# Patient Record
Sex: MALE | Race: BLACK OR AFRICAN AMERICAN | NOT HISPANIC OR LATINO | Employment: OTHER | URBAN - METROPOLITAN AREA
[De-identification: names, ages, dates, MRNs, and addresses within clinical notes are randomized per-mention and may not be internally consistent; named-entity substitution may affect disease eponyms.]

---

## 2018-02-01 DIAGNOSIS — N18.31 CHRONIC KIDNEY DISEASE (CKD) STAGE G3A/A3, MODERATELY DECREASED GLOMERULAR FILTRATION RATE (GFR) BETWEEN 45-59 ML/MIN/1.73 SQUARE METER AND ALBUMINURIA CREATININE RATIO GREATER THAN 300 MG/G (HCC): Primary | ICD-10-CM

## 2018-02-01 DIAGNOSIS — R80.9 PROTEINURIA, UNSPECIFIED TYPE: ICD-10-CM

## 2018-02-01 DIAGNOSIS — E55.9 VITAMIN D DEFICIENCY: ICD-10-CM

## 2018-03-01 ENCOUNTER — OFFICE VISIT (OUTPATIENT)
Dept: NEPHROLOGY | Facility: CLINIC | Age: 68
End: 2018-03-01
Payer: COMMERCIAL

## 2018-03-01 VITALS — BODY MASS INDEX: 27.43 KG/M2 | HEIGHT: 68 IN | WEIGHT: 181 LBS

## 2018-03-01 DIAGNOSIS — R80.1 PERSISTENT PROTEINURIA: Primary | ICD-10-CM

## 2018-03-01 DIAGNOSIS — E11.21 DIABETIC NEPHROPATHY ASSOCIATED WITH TYPE 2 DIABETES MELLITUS (HCC): ICD-10-CM

## 2018-03-01 DIAGNOSIS — N25.81 SECONDARY HYPERPARATHYROIDISM (HCC): ICD-10-CM

## 2018-03-01 DIAGNOSIS — N18.30 CKD (CHRONIC KIDNEY DISEASE) STAGE 3, GFR 30-59 ML/MIN (HCC): ICD-10-CM

## 2018-03-01 PROBLEM — E55.9 VITAMIN D DEFICIENCY: Status: ACTIVE | Noted: 2018-03-01

## 2018-03-01 PROCEDURE — 99214 OFFICE O/P EST MOD 30 MIN: CPT | Performed by: INTERNAL MEDICINE

## 2018-03-01 RX ORDER — OLMESARTAN MEDOXOMIL 5 MG/1
5 TABLET ORAL DAILY
Qty: 90 TABLET | Refills: 3 | Status: SHIPPED | OUTPATIENT
Start: 2018-03-01 | End: 2018-04-06 | Stop reason: SDUPTHER

## 2018-03-01 RX ORDER — ASCORBIC ACID 500 MG
500 TABLET ORAL DAILY
COMMUNITY

## 2018-03-01 RX ORDER — TRAVOPROST 0.004 %
1 DROPS OPHTHALMIC (EYE)
Refills: 0 | COMMUNITY
Start: 2018-02-12

## 2018-03-01 RX ORDER — METOPROLOL SUCCINATE 50 MG/1
50 TABLET, EXTENDED RELEASE ORAL DAILY
COMMUNITY
End: 2019-03-15 | Stop reason: HOSPADM

## 2018-03-01 RX ORDER — OLMESARTAN MEDOXOMIL 5 MG/1
1 TABLET ORAL DAILY
COMMUNITY
Start: 2015-01-01 | End: 2018-03-01 | Stop reason: SDUPTHER

## 2018-03-01 RX ORDER — ATORVASTATIN CALCIUM 20 MG/1
1 TABLET, FILM COATED ORAL DAILY
Refills: 0 | COMMUNITY
Start: 2018-02-19 | End: 2018-04-17 | Stop reason: ALTCHOICE

## 2018-03-01 RX ORDER — CALCITRIOL 0.25 UG/1
1 CAPSULE, LIQUID FILLED ORAL 2 TIMES WEEKLY
COMMUNITY
Start: 2015-01-01 | End: 2019-04-19 | Stop reason: SDUPTHER

## 2018-03-01 RX ORDER — MULTIVITAMIN
1 TABLET ORAL DAILY
COMMUNITY
End: 2019-03-15 | Stop reason: HOSPADM

## 2018-03-01 NOTE — PATIENT INSTRUCTIONS
Please be sure that one meal a day is salad based    Restrict your protein intake to about 50-60 grams a day

## 2018-03-01 NOTE — PROGRESS NOTES
Assessment/Plan:    Diabetic nephropathy associated with type 2 diabetes mellitus ([facility] [address] )  He has diabetes for 5 years but his glucose seems to be well controlled  Not sure if his increased proteinuria is related to diabetes or not  Checking hgb a1c and minor serologic workup  Increasing benicar to 5 mg daily    Secondary hyperparathyroidism ([facility] [address] )  Check pth and phos    CKD (chronic kidney disease) stage 3, GFR 30-59 ml/min  His kidney function has remained preserved Cr is 1 6 his prior cr baseline was 1 3-1 5  Persistent proteinuria  Again as above, increasing proteinuria not sure if this is diabetes or not  Plan for increasing benicar and recheck labs  If no decrease may need to consider kidney biopsy           Subjective:      Patient ID: Saint Richmond is a 78 y o  male  HPI    He is feeling welll and denies any chest or sob  Review of Systems  no cp, sob or edema no urinary issues  Objective:      Ht 5' 8" (1 727 m)   Wt 82 1 kg (181 lb)   BMI 27 52 kg/m²          Physical Exam   Constitutional: He appears well-nourished  HENT:   Head: Atraumatic  Eyes: EOM are normal    Neck: Thyromegaly present  Cardiovascular: Regular rhythm  Pulmonary/Chest: Breath sounds normal    Abdominal: Soft  Bowel sounds are normal    Musculoskeletal: He exhibits no edema  Lymphadenopathy:     He has no cervical adenopathy  Neurological:   nonfocal   Skin: Skin is warm and dry

## 2018-03-01 NOTE — ASSESSMENT & PLAN NOTE
Again as above, increasing proteinuria not sure if this is diabetes or not  Plan for increasing benicar and recheck labs   If no decrease may need to consider kidney biopsy

## 2018-03-28 LAB
25(OH)D3 SERPL-MCNC: 18.3 NG/ML (ref 30–100)
EXT GLUCOSE BLD: 86
EXTERNAL BACTERIA (UA): ABNORMAL
EXTERNAL BUN: 22
EXTERNAL CALCIUM: 8.3
EXTERNAL CHLORIDE: 106
EXTERNAL CO2: 25
EXTERNAL CREATININE: 1.65
EXTERNAL EGFR: 49
EXTERNAL POTASSIUM: 4.6
EXTERNAL SODIUM: 138
GLUCOSE 24H UR-MRATE: ABNORMAL G/(24.H)
HGB UR QL STRIP.AUTO: ABNORMAL
LEUKOCYTE ESTERASE UR QL STRIP: NEGATIVE
MAGNESIUM SERPL-MCNC: 1.7 MG/DL (ref 1.6–2.6)
NITRITE UR QL STRIP: NEGATIVE
NON-SQ EPI CELLS URNS QL MICRO: ABNORMAL /HPF
PH SMN: 5 [PH]
PHOSPHATE SERPL-MCNC: 4 MG/DL (ref 2.3–4.1)
PROT UR STRIP-MCNC: ABNORMAL MG/DL
PROT/CREAT 24H UR: 4.2 MG/G{CREAT}
RBC #/AREA URNS AUTO: ABNORMAL /HPF
SP GR UR STRIP.AUTO: 1.01 (ref 1–1.03)
WBC #/AREA URNS AUTO: ABNORMAL /HPF

## 2018-04-06 ENCOUNTER — OFFICE VISIT (OUTPATIENT)
Dept: NEPHROLOGY | Facility: CLINIC | Age: 68
End: 2018-04-06
Payer: COMMERCIAL

## 2018-04-06 VITALS — HEIGHT: 68 IN | BODY MASS INDEX: 26.98 KG/M2 | WEIGHT: 178 LBS

## 2018-04-06 DIAGNOSIS — R80.1 PERSISTENT PROTEINURIA: Primary | ICD-10-CM

## 2018-04-06 DIAGNOSIS — N25.81 SECONDARY HYPERPARATHYROIDISM (HCC): ICD-10-CM

## 2018-04-06 DIAGNOSIS — E11.21 DIABETIC NEPHROPATHY ASSOCIATED WITH TYPE 2 DIABETES MELLITUS (HCC): Primary | ICD-10-CM

## 2018-04-06 DIAGNOSIS — R80.1 PERSISTENT PROTEINURIA: ICD-10-CM

## 2018-04-06 DIAGNOSIS — E55.9 VITAMIN D DEFICIENCY: ICD-10-CM

## 2018-04-06 DIAGNOSIS — N18.30 CKD (CHRONIC KIDNEY DISEASE) STAGE 3, GFR 30-59 ML/MIN (HCC): ICD-10-CM

## 2018-04-06 PROCEDURE — 99214 OFFICE O/P EST MOD 30 MIN: CPT | Performed by: INTERNAL MEDICINE

## 2018-04-06 RX ORDER — OLMESARTAN MEDOXOMIL 5 MG/1
TABLET ORAL
Qty: 180 TABLET | Refills: 3 | Status: SHIPPED | OUTPATIENT
Start: 2018-04-06 | End: 2018-05-05 | Stop reason: SDUPTHER

## 2018-04-06 NOTE — PROGRESS NOTES
Assessment/Plan:    CKD (chronic kidney disease) stage 3, GFR 30-59 ml/min  She has known stage 3 chronic kidney disease and his kidney function is at his baseline however he does have worsening proteinuria  His hemoglobin A1c is 5 6 and I am not sure at the worsening proteinuria is secondary to diabetic related renal disease or another process  I think we need to pursue a kidney biopsy at this point to further elaborate on the cause of his worsening proteinuria  Serologic workup has been essentially negative    Persistent proteinuria  As noted his protein creatinine ratio has increased to 4 4  Was 1 6 in 2016  While there has been no change in his kidney function the concern is whether not increased proteinuria is due to diabetic nephropathy versus some other process  Serologic workup has been negative  With increasing his Benicar either has not been any decrease in his proteinuria  Increase benicar to 10 mg and repeat labs also setting up for kidney biopsy to evaluate the cause of his proteinuria  Secondary hyperparathyroidism (HCC)  Intact PTH is 78  He is on twice weekly Rocaltrol and his PTH has been stable  Was 76 in 2016    Vitamin D deficiency  Recheck vitamin-D level with next visit; if it is low may be contributing as well secondary hyperparathyroidism    Diabetic nephropathy associated with type 2 diabetes mellitus ([facility] [address] )  Please see other notes  Again the question is is a worsening proteinuria due to diabetic nephropathy or not  Think we need to proceed with a kidney biopsy as discussed  The risks and benefits of a kidney biopsy were discussed with the patient  We talked about the risks of bleeding due to the kidney biopsy  We talked about how he would need to stay in the hospital overnight after the kidney biopsy as risk of bleeding is increased to a 2nd 12 hours    I stated there is a 100,000 chance that the bleeding would be severe now been active procedure such as embolization would need to be done and there was at 1 in a million chance that the bleeding would be significant enough that he could lose kidney  He is currently not on any anti-platelet medication  We will try and set up the biopsy at [facility] over the next 2 weeks; I will try for next week if possible          Subjective:      Patient ID: Osman Alfredo is a 78 y o  male  HPI    Patient has been feeling well  He tolerated the increased dose of Benicar  No dizziness no lightheadedness no chest pressure or shortness of Breath  He is here for follow-up on the blood urine studies after increasing the Benicar    Review of Systems  no chest pressure or shortness of Breath no nausea vomiting diarrhea no abdominal pain or back pain no urgency no frequency    Objective:      Ht 5' 8" (1 727 m)   Wt 80 7 kg (178 lb)   BMI 27 06 kg/m²          Physical Exam   Constitutional: He is oriented to person, place, and time  He appears well-nourished  HENT:   Head: Normocephalic  Eyes: Pupils are equal, round, and reactive to light  Neck: Neck supple  Cardiovascular: Normal rate and regular rhythm  Pulmonary/Chest: Breath sounds normal    Abdominal: Soft  Bowel sounds are normal    Musculoskeletal: He exhibits no edema  Lymphadenopathy:     He has no cervical adenopathy  Neurological: He is alert and oriented to person, place, and time  nonfocal   Skin: Skin is warm and dry

## 2018-04-06 NOTE — ASSESSMENT & PLAN NOTE
Recheck vitamin-D level with next visit; if it is low may be contributing as well secondary hyperparathyroidism

## 2018-04-06 NOTE — ASSESSMENT & PLAN NOTE
She has known stage 3 chronic kidney disease and his kidney function is at his baseline however he does have worsening proteinuria  His hemoglobin A1c is 5 6 and I am not sure at the worsening proteinuria is secondary to diabetic related renal disease or another process  I think we need to pursue a kidney biopsy at this point to further elaborate on the cause of his worsening proteinuria    Serologic workup has been essentially negative

## 2018-04-06 NOTE — ASSESSMENT & PLAN NOTE
Please see other notes  Again the question is is a worsening proteinuria due to diabetic nephropathy or not  Think we need to proceed with a kidney biopsy as discussed  The risks and benefits of a kidney biopsy were discussed with the patient  We talked about the risks of bleeding due to the kidney biopsy  We talked about how he would need to stay in the hospital overnight after the kidney biopsy as risk of bleeding is increased to a 2nd 12 hours  I stated there is a 100,000 chance that the bleeding would be severe now been active procedure such as embolization would need to be done and there was at 1 in a million chance that the bleeding would be significant enough that he could lose kidney  He is currently not on any anti-platelet medication    We will try and set up the biopsy at [facility] over the next 2 weeks; I will try for next week if possible

## 2018-04-06 NOTE — PATIENT INSTRUCTIONS
1  Please increase your benicar to 5 mg 2 tablets daily in the morning  Please call if you develop dizziness or lightheadedness and decrease back to 5 mg once a day    2  Please check your blood pressure at home daily; please call if you develop dizziness or lightheadedness    3   We will call you regarding the set up of the kidney biopsy

## 2018-04-06 NOTE — ASSESSMENT & PLAN NOTE
As noted his protein creatinine ratio has increased to 4 4  Was 1 6 in 2016  While there has been no change in his kidney function the concern is whether not increased proteinuria is due to diabetic nephropathy versus some other process  Serologic workup has been negative  With increasing his Benicar either has not been any decrease in his proteinuria  Increase benicar to 10 mg and repeat labs also setting up for kidney biopsy to evaluate the cause of his proteinuria

## 2018-04-13 ENCOUNTER — TELEPHONE (OUTPATIENT)
Dept: OTHER | Facility: HOSPITAL | Age: 68
End: 2018-04-13

## 2018-04-13 DIAGNOSIS — Z01.818 PRE-OPERATIVE CLEARANCE: ICD-10-CM

## 2018-04-13 DIAGNOSIS — R80.9 PROTEINURIA, UNSPECIFIED TYPE: ICD-10-CM

## 2018-04-13 DIAGNOSIS — N18.31 CHRONIC KIDNEY DISEASE (CKD) STAGE G3A/A2, MODERATELY DECREASED GLOMERULAR FILTRATION RATE (GFR) BETWEEN 45-59 ML/MIN/1.73 SQUARE METER AND ALBUMINURIA CREATININE RATIO BETWEEN 30-299 MG/G (HCC): Primary | ICD-10-CM

## 2018-04-13 NOTE — TELEPHONE ENCOUNTER
I spoke with patient on the phone today as well as IR to clarify the upcoming scheduled procedure  Mr Mouna Michel is scheduled to have IR kidney biopsy on 4/17  I confirmed this date with him  He will get PT and cbc on Saturday at [facility] as well as other lab work and undergo the procedure on Tuesday  Phone consult will be done via IR  Had communicated with IR and my colleagues and the patient will be monitored post procedure and likely discharged in the afternoon that day post-procedure  Appreciate IR input and help

## 2018-04-16 ENCOUNTER — TELEPHONE (OUTPATIENT)
Dept: NEPHROLOGY | Facility: CLINIC | Age: 68
End: 2018-04-16

## 2018-04-16 LAB
ALBUMIN SNV-MCNC: 3.2 G/DL
ALP SERPL-CCNC: 52 U/L (ref 46–116)
ALT SERPL W P-5'-P-CCNC: 25 U/L (ref 12–78)
AST SERPL W P-5'-P-CCNC: 20 U/L (ref 5–45)
BUN SERPL-MCNC: 25 MG/DL (ref 5–25)
CALCIUM SERPL-MCNC: 8.7 MG/DL
CHLORIDE SERPL-SCNC: 105 MMOL/L (ref 100–108)
CO2 SERPL-SCNC: 25 MMOL/L (ref 21–32)
CREAT SERPL-MCNC: 1.72 MG/DL (ref 0.6–1.3)
HCT VFR BLD AUTO: 44.3 % (ref 36.5–49.3)
HGB BLDA-MCNC: 15.4 G/DL (ref 12–17)
PLATELET # BLD AUTO: 198 THOUSANDS/UL (ref 149–390)
POTASSIUM SERPL-SCNC: 4.6 MMOL/L (ref 3.5–5.3)
PROT SERPL-MCNC: 6.4 G/DL (ref 6.4–8.2)
SODIUM SERPL-SCNC: 136 MMOL/L (ref 136–145)
WBC # BLD AUTO: 8 THOUSAND/UL

## 2018-04-16 NOTE — TELEPHONE ENCOUNTER
Per patient, he was told by [facility] IR nurse that they will draw PT/INR on his arrival for biopsy  Accordingly, pt did not go to [facility] 4/14/18 to have it done as scheduled

## 2018-04-17 ENCOUNTER — HOSPITAL ENCOUNTER (OUTPATIENT)
Dept: CT IMAGING | Facility: HOSPITAL | Age: 68
Discharge: HOME | End: 2018-04-17
Attending: INTERNAL MEDICINE
Payer: COMMERCIAL

## 2018-04-17 VITALS
TEMPERATURE: 97.6 F | WEIGHT: 178 LBS | BODY MASS INDEX: 26.98 KG/M2 | OXYGEN SATURATION: 99 % | SYSTOLIC BLOOD PRESSURE: 146 MMHG | DIASTOLIC BLOOD PRESSURE: 61 MMHG | HEIGHT: 68 IN | RESPIRATION RATE: 18 BRPM | HEART RATE: 71 BPM

## 2018-04-17 DIAGNOSIS — R80.1 PERSISTENT PROTEINURIA: ICD-10-CM

## 2018-04-17 LAB
GLUCOSE SERPL-MCNC: 91 MG/DL (ref 65–140)
INR PPP: 0.9 (ref 0.86–1.16)
PROTHROMBIN TIME: 12.4 SECONDS (ref 12.1–14.4)

## 2018-04-17 PROCEDURE — 50200 RENAL BIOPSY PERQ: CPT | Performed by: RADIOLOGY

## 2018-04-17 PROCEDURE — 99152 MOD SED SAME PHYS/QHP 5/>YRS: CPT

## 2018-04-17 PROCEDURE — 88300 SURGICAL PATH GROSS: CPT | Performed by: PATHOLOGY

## 2018-04-17 PROCEDURE — 77012 CT SCAN FOR NEEDLE BIOPSY: CPT

## 2018-04-17 PROCEDURE — 88348 ELECTRON MICROSCOPY DX: CPT

## 2018-04-17 PROCEDURE — 77012 CT SCAN FOR NEEDLE BIOPSY: CPT | Performed by: RADIOLOGY

## 2018-04-17 PROCEDURE — 88346 IMFLUOR 1ST 1ANTB STAIN PX: CPT

## 2018-04-17 PROCEDURE — 99152 MOD SED SAME PHYS/QHP 5/>YRS: CPT | Performed by: RADIOLOGY

## 2018-04-17 PROCEDURE — 88350 IMFLUOR EA ADDL 1ANTB STN PX: CPT

## 2018-04-17 PROCEDURE — 88329 PATH CONSLTJ DRG SURG: CPT | Performed by: PATHOLOGY

## 2018-04-17 PROCEDURE — 88313 SPECIAL STAINS GROUP 2: CPT

## 2018-04-17 PROCEDURE — 85610 PROTHROMBIN TIME: CPT | Performed by: RADIOLOGY

## 2018-04-17 PROCEDURE — 88305 TISSUE EXAM BY PATHOLOGIST: CPT | Performed by: INTERNAL MEDICINE

## 2018-04-17 PROCEDURE — 82948 REAGENT STRIP/BLOOD GLUCOSE: CPT

## 2018-04-17 PROCEDURE — 50200 RENAL BIOPSY PERQ: CPT

## 2018-04-17 RX ORDER — MIDAZOLAM HYDROCHLORIDE 1 MG/ML
INJECTION INTRAMUSCULAR; INTRAVENOUS CODE/TRAUMA/SEDATION MEDICATION
Status: COMPLETED | OUTPATIENT
Start: 2018-04-17 | End: 2018-04-17

## 2018-04-17 RX ORDER — SODIUM CHLORIDE 9 MG/ML
75 INJECTION, SOLUTION INTRAVENOUS CONTINUOUS
Status: DISCONTINUED | OUTPATIENT
Start: 2018-04-17 | End: 2018-04-18 | Stop reason: HOSPADM

## 2018-04-17 RX ORDER — HYDROCODONE BITARTRATE AND ACETAMINOPHEN 5; 325 MG/1; MG/1
1 TABLET ORAL EVERY 6 HOURS PRN
Status: DISCONTINUED | OUTPATIENT
Start: 2018-04-17 | End: 2018-04-18 | Stop reason: HOSPADM

## 2018-04-17 RX ORDER — FENTANYL CITRATE 50 UG/ML
INJECTION, SOLUTION INTRAMUSCULAR; INTRAVENOUS CODE/TRAUMA/SEDATION MEDICATION
Status: COMPLETED | OUTPATIENT
Start: 2018-04-17 | End: 2018-04-17

## 2018-04-17 RX ORDER — BIOTIN 1 MG
1 TABLET ORAL DAILY
COMMUNITY

## 2018-04-17 RX ADMIN — FENTANYL CITRATE 50 MCG: 50 INJECTION INTRAMUSCULAR; INTRAVENOUS at 09:32

## 2018-04-17 RX ADMIN — SODIUM CHLORIDE 75 ML/HR: 0.9 INJECTION, SOLUTION INTRAVENOUS at 08:30

## 2018-04-17 RX ADMIN — FENTANYL CITRATE 50 MCG: 50 INJECTION INTRAMUSCULAR; INTRAVENOUS at 09:36

## 2018-04-17 RX ADMIN — MIDAZOLAM HYDROCHLORIDE 1 MG: 1 INJECTION, SOLUTION INTRAMUSCULAR; INTRAVENOUS at 09:36

## 2018-04-17 RX ADMIN — MIDAZOLAM HYDROCHLORIDE 1 MG: 1 INJECTION, SOLUTION INTRAMUSCULAR; INTRAVENOUS at 09:32

## 2018-04-17 NOTE — DISCHARGE INSTRUCTIONS
Percutaneous Kidney Biopsy   WHAT YOU NEED TO KNOW:   A percutaneous kidney biopsy is a procedure to remove a small sample of kidney tissue  It may also be done to check for kidney disease or cancer  DISCHARGE INSTRUCTIONS:   Follow up with your healthcare provider as directed:  Write down your questions so you remember to ask them during your visits  Wound care: The Band-Aid may be removed in 24 hours  For more information:   · National Kid[employer] and Urologic [employer] Information Clearinghouse  [address], [place] [address]  Phone: 9- 583 - [phone]  Web Address: http://kidney  niddk nih gov/   Care after your procedure:    1  Limit your activities for 36 hours after your biopsy  2  No driving day of biopsy  3  Return to your normal diet  Flat sips of flat soda helps with mild nausea  4  Remove band-aid or dressing 24 hours after procedure  Contact Interventional Radiology at [phone]    Ben PATIENTS: Contact Interventional Radiology at [phone]) Dany Skiff PATIENTS: Contact Interventional Radiology at [phone]) if:    1  Difficulty breathing, nausea or vomiting  2  You feel weak or dizzy  3  Chills or fever above 101 degrees F  You have persistent nausea or vomiting    4  You have severe pain in your abdomen or where You feel weak or dizzy  your           procedure was done  5  You have blood in your urine  You urinate small amounts or not at all  4  Develop any redness, swelling, heat, unusual drainage, heavy bruising or bleeding     from biopsy site

## 2018-04-17 NOTE — BRIEF OP NOTE (RAD/CATH)
CT NEEDLE BIOPSY KIDNEY  Procedure Note    PATIENT NAME: Genie Heimlich  : 1950  MRN: [id number]     Pre-op Diagnosis:   1  Persistent proteinuria      Post-op Diagnosis:   1  Persistent proteinuria        Surgeon:   Obey Sanchez MD  Assistants:     No qualified resident was available, Resident is only observing    Estimated Blood Loss: minimal     Findings:     Lower pole right kidney biopsy performed  Glomeruli present, adequate for evaluation  D stat was used  No post biopsy hemorrhage  Specimens: two 18 gauge core biopsy samples       Complications:  none    Anesthesia: Conscious sedation and Amy Duvall MD     Date: 2018  Time: 9:54 AM

## 2018-04-27 LAB — MISCELLANEOUS LAB TEST RESULT: NORMAL

## 2018-05-05 DIAGNOSIS — R80.1 PERSISTENT PROTEINURIA: ICD-10-CM

## 2018-05-07 RX ORDER — OLMESARTAN MEDOXOMIL 5 MG/1
TABLET ORAL
Qty: 30 TABLET | Refills: 6 | Status: SHIPPED | OUTPATIENT
Start: 2018-05-07 | End: 2018-05-11

## 2018-05-10 PROBLEM — N05.1 FSGS (FOCAL SEGMENTAL GLOMERULOSCLEROSIS): Status: ACTIVE | Noted: 2018-05-10

## 2018-05-11 ENCOUNTER — OFFICE VISIT (OUTPATIENT)
Dept: NEPHROLOGY | Facility: CLINIC | Age: 68
End: 2018-05-11
Payer: COMMERCIAL

## 2018-05-11 VITALS — BODY MASS INDEX: 26.98 KG/M2 | WEIGHT: 178 LBS | HEIGHT: 68 IN

## 2018-05-11 DIAGNOSIS — R80.1 PERSISTENT PROTEINURIA: ICD-10-CM

## 2018-05-11 DIAGNOSIS — N18.30 CKD (CHRONIC KIDNEY DISEASE) STAGE 3, GFR 30-59 ML/MIN (HCC): ICD-10-CM

## 2018-05-11 DIAGNOSIS — N05.1 FSGS (FOCAL SEGMENTAL GLOMERULOSCLEROSIS): Primary | ICD-10-CM

## 2018-05-11 PROBLEM — I10 ESSENTIAL HYPERTENSION: Status: ACTIVE | Noted: 2018-05-11

## 2018-05-11 PROCEDURE — 99214 OFFICE O/P EST MOD 30 MIN: CPT | Performed by: INTERNAL MEDICINE

## 2018-05-11 RX ORDER — OLMESARTAN MEDOXOMIL 5 MG/1
12.5 TABLET ORAL DAILY
Qty: 90 TABLET | Refills: 4 | Status: SHIPPED | OUTPATIENT
Start: 2018-05-11 | End: 2018-12-31 | Stop reason: SDUPTHER

## 2018-05-11 RX ORDER — OLMESARTAN MEDOXOMIL 5 MG/1
12.5 TABLET ORAL DAILY
Qty: 90 TABLET | Refills: 3 | Status: SHIPPED | OUTPATIENT
Start: 2018-05-11 | End: 2018-05-11

## 2018-05-11 NOTE — PROGRESS NOTES
Assessment/Plan:    CKD (chronic kidney disease) stage 3, GFR 30-59 ml/min  The patient has a baseline Cr in the mid 1 0 range; the patient has biopsy proven  Secondary FSGS  I had called the patient  After his biopsy with the results  The goal of therapy is to increase ARB dosing, weight loss  FSGS (focal segmental glomerulosclerosis)  This is secondary FSGS most likely; the patient has glomerulomegaly on biopsy  As noted above, the plan would be increasing ARB dosage, further weight loss as well as screening for ELBERT  He has lost at least 30 lb of not more since I have seen him  His current weight is 170 lb would like to see him down to 170 lb and then we can reassess  2 weeks ago we had increased his almost started 10 mg daily now will increase to 12 5 mg daily alternating with 10 mg will repeat his potassium and if it is stable will increase to just  12 5 mg daily    Secondary hyperparathyroidism ([address] )  Last intact PTH was 78  Patient is on calcitriol; recheck PTH level    Persistent proteinuria   He has biopsy-proven secondary FSGS with glomerulomegaly  And there is only mild diabetic glomerulosclerosis  There was minimal fibrosis; This is more of a secondary form of FSGS encouraging weight loss and increasing ARB  As able  Essential hypertension   His blood pressure is currently 140/60  Increasing Benicar as noted above; also encouraging weight loss  Subjective:      Patient ID: Myrna Garcias is a 78 y o  male  HPI      We are seeing Mr Mustafa  In follow-up for chronic kidney disease and status post kidney biopsy  His biopsy demonstrated more of a secondary focal segmental glomerulosclerosis with only very mild diabetic changes  We had increased his Benicar 10 mg  Daily couple of weeks ago and he is tolerating it okay      Review of Systems    He denies any gross hematuria no swelling in the legs no nausea vomiting diarrhea, he denies any abdominal or back pain no dizziness no lightheadedness no cramping  His weight is decreased to 178 pounds  Objective:      Ht 5' 8" (1 727 m)   Wt 80 7 kg (178 lb)   BMI 27 06 kg/m²      blood pressure is 140/62 in the right arm the     Physical Exam   Constitutional: He appears well-nourished  HENT:   Head: Atraumatic  Eyes: No scleral icterus  Neck: Neck supple  Cardiovascular: Normal rate and regular rhythm  Pulmonary/Chest: Effort normal and breath sounds normal    Abdominal: Soft  Bowel sounds are normal    Musculoskeletal: He exhibits no edema  Lymphadenopathy:     He has no cervical adenopathy  Neurological:   Nonfocal patient answers questions appropriately   Skin: Skin is warm and dry

## 2018-05-11 NOTE — ASSESSMENT & PLAN NOTE
His blood pressure is currently 140/60  Increasing Benicar as noted above; also encouraging weight loss

## 2018-05-11 NOTE — ASSESSMENT & PLAN NOTE
He has biopsy-proven secondary FSGS with glomerulomegaly  And there is only mild diabetic glomerulosclerosis  There was minimal fibrosis; This is more of a secondary form of FSGS encouraging weight loss and increasing ARB  As able

## 2018-05-11 NOTE — PATIENT INSTRUCTIONS
1  You have lost a tremendous amount of weight  Let's shoot for 170 pounds and then reassess  2  Increase benicar to 10 mg alternating with 12 5 mg every other day  Will repeat blood work the first week of [place]      3  Thank you for coming in today; it was nice visiting with you

## 2018-05-11 NOTE — ASSESSMENT & PLAN NOTE
The patient has a baseline Cr in the mid 1 0 range; the patient has biopsy proven  Secondary FSGS  I had called the patient  After his biopsy with the results  The goal of therapy is to increase ARB dosing, weight loss

## 2018-05-11 NOTE — ASSESSMENT & PLAN NOTE
This is secondary FSGS most likely; the patient has glomerulomegaly on biopsy  As noted above, the plan would be increasing ARB dosage, further weight loss as well as screening for ELBERT  He has lost at least 30 lb of not more since I have seen him  His current weight is 170 lb would like to see him down to 170 lb and then we can reassess    2 weeks ago we had increased his almost started 10 mg daily now will increase to 12 5 mg daily alternating with 10 mg will repeat his potassium and if it is stable will increase to just  12 5 mg daily

## 2018-07-25 DIAGNOSIS — M10.9 GOUT, UNSPECIFIED CAUSE, UNSPECIFIED CHRONICITY, UNSPECIFIED SITE: Primary | ICD-10-CM

## 2018-07-30 LAB
25(OH)D3 SERPL-MCNC: 28.7 NG/ML (ref 30–100)
EXT GLUCOSE BLD: 91
EXTERNAL BUN: 20
EXTERNAL CALCIUM: 8.4
EXTERNAL CHLORIDE: 107
EXTERNAL CO2: 24
EXTERNAL CREATININE: 1.7
EXTERNAL EGFR: 47
EXTERNAL POTASSIUM: 4.4
EXTERNAL SODIUM: 137

## 2018-08-22 NOTE — ASSESSMENT & PLAN NOTE
He has stage 3 chronic kidney disease and biopsy-proven focal segmental glomerular sclerosis  His baseline creatinine has been in the mid 1 range about 1 6-1 8  Titrating Benicar as much as possible in terms of facilitating protein reduction  He has also lost weight which is also helping  Urinalysis shows 3+ protein and no blood  Need to recheck protein creatinine ratio    Recheck urine studies in October

## 2018-08-24 ENCOUNTER — OFFICE VISIT (OUTPATIENT)
Dept: NEPHROLOGY | Facility: CLINIC | Age: 68
End: 2018-08-24
Payer: COMMERCIAL

## 2018-08-24 VITALS — WEIGHT: 173 LBS | BODY MASS INDEX: 26.22 KG/M2 | HEIGHT: 68 IN

## 2018-08-24 DIAGNOSIS — N18.30 CKD (CHRONIC KIDNEY DISEASE) STAGE 3, GFR 30-59 ML/MIN (HCC): ICD-10-CM

## 2018-08-24 DIAGNOSIS — E55.9 VITAMIN D DEFICIENCY: ICD-10-CM

## 2018-08-24 DIAGNOSIS — R80.1 PERSISTENT PROTEINURIA: ICD-10-CM

## 2018-08-24 DIAGNOSIS — N05.1 FSGS (FOCAL SEGMENTAL GLOMERULOSCLEROSIS): Primary | ICD-10-CM

## 2018-08-24 PROCEDURE — 99214 OFFICE O/P EST MOD 30 MIN: CPT | Performed by: INTERNAL MEDICINE

## 2018-08-24 RX ORDER — ATORVASTATIN CALCIUM 20 MG/1
1 TABLET, FILM COATED ORAL DAILY
COMMUNITY
Start: 2018-07-27 | End: 2020-11-17 | Stop reason: SDUPTHER

## 2018-08-24 NOTE — PATIENT INSTRUCTIONS
1  Thank you for coming to see me today       2  Please check your blood pressure three times a week and let me know if your blood pressure is consistently over 140

## 2018-08-24 NOTE — ASSESSMENT & PLAN NOTE
Recheck PTH level before next blood work in October his phosphorus is stable at 3 7  From his prior blood work intact PTH was 78    Maintain twice weekly Rocaltrol

## 2018-08-24 NOTE — PROGRESS NOTES
Assessment/Plan:    FSGS (focal segmental glomerulosclerosis)   He has stage 3 chronic kidney disease and biopsy-proven focal segmental glomerular sclerosis  His baseline creatinine has been in the mid 1 range about 1 6-1 8  Titrating Benicar as much as possible in terms of facilitating protein reduction  He has also lost weight which is also helping  Urinalysis shows 3+ protein and no blood  Need to recheck protein creatinine ratio  Recheck urine studies in October    Vitamin D deficiency   The vitamin-D level is increased from 18-28  Continue with vitamin-D  Persistent proteinuria   Secondary to biopsy-proven focal segmental glomerulosclerosis  Increase ARB dosing as tolerated    CKD (chronic kidney disease) stage 3, GFR 30-59 ml/min  He has baseline ckd 3 with Cr 1 6-1 8 baseline  On recent blood work is creatinine is 1 7 continue with Benicar    Secondary hyperparathyroidism ([address] )  Recheck PTH level before next blood work in October his phosphorus is stable at 3 7  From his prior blood work intact PTH was 78  Maintain twice weekly Rocaltrol    He is undergoing evaluation with Cardiology with a stress test and echocardiogram   I askedWith regards to hypertension:   him to check his blood pressure at home at least 3 times a week sitting to let me know if he is consistently over were above 140  I am not sure if any medications will be adjusted by Cardiology until he undergoes further cardiac testing so I am holding off on adjusting any medications of his right now  If he has any further gout exacerbations with talked about switching his Benicar to losartan which is more uricosuric  Abdirahman Beckford He has no history of any kidney stones  We talked about maintaining fluid intake  With regards to FSGS, he has lost weight and is new set point seems to be in the 170s and long-term this will only help his kidney function  All questions answered  Subjective:      Patient ID: Eddie Sarmiento is a 80 y o  male     HPI    Patient is here with his wife in follow-up for chronic kidney disease  He has had trying couple of weeks  He talked about the recent passing of his father and dealing with family issues  He also talked about the long drive that he had to his father's    He had developed some transient leg edema that seemed to resolve with diuretics  He has seen Cardiology and will be undergoing an echocardiogram and stress test   He also had a recent gout exacerbation which was his 1st and approximately 3 years  He currently denies any acute complaints  He denies any further problems with any leg swelling no chest pressure or shortness of Breath no nausea vomiting diarrhea no dizziness no lightheadedness  He denies any further ankle swelling no further pain; he states he had taken tart cherry extract and helped him with his gout    Review of Systems  as above  Objective:  146/62 in the right arm    Ht 5' 8" (1 727 m)   Wt 78 5 kg (173 lb)   BMI 26 30 kg/m²          Physical Exam   Constitutional: He appears well-nourished  Eyes: No scleral icterus  Neck: Neck supple  Cardiovascular: Regular rhythm  Pulmonary/Chest: Breath sounds normal    Abdominal: Soft  Bowel sounds are normal    Musculoskeletal: He exhibits no edema  There is no ankle edema there is no tenderness   Lymphadenopathy:     He has no cervical adenopathy  Neurological:   Nonfocal   Skin: Skin is warm and dry  No erythema

## 2018-08-24 NOTE — ASSESSMENT & PLAN NOTE
He has baseline ckd 3 with Cr 1 6-1 8 baseline    On recent blood work is creatinine is 1 7 continue with Benicar

## 2018-10-01 LAB
BILIRUB UR QL STRIP: NEGATIVE
CREAT ?TM UR-SCNC: 161 UMOL/L
CREAT ?TM UR-SCNC: 161 UMOL/L
EXT GLUCOSE BLD: 96
EXT PROTEIN URINE: 640
EXTERNAL ALBUMIN: 3.2
EXTERNAL ALK PHOS: 53
EXTERNAL ALT: 21
EXTERNAL ANION GAP: 10
EXTERNAL AST: 16
EXTERNAL BACTERIA (UA): ABNORMAL
EXTERNAL BICARBONATE: 28
EXTERNAL BUN: 21
EXTERNAL CALCIUM: 8.8
EXTERNAL CHLORIDE: 106
EXTERNAL CREATININE: 1.71
EXTERNAL EGFR: 41
EXTERNAL PHOSPHORUS: 3.6
EXTERNAL POTASSIUM: 4.6
EXTERNAL PTH: 82
EXTERNAL SODIUM: 139
EXTERNAL T.BILIRUBIN: 0.5
EXTERNAL TOTAL PROTEIN: 6.4
GLUCOSE UR STRIP-MCNC: NEGATIVE MG/DL
HCT VFR BLD AUTO: 42.2 % (ref 36.5–49.3)
HGB BLD-MCNC: 14.6 G/DL (ref 12–17)
HGB UR QL STRIP.AUTO: ABNORMAL
KETONES UR STRIP-MCNC: NEGATIVE MG/DL
LEUKOCYTE ESTERASE UR QL STRIP: NEGATIVE
NITRITE UR QL STRIP: NEGATIVE
NON-SQ EPI CELLS URNS QL MICRO: ABNORMAL /HPF
PH UR STRIP.AUTO: 6 [PH] (ref 4.5–8)
PLATELET # BLD AUTO: 196 THOUSANDS/UL (ref 149–390)
PROT UR STRIP-MCNC: ABNORMAL MG/DL
PROT/CREAT UR: 4 MG/G{CREAT}
RBC #/AREA URNS AUTO: ABNORMAL /HPF
SP GR UR STRIP.AUTO: 1.03 (ref 1–1.03)
UROBILINOGEN UR QL STRIP.AUTO: 0.2 E.U./DL
WBC # BLD AUTO: 8.4 THOUSAND/UL
WBC #/AREA URNS AUTO: ABNORMAL /HPF

## 2018-12-28 ENCOUNTER — OFFICE VISIT (OUTPATIENT)
Dept: NEPHROLOGY | Facility: CLINIC | Age: 68
End: 2018-12-28
Payer: COMMERCIAL

## 2018-12-28 VITALS — WEIGHT: 165 LBS | BODY MASS INDEX: 25.01 KG/M2 | HEIGHT: 68 IN

## 2018-12-28 DIAGNOSIS — N05.1 FSGS (FOCAL SEGMENTAL GLOMERULOSCLEROSIS): ICD-10-CM

## 2018-12-28 DIAGNOSIS — N25.81 SECONDARY HYPERPARATHYROIDISM (HCC): ICD-10-CM

## 2018-12-28 DIAGNOSIS — N18.30 CKD (CHRONIC KIDNEY DISEASE) STAGE 3, GFR 30-59 ML/MIN (HCC): Primary | ICD-10-CM

## 2018-12-28 DIAGNOSIS — R80.1 PERSISTENT PROTEINURIA: ICD-10-CM

## 2018-12-28 PROCEDURE — 99214 OFFICE O/P EST MOD 30 MIN: CPT | Performed by: INTERNAL MEDICINE

## 2018-12-28 NOTE — ASSESSMENT & PLAN NOTE
He has baseline CKD 3 with a baseline cr between 1 6-1 8  His creatinine is stable 1 7  Given his recent influenza/viral URI he had lost about 5 lb he decreased his Benicar dose to 10 mg daily  I asked him especially given the focal segmental glomerulosclerosis and the degree of proteinuria that we would try and increase his Benicar dose as much as possible  He was agreeable to increasing his dose back to 12 5 mg every other day with the 10 mg dose and if he was feeling okay with this by mid January to increased to 12 5 mg daily  His potassium has been running at 4 6-5 0 range will check a BMP in February to recheck a repeat potassium and creatinine level

## 2018-12-28 NOTE — ASSESSMENT & PLAN NOTE
This is secondary FSGS as on biopsy there was glomerulomegaly and only 15% foot process effacement  The degree of fibrosis was mild on kidney biopsy  From a treatment standpoint, he has lost 35 lb and his weight seems to be stable at 170 lb  He also we are trying to max out the ARB  Please see above with regards to Benicar dosing  Will follow protein levels and kidney function closely

## 2018-12-28 NOTE — ASSESSMENT & PLAN NOTE
He has biopsy proven secondary FSGS  He is on ARB and he has lost a good amount of weight  We had increased his benicar dosing to 12 5 mg daily   His p/c ratio was 4 4 in April 2018 and on recent labs has been 4 0

## 2018-12-28 NOTE — ASSESSMENT & PLAN NOTE
His last PTH in October was 80 and his phos was okay   Maintain twice weekly calcitriol and recheck labs in [place]

## 2018-12-28 NOTE — PROGRESS NOTES
Assessment/Plan:    CKD (chronic kidney disease) stage 3, GFR 30-59 ml/min  He has baseline CKD 3 with a baseline cr between 1 6-1 8  His creatinine is stable 1 7  Given his recent influenza/viral URI he had lost about 5 lb he decreased his Benicar dose to 10 mg daily  I asked him especially given the focal segmental glomerulosclerosis and the degree of proteinuria that we would try and increase his Benicar dose as much as possible  He was agreeable to increasing his dose back to 12 5 mg every other day with the 10 mg dose and if he was feeling okay with this by mid January to increased to 12 5 mg daily  His potassium has been running at 4 6-5 0 range will check a BMP in February to recheck a repeat potassium and creatinine level  Persistent proteinuria  He has biopsy proven secondary FSGS  He is on ARB and he has lost a good amount of weight  We had increased his benicar dosing to 12 5 mg daily  His p/c ratio was 4 4 in April 2018 and on recent labs has been 4 0    FSGS (focal segmental glomerulosclerosis)  This is secondary FSGS as on biopsy there was glomerulomegaly and only 15% foot process effacement  The degree of fibrosis was mild on kidney biopsy  From a treatment standpoint, he has lost 35 lb and his weight seems to be stable at 170 lb  He also we are trying to max out the ARB  Please see above with regards to Benicar dosing  Will follow protein levels and kidney function closely  Secondary hyperparathyroidism ([address] )  His last PTH in October was 80 and his phos was okay  Maintain twice weekly calcitriol and recheck labs in jan          Subjective:      Patient ID: Silver Santillan is a 80 y o  male  HPI    Patient seen in follow-up for stage 3 chronic kidney disease  He has recently been dealing with a viral URI/influenza which seems to be resolving  He still has more of persistent cough for which he is following up with his primary care physician    He denies any chest pressure and shortness of breath  He denies any dizziness or lightheadedness  He has been taking Chlor-Trimeton for cold and flu symptoms  Review of Systems  currently no nausea vomiting diarrhea no shortness of breath positive nonproductive persistent cough no leg edema no dizziness or lightheadedness standing up  No abdominal or back pain no diarrhea    Objective:      Ht 5' 8" (1 727 m)   Wt 74 8 kg (165 lb)   BMI 25 09 kg/m²     Blood pressure is 146/70 in the right arm     Physical Exam   Constitutional: He appears well-nourished  Eyes: No scleral icterus  Neck: Neck supple  Cardiovascular: Normal rate and regular rhythm  Pulmonary/Chest: Breath sounds normal    Abdominal: Soft  Bowel sounds are normal    Musculoskeletal: He exhibits no edema  Lymphadenopathy:     He has no cervical adenopathy  Neurological:   Nonfocal   Skin: Skin is warm and dry

## 2018-12-28 NOTE — PATIENT INSTRUCTIONS
1  Please increase benicar to 12 5 mg every other day alternating with 10 mg dose  In mid January, ie around Jan 14 if you are feeling okay with this increase would increase benicar to 12 5 mg daily then  We will check bmp in febreary too followup on potassium levels and kidney function      2  Thank you for coming in to see me today and [address]

## 2018-12-31 DIAGNOSIS — N05.1 FSGS (FOCAL SEGMENTAL GLOMERULOSCLEROSIS): ICD-10-CM

## 2018-12-31 DIAGNOSIS — R80.1 PERSISTENT PROTEINURIA: ICD-10-CM

## 2019-01-01 RX ORDER — OLMESARTAN MEDOXOMIL 5 MG/1
TABLET ORAL
Qty: 90 TABLET | Refills: 4 | Status: SHIPPED | OUTPATIENT
Start: 2019-01-01 | End: 2019-02-06

## 2019-01-29 ENCOUNTER — HOSPITAL ENCOUNTER (INPATIENT)
Facility: HOSPITAL | Age: 69
LOS: 1 days | Discharge: HOME | DRG: 281 | End: 2019-01-30
Attending: EMERGENCY MEDICINE | Admitting: INTERNAL MEDICINE
Payer: COMMERCIAL

## 2019-01-29 ENCOUNTER — APPOINTMENT (EMERGENCY)
Dept: RADIOLOGY | Facility: HOSPITAL | Age: 69
DRG: 281 | End: 2019-01-29
Payer: COMMERCIAL

## 2019-01-29 ENCOUNTER — APPOINTMENT (INPATIENT)
Dept: NON INVASIVE DIAGNOSTICS | Facility: HOSPITAL | Age: 69
DRG: 281 | End: 2019-01-29
Payer: COMMERCIAL

## 2019-01-29 DIAGNOSIS — I50.9 ACUTE CHF (CONGESTIVE HEART FAILURE) (HCC): ICD-10-CM

## 2019-01-29 DIAGNOSIS — I10 ESSENTIAL HYPERTENSION: ICD-10-CM

## 2019-01-29 DIAGNOSIS — R77.8 ELEVATED TROPONIN: ICD-10-CM

## 2019-01-29 DIAGNOSIS — I50.9 CHF (CONGESTIVE HEART FAILURE) (HCC): Primary | ICD-10-CM

## 2019-01-29 PROBLEM — R06.02 SHORTNESS OF BREATH: Status: ACTIVE | Noted: 2019-01-29

## 2019-01-29 PROBLEM — R79.89 ELEVATED TROPONIN: Status: ACTIVE | Noted: 2019-01-29

## 2019-01-29 LAB
ALBUMIN SERPL BCP-MCNC: 2.4 G/DL (ref 3.5–5)
ALP SERPL-CCNC: 94 U/L (ref 46–116)
ALT SERPL W P-5'-P-CCNC: 75 U/L (ref 12–78)
ANION GAP SERPL CALCULATED.3IONS-SCNC: 9 MMOL/L (ref 4–13)
APTT PPP: 29 SECONDS (ref 26–38)
AST SERPL W P-5'-P-CCNC: 62 U/L (ref 5–45)
BASOPHILS # BLD AUTO: 0.05 THOUSANDS/ΜL (ref 0–0.1)
BASOPHILS NFR BLD AUTO: 1 % (ref 0–1)
BILIRUB SERPL-MCNC: 0.4 MG/DL (ref 0.2–1)
BUN SERPL-MCNC: 32 MG/DL (ref 5–25)
CALCIUM SERPL-MCNC: 8.1 MG/DL (ref 8.3–10.1)
CHLORIDE SERPL-SCNC: 105 MMOL/L (ref 100–108)
CO2 SERPL-SCNC: 24 MMOL/L (ref 21–32)
CREAT SERPL-MCNC: 1.95 MG/DL (ref 0.6–1.3)
EOSINOPHIL # BLD AUTO: 0.26 THOUSAND/ΜL (ref 0–0.61)
EOSINOPHIL NFR BLD AUTO: 3 % (ref 0–6)
ERYTHROCYTE [DISTWIDTH] IN BLOOD BY AUTOMATED COUNT: 14.3 % (ref 11.6–15.1)
GFR SERPL CREATININE-BSD FRML MDRD: 40 ML/MIN/1.73SQ M
GLUCOSE SERPL-MCNC: 107 MG/DL (ref 65–140)
GLUCOSE SERPL-MCNC: 111 MG/DL (ref 65–140)
GLUCOSE SERPL-MCNC: 114 MG/DL (ref 65–140)
GLUCOSE SERPL-MCNC: 129 MG/DL (ref 65–140)
GLUCOSE SERPL-MCNC: 133 MG/DL (ref 65–140)
HCT VFR BLD AUTO: 43.5 % (ref 36.5–49.3)
HGB BLD-MCNC: 14.6 G/DL (ref 12–17)
IMM GRANULOCYTES # BLD AUTO: 0.03 THOUSAND/UL (ref 0–0.2)
IMM GRANULOCYTES NFR BLD AUTO: 0 % (ref 0–2)
INR PPP: 1.08 (ref 0.86–1.17)
LYMPHOCYTES # BLD AUTO: 1.74 THOUSANDS/ΜL (ref 0.6–4.47)
LYMPHOCYTES NFR BLD AUTO: 20 % (ref 14–44)
MCH RBC QN AUTO: 30.7 PG (ref 26.8–34.3)
MCHC RBC AUTO-ENTMCNC: 33.6 G/DL (ref 31.4–37.4)
MCV RBC AUTO: 91 FL (ref 82–98)
MONOCYTES # BLD AUTO: 0.61 THOUSAND/ΜL (ref 0.17–1.22)
MONOCYTES NFR BLD AUTO: 7 % (ref 4–12)
NEUTROPHILS # BLD AUTO: 6.1 THOUSANDS/ΜL (ref 1.85–7.62)
NEUTS SEG NFR BLD AUTO: 69 % (ref 43–75)
NRBC BLD AUTO-RTO: 0 /100 WBCS
NT-PROBNP SERPL-MCNC: ABNORMAL PG/ML
PLATELET # BLD AUTO: 220 THOUSANDS/UL (ref 149–390)
PMV BLD AUTO: 9.4 FL (ref 8.9–12.7)
POTASSIUM SERPL-SCNC: 4.4 MMOL/L (ref 3.5–5.3)
PROT SERPL-MCNC: 6.3 G/DL (ref 6.4–8.2)
PROTHROMBIN TIME: 13.7 SECONDS (ref 11.8–14.2)
RBC # BLD AUTO: 4.76 MILLION/UL (ref 3.88–5.62)
SODIUM SERPL-SCNC: 138 MMOL/L (ref 136–145)
TROPONIN I SERPL-MCNC: 0.04 NG/ML
TROPONIN I SERPL-MCNC: 0.04 NG/ML
TROPONIN I SERPL-MCNC: 0.05 NG/ML
TSH SERPL DL<=0.05 MIU/L-ACNC: 3.35 UIU/ML (ref 0.36–3.74)
WBC # BLD AUTO: 8.79 THOUSAND/UL (ref 4.31–10.16)

## 2019-01-29 PROCEDURE — 84484 ASSAY OF TROPONIN QUANT: CPT | Performed by: PHYSICIAN ASSISTANT

## 2019-01-29 PROCEDURE — 93005 ELECTROCARDIOGRAM TRACING: CPT

## 2019-01-29 PROCEDURE — 83880 ASSAY OF NATRIURETIC PEPTIDE: CPT | Performed by: EMERGENCY MEDICINE

## 2019-01-29 PROCEDURE — 93306 TTE W/DOPPLER COMPLETE: CPT

## 2019-01-29 PROCEDURE — 71046 X-RAY EXAM CHEST 2 VIEWS: CPT

## 2019-01-29 PROCEDURE — 80053 COMPREHEN METABOLIC PANEL: CPT | Performed by: EMERGENCY MEDICINE

## 2019-01-29 PROCEDURE — 36415 COLL VENOUS BLD VENIPUNCTURE: CPT | Performed by: EMERGENCY MEDICINE

## 2019-01-29 PROCEDURE — 93306 TTE W/DOPPLER COMPLETE: CPT | Performed by: INTERNAL MEDICINE

## 2019-01-29 PROCEDURE — 85610 PROTHROMBIN TIME: CPT | Performed by: EMERGENCY MEDICINE

## 2019-01-29 PROCEDURE — 99222 1ST HOSP IP/OBS MODERATE 55: CPT | Performed by: INTERNAL MEDICINE

## 2019-01-29 PROCEDURE — 85025 COMPLETE CBC W/AUTO DIFF WBC: CPT | Performed by: EMERGENCY MEDICINE

## 2019-01-29 PROCEDURE — 84484 ASSAY OF TROPONIN QUANT: CPT | Performed by: EMERGENCY MEDICINE

## 2019-01-29 PROCEDURE — 82948 REAGENT STRIP/BLOOD GLUCOSE: CPT

## 2019-01-29 PROCEDURE — 99223 1ST HOSP IP/OBS HIGH 75: CPT | Performed by: PHYSICIAN ASSISTANT

## 2019-01-29 PROCEDURE — 85730 THROMBOPLASTIN TIME PARTIAL: CPT | Performed by: EMERGENCY MEDICINE

## 2019-01-29 PROCEDURE — 84443 ASSAY THYROID STIM HORMONE: CPT | Performed by: EMERGENCY MEDICINE

## 2019-01-29 PROCEDURE — 99285 EMERGENCY DEPT VISIT HI MDM: CPT

## 2019-01-29 RX ORDER — LOSARTAN POTASSIUM 25 MG/1
25 TABLET ORAL DAILY
Status: DISCONTINUED | OUTPATIENT
Start: 2019-01-29 | End: 2019-01-30

## 2019-01-29 RX ORDER — TRAVOPROST OPHTHALMIC SOLUTION 0.04 MG/ML
1 SOLUTION OPHTHALMIC
Status: DISCONTINUED | OUTPATIENT
Start: 2019-01-29 | End: 2019-01-30 | Stop reason: HOSPADM

## 2019-01-29 RX ORDER — ASCORBIC ACID 500 MG
500 TABLET ORAL DAILY
Status: DISCONTINUED | OUTPATIENT
Start: 2019-01-29 | End: 2019-01-30 | Stop reason: HOSPADM

## 2019-01-29 RX ORDER — MELATONIN
1000 DAILY
Status: DISCONTINUED | OUTPATIENT
Start: 2019-01-29 | End: 2019-01-30 | Stop reason: HOSPADM

## 2019-01-29 RX ORDER — ASPIRIN 81 MG/1
162 TABLET, CHEWABLE ORAL ONCE
Status: COMPLETED | OUTPATIENT
Start: 2019-01-29 | End: 2019-01-29

## 2019-01-29 RX ORDER — FUROSEMIDE 10 MG/ML
20 INJECTION INTRAMUSCULAR; INTRAVENOUS ONCE
Status: COMPLETED | OUTPATIENT
Start: 2019-01-29 | End: 2019-01-29

## 2019-01-29 RX ORDER — HEPARIN SODIUM 5000 [USP'U]/ML
5000 INJECTION, SOLUTION INTRAVENOUS; SUBCUTANEOUS EVERY 8 HOURS SCHEDULED
Status: DISCONTINUED | OUTPATIENT
Start: 2019-01-29 | End: 2019-01-30 | Stop reason: HOSPADM

## 2019-01-29 RX ORDER — METOPROLOL SUCCINATE 100 MG/1
100 TABLET, EXTENDED RELEASE ORAL DAILY
Status: DISCONTINUED | OUTPATIENT
Start: 2019-01-29 | End: 2019-01-30 | Stop reason: HOSPADM

## 2019-01-29 RX ORDER — AMLODIPINE BESYLATE 5 MG/1
5 TABLET ORAL DAILY
Status: DISCONTINUED | OUTPATIENT
Start: 2019-01-29 | End: 2019-01-30 | Stop reason: HOSPADM

## 2019-01-29 RX ORDER — FUROSEMIDE 10 MG/ML
20 INJECTION INTRAMUSCULAR; INTRAVENOUS
Status: DISCONTINUED | OUTPATIENT
Start: 2019-01-29 | End: 2019-01-30

## 2019-01-29 RX ORDER — CALCITRIOL 0.25 UG/1
0.25 CAPSULE, LIQUID FILLED ORAL 2 TIMES WEEKLY
Status: DISCONTINUED | OUTPATIENT
Start: 2019-01-31 | End: 2019-01-30 | Stop reason: HOSPADM

## 2019-01-29 RX ORDER — ATORVASTATIN CALCIUM 20 MG/1
20 TABLET, FILM COATED ORAL DAILY
Status: DISCONTINUED | OUTPATIENT
Start: 2019-01-29 | End: 2019-01-30 | Stop reason: HOSPADM

## 2019-01-29 RX ADMIN — NITROGLYCERIN 0.5 INCH: 20 OINTMENT TOPICAL at 02:38

## 2019-01-29 RX ADMIN — HEPARIN SODIUM 5000 UNITS: 5000 INJECTION, SOLUTION INTRAVENOUS; SUBCUTANEOUS at 22:35

## 2019-01-29 RX ADMIN — ASPIRIN 162 MG: 81 TABLET, CHEWABLE ORAL at 02:37

## 2019-01-29 RX ADMIN — ATORVASTATIN CALCIUM 20 MG: 20 TABLET, FILM COATED ORAL at 09:18

## 2019-01-29 RX ADMIN — METOPROLOL SUCCINATE 100 MG: 100 TABLET, FILM COATED, EXTENDED RELEASE ORAL at 09:17

## 2019-01-29 RX ADMIN — VITAMIN D, TAB 1000IU (100/BT) 1000 UNITS: 25 TAB at 09:18

## 2019-01-29 RX ADMIN — FUROSEMIDE 20 MG: 10 INJECTION, SOLUTION INTRAMUSCULAR; INTRAVENOUS at 16:38

## 2019-01-29 RX ADMIN — FUROSEMIDE 20 MG: 10 INJECTION, SOLUTION INTRAMUSCULAR; INTRAVENOUS at 02:40

## 2019-01-29 RX ADMIN — LOSARTAN POTASSIUM 25 MG: 25 TABLET, FILM COATED ORAL at 09:18

## 2019-01-29 RX ADMIN — HEPARIN SODIUM 5000 UNITS: 5000 INJECTION, SOLUTION INTRAVENOUS; SUBCUTANEOUS at 14:40

## 2019-01-29 RX ADMIN — OXYCODONE HYDROCHLORIDE AND ACETAMINOPHEN 500 MG: 500 TABLET ORAL at 09:19

## 2019-01-29 RX ADMIN — AMLODIPINE BESYLATE 5 MG: 5 TABLET ORAL at 12:28

## 2019-01-29 RX ADMIN — TRAVOPROST 1 DROP: 0.04 SOLUTION/ DROPS OPHTHALMIC at 22:33

## 2019-01-29 RX ADMIN — HEPARIN SODIUM 5000 UNITS: 5000 INJECTION, SOLUTION INTRAVENOUS; SUBCUTANEOUS at 05:50

## 2019-01-29 NOTE — CONSULTS
Cardiology   [facility] 80 y o  male MRN: [id number]  Unit/Bed#: -01 Encounter: [id number]      Reason for Consult / Principal Problem: CHF   Insomnia       Pt reports having trouble sleeping the last few days with SOB (SOB is worse when laying down and feels better while standing  "I didnt know if it was an anxiety attach") and a dry mouth  Pt reports getting dizzy and some sweats and thought he might be getting the "flu bug again " Pt reports he could not get comfortable so he decided to come to the ER   Shortness of Breath     Physician Requesting Consult:  Terrance Ac MD    Cardiologist: Dr Bryon Newell    PCP: Dr Maddy Olson    Assessment/plan  Acute HF, unclear etiology-ischemic vs non-ischemic (possible viral myocarditis)  -12 lead ECG 1/29:  NSR, RBBB, with LAFB bifascicular block, Q-waves V1 V2, LVH, ST T-wave abnormalities  -Troponin elevation:  0 05--0 04  -ProBNP 16,175  -Chest x-ray PA and lateral:  Small bilateral pleural effusions, left lower lung subsegmental atelectasis  -Ischemic workup:  Patient previously had a exercise nuclear stress and echocardiogram within the past 9 months--will obtain records from [facility]  -Can obtain a limited 2D echocardiogram this admission; tear with prior reading from [facility]  -Currently on IV furosemide 20 mg b i d--can continue, patient feels slightly better than yesterday in terms of his breathing  -Closely monitor volume status; strict I&O; daily standing weights; 2 g sodium diet, 1500 mL fluid restriction  -Continue closely monitor electrolytes; replete as needed    CKD stage III- baseline creatinine 1 6-1 8  -Follows with Dr Candido Robert as an outpatient   -Creatinine on admission 1 95  -Continue to closely monitor renal function; trend obtain a m   BMP  -Avoid nephrotoxin agents; maintain normotension    Hyperlipidemia  -Currently on atorvastatin 20 mg daily, continue  -Will check FLP in am    Hypertension  -BP elevated over the past 12 hrs; range 150's to 160's; last recorded 155/70  -Current BP regimen:  Toprol  mg daily, and Benicar 12 5 mg (MWF)  -Benicar--non-formulary-- changed to Losartan 25 mg daily    DM type 2  -currently on sliding scale insulin coverage this admission--on metformin at home  -check A1c in the a m  HPI: Myra Mustafa 80y o  year old male with a past medical history of a heart murmur, hypertension, hyperlipidemia, DM type 2, and CKD stage 3  He does not follow with a routine cardiologist   His current cardiac medications include atorvastatin 20 mg daily, Toprol  mg daily, and Benicar 12 5 mg (on MWF)  He presents to the [address] [place] [address] on 01/29/2019 for evaluation of worsening shortness of breath, orthopnea, chills and fatigue  The patient states that over the past month he has been dealing with a likely viral illness/URI which started in mid to late December  He reports a weight loss of approximately 10 lb during that period of time secondary to poor appetite/ oral intake  He states that he was evaluated by his PCP for this illness who recommended increased oral hydration  Approximately 9 months ago the patient was being seen by his PCP for routine follow-up and was diagnosed with a new murmur and was referred to Dr Marija Gee with [facility]  The patient underwent a nuclear exercise stress test and a 2D echocardiogram which appeared to be normal   He also had report of increased bilateral lower extremity swelling during this time period and was prescribed an oral diuretic for short period of time  The patient has still been dealing with a cold/illness over the past couple weeks with sinus congestion, sore throat, and cough  Over the past 3-4 days the patient has experience progressively worsening shortness of breath, GARCIA, orthopnea    He states that his symptoms were difficult to deal with yesterday and decided, to come to the ED for further evaluation  Hemodynamics on admission:  Temp 97 6° F, HR 88, RR 20, /70, sat 100% on RA  Laboratory data on admission:  NA +138, K +4 4 chloride 105, CO2 24, anion gap 9, BUN 32, creatinine 1 95, glucose 111, calcium 8 1, AST 62, ALT 75, alk-phos 94, albumin 2 4, WBC 8 79, hemoglobin 14 6 platelet count 218  Imaging:  Chest x-ray PA and lateral-bilateral pleural effusions left lower lung subsegmental atelectasis    Family History:   Family History   Problem Relation Age of Onset    Stroke Mother     Hypertension Mother     Blindness Father     Gout Brother      Historical Information   Past Medical History:   Diagnosis Date    Diabetes mellitus ([address] )     Hyperlipidemia     Hypertension     Proteinuria     Stage 3 chronic kidney disease (HCC)     Vitamin D deficiency      Past Surgical History:   Procedure Laterality Date    COLONOSCOPY      TOE SURGERY Left      Social History   History   Alcohol Use    Yes     Comment: occasionally     History   Drug Use No     History   Smoking Status    Former Smoker    Quit date: 1968   Smokeless Tobacco    Never Used     Family History:   Family History   Problem Relation Age of Onset    Stroke Mother     Hypertension Mother     Blindness Father     Gout Brother        Review of Systems:  Review of Systems   Constitutional: Positive for activity change, chills, fatigue and unexpected weight change  Negative for appetite change, diaphoresis and fever  HENT: Positive for congestion  Eyes: Negative for visual disturbance  Respiratory: Positive for cough and shortness of breath  Negative for chest tightness  Cardiovascular: Positive for leg swelling  Negative for chest pain and palpitations  +orthopnea, +GARCIA   Gastrointestinal: Negative for abdominal pain, constipation, diarrhea, nausea and vomiting  Genitourinary: Negative for difficulty urinating and dysuria  Musculoskeletal: Negative for arthralgias and myalgias     Neurological: Negative for dizziness, light-headedness and headaches  All other systems reviewed and are negative            Scheduled Meds:  Current Facility-Administered Medications:  ascorbic acid 500 mg Oral Daily Norma Ardon PA-C   atorvastatin 20 mg Oral Daily Norma Ardon PA-C   [START ON 1/31/2019] calcitriol 0 25 mcg Oral Once per day on Mon Thu Virgil Blankenship   cholecalciferol 1,000 Units Oral Daily Norma Ardon PA-C   furosemide 20 mg Intravenous BID (diuretic) Nroma Ardon PA-C   heparin (porcine) 5,000 Units Subcutaneous Q8H [id number]   insulin lispro 1-5 Units Subcutaneous HS Nikkie Balderas PA-C   insulin lispro 1-6 Units Subcutaneous TID AC Nikkie Balderas PA-C   losartan 25 mg Oral Daily Norma Ardon PA-C   metoprolol succinate 100 mg Oral Daily Nikkie Balderas PA-C   travoprost 1 drop Both Eyes HS Nikkie Balderas PA-C     Continuous Infusions:   PRN Meds:   all current active meds have been reviewed, current meds:   Current Facility-Administered Medications   Medication Dose Route Frequency    ascorbic acid (VITAMIN C) tablet 500 mg  500 mg Oral Daily    atorvastatin (LIPITOR) tablet 20 mg  20 mg Oral Daily    [START ON 1/31/2019] calcitriol (ROCALTROL) capsule 0 25 mcg  0 25 mcg Oral Once per day on Mon Thu    cholecalciferol (VITAMIN D3) tablet 1,000 Units  1,000 Units Oral Daily    furosemide (LASIX) injection 20 mg  20 mg Intravenous BID (diuretic)    heparin (porcine) subcutaneous injection 5,000 Units  5,000 Units Subcutaneous Q8H [address]    insulin lispro (HumaLOG) 100 units/mL subcutaneous injection 1-5 Units  1-5 Units Subcutaneous HS    insulin lispro (HumaLOG) 100 units/mL subcutaneous injection 1-6 Units  1-6 Units Subcutaneous TID AC    losartan (COZAAR) tablet 25 mg  25 mg Oral Daily    metoprolol succinate (TOPROL-XL) 24 hr tablet 100 mg  100 mg Oral Daily    travoprost (TRAVATAN-Z) 0 004 % ophthalmic solution 1 drop  1 drop Both Eyes HS    and PTA meds:   Prior to Admission Medications   Prescriptions Last Dose Informant Patient Reported? Taking? Cholecalciferol (VITAMIN D3) 1000 units CAPS 1/28/2019 at Unknown time Self Yes Yes   Sig: Take 1 capsule by mouth daily   Multiple Vitamin (MULTIVITAMIN) tablet 1/28/2019 at Unknown time Self Yes Yes   Sig: Take 1 tablet by mouth daily   TRAVATAN Z 0 004 % ophthalmic solution 1/28/2019 at Unknown time Self Yes Yes   ascorbic acid (VITAMIN C) 500 mg tablet 1/28/2019 at Unknown time Self Yes Yes   Sig: Take 500 mg by mouth daily   atorvastatin (LIPITOR) 20 mg tablet 1/28/2019 at Unknown time Self Yes Yes   Sig: Take 1 tablet by mouth daily   calcitriol (ROCALTROL) 0 25 mcg capsule Past Week at Unknown time Self Yes Yes   Sig: Take 1 capsule by mouth 2 (two) times a week   metFORMIN (GLUCOPHAGE) 500 mg tablet 1/28/2019 at Unknown time Self Yes Yes   Sig: Take 1 tablet by mouth daily   metoprolol succinate (TOPROL-XL) 50 mg 24 hr tablet 1/28/2019 at Unknown time Self Yes Yes   Sig: Take 100 mg by mouth daily     olmesartan (BENICAR) 5 mg tablet 1/28/2019 at Unknown time  No Yes   Sig: TAKE 2 AND 1/2 TABLETS (12 5MG TOTAL) BY MOUTH ON MONDAY WEDNESDAY AND FRIDAY  TAKE 2 TABLETS (10 MG) THE OTHER DAYS      Facility-Administered Medications: None       Allergies   Allergen Reactions    Ace Inhibitors Cough       Objective   Vitals: Blood pressure 155/70, pulse 87, temperature 98 °F (36 7 °C), temperature source Oral, resp  rate 18, height 5' 8" (1 727 m), weight 74 2 kg (163 lb 9 6 oz), SpO2 99 %  , Body mass index is 24 88 kg/m² , Orthostatic Blood Pressures      Most Recent Value   Blood Pressure  155/70 filed at 01/29/2019 0715   Patient Position - Orthostatic VS  Lying filed at 01/29/2019 0715          No intake or output data in the 24 hours ending 01/29/19 1047    Invasive Devices     Peripheral Intravenous Line            Peripheral IV 01/29/19 Left Forearm less than 1 day                Physical Exam:  Physical Exam Constitutional: He is oriented to person, place, and time  He appears well-developed and well-nourished  No distress  HENT:   Head: Normocephalic and atraumatic  Mouth/Throat: Oropharynx is clear and moist  No oropharyngeal exudate  Eyes: Pupils are equal, round, and reactive to light  Conjunctivae are normal  No scleral icterus  Neck: Normal range of motion  Neck supple  No JVD present  Cardiovascular: Normal rate, regular rhythm and intact distal pulses  Exam reveals no gallop and no friction rub  Murmur heard  Systolic murmur is present   Pulmonary/Chest: Effort normal and breath sounds normal  He has no wheezes  He has no rales  Abdominal: Soft  Bowel sounds are normal    Musculoskeletal: Normal range of motion  He exhibits no edema  Lymphadenopathy:     He has no cervical adenopathy  Neurological: He is alert and oriented to person, place, and time  Skin: Skin is warm and dry  He is not diaphoretic  Psychiatric: He has a normal mood and affect       Lab Results:   Recent Results (from the past 24 hour(s))   CBC and differential    Collection Time: 01/29/19  1:45 AM   Result Value Ref Range    WBC 8 79 4 31 - 10 16 Thousand/uL    RBC 4 76 3 88 - 5 62 Million/uL    Hemoglobin 14 6 12 0 - 17 0 g/dL    Hematocrit 43 5 36 5 - 49 3 %    MCV 91 82 - 98 fL    MCH 30 7 26 8 - 34 3 pg    MCHC 33 6 31 4 - 37 4 g/dL    RDW 14 3 11 6 - 15 1 %    MPV 9 4 8 9 - 12 7 fL    Platelets 770 806 - 755 Thousands/uL    nRBC 0 /100 WBCs    Neutrophils Relative 69 43 - 75 %    Immat GRANS % 0 0 - 2 %    Lymphocytes Relative 20 14 - 44 %    Monocytes Relative 7 4 - 12 %    Eosinophils Relative 3 0 - 6 %    Basophils Relative 1 0 - 1 %    Neutrophils Absolute 6 10 1 85 - 7 62 Thousands/µL    Immature Grans Absolute 0 03 0 00 - 0 20 Thousand/uL    Lymphocytes Absolute 1 74 0 60 - 4 47 Thousands/µL    Monocytes Absolute 0 61 0 17 - 1 22 Thousand/µL    Eosinophils Absolute 0 26 0 00 - 0 61 Thousand/µL    Basophils Absolute 0 05 0 00 - 0 10 Thousands/µL   Protime-INR    Collection Time: 01/29/19  1:45 AM   Result Value Ref Range    Protime 13 7 11 8 - 14 2 seconds    INR 1 08 0 86 - 1 17   APTT    Collection Time: 01/29/19  1:45 AM   Result Value Ref Range    PTT 29 26 - 38 seconds   Comprehensive metabolic panel    Collection Time: 01/29/19  1:45 AM   Result Value Ref Range    Sodium 138 136 - 145 mmol/L    Potassium 4 4 3 5 - 5 3 mmol/L    Chloride 105 100 - 108 mmol/L    CO2 24 21 - 32 mmol/L    ANION GAP 9 4 - 13 mmol/L    BUN 32 (H) 5 - 25 mg/dL    Creatinine 1 95 (H) 0 60 - 1 30 mg/dL    Glucose 111 65 - 140 mg/dL    Calcium 8 1 (L) 8 3 - 10 1 mg/dL    AST 62 (H) 5 - 45 U/L    ALT 75 12 - 78 U/L    Alkaline Phosphatase 94 46 - 116 U/L    Total Protein 6 3 (L) 6 4 - 8 2 g/dL    Albumin 2 4 (L) 3 5 - 5 0 g/dL    Total Bilirubin 0 40 0 20 - 1 00 mg/dL    eGFR 40 ml/min/1 73sq m   TSH    Collection Time: 01/29/19  1:45 AM   Result Value Ref Range    TSH 3RD GENERATON 3 347 0 358 - 3 740 uIU/mL   Troponin I    Collection Time: 01/29/19  1:45 AM   Result Value Ref Range    Troponin I 0 05 (H) <=0 04 ng/mL   B-type natriuretic peptide    Collection Time: 01/29/19  1:45 AM   Result Value Ref Range    NT-proBNP 16,175 (H) <125 pg/mL   Fingerstick Glucose (POCT)    Collection Time: 01/29/19  7:59 AM   Result Value Ref Range    POC Glucose 133 65 - 140 mg/dl   Troponin I    Collection Time: 01/29/19  8:02 AM   Result Value Ref Range    Troponin I 0 04 <=0 04 ng/mL     Imaging: I have personally reviewed pertinent reports     and I have personally reviewed pertinent films in PACS  Code Status: LVL 1 Full Code

## 2019-01-29 NOTE — ED PROVIDER NOTES
History  Chief Complaint   Patient presents with    Insomnia     Pt reports having trouble sleeping the last few days with SOB (SOB is worse when laying down and feels better while standing  "I didnt know if it was an anxiety attach") and a dry mouth  Pt reports getting dizzy and some sweats and thought he might be getting the "flu bug again " Pt reports he could not get comfortable so he decided to come to the ER   Shortness of Breath     Patient is a 80year old male with a few days of worsening shortness of breath and orthopnea and anxiety and difficulty sleeping  (+) dizziness and sweating tonight  No chest pain  No cough  No fever  No travel  Denies smoking  No N/V  No recent old records from this ED seen on computer system  [facility] website checked on this patient and last Rx filled was on 1/4/19 for tylenol #3 for two day supply  History provided by:  Patient and spouse   used: No        Prior to Admission Medications   Prescriptions Last Dose Informant Patient Reported? Taking?    Cholecalciferol (VITAMIN D3) 1000 units CAPS  Self Yes Yes   Sig: Take 1 capsule by mouth daily   Multiple Vitamin (MULTIVITAMIN) tablet  Self Yes Yes   Sig: Take 1 tablet by mouth daily   TRAVATAN Z 0 004 % ophthalmic solution  Self Yes Yes   ascorbic acid (VITAMIN C) 500 mg tablet  Self Yes Yes   Sig: Take 500 mg by mouth daily   atorvastatin (LIPITOR) 20 mg tablet  Self Yes Yes   Sig: Take 1 tablet by mouth daily   calcitriol (ROCALTROL) 0 25 mcg capsule  Self Yes Yes   Sig: Take 1 capsule by mouth 2 (two) times a week   metFORMIN (GLUCOPHAGE) 500 mg tablet  Self Yes Yes   Sig: Take 1 tablet by mouth daily   metoprolol succinate (TOPROL-XL) 50 mg 24 hr tablet  Self Yes Yes   Sig: Take 100 mg by mouth daily     olmesartan (BENICAR) 5 mg tablet   No Yes   Sig: TAKE 2 AND 1/2 TABLETS (12 5MG TOTAL) BY MOUTH ON MONDAY WEDNESDAY AND FRIDAY  TAKE 2 TABLETS (10 MG) THE OTHER DAYS Facility-Administered Medications: None       Past Medical History:   Diagnosis Date    Diabetes mellitus ([address] )     Hyperlipidemia     Hypertension     Proteinuria     Stage 3 chronic kidney disease (HCC)     Vitamin D deficiency        Past Surgical History:   Procedure Laterality Date    COLONOSCOPY      TOE SURGERY Left        Family History   Problem Relation Age of Onset    Stroke Mother     Hypertension Mother     Blindness Father     Gout Brother      I have reviewed and agree with the history as documented  Social History   Substance Use Topics    Smoking status: Former Smoker    Smokeless tobacco: Never Used    Alcohol use Yes      Comment: occasionally        Review of Systems   Constitutional: Positive for diaphoresis  Negative for fever  Respiratory: Positive for shortness of breath  Negative for cough  Cardiovascular: Negative for chest pain  Gastrointestinal: Negative for nausea and vomiting  Neurological: Positive for dizziness  Psychiatric/Behavioral: The patient is nervous/anxious  All other systems reviewed and are negative  Physical Exam  Physical Exam   Constitutional: He is oriented to person, place, and time  He appears well-developed and well-nourished  He appears distressed (mild)  HENT:   Head: Normocephalic and atraumatic  Mouth/Throat: Oropharynx is clear and moist    Eyes: No scleral icterus  Neck: Normal range of motion  Neck supple  No JVD present  No tracheal deviation present  Cardiovascular: Normal rate, regular rhythm and normal heart sounds  No murmur heard  Pulmonary/Chest: Effort normal  No stridor  No respiratory distress  He has no wheezes  He has rales (?bibasilar)  Abdominal: Soft  Bowel sounds are normal  There is no tenderness  Musculoskeletal: He exhibits no edema, tenderness (No calf tenderness) or deformity  Neurological: He is alert and oriented to person, place, and time  Skin: Skin is warm and dry   No rash noted    Psychiatric:   Somewhat anxious  Nursing note and vitals reviewed  Vital Signs  ED Triage Vitals [01/29/19 0105]   Temperature Pulse Respirations Blood Pressure SpO2   97 6 °F (36 4 °C) 88 20 169/70 100 %      Temp Source Heart Rate Source Patient Position - Orthostatic VS BP Location FiO2 (%)   Oral Monitor Sitting Right arm --      Pain Score       No Pain           Vitals:    01/29/19 0105 01/29/19 0145 01/29/19 0200   BP: 169/70     Pulse: 88 82 84   Patient Position - Orthostatic VS: Sitting         Visual Acuity      ED Medications  Medications   nitroglycerin (NITRO-BID) 2 % TD ointment 0 5 inch (not administered)   furosemide (LASIX) injection 20 mg (not administered)   aspirin chewable tablet 162 mg (not administered)       Diagnostic Studies  Results Reviewed     Procedure Component Value Units Date/Time    TSH [[id number]]  (Normal) Collected:  01/29/19 0145    Lab Status:  Final result Specimen:  Blood from Arm, Left Updated:  01/29/19 0218     TSH 3RD GENERATON 3 347 uIU/mL     Narrative:         Patients undergoing fluorescein dye angiography may retain small amounts of fluorescein in the body for 48-72 hours post procedure  Samples containing fluorescein can produce falsely depressed TSH values  If the patient had this procedure,a specimen should be resubmitted post fluorescein clearance      B-type natriuretic peptide [[id number]]  (Abnormal) Collected:  01/29/19 0145    Lab Status:  Final result Specimen:  Blood from Arm, Left Updated:  01/29/19 0218     NT-proBNP 16,175 (H) pg/mL     Troponin I [[id number]]  (Abnormal) Collected:  01/29/19 0145    Lab Status:  Final result Specimen:  Blood from Arm, Left Updated:  01/29/19 0211     Troponin I 0 05 (H) ng/mL     Comprehensive metabolic panel [[id number]]  (Abnormal) Collected:  01/29/19 0145    Lab Status:  Final result Specimen:  Blood from Arm, Left Updated:  01/29/19 0210     Sodium 138 mmol/L      Potassium 4 4 mmol/L      Chloride 105 mmol/L      CO2 24 mmol/L      ANION GAP 9 mmol/L      BUN 32 (H) mg/dL      Creatinine 1 95 (H) mg/dL      Glucose 111 mg/dL      Calcium 8 1 (L) mg/dL      AST 62 (H) U/L      ALT 75 U/L      Alkaline Phosphatase 94 U/L      Total Protein 6 3 (L) g/dL      Albumin 2 4 (L) g/dL      Total Bilirubin 0 40 mg/dL      eGFR 40 ml/min/1 73sq m     Narrative:         National Kidney Disease Education Program recommendations are as follows:  GFR calculation is accurate only with a steady state creatinine  Chronic Kidney disease less than 60 ml/min/1 73 sq  meters  Kidney failure less than 15 ml/min/1 73 sq  meters  Protime-INR [[id number]]  (Normal) Collected:  01/29/19 0145    Lab Status:  Final result Specimen:  Blood from Arm, Left Updated:  01/29/19 0203     Protime 13 7 seconds      INR 1 08    APTT [[id number]]  (Normal) Collected:  01/29/19 0145    Lab Status:  Final result Specimen:  Blood from Arm, Left Updated:  01/29/19 0203     PTT 29 seconds     CBC and differential [[id number]] Collected:  01/29/19 0145    Lab Status:  Final result Specimen:  Blood from Arm, Left Updated:  01/29/19 0153     WBC 8 79 Thousand/uL      RBC 4 76 Million/uL      Hemoglobin 14 6 g/dL      Hematocrit 43 5 %      MCV 91 fL      MCH 30 7 pg      MCHC 33 6 g/dL      RDW 14 3 %      MPV 9 4 fL      Platelets 379 Thousands/uL      nRBC 0 /100 WBCs      Neutrophils Relative 69 %      Immat GRANS % 0 %      Lymphocytes Relative 20 %      Monocytes Relative 7 %      Eosinophils Relative 3 %      Basophils Relative 1 %      Neutrophils Absolute 6 10 Thousands/µL      Immature Grans Absolute 0 03 Thousand/uL      Lymphocytes Absolute 1 74 Thousands/µL      Monocytes Absolute 0 61 Thousand/µL      Eosinophils Absolute 0 26 Thousand/µL      Basophils Absolute 0 05 Thousands/µL                  XR chest 2 views   ED Interpretation by Radha Funez MD (01/29 5211)   CHF, R effusion read by me                    Procedures  ECG 12 Lead Documentation  Date/Time: 1/29/2019 1:16 AM  Performed by: Mani Nevarez  Authorized by: Mani Nevarez     Indications / Diagnosis:  Sob  ECG reviewed by me, the ED Provider: yes    Patient location:  ED  Previous ECG:     Previous ECG:  Unavailable  Rate:     ECG rate:  88    ECG rate assessment: normal    Rhythm:     Rhythm: sinus rhythm    Ectopy:     Ectopy: none    QRS:     QRS axis:  Left  Conduction:     Conduction: abnormal      Abnormal conduction: complete RBBB, LAFB and bifascicular block    ST segments:     ST segments:  Normal  Q waves:     Q waves:  V1 and V2  Other findings:     Other findings: LAE and LVH with strain             Phone Contacts  ED Phone Contact    ED Course  ED Course as of Jan 29 0232   Tue Jan 29, 2019   0221 Labs and CXR d/w patient and wife with patient's permission  NTG paste and IV lasix ordered  MDM  Number of Diagnoses or Management Options  Diagnosis management comments: DDX including but not limited to: pneumonia, pleural effusion, CHF, doubt PE, PTX, ACS, MI, asthma exacerbation, COPD exacerbation, anemia, metabolic abnormality, renal failure, anxiety, insomnia         Amount and/or Complexity of Data Reviewed  Clinical lab tests: ordered and reviewed  Tests in the radiology section of CPT®: ordered and reviewed  Decide to obtain previous medical records or to obtain history from someone other than the patient: yes  Discuss the patient with other providers: yes  Independent visualization of images, tracings, or specimens: yes        Disposition  Final diagnoses:   CHF (congestive heart failure) ([facility] )   Elevated troponin     Time reflects when diagnosis was documented in both MDM as applicable and the Disposition within this note     Time User Action Codes Description Comment    1/29/2019  2:31 AM [place] Add [I50 9] CHF (congestive heart failure) ([facility] )     1/29/2019  2:31 AM [place] Add [R74 8] Elevated troponin       ED Disposition     ED Disposition Condition Comment    Admit  Case was discussed with ELANA Stearns and the patient's admission status was agreed to be Admission Status: inpatient status to the service of Dr Chad Urrutia   Follow-up Information    None         Patient's Medications   Discharge Prescriptions    No medications on file     No discharge procedures on file      ED Provider  Electronically Signed by           Rubens Wolf MD  01/29/19 5445

## 2019-01-29 NOTE — ASSESSMENT & PLAN NOTE
· Cr 1 95 on admission  · Baseline Cr 1 60-1  80  · Monitor intake/output  · Obtain repeat BMP in AM    · Outpatient follow-up with nephrology, Dr Kaleigh Mahoney

## 2019-01-29 NOTE — H&P
H&P- Kathy Prince 1950, 80 y o  male MRN: [id number]    Unit/Bed#: -01 Encounter: [id number]    Primary Care Provider: Laury Mcdonnell MD   Date and time admitted to hospital: 1/29/2019  1:00 AM        * Acute CHF (congestive heart failure) ([place] [address] )   Assessment & Plan    · Presented with SOB, orthopnea  · Suspect due to acute CHF  · BNP 16,175  · Follow-up on official results of CXR in AM    · Received 20 mg of IV Lasix in ED  · Continue IV Lasix 20 mg BID  · Cardiology consulted  · Obtain echocardiogram   · Monitor intake/output and daily weights  CKD (chronic kidney disease) stage 3, GFR 30-59 ml/min ([facility])   Assessment & Plan    · Cr 1 95 on admission  · Baseline Cr 1 60-1  80  · Monitor intake/output  · Obtain repeat BMP in AM    · Outpatient follow-up with nephrology, Dr Kaleigh Mahoney  Elevated troponin   Assessment & Plan    · Initial troponin 0 05    · Suspect NSTEMI type 2 in the setting of acute CHF  · Continue to trend troponin  · Monitor on telemetry  · Obtain echocardiogram       Essential hypertension   Assessment & Plan    · Continue to monitor BP with IV diuresis  · Continue metoprolol and ARB  Secondary hyperparathyroidism ([place] [address] )   Assessment & Plan    · Continue calcitriol twice weekly  Diabetic nephropathy associated with type 2 diabetes mellitus ([place] [address] )   Assessment & Plan    No results found for: HGBA1C    No results for input(s): POCGLU in the last 72 hours  Blood Sugar Average: Last 72 hrs:    · Hold metformin while inpatient  · Monitor accu-checks AC and HS  · SSI for coverage  VTE Prophylaxis: Heparin  / sequential compression device   Code Status: Level 1- Full Code  POLST: POLST form is not discussed and not completed at this time  Discussion with family: patient's wife at bedside    Anticipated Length of Stay:  Patient will be admitted on an Inpatient basis with an anticipated length of stay of  > 2 midnights     Justification for HEENA SNELL Stay: acute CHF, need for IV diuresis and cardiology evaluation  Total Time for Visit, including Counseling / Coordination of Care: 45 minutes  Greater than 50% of this total time spent on direct patient counseling and coordination of care  Chief Complaint:   SOB, orthopnea    History of Present Illness:    Cheli rEwin is a 80 y o  male with a history of HTN, CKD and type 2 diabetes who presents with SOB and orthopnea  Patient admits to increased SOB with exertion over the past few days  He reports that he had a cold a few weeks ago and attributed his increased SOB to his recent illness  He also reports difficulty sleeping over the past few days, noting that he becomes SOB and anxious when trying to lay flat  He states that last evening he was trying to sleep and was unable to do so despite trying to sleep in different positions on his couch and chair  He denies recent chest pain, cough, abdominal pain/ distention, weight gain or lower extremity edema  He notes that he has recently lost about 10 lbs due to his recent illness  He denies a history of CHF  He reports that he has been evaluated by a cardiologist and reports that his last echocardiogram this past year was unremarkable  Review of Systems:    Review of Systems   Respiratory: Positive for shortness of breath  Psychiatric/Behavioral: The patient is nervous/anxious  All other systems reviewed and are negative  Past Medical and Surgical History:     Past Medical History:   Diagnosis Date    Diabetes mellitus ([facility] )     Hyperlipidemia     Hypertension     Proteinuria     Stage 3 chronic kidney disease ([facility] )     Vitamin D deficiency        Past Surgical History:   Procedure Laterality Date    COLONOSCOPY      TOE SURGERY Left        Meds/Allergies:    Prior to Admission medications    Medication Sig Start Date End Date Taking?  Authorizing Provider   ascorbic acid (VITAMIN C) 500 mg tablet Take 500 mg by mouth daily   Yes Historical Provider, MD   atorvastatin (LIPITOR) 20 mg tablet Take 1 tablet by mouth daily 7/27/18  Yes Historical Provider, MD   calcitriol (ROCALTROL) 0 25 mcg capsule Take 1 capsule by mouth 2 (two) times a week 1/1/15  Yes Historical Provider, MD   Cholecalciferol (VITAMIN D3) 1000 units CAPS Take 1 capsule by mouth daily   Yes Historical Provider, MD   metFORMIN (GLUCOPHAGE) 500 mg tablet Take 1 tablet by mouth daily 2/19/18  Yes Historical Provider, MD   metoprolol succinate (TOPROL-XL) 50 mg 24 hr tablet Take 100 mg by mouth daily     Yes Historical Provider, MD   Multiple Vitamin (MULTIVITAMIN) tablet Take 1 tablet by mouth daily   Yes Historical Provider, MD   olmesartan (BENICAR) 5 mg tablet TAKE 2 AND 1/2 TABLETS (12 5MG TOTAL) BY MOUTH ON MONDAY WEDNESDAY AND FRIDAY  TAKE 2 TABLETS (10 MG) THE OTHER DAYS 1/1/19  Yes Perfecto Chowdary, DO   TRAVATAN Z 0 004 % ophthalmic solution  2/12/18  Yes Historical Provider, MD     I have reviewed home medications with patient personally  Allergies:    Allergies   Allergen Reactions    Ace Inhibitors Cough       Social History:     Marital Status: /Civil Union   Patient Pre-hospital Living Situation: resides with wife  Patient Pre-hospital Level of Mobility: ambulates without assistance  Patient Pre-hospital Diet Restrictions: none  Substance Use History:   History   Alcohol Use    Yes     Comment: occasionally     History   Smoking Status    Former Smoker    Quit date: 1968   Smokeless Tobacco    Never Used     History   Drug Use No       Family History:    Family History   Problem Relation Age of Onset    Stroke Mother     Hypertension Mother     Blindness Father     Gout Brother        Physical Exam:     Vitals:   Blood Pressure: 151/69 (01/29/19 0404)  Pulse: 76 (01/29/19 0404)  Temperature: 97 7 °F (36 5 °C) (01/29/19 0404)  Temp Source: Oral (01/29/19 0404)  Respirations: 18 (01/29/19 0404)  Height: 5' 8" (172 7 cm) (01/29/19 0404)  Weight - Scale: 74 2 kg (163 lb 9 6 oz) (01/29/19 0404)  SpO2: 99 % (01/29/19 0404)    Physical Exam   Constitutional: He is oriented to person, place, and time  He appears well-developed and well-nourished  No distress  HENT:   Head: Normocephalic and atraumatic  Eyes: Conjunctivae are normal    Cardiovascular: Normal rate and regular rhythm  Murmur heard  Pulmonary/Chest: Effort normal and breath sounds normal  No respiratory distress  Abdominal: Soft  Bowel sounds are normal  He exhibits no distension  There is no tenderness  Musculoskeletal: Normal range of motion  He exhibits edema (trace edema of bilateral lower extremities)  Neurological: He is alert and oriented to person, place, and time  Skin: Skin is warm and dry  He is not diaphoretic  No erythema  Psychiatric: He has a normal mood and affect  Nursing note and vitals reviewed  Additional Data:     Lab Results: I have personally reviewed pertinent reports  Results from last 7 days  Lab Units 01/29/19  0145   WBC Thousand/uL 8 79   HEMOGLOBIN g/dL 14 6   HEMATOCRIT % 43 5   PLATELETS Thousands/uL 220   NEUTROS PCT % 69   LYMPHS PCT % 20   MONOS PCT % 7   EOS PCT % 3       Results from last 7 days  Lab Units 01/29/19  0145   SODIUM mmol/L 138   POTASSIUM mmol/L 4 4   CHLORIDE mmol/L 105   CO2 mmol/L 24   BUN mg/dL 32*   CREATININE mg/dL 1 95*   ANION GAP mmol/L 9   CALCIUM mg/dL 8 1*   ALBUMIN g/dL 2 4*   TOTAL BILIRUBIN mg/dL 0 40   ALK PHOS U/L 94   ALT U/L 75   AST U/L 62*   GLUCOSE RANDOM mg/dL 111       Results from last 7 days  Lab Units 01/29/19  0145   INR  1 08                   Imaging: I have personally reviewed pertinent films in PACS    XR chest 2 views   ED Interpretation by rTipp Cuevas MD (01/29 8417)   CHF, R effusion read by me  AllscriNiko Niko / Epic Records Reviewed: Yes     ** Please Note: This note has been constructed using a voice recognition system   **

## 2019-01-29 NOTE — ASSESSMENT & PLAN NOTE
· Initial troponin 0 05    · Suspect NSTEMI type 2 in the setting of acute CHF  · Continue to trend troponin  · Monitor on telemetry     · Obtain echocardiogram

## 2019-01-29 NOTE — ASSESSMENT & PLAN NOTE
· Presented with SOB, orthopnea  · Suspect due to acute CHF  · BNP 16,175  · Follow-up on official results of CXR in AM    · Received 20 mg of IV Lasix in ED  · Continue IV Lasix 20 mg BID  · Cardiology consulted  · Obtain echocardiogram   · Monitor intake/output and daily weights

## 2019-01-29 NOTE — ASSESSMENT & PLAN NOTE
No results found for: HGBA1C    No results for input(s): POCGLU in the last 72 hours  Blood Sugar Average: Last 72 hrs:    · Hold metformin while inpatient  · Monitor accu-checks AC and HS  · SSI for coverage

## 2019-01-30 VITALS
HEIGHT: 68 IN | TEMPERATURE: 98.5 F | BODY MASS INDEX: 24.66 KG/M2 | DIASTOLIC BLOOD PRESSURE: 70 MMHG | RESPIRATION RATE: 18 BRPM | HEART RATE: 87 BPM | OXYGEN SATURATION: 97 % | WEIGHT: 162.7 LBS | SYSTOLIC BLOOD PRESSURE: 155 MMHG

## 2019-01-30 LAB
ANION GAP SERPL CALCULATED.3IONS-SCNC: 9 MMOL/L (ref 4–13)
BUN SERPL-MCNC: 32 MG/DL (ref 5–25)
CALCIUM SERPL-MCNC: 8.5 MG/DL (ref 8.3–10.1)
CHLORIDE SERPL-SCNC: 106 MMOL/L (ref 100–108)
CO2 SERPL-SCNC: 26 MMOL/L (ref 21–32)
CREAT SERPL-MCNC: 1.84 MG/DL (ref 0.6–1.3)
GFR SERPL CREATININE-BSD FRML MDRD: 43 ML/MIN/1.73SQ M
GLUCOSE SERPL-MCNC: 102 MG/DL (ref 65–140)
GLUCOSE SERPL-MCNC: 109 MG/DL (ref 65–140)
GLUCOSE SERPL-MCNC: 156 MG/DL (ref 65–140)
POTASSIUM SERPL-SCNC: 4.6 MMOL/L (ref 3.5–5.3)
SODIUM SERPL-SCNC: 141 MMOL/L (ref 136–145)

## 2019-01-30 PROCEDURE — 99239 HOSP IP/OBS DSCHRG MGMT >30: CPT | Performed by: HOSPITALIST

## 2019-01-30 PROCEDURE — 99232 SBSQ HOSP IP/OBS MODERATE 35: CPT | Performed by: INTERNAL MEDICINE

## 2019-01-30 PROCEDURE — 80048 BASIC METABOLIC PNL TOTAL CA: CPT | Performed by: PHYSICIAN ASSISTANT

## 2019-01-30 PROCEDURE — 82948 REAGENT STRIP/BLOOD GLUCOSE: CPT

## 2019-01-30 RX ORDER — FUROSEMIDE 40 MG/1
40 TABLET ORAL DAILY
Status: DISCONTINUED | OUTPATIENT
Start: 2019-01-31 | End: 2019-01-30 | Stop reason: HOSPADM

## 2019-01-30 RX ORDER — FUROSEMIDE 40 MG/1
40 TABLET ORAL DAILY
Qty: 30 TABLET | Refills: 0 | Status: SHIPPED | OUTPATIENT
Start: 2019-01-31 | End: 2019-03-04

## 2019-01-30 RX ORDER — AMLODIPINE BESYLATE 5 MG/1
5 TABLET ORAL DAILY
Qty: 30 TABLET | Refills: 0 | Status: SHIPPED | OUTPATIENT
Start: 2019-01-31 | End: 2019-02-06

## 2019-01-30 RX ORDER — LOSARTAN POTASSIUM 50 MG/1
50 TABLET ORAL DAILY
Status: DISCONTINUED | OUTPATIENT
Start: 2019-01-31 | End: 2019-01-30 | Stop reason: HOSPADM

## 2019-01-30 RX ADMIN — AMLODIPINE BESYLATE 5 MG: 5 TABLET ORAL at 08:35

## 2019-01-30 RX ADMIN — VITAMIN D, TAB 1000IU (100/BT) 1000 UNITS: 25 TAB at 08:33

## 2019-01-30 RX ADMIN — HEPARIN SODIUM 5000 UNITS: 5000 INJECTION, SOLUTION INTRAVENOUS; SUBCUTANEOUS at 05:56

## 2019-01-30 RX ADMIN — LOSARTAN POTASSIUM 25 MG: 25 TABLET, FILM COATED ORAL at 08:33

## 2019-01-30 RX ADMIN — FUROSEMIDE 20 MG: 10 INJECTION, SOLUTION INTRAMUSCULAR; INTRAVENOUS at 08:33

## 2019-01-30 RX ADMIN — OXYCODONE HYDROCHLORIDE AND ACETAMINOPHEN 500 MG: 500 TABLET ORAL at 08:35

## 2019-01-30 RX ADMIN — ATORVASTATIN CALCIUM 20 MG: 20 TABLET, FILM COATED ORAL at 08:36

## 2019-01-30 RX ADMIN — METOPROLOL SUCCINATE 100 MG: 100 TABLET, FILM COATED, EXTENDED RELEASE ORAL at 08:35

## 2019-01-30 NOTE — ASSESSMENT & PLAN NOTE
· Cr 1 95 on admission  · Baseline Cr 1 84 today (stable)   · Monitor intake/output  · BMP daily   · Outpatient follow-up with nephrology, Dr Mayra Calvert

## 2019-01-30 NOTE — ASSESSMENT & PLAN NOTE
· Initial troponin 0 05; remained 0 04   · Suspect NSTEMI type 2 in the setting of acute CHF  · Monitor on telemetry

## 2019-01-30 NOTE — PROGRESS NOTES
Cardiology Progress Note Xavier Vora 80 y o  male MRN: [id number]    Unit/Bed#: -01 Encounter: [id number]        Subjective:    No significant events overnight  Echo demonstrated severe MR/AI  Review of Systems   Cardiovascular: Negative for chest pain, leg swelling and palpitations  Respiratory: Negative for shortness of breath  Objective:   Vitals: Blood pressure 155/70, pulse 87, temperature 98 5 °F (36 9 °C), temperature source Oral, resp  rate 18, height 5' 8" (1 727 m), weight 73 8 kg (162 lb 11 2 oz), SpO2 97 %  , Body mass index is 24 74 kg/m² , Orthostatic Blood Pressures      Most Recent Value   Blood Pressure  155/70 filed at 2019 0737   Patient Position - Orthostatic VS  Sitting filed at 2019 2362         Systolic (76JZP), CARLOS:884 , Min:155 , ZW     Diastolic (50YSG), EXR:54, Min:70, Max:70      Intake/Output Summary (Last 24 hours) at 19 1052  Last data filed at 19 0800   Gross per 24 hour   Intake              240 ml   Output              100 ml   Net              140 ml     Weight (last 2 days)     Date/Time   Weight    19 0600  73 8 (162 7)    19 0404  74 2 (163 6)    19 0105  76 (167 55)                Telemetry Review: NSR    Physical Exam   Cardiovascular: Normal rate and regular rhythm  Exam reveals no gallop and no friction rub  Murmur heard  Systolic murmur is present with a grade of 2/6   Pulmonary/Chest: Breath sounds normal  He has no wheezes  He has no rales  Musculoskeletal: He exhibits no edema           Laboratory Results:    Results from last 7 days  Lab Units 19  1140 19  0802 19  0145   TROPONIN I ng/mL 0 04 0 04 0 05*       CBC with diff:   Results from last 7 days  Lab Units 19  0145   WBC Thousand/uL 8 79   HEMOGLOBIN g/dL 14 6   HEMATOCRIT % 43 5   MCV fL 91   PLATELETS Thousands/uL 220   MCH pg 30 7   MCHC g/dL 33 6   RDW % 14 3   MPV fL 9 4   NRBC AUTO /100 WBCs 0 CMP:  Results from last 7 days  Lab Units 19  0545 19  0145   POTASSIUM mmol/L 4 6 4 4   CHLORIDE mmol/L 106 105   CO2 mmol/L 26 24   BUN mg/dL 32* 32*   CREATININE mg/dL 1 84* 1 95*   CALCIUM mg/dL 8 5 8 1*   AST U/L  --  62*   ALT U/L  --  75   ALK PHOS U/L  --  94   EGFR ml/min/1 73sq m 43 40         BMP:  Results from last 7 days  Lab Units 19  0545 19  0145   POTASSIUM mmol/L 4 6 4 4   CHLORIDE mmol/L 106 105   CO2 mmol/L 26 24   BUN mg/dL 32* 32*   CREATININE mg/dL 1 84* 1 95*   CALCIUM mg/dL 8 5 8 1*       Magnesium:       Coags:   Results from last 7 days  Lab Units 19  0145   PTT seconds 29   INR  1 08         Cardiac testing:   Results for orders placed during the hospital encounter of 19   Echo complete with contrast if indicated    Narrative [place] [id number] [address] [place] [address]  [phone]    Transthoracic Echocardiogram  2D, M-mode, Doppler, and Color Doppler    Study date:  2019    Patient: Chung Juarez  MR number: [id number]  Account number: [de-identified]  : 1950  Age: 80 years  Gender: Male  Status: Inpatient  Location: Bedside  Height: 68 in  Weight: 163 lb  BP: 155/ 70 mmHg    Indications: Heart Failure    Diagnoses: I50 9 - Heart failure, unspecified    Sonographer:  TIMMY Ralph  Primary Physician:  Marc Boland  Referring Physician:  Zohra Braxton MD  Group:  [facility]  Interpreting Physician:  Zohra Braxton MD    SUMMARY    LEFT VENTRICLE:  The ventricle was mildly dilated  Systolic function was mildly to moderately reduced  Ejection fraction was estimated to be 40 %  There was mild diffuse hypokinesis  Wall thickness was mildly increased  RIGHT VENTRICLE:  The ventricle was mildly dilated  Systolic function was normal     LEFT ATRIUM:  The atrium was mildly dilated  MITRAL VALVE:  There was moderate to severe regurgitation      AORTIC VALVE:  The valve was possibly bicuspid  Leaflets exhibited normal thickness, normal cuspal separation, and significant diastolic prolapse  There was moderate to severe regurgitation  TRICUSPID VALVE:  There was mild regurgitation  Pulmonary artery systolic pressure was moderately to markedly increased  The findings suggest moderate to severe pulmonary hypertension  AORTA:  The root exhibited mild dilatation  IVC, HEPATIC VEINS:  The inferior vena cava was mildly dilated  Respirophasic changes were blunted (less than 50% variation)  PERICARDIUM:  There was a left pleural effusion  HISTORY: PRIOR HISTORY: Murmur, HTN, DM2, HLD, CKD3    PROCEDURE: The procedure was performed at the bedside  This was a routine study  The transthoracic approach was used  The study included complete 2D imaging, M-mode, complete spectral Doppler, and color Doppler  The heart rate was 95 bpm,  at the start of the study  Images were obtained from the parasternal, apical, subcostal, and suprasternal notch acoustic windows  Image quality was adequate  LEFT VENTRICLE: The ventricle was mildly dilated  Systolic function was mildly to moderately reduced  Ejection fraction was estimated to be 40 %  There was mild diffuse hypokinesis  Wall thickness was mildly increased  RIGHT VENTRICLE: The ventricle was mildly dilated  Systolic function was normal  Wall thickness was normal     LEFT ATRIUM: The atrium was mildly dilated  RIGHT ATRIUM: Size was normal     MITRAL VALVE: Valve structure was normal  There was normal leaflet separation  DOPPLER: The transmitral velocity was within the normal range  There was no evidence for stenosis  There was moderate to severe regurgitation  AORTIC VALVE: The valve was possibly bicuspid  Leaflets exhibited normal thickness, normal cuspal separation, and significant diastolic prolapse  DOPPLER: Transaortic velocity was within the normal range  There was no evidence for  stenosis   There was moderate to severe regurgitation  The regurgitant jet was eccentric  TRICUSPID VALVE: The valve structure was normal  There was normal leaflet separation  DOPPLER: The transtricuspid velocity was within the normal range  There was no evidence for stenosis  There was mild regurgitation  Pulmonary artery  systolic pressure was moderately to markedly increased  Estimated peak PA pressure was 60 mmHg  The findings suggest moderate to severe pulmonary hypertension  PULMONIC VALVE: Leaflets exhibited normal thickness, no calcification, and normal cuspal separation  DOPPLER: The transpulmonic velocity was within the normal range  There was no regurgitation  PERICARDIUM: There was no pericardial effusion  There was a left pleural effusion  The pericardium was normal in appearance  AORTA: The root exhibited mild dilatation  SYSTEMIC VEINS: IVC: The inferior vena cava was mildly dilated  Respirophasic changes were blunted (less than 50% variation)      MEASUREMENT TABLES    2D MEASUREMENTS  Aorta   (Reference normals)  Root diam   39 mm   (--)    SYSTEM MEASUREMENT TABLES    2D  %FS: 23 42 %  Ao Diam: 3 91 cm  EDV(Teich): 179 53 ml  EF Biplane: 40 37 %  EF(Teich): 46 07 %  ESV(Teich): 96 82 ml  IVSd: 1 02 cm  LA Area: 12 24 cm2  LA Diam: 4 3 cm  LVEDV MOD A2C: 129 37 ml  LVEDV MOD A4C: 151 37 ml  LVEDV MOD BP: 140 9 ml  LVEF MOD A2C: 42 79 %  LVEF MOD A4C: 40 91 %  LVESV MOD A2C: 74 02 ml  LVESV MOD A4C: 89 45 ml  LVESV MOD BP: 84 02 ml  LVIDd: 5 99 cm  LVIDs: 4 59 cm  LVLd A2C: 8 45 cm  LVLd A4C: 8 58 cm  LVLs A2C: 7 63 cm  LVLs A4C: 8 25 cm  LVPWd: 1 21 cm  RA Area: 15 27 cm2  RVIDd: 4 39 cm  SV MOD A2C: 55 35 ml  SV MOD A4C: 61 92 ml  SV(Teich): 82 71 ml    CW  TR MaxP 66 mmHg  TR Vmax: 3 52 m/s    MM  TAPSE: 1 8 cm    [employer] Accredited Echocardi[facility]    Prepared and electronically signed by    Greg Manley MD  Signed 2019 16:15:06           Meds/Allergies     Current Facility-Administered Medications:  amLODIPine 5 mg Oral Daily JHONATAN Gerard   ascorbic acid 500 mg Oral Daily Shae YINA Chapman   atorvastatin 20 mg Oral Daily [facility]   [START ON 1/31/2019] calcitriol 0 25 mcg Oral Once per day on Mon Thu [facility]   cholecalciferol 1,000 Units Oral Daily Shae Chapman PA-C   furosemide 20 mg Intravenous BID (diuretic) Shae Chapman PA-C   heparin (porcine) 5,000 Units Subcutaneous [facility] Nikkie Balderas PA-C   insulin lispro 1-5 Units Subcutaneous HS Nikkie Balderas PA-C   insulin lispro 1-6 Units Subcutaneous TID AC Nikkie Balderas PA-C   losartan 25 mg Oral Daily Shae Chapman PA-C   metoprolol succinate 100 mg Oral Daily Nikkie Balderas PA-C   travoprost 1 drop Both Eyes HS Shae Chapman PA-C        Prescriptions Prior to Admission   Medication    ascorbic acid (VITAMIN C) 500 mg tablet    atorvastatin (LIPITOR) 20 mg tablet    calcitriol (ROCALTROL) 0 25 mcg capsule    Cholecalciferol (VITAMIN D3) 1000 units CAPS    metFORMIN (GLUCOPHAGE) 500 mg tablet    metoprolol succinate (TOPROL-XL) 50 mg 24 hr tablet    Multiple Vitamin (MULTIVITAMIN) tablet    olmesartan (BENICAR) 5 mg tablet    TRAVATAN Z 0 004 % ophthalmic solution       Assessment:  Principal Problem:    Acute CHF (congestive heart failure) (HCC)  Active Problems:    CKD (chronic kidney disease) stage 3, GFR 30-59 ml/min (HCC)    Diabetic nephropathy associated with type 2 diabetes mellitus (HCC)    Secondary hyperparathyroidism (HCC)    Essential hypertension    Elevated troponin      Impression:  1  Cardiomyopathy - likely due to valvular heart disease  2  Bicuspid AoV with mod-sev AI/mod-sev MR - will need AVR/MVR likely  3  HTN - not adequate control  4  CKD    Plan:  1  Increase losartan  2  Change to oral diuretics  3  Patient stable for discharge from cardiac standpoint  Will need to f/u with CT surgery to help arrange for AVR

## 2019-01-30 NOTE — DISCHARGE SUMMARY
[facility]    Patient Information: Hannah Jordan 80 y o  male MRN: [id number]  Unit/Bed#: -01 Encounter: [id number]    Discharging Physician / Practitioner: Jennifer Mireles MD  PCP: Mohsen Stevens MD  Admission Date: 1/29/2019  Discharge Date: 01/30/19    Disposition:     Home    Reason for Admission: shortness of breath     Discharge Diagnoses:     Principal Problem:    Acute CHF (congestive heart failure) ([facility] )  Active Problems:    Essential hypertension    Elevated troponin    CKD (chronic kidney disease) stage 3, GFR 30-59 ml/min ([facility])    Diabetic nephropathy associated with type 2 diabetes mellitus ([facility] )    Secondary hyperparathyroidism ([place] [address] )  Resolved Problems:    * No resolved hospital problems  *      Consultations During Hospital Stay:  · Cardiology     Procedures Performed:     TTE: (1/29/19)  SUMMARY     LEFT VENTRICLE:  The ventricle was mildly dilated  Systolic function was mildly to moderately reduced  Ejection fraction was estimated to be 40 %  There was mild diffuse hypokinesis  Wall thickness was mildly increased      RIGHT VENTRICLE:  The ventricle was mildly dilated  Systolic function was normal      LEFT ATRIUM:  The atrium was mildly dilated      MITRAL VALVE:  There was moderate to severe regurgitation      AORTIC VALVE:  The valve was possibly bicuspid  Leaflets exhibited normal thickness, normal cuspal separation, and significant diastolic prolapse  There was moderate to severe regurgitation      TRICUSPID VALVE:  There was mild regurgitation  Pulmonary artery systolic pressure was moderately to markedly increased  The findings suggest moderate to severe pulmonary hypertension      AORTA:  The root exhibited mild dilatation      IVC, HEPATIC VEINS:  The inferior vena cava was mildly dilated  Respirophasic changes were blunted (less than 50% variation)      PERICARDIUM:  There was a left pleural effusion      Significant Findings / Test Results: · See above     Incidental Findings:   ·  none     Test Results Pending at Discharge (will require follow up): · None      Outpatient Tests Requested:  · None     Complications:      Hospital Course:     Gurdeep Vazquez is a 80 y o  male patient who originally presented to the hospital on 1/29/2019 due to worsening shortness of breath and orthopnea  There was concern for an acute on chronic CHF exacerbation  Patient was seen evaluated by Cardiology and he was started on IV diuretics  He had rapid improvement in his symptoms  He underwent echocardiogram which showed significant valvulopathy in both his aortic and mitral valve which would explain his new cardiomyopathy  He was started on oral diuretics his blood pressure meds were adjusted due to elevated pressures well observed in the hospital   He will need follow up with CT surgery to address his valve issues  Condition at Discharge: fair     Discharge Day Visit / Exam:     Subjective:  Pt is feeling pretty good  Was able to sleep without SOB  No SOB at rest   Vitals: Blood Pressure: 155/70 (01/30/19 0737)  Pulse: 87 (01/30/19 0737)  Temperature: 98 5 °F (36 9 °C) (01/30/19 0737)  Temp Source: Oral (01/30/19 0737)  Respirations: 18 (01/30/19 0737)  Height: 5' 8" (172 7 cm) (01/29/19 0404)  Weight - Scale: 73 8 kg (162 lb 11 2 oz) (01/30/19 0600)  SpO2: 97 % (01/30/19 0737)  Exam:   Physical Exam   Constitutional: He is oriented to person, place, and time  No distress  HENT:   Head: Normocephalic and atraumatic  Eyes: Conjunctivae are normal  No scleral icterus  Cardiovascular: Normal rate and regular rhythm  Pulmonary/Chest: Effort normal  No respiratory distress  He has no wheezes  He has no rales  Abdominal: Soft  Bowel sounds are normal  He exhibits no distension  There is no tenderness  There is no rebound  Musculoskeletal: He exhibits no edema  Neurological: He is alert and oriented to person, place, and time     Skin: Skin is warm and dry  No rash noted  He is not diaphoretic  No erythema  Nursing note and vitals reviewed  Discharge instructions/Information to patient and family:   See after visit summary for information provided to patient and family  Provisions for Follow-Up Care:  See after visit summary for information related to follow-up care and any pertinent home health orders  Planned Readmission: none      Discharge Statement:  I spent 40 minutes discharging the patient  This time was spent on the day of discharge  I had direct contact with the patient on the day of discharge  Greater than 50% of the total time was spent examining patient, answering all patient questions, arranging and discussing plan of care with patient as well as directly providing post-discharge instructions  Additional time then spent on discharge activities  Discharge Medications:  See after visit summary for reconciled discharge medications provided to patient and family        ** Please Note: This note has been constructed using a voice recognition system **

## 2019-01-30 NOTE — ASSESSMENT & PLAN NOTE
· Presented with SOB, orthopnea  · Suspect due to acute CHF  · BNP 16,175  · lassix   · Cardiology consulted  · TTE with diffuse hypokinesis (EF 40%), mod-severe AR, mod-severe pulm htn, dilated IVC  · Monitor intake/output and daily weights

## 2019-01-30 NOTE — DISCHARGE INSTRUCTIONS
Take your medications as directed, and keep your follow up appointments  Adhere to a heart healthy lifestyle, maintaining a low sodium diet  Daily weight and record  If your weight increases 2-3 lbs in one day, or 5 lbs in 2 days, you are short of breath or have lower extremity swelling, please call the heart failure team at  [facility] at [phone]  Heart Failure   WHAT YOU NEED TO KNOW:   Heart failure (HF) is a condition that does not allow your heart to fill or pump properly  Not enough oxygen in your blood gets to your organs and tissues  HF can occur in the right side, the left side, or both lower chambers of your heart  HF is often caused by damage or injury to your heart  The damage may be caused by heart attack, other heart conditions, or high blood pressure  HF is a long-term condition that tends to get worse over time  It is important to manage your health to improve your quality of life  HF can be worsened by heavy alcohol use, smoking, diabetes that is not controlled, or obesity  DISCHARGE INSTRUCTIONS:   Call 911 if:   · You have any of the following signs of a heart attack:      ¨ Squeezing, pressure, or pain in your chest that lasts longer than 5 minutes or returns    ¨ Discomfort or pain in your back, neck, jaw, stomach, or arm     ¨ Trouble breathing    ¨ Nausea or vomiting    ¨ Lightheadedness or a sudden cold sweat, especially with chest pain or trouble breathing    Seek care immediately if:   · You gain 3 or more pounds (1 4 kg) in a day, or more than your healthcare provider says you should  · Your heartbeat is fast, slow, or uneven all the time    Contact your healthcare provider if:   · You have symptoms of worsening HF:      ¨ Shortness of breath at rest, at night, or that is getting worse in any way     ¨ Weight gain of 5 or more pounds (2 2 kg) in a week     ¨ More swelling in your legs or ankles     ¨ Abdominal pain or swelling     ¨ More coughing ¨ Loss of appetite     ¨ Feeling tired all the time    · You feel hopeless or depressed, or you have lost interest in things you used to enjoy  · You often feel worried or afraid  · You have questions or concerns about your condition or care  Medicines: You may  need any of the following:  · Medicines  may be given to help regulate your heart rhythm  You may also need medicines to lower your blood pressure, and to get rid of extra fluids  · Take your medicine as directed  Contact your healthcare provider if you think your medicine is not helping or if you have side effects  Tell him or her if you are allergic to any medicine  Keep a list of the medicines, vitamins, and herbs you take  Include the amounts, and when and why you take them  Bring the list or the pill bottles to follow-up visits  Carry your medicine list with you in case of an emergency  Follow up with your healthcare provider or cardiologist as directed: You may need to return for other tests  You may need home health care  A healthcare provider will monitor your vital signs, weight, and make sure your medicines are working  Write down your questions so you remember to ask them during your visits  Go to cardiac rehab as directed:  Cardiac rehab is a program run by specialists who will help you safely strengthen your heart  The program includes exercise, relaxation, stress management, and heart-healthy nutrition  Healthcare providers will also make sure your medicines are helping to reduce your symptoms  Manage your HF:  · Do not smoke  Nicotine and other chemicals in cigarettes and cigars can cause lung damage and make HF difficult to manage  Ask your healthcare provider for information if you currently smoke and need help to quit  E-cigarettes or smokeless tobacco still contain nicotine  Talk to your healthcare provider before you use these products  · Do not drink alcohol or take illegal drugs    Alcohol and drugs can worsen your symptoms quickly  · Weigh yourself every morning  Use the same scale, in the same spot  Do this after you use the bathroom, but before you eat or drink anything  Wear the same type of clothing  Do not wear shoes  Record your weight each day so you will notice any sudden weight gain  Swelling and weight gain are signs of fluid retention  If you are overweight, ask how to lose weight safely  · Check your blood pressure and heart rate every day  Ask for more information about how to measure your blood pressure and heart rate correctly  Ask what these numbers should be for you  · Manage any chronic health conditions you have  These include high blood pressure, diabetes, obesity, high cholesterol, metabolic syndrome, and COPD  You will have fewer symptoms if you manage these health conditions  Follow your healthcare provider's recommendations and follow up with him or her regularly  · Eat heart-healthy foods and limit sodium (salt)  An easy way to do this is to eat more fresh fruits and vegetables and fewer canned and processed foods  Replace butter and margarine with heart-healthy oils such as olive oil and canola oil  Other heart-healthy foods include walnuts, whole-grain breads, low-fat dairy products, beans, and lean meats  Fatty fish such as salmon and tuna are also heart healthy  Ask how much salt you can eat each day  Do not use salt substitutes  · Drink liquids as directed  You may need to limit the amount of liquids you drink if you retain fluid  Ask how much liquid to drink each day and which liquids are best for you  · Stay active  If you are not active, your symptoms are likely to worsen quickly  Walking, bicycling, and other types of physical activity help maintain your strength and improve your mood  Physical activity also helps you manage your weight  Work with your healthcare provider to create an exercise plan that is right for you       · Get vaccines as directed  Get a flu shot every year  You may also need the pneumonia vaccine  The flu and pneumonia can be severe for a person who has HF  Vaccines protect you from these infections  Join a support group:  Living with HF can be difficult  It may be helpful to talk with others who have HF  You may learn how to better manage your condition or get emotional support  For more information:   · [address]  [place] , [place]   Phone: 6- [phone] - [phone]  Web Address: https://www strong com/  org   © 2017 [address] [employer] Information is for End User's use only and may not be sold, redistributed or otherwise used for commercial purposes  All illustrations and images included in CareNotes® are the copyrighted property of [employer]  or Julio César Wolff  The above information is an  only  It is not intended as medical advice for individual conditions or treatments  Talk to your doctor, nurse or pharmacist before following any medical regimen to see if it is safe and effective for you

## 2019-01-30 NOTE — UTILIZATION REVIEW
Network Utilization Review Department  Phone: [phone]; Fax [phone]  [email or web address]  ATTENTION: Please call with any questions or concerns to [phone]  and carefully listen to the prompts so that you are directed to the right person  Send all requests for admission clinical reviews, approved or denied determinations and any other requests to fax [phone]  All voicemails are confidential   Initial Clinical Review    Admission: Date/Time/Statement: inpatient 1/29/19 @ 0232     Orders Placed This Encounter   Procedures    Inpatient Admission (expected length of stay for this patient Order details is greater than two midnights)     Telemetry     Standing Status:   Standing     Number of Occurrences:   1     Order Specific Question:   Admitting Physician     Answer:   Bao Davis [[id number]]     Order Specific Question:   Level of Care     Answer:   Med Surg [[id number]]     Order Specific Question:   Bed request comments     Answer:   telemetry     Order Specific Question:   Estimated length of stay     Answer:   More than 2 Midnights     Order Specific Question:   Certification     Answer:   I certify that inpatient services are medically necessary for this patient for a duration of greater than two midnights  See H&P and MD Progress Notes for additional information about the patient's course of treatment  ED: Date/Time/Mode of Arrival:   ED Arrival Information     Expected Arrival Acuity Means of Arrival Escorted By Service Admission Type    - 1/29/2019 00:53 Urgent Walk-In Self Hospitalist Urgent    Arrival Complaint    Insomnia, anxiety, sob        Chief Complaint:   Chief Complaint   Patient presents with    Insomnia     Pt reports having trouble sleeping the last few days with SOB (SOB is worse when laying down and feels better while standing  "I didnt know if it was an anxiety attach") and a dry mouth   Pt reports getting dizzy and some sweats and thought he might be getting the "flu bug again " Pt reports he could not get comfortable so he decided to come to the ER   Shortness of Breath     History of Illness: 80 y o  Male PMhx of HTN, CKD, type 2 DM  presents with worsening shortness of breath over past few days with orthopnea, anxiety and difficulty sleeping  He attests he had a cold a few weeks ago and attributes the increased SOB to this illness  Noted for rales on admission  Exam      ED Vital Signs:   ED Triage Vitals [01/29/19 0105]   Temperature Pulse Respirations Blood Pressure SpO2   97 6 °F (36 4 °C) 88 20 169/70 100 %      Temp Source Heart Rate Source Patient Position - Orthostatic VS BP Location FiO2 (%)   Oral Monitor Sitting Right arm --      Pain Score       No Pain        Wt Readings from Last 1 Encounters:   01/30/19 73 8 kg (162 lb 11 2 oz)     Vital Signs (abnormal): none     Pertinent Labs/Diagnostic Test Results: 1/29/2019   BUN 32     Creatinine 1 95     Calcium 8 1     AST 62     Total Protein 6 3     Albumin 2 4     Trop 0 05, NT-proBNP 16,175,   CXR: Bibasilar pleural effusions or thickening  Left lower lobe subsegmental atelectasis  EKG: NSR, conduction abnormal, Abnormal conduction: complete RBBB, LAFB and bifascicular block ; ST segments: ST segments:  Normal Q waves:  Q waves:  V1 and V2  Other findings: Other findings: LAE and LVH with strain    1/30/2019 BUN=32, CREAT=1 84,     ED Treatment:   Medication Administration from 01/29/2019 0053 to 01/29/2019 0328       Date/Time Order Dose Route Action     01/29/2019 0238 nitroglycerin (NITRO-BID) 2 % TD ointment 0 5 inch 0 5 inch Topical Given     01/29/2019 0240 furosemide (LASIX) injection 20 mg 20 mg Intravenous Given     01/29/2019 0237 aspirin chewable tablet 162 mg 162 mg Oral Given        Past Medical/Surgical History:    Active Ambulatory Problems     Diagnosis Date Noted    CKD (chronic kidney disease) stage 3, GFR 30-59 ml/min ([facility]) 03/01/2018    Persistent proteinuria 03/01/2018    Diabetic nephropathy associated with type 2 diabetes mellitus ([place] [id number] ) 03/01/2018    Vitamin D deficiency 03/01/2018    Secondary hyperparathyroidism ([place] [address] ) 03/01/2018    FSGS (focal segmental glomerulosclerosis) 05/10/2018    Essential hypertension 05/11/2018       Past Medical History:   Diagnosis Date    Diabetes mellitus ([place] [address] )     Hyperlipidemia     Hypertension     Proteinuria     Stage 3 chronic kidney disease (HCC)     Vitamin D deficiency      Admitting Diagnosis: Shortness of breath [R06 02]  CHF (congestive heart failure) (HCC) [I50 9]  Insomnia [G47 00]  Elevated troponin [R74 8]     Age/Sex: 80 y o  male     Assessment/Plan: 80 y o  Male PMhx of HTN, CKD, type 2 DM  presents with worsening shortness of breath over past few days with orthopnea, anxiety and difficulty sleeping  He attests he had a cold a few weeks ago and attributes the increased SOB to this illness  Inpatient admission workup reveals CHF presenting with SOB, orthopnea; received IV Lasix in ED, cont BID; appreciate Cardiology input  Obtain ECHO, monitor I/O  CKD- baseline CREAT= 1 6-1 8, repeat BMP, outpatient follow up Dr Red Litten  Admission labs revealed NSTEMI type 2- monitor telemetry, trend trops, ECHO  Monitor HTN & treat with IV diuretics, metoprolol & ARB   Sliding scale insulin    Admission Orders:  48 hr telemetry  Peripheral IV  dialy wt  I/O  trop1  Cardiac diet  scd      Scheduled Meds:   Current Facility-Administered Medications:  amLODIPine 5 mg Oral Daily JHONATAN Shelton   ascorbic acid 500 mg Oral Daily Asher Vale PA-C   atorvastatin 20 mg Oral Daily Asher Vale PA-C   [START ON 1/31/2019] calcitriol 0 25 mcg Oral Once per day on Mon Thu Virgil Willoughby   cholecalciferol 1,000 Units Oral Daily Asher Vale PA-C   furosemide 20 mg Intravenous BID (diuretic) Asher Vale PA-C   heparin (porcine) 5,000 Units Subcutaneous Q8H [id number]   insulin lispro 1-5 Units Subcutaneous HS Nikkie Analia Bautista PA-C   insulin lispro 1-6 Units Subcutaneous TID AC Nikkie Balderas PA-C   losartan 25 mg Oral Daily Gayatri Grimaldo PA-C   metoprolol succinate 100 mg Oral Daily Nikkie Balderas PA-C   travoprost 1 drop Both Eyes HS Nikkie Balderas PA-C     Continuous Infusions:    PRN Meds:

## 2019-01-30 NOTE — ASSESSMENT & PLAN NOTE
No results found for: HGBA1C    Recent Labs      01/29/19   1138  01/29/19   1559  01/29/19   2112  01/30/19   0804   POCGLU  107  129  114  156*       Blood Sugar Average: Last 72 hrs:  (P) 127 8  · Hold metformin while inpatient  · Monitor accu-checks AC and HS  · SSI for coverage

## 2019-01-31 ENCOUNTER — TELEPHONE (OUTPATIENT)
Dept: CARDIOLOGY CLINIC | Facility: CLINIC | Age: 69
End: 2019-01-31

## 2019-01-31 NOTE — TELEPHONE ENCOUNTER
Good Morning Dr Dodie Johnson,    Dr Bryon Newell would like to speak with you in regards to pt  He said its in regards to pt being in the hospital  When you get a chance if you can call him back [phone]

## 2019-01-31 NOTE — TELEPHONE ENCOUNTER
I sent message to Chance Mcconnell she s/w pt he stop by to sign medical release  I gave Chance Mcconnell the medical release that pt signed  She asked if I can scan release in pts chart

## 2019-01-31 NOTE — TELEPHONE ENCOUNTER
I spoke to [facility] - he wants the echo from this hospitalization faxed to his office  Thanks

## 2019-02-06 ENCOUNTER — OFFICE VISIT (OUTPATIENT)
Dept: CARDIAC SURGERY | Facility: CLINIC | Age: 69
End: 2019-02-06
Payer: COMMERCIAL

## 2019-02-06 VITALS
BODY MASS INDEX: 24.25 KG/M2 | DIASTOLIC BLOOD PRESSURE: 52 MMHG | OXYGEN SATURATION: 97 % | SYSTOLIC BLOOD PRESSURE: 142 MMHG | HEART RATE: 78 BPM | TEMPERATURE: 97.8 F | RESPIRATION RATE: 14 BRPM | WEIGHT: 160 LBS | HEIGHT: 68 IN

## 2019-02-06 DIAGNOSIS — Q23.1 CONGENITAL BICUSPID AORTIC VALVE: Primary | ICD-10-CM

## 2019-02-06 DIAGNOSIS — I34.0 NON-RHEUMATIC MITRAL REGURGITATION: ICD-10-CM

## 2019-02-06 DIAGNOSIS — I35.1 NONRHEUMATIC AORTIC VALVE INSUFFICIENCY: ICD-10-CM

## 2019-02-06 PROBLEM — Q23.81 CONGENITAL BICUSPID AORTIC VALVE: Status: ACTIVE | Noted: 2019-02-06

## 2019-02-06 LAB
ATRIAL RATE: 88 BPM
P AXIS: 62 DEGREES
PR INTERVAL: 166 MS
QRS AXIS: -79 DEGREES
QRSD INTERVAL: 140 MS
QT INTERVAL: 422 MS
QTC INTERVAL: 510 MS
T WAVE AXIS: 80 DEGREES
VENTRICULAR RATE: 88 BPM

## 2019-02-06 PROCEDURE — 99204 OFFICE O/P NEW MOD 45 MIN: CPT | Performed by: THORACIC SURGERY (CARDIOTHORACIC VASCULAR SURGERY)

## 2019-02-06 PROCEDURE — 93010 ELECTROCARDIOGRAM REPORT: CPT | Performed by: INTERNAL MEDICINE

## 2019-02-06 RX ORDER — OLMESARTAN MEDOXOMIL 20 MG/1
1 TABLET ORAL 2 TIMES DAILY
Refills: 0 | COMMUNITY
Start: 2019-02-01 | End: 2019-02-26 | Stop reason: SDUPTHER

## 2019-02-06 NOTE — PROGRESS NOTES
Consultation - Cardiothoracic Surgery   KAYLEN HARRIS [facility] 80 y o  male MRN: [id number]    Physician Requesting Consult: Dr Adam Phillip    Reason for Consult / Principal Problem: Bicuspid aortic valve with insufficiency and  Mitral regurgitation    History of Present Illness: [place] [facility] J CARLOS is a 80y o  year old male who presents to our office today for initial out patient surgical consultation for Bicuspid aortic valve with insufficiency and mitral regurgitation  He states he was noted to have a heart murmur approximately 1 year ago  He has noticed over the past year a decrease in activity tolerance and lack of energy  While traveling in July 2018 he developed bilateral ankle edema which resolved with diuretic therapy  Since October he reports ongoing "cold type" symptoms and a weight loss of 10 lbs  In January he developed worsening SOB and orthopnea over several days prompting a visit to the ER  He was admitted and diagnosed with acute CHF  Echocardiogram demonstrated a cardiomyopathy with EF 40%, bicuspid aortic valve morphology with moderate to severe insufficiency, moderate to severe mitral regurgitation, aortic root dilatation and mild tricuspid regurgitation with moderate to severe pulmonary hypertension  In addition to his cardiac disease he has a h/o HTN, hyperlipidemia CKD III (renal bx proven DM nephropathy) and DM type 2 (Metformin)  He is a former smoker, quit in 1968  He does not drink alcohol much at all anymore  He obtains routine dental care, was last seen by the dentist 1 month ago and had a tooth extracted  He needs a permanent bridge  He states his brother underwent heart valve surgery at [facility] last year  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, 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  He also has a [address]   He lives with his wife who is recovering from a CVA and his brother in law who has Crohn's disease and schizophrenia  He states he has a lot a stress in his life  Past Medical History:  Past Medical History:   Diagnosis Date    Diabetes mellitus ([address] )     Hyperlipidemia     Hypertension     Proteinuria     Stage 3 chronic kidney disease (HCC)     Vitamin D deficiency          Past Surgical History:   Past Surgical History:   Procedure Laterality Date    COLONOSCOPY      TOE SURGERY Left          Family History:  Family History   Problem Relation Age of Onset    Stroke Mother     Hypertension Mother     Blindness Father     Gout Brother          Social History:    History   Alcohol Use    Yes     Comment: occasionally     History   Drug Use No     History   Smoking Status    Former Smoker    Quit date: 1968   Smokeless Tobacco    Never Used       Home Medications:   Prior to Admission medications    Medication Sig Start Date End Date Taking?  Authorizing Provider   ascorbic acid (VITAMIN C) 500 mg tablet Take 500 mg by mouth daily   Yes Historical Provider, MD   atorvastatin (LIPITOR) 20 mg tablet Take 1 tablet by mouth daily 7/27/18  Yes Historical Provider, MD   calcitriol (ROCALTROL) 0 25 mcg capsule Take 1 capsule by mouth 2 (two) times a week 1/1/15  Yes Historical Provider, MD   Cholecalciferol (VITAMIN D3) 1000 units CAPS Take 1 capsule by mouth daily   Yes Historical Provider, MD   furosemide (LASIX) 40 mg tablet Take 1 tablet (40 mg total) by mouth daily for 30 days 1/31/19 3/2/19 Yes Noa Antoine MD   metFORMIN (GLUCOPHAGE) 500 mg tablet Take 1 tablet by mouth daily 2/19/18  Yes Historical Provider, MD   metoprolol succinate (TOPROL-XL) 50 mg 24 hr tablet Take 100 mg by mouth daily     Yes Historical Provider, MD   Multiple Vitamin (MULTIVITAMIN) tablet Take 1 tablet by mouth daily   Yes Historical Provider, MD   olmesartan (BENICAR) 20 mg tablet Take 1 tablet by mouth daily 2/1/19  Yes Historical Provider, MD   TRAVATAN Z 0 004 % ophthalmic solution  2/12/18  Yes Historical Provider, MD amLODIPine (NORVASC) 5 mg tablet Take 1 tablet (5 mg total) by mouth daily for 30 days  Patient not taking: Reported on 2/6/2019 1/31/19 2/6/19  Sahra Dobbins MD   olmesartan (BENICAR) 5 mg tablet TAKE 2 AND 1/2 TABLETS (12 5MG TOTAL) BY MOUTH ON MONDAY [facility] AND FRIDAY  TAKE 2 TABLETS (10 MG) THE OTHER DAYS  Patient not taking: Reported on 2/6/2019 1/1/19 2/6/19  Samuel Lund DO       Allergies: Allergies   Allergen Reactions    Ace Inhibitors Cough       Review of Systems:  Review of Systems - History obtained from chart review and the patient  General ROS: positive for  - fatigue and weight loss  Psychological ROS: negative  Ophthalmic ROS: negative   ENT ROS: positive for - nasal congestion  Hematological and Lymphatic ROS: negative for - bleeding problems, blood clots, bruising or jaundice  Respiratory ROS: positive for - shortness of breath  negative for - cough or hemoptysis  Cardiovascular ROS: positive for - dyspnea on exertion, murmur and decreased activity tolerance   negative for - chest pain, irregular heartbeat, loss of consciousness or palpitations  Gastrointestinal ROS: no abdominal pain, change in bowel habits, or black or bloody stools  Genito-Urinary ROS: no dysuria, trouble voiding, or hematuria  Musculoskeletal ROS: negative  Neurological ROS: no TIA or stroke symptoms    Vital Signs:     Vitals:    02/06/19 1000 02/06/19 1001   BP: (!) 142/48 142/52   BP Location: Left arm Right arm   Cuff Size: Adult    Pulse: 78    Resp: 14    Temp: 97 8 °F (36 6 °C)    TempSrc: Oral    SpO2: 97%    Weight: 72 6 kg (160 lb)    Height: 5' 8" (1 727 m)        Physical Exam:    General: well developed, no acute distress  HEENT/NECK:  PERRLA  No jugular venous distention  Cardiac:Regular rate and rhythm, II/VI diastolic murmur LLSB, II/VI systolic murmur at apex  Carotid arteries: 2+ pulses, no bruit  Pulmonary:  Breath sounds clear bilaterally  Abdomen:  Non-tender, Non-distended    Positive bowel sounds  Upper extremities: 2+ radial pulses; brisk capillary refill; Lower extremities: Extremities warm/dry  2+ femoral pulses  PT/DP pulses 2+ bilaterally  No edema B/L  Neuro: Alert and oriented X 3  Sensation is grossly intact  No focal deficits  Musculoskeletal: MAEE, stable gait  Skin: Warm/Dry, without rashes or lesions  Lab Results:   Lab Results   Component Value Date    WBC 8 79 01/29/2019    HGB 14 6 01/29/2019    HCT 43 5 01/29/2019    MCV 91 01/29/2019     01/29/2019     Lab Results   Component Value Date    K 4 6 01/30/2019     01/30/2019    CO2 26 01/30/2019    BUN 32 (H) 01/30/2019    CREATININE 1 84 (H) 01/30/2019    CALCIUM 8 5 01/30/2019    AST 62 (H) 01/29/2019    ALT 75 01/29/2019    ALKPHOS 94 01/29/2019    EGFR 43 01/30/2019     Lab Results   Component Value Date    INR 1 08 01/29/2019    INR 0 90 04/17/2018    PROTIME 13 7 01/29/2019    PROTIME 12 4 04/17/2018     Lab Results   Component Value Date    TROPONINI 0 04 01/29/2019       Imaging Studies:     Echocardiogram:   LEFT VENTRICLE:  The ventricle was mildly dilated  Systolic function was mildly to moderately reduced  Ejection fraction was estimated to be 40 %  There was mild diffuse hypokinesis  Wall thickness was mildly increased      RIGHT VENTRICLE:  The ventricle was mildly dilated  Systolic function was normal      LEFT ATRIUM:  The atrium was mildly dilated      MITRAL VALVE:  There was moderate to severe regurgitation      AORTIC VALVE:  The valve was possibly bicuspid  Leaflets exhibited normal thickness, normal cuspal separation, and significant diastolic prolapse  There was moderate to severe regurgitation      TRICUSPID VALVE:  There was mild regurgitation  Pulmonary artery systolic pressure was moderately to markedly increased    The findings suggest moderate to severe pulmonary hypertension      AORTA:  The root exhibited mild dilatation        I have personally reviewed pertinent films in PACS    Assessment:  Patient Active Problem List    Diagnosis Date Noted    Congenital bicuspid aortic valve 02/06/2019    Nonrheumatic aortic valve insufficiency 02/06/2019    Non-rheumatic mitral regurgitation 02/06/2019    Acute CHF (congestive heart failure) ([place] [id number] ) 01/29/2019    Elevated troponin 01/29/2019    Essential hypertension 05/11/2018    FSGS (focal segmental glomerulosclerosis) 05/10/2018    CKD (chronic kidney disease) stage 3, GFR 30-59 ml/min ([place] [address] ) 03/01/2018    Persistent proteinuria 03/01/2018    Diabetic nephropathy associated with type 2 diabetes mellitus ([place] [address] ) 03/01/2018    Vitamin D deficiency 03/01/2018    Secondary hyperparathyroidism ([place] [address] ) 03/01/2018       Plan:  Risks and benefits of aortic valve replacement and mitral valve repair vs replacement were discussed in detail today with the patient  He understands and wishes to proceed with further workup and ultimately surgical intervention  We have ordered preoperative cardiac catheterization, non-contrast chest CT scan, carotid duplex, PFT's and dental clearance  Once competed, he will return to the office for follow up to review the results of his testing and to discuss surgical plan  Hannah Jordan was comfortable with our recommendations, and his questions were answered to his satisfaction  Thank you for allowing us to participate in the care of this patient       SIGNATURE: JHONATAN Augustin  DATE: February 6, 2019  TIME: 10:54 AM

## 2019-02-06 NOTE — LETTER
February 6, 2019     Liset Cavanaugh MD  [address]    Patient: Kev Paiz   YOB: 1950   Date of Visit: 2/6/2019       Dear Dr Garrett Ours: Thank you for referring Kev Paiz to me for evaluation  Below are my notes for this consultation  If you have questions, please do not hesitate to call me  I look forward to following your patient along with you  Sincerely,        Lb Le,         CC: MD Esvin Beach CRNP  2/6/2019 11:22 AM  Cosign Needed  Consultation - Cardiothoracic Surgery   Myra Mustafa 80 y o  male MRN: [id number]    Physician Requesting Consult: Dr Gerry Liz    Reason for Consult / Principal Problem: Bicuspid aortic valve with insufficiency and  Mitral regurgitation    History of Present Illness: Kev Paiz is a 80y o  year old male who presents to our office today for initial out patient surgical consultation for Bicuspid aortic valve with insufficiency and mitral regurgitation  He states he was noted to have a heart murmur approximately 1 year ago  He has noticed over the past year a decrease in activity tolerance and lack of energy  While traveling in July 2018 he developed bilateral ankle edema which resolved with diuretic therapy  Since October he reports ongoing "cold type" symptoms and a weight loss of 10 lbs  In January he developed worsening SOB and orthopnea over several days prompting a visit to the ER  He was admitted and diagnosed with acute CHF  Echocardiogram demonstrated a cardiomyopathy with EF 40%, bicuspid aortic valve morphology with moderate to severe insufficiency, moderate to severe mitral regurgitation, aortic root dilatation and mild tricuspid regurgitation with moderate to severe pulmonary hypertension  In addition to his cardiac disease he has a h/o HTN, hyperlipidemia CKD III (renal bx proven DM nephropathy) and DM type 2 (Metformin)  He is a former smoker, quit in 1968   He does not drink alcohol much at all anymore  He obtains routine dental care, was last seen by the dentist 1 month ago and had a tooth extracted  He needs a permanent bridge  He states his brother underwent heart valve surgery at [facility] clinic last year  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, 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  He also has a [address]  He lives with his wife who is recovering from a CVA and his brother in law who has Crohn's disease and schizophrenia  He states he has a lot a stress in his life  Past Medical History:  Past Medical History:   Diagnosis Date    Diabetes mellitus ([address] )     Hyperlipidemia     Hypertension     Proteinuria     Stage 3 chronic kidney disease (HCC)     Vitamin D deficiency          Past Surgical History:   Past Surgical History:   Procedure Laterality Date    COLONOSCOPY      TOE SURGERY Left          Family History:  Family History   Problem Relation Age of Onset    Stroke Mother     Hypertension Mother     Blindness Father     Gout Brother          Social History:    History   Alcohol Use    Yes     Comment: occasionally     History   Drug Use No     History   Smoking Status    Former Smoker    Quit date: 1968   Smokeless Tobacco    Never Used       Home Medications:   Prior to Admission medications    Medication Sig Start Date End Date Taking?  Authorizing Provider   ascorbic acid (VITAMIN C) 500 mg tablet Take 500 mg by mouth daily   Yes Historical Provider, MD   atorvastatin (LIPITOR) 20 mg tablet Take 1 tablet by mouth daily 7/27/18  Yes Historical Provider, MD   calcitriol (ROCALTROL) 0 25 mcg capsule Take 1 capsule by mouth 2 (two) times a week 1/1/15  Yes Historical Provider, MD   Cholecalciferol (VITAMIN D3) 1000 units CAPS Take 1 capsule by mouth daily   Yes Historical Provider, MD   furosemide (LASIX) 40 mg tablet Take 1 tablet (40 mg total) by mouth daily for 30 days 1/31/19 3/2/19 Yes Kasi De Leon MD   metFORMIN (GLUCOPHAGE) 500 mg tablet Take 1 tablet by mouth daily 2/19/18  Yes Historical Provider, MD   metoprolol succinate (TOPROL-XL) 50 mg 24 hr tablet Take 100 mg by mouth daily     Yes Historical Provider, MD   Multiple Vitamin (MULTIVITAMIN) tablet Take 1 tablet by mouth daily   Yes Historical Provider, MD   olmesartan (BENICAR) 20 mg tablet Take 1 tablet by mouth daily 2/1/19  Yes Historical Provider, MD   TRAVATAN Z 0 004 % ophthalmic solution  2/12/18  Yes Historical Provider, MD   amLODIPine (NORVASC) 5 mg tablet Take 1 tablet (5 mg total) by mouth daily for 30 days  Patient not taking: Reported on 2/6/2019 1/31/19 2/6/19  Kasi De Leon MD   olmesartan (BENICAR) 5 mg tablet TAKE 2 AND 1/2 TABLETS (12 5MG TOTAL) BY MOUTH ON MONDAY [facility] AND FRIDAY  TAKE 2 TABLETS (10 MG) THE OTHER DAYS  Patient not taking: Reported on 2/6/2019 1/1/19 2/6/19  Soraya Richardson DO       Allergies:   Allergies   Allergen Reactions    Ace Inhibitors Cough       Review of Systems:  Review of Systems - History obtained from chart review and the patient  General ROS: positive for  - fatigue and weight loss  Psychological ROS: negative  Ophthalmic ROS: negative   ENT ROS: positive for - nasal congestion  Hematological and Lymphatic ROS: negative for - bleeding problems, blood clots, bruising or jaundice  Respiratory ROS: positive for - shortness of breath  negative for - cough or hemoptysis  Cardiovascular ROS: positive for - dyspnea on exertion, murmur and decreased activity tolerance   negative for - chest pain, irregular heartbeat, loss of consciousness or palpitations  Gastrointestinal ROS: no abdominal pain, change in bowel habits, or black or bloody stools  Genito-Urinary ROS: no dysuria, trouble voiding, or hematuria  Musculoskeletal ROS: negative  Neurological ROS: no TIA or stroke symptoms    Vital Signs:     Vitals:    02/06/19 1000 02/06/19 1001   BP: (!) 142/48 142/52   BP Location: Left arm Right arm   Cuff Size: Adult    Pulse: 78    Resp: 14    Temp: 97 8 °F (36 6 °C)    TempSrc: Oral    SpO2: 97%    Weight: 72 6 kg (160 lb)    Height: 5' 8" (1 727 m)        Physical Exam:    General: well developed, no acute distress  HEENT/NECK:  PERRLA  No jugular venous distention  Cardiac:Regular rate and rhythm, II/VI diastolic murmur LLSB, II/VI systolic murmur at apex  Carotid arteries: 2+ pulses, no bruit  Pulmonary:  Breath sounds clear bilaterally  Abdomen:  Non-tender, Non-distended  Positive bowel sounds  Upper extremities: 2+ radial pulses; brisk capillary refill; Lower extremities: Extremities warm/dry  2+ femoral pulses  PT/DP pulses 2+ bilaterally  No edema B/L  Neuro: Alert and oriented X 3  Sensation is grossly intact  No focal deficits  Musculoskeletal: MAEE, stable gait  Skin: Warm/Dry, without rashes or lesions  Lab Results:   Lab Results   Component Value Date    WBC 8 79 01/29/2019    HGB 14 6 01/29/2019    HCT 43 5 01/29/2019    MCV 91 01/29/2019     01/29/2019     Lab Results   Component Value Date    K 4 6 01/30/2019     01/30/2019    CO2 26 01/30/2019    BUN 32 (H) 01/30/2019    CREATININE 1 84 (H) 01/30/2019    CALCIUM 8 5 01/30/2019    AST 62 (H) 01/29/2019    ALT 75 01/29/2019    ALKPHOS 94 01/29/2019    EGFR 43 01/30/2019     Lab Results   Component Value Date    INR 1 08 01/29/2019    INR 0 90 04/17/2018    PROTIME 13 7 01/29/2019    PROTIME 12 4 04/17/2018     Lab Results   Component Value Date    TROPONINI 0 04 01/29/2019       Imaging Studies:     Echocardiogram:   LEFT VENTRICLE:  The ventricle was mildly dilated  Systolic function was mildly to moderately reduced  Ejection fraction was estimated to be 40 %  There was mild diffuse hypokinesis  Wall thickness was mildly increased      RIGHT VENTRICLE:  The ventricle was mildly dilated    Systolic function was normal      LEFT ATRIUM:  The atrium was mildly dilated      MITRAL VALVE:  There was moderate to severe regurgitation      AORTIC VALVE:  The valve was possibly bicuspid  Leaflets exhibited normal thickness, normal cuspal separation, and significant diastolic prolapse  There was moderate to severe regurgitation      TRICUSPID VALVE:  There was mild regurgitation  Pulmonary artery systolic pressure was moderately to markedly increased  The findings suggest moderate to severe pulmonary hypertension      AORTA:  The root exhibited mild dilatation        I have personally reviewed pertinent films in PACS    Assessment:  Patient Active Problem List    Diagnosis Date Noted    Congenital bicuspid aortic valve 02/06/2019    Nonrheumatic aortic valve insufficiency 02/06/2019    Non-rheumatic mitral regurgitation 02/06/2019    Acute CHF (congestive heart failure) ([facility] ) 01/29/2019    Elevated troponin 01/29/2019    Essential hypertension 05/11/2018    FSGS (focal segmental glomerulosclerosis) 05/10/2018    CKD (chronic kidney disease) stage 3, GFR 30-59 ml/min ([facility] ) 03/01/2018    Persistent proteinuria 03/01/2018    Diabetic nephropathy associated with type 2 diabetes mellitus ([facility] ) 03/01/2018    Vitamin D deficiency 03/01/2018    Secondary hyperparathyroidism ([place] [address] ) 03/01/2018       Plan:  Risks and benefits of aortic valve replacement and mitral valve repair vs replacement were discussed in detail today with the patient  He understands and wishes to proceed with further workup and ultimately surgical intervention  We have ordered preoperative cardiac catheterization, non-contrast chest CT scan, carotid duplex, PFT's and dental clearance  Once competed, he will return to the office for follow up to review the results of his testing and to discuss surgical plan  Kathy Niki was comfortable with our recommendations, and his questions were answered to his satisfaction  Thank you for allowing us to participate in the care of this patient       SIGNATURE: Art Greene, JHONATAN  DATE: February 6, 2019  TIME: 10:54 AM

## 2019-02-07 ENCOUNTER — TELEPHONE (OUTPATIENT)
Dept: CARDIOLOGY CLINIC | Facility: CLINIC | Age: 69
End: 2019-02-07

## 2019-02-10 NOTE — H&P (VIEW-ONLY)
Heart Failure Follow Up Office Visit Note Alley Poster   80 y o    male   MRN: [id number]  [facility] [place]  [address]  [place] [address] [phone]    PCP: Eve Baldwin MD  Cardiologist Will be Dr Jose Yoo          Impression/plan  Chronic systolic heart failure  Recent hosp adm 1/29-1/30/19  --Discharge weight:73 8 kg (162 lb 11 2 oz) 01/30/19   Home weights are now 156 lb  --Discharge creatinine: 1 84  --Last labs: None since discharge  --Educated regarding adherence to a 1 5g-- 2g sodium diet/ 1500 ml fluid restriction, daily weights and to call us if he gains 2-3 lbs in 1 day ir 5 lbs in 1 wk  --Beta-Blocker: On Toprol- mg daily  --ACEi, ARB or ARNi: Benicar 20 mg b i d   --Aldosterone Receptor Blocker:None  --Diuretic:Lasix 40 mg po/d  NICM (likely), EF 40%- new dx  Normal Stress test 9 months ago  Cardiomyopathy likely due to valvular pathology  --A cardiac catheterization has been requested by CT surgery  Mitral regurgitation, moderate  --Under CTS evaluation for MV repair  Congential bicuspid aortic valve with severe aortic insufficiency  --Under CTS evaluation for Tissue AVR  Hypertension  /54 today,  Controlled  On BB and ARB  Hyperlipidemia- on atorvastatin 20 mg/d  Lipids not assessed  --Will check a lipid profile  CKD3  Baseline Cr 1 6-1 8  Followed by Dr Archie Vega as an OP  T2DM, on metformin  Unsure of last HgA1c    Summary recommendations  Hold Lasix and metformin day of cath  CBC, BMP,Fasting lipid profile, hemoglobin A1c tomorrow morning  He thinks he may go to [facility]  He has a CT of the chest and carotid dopler scheduled tomorrow  Educated regarding heart failure, as above  Follow up will be scheduled with Dr Jose Yoo 2 M  Cardiology preference discussed w pt              HPI:   Mr Charlotte Hurtado is a 79 yo gentleman with a bicuspid aortic valve, with severe AI, moderate MR and LV dysfunction ( EF 40%)  He also has CKD3, HTN, hyperlipidemia and DM2  He was recently hospitalized with SOB, found to have a biscupid aortic valve with severe AI/ MR and a new cardiomyopathy  He was diuresed and underwent med adjustments to optimize his BP control  He is currently under evaluation by CTS for a tissue aortic valve replacement and a mitral valve repair; his first office visit was last week with Dr Kuldip Soto  Preoperative testing has been scheduled  He reportedly had a stress test that was normal about 9 months ago, by Dr Betzy Guardado  He is here today for cardiology follow up    2/13/19  He is feeling well, no chest pain or shortness of breath  Weights at home are stable at 156 lb  He saw CT surgery/Dr Kuldip Soto last week  Cardiac catheterization is scheduled February 15th  he was previously known to Dr Betzy Guardado- who he also saw since [address] [place] [address] and changed some medications   He is now taking Benicar 20 mg b i d  and Lasix 20 mg on Monday, Wednesday, Friday, and 40 mg on all others  He has not had blood work since hospital discharge          Assessment  Diagnoses and all orders for this visit:    Chronic systolic (congestive) heart failure (HCC)    Cardiomyopathy, unspecified type ([facility] )    Congenital bicuspid aortic valve    Non-rheumatic mitral regurgitation    Essential hypertension    CKD (chronic kidney disease) stage 3, GFR 30-59 ml/min ([facility] )    Other specified diabetes mellitus with stage 3 chronic kidney disease, without long-term current use of insulin ([facility] )        Past Medical History:   Diagnosis Date    Diabetes mellitus ([facility] )     Hyperlipidemia     Hypertension     Proteinuria     Stage 3 chronic kidney disease ([facility] )     Vitamin D deficiency        Review of Systems   Constitution: Negative for chills     Cardiovascular: Negative for chest pain, claudication, cyanosis, dyspnea on exertion, irregular heartbeat, leg swelling, near-syncope, orthopnea, palpitations, paroxysmal nocturnal dyspnea and syncope  Respiratory: Negative for cough and shortness of breath  Gastrointestinal: Negative for heartburn and nausea  Neurological: Negative for dizziness, focal weakness, headaches, light-headedness and weakness  All other systems reviewed and are negative  Allergies   Allergen Reactions    Ace Inhibitors Cough           Current Outpatient Medications:     ascorbic acid (VITAMIN C) 500 mg tablet, Take 500 mg by mouth daily, Disp: , Rfl:     atorvastatin (LIPITOR) 20 mg tablet, Take 1 tablet by mouth daily, Disp: , Rfl:     calcitriol (ROCALTROL) 0 25 mcg capsule, Take 1 capsule by mouth 2 (two) times a week, Disp: , Rfl:     Cholecalciferol (VITAMIN D3) 1000 units CAPS, Take 1 capsule by mouth daily, Disp: , Rfl:     furosemide (LASIX) 40 mg tablet, Take 1 tablet (40 mg total) by mouth daily for 30 days, Disp: 30 tablet, Rfl: 0    metFORMIN (GLUCOPHAGE) 500 mg tablet, Take 1 tablet by mouth daily, Disp: , Rfl: 0    metoprolol succinate (TOPROL-XL) 50 mg 24 hr tablet, Take 100 mg by mouth daily  , Disp: , Rfl:     Multiple Vitamin (MULTIVITAMIN) tablet, Take 1 tablet by mouth daily, Disp: , Rfl:     olmesartan (BENICAR) 20 mg tablet, Take 1 tablet by mouth daily, Disp: , Rfl: 0    TRAVATAN Z 0 004 % ophthalmic solution, , Disp: , Rfl: 0    Social History     Socioeconomic History    Marital status: /Civil Union     Spouse name: Not on file    Number of children: Not on file    Years of education: Not on file    Highest education level: Not on file   Occupational History    Occupation: RETIRED   Social Needs    Financial resource strain: Not on file    Food insecurity:     Worry: Not on file     Inability: Not on file    Transportation needs:     Medical: Not on file     Non-medical: Not on file   Tobacco Use    Smoking status: Former Smoker     Last attempt to quit:      Years since quittin 1    Smokeless tobacco: Never Used   Substance and Sexual Activity    Alcohol use: Yes     Comment: occasionally    Drug use: No    Sexual activity: Not on file   Lifestyle    Physical activity:     Days per week: Not on file     Minutes per session: Not on file    Stress: Not on file   Relationships    Social connections:     Talks on phone: Not on file     Gets together: Not on file     Attends [employer] service: Not on file     Active member of club or organization: Not on file     Attends meetings of clubs or organizations: Not on file     Relationship status: Not on file    Intimate partner violence:     Fear of current or ex partner: Not on file     Emotionally abused: Not on file     Physically abused: Not on file     Forced sexual activity: Not on file   Other Topics Concern    Not on file   Social History Narrative    Not on file       Family History   Problem Relation Age of Onset    Stroke Mother     Hypertension Mother     Blindness Father     Gout Brother        Physical Exam   Constitutional: He is oriented to person, place, and time  No distress  HENT:   Head: Normocephalic and atraumatic  Eyes: Conjunctivae and EOM are normal    Neck: Normal range of motion  Neck supple  Cardiovascular: Normal rate, regular rhythm and intact distal pulses  Murmur heard  Systolic murmur is present  3/6 crescendo decrescendo murmur across the precordium radiating to the neck  also 2/6 blowing systolic murmur left lower sternal border  Pulmonary/Chest: Effort normal and breath sounds normal    Abdominal: Soft  Bowel sounds are normal    Musculoskeletal: Normal range of motion  Neurological: He is alert and oriented to person, place, and time  Skin: Skin is warm and dry  Psychiatric: He has a normal mood and affect  Nursing note and vitals reviewed  Vitals: There were no vitals taken for this visit     Wt Readings from Last 3 Encounters:   02/06/19 72 6 kg (160 lb)   01/30/19 73 8 kg (162 lb 11 2 oz)   12/28/18 74 8 kg (165 lb)         Labs & Results:  Lab Results   Component Value Date    WBC 8 79 2019    HGB 14 6 2019    HCT 43 5 2019    MCV 91 2019     2019     No results found for: BNP  No components found for: CHEM    Results for orders placed during the hospital encounter of 19   Echo complete with contrast if indicated    Narrative [facility], [facility]  [phone]    Transthoracic Echocardiogram  2D, M-mode, Doppler, and Color Doppler    Study date:  2019    Patient: Bennie Godinez  MR number: [id number]  Account number: [de-identified]  : 1950  Age: 80 years  Gender: Male  Status: Inpatient  Location: Bedside  Height: 68 in  Weight: 163 lb  BP: 155/ 70 mmHg    Indications: Heart Failure    Diagnoses: I50 9 - Heart failure, unspecified    Sonographer:  Zandra Cooks, RCS  Primary Physician:  Renato Bloom  Referring Physician:  Emmett Sanchez MD  Group[facility]  [facility]  Interpreting Physician:  Emmett Sanchez MD    SUMMARY    LEFT VENTRICLE:  The ventricle was mildly dilated  Systolic function was mildly to moderately reduced  Ejection fraction was estimated to be 40 %  There was mild diffuse hypokinesis  Wall thickness was mildly increased  RIGHT VENTRICLE:  The ventricle was mildly dilated  Systolic function was normal     LEFT ATRIUM:  The atrium was mildly dilated  MITRAL VALVE:  There was moderate to severe regurgitation  AORTIC VALVE:  The valve was possibly bicuspid  Leaflets exhibited normal thickness, normal cuspal separation, and significant diastolic prolapse  There was moderate to severe regurgitation  TRICUSPID VALVE:  There was mild regurgitation  Pulmonary artery systolic pressure was moderately to markedly increased  The findings suggest moderate to severe pulmonary hypertension  AORTA:  The root exhibited mild dilatation      IVC, HEPATIC VEINS:  The inferior vena cava was mildly dilated  Respirophasic changes were blunted (less than 50% variation)  PERICARDIUM:  There was a left pleural effusion  HISTORY: PRIOR HISTORY: Murmur, HTN, DM2, HLD, CKD3    PROCEDURE: The procedure was performed at the bedside  This was a routine study  The transthoracic approach was used  The study included complete 2D imaging, M-mode, complete spectral Doppler, and color Doppler  The heart rate was 95 bpm,  at the start of the study  Images were obtained from the parasternal, apical, subcostal, and suprasternal notch acoustic windows  Image quality was adequate  LEFT VENTRICLE: The ventricle was mildly dilated  Systolic function was mildly to moderately reduced  Ejection fraction was estimated to be 40 %  There was mild diffuse hypokinesis  Wall thickness was mildly increased  RIGHT VENTRICLE: The ventricle was mildly dilated  Systolic function was normal  Wall thickness was normal     LEFT ATRIUM: The atrium was mildly dilated  RIGHT ATRIUM: Size was normal     MITRAL VALVE: Valve structure was normal  There was normal leaflet separation  DOPPLER: The transmitral velocity was within the normal range  There was no evidence for stenosis  There was moderate to severe regurgitation  AORTIC VALVE: The valve was possibly bicuspid  Leaflets exhibited normal thickness, normal cuspal separation, and significant diastolic prolapse  DOPPLER: Transaortic velocity was within the normal range  There was no evidence for  stenosis  There was moderate to severe regurgitation  The regurgitant jet was eccentric  TRICUSPID VALVE: The valve structure was normal  There was normal leaflet separation  DOPPLER: The transtricuspid velocity was within the normal range  There was no evidence for stenosis  There was mild regurgitation  Pulmonary artery  systolic pressure was moderately to markedly increased  Estimated peak PA pressure was 60 mmHg   The findings suggest moderate to severe pulmonary hypertension  PULMONIC VALVE: Leaflets exhibited normal thickness, no calcification, and normal cuspal separation  DOPPLER: The transpulmonic velocity was within the normal range  There was no regurgitation  PERICARDIUM: There was no pericardial effusion  There was a left pleural effusion  The pericardium was normal in appearance  AORTA: The root exhibited mild dilatation  SYSTEMIC VEINS: IVC: The inferior vena cava was mildly dilated  Respirophasic changes were blunted (less than 50% variation)      MEASUREMENT TABLES    2D MEASUREMENTS  Aorta   (Reference normals)  Root diam   39 mm   (--)    SYSTEM MEASUREMENT TABLES    2D  %FS: 23 42 %  Ao Diam: 3 91 cm  EDV(Teich): 179 53 ml  EF Biplane: 40 37 %  EF(Teich): 46 07 %  ESV(Teich): 96 82 ml  IVSd: 1 02 cm  LA Area: 12 24 cm2  LA Diam: 4 3 cm  LVEDV MOD A2C: 129 37 ml  LVEDV MOD A4C: 151 37 ml  LVEDV MOD BP: 140 9 ml  LVEF MOD A2C: 42 79 %  LVEF MOD A4C: 40 91 %  LVESV MOD A2C: 74 02 ml  LVESV MOD A4C: 89 45 ml  LVESV MOD BP: 84 02 ml  LVIDd: 5 99 cm  LVIDs: 4 59 cm  LVLd A2C: 8 45 cm  LVLd A4C: 8 58 cm  LVLs A2C: 7 63 cm  LVLs A4C: 8 25 cm  LVPWd: 1 21 cm  RA Area: 15 27 cm2  RVIDd: 4 39 cm  SV MOD A2C: 55 35 ml  SV MOD A4C: 61 92 ml  SV(Teich): 82 71 ml    CW  TR MaxP 66 mmHg  TR Vmax: 3 52 m/s    MM  TAPSE: 1 8 cm    [employer] Accredited Echocardiography Laboratory    Prepared and electronically signed by    Yazan Bernabe MD  Signed 2019 16:15:06

## 2019-02-10 NOTE — PROGRESS NOTES
Heart Failure Follow Up Office Visit Note Nazanin Morgan   80 y o    male   MRN: [id number]  [address] [facility]  [address]  [facility] [address] [phone]    PCP: Marc Boland MD  Cardiologist Will be Dr Aiyana Fernando          Impression/plan  Chronic systolic heart failure  Recent hosp adm 1/29-1/30/19  --Discharge weight:73 8 kg (162 lb 11 2 oz) 01/30/19   Home weights are now 156 lb  --Discharge creatinine: 1 84  --Last labs: None since discharge  --Educated regarding adherence to a 1 5g-- 2g sodium diet/ 1500 ml fluid restriction, daily weights and to call us if he gains 2-3 lbs in 1 day ir 5 lbs in 1 wk  --Beta-Blocker: On Toprol- mg daily  --ACEi, ARB or ARNi: Benicar 20 mg b i d   --Aldosterone Receptor Blocker:None  --Diuretic:Lasix 40 mg po/d  NICM (likely), EF 40%- new dx  Normal Stress test 9 months ago  Cardiomyopathy likely due to valvular pathology  --A cardiac catheterization has been requested by CT surgery  Mitral regurgitation, moderate  --Under CTS evaluation for MV repair  Congential bicuspid aortic valve with severe aortic insufficiency  --Under CTS evaluation for Tissue AVR  Hypertension  /54 today,  Controlled  On BB and ARB  Hyperlipidemia- on atorvastatin 20 mg/d  Lipids not assessed  --Will check a lipid profile  CKD3  Baseline Cr 1 6-1 8  Followed by Dr Serge Clemens as an OP  T2DM, on metformin  Unsure of last HgA1c    Summary recommendations  Hold Lasix and metformin day of cath  CBC, BMP,Fasting lipid profile, hemoglobin A1c tomorrow morning  He thinks he may go to [facility]  He has a CT of the chest and carotid dopler scheduled tomorrow  Educated regarding heart failure, as above  Follow up will be scheduled with Dr Aiyana Fernando 2 M  Cardiology preference discussed w pt              HPI:   Mr Kailyn Eduardo is a 79 yo gentleman with a bicuspid aortic valve, with severe AI, moderate MR and LV dysfunction ( EF 40%)  He also has CKD3, HTN, hyperlipidemia and DM2  He was recently hospitalized with SOB, found to have a biscupid aortic valve with severe AI/ MR and a new cardiomyopathy  He was diuresed and underwent med adjustments to optimize his BP control  He is currently under evaluation by CTS for a tissue aortic valve replacement and a mitral valve repair; his first office visit was last week with Dr Jose Rocha  Preoperative testing has been scheduled  He reportedly had a stress test that was normal about 9 months ago, by Dr Bryon Newell  He is here today for cardiology follow up    2/13/19  He is feeling well, no chest pain or shortness of breath  Weights at home are stable at 156 lb  He saw CT surgery/Dr Jose Rocha last week  Cardiac catheterization is scheduled February 15th  he was previously known to Dr Bryon Newell- who he also saw since [address] [place] [address] and changed some medications   He is now taking Benicar 20 mg b i d  and Lasix 20 mg on Monday, Wednesday, Friday, and 40 mg on all others  He has not had blood work since hospital discharge          Assessment  Diagnoses and all orders for this visit:    Chronic systolic (congestive) heart failure (HCC)    Cardiomyopathy, unspecified type ([facility] )    Congenital bicuspid aortic valve    Non-rheumatic mitral regurgitation    Essential hypertension    CKD (chronic kidney disease) stage 3, GFR 30-59 ml/min ([facility] )    Other specified diabetes mellitus with stage 3 chronic kidney disease, without long-term current use of insulin ([facility] )        Past Medical History:   Diagnosis Date    Diabetes mellitus ([facility] )     Hyperlipidemia     Hypertension     Proteinuria     Stage 3 chronic kidney disease ([facility] )     Vitamin D deficiency        Review of Systems   Constitution: Negative for chills     Cardiovascular: Negative for chest pain, claudication, cyanosis, dyspnea on exertion, irregular heartbeat, leg swelling, near-syncope, orthopnea, palpitations, paroxysmal nocturnal dyspnea and syncope  Respiratory: Negative for cough and shortness of breath  Gastrointestinal: Negative for heartburn and nausea  Neurological: Negative for dizziness, focal weakness, headaches, light-headedness and weakness  All other systems reviewed and are negative  Allergies   Allergen Reactions    Ace Inhibitors Cough           Current Outpatient Medications:     ascorbic acid (VITAMIN C) 500 mg tablet, Take 500 mg by mouth daily, Disp: , Rfl:     atorvastatin (LIPITOR) 20 mg tablet, Take 1 tablet by mouth daily, Disp: , Rfl:     calcitriol (ROCALTROL) 0 25 mcg capsule, Take 1 capsule by mouth 2 (two) times a week, Disp: , Rfl:     Cholecalciferol (VITAMIN D3) 1000 units CAPS, Take 1 capsule by mouth daily, Disp: , Rfl:     furosemide (LASIX) 40 mg tablet, Take 1 tablet (40 mg total) by mouth daily for 30 days, Disp: 30 tablet, Rfl: 0    metFORMIN (GLUCOPHAGE) 500 mg tablet, Take 1 tablet by mouth daily, Disp: , Rfl: 0    metoprolol succinate (TOPROL-XL) 50 mg 24 hr tablet, Take 100 mg by mouth daily  , Disp: , Rfl:     Multiple Vitamin (MULTIVITAMIN) tablet, Take 1 tablet by mouth daily, Disp: , Rfl:     olmesartan (BENICAR) 20 mg tablet, Take 1 tablet by mouth daily, Disp: , Rfl: 0    TRAVATAN Z 0 004 % ophthalmic solution, , Disp: , Rfl: 0    Social History     Socioeconomic History    Marital status: /Civil Union     Spouse name: Not on file    Number of children: Not on file    Years of education: Not on file    Highest education level: Not on file   Occupational History    Occupation: RETIRED   Social Needs    Financial resource strain: Not on file    Food insecurity:     Worry: Not on file     Inability: Not on file    Transportation needs:     Medical: Not on file     Non-medical: Not on file   Tobacco Use    Smoking status: Former Smoker     Last attempt to quit:      Years since quittin 1    Smokeless tobacco: Never Used   Substance and Sexual Activity    Alcohol use: Yes     Comment: occasionally    Drug use: No    Sexual activity: Not on file   Lifestyle    Physical activity:     Days per week: Not on file     Minutes per session: Not on file    Stress: Not on file   Relationships    Social connections:     Talks on phone: Not on file     Gets together: Not on file     Attends Restorationism service: Not on file     Active member of club or organization: Not on file     Attends meetings of clubs or organizations: Not on file     Relationship status: Not on file    Intimate partner violence:     Fear of current or ex partner: Not on file     Emotionally abused: Not on file     Physically abused: Not on file     Forced sexual activity: Not on file   Other Topics Concern    Not on file   Social History Narrative    Not on file       Family History   Problem Relation Age of Onset    Stroke Mother     Hypertension Mother     Blindness Father     Gout Brother        Physical Exam   Constitutional: He is oriented to person, place, and time  No distress  HENT:   Head: Normocephalic and atraumatic  Eyes: Conjunctivae and EOM are normal    Neck: Normal range of motion  Neck supple  Cardiovascular: Normal rate, regular rhythm and intact distal pulses  Murmur heard  Systolic murmur is present  3/6 crescendo decrescendo murmur across the precordium radiating to the neck  also 2/6 blowing systolic murmur left lower sternal border  Pulmonary/Chest: Effort normal and breath sounds normal    Abdominal: Soft  Bowel sounds are normal    Musculoskeletal: Normal range of motion  Neurological: He is alert and oriented to person, place, and time  Skin: Skin is warm and dry  Psychiatric: He has a normal mood and affect  Nursing note and vitals reviewed  Vitals: There were no vitals taken for this visit     Wt Readings from Last 3 Encounters:   02/06/19 72 6 kg (160 lb)   01/30/19 73 8 kg (162 lb 11 2 oz)   12/28/18 74 8 kg (165 lb)         Labs & Results:  Lab Results   Component Value Date    WBC 8 79 2019    HGB 14 6 2019    HCT 43 5 2019    MCV 91 2019     2019     No results found for: BNP  No components found for: CHEM    Results for orders placed during the hospital encounter of 19   Echo complete with contrast if indicated    Narrative [facility], [address] [place] [address]  [phone]    Transthoracic Echocardiogram  2D, M-mode, Doppler, and Color Doppler    Study date:  2019    Patient: Bennie Godinez  MR number: [id number]  Account number: [de-identified]  : 1950  Age: 80 years  Gender: Male  Status: Inpatient  Location: Bedside  Height: 68 in  Weight: 163 lb  BP: 155/ 70 mmHg    Indications: Heart Failure    Diagnoses: I50 9 - Heart failure, unspecified    Sonographer:  Zandra Cooks, RCS  Primary Physician:  Renato Bloom  Referring Physician:  Emmett Sanchez MD  Group:  [id number]  Interpreting Physician:  Emmett Sanchez MD    SUMMARY    LEFT VENTRICLE:  The ventricle was mildly dilated  Systolic function was mildly to moderately reduced  Ejection fraction was estimated to be 40 %  There was mild diffuse hypokinesis  Wall thickness was mildly increased  RIGHT VENTRICLE:  The ventricle was mildly dilated  Systolic function was normal     LEFT ATRIUM:  The atrium was mildly dilated  MITRAL VALVE:  There was moderate to severe regurgitation  AORTIC VALVE:  The valve was possibly bicuspid  Leaflets exhibited normal thickness, normal cuspal separation, and significant diastolic prolapse  There was moderate to severe regurgitation  TRICUSPID VALVE:  There was mild regurgitation  Pulmonary artery systolic pressure was moderately to markedly increased  The findings suggest moderate to severe pulmonary hypertension  AORTA:  The root exhibited mild dilatation      IVC, HEPATIC VEINS:  The inferior vena cava was mildly dilated  Respirophasic changes were blunted (less than 50% variation)  PERICARDIUM:  There was a left pleural effusion  HISTORY: PRIOR HISTORY: Murmur, HTN, DM2, HLD, CKD3    PROCEDURE: The procedure was performed at the bedside  This was a routine study  The transthoracic approach was used  The study included complete 2D imaging, M-mode, complete spectral Doppler, and color Doppler  The heart rate was 95 bpm,  at the start of the study  Images were obtained from the parasternal, apical, subcostal, and suprasternal notch acoustic windows  Image quality was adequate  LEFT VENTRICLE: The ventricle was mildly dilated  Systolic function was mildly to moderately reduced  Ejection fraction was estimated to be 40 %  There was mild diffuse hypokinesis  Wall thickness was mildly increased  RIGHT VENTRICLE: The ventricle was mildly dilated  Systolic function was normal  Wall thickness was normal     LEFT ATRIUM: The atrium was mildly dilated  RIGHT ATRIUM: Size was normal     MITRAL VALVE: Valve structure was normal  There was normal leaflet separation  DOPPLER: The transmitral velocity was within the normal range  There was no evidence for stenosis  There was moderate to severe regurgitation  AORTIC VALVE: The valve was possibly bicuspid  Leaflets exhibited normal thickness, normal cuspal separation, and significant diastolic prolapse  DOPPLER: Transaortic velocity was within the normal range  There was no evidence for  stenosis  There was moderate to severe regurgitation  The regurgitant jet was eccentric  TRICUSPID VALVE: The valve structure was normal  There was normal leaflet separation  DOPPLER: The transtricuspid velocity was within the normal range  There was no evidence for stenosis  There was mild regurgitation  Pulmonary artery  systolic pressure was moderately to markedly increased  Estimated peak PA pressure was 60 mmHg   The findings suggest moderate to severe pulmonary hypertension  PULMONIC VALVE: Leaflets exhibited normal thickness, no calcification, and normal cuspal separation  DOPPLER: The transpulmonic velocity was within the normal range  There was no regurgitation  PERICARDIUM: There was no pericardial effusion  There was a left pleural effusion  The pericardium was normal in appearance  AORTA: The root exhibited mild dilatation  SYSTEMIC VEINS: IVC: The inferior vena cava was mildly dilated  Respirophasic changes were blunted (less than 50% variation)      MEASUREMENT TABLES    2D MEASUREMENTS  Aorta   (Reference normals)  Root diam   39 mm   (--)    SYSTEM MEASUREMENT TABLES    2D  %FS: 23 42 %  Ao Diam: 3 91 cm  EDV(Teich): 179 53 ml  EF Biplane: 40 37 %  EF(Teich): 46 07 %  ESV(Teich): 96 82 ml  IVSd: 1 02 cm  LA Area: 12 24 cm2  LA Diam: 4 3 cm  LVEDV MOD A2C: 129 37 ml  LVEDV MOD A4C: 151 37 ml  LVEDV MOD BP: 140 9 ml  LVEF MOD A2C: 42 79 %  LVEF MOD A4C: 40 91 %  LVESV MOD A2C: 74 02 ml  LVESV MOD A4C: 89 45 ml  LVESV MOD BP: 84 02 ml  LVIDd: 5 99 cm  LVIDs: 4 59 cm  LVLd A2C: 8 45 cm  LVLd A4C: 8 58 cm  LVLs A2C: 7 63 cm  LVLs A4C: 8 25 cm  LVPWd: 1 21 cm  RA Area: 15 27 cm2  RVIDd: 4 39 cm  SV MOD A2C: 55 35 ml  SV MOD A4C: 61 92 ml  SV(Teich): 82 71 ml    CW  TR MaxP 66 mmHg  TR Vmax: 3 52 m/s    MM  TAPSE: 1 8 cm    [employer] Accredited Echocardiography Laboratory    Prepared and electronically signed by    Jaz Peña MD  Signed 2019 16:15:06

## 2019-02-13 ENCOUNTER — OFFICE VISIT (OUTPATIENT)
Dept: CARDIOLOGY CLINIC | Facility: CLINIC | Age: 69
End: 2019-02-13
Payer: COMMERCIAL

## 2019-02-13 VITALS
WEIGHT: 159.4 LBS | SYSTOLIC BLOOD PRESSURE: 128 MMHG | HEIGHT: 68 IN | OXYGEN SATURATION: 98 % | DIASTOLIC BLOOD PRESSURE: 54 MMHG | BODY MASS INDEX: 24.16 KG/M2 | HEART RATE: 68 BPM

## 2019-02-13 DIAGNOSIS — Q23.1 CONGENITAL BICUSPID AORTIC VALVE: ICD-10-CM

## 2019-02-13 DIAGNOSIS — I10 ESSENTIAL HYPERTENSION: ICD-10-CM

## 2019-02-13 DIAGNOSIS — I42.9 CARDIOMYOPATHY, UNSPECIFIED TYPE (HCC): ICD-10-CM

## 2019-02-13 DIAGNOSIS — I50.22 CHRONIC SYSTOLIC (CONGESTIVE) HEART FAILURE (HCC): Primary | ICD-10-CM

## 2019-02-13 DIAGNOSIS — N18.30 OTHER SPECIFIED DIABETES MELLITUS WITH STAGE 3 CHRONIC KIDNEY DISEASE, WITHOUT LONG-TERM CURRENT USE OF INSULIN: ICD-10-CM

## 2019-02-13 DIAGNOSIS — I34.0 NON-RHEUMATIC MITRAL REGURGITATION: ICD-10-CM

## 2019-02-13 DIAGNOSIS — E13.22 OTHER SPECIFIED DIABETES MELLITUS WITH STAGE 3 CHRONIC KIDNEY DISEASE, WITHOUT LONG-TERM CURRENT USE OF INSULIN: ICD-10-CM

## 2019-02-13 DIAGNOSIS — N18.30 CKD (CHRONIC KIDNEY DISEASE) STAGE 3, GFR 30-59 ML/MIN (HCC): ICD-10-CM

## 2019-02-13 DIAGNOSIS — E78.5 HYPERLIPIDEMIA, UNSPECIFIED HYPERLIPIDEMIA TYPE: ICD-10-CM

## 2019-02-13 PROCEDURE — 3066F NEPHROPATHY DOC TX: CPT | Performed by: NURSE PRACTITIONER

## 2019-02-13 PROCEDURE — 99214 OFFICE O/P EST MOD 30 MIN: CPT | Performed by: NURSE PRACTITIONER

## 2019-02-13 NOTE — PATIENT INSTRUCTIONS
Take your medications as directed, and keep your follow up appointments  Adhere to a heart healthy lifestyle, maintaining a low sodium diet  Daily weight and record  If your weight increases 2-3 lbs in one day, or 5 lbs in 2 days, you are short of breath or have lower extremity swelling, please call the heart failure team at  [facility] at [phone]  Sodium: Look at the Label  Over 70% of dietary sodium comes from   eating packaged and prepared foods  Use the Nutrition Facts Label! Packaged and prepared foods can contain high levels of sodium, whether or not they taste salty  Even though sodium may already be in these foods, you can follow these steps to lower your daily sodium intake   Limit sodium to less than 2,300 milligrams (mg)* per day--thats equal to about 1 teaspoon of salt!  Use % Daily Value (%DV) to see if a serving of food is high or low in sodium and to compare food products  As a general guide: 5% DV or less per serving of sodium is considered low, and 20% DV or more per serving of sodium is considered high   Pay attention to the serving size and the number of servings you eat or drink to determine how much sodium you are consuming  Choose   Less   20% DVor more per serving is considered high! Sodium   and Health  According to the Dietary Guidelines for Americans, diets higher in sodium are associated with an increased risk of developing high blood pressure (also known as hypertension)  Uncontrolled high blood pressure can raise the risk of heart attacks, heart failure, stroke, kidney disease, and blindness  Consuming less sodium can help reduce your risk of developing these health conditions  * The Daily Value (100% DV) for sodium--reference amount not to exceed each day for adults and children 3years of age and older

## 2019-02-13 NOTE — LETTER
February 13, 2019     Yue Araujo MD  [address] [facility] 105    Patient: Gurdeep Vazquez   YOB: 1950   Date of Visit: 2/13/2019       Dear Dr Latesha Salas: Thank you for referring Gurdeep Vazquez to me for evaluation  Below are my notes for this consultation  If you have questions, please do not hesitate to call me  I look forward to following your patient along with you  Sincerely,        JHONATAN Jacobs        CC: MD Arleth Grayson, JHONATAN Díaz  2/13/2019  9:58 AM  Sign at close encounter  Heart Failure Follow Up Office Visit Note -     Gurdeep Vazquez   80 y o    male   MRN: [id number]  [address] [facility] [address] [place] [address] [phone]    PCP: Yue Araujo MD  Cardiologist Will be Dr Noreen Burris          Impression/plan  Chronic systolic heart failure  Recent hosp adm 1/29-1/30/19  --Discharge weight:73 8 kg (162 lb 11 2 oz) 01/30/19   Home weights are now 156 lb  --Discharge creatinine: 1 84  --Last labs: None since discharge  --Educated regarding adherence to a 1 5g-- 2g sodium diet/ 1500 ml fluid restriction, daily weights and to call us if he gains 2-3 lbs in 1 day ir 5 lbs in 1 wk  --Beta-Blocker: On Toprol- mg daily  --ACEi, ARB or ARNi: Benicar 20 mg b i d   --Aldosterone Receptor Blocker:None  --Diuretic:Lasix 40 mg po/d  NICM (likely), EF 40%- new dx  Normal Stress test 9 months ago  Cardiomyopathy likely due to valvular pathology  --A cardiac catheterization has been requested by CT surgery  Mitral regurgitation, moderate  --Under CTS evaluation for MV repair  Congential bicuspid aortic valve with severe aortic insufficiency  --Under CTS evaluation for Tissue AVR  Hypertension  /54 today,  Controlled  On BB and ARB  Hyperlipidemia- on atorvastatin 20 mg/d  Lipids not assessed  --Will check a lipid profile  CKD3  Baseline Cr 1 6-1 8  Followed by Dr Mayra Calvert as an OP  T2DM, on metformin  Unsure of last HgA1c    Summary recommendations  Hold Lasix and metformin day of cath  CBC, BMP,Fasting lipid profile, hemoglobin A1c tomorrow morning  He thinks he may go to [facility] am  He has a CT of the chest and carotid dopler scheduled tomorrow  Educated regarding heart failure, as above  Follow up will be scheduled with Dr Jasper Soriano 2 M  Cardiology preference discussed w pt              HPI:   Mr Nixon Benitez is a 79 yo gentleman with a bicuspid aortic valve, with severe AI, moderate MR and LV dysfunction ( EF 40%)  He also has CKD3, HTN, hyperlipidemia and DM2  He was recently hospitalized with SOB, found to have a biscupid aortic valve with severe AI/ MR and a new cardiomyopathy  He was diuresed and underwent med adjustments to optimize his BP control  He is currently under evaluation by CTS for a tissue aortic valve replacement and a mitral valve repair; his first office visit was last week with Dr Justyn Turcios  Preoperative testing has been scheduled  He reportedly had a stress test that was normal about 9 months ago, by Dr Marija Gee  He is here today for cardiology follow up    2/13/19  He is feeling well, no chest pain or shortness of breath    Weights at home are stable at 156 lb  He saw CT surgery/Dr Justyn Turcios last week  Cardiac catheterization is scheduled February 15th  he was previously known to Dr Marija Gee- who he also saw since [address] [place] [address] and changed some medications   He is now taking Benicar 20 mg b i d  and Lasix 20 mg on Monday, Wednesday, Friday, and 40 mg on all others  He has not had blood work since hospital discharge          Assessment  Diagnoses and all orders for this visit:    Chronic systolic (congestive) heart failure (HCC)    Cardiomyopathy, unspecified type ([address] )    Congenital bicuspid aortic valve    Non-rheumatic mitral regurgitation    Essential hypertension    CKD (chronic kidney disease) stage 3, GFR 30-59 ml/min ([facility] )    Other specified diabetes mellitus with stage 3 chronic kidney disease, without long-term current use of insulin ([place] [address] )        Past Medical History:   Diagnosis Date    Diabetes mellitus ([place] [address] )     Hyperlipidemia     Hypertension     Proteinuria     Stage 3 chronic kidney disease (HCC)     Vitamin D deficiency        Review of Systems   Constitution: Negative for chills  Cardiovascular: Negative for chest pain, claudication, cyanosis, dyspnea on exertion, irregular heartbeat, leg swelling, near-syncope, orthopnea, palpitations, paroxysmal nocturnal dyspnea and syncope  Respiratory: Negative for cough and shortness of breath  Gastrointestinal: Negative for heartburn and nausea  Neurological: Negative for dizziness, focal weakness, headaches, light-headedness and weakness  All other systems reviewed and are negative  Allergies   Allergen Reactions    Ace Inhibitors Cough           Current Outpatient Medications:     ascorbic acid (VITAMIN C) 500 mg tablet, Take 500 mg by mouth daily, Disp: , Rfl:     atorvastatin (LIPITOR) 20 mg tablet, Take 1 tablet by mouth daily, Disp: , Rfl:     calcitriol (ROCALTROL) 0 25 mcg capsule, Take 1 capsule by mouth 2 (two) times a week, Disp: , Rfl:     Cholecalciferol (VITAMIN D3) 1000 units CAPS, Take 1 capsule by mouth daily, Disp: , Rfl:     furosemide (LASIX) 40 mg tablet, Take 1 tablet (40 mg total) by mouth daily for 30 days, Disp: 30 tablet, Rfl: 0    metFORMIN (GLUCOPHAGE) 500 mg tablet, Take 1 tablet by mouth daily, Disp: , Rfl: 0    metoprolol succinate (TOPROL-XL) 50 mg 24 hr tablet, Take 100 mg by mouth daily  , Disp: , Rfl:     Multiple Vitamin (MULTIVITAMIN) tablet, Take 1 tablet by mouth daily, Disp: , Rfl:     olmesartan (BENICAR) 20 mg tablet, Take 1 tablet by mouth daily, Disp: , Rfl: 0    TRAVATAN Z 0 004 % ophthalmic solution, , Disp: , Rfl: 0    Social History     Socioeconomic History    Marital status: /Civil Union     Spouse name: Not on file    Number of children: Not on file    Years of education: Not on file    Highest education level: Not on file   Occupational History    Occupation: RETIRED   Social Needs    Financial resource strain: Not on file    Food insecurity:     Worry: Not on file     Inability: Not on file    Transportation needs:     Medical: Not on file     Non-medical: Not on file   Tobacco Use    Smoking status: Former Smoker     Last attempt to quit:      Years since quittin 1    Smokeless tobacco: Never Used   Substance and Sexual Activity    Alcohol use: Yes     Comment: occasionally    Drug use: No    Sexual activity: Not on file   Lifestyle    Physical activity:     Days per week: Not on file     Minutes per session: Not on file    Stress: Not on file   Relationships    Social connections:     Talks on phone: Not on file     Gets together: Not on file     Attends [employer] service: Not on file     Active member of club or organization: Not on file     Attends meetings of clubs or organizations: Not on file     Relationship status: Not on file    Intimate partner violence:     Fear of current or ex partner: Not on file     Emotionally abused: Not on file     Physically abused: Not on file     Forced sexual activity: Not on file   Other Topics Concern    Not on file   Social History Narrative    Not on file       Family History   Problem Relation Age of Onset    Stroke Mother     Hypertension Mother     Blindness Father     Gout Brother        Physical Exam   Constitutional: He is oriented to person, place, and time  No distress  HENT:   Head: Normocephalic and atraumatic  Eyes: Conjunctivae and EOM are normal    Neck: Normal range of motion  Neck supple  Cardiovascular: Normal rate, regular rhythm and intact distal pulses  Murmur heard  Systolic murmur is present  3/6 crescendo decrescendo murmur across the precordium radiating to the neck   also 2/6 blowing systolic murmur left lower sternal border  Pulmonary/Chest: Effort normal and breath sounds normal    Abdominal: Soft  Bowel sounds are normal    Musculoskeletal: Normal range of motion  Neurological: He is alert and oriented to person, place, and time  Skin: Skin is warm and dry  Psychiatric: He has a normal mood and affect  Nursing note and vitals reviewed  Vitals: There were no vitals taken for this visit  Wt Readings from Last 3 Encounters:   19 72 6 kg (160 lb)   19 73 8 kg (162 lb 11 2 oz)   18 74 8 kg (165 lb)         Labs & Results:  Lab Results   Component Value Date    WBC 8 79 2019    HGB 14 6 2019    HCT 43 5 2019    MCV 91 2019     2019     No results found for: BNP  No components found for: CHEM    Results for orders placed during the hospital encounter of 19   Echo complete with contrast if indicated    Narrative [address]  [phone]    Transthoracic Echocardiogram  2D, M-mode, Doppler, and Color Doppler    Study date:  2019    Patient: Cornelia Brown  MR number: [id number]  Account number: [de-identified]  : 1950  Age: 80 years  Gender: Male  Status: Inpatient  Location: Bedside  Height: 68 in  Weight: 163 lb  BP: 155/ 70 mmHg    Indications: Heart Failure    Diagnoses: I50 9 - Heart failure, unspecified    Sonographer:  TIMMY Trujillo  Primary Physician:  Hair Musa  Referring Physician:  Abdi Rutledge MD  Group:  [facility]  Interpreting Physician:  Abdi Rutledge MD    SUMMARY    LEFT VENTRICLE:  The ventricle was mildly dilated  Systolic function was mildly to moderately reduced  Ejection fraction was estimated to be 40 %  There was mild diffuse hypokinesis  Wall thickness was mildly increased  RIGHT VENTRICLE:  The ventricle was mildly dilated    Systolic function was normal     LEFT ATRIUM:  The atrium was mildly dilated  MITRAL VALVE:  There was moderate to severe regurgitation  AORTIC VALVE:  The valve was possibly bicuspid  Leaflets exhibited normal thickness, normal cuspal separation, and significant diastolic prolapse  There was moderate to severe regurgitation  TRICUSPID VALVE:  There was mild regurgitation  Pulmonary artery systolic pressure was moderately to markedly increased  The findings suggest moderate to severe pulmonary hypertension  AORTA:  The root exhibited mild dilatation  IVC, HEPATIC VEINS:  The inferior vena cava was mildly dilated  Respirophasic changes were blunted (less than 50% variation)  PERICARDIUM:  There was a left pleural effusion  HISTORY: PRIOR HISTORY: Murmur, HTN, DM2, HLD, CKD3    PROCEDURE: The procedure was performed at the bedside  This was a routine study  The transthoracic approach was used  The study included complete 2D imaging, M-mode, complete spectral Doppler, and color Doppler  The heart rate was 95 bpm,  at the start of the study  Images were obtained from the parasternal, apical, subcostal, and suprasternal notch acoustic windows  Image quality was adequate  LEFT VENTRICLE: The ventricle was mildly dilated  Systolic function was mildly to moderately reduced  Ejection fraction was estimated to be 40 %  There was mild diffuse hypokinesis  Wall thickness was mildly increased  RIGHT VENTRICLE: The ventricle was mildly dilated  Systolic function was normal  Wall thickness was normal     LEFT ATRIUM: The atrium was mildly dilated  RIGHT ATRIUM: Size was normal     MITRAL VALVE: Valve structure was normal  There was normal leaflet separation  DOPPLER: The transmitral velocity was within the normal range  There was no evidence for stenosis  There was moderate to severe regurgitation  AORTIC VALVE: The valve was possibly bicuspid  Leaflets exhibited normal thickness, normal cuspal separation, and significant diastolic prolapse   DOPPLER: Transaortic velocity was within the normal range  There was no evidence for  stenosis  There was moderate to severe regurgitation  The regurgitant jet was eccentric  TRICUSPID VALVE: The valve structure was normal  There was normal leaflet separation  DOPPLER: The transtricuspid velocity was within the normal range  There was no evidence for stenosis  There was mild regurgitation  Pulmonary artery  systolic pressure was moderately to markedly increased  Estimated peak PA pressure was 60 mmHg  The findings suggest moderate to severe pulmonary hypertension  PULMONIC VALVE: Leaflets exhibited normal thickness, no calcification, and normal cuspal separation  DOPPLER: The transpulmonic velocity was within the normal range  There was no regurgitation  PERICARDIUM: There was no pericardial effusion  There was a left pleural effusion  The pericardium was normal in appearance  AORTA: The root exhibited mild dilatation  SYSTEMIC VEINS: IVC: The inferior vena cava was mildly dilated  Respirophasic changes were blunted (less than 50% variation)      MEASUREMENT TABLES    2D MEASUREMENTS  Aorta   (Reference normals)  Root diam   39 mm   (--)    SYSTEM MEASUREMENT TABLES    2D  %FS: 23 42 %  Ao Diam: 3 91 cm  EDV(Teich): 179 53 ml  EF Biplane: 40 37 %  EF(Teich): 46 07 %  ESV(Teich): 96 82 ml  IVSd: 1 02 cm  LA Area: 12 24 cm2  LA Diam: 4 3 cm  LVEDV MOD A2C: 129 37 ml  LVEDV MOD A4C: 151 37 ml  LVEDV MOD BP: 140 9 ml  LVEF MOD A2C: 42 79 %  LVEF MOD A4C: 40 91 %  LVESV MOD A2C: 74 02 ml  LVESV MOD A4C: 89 45 ml  LVESV MOD BP: 84 02 ml  LVIDd: 5 99 cm  LVIDs: 4 59 cm  LVLd A2C: 8 45 cm  LVLd A4C: 8 58 cm  LVLs A2C: 7 63 cm  LVLs A4C: 8 25 cm  LVPWd: 1 21 cm  RA Area: 15 27 cm2  RVIDd: 4 39 cm  SV MOD A2C: 55 35 ml  SV MOD A4C: 61 92 ml  SV(Teich): 82 71 ml    CW  TR MaxP 66 mmHg  TR Vmax: 3 52 m/s    MM  TAPSE: 1 8 cm    [facility] Accredited Echocardiography Laboratory    Prepared and electronically signed by    Litzy Vo MD  Signed 29-Jan-2019 16:15:06

## 2019-02-14 ENCOUNTER — HOSPITAL ENCOUNTER (OUTPATIENT)
Dept: NON INVASIVE DIAGNOSTICS | Facility: CLINIC | Age: 69
Discharge: HOME | End: 2019-02-14
Payer: COMMERCIAL

## 2019-02-14 ENCOUNTER — HOSPITAL ENCOUNTER (OUTPATIENT)
Dept: PULMONOLOGY | Facility: HOSPITAL | Age: 69
Discharge: HOME | End: 2019-02-14
Payer: COMMERCIAL

## 2019-02-14 DIAGNOSIS — I34.0 NON-RHEUMATIC MITRAL REGURGITATION: ICD-10-CM

## 2019-02-14 DIAGNOSIS — Q23.1 CONGENITAL BICUSPID AORTIC VALVE: ICD-10-CM

## 2019-02-14 DIAGNOSIS — I35.1 NONRHEUMATIC AORTIC VALVE INSUFFICIENCY: ICD-10-CM

## 2019-02-14 PROCEDURE — 94726 PLETHYSMOGRAPHY LUNG VOLUMES: CPT

## 2019-02-14 PROCEDURE — 94726 PLETHYSMOGRAPHY LUNG VOLUMES: CPT | Performed by: INTERNAL MEDICINE

## 2019-02-14 PROCEDURE — 93880 EXTRACRANIAL BILAT STUDY: CPT | Performed by: SURGERY

## 2019-02-14 PROCEDURE — 93880 EXTRACRANIAL BILAT STUDY: CPT

## 2019-02-14 PROCEDURE — 94729 DIFFUSING CAPACITY: CPT

## 2019-02-14 PROCEDURE — 94010 BREATHING CAPACITY TEST: CPT

## 2019-02-14 PROCEDURE — 94729 DIFFUSING CAPACITY: CPT | Performed by: INTERNAL MEDICINE

## 2019-02-14 PROCEDURE — 94760 N-INVAS EAR/PLS OXIMETRY 1: CPT

## 2019-02-14 PROCEDURE — 94010 BREATHING CAPACITY TEST: CPT | Performed by: INTERNAL MEDICINE

## 2019-02-14 RX ORDER — ASPIRIN 81 MG/1
324 TABLET, CHEWABLE ORAL ONCE
Status: CANCELLED | OUTPATIENT
Start: 2019-02-14 | End: 2019-02-14

## 2019-02-14 RX ORDER — SODIUM CHLORIDE 9 MG/ML
125 INJECTION, SOLUTION INTRAVENOUS CONTINUOUS
Status: CANCELLED | OUTPATIENT
Start: 2019-02-14

## 2019-02-14 RX ORDER — CLOPIDOGREL BISULFATE 75 MG/1
600 TABLET ORAL ONCE
Status: CANCELLED | OUTPATIENT
Start: 2019-02-14 | End: 2019-02-14

## 2019-02-15 ENCOUNTER — HOSPITAL ENCOUNTER (OUTPATIENT)
Dept: NON INVASIVE DIAGNOSTICS | Facility: HOSPITAL | Age: 69
Discharge: HOME | End: 2019-02-15
Attending: INTERNAL MEDICINE | Admitting: INTERNAL MEDICINE
Payer: COMMERCIAL

## 2019-02-15 VITALS
SYSTOLIC BLOOD PRESSURE: 150 MMHG | DIASTOLIC BLOOD PRESSURE: 65 MMHG | RESPIRATION RATE: 17 BRPM | TEMPERATURE: 97.7 F | OXYGEN SATURATION: 97 % | HEART RATE: 79 BPM

## 2019-02-15 DIAGNOSIS — I35.1 NONRHEUMATIC AORTIC VALVE INSUFFICIENCY: ICD-10-CM

## 2019-02-15 DIAGNOSIS — I34.0 NON-RHEUMATIC MITRAL REGURGITATION: ICD-10-CM

## 2019-02-15 DIAGNOSIS — N18.30 CKD (CHRONIC KIDNEY DISEASE) STAGE 3, GFR 30-59 ML/MIN (HCC): Primary | ICD-10-CM

## 2019-02-15 DIAGNOSIS — Q23.1 CONGENITAL BICUSPID AORTIC VALVE: ICD-10-CM

## 2019-02-15 LAB
ANION GAP SERPL CALCULATED.3IONS-SCNC: 6 MMOL/L (ref 4–13)
BUN SERPL-MCNC: 32 MG/DL (ref 5–25)
CALCIUM SERPL-MCNC: 8.6 MG/DL (ref 8.3–10.1)
CHLORIDE SERPL-SCNC: 108 MMOL/L (ref 100–108)
CHOLEST SERPL-MCNC: 126 MG/DL (ref 50–200)
CO2 SERPL-SCNC: 25 MMOL/L (ref 21–32)
CREAT SERPL-MCNC: 1.71 MG/DL (ref 0.6–1.3)
GFR SERPL CREATININE-BSD FRML MDRD: 47 ML/MIN/1.73SQ M
GLUCOSE P FAST SERPL-MCNC: 95 MG/DL (ref 65–99)
GLUCOSE SERPL-MCNC: 95 MG/DL (ref 65–140)
HDLC SERPL-MCNC: 45 MG/DL (ref 40–60)
LDLC SERPL CALC-MCNC: 63 MG/DL (ref 0–100)
NONHDLC SERPL-MCNC: 81 MG/DL
POTASSIUM SERPL-SCNC: 4.4 MMOL/L (ref 3.5–5.3)
SODIUM SERPL-SCNC: 139 MMOL/L (ref 136–145)
TRIGL SERPL-MCNC: 88 MG/DL

## 2019-02-15 PROCEDURE — 93454 CORONARY ARTERY ANGIO S&I: CPT | Performed by: NURSE PRACTITIONER

## 2019-02-15 PROCEDURE — C1894 INTRO/SHEATH, NON-LASER: HCPCS | Performed by: NURSE PRACTITIONER

## 2019-02-15 PROCEDURE — 99152 MOD SED SAME PHYS/QHP 5/>YRS: CPT | Performed by: NURSE PRACTITIONER

## 2019-02-15 PROCEDURE — C1769 GUIDE WIRE: HCPCS | Performed by: NURSE PRACTITIONER

## 2019-02-15 PROCEDURE — C1887 CATHETER, GUIDING: HCPCS | Performed by: NURSE PRACTITIONER

## 2019-02-15 PROCEDURE — 99152 MOD SED SAME PHYS/QHP 5/>YRS: CPT | Performed by: INTERNAL MEDICINE

## 2019-02-15 PROCEDURE — 99153 MOD SED SAME PHYS/QHP EA: CPT | Performed by: NURSE PRACTITIONER

## 2019-02-15 PROCEDURE — 93454 CORONARY ARTERY ANGIO S&I: CPT | Performed by: INTERNAL MEDICINE

## 2019-02-15 PROCEDURE — 80061 LIPID PANEL: CPT | Performed by: INTERNAL MEDICINE

## 2019-02-15 PROCEDURE — 80048 BASIC METABOLIC PNL TOTAL CA: CPT | Performed by: INTERNAL MEDICINE

## 2019-02-15 PROCEDURE — 93005 ELECTROCARDIOGRAM TRACING: CPT

## 2019-02-15 RX ORDER — ACETAMINOPHEN 325 MG/1
650 TABLET ORAL EVERY 8 HOURS PRN
Status: DISCONTINUED | OUTPATIENT
Start: 2019-02-15 | End: 2019-02-15 | Stop reason: HOSPADM

## 2019-02-15 RX ORDER — NITROGLYCERIN 20 MG/100ML
INJECTION INTRAVENOUS CODE/TRAUMA/SEDATION MEDICATION
Status: COMPLETED | OUTPATIENT
Start: 2019-02-15 | End: 2019-02-15

## 2019-02-15 RX ORDER — CLOPIDOGREL BISULFATE 75 MG/1
600 TABLET ORAL ONCE
Status: COMPLETED | OUTPATIENT
Start: 2019-02-15 | End: 2019-02-15

## 2019-02-15 RX ORDER — VERAPAMIL HCL 2.5 MG/ML
AMPUL (ML) INTRAVENOUS CODE/TRAUMA/SEDATION MEDICATION
Status: COMPLETED | OUTPATIENT
Start: 2019-02-15 | End: 2019-02-15

## 2019-02-15 RX ORDER — SODIUM CHLORIDE 9 MG/ML
125 INJECTION, SOLUTION INTRAVENOUS CONTINUOUS
Status: DISCONTINUED | OUTPATIENT
Start: 2019-02-15 | End: 2019-02-15 | Stop reason: HOSPADM

## 2019-02-15 RX ORDER — LIDOCAINE HYDROCHLORIDE 10 MG/ML
INJECTION, SOLUTION INFILTRATION; PERINEURAL CODE/TRAUMA/SEDATION MEDICATION
Status: COMPLETED | OUTPATIENT
Start: 2019-02-15 | End: 2019-02-15

## 2019-02-15 RX ORDER — SODIUM CHLORIDE 9 MG/ML
75 INJECTION, SOLUTION INTRAVENOUS CONTINUOUS
Status: DISCONTINUED | OUTPATIENT
Start: 2019-02-15 | End: 2019-02-15 | Stop reason: HOSPADM

## 2019-02-15 RX ORDER — HEPARIN SODIUM 1000 [USP'U]/ML
INJECTION, SOLUTION INTRAVENOUS; SUBCUTANEOUS CODE/TRAUMA/SEDATION MEDICATION
Status: COMPLETED | OUTPATIENT
Start: 2019-02-15 | End: 2019-02-15

## 2019-02-15 RX ORDER — MIDAZOLAM HYDROCHLORIDE 1 MG/ML
INJECTION INTRAMUSCULAR; INTRAVENOUS CODE/TRAUMA/SEDATION MEDICATION
Status: COMPLETED | OUTPATIENT
Start: 2019-02-15 | End: 2019-02-15

## 2019-02-15 RX ORDER — FENTANYL CITRATE 50 UG/ML
INJECTION, SOLUTION INTRAMUSCULAR; INTRAVENOUS CODE/TRAUMA/SEDATION MEDICATION
Status: COMPLETED | OUTPATIENT
Start: 2019-02-15 | End: 2019-02-15

## 2019-02-15 RX ORDER — ASPIRIN 81 MG/1
324 TABLET, CHEWABLE ORAL ONCE
Status: COMPLETED | OUTPATIENT
Start: 2019-02-15 | End: 2019-02-15

## 2019-02-15 RX ADMIN — LIDOCAINE HYDROCHLORIDE 0.1 ML: 10 INJECTION, SOLUTION INFILTRATION; PERINEURAL at 11:50

## 2019-02-15 RX ADMIN — VERAPAMIL HYDROCHLORIDE 1.25 MG: 2.5 INJECTION, SOLUTION INTRAVENOUS at 11:52

## 2019-02-15 RX ADMIN — HEPARIN SODIUM 4000 UNITS: 1000 INJECTION INTRAVENOUS; SUBCUTANEOUS at 11:51

## 2019-02-15 RX ADMIN — NITROGLYCERIN 200 MCG: 20 INJECTION INTRAVENOUS at 11:51

## 2019-02-15 RX ADMIN — CLOPIDOGREL BISULFATE 600 MG: 75 TABLET ORAL at 07:10

## 2019-02-15 RX ADMIN — IOHEXOL 50 ML: 350 INJECTION, SOLUTION INTRAVENOUS at 12:01

## 2019-02-15 RX ADMIN — MIDAZOLAM 2 MG: 1 INJECTION INTRAMUSCULAR; INTRAVENOUS at 11:32

## 2019-02-15 RX ADMIN — SODIUM CHLORIDE 125 ML/HR: 0.9 INJECTION, SOLUTION INTRAVENOUS at 07:02

## 2019-02-15 RX ADMIN — FENTANYL CITRATE 50 MCG: 50 INJECTION, SOLUTION INTRAMUSCULAR; INTRAVENOUS at 11:32

## 2019-02-15 RX ADMIN — ASPIRIN 81 MG 324 MG: 81 TABLET ORAL at 07:10

## 2019-02-15 NOTE — INTERVAL H&P NOTE
Update:      H&P reviewed  After examining the patient, I find no changed to the H&P since it had been written  Patient re-evaluated   Accept as history and physical     Siddharth Soliz MD/February 15, 2019/11:34 AM

## 2019-02-15 NOTE — DISCHARGE INSTRUCTIONS
1  Please see the post cardiac catheterization dishcarge instructions  No heavy lifting, greater than 10 lbs  or strenuous  activity for 48 hrs  2 Remove band aid tomorrow  Shower and wash wrist area gently with soap and water- beginning tomorrow  Rinse and pat dry  Apply new water seal band aid  Repeat this process for 5 days  No powders, creams lotions or antibiotic ointments  for 5 days  No tub baths, hot tubs or swimming for 5 days  3  Please call our office ([phone]) if you have any fever, redness, swelling, discharge from your wrist access site    4 No driving for 1 day

## 2019-02-16 LAB
ATRIAL RATE: 75 BPM
P AXIS: 67 DEGREES
PR INTERVAL: 176 MS
QRS AXIS: -79 DEGREES
QRSD INTERVAL: 138 MS
QT INTERVAL: 426 MS
QTC INTERVAL: 475 MS
T WAVE AXIS: 30 DEGREES
VENTRICULAR RATE: 75 BPM

## 2019-02-16 PROCEDURE — 93010 ELECTROCARDIOGRAM REPORT: CPT | Performed by: INTERNAL MEDICINE

## 2019-02-21 ENCOUNTER — HOSPITAL ENCOUNTER (OUTPATIENT)
Dept: CT IMAGING | Facility: HOSPITAL | Age: 69
Discharge: HOME | End: 2019-02-21
Payer: COMMERCIAL

## 2019-02-21 DIAGNOSIS — I35.1 NONRHEUMATIC AORTIC VALVE INSUFFICIENCY: ICD-10-CM

## 2019-02-21 DIAGNOSIS — Q23.1 CONGENITAL BICUSPID AORTIC VALVE: ICD-10-CM

## 2019-02-21 DIAGNOSIS — I34.0 NON-RHEUMATIC MITRAL REGURGITATION: ICD-10-CM

## 2019-02-21 PROCEDURE — 71250 CT THORAX DX C-: CPT

## 2019-02-25 DIAGNOSIS — I10 ESSENTIAL HYPERTENSION: Primary | ICD-10-CM

## 2019-02-25 NOTE — TELEPHONE ENCOUNTER
Pt went to the pharmacy to  his Benicar and the medication is on back order  He's been out of the medication since yesterday  Pt is not having any symptoms  He is not sure what he should do  Pt normally uses [facility] they are on back order [facility] and they are also on back order

## 2019-02-25 NOTE — TELEPHONE ENCOUNTER
Based on his last BP numbers, he can go without it for a short period of time  If it is going to be prolonged ( a number of weeks), we can consider changing to something else

## 2019-02-26 RX ORDER — OLMESARTAN MEDOXOMIL 20 MG/1
20 TABLET ORAL 2 TIMES DAILY
Qty: 60 TABLET | Refills: 3 | Status: SHIPPED | OUTPATIENT
Start: 2019-02-26 | End: 2019-03-15 | Stop reason: HOSPADM

## 2019-02-26 NOTE — TELEPHONE ENCOUNTER
Called and spoke with lorna, He wanted to see if [facility] had the medication and they did patient will go pick it up from there       Thank you

## 2019-02-27 ENCOUNTER — OFFICE VISIT (OUTPATIENT)
Dept: CARDIAC SURGERY | Facility: CLINIC | Age: 69
End: 2019-02-27
Payer: COMMERCIAL

## 2019-02-27 VITALS
HEIGHT: 68 IN | DIASTOLIC BLOOD PRESSURE: 46 MMHG | SYSTOLIC BLOOD PRESSURE: 132 MMHG | TEMPERATURE: 97.5 F | HEART RATE: 68 BPM | OXYGEN SATURATION: 97 % | BODY MASS INDEX: 24.4 KG/M2 | WEIGHT: 161 LBS | RESPIRATION RATE: 12 BRPM

## 2019-02-27 DIAGNOSIS — I34.0 NON-RHEUMATIC MITRAL REGURGITATION: ICD-10-CM

## 2019-02-27 DIAGNOSIS — Q23.1 BICUSPID AORTIC VALVE: ICD-10-CM

## 2019-02-27 DIAGNOSIS — I35.1 NONRHEUMATIC AORTIC VALVE INSUFFICIENCY: Primary | ICD-10-CM

## 2019-02-27 DIAGNOSIS — I25.10 CORONARY ARTERY DISEASE INVOLVING NATIVE CORONARY ARTERY OF NATIVE HEART WITHOUT ANGINA PECTORIS: ICD-10-CM

## 2019-02-27 PROBLEM — Q23.81 BICUSPID AORTIC VALVE: Status: ACTIVE | Noted: 2019-02-27

## 2019-02-27 PROCEDURE — 99214 OFFICE O/P EST MOD 30 MIN: CPT | Performed by: THORACIC SURGERY (CARDIOTHORACIC VASCULAR SURGERY)

## 2019-02-27 RX ORDER — CHLORHEXIDINE GLUCONATE 0.12 MG/ML
15 RINSE ORAL ONCE
Status: CANCELLED | OUTPATIENT
Start: 2019-02-27 | End: 2019-02-27

## 2019-02-27 NOTE — LETTER
2019     Nayeli Valdez MD  [address]    Patient: David Bain   YOB: 1950   Date of Visit: 2019       Dear Dr Chandra Kaye: Thank you for referring David Bain to me for evaluation  Below are my notes for this consultation  If you have questions, please do not hesitate to call me  I look forward to following your patient along with you  Sincerely,        Carleen Gill, DO        CC: MD Sergio Aiken PA-C  2019  3:42 PM  Attested  Follow up preop visit - Cardiothoracic Surgery   David Bain 80 y o  male MRN: [id number]    Physician Requesting Consult: No att  providers found    Reason for Consult / Principal Problem: Coronary artery disease, Mitral regurgitation, BAV, Aortic Insufficiency    History of Present Illness: David Bain is a 80y o  year old male     Past Medical History:  Past Medical History:   Diagnosis Date    [facility]     Hyperlipidemia     Hypertension     Proteinuria     Stage 3 chronic [facility] disease [facility] )     Vitamin D deficiency          Past Surgical History:   Past Surgical History:   Procedure Laterality Date    COLONOSCOPY      TOE SURGERY Left          Family History:  Family History   Problem Relation Age of Onset    Stroke Mother     Hypertension Mother     Blindness Father     Gout Brother     Heart Valve Disease Brother     Anuerysm Neg Hx          Social History:    Social History     Substance and Sexual Activity   Alcohol Use Yes    Frequency: Monthly or less    Comment: occasionally     Social History     Substance and Sexual Activity   Drug Use No     Social History     Tobacco Use   Smoking Status Former Smoker    Last attempt to quit: Penelope Ross Years since quittin 1   Smokeless Tobacco Never Used       Home Medications:   Prior to Admission medications    Medication Sig Start Date End Date Taking?  Authorizing Provider   ascorbic acid (VITAMIN C) 500 mg tablet Take 500 mg by mouth daily   Yes Historical Provider, MD   atorvastatin (LIPITOR) 20 mg tablet Take 1 tablet by mouth daily 7/27/18  Yes Historical Provider, MD   calcitriol (ROCALTROL) 0 25 mcg capsule Take 1 capsule by mouth 2 (two) times a week 1/1/15  Yes Historical Provider, MD   Cholecalciferol (VITAMIN D3) 1000 units CAPS Take 1 capsule by mouth daily   Yes Historical Provider, MD   furosemide (LASIX) 40 mg tablet Take 1 tablet (40 mg total) by mouth daily for 30 days 1/31/19 3/2/19 Yes Daphne Montes MD   metFORMIN (GLUCOPHAGE) 500 mg tablet Take 1 tablet by mouth daily 2/19/18  Yes Historical Provider, MD   metoprolol succinate (TOPROL-XL) 50 mg 24 hr tablet Take 50 mg by mouth daily    Yes Historical Provider, MD   Multiple Vitamin (MULTIVITAMIN) tablet Take 1 tablet by mouth daily   Yes Historical Provider, MD   olmesartan (BENICAR) 20 mg tablet Take 1 tablet (20 mg total) by mouth 2 (two) times a day 2/26/19  Yes JHONATAN Chua   TRAVATAN Z 0 004 % ophthalmic solution  2/12/18  Yes Historical Provider, MD       Allergies:   Allergies   Allergen Reactions    Ace Inhibitors Cough       Review of Systems:  Review of Systems - All ROS negative except as described in HPI  History obtained from the patient  General ROS: positive for  - fatigue  negative for - chills, fever, hot flashes, night sweats, weight gain or weight loss  Hematological and Lymphatic ROS: negative for - bleeding problems, blood clots, bruising, jaundice, night sweats or swollen lymph nodes  Respiratory ROS: positive for - shortness of breath  negative for - cough, hemoptysis or wheezing  Cardiovascular ROS: positive for - dyspnea on exertion  negative for - chest pain, edema, irregular heartbeat, loss of consciousness, orthopnea or paroxysmal nocturnal dyspnea  Gastrointestinal ROS: negative for - abdominal pain, appetite loss, constipation, diarrhea, hematemesis, melena or nausea/vomiting  Neurological ROS: negative for - dizziness, headaches, numbness/tingling, seizures or weakness    Vital Signs:     Vitals:    02/27/19 1400 02/27/19 1424   BP: (!) 130/46 (!) 132/46   BP Location: Left arm Right arm   Cuff Size: Adult    Pulse: 68    Resp: 12    Temp: 97 5 °F (36 4 °C)    TempSrc: Oral    SpO2: 97%    Weight: 73 kg (161 lb)    Height: 5' 8" (1 727 m)        Physical Exam:    General:    HEENT/NECK:  PERRLA  No jugular venous distention  Cardiac:Regular rate and rhythm, III/VI diastic Murmur  Carotid arteries: no bruit  Pulmonary:  Breath sounds clear bilaterally  Abdomen:  Non-tender, Non-distended  Positive bowel sounds  Upper extremities: 2+ radial pulses; brisk capillary refill  Lower extremities: Extremities warm/dry  PT/DP pulses 2+ bilaterally  No edema B/L  Neuro: Alert and oriented X 3  Sensation is grossly intact  No focal deficits  Skin: Warm/Dry, without rashes or lesions  Lab Results:               Invalid input(s): LABGLOM      No results found for: HGBA1C  Lab Results   Component Value Date    TROPONINI 0 04 01/29/2019       Imaging Studies:     Cardiac Catheterization: Left main: Normal   LAD: The LAD was large and extended well beyond the apex  No atherosclerosis was seen  The diagonal branches were also normal   Circumflex: The vessel was normal sized There was a 70% stenosis in mid vessel  Ramus intermedius: There was a 70% stenosis in mid vessel  RCA: The vessel was large sized and dominant, giving rise to the PDA  No atherosclerosis was seen  Echocardiogram: LEFT VENTRICLE:  The ventricle was mildly dilated  Systolic function was mildly to moderately reduced  Ejection fraction was estimated to be 40 %  There was mild diffuse hypokinesis  Wall thickness was mildly increased      RIGHT VENTRICLE:  The ventricle was mildly dilated    Systolic function was normal      LEFT ATRIUM:  The atrium was mildly dilated      MITRAL VALVE:  There was moderate to severe regurgitation      AORTIC VALVE:  The valve was possibly bicuspid  Leaflets exhibited normal thickness, normal cuspal separation, and significant diastolic prolapse  There was moderate to severe regurgitation      TRICUSPID VALVE:  There was mild regurgitation  Pulmonary artery systolic pressure was moderately to markedly increased  The findings suggest moderate to severe pulmonary hypertension      AORTA:  The root exhibited mild dilatation      IVC, HEPATIC VEINS:  The inferior vena cava was mildly dilated  Respirophasic changes were blunted (less than 50% variation)      PERICARDIUM:  There was a left pleural effusion  CT Scan: HEART/GREAT VESSELS:  Atherosclerotic changes are noted in thoracic aorta and coronary arteries      There is no evidence of atherosclerotic disease involving the ascending aorta  There is minimal calcification of the aortic valve  Ascending aorta measures 3 2 cm in diameter  Distance from ascending aorta to the sternum measures 2 3 cm with a small   amount of intervening mediastinal fat  PFT's:    Spirometry:  FEV1/FVC Ratio is 57 %  FEV1 is 1 86 L/68 % of predicted  FVC is 3 26 L/91 % of predicted      Lung volumes:  RV 0 55 L/24% of predicted, TLC 3 69 L/55 % of predicted, RV/TLC ratio 15 %     Diffusing capacity:  DLCO is 41 % of predicted      IMPRESSION:  1  Spirometry shows moderate obstruction  2  Flow volume loop is very obstructive  3  Lung volumes shows severe restriction  Diffusion capacity is severely reduced  Carotid Duplex:  RIGHT:  There is <50% stenosis noted in the internal carotid artery  Plaque is  heterogenous and irregular  Vertebral artery flow is antegrade  There is no significant subclavian artery  disease  LEFT:  There is <50% stenosis noted in the internal carotid artery  Plaque is  heterogenous and smooth  Vertebral artery flow is antegrade   There is no significant subclavian artery  disease      I have personally reviewed pertinent reports  and I have personally reviewed pertinent films in PACS    Assessment:  Patient Active Problem List    Diagnosis Date Noted    Congenital bicuspid aortic valve 02/06/2019    Nonrheumatic aortic valve insufficiency 02/06/2019    Non-rheumatic mitral regurgitation 02/06/2019    Acute [facility]congestive [facility] failure) ([facility] ) 01/29/2019    Elevated troponin 01/29/2019    Essential hypertension 05/11/2018    FSGS (focal segmental glomerulosclerosis) 05/10/2018    CKD (chronic kidney disease) stage 3, GFR 30-59 ml/min ([facility] ) 03/01/2018    Persistent proteinuria 03/01/2018    Diabetic nephropathy associated with type 2 diabetes mellitus ([facility] ) 03/01/2018    Vitamin D deficiency 03/01/2018    Secondary hyperparathyroidism ([place] [id number] ) 03/01/2018     Severe coronary artery disease; Ongoing CABG workup  Severe mitral regurgitation; Ongoing MVR workup  Bicuspid Aortic Valve with Severe Aortic Insufficiency; Ongoing AVR workup    Plan:  Risks and benefits of aortic valve replacement, coronary artery bypass grafting and mitral valve repair vs replacement were discussed in detail today with the patient  We have reviewed results all preoperative radiographic,  laboratory and vascular studies  They understand and wish to proceed with surgical intervention  They will be scheduled for surgery with Dr Francies Homans, D O Zeb Spark was comfortable with our recommendations, and their questions were answered to their satisfaction  Thank you for allowing us to participate in the care of this patient  [facility] [place]  DATE: February 27, 2019  TIME: 2:52 PM  Attestation signed by Francies Homans, DO at 2/27/2019  4:24 PM:  The patient was seen and examined, and I agree with the midlevel's history, physical exam, assessment and plan with the following additions:    Mr Natacha Erickson returns to the office after completion of his preoperative testing    His cardiac catheterization was reviewed with him demonstrating moderate 2 vessel disease  These both appear to be stable lesions and can continue with medical therapy  He has severe aortic insufficiency mitral regurgitation  We discussed valve choices, and he would wish to proceed with a tissue aortic valve replacement, mitral valve repair and possible replacement tissue valve if necessary  He will remain on medical therapy for his moderate coronary disease  The risks, benefits, and alternatives to surgery been discussed in detail with the patient and he wishes to proceed

## 2019-02-27 NOTE — H&P (VIEW-ONLY)
Follow up preop visit - Cardiothoracic Surgery   KAYLEN HARRIS [facility] 80 y o  male MRN: [id number]    Physician Requesting Consult: No att  providers found    Reason for Consult / Principal Problem: Coronary artery disease, Mitral regurgitation, BAV, Aortic Insufficiency    History of Present Illness: KAYLEN HARRIS [facility] is a 80y o  year old male     Past Medical History:  Past Medical History:   Diagnosis Date    [facility]     Hyperlipidemia     Hypertension     Proteinuria     Stage 3 chronic kidney disease ([facility] )     Vitamin D deficiency          Past Surgical History:   Past Surgical History:   Procedure Laterality Date    COLONOSCOPY      TOE SURGERY Left          Family History:  Family History   Problem Relation Age of Onset    Stroke Mother     Hypertension Mother     Blindness Father     Gout Brother     Heart Valve Disease Brother     Anuerysm Neg Hx          Social History:    Social History     Substance and Sexual Activity   Alcohol Use Yes    Frequency: Monthly or less    Comment: occasionally     Social History     Substance and Sexual Activity   Drug Use No     Social History     Tobacco Use   Smoking Status Former Smoker    Last attempt to quit: [employer] Years since quittin 1   Smokeless Tobacco Never Used       Home Medications:   Prior to Admission medications    Medication Sig Start Date End Date Taking?  Authorizing Provider   ascorbic acid (VITAMIN C) 500 mg tablet Take 500 mg by mouth daily   Yes Historical Provider, MD   atorvastatin (LIPITOR) 20 mg tablet Take 1 tablet by mouth daily 18  Yes Historical Provider, MD   calcitriol (ROCALTROL) 0 25 mcg capsule Take 1 capsule by mouth 2 (two) times a week 1/1/15  Yes Historical Provider, MD   Cholecalciferol (VITAMIN D3) 1000 units CAPS Take 1 capsule by mouth daily   Yes Historical Provider, MD   furosemide (LASIX) 40 mg tablet Take 1 tablet (40 mg total) by mouth daily for 30 days 1/31/19 3/2/19 Yes Jackie Teague MD metFORMIN (GLUCOPHAGE) 500 mg tablet Take 1 tablet by mouth daily 2/19/18  Yes Historical Provider, MD   metoprolol succinate (TOPROL-XL) 50 mg 24 hr tablet Take 50 mg by mouth daily    Yes Historical Provider, MD   Multiple Vitamin (MULTIVITAMIN) tablet Take 1 tablet by mouth daily   Yes Historical Provider, MD   olmesartan (BENICAR) 20 mg tablet Take 1 tablet (20 mg total) by mouth 2 (two) times a day 2/26/19  Yes JHONATAN Chua   TRAVATAN Z 0 004 % ophthalmic solution  2/12/18  Yes Historical Provider, MD       Allergies: Allergies   Allergen Reactions    Ace Inhibitors Cough       Review of Systems:  Review of Systems - All ROS negative except as described in HPI  History obtained from the patient  General ROS: positive for  - fatigue  negative for - chills, fever, hot flashes, night sweats, weight gain or weight loss  Hematological and Lymphatic ROS: negative for - bleeding problems, blood clots, bruising, jaundice, night sweats or swollen lymph nodes  Respiratory ROS: positive for - shortness of breath  negative for - cough, hemoptysis or wheezing  Cardiovascular ROS: positive for - dyspnea on exertion  negative for - chest pain, edema, irregular heartbeat, loss of consciousness, orthopnea or paroxysmal nocturnal dyspnea  Gastrointestinal ROS: negative for - abdominal pain, appetite loss, constipation, diarrhea, hematemesis, melena or nausea/vomiting  Neurological ROS: negative for - dizziness, headaches, numbness/tingling, seizures or weakness    Vital Signs:     Vitals:    02/27/19 1400 02/27/19 1424   BP: (!) 130/46 (!) 132/46   BP Location: Left arm Right arm   Cuff Size: Adult    Pulse: 68    Resp: 12    Temp: 97 5 °F (36 4 °C)    TempSrc: Oral    SpO2: 97%    Weight: 73 kg (161 lb)    Height: 5' 8" (1 727 m)        Physical Exam:    General:    HEENT/NECK:  PERRLA  No jugular venous distention  Cardiac:Regular rate and rhythm, III/VI diastic Murmur    Carotid arteries: no bruit  Pulmonary: Breath sounds clear bilaterally  Abdomen:  Non-tender, Non-distended  Positive bowel sounds  Upper extremities: 2+ radial pulses; brisk capillary refill  Lower extremities: Extremities warm/dry  PT/DP pulses 2+ bilaterally  No edema B/L  Neuro: Alert and oriented X 3  Sensation is grossly intact  No focal deficits  Skin: Warm/Dry, without rashes or lesions  Lab Results:               Invalid input(s): LABGLOM      No results found for: HGBA1C  Lab Results   Component Value Date    TROPONINI 0 04 01/29/2019       Imaging Studies:     Cardiac Catheterization: Left main: Normal   LAD: The LAD was large and extended well beyond the apex  No atherosclerosis was seen  The diagonal branches were also normal   Circumflex: The vessel was normal sized There was a 70% stenosis in mid vessel  Ramus intermedius: There was a 70% stenosis in mid vessel  RCA: The vessel was large sized and dominant, giving rise to the PDA  No atherosclerosis was seen  Echocardiogram: LEFT VENTRICLE:  The ventricle was mildly dilated  Systolic function was mildly to moderately reduced  Ejection fraction was estimated to be 40 %  There was mild diffuse hypokinesis  Wall thickness was mildly increased      RIGHT VENTRICLE:  The ventricle was mildly dilated  Systolic function was normal      LEFT ATRIUM:  The atrium was mildly dilated      MITRAL VALVE:  There was moderate to severe regurgitation      AORTIC VALVE:  The valve was possibly bicuspid  Leaflets exhibited normal thickness, normal cuspal separation, and significant diastolic prolapse  There was moderate to severe regurgitation      TRICUSPID VALVE:  There was mild regurgitation  Pulmonary artery systolic pressure was moderately to markedly increased  The findings suggest moderate to severe pulmonary hypertension      AORTA:  The root exhibited mild dilatation      IVC, HEPATIC VEINS:  The inferior vena cava was mildly dilated    Respirophasic changes were blunted (less than 50% variation)      PERICARDIUM:  There was a left pleural effusion  CT Scan: HEART/GREAT VESSELS:  Atherosclerotic changes are noted in thoracic aorta and coronary arteries      There is no evidence of atherosclerotic disease involving the ascending aorta  There is minimal calcification of the aortic valve  Ascending aorta measures 3 2 cm in diameter  Distance from ascending aorta to the sternum measures 2 3 cm with a small   amount of intervening mediastinal fat  PFT's:    Spirometry:  FEV1/FVC Ratio is 57 %  FEV1 is 1 86 L/68 % of predicted  FVC is 3 26 L/91 % of predicted      Lung volumes:  RV 0 55 L/24% of predicted, TLC 3 69 L/55 % of predicted, RV/TLC ratio 15 %     Diffusing capacity:  DLCO is 41 % of predicted      IMPRESSION:  1  Spirometry shows moderate obstruction  2  Flow volume loop is very obstructive  3  Lung volumes shows severe restriction  Diffusion capacity is severely reduced  Carotid Duplex:  RIGHT:  There is <50% stenosis noted in the internal carotid artery  Plaque is  heterogenous and irregular  Vertebral artery flow is antegrade  There is no significant subclavian artery  disease  LEFT:  There is <50% stenosis noted in the internal carotid artery  Plaque is  heterogenous and smooth  Vertebral artery flow is antegrade  There is no significant subclavian artery  disease      I have personally reviewed pertinent reports     and I have personally reviewed pertinent films in PACS    Assessment:  Patient Active Problem List    Diagnosis Date Noted    Congenital bicuspid aortic valve 02/06/2019    Nonrheumatic aortic valve insufficiency 02/06/2019    Non-rheumatic mitral regurgitation 02/06/2019    [facility]    Elevated troponin 01/29/2019    Essential hypertension 05/11/2018    FSGS (focal segmental glomerulosclerosis) 05/10/2018    CKD (chronic kidney disease) stage 3, GFR 30-59 ml/min [facility] ) 03/01/2018    Persistent proteinuria 03/01/2018    Diabetic nephropathy associated with type 2 diabetes mellitus ([facility] ) 03/01/2018    Vitamin D deficiency 03/01/2018    Secondary hyperparathyroidism ([facility] ) 03/01/2018     Severe coronary artery disease; Ongoing CABG workup  Severe mitral regurgitation; Ongoing MVR workup  Bicuspid Aortic Valve with Severe Aortic Insufficiency; Ongoing AVR workup    Plan:  Risks and benefits of aortic valve replacement, coronary artery bypass grafting and mitral valve repair vs replacement were discussed in detail today with the patient  We have reviewed results all preoperative radiographic,  laboratory and vascular studies  They understand and wish to proceed with surgical intervention  They will be scheduled for surgery with DAMON Torre was comfortable with our recommendations, and their questions were answered to their satisfaction  Thank you for allowing us to participate in the care of this patient       SIGNATURE: Virgil Randle  DATE: February 27, 2019  TIME: 2:52 PM

## 2019-02-27 NOTE — PATIENT INSTRUCTIONS
1  You will receive a phone call from the hospital between 2:00 PM and 8:00 PM the day prior to surgery to confirm arrival time and location  For surgery on Mondays, you will receive a call on Friday  2  Do not drink or eat anything after midnight the night before surgery  That includes no water, candy, gum, lozenges, Lifesavers, etc  We recommend you not eat any salty or fatty snack foods, consume alcohol or smoke the night before surgery  3  Continue taking aspirin but only 81 mg daily  4  If you take Coumadin and/or Plavix, discontinue it 5 days before surgery  5  If you are diabetic, do not take any of your diabetic pills the morning of surgery  If you take Lantus insulin, you may take it at your regularly scheduled time the day before surgery  Do not take any other insulins the morning of surgery  6  The 2 nights before surgery, take a shower each night using the special antiseptic soap or soap sponges you received from the office or hospital  Elinor Wallss your hair with regular shampoo and rinse completely before using the antiseptic sponges  Use the sponge to wash from your neck down, with special attention to the armpits and groin area  Do not use any other soap or cleanser on your skin  Do not use lotions, powder, deodorant or perfume of any kind on your skin after you shower  Use clean bed linens and wear clean pajamas after your shower  7  You will be prescribed Mupirocin nasal ointment  Apply to both nostrils twice a day for 5 days prior to surgery  8  Do not take a shower the morning of surgery; you'll be given a special" bath" at the hospital   9  Notify the CT surgery office if you develop a cold, sore throat, cough, fever or other health issues before your surgery  10  Other medication changes included the following:Stop Multivitamin and all other vitamins now  Stop Metformin and Olmesartan 3 days prior to surgery

## 2019-02-27 NOTE — PROGRESS NOTES
Follow up preop visit - Cardiothoracic Surgery   KAYLEN HARRIS [facility] 80 y o  male MRN: [id number]    Physician Requesting Consult: No att  providers found    Reason for Consult / Principal Problem: Coronary artery disease, Mitral regurgitation, BAV, Aortic Insufficiency    History of Present Illness: KAYLEN HARRIS [facility] is a 80y o  year old male     Past Medical History:  Past Medical History:   Diagnosis Date    [facility]     Hyperlipidemia     Hypertension     Proteinuria     Stage 3 chronic kidney disease ([facility]     Vitamin D deficiency          Past Surgical History:   Past Surgical History:   Procedure Laterality Date    COLONOSCOPY      TOE SURGERY Left          Family History:  Family History   Problem Relation Age of Onset    Stroke Mother     Hypertension Mother     Blindness Father     Gout Brother     Heart Valve Disease Brother     Anuerysm Neg Hx          Social History:    Social History     Substance and Sexual Activity   Alcohol Use Yes    Frequency: Monthly or less    Comment: occasionally     Social History     Substance and Sexual Activity   Drug Use No     Social History     Tobacco Use   Smoking Status Former Smoker    Last attempt to quit: Mirian Terrell Years since quittin 1   Smokeless Tobacco Never Used       Home Medications:   Prior to Admission medications    Medication Sig Start Date End Date Taking?  Authorizing Provider   ascorbic acid (VITAMIN C) 500 mg tablet Take 500 mg by mouth daily   Yes Historical Provider, MD   atorvastatin (LIPITOR) 20 mg tablet Take 1 tablet by mouth daily 18  Yes Historical Provider, MD   calcitriol (ROCALTROL) 0 25 mcg capsule Take 1 capsule by mouth 2 (two) times a week 1/1/15  Yes Historical Provider, MD   Cholecalciferol (VITAMIN D3) 1000 units CAPS Take 1 capsule by mouth daily   Yes Historical Provider, MD   furosemide (LASIX) 40 mg tablet Take 1 tablet (40 mg total) by mouth daily for 30 days 1/31/19 3/2/19 Yes Kasi De Leon MD metFORMIN (GLUCOPHAGE) 500 mg tablet Take 1 tablet by mouth daily 2/19/18  Yes Historical Provider, MD   metoprolol succinate (TOPROL-XL) 50 mg 24 hr tablet Take 50 mg by mouth daily    Yes Historical Provider, MD   Multiple Vitamin (MULTIVITAMIN) tablet Take 1 tablet by mouth daily   Yes Historical Provider, MD   olmesartan (BENICAR) 20 mg tablet Take 1 tablet (20 mg total) by mouth 2 (two) times a day 2/26/19  Yes JHONATAN Romero   TRAVATAN Z 0 004 % ophthalmic solution  2/12/18  Yes Historical Provider, MD       Allergies: Allergies   Allergen Reactions    Ace Inhibitors Cough       Review of Systems:  Review of Systems - All ROS negative except as described in HPI  History obtained from the patient  General ROS: positive for  - fatigue  negative for - chills, fever, hot flashes, night sweats, weight gain or weight loss  Hematological and Lymphatic ROS: negative for - bleeding problems, blood clots, bruising, jaundice, night sweats or swollen lymph nodes  Respiratory ROS: positive for - shortness of breath  negative for - cough, hemoptysis or wheezing  Cardiovascular ROS: positive for - dyspnea on exertion  negative for - chest pain, edema, irregular heartbeat, loss of consciousness, orthopnea or paroxysmal nocturnal dyspnea  Gastrointestinal ROS: negative for - abdominal pain, appetite loss, constipation, diarrhea, hematemesis, melena or nausea/vomiting  Neurological ROS: negative for - dizziness, headaches, numbness/tingling, seizures or weakness    Vital Signs:     Vitals:    02/27/19 1400 02/27/19 1424   BP: (!) 130/46 (!) 132/46   BP Location: Left arm Right arm   Cuff Size: Adult    Pulse: 68    Resp: 12    Temp: 97 5 °F (36 4 °C)    TempSrc: Oral    SpO2: 97%    Weight: 73 kg (161 lb)    Height: 5' 8" (1 727 m)        Physical Exam:    General:    HEENT/NECK:  PERRLA  No jugular venous distention  Cardiac:Regular rate and rhythm, III/VI diastic Murmur    Carotid arteries: no bruit  Pulmonary: Breath sounds clear bilaterally  Abdomen:  Non-tender, Non-distended  Positive bowel sounds  Upper extremities: 2+ radial pulses; brisk capillary refill  Lower extremities: Extremities warm/dry  PT/DP pulses 2+ bilaterally  No edema B/L  Neuro: Alert and oriented X 3  Sensation is grossly intact  No focal deficits  Skin: Warm/Dry, without rashes or lesions  Lab Results:               Invalid input(s): LABGLOM      No results found for: HGBA1C  Lab Results   Component Value Date    TROPONINI 0 04 01/29/2019       Imaging Studies:     Cardiac Catheterization: Left main: Normal   LAD: The LAD was large and extended well beyond the apex  No atherosclerosis was seen  The diagonal branches were also normal   Circumflex: The vessel was normal sized There was a 70% stenosis in mid vessel  Ramus intermedius: There was a 70% stenosis in mid vessel  RCA: The vessel was large sized and dominant, giving rise to the PDA  No atherosclerosis was seen  Echocardiogram: LEFT VENTRICLE:  The ventricle was mildly dilated  Systolic function was mildly to moderately reduced  Ejection fraction was estimated to be 40 %  There was mild diffuse hypokinesis  Wall thickness was mildly increased      RIGHT VENTRICLE:  The ventricle was mildly dilated  Systolic function was normal      LEFT ATRIUM:  The atrium was mildly dilated      MITRAL VALVE:  There was moderate to severe regurgitation      AORTIC VALVE:  The valve was possibly bicuspid  Leaflets exhibited normal thickness, normal cuspal separation, and significant diastolic prolapse  There was moderate to severe regurgitation      TRICUSPID VALVE:  There was mild regurgitation  Pulmonary artery systolic pressure was moderately to markedly increased  The findings suggest moderate to severe pulmonary hypertension      AORTA:  The root exhibited mild dilatation      IVC, HEPATIC VEINS:  The inferior vena cava was mildly dilated    Respirophasic changes were blunted (less than 50% variation)      PERICARDIUM:  There was a left pleural effusion  CT Scan: HEART/GREAT VESSELS:  Atherosclerotic changes are noted in thoracic aorta and coronary arteries      There is no evidence of atherosclerotic disease involving the ascending aorta  There is minimal calcification of the aortic valve  Ascending aorta measures 3 2 cm in diameter  Distance from ascending aorta to the sternum measures 2 3 cm with a small   amount of intervening mediastinal fat  PFT's:    Spirometry:  FEV1/FVC Ratio is 57 %  FEV1 is 1 86 L/68 % of predicted  FVC is 3 26 L/91 % of predicted      Lung volumes:  RV 0 55 L/24% of predicted, TLC 3 69 L/55 % of predicted, RV/TLC ratio 15 %     Diffusing capacity:  DLCO is 41 % of predicted      IMPRESSION:  1  Spirometry shows moderate obstruction  2  Flow volume loop is very obstructive  3  Lung volumes shows severe restriction  Diffusion capacity is severely reduced  Carotid Duplex:  RIGHT:  There is <50% stenosis noted in the internal carotid artery  Plaque is  heterogenous and irregular  Vertebral artery flow is antegrade  There is no significant subclavian artery  disease  LEFT:  There is <50% stenosis noted in the internal carotid artery  Plaque is  heterogenous and smooth  Vertebral artery flow is antegrade  There is no significant subclavian artery  disease      I have personally reviewed pertinent reports     and I have personally reviewed pertinent films in PACS    Assessment:  Patient Active Problem List    Diagnosis Date Noted    Congenital bicuspid aortic valve 02/06/2019    Nonrheumatic aortic valve insufficiency 02/06/2019    Non-rheumatic mitral regurgitation 02/06/2019    [facility] 01/29/2019    Elevated troponin 01/29/2019    Essential hypertension 05/11/2018    FSGS (focal segmental glomerulosclerosis) 05/10/2018    CKD (chronic kidney disease) stage 3, GFR 30-59 ml/min ([facility] ) 03/01/2018    Persistent proteinuria 03/01/2018    Diabetic nephropathy associated with type 2 diabetes mellitus ([facility] ) 03/01/2018    Vitamin D deficiency 03/01/2018    Secondary hyperparathyroidism ([facility] ) 03/01/2018     Severe coronary artery disease; Ongoing CABG workup  Severe mitral regurgitation; Ongoing MVR workup  Bicuspid Aortic Valve with Severe Aortic Insufficiency; Ongoing AVR workup    Plan:  Risks and benefits of aortic valve replacement, coronary artery bypass grafting and mitral valve repair vs replacement were discussed in detail today with the patient  We have reviewed results all preoperative radiographic,  laboratory and vascular studies  They understand and wish to proceed with surgical intervention  They will be scheduled for surgery with DAMON Fisher was comfortable with our recommendations, and their questions were answered to their satisfaction  Thank you for allowing us to participate in the care of this patient       SIGNATURE: Virgil Barfield  DATE: February 27, 2019  TIME: 2:52 PM

## 2019-02-27 NOTE — H&P
Follow up preop visit - Cardiothoracic Surgery   KAYLEN HARRIS [facility] 80 y o  male MRN: [id number]    Physician Requesting Consult: No att  providers found    Reason for Consult / Principal Problem: Coronary artery disease, Mitral regurgitation, BAV, Aortic Insufficiency    History of Present Illness: KAYLEN HARRIS [facility] is a 80y o  year old male     Past Medical History:  Past Medical History:   Diagnosis Date    [facility] mellitus [facility]     Hyperlipidemia     Hypertension     Proteinuria     Stage 3 chronic kidney disease ([facility] )     Vitamin D deficiency          Past Surgical History:   Past Surgical History:   Procedure Laterality Date    COLONOSCOPY      TOE SURGERY Left          Family History:  Family History   Problem Relation Age of Onset    Stroke Mother     Hypertension Mother     Blindness Father     Gout Brother     Heart Valve Disease Brother     Anuerysm Neg Hx          Social History:    Social History     Substance and Sexual Activity   Alcohol Use Yes    Frequency: Monthly or less    Comment: occasionally     Social History     Substance and Sexual Activity   Drug Use No     Social History     Tobacco Use   Smoking Status Former Smoker    Last attempt to quit: Mara Portillo Years since quittin 1   Smokeless Tobacco Never Used       Home Medications:   Prior to Admission medications    Medication Sig Start Date End Date Taking?  Authorizing Provider   ascorbic acid (VITAMIN C) 500 mg tablet Take 500 mg by mouth daily   Yes Historical Provider, MD   atorvastatin (LIPITOR) 20 mg tablet Take 1 tablet by mouth daily 18  Yes Historical Provider, MD   calcitriol (ROCALTROL) 0 25 mcg capsule Take 1 capsule by mouth 2 (two) times a week 1/1/15  Yes Historical Provider, MD   Cholecalciferol (VITAMIN D3) 1000 units CAPS Take 1 capsule by mouth daily   Yes Historical Provider, MD   furosemide (LASIX) 40 mg tablet Take 1 tablet (40 mg total) by mouth daily for 30 days 1/31/19 3/2/19 Yes Gene Serrano MD metFORMIN (GLUCOPHAGE) 500 mg tablet Take 1 tablet by mouth daily 2/19/18  Yes Historical Provider, MD   metoprolol succinate (TOPROL-XL) 50 mg 24 hr tablet Take 50 mg by mouth daily    Yes Historical Provider, MD   Multiple Vitamin (MULTIVITAMIN) tablet Take 1 tablet by mouth daily   Yes Historical Provider, MD   olmesartan (BENICAR) 20 mg tablet Take 1 tablet (20 mg total) by mouth 2 (two) times a day 2/26/19  Yes JHONATAN Chua   TRAVATAN Z 0 004 % ophthalmic solution  2/12/18  Yes Historical Provider, MD       Allergies: Allergies   Allergen Reactions    Ace Inhibitors Cough       Review of Systems:  Review of Systems - All ROS negative except as described in HPI  History obtained from the patient  General ROS: positive for  - fatigue  negative for - chills, fever, hot flashes, night sweats, weight gain or weight loss  Hematological and Lymphatic ROS: negative for - bleeding problems, blood clots, bruising, jaundice, night sweats or swollen lymph nodes  Respiratory ROS: positive for - shortness of breath  negative for - cough, hemoptysis or wheezing  Cardiovascular ROS: positive for - dyspnea on exertion  negative for - chest pain, edema, irregular heartbeat, loss of consciousness, orthopnea or paroxysmal nocturnal dyspnea  Gastrointestinal ROS: negative for - abdominal pain, appetite loss, constipation, diarrhea, hematemesis, melena or nausea/vomiting  Neurological ROS: negative for - dizziness, headaches, numbness/tingling, seizures or weakness    Vital Signs:     Vitals:    02/27/19 1400 02/27/19 1424   BP: (!) 130/46 (!) 132/46   BP Location: Left arm Right arm   Cuff Size: Adult    Pulse: 68    Resp: 12    Temp: 97 5 °F (36 4 °C)    TempSrc: Oral    SpO2: 97%    Weight: 73 kg (161 lb)    Height: 5' 8" (1 727 m)        Physical Exam:    General:    HEENT/NECK:  PERRLA  No jugular venous distention  Cardiac:Regular rate and rhythm, III/VI diastic Murmur    Carotid arteries: no bruit  Pulmonary: Breath sounds clear bilaterally  Abdomen:  Non-tender, Non-distended  Positive bowel sounds  Upper extremities: 2+ radial pulses; brisk capillary refill  Lower extremities: Extremities warm/dry  PT/DP pulses 2+ bilaterally  No edema B/L  Neuro: Alert and oriented X 3  Sensation is grossly intact  No focal deficits  Skin: Warm/Dry, without rashes or lesions  Lab Results:               Invalid input(s): LABGLOM      No results found for: HGBA1C  Lab Results   Component Value Date    TROPONINI 0 04 01/29/2019       Imaging Studies:     Cardiac Catheterization: Left main: Normal   LAD: The LAD was large and extended well beyond the apex  No atherosclerosis was seen  The diagonal branches were also normal   Circumflex: The vessel was normal sized There was a 70% stenosis in mid vessel  Ramus intermedius: There was a 70% stenosis in mid vessel  RCA: The vessel was large sized and dominant, giving rise to the PDA  No atherosclerosis was seen  Echocardiogram: LEFT VENTRICLE:  The ventricle was mildly dilated  Systolic function was mildly to moderately reduced  Ejection fraction was estimated to be 40 %  There was mild diffuse hypokinesis  Wall thickness was mildly increased      RIGHT VENTRICLE:  The ventricle was mildly dilated  Systolic function was normal      LEFT ATRIUM:  The atrium was mildly dilated      MITRAL VALVE:  There was moderate to severe regurgitation      AORTIC VALVE:  The valve was possibly bicuspid  Leaflets exhibited normal thickness, normal cuspal separation, and significant diastolic prolapse  There was moderate to severe regurgitation      TRICUSPID VALVE:  There was mild regurgitation  Pulmonary artery systolic pressure was moderately to markedly increased  The findings suggest moderate to severe pulmonary hypertension      AORTA:  The root exhibited mild dilatation      IVC, HEPATIC VEINS:  The inferior vena cava was mildly dilated    Respirophasic changes were blunted (less than 50% variation)      PERICARDIUM:  There was a left pleural effusion  CT Scan: HEART/GREAT VESSELS:  Atherosclerotic changes are noted in thoracic aorta and coronary arteries      There is no evidence of atherosclerotic disease involving the ascending aorta  There is minimal calcification of the aortic valve  Ascending aorta measures 3 2 cm in diameter  Distance from ascending aorta to the sternum measures 2 3 cm with a small   amount of intervening mediastinal fat  PFT's:    Spirometry:  FEV1/FVC Ratio is 57 %  FEV1 is 1 86 L/68 % of predicted  FVC is 3 26 L/91 % of predicted      Lung volumes:  RV 0 55 L/24% of predicted, TLC 3 69 L/55 % of predicted, RV/TLC ratio 15 %     Diffusing capacity:  DLCO is 41 % of predicted      IMPRESSION:  1  Spirometry shows moderate obstruction  2  Flow volume loop is very obstructive  3  Lung volumes shows severe restriction  Diffusion capacity is severely reduced  Carotid Duplex:  RIGHT:  There is <50% stenosis noted in the internal carotid artery  Plaque is  heterogenous and irregular  Vertebral artery flow is antegrade  There is no significant subclavian artery  disease  LEFT:  There is <50% stenosis noted in the internal carotid artery  Plaque is  heterogenous and smooth  Vertebral artery flow is antegrade  There is no significant subclavian artery  disease      I have personally reviewed pertinent reports     and I have personally reviewed pertinent films in PACS    Assessment:  Patient Active Problem List    Diagnosis Date Noted    Congenital bicuspid aortic valve 02/06/2019    Nonrheumatic aortic valve insufficiency 02/06/2019    Non-rheumatic mitral regurgitation 02/06/2019    [facility]    Elevated troponin 01/29/2019    Essential hypertension 05/11/2018    FSGS (focal segmental glomerulosclerosis) 05/10/2018    CKD (chronic kidney disease) stage 3, GFR 30-59 ml/min ([facility] ) 03/01/2018    Persistent proteinuria 03/01/2018    Diabetic nephropathy associated with type 2 diabetes mellitus ([facility] ) 03/01/2018    Vitamin D deficiency 03/01/2018    Secondary hyperparathyroidism ([facility] ) 03/01/2018     Severe coronary artery disease; Ongoing CABG workup  Severe mitral regurgitation; Ongoing MVR workup  Bicuspid Aortic Valve with Severe Aortic Insufficiency; Ongoing AVR workup    Plan:  Risks and benefits of aortic valve replacement, coronary artery bypass grafting and mitral valve repair vs replacement were discussed in detail today with the patient  We have reviewed results all preoperative radiographic,  laboratory and vascular studies  They understand and wish to proceed with surgical intervention  They will be scheduled for surgery with DAMON Blanco  [facility] was comfortable with our recommendations, and their questions were answered to their satisfaction  Thank you for allowing us to participate in the care of this patient       SIGNATURE: Virgil Rush  DATE: February 27, 2019  TIME: 2:52 PM

## 2019-03-02 ENCOUNTER — HOSPITAL ENCOUNTER (EMERGENCY)
Facility: HOSPITAL | Age: 69
Discharge: HOME | End: 2019-03-02
Attending: EMERGENCY MEDICINE | Admitting: EMERGENCY MEDICINE
Payer: COMMERCIAL

## 2019-03-02 ENCOUNTER — APPOINTMENT (OUTPATIENT)
Dept: LAB | Facility: CLINIC | Age: 69
End: 2019-03-02
Payer: COMMERCIAL

## 2019-03-02 ENCOUNTER — TRANSCRIBE ORDERS (OUTPATIENT)
Dept: LAB | Facility: CLINIC | Age: 69
End: 2019-03-02

## 2019-03-02 VITALS
DIASTOLIC BLOOD PRESSURE: 52 MMHG | OXYGEN SATURATION: 98 % | RESPIRATION RATE: 18 BRPM | TEMPERATURE: 98.3 F | SYSTOLIC BLOOD PRESSURE: 148 MMHG | WEIGHT: 160.72 LBS | HEART RATE: 79 BPM | BODY MASS INDEX: 24.44 KG/M2

## 2019-03-02 DIAGNOSIS — Q23.1 BICUSPID AORTIC VALVE: ICD-10-CM

## 2019-03-02 DIAGNOSIS — I34.0 NON-RHEUMATIC MITRAL REGURGITATION: ICD-10-CM

## 2019-03-02 DIAGNOSIS — Z01.30 BLOOD PRESSURE CHECK: Primary | ICD-10-CM

## 2019-03-02 DIAGNOSIS — Q23.1 CONGENITAL INSUFFICIENCY OF AORTIC VALVE: Primary | ICD-10-CM

## 2019-03-02 DIAGNOSIS — I34.8 MYXOID TRANSFORMATION OF MITRAL VALVE: ICD-10-CM

## 2019-03-02 DIAGNOSIS — Q23.1 CONGENITAL INSUFFICIENCY OF AORTIC VALVE: ICD-10-CM

## 2019-03-02 DIAGNOSIS — I35.1 NONRHEUMATIC AORTIC VALVE INSUFFICIENCY: ICD-10-CM

## 2019-03-02 LAB
ABO GROUP BLD: NORMAL
ANION GAP SERPL CALCULATED.3IONS-SCNC: 9 MMOL/L (ref 4–13)
BACTERIA UR QL AUTO: ABNORMAL /HPF
BILIRUB UR QL STRIP: NEGATIVE
BLD GP AB SCN SERPL QL: NEGATIVE
BUN SERPL-MCNC: 31 MG/DL (ref 5–25)
CALCIUM SERPL-MCNC: 8.4 MG/DL (ref 8.3–10.1)
CHLORIDE SERPL-SCNC: 107 MMOL/L (ref 100–108)
CLARITY UR: CLEAR
CO2 SERPL-SCNC: 24 MMOL/L (ref 21–32)
COLOR UR: YELLOW
CREAT SERPL-MCNC: 1.84 MG/DL (ref 0.6–1.3)
ERYTHROCYTE [DISTWIDTH] IN BLOOD BY AUTOMATED COUNT: 13.4 % (ref 11.6–15.1)
EST. AVERAGE GLUCOSE BLD GHB EST-MCNC: 123 MG/DL
GFR SERPL CREATININE-BSD FRML MDRD: 43 ML/MIN/1.73SQ M
GLUCOSE P FAST SERPL-MCNC: 91 MG/DL (ref 65–99)
GLUCOSE UR STRIP-MCNC: NEGATIVE MG/DL
HBA1C MFR BLD: 5.9 % (ref 4.2–6.3)
HCT VFR BLD AUTO: 40.5 % (ref 36.5–49.3)
HGB BLD-MCNC: 14.1 G/DL (ref 12–17)
HGB UR QL STRIP.AUTO: ABNORMAL
HYALINE CASTS #/AREA URNS LPF: ABNORMAL /LPF
KETONES UR STRIP-MCNC: NEGATIVE MG/DL
LEUKOCYTE ESTERASE UR QL STRIP: NEGATIVE
MCH RBC QN AUTO: 31.3 PG (ref 26.8–34.3)
MCHC RBC AUTO-ENTMCNC: 34.8 G/DL (ref 31.4–37.4)
MCV RBC AUTO: 90 FL (ref 82–98)
NITRITE UR QL STRIP: NEGATIVE
NON-SQ EPI CELLS URNS QL MICRO: ABNORMAL /HPF
PH UR STRIP.AUTO: 5.5 [PH] (ref 4.5–8)
PLATELET # BLD AUTO: 199 THOUSANDS/UL (ref 149–390)
PMV BLD AUTO: 10 FL (ref 8.9–12.7)
POTASSIUM SERPL-SCNC: 4.5 MMOL/L (ref 3.5–5.3)
PROT UR STRIP-MCNC: >=300 MG/DL
RBC # BLD AUTO: 4.5 MILLION/UL (ref 3.88–5.62)
RBC #/AREA URNS AUTO: ABNORMAL /HPF
RH BLD: POSITIVE
SODIUM SERPL-SCNC: 140 MMOL/L (ref 136–145)
SP GR UR STRIP.AUTO: >=1.03 (ref 1–1.03)
SPECIMEN EXPIRATION DATE: NORMAL
UROBILINOGEN UR QL STRIP.AUTO: 0.2 E.U./DL
WBC # BLD AUTO: 6.29 THOUSAND/UL (ref 4.31–10.16)
WBC #/AREA URNS AUTO: ABNORMAL /HPF

## 2019-03-02 PROCEDURE — 93005 ELECTROCARDIOGRAM TRACING: CPT

## 2019-03-02 PROCEDURE — 86900 BLOOD TYPING SEROLOGIC ABO: CPT

## 2019-03-02 PROCEDURE — 36415 COLL VENOUS BLD VENIPUNCTURE: CPT

## 2019-03-02 PROCEDURE — 99282 EMERGENCY DEPT VISIT SF MDM: CPT

## 2019-03-02 PROCEDURE — 86850 RBC ANTIBODY SCREEN: CPT

## 2019-03-02 PROCEDURE — 85027 COMPLETE CBC AUTOMATED: CPT

## 2019-03-02 PROCEDURE — 86901 BLOOD TYPING SEROLOGIC RH(D): CPT

## 2019-03-02 PROCEDURE — 83036 HEMOGLOBIN GLYCOSYLATED A1C: CPT

## 2019-03-02 PROCEDURE — 81001 URINALYSIS AUTO W/SCOPE: CPT | Performed by: THORACIC SURGERY (CARDIOTHORACIC VASCULAR SURGERY)

## 2019-03-02 PROCEDURE — 87081 CULTURE SCREEN ONLY: CPT

## 2019-03-02 PROCEDURE — 80048 BASIC METABOLIC PNL TOTAL CA: CPT

## 2019-03-02 NOTE — ED PROVIDER NOTES
History  Chief Complaint   Patient presents with    Blood Pressure Check     Patient presents to ED for eval of possible low hr and blood pressure  Takes benicar 20mg bid  Scheduled for open heart surgery this Thursday  Denies chest pain, sob, dizziness, nausea, or vomiting  States, 'I just am aware that my heart rate feels low "      Patient is a 60-year-old male with a past medical history of CHF, diabetes, CKD, hypertension, hyperlipidemia presenting to the emergency department for a blood pressure check  Patient has been feeling as if my heart is beating slow and wanted to come to the ER to make sure his blood pressure wasn't too low  Patient is scheduled for a mitral valve and aortic valve repair this week  Patient was at the lab this morning getting preoperative blood work done  Patient is asymptomatic without any complaints  Patient denies chest pain, palpitations, shortness of breath, dizziness, diaphoresis, nausea, vomiting, fever, abdominal pain  Pt's blood pressure on arrival is 167/67 which is where his blood pressure normally runs at  Antihypertensives include Benicar, metoprolol, and Lasix  Pt has been compliant with his medications but did not take his medications yet this morning  History provided by:  Patient   used: No    Hypertension   Severity:  Mild  Onset quality:  Gradual  Progression:  Improving  Chronicity:  Chronic  Time since last dose of antihypertensive: Yesterday  Context: stress    Relieved by:  Beta blockers, angiotensin blockers and diuretics  Associated symptoms: palpitations    Associated symptoms: no abdominal pain, no blurred vision, no chest pain, no dizziness, no fever, no headaches, no nausea, no peripheral edema, no shortness of breath, no syncope and not vomiting    Risk factors: cardiac disease and diabetes        Prior to Admission Medications   Prescriptions Last Dose Informant Patient Reported? Taking?    Cholecalciferol (VITAMIN D3) 1000 units CAPS  Self Yes No   Sig: Take 1 capsule by mouth daily   Multiple Vitamin (MULTIVITAMIN) tablet  Self Yes No   Sig: Take 1 tablet by mouth daily   TRAVATAN Z 0 004 % ophthalmic solution  Self Yes No   ascorbic acid (VITAMIN C) 500 mg tablet  Self Yes No   Sig: Take 500 mg by mouth daily   atorvastatin (LIPITOR) 20 mg tablet  Self Yes No   Sig: Take 1 tablet by mouth daily   calcitriol (ROCALTROL) 0 25 mcg capsule  Self Yes No   Sig: Take 1 capsule by mouth 2 (two) times a week   furosemide (LASIX) 40 mg tablet  Self No No   Sig: Take 1 tablet (40 mg total) by mouth daily for 30 days   metFORMIN (GLUCOPHAGE) 500 mg tablet  Self Yes No   Sig: Take 1 tablet by mouth daily   metoprolol succinate (TOPROL-XL) 50 mg 24 hr tablet  Self Yes No   Sig: Take 50 mg by mouth daily    mupirocin (BACTROBAN) 2 % ointment   No No   Sig: Apply inside both nostrils two times per day  Start 5 days prior to surgery  olmesartan (BENICAR) 20 mg tablet  Self No No   Sig: Take 1 tablet (20 mg total) by mouth 2 (two) times a day      Facility-Administered Medications: None       Past Medical History:   Diagnosis Date    Diabetes mellitus ([address] )     Hyperlipidemia     Hypertension     Proteinuria     Stage 3 chronic kidney disease (HCC)     Vitamin D deficiency        Past Surgical History:   Procedure Laterality Date    COLONOSCOPY      TOE SURGERY Left        Family History   Problem Relation Age of Onset    Stroke Mother     Hypertension Mother     Blindness Father     Gout Brother     Heart Valve Disease Brother     Anuerysm Neg Hx      I have reviewed and agree with the history as documented      Social History     Tobacco Use    Smoking status: Former Smoker     Last attempt to quit: 1968     Years since quittin 2    Smokeless tobacco: Never Used   Substance Use Topics    Alcohol use: Yes     Frequency: Monthly or less     Comment: occasionally    Drug use: No        Review of Systems   Constitutional: Negative for chills and fever  HENT: Negative for sore throat  Eyes: Negative for blurred vision and visual disturbance  Respiratory: Negative for shortness of breath  Cardiovascular: Positive for palpitations  Negative for chest pain, leg swelling and syncope  Gastrointestinal: Negative for abdominal pain, nausea and vomiting  Genitourinary: Negative for dysuria  Musculoskeletal: Negative for back pain  Skin: Negative for color change  Neurological: Negative for dizziness, syncope, light-headedness and headaches  All other systems reviewed and are negative  Physical Exam  Physical Exam   Constitutional: He is oriented to person, place, and time  He appears well-developed and well-nourished  No distress  Pt resting comfortably in bed  HENT:   Head: Normocephalic and atraumatic  Right Ear: External ear normal    Left Ear: External ear normal    Eyes: Right eye exhibits no discharge  Left eye exhibits no discharge  No scleral icterus  Cardiovascular: Normal rate and regular rhythm  Murmur heard  Diastolic murmur is present  Pulmonary/Chest: Effort normal and breath sounds normal  No stridor  No respiratory distress  He has no wheezes  He has no rales  Abdominal: Soft  Bowel sounds are normal  He exhibits no distension  There is no tenderness  There is no guarding  Musculoskeletal:   No peripheral edema  Neurological: He is alert and oriented to person, place, and time  Skin: Skin is warm and dry  He is not diaphoretic  Psychiatric: He has a normal mood and affect   His behavior is normal        Vital Signs  ED Triage Vitals   Temperature Pulse Respirations Blood Pressure SpO2   03/02/19 0808 03/02/19 0805 03/02/19 0805 03/02/19 0805 03/02/19 0805   98 3 °F (36 8 °C) 79 18 167/67 98 %      Temp Source Heart Rate Source Patient Position - Orthostatic VS BP Location FiO2 (%)   03/02/19 0808 03/02/19 0805 03/02/19 0805 03/02/19 0805 --   Oral Monitor Sitting Right arm Pain Score       03/02/19 0805       No Pain           Vitals:    03/02/19 0805 03/02/19 0849   BP: 167/67 148/52   Pulse: 79    Patient Position - Orthostatic VS: Sitting Sitting       Visual Acuity      ED Medications  Medications - No data to display    Diagnostic Studies  Results Reviewed     None                 No orders to display              Procedures  ECG 12 Lead Documentation  Date/Time: 3/2/2019 8:39 AM  Performed by: Brian Flood PA-C  Authorized by: Brian Flood PA-C     Indications / Diagnosis:  Palpitations  ECG reviewed by me, the ED Provider: yes    Patient location:  ED  Previous ECG:     Previous ECG:  Compared to current    Comparison ECG info:  2/15/19    Similarity:  No change    Comparison to cardiac monitor: Yes    Interpretation:     Interpretation: abnormal    Rate:     ECG rate:  76    ECG rate assessment: normal    Rhythm:     Rhythm: sinus rhythm    Ectopy:     Ectopy: none    QRS:     QRS axis:  Left    QRS intervals: Wide  Conduction:     Conduction: abnormal      Abnormal conduction: incomplete RBBB and LAFB    ST segments:     ST segments:  Normal  T waves:     T waves: non-specific and inverted      Inverted:  V4 and V5  Comments:      T wave inversions are consistent with previous EKGs  Phone Contacts  ED Phone Contact    ED Course           MDM  Number of Diagnoses or Management Options  Blood pressure check: new and requires workup  Diagnosis management comments: 69yo male with a PMH of CHF, CKD, DM, HTN, and HLD who is scheduled for an upcoming mitral and aortic valve repair presenting to the emergency department for a blood pressure check  Pt is well-appearing  Pt had preoperative blood work done this morning  No need for additional blood work at this time as pt is asymptomatic  Will do EKG and likely discharge pt to home  EKG compared to previous EKG last month  RBBB, LAFB, and T wave inversions in V4/V5 present in prior EKG   Pt safe for discharge with PCP follow-up  Red flags and when to return to ED discussed with pt  Pt expressed understanding and is agreeable to plan  Amount and/or Complexity of Data Reviewed  Review and summarize past medical records: yes  Independent visualization of images, tracings, or specimens: yes    Risk of Complications, Morbidity, and/or Mortality  Presenting problems: moderate  Diagnostic procedures: moderate  Management options: moderate    Patient Progress  Patient progress: stable      Disposition  Final diagnoses:   Blood pressure check     Time reflects when diagnosis was documented in both MDM as applicable and the Disposition within this note     Time User Action Codes Description Comment    3/2/2019  8:41 AM Silvio Worrell Add [Z01 30] Blood pressure check       ED Disposition     ED Disposition Condition Date/Time Comment    Discharge Stable Sat Mar 2, 2019  8:41 AM Myra Mustafa discharge to home/self care              Follow-up Information     Follow up With Specialties Details Why Contact Info Additional [address], MD Family Medicine   [address]  [address] Emergency Department Emergency Medicine  If symptoms worsen [address] [facility]  [phone] [facility] ED, [place], [place], [address]          Discharge Medication List as of 3/2/2019  8:42 AM      CONTINUE these medications which have NOT CHANGED    Details   ascorbic acid (VITAMIN C) 500 mg tablet Take 500 mg by mouth daily, Historical Med      atorvastatin (LIPITOR) 20 mg tablet Take 1 tablet by mouth daily, Starting Fri 7/27/2018, Historical Med      calcitriol (ROCALTROL) 0 25 mcg capsule Take 1 capsule by mouth 2 (two) times a week, Starting Thu 1/1/2015, Historical Med      Cholecalciferol (VITAMIN D3) 1000 units CAPS Take 1 capsule by mouth daily, Historical Med      furosemide (LASIX) 40 mg tablet Take 1 tablet (40 mg total) by mouth daily for 30 days, Starting Thu 1/31/2019, Until Sat 3/2/2019, Print      metFORMIN (GLUCOPHAGE) 500 mg tablet Take 1 tablet by mouth daily, Starting Mon 2/19/2018, Historical Med      metoprolol succinate (TOPROL-XL) 50 mg 24 hr tablet Take 50 mg by mouth daily , Historical Med      Multiple Vitamin (MULTIVITAMIN) tablet Take 1 tablet by mouth daily, Historical Med      mupirocin (BACTROBAN) 2 % ointment Apply inside both nostrils two times per day  Start 5 days prior to surgery  , Normal      olmesartan (BENICAR) 20 mg tablet Take 1 tablet (20 mg total) by mouth 2 (two) times a day, Starting Tue 2/26/2019, Normal      TRAVATAN Z 0 004 % ophthalmic solution Starting Mon 2/12/2018, Historical Med           No discharge procedures on file      ED Provider  Electronically Signed by           Teo Holder PA-C  03/02/19 6370

## 2019-03-02 NOTE — ED NOTES
Provider at bedside     Geraldo Trinh, [facility]  03/02/19 3010

## 2019-03-03 LAB — MRSA NOSE QL CULT: NORMAL

## 2019-03-04 LAB
ATRIAL RATE: 76 BPM
P AXIS: 70 DEGREES
PR INTERVAL: 170 MS
QRS AXIS: -77 DEGREES
QRSD INTERVAL: 144 MS
QT INTERVAL: 420 MS
QTC INTERVAL: 472 MS
T WAVE AXIS: 60 DEGREES
VENTRICULAR RATE: 76 BPM

## 2019-03-04 PROCEDURE — 93010 ELECTROCARDIOGRAM REPORT: CPT | Performed by: INTERNAL MEDICINE

## 2019-03-04 RX ORDER — FUROSEMIDE 20 MG/1
20 TABLET ORAL 3 TIMES WEEKLY
COMMUNITY
End: 2019-03-15 | Stop reason: HOSPADM

## 2019-03-04 RX ORDER — FUROSEMIDE 40 MG/1
40 TABLET ORAL
COMMUNITY
End: 2019-03-15 | Stop reason: HOSPADM

## 2019-03-04 NOTE — PRE-PROCEDURE INSTRUCTIONS
Pre-Surgery Instructions:   Medication Instructions    ascorbic acid (VITAMIN C) 500 mg tablet Patient was instructed by Physician and understands   atorvastatin (LIPITOR) 20 mg tablet Patient was instructed by Physician and understands   calcitriol (ROCALTROL) 0 25 mcg capsule Patient was instructed by Physician and understands   Cholecalciferol (VITAMIN D3) 1000 units CAPS Patient was instructed by Physician and understands   furosemide (LASIX) 20 mg tablet Patient was instructed by Physician and understands   furosemide (LASIX) 40 mg tablet Patient was instructed by Physician and understands   metFORMIN (GLUCOPHAGE) 500 mg tablet Patient was instructed by Physician and understands   metoprolol succinate (TOPROL-XL) 50 mg 24 hr tablet Patient was instructed by Physician and understands   Multiple Vitamin (MULTIVITAMIN) tablet Patient was instructed by Physician and understands   mupirocin (BACTROBAN) 2 % ointment Patient was instructed by Physician and understands   olmesartan (BENICAR) 20 mg tablet Patient was instructed by Physician and understands   TRAVATAN Z 0 004 % ophthalmic solution Patient was instructed by Physician and understands

## 2019-03-05 ENCOUNTER — TELEPHONE (OUTPATIENT)
Dept: NEPHROLOGY | Facility: CLINIC | Age: 69
End: 2019-03-05

## 2019-03-05 NOTE — TELEPHONE ENCOUNTER
I called to schedule April follow up appt with Dr Sohan Mckeon  Patient said he is going for open heart surgery on Thursday and he will call us on a later date to schedule appt once he is fully recovered

## 2019-03-06 ENCOUNTER — ANESTHESIA EVENT (INPATIENT)
Dept: PERIOP | Facility: HOSPITAL | Age: 69
DRG: 219 | End: 2019-03-06
Payer: COMMERCIAL

## 2019-03-06 RX ORDER — HEPARIN SODIUM 1000 [USP'U]/ML
400 INJECTION, SOLUTION INTRAVENOUS; SUBCUTANEOUS ONCE
Status: CANCELLED | OUTPATIENT
Start: 2019-03-07

## 2019-03-06 RX ORDER — HEPARIN SODIUM 1000 [USP'U]/ML
10000 INJECTION, SOLUTION INTRAVENOUS; SUBCUTANEOUS ONCE
Status: CANCELLED | OUTPATIENT
Start: 2019-03-07

## 2019-03-06 NOTE — ANESTHESIA PREPROCEDURE EVALUATION
Review of Systems/Medical History  Patient summary reviewed  Chart reviewed      Cardiovascular  Hyperlipidemia, Hypertension controlled, CAD , CAD status: 2VD, CHF ,   Comment: Echo:  EF = 40%  Severe AI  Severe MR,  Pulmonary       GI/Hepatic         Comment: Cr = 1 84     Endo/Other  Diabetes well controlled type 2 Oral agent,      GYN       Hematology   Musculoskeletal       Neurology   Psychology           Physical Exam    Airway    Mallampati score: I  TM Distance: >3 FB  Neck ROM: full     Dental       Cardiovascular      Pulmonary      Other Findings        Anesthesia Plan  ASA Score- 4     Anesthesia Type- general with ASA Monitors  Additional Monitors: pulmonary artery catheter, central venous line and arterial line  Airway Plan: ETT  Plan Factors-    Induction- intravenous  Postoperative Plan-     Informed Consent- Anesthetic plan and risks discussed with patient

## 2019-03-07 ENCOUNTER — APPOINTMENT (INPATIENT)
Dept: RADIOLOGY | Facility: HOSPITAL | Age: 69
DRG: 219 | End: 2019-03-07
Payer: COMMERCIAL

## 2019-03-07 ENCOUNTER — HOSPITAL ENCOUNTER (INPATIENT)
Facility: HOSPITAL | Age: 69
LOS: 8 days | Discharge: HOME HEALTH | DRG: 219 | End: 2019-03-15
Attending: THORACIC SURGERY (CARDIOTHORACIC VASCULAR SURGERY) | Admitting: THORACIC SURGERY (CARDIOTHORACIC VASCULAR SURGERY)
Payer: COMMERCIAL

## 2019-03-07 ENCOUNTER — ANESTHESIA (INPATIENT)
Dept: PERIOP | Facility: HOSPITAL | Age: 69
DRG: 219 | End: 2019-03-07
Payer: COMMERCIAL

## 2019-03-07 ENCOUNTER — APPOINTMENT (INPATIENT)
Dept: NON INVASIVE DIAGNOSTICS | Facility: HOSPITAL | Age: 69
DRG: 219 | End: 2019-03-07
Payer: COMMERCIAL

## 2019-03-07 DIAGNOSIS — Z98.890 S/P MVR (MITRAL VALVE REPAIR): ICD-10-CM

## 2019-03-07 DIAGNOSIS — I35.1 NONRHEUMATIC AORTIC VALVE INSUFFICIENCY: ICD-10-CM

## 2019-03-07 DIAGNOSIS — N17.9 AKI (ACUTE KIDNEY INJURY) (HCC): ICD-10-CM

## 2019-03-07 DIAGNOSIS — E11.9 DIABETES MELLITUS TYPE 2 IN NONOBESE (HCC): Chronic | ICD-10-CM

## 2019-03-07 DIAGNOSIS — I25.10 CORONARY ARTERY DISEASE INVOLVING NATIVE CORONARY ARTERY OF NATIVE HEART WITHOUT ANGINA PECTORIS: ICD-10-CM

## 2019-03-07 DIAGNOSIS — I34.0 NON-RHEUMATIC MITRAL REGURGITATION: ICD-10-CM

## 2019-03-07 DIAGNOSIS — I48.0 PAROXYSMAL ATRIAL FIBRILLATION (HCC): ICD-10-CM

## 2019-03-07 DIAGNOSIS — N18.30 CKD (CHRONIC KIDNEY DISEASE) STAGE 3, GFR 30-59 ML/MIN (HCC): Chronic | ICD-10-CM

## 2019-03-07 DIAGNOSIS — Z95.2 S/P AVR: Primary | ICD-10-CM

## 2019-03-07 DIAGNOSIS — I35.9 NONRHEUMATIC AORTIC VALVE DISORDER: ICD-10-CM

## 2019-03-07 DIAGNOSIS — Q23.1 BICUSPID AORTIC VALVE: ICD-10-CM

## 2019-03-07 PROBLEM — I42.0 DILATED CARDIOMYOPATHY (HCC): Chronic | Status: ACTIVE | Noted: 2019-03-07

## 2019-03-07 PROBLEM — I50.22 CHRONIC SYSTOLIC CONGESTIVE HEART FAILURE (HCC): Chronic | Status: ACTIVE | Noted: 2019-01-29

## 2019-03-07 PROBLEM — R79.89 ELEVATED TROPONIN: Status: RESOLVED | Noted: 2019-01-29 | Resolved: 2019-03-07

## 2019-03-07 PROBLEM — Q23.81 BICUSPID AORTIC VALVE: Chronic | Status: ACTIVE | Noted: 2019-02-27

## 2019-03-07 PROBLEM — R77.8 ELEVATED TROPONIN: Status: RESOLVED | Noted: 2019-01-29 | Resolved: 2019-03-07

## 2019-03-07 PROBLEM — I42.0 DILATED CARDIOMYOPATHY (HCC): Status: ACTIVE | Noted: 2019-03-07

## 2019-03-07 PROBLEM — I50.22 CHRONIC SYSTOLIC CONGESTIVE HEART FAILURE (HCC): Status: ACTIVE | Noted: 2019-01-29

## 2019-03-07 PROBLEM — I10 ESSENTIAL HYPERTENSION: Chronic | Status: ACTIVE | Noted: 2018-05-11

## 2019-03-07 PROBLEM — N25.81 SECONDARY HYPERPARATHYROIDISM (HCC): Chronic | Status: ACTIVE | Noted: 2018-03-01

## 2019-03-07 PROBLEM — E11.21 DIABETIC NEPHROPATHY ASSOCIATED WITH TYPE 2 DIABETES MELLITUS (HCC): Chronic | Status: ACTIVE | Noted: 2018-03-01

## 2019-03-07 LAB
ANION GAP SERPL CALCULATED.3IONS-SCNC: 7 MMOL/L (ref 4–13)
ATRIAL RATE: 90 BPM
BASE EXCESS BLDA CALC-SCNC: -1 MMOL/L (ref -2–3)
BASE EXCESS BLDA CALC-SCNC: -2 MMOL/L (ref -2–3)
BASE EXCESS BLDA CALC-SCNC: 0 MMOL/L (ref -2–3)
BASE EXCESS BLDA CALC-SCNC: 1 MMOL/L (ref -2–3)
BASE EXCESS BLDA CALC-SCNC: 4 MMOL/L (ref -2–3)
BUN SERPL-MCNC: 25 MG/DL (ref 5–25)
CA-I BLD-SCNC: 0.97 MMOL/L (ref 1.12–1.32)
CA-I BLD-SCNC: 1 MMOL/L (ref 1.12–1.32)
CA-I BLD-SCNC: 1.01 MMOL/L (ref 1.12–1.32)
CA-I BLD-SCNC: 1.02 MMOL/L (ref 1.12–1.32)
CA-I BLD-SCNC: 1.06 MMOL/L (ref 1.12–1.32)
CA-I BLD-SCNC: 1.17 MMOL/L (ref 1.12–1.32)
CA-I BLD-SCNC: 1.17 MMOL/L (ref 1.12–1.32)
CA-I BLD-SCNC: 1.19 MMOL/L (ref 1.12–1.32)
CA-I BLD-SCNC: 1.31 MMOL/L (ref 1.12–1.32)
CALCIUM SERPL-MCNC: 8 MG/DL (ref 8.3–10.1)
CHLORIDE SERPL-SCNC: 112 MMOL/L (ref 100–108)
CO2 SERPL-SCNC: 23 MMOL/L (ref 21–32)
CREAT SERPL-MCNC: 1.94 MG/DL (ref 0.6–1.3)
FIO2 GAS DIL.REBREATH: 50 L
GFR SERPL CREATININE-BSD FRML MDRD: 40 ML/MIN/1.73SQ M
GLUCOSE SERPL-MCNC: 101 MG/DL (ref 65–140)
GLUCOSE SERPL-MCNC: 104 MG/DL (ref 65–140)
GLUCOSE SERPL-MCNC: 110 MG/DL (ref 65–140)
GLUCOSE SERPL-MCNC: 111 MG/DL (ref 65–140)
GLUCOSE SERPL-MCNC: 134 MG/DL (ref 65–140)
GLUCOSE SERPL-MCNC: 139 MG/DL (ref 65–140)
GLUCOSE SERPL-MCNC: 140 MG/DL (ref 65–140)
GLUCOSE SERPL-MCNC: 142 MG/DL (ref 65–140)
GLUCOSE SERPL-MCNC: 154 MG/DL (ref 65–140)
GLUCOSE SERPL-MCNC: 155 MG/DL (ref 65–140)
GLUCOSE SERPL-MCNC: 166 MG/DL (ref 65–140)
GLUCOSE SERPL-MCNC: 167 MG/DL (ref 65–140)
GLUCOSE SERPL-MCNC: 171 MG/DL (ref 65–140)
GLUCOSE SERPL-MCNC: 185 MG/DL (ref 65–140)
GLUCOSE SERPL-MCNC: 89 MG/DL (ref 65–140)
GLUCOSE SERPL-MCNC: 95 MG/DL (ref 65–140)
GLUCOSE SERPL-MCNC: 96 MG/DL (ref 65–140)
GLUCOSE SERPL-MCNC: 98 MG/DL (ref 65–140)
HCO3 BLDA-SCNC: 23.2 MMOL/L (ref 22–28)
HCO3 BLDA-SCNC: 23.3 MMOL/L (ref 22–28)
HCO3 BLDA-SCNC: 24.3 MMOL/L (ref 22–28)
HCO3 BLDA-SCNC: 25.6 MMOL/L (ref 22–28)
HCO3 BLDA-SCNC: 25.7 MMOL/L (ref 22–28)
HCO3 BLDA-SCNC: 25.9 MMOL/L (ref 22–28)
HCO3 BLDA-SCNC: 26.8 MMOL/L (ref 22–28)
HCO3 BLDA-SCNC: 29.6 MMOL/L (ref 24–30)
HCO3 BLDA-SCNC: 29.8 MMOL/L (ref 22–28)
HCT VFR BLD AUTO: 34.6 % (ref 36.5–49.3)
HCT VFR BLD AUTO: 35 % (ref 36.5–49.3)
HCT VFR BLD CALC: 24 % (ref 36.5–49.3)
HCT VFR BLD CALC: 27 % (ref 36.5–49.3)
HCT VFR BLD CALC: 29 % (ref 36.5–49.3)
HCT VFR BLD CALC: 29 % (ref 36.5–49.3)
HCT VFR BLD CALC: 30 % (ref 36.5–49.3)
HCT VFR BLD CALC: 30 % (ref 36.5–49.3)
HCT VFR BLD CALC: 31 % (ref 36.5–49.3)
HCT VFR BLD CALC: 32 % (ref 36.5–49.3)
HCT VFR BLD CALC: 32 % (ref 36.5–49.3)
HGB BLD-MCNC: 11.7 G/DL (ref 12–17)
HGB BLD-MCNC: 11.9 G/DL (ref 12–17)
HGB BLDA-MCNC: 10.2 G/DL (ref 12–17)
HGB BLDA-MCNC: 10.2 G/DL (ref 12–17)
HGB BLDA-MCNC: 10.5 G/DL (ref 12–17)
HGB BLDA-MCNC: 10.9 G/DL (ref 12–17)
HGB BLDA-MCNC: 10.9 G/DL (ref 12–17)
HGB BLDA-MCNC: 8.2 G/DL (ref 12–17)
HGB BLDA-MCNC: 9.2 G/DL (ref 12–17)
HGB BLDA-MCNC: 9.9 G/DL (ref 12–17)
HGB BLDA-MCNC: 9.9 G/DL (ref 12–17)
KCT BLD-ACNC: 115 SEC (ref 89–137)
KCT BLD-ACNC: 138 SEC (ref 89–137)
KCT BLD-ACNC: 543 SEC (ref 89–137)
KCT BLD-ACNC: 543 SEC (ref 89–137)
KCT BLD-ACNC: 548 SEC (ref 89–137)
KCT BLD-ACNC: 554 SEC (ref 89–137)
KCT BLD-ACNC: 682 SEC (ref 89–137)
KCT BLD-ACNC: 831 SEC (ref 89–137)
P AXIS: 76 DEGREES
PCO2 BLD: 100.5 MM HG (ref 42–50)
PCO2 BLD: 178 MM HG
PCO2 BLD: 24 MMOL/L (ref 21–32)
PCO2 BLD: 24 MMOL/L (ref 21–32)
PCO2 BLD: 26 MMOL/L (ref 21–32)
PCO2 BLD: 27 MMOL/L (ref 21–32)
PCO2 BLD: 28 MMOL/L (ref 21–32)
PCO2 BLD: 31 MMOL/L (ref 21–32)
PCO2 BLD: 33 MMOL/L (ref 21–32)
PCO2 BLD: 36.5 MM HG (ref 36–44)
PCO2 BLD: 42.2 MM HG (ref 36–44)
PCO2 BLD: 46.8 MM HG (ref 36–44)
PCO2 BLD: 47.4 MM HG (ref 36–44)
PCO2 BLD: 48.6 MM HG (ref 36–44)
PCO2 BLD: 49.8 MM HG (ref 36–44)
PCO2 BLD: 49.9 MM HG (ref 36–44)
PCO2 BLD: 53.5 MM HG (ref 36–44)
PCO2 BLDA: 41.7 MM HG
PH BLD: 7.08 [PH] (ref 7.3–7.4)
PH BLD: 7.29 [PH] (ref 7.35–7.45)
PH BLD: 7.31 [PH] (ref 7.35–7.45)
PH BLD: 7.32 [PH] (ref 7.35–7.45)
PH BLD: 7.35 [PH]
PH BLD: 7.35 [PH] (ref 7.35–7.45)
PH BLD: 7.35 [PH] (ref 7.35–7.45)
PH BLD: 7.36 [PH] (ref 7.35–7.45)
PH BLD: 7.38 [PH] (ref 7.35–7.45)
PH BLD: 7.41 [PH] (ref 7.35–7.45)
PLATELET # BLD AUTO: 144 THOUSANDS/UL (ref 149–390)
PLATELET # BLD AUTO: 158 THOUSANDS/UL (ref 149–390)
PMV BLD AUTO: 10 FL (ref 8.9–12.7)
PMV BLD AUTO: 10.6 FL (ref 8.9–12.7)
PO2 BLD: 180 MM HG (ref 75–129)
PO2 BLD: 281 MM HG (ref 75–129)
PO2 BLD: 291 MM HG (ref 75–129)
PO2 BLD: 336 MM HG (ref 75–129)
PO2 BLD: 345 MM HG (ref 75–129)
PO2 BLD: 55 MM HG (ref 35–45)
PO2 BLD: >400 MM HG (ref 75–129)
POTASSIUM BLD-SCNC: 4.3 MMOL/L (ref 3.5–5.3)
POTASSIUM BLD-SCNC: 4.6 MMOL/L (ref 3.5–5.3)
POTASSIUM BLD-SCNC: 5 MMOL/L (ref 3.5–5.3)
POTASSIUM BLD-SCNC: 5 MMOL/L (ref 3.5–5.3)
POTASSIUM BLD-SCNC: 5.2 MMOL/L (ref 3.5–5.3)
POTASSIUM BLD-SCNC: 5.4 MMOL/L (ref 3.5–5.3)
POTASSIUM BLD-SCNC: 6.4 MMOL/L (ref 3.5–5.3)
POTASSIUM BLD-SCNC: 6.6 MMOL/L (ref 3.5–5.3)
POTASSIUM BLD-SCNC: 6.7 MMOL/L (ref 3.5–5.3)
POTASSIUM SERPL-SCNC: 4.3 MMOL/L (ref 3.5–5.3)
POTASSIUM SERPL-SCNC: 4.4 MMOL/L (ref 3.5–5.3)
POTASSIUM SERPL-SCNC: 5.2 MMOL/L (ref 3.5–5.3)
QRS AXIS: -65 DEGREES
QRSD INTERVAL: 188 MS
QT INTERVAL: 475 MS
QTC INTERVAL: 582 MS
SAO2 % BLD FROM PO2: 100 % (ref 95–98)
SAO2 % BLD FROM PO2: 72 % (ref 95–98)
SODIUM BLD-SCNC: 136 MMOL/L (ref 136–145)
SODIUM BLD-SCNC: 139 MMOL/L (ref 136–145)
SODIUM BLD-SCNC: 140 MMOL/L (ref 136–145)
SODIUM BLD-SCNC: 141 MMOL/L (ref 136–145)
SODIUM SERPL-SCNC: 142 MMOL/L (ref 136–145)
SPECIMEN SOURCE: ABNORMAL
SPECIMEN SOURCE: NORMAL
T WAVE AXIS: 190 DEGREES
VENTRICULAR RATE: 90 BPM

## 2019-03-07 PROCEDURE — 84295 ASSAY OF SERUM SODIUM: CPT

## 2019-03-07 PROCEDURE — 88311 DECALCIFY TISSUE: CPT | Performed by: PATHOLOGY

## 2019-03-07 PROCEDURE — 02RF08Z REPLACEMENT OF AORTIC VALVE WITH ZOOPLASTIC TISSUE, OPEN APPROACH: ICD-10-PCS | Performed by: THORACIC SURGERY (CARDIOTHORACIC VASCULAR SURGERY)

## 2019-03-07 PROCEDURE — 82330 ASSAY OF CALCIUM: CPT

## 2019-03-07 PROCEDURE — 02HV33Z INSERTION OF INFUSION DEVICE INTO SUPERIOR VENA CAVA, PERCUTANEOUS APPROACH: ICD-10-PCS | Performed by: ANESTHESIOLOGY

## 2019-03-07 PROCEDURE — 94002 VENT MGMT INPAT INIT DAY: CPT

## 2019-03-07 PROCEDURE — 33405 REPLACEMENT AORTIC VALVE OPN: CPT | Performed by: PHYSICIAN ASSISTANT

## 2019-03-07 PROCEDURE — 86920 COMPATIBILITY TEST SPIN: CPT

## 2019-03-07 PROCEDURE — 88313 SPECIAL STAINS GROUP 2: CPT | Performed by: PATHOLOGY

## 2019-03-07 PROCEDURE — 99232 SBSQ HOSP IP/OBS MODERATE 35: CPT | Performed by: EMERGENCY MEDICINE

## 2019-03-07 PROCEDURE — 84132 ASSAY OF SERUM POTASSIUM: CPT | Performed by: PHYSICIAN ASSISTANT

## 2019-03-07 PROCEDURE — 85014 HEMATOCRIT: CPT

## 2019-03-07 PROCEDURE — 30233N0 TRANSFUSION OF AUTOLOGOUS RED BLOOD CELLS INTO PERIPHERAL VEIN, PERCUTANEOUS APPROACH: ICD-10-PCS | Performed by: ANESTHESIOLOGY

## 2019-03-07 PROCEDURE — 88305 TISSUE EXAM BY PATHOLOGIST: CPT | Performed by: PATHOLOGY

## 2019-03-07 PROCEDURE — 93312 ECHO TRANSESOPHAGEAL: CPT

## 2019-03-07 PROCEDURE — 85049 AUTOMATED PLATELET COUNT: CPT | Performed by: THORACIC SURGERY (CARDIOTHORACIC VASCULAR SURGERY)

## 2019-03-07 PROCEDURE — 71045 X-RAY EXAM CHEST 1 VIEW: CPT

## 2019-03-07 PROCEDURE — 5A1221Z PERFORMANCE OF CARDIAC OUTPUT, CONTINUOUS: ICD-10-PCS | Performed by: THORACIC SURGERY (CARDIOTHORACIC VASCULAR SURGERY)

## 2019-03-07 PROCEDURE — 76376 3D RENDER W/INTRP POSTPROCES: CPT

## 2019-03-07 PROCEDURE — 93005 ELECTROCARDIOGRAM TRACING: CPT

## 2019-03-07 PROCEDURE — 85347 COAGULATION TIME ACTIVATED: CPT

## 2019-03-07 PROCEDURE — 84132 ASSAY OF SERUM POTASSIUM: CPT

## 2019-03-07 PROCEDURE — 5A1223Z PERFORMANCE OF CARDIAC PACING, CONTINUOUS: ICD-10-PCS | Performed by: THORACIC SURGERY (CARDIOTHORACIC VASCULAR SURGERY)

## 2019-03-07 PROCEDURE — 33405 REPLACEMENT AORTIC VALVE OPN: CPT | Performed by: THORACIC SURGERY (CARDIOTHORACIC VASCULAR SURGERY)

## 2019-03-07 PROCEDURE — 02UG0JZ SUPPLEMENT MITRAL VALVE WITH SYNTHETIC SUBSTITUTE, OPEN APPROACH: ICD-10-PCS | Performed by: THORACIC SURGERY (CARDIOTHORACIC VASCULAR SURGERY)

## 2019-03-07 PROCEDURE — 33426 REPAIR OF MITRAL VALVE: CPT | Performed by: PHYSICIAN ASSISTANT

## 2019-03-07 PROCEDURE — 93010 ELECTROCARDIOGRAM REPORT: CPT | Performed by: INTERNAL MEDICINE

## 2019-03-07 PROCEDURE — 82803 BLOOD GASES ANY COMBINATION: CPT

## 2019-03-07 PROCEDURE — 85014 HEMATOCRIT: CPT | Performed by: PHYSICIAN ASSISTANT

## 2019-03-07 PROCEDURE — 85018 HEMOGLOBIN: CPT | Performed by: PHYSICIAN ASSISTANT

## 2019-03-07 PROCEDURE — 82948 REAGENT STRIP/BLOOD GLUCOSE: CPT

## 2019-03-07 PROCEDURE — 80048 BASIC METABOLIC PNL TOTAL CA: CPT | Performed by: PHYSICIAN ASSISTANT

## 2019-03-07 PROCEDURE — 82947 ASSAY GLUCOSE BLOOD QUANT: CPT

## 2019-03-07 PROCEDURE — 33426 REPAIR OF MITRAL VALVE: CPT | Performed by: THORACIC SURGERY (CARDIOTHORACIC VASCULAR SURGERY)

## 2019-03-07 PROCEDURE — 94760 N-INVAS EAR/PLS OXIMETRY 1: CPT

## 2019-03-07 PROCEDURE — 02L70CK OCCLUSION OF LEFT ATRIAL APPENDAGE WITH EXTRALUMINAL DEVICE, OPEN APPROACH: ICD-10-PCS | Performed by: THORACIC SURGERY (CARDIOTHORACIC VASCULAR SURGERY)

## 2019-03-07 PROCEDURE — 85049 AUTOMATED PLATELET COUNT: CPT | Performed by: PHYSICIAN ASSISTANT

## 2019-03-07 DEVICE — ARTICLIP 35MM: Type: IMPLANTABLE DEVICE | Site: HEART | Status: FUNCTIONAL

## 2019-03-07 DEVICE — VALVE AORTIC INSPIRIS RESILIA 23MM: Type: IMPLANTABLE DEVICE | Site: HEART | Status: FUNCTIONAL

## 2019-03-07 DEVICE — RING ANNULOPLASTY CG FUTURE 30MM: Type: IMPLANTABLE DEVICE | Site: HEART | Status: FUNCTIONAL

## 2019-03-07 RX ORDER — ATORVASTATIN CALCIUM 20 MG/1
20 TABLET, FILM COATED ORAL
Status: DISCONTINUED | OUTPATIENT
Start: 2019-03-07 | End: 2019-03-15 | Stop reason: HOSPADM

## 2019-03-07 RX ORDER — CHLORHEXIDINE GLUCONATE 0.12 MG/ML
15 RINSE ORAL ONCE
Status: COMPLETED | OUTPATIENT
Start: 2019-03-07 | End: 2019-03-07

## 2019-03-07 RX ORDER — HEPARIN SODIUM 1000 [USP'U]/ML
INJECTION, SOLUTION INTRAVENOUS; SUBCUTANEOUS AS NEEDED
Status: DISCONTINUED | OUTPATIENT
Start: 2019-03-07 | End: 2019-03-07 | Stop reason: SURG

## 2019-03-07 RX ORDER — MAGNESIUM SULFATE HEPTAHYDRATE 40 MG/ML
2 INJECTION, SOLUTION INTRAVENOUS ONCE
Status: COMPLETED | OUTPATIENT
Start: 2019-03-07 | End: 2019-03-07

## 2019-03-07 RX ORDER — SODIUM CHLORIDE 450 MG/100ML
20 INJECTION, SOLUTION INTRAVENOUS CONTINUOUS
Status: DISCONTINUED | OUTPATIENT
Start: 2019-03-07 | End: 2019-03-11

## 2019-03-07 RX ORDER — GLYCOPYRROLATE 0.2 MG/ML
INJECTION INTRAMUSCULAR; INTRAVENOUS AS NEEDED
Status: DISCONTINUED | OUTPATIENT
Start: 2019-03-07 | End: 2019-03-07 | Stop reason: SURG

## 2019-03-07 RX ORDER — PANTOPRAZOLE SODIUM 40 MG/1
40 TABLET, DELAYED RELEASE ORAL
Status: DISCONTINUED | OUTPATIENT
Start: 2019-03-08 | End: 2019-03-15 | Stop reason: HOSPADM

## 2019-03-07 RX ORDER — SODIUM CHLORIDE 9 MG/ML
INJECTION, SOLUTION INTRAVENOUS CONTINUOUS PRN
Status: DISCONTINUED | OUTPATIENT
Start: 2019-03-07 | End: 2019-03-07 | Stop reason: SURG

## 2019-03-07 RX ORDER — MILRINONE LACTATE 0.2 MG/ML
0.13 INJECTION, SOLUTION INTRAVENOUS CONTINUOUS
Status: DISCONTINUED | OUTPATIENT
Start: 2019-03-07 | End: 2019-03-12

## 2019-03-07 RX ORDER — BISACODYL 10 MG
10 SUPPOSITORY, RECTAL RECTAL DAILY PRN
Status: DISCONTINUED | OUTPATIENT
Start: 2019-03-07 | End: 2019-03-15 | Stop reason: HOSPADM

## 2019-03-07 RX ORDER — OXYCODONE HYDROCHLORIDE AND ACETAMINOPHEN 5; 325 MG/1; MG/1
1 TABLET ORAL EVERY 4 HOURS PRN
Status: DISCONTINUED | OUTPATIENT
Start: 2019-03-07 | End: 2019-03-15 | Stop reason: HOSPADM

## 2019-03-07 RX ORDER — VANCOMYCIN HYDROCHLORIDE 1 G/20ML
INJECTION, POWDER, LYOPHILIZED, FOR SOLUTION INTRAVENOUS AS NEEDED
Status: DISCONTINUED | OUTPATIENT
Start: 2019-03-07 | End: 2019-03-07 | Stop reason: HOSPADM

## 2019-03-07 RX ORDER — OXYCODONE HYDROCHLORIDE AND ACETAMINOPHEN 5; 325 MG/1; MG/1
1 TABLET ORAL EVERY 4 HOURS PRN
Status: DISCONTINUED | OUTPATIENT
Start: 2019-03-07 | End: 2019-03-07

## 2019-03-07 RX ORDER — ONDANSETRON 2 MG/ML
4 INJECTION INTRAMUSCULAR; INTRAVENOUS EVERY 6 HOURS PRN
Status: DISCONTINUED | OUTPATIENT
Start: 2019-03-07 | End: 2019-03-15 | Stop reason: HOSPADM

## 2019-03-07 RX ORDER — ASPIRIN 325 MG
325 TABLET ORAL DAILY
Status: DISCONTINUED | OUTPATIENT
Start: 2019-03-07 | End: 2019-03-11

## 2019-03-07 RX ORDER — MAGNESIUM HYDROXIDE 1200 MG/15ML
LIQUID ORAL AS NEEDED
Status: DISCONTINUED | OUTPATIENT
Start: 2019-03-07 | End: 2019-03-07 | Stop reason: HOSPADM

## 2019-03-07 RX ORDER — OXYCODONE HYDROCHLORIDE AND ACETAMINOPHEN 5; 325 MG/1; MG/1
2 TABLET ORAL EVERY 6 HOURS PRN
Status: DISCONTINUED | OUTPATIENT
Start: 2019-03-07 | End: 2019-03-15 | Stop reason: HOSPADM

## 2019-03-07 RX ORDER — ALBUMIN, HUMAN INJ 5% 5 %
SOLUTION INTRAVENOUS
Status: COMPLETED
Start: 2019-03-07 | End: 2019-03-07

## 2019-03-07 RX ORDER — FENTANYL CITRATE 50 UG/ML
50 INJECTION, SOLUTION INTRAMUSCULAR; INTRAVENOUS ONCE
Status: DISCONTINUED | OUTPATIENT
Start: 2019-03-07 | End: 2019-03-08

## 2019-03-07 RX ORDER — HYDROMORPHONE HCL/PF 1 MG/ML
1 SYRINGE (ML) INJECTION
Status: DISCONTINUED | OUTPATIENT
Start: 2019-03-07 | End: 2019-03-08

## 2019-03-07 RX ORDER — FENTANYL CITRATE 50 UG/ML
INJECTION, SOLUTION INTRAMUSCULAR; INTRAVENOUS AS NEEDED
Status: DISCONTINUED | OUTPATIENT
Start: 2019-03-07 | End: 2019-03-07 | Stop reason: SURG

## 2019-03-07 RX ORDER — SODIUM CHLORIDE, SODIUM LACTATE, POTASSIUM CHLORIDE, CALCIUM CHLORIDE 600; 310; 30; 20 MG/100ML; MG/100ML; MG/100ML; MG/100ML
INJECTION, SOLUTION INTRAVENOUS CONTINUOUS PRN
Status: DISCONTINUED | OUTPATIENT
Start: 2019-03-07 | End: 2019-03-07 | Stop reason: SURG

## 2019-03-07 RX ORDER — FUROSEMIDE 10 MG/ML
40 INJECTION INTRAMUSCULAR; INTRAVENOUS EVERY 6 HOURS PRN
Status: DISCONTINUED | OUTPATIENT
Start: 2019-03-07 | End: 2019-03-08

## 2019-03-07 RX ORDER — TRAVOPROST OPHTHALMIC SOLUTION 0.04 MG/ML
1 SOLUTION OPHTHALMIC
Status: DISCONTINUED | OUTPATIENT
Start: 2019-03-07 | End: 2019-03-15 | Stop reason: HOSPADM

## 2019-03-07 RX ORDER — MILRINONE LACTATE 0.2 MG/ML
INJECTION, SOLUTION INTRAVENOUS CONTINUOUS PRN
Status: DISCONTINUED | OUTPATIENT
Start: 2019-03-07 | End: 2019-03-07 | Stop reason: SURG

## 2019-03-07 RX ORDER — HYDROMORPHONE HCL/PF 1 MG/ML
0.5 SYRINGE (ML) INJECTION
Status: DISCONTINUED | OUTPATIENT
Start: 2019-03-07 | End: 2019-03-08

## 2019-03-07 RX ORDER — AMIODARONE HYDROCHLORIDE 200 MG/1
200 TABLET ORAL EVERY 8 HOURS SCHEDULED
Status: DISCONTINUED | OUTPATIENT
Start: 2019-03-07 | End: 2019-03-15 | Stop reason: HOSPADM

## 2019-03-07 RX ORDER — HEPARIN SODIUM 5000 [USP'U]/ML
5000 INJECTION, SOLUTION INTRAVENOUS; SUBCUTANEOUS EVERY 8 HOURS SCHEDULED
Status: DISCONTINUED | OUTPATIENT
Start: 2019-03-07 | End: 2019-03-10

## 2019-03-07 RX ORDER — CHLORHEXIDINE GLUCONATE 0.12 MG/ML
15 RINSE ORAL 2 TIMES DAILY
Status: DISCONTINUED | OUTPATIENT
Start: 2019-03-07 | End: 2019-03-09

## 2019-03-07 RX ORDER — LIDOCAINE HYDROCHLORIDE 10 MG/ML
INJECTION, SOLUTION INFILTRATION; PERINEURAL
Status: COMPLETED | OUTPATIENT
Start: 2019-03-07 | End: 2019-03-07

## 2019-03-07 RX ORDER — FONDAPARINUX SODIUM 2.5 MG/.5ML
2.5 INJECTION SUBCUTANEOUS DAILY
Status: DISCONTINUED | OUTPATIENT
Start: 2019-03-08 | End: 2019-03-07

## 2019-03-07 RX ORDER — POTASSIUM CHLORIDE 14.9 MG/ML
20 INJECTION INTRAVENOUS ONCE AS NEEDED
Status: DISCONTINUED | OUTPATIENT
Start: 2019-03-07 | End: 2019-03-09

## 2019-03-07 RX ORDER — ROCURONIUM BROMIDE 10 MG/ML
INJECTION, SOLUTION INTRAVENOUS AS NEEDED
Status: DISCONTINUED | OUTPATIENT
Start: 2019-03-07 | End: 2019-03-07 | Stop reason: SURG

## 2019-03-07 RX ORDER — CEFAZOLIN SODIUM 2 G/50ML
SOLUTION INTRAVENOUS AS NEEDED
Status: DISCONTINUED | OUTPATIENT
Start: 2019-03-07 | End: 2019-03-07 | Stop reason: SURG

## 2019-03-07 RX ORDER — ALBUMIN, HUMAN INJ 5% 5 %
12.5 SOLUTION INTRAVENOUS ONCE
Status: COMPLETED | OUTPATIENT
Start: 2019-03-07 | End: 2019-03-07

## 2019-03-07 RX ORDER — ACETAMINOPHEN 325 MG/1
650 TABLET ORAL EVERY 4 HOURS PRN
Status: DISCONTINUED | OUTPATIENT
Start: 2019-03-07 | End: 2019-03-15 | Stop reason: HOSPADM

## 2019-03-07 RX ORDER — FENTANYL CITRATE 50 UG/ML
50 INJECTION, SOLUTION INTRAMUSCULAR; INTRAVENOUS
Status: DISCONTINUED | OUTPATIENT
Start: 2019-03-07 | End: 2019-03-08

## 2019-03-07 RX ORDER — PROTAMINE SULFATE 10 MG/ML
INJECTION, SOLUTION INTRAVENOUS AS NEEDED
Status: DISCONTINUED | OUTPATIENT
Start: 2019-03-07 | End: 2019-03-07 | Stop reason: SURG

## 2019-03-07 RX ORDER — POLYETHYLENE GLYCOL 3350 17 G/17G
17 POWDER, FOR SOLUTION ORAL DAILY
Status: DISCONTINUED | OUTPATIENT
Start: 2019-03-07 | End: 2019-03-15 | Stop reason: HOSPADM

## 2019-03-07 RX ORDER — POTASSIUM CHLORIDE 14.9 MG/ML
20 INJECTION INTRAVENOUS
Status: DISCONTINUED | OUTPATIENT
Start: 2019-03-07 | End: 2019-03-09

## 2019-03-07 RX ORDER — CALCIUM CHLORIDE 100 MG/ML
1 INJECTION INTRAVENOUS; INTRAVENTRICULAR ONCE
Status: DISCONTINUED | OUTPATIENT
Start: 2019-03-07 | End: 2019-03-08

## 2019-03-07 RX ORDER — NEOSTIGMINE METHYLSULFATE 1 MG/ML
INJECTION INTRAVENOUS AS NEEDED
Status: DISCONTINUED | OUTPATIENT
Start: 2019-03-07 | End: 2019-03-07 | Stop reason: SURG

## 2019-03-07 RX ORDER — MIDAZOLAM HYDROCHLORIDE 1 MG/ML
INJECTION INTRAMUSCULAR; INTRAVENOUS AS NEEDED
Status: DISCONTINUED | OUTPATIENT
Start: 2019-03-07 | End: 2019-03-07 | Stop reason: SURG

## 2019-03-07 RX ADMIN — CEFAZOLIN SODIUM 2000 MG: 2 SOLUTION INTRAVENOUS at 08:24

## 2019-03-07 RX ADMIN — EPINEPHRINE 4 MCG/MIN: 1 INJECTION, SOLUTION, CONCENTRATE INTRAVENOUS at 12:00

## 2019-03-07 RX ADMIN — VASOPRESSIN 0.04 UNITS/MIN: 20 INJECTION INTRAVENOUS at 12:00

## 2019-03-07 RX ADMIN — FENTANYL CITRATE 750 MCG: 50 INJECTION, SOLUTION INTRAMUSCULAR; INTRAVENOUS at 07:43

## 2019-03-07 RX ADMIN — CEFAZOLIN SODIUM 2000 MG: 10 INJECTION, POWDER, FOR SOLUTION INTRAVENOUS at 20:34

## 2019-03-07 RX ADMIN — LIDOCAINE HYDROCHLORIDE 0.5 ML: 10 INJECTION, SOLUTION INFILTRATION; PERINEURAL at 09:25

## 2019-03-07 RX ADMIN — CHLORHEXIDINE GLUCONATE 0.12% ORAL RINSE 15 ML: 1.2 LIQUID ORAL at 17:45

## 2019-03-07 RX ADMIN — VASOPRESSIN 0.04 UNITS/MIN: 20 INJECTION INTRAVENOUS at 16:37

## 2019-03-07 RX ADMIN — OXYCODONE HYDROCHLORIDE AND ACETAMINOPHEN 1 TABLET: 5; 325 TABLET ORAL at 20:33

## 2019-03-07 RX ADMIN — MUPIROCIN 1 APPLICATION: 20 OINTMENT TOPICAL at 22:17

## 2019-03-07 RX ADMIN — ONDANSETRON 4 MG: 2 INJECTION INTRAMUSCULAR; INTRAVENOUS at 20:44

## 2019-03-07 RX ADMIN — ASPIRIN 325 MG ORAL TABLET 325 MG: 325 PILL ORAL at 17:45

## 2019-03-07 RX ADMIN — ROCURONIUM BROMIDE 100 MG: 10 INJECTION INTRAVENOUS at 07:43

## 2019-03-07 RX ADMIN — MUPIROCIN 1 APPLICATION: 20 OINTMENT TOPICAL at 06:33

## 2019-03-07 RX ADMIN — GLYCOPYRROLATE 0.8 MG: 0.2 INJECTION, SOLUTION INTRAMUSCULAR; INTRAVENOUS at 11:26

## 2019-03-07 RX ADMIN — MILRINONE LACTATE IN DEXTROSE 0.5 MCG/KG/MIN: 200 INJECTION, SOLUTION INTRAVENOUS at 10:33

## 2019-03-07 RX ADMIN — NEOSTIGMINE METHYLSULFATE 4 MG: 1 INJECTION INTRAVENOUS at 11:26

## 2019-03-07 RX ADMIN — EPINEPHRINE 4 MCG/MIN: 1 INJECTION, SOLUTION, CONCENTRATE INTRAVENOUS at 21:56

## 2019-03-07 RX ADMIN — MIDAZOLAM 5 MG: 1 INJECTION INTRAMUSCULAR; INTRAVENOUS at 07:43

## 2019-03-07 RX ADMIN — SODIUM CHLORIDE, SODIUM LACTATE, POTASSIUM CHLORIDE, AND CALCIUM CHLORIDE: .6; .31; .03; .02 INJECTION, SOLUTION INTRAVENOUS at 08:11

## 2019-03-07 RX ADMIN — CEFAZOLIN SODIUM 2000 MG: 10 INJECTION, POWDER, FOR SOLUTION INTRAVENOUS at 16:35

## 2019-03-07 RX ADMIN — CEFAZOLIN SODIUM 2000 MG: 2 SOLUTION INTRAVENOUS at 11:35

## 2019-03-07 RX ADMIN — VASOPRESSIN 0.04 UNITS/MIN: 20 INJECTION INTRAVENOUS at 10:33

## 2019-03-07 RX ADMIN — MILRINONE LACTATE IN DEXTROSE 0.5 MCG/KG/MIN: 200 INJECTION, SOLUTION INTRAVENOUS at 12:00

## 2019-03-07 RX ADMIN — CHLORHEXIDINE GLUCONATE 15 ML: 1.2 RINSE ORAL at 06:16

## 2019-03-07 RX ADMIN — SODIUM CHLORIDE 3 UNITS/HR: 9 INJECTION, SOLUTION INTRAVENOUS at 16:35

## 2019-03-07 RX ADMIN — FENTANYL CITRATE 250 MCG: 50 INJECTION, SOLUTION INTRAMUSCULAR; INTRAVENOUS at 11:30

## 2019-03-07 RX ADMIN — MIDAZOLAM 5 MG: 1 INJECTION INTRAMUSCULAR; INTRAVENOUS at 07:29

## 2019-03-07 RX ADMIN — ALBUMIN, HUMAN INJ 5% 12.5 G: 5 SOLUTION at 16:32

## 2019-03-07 RX ADMIN — EPINEPHRINE 2 MCG/MIN: 1 INJECTION, SOLUTION, CONCENTRATE INTRAVENOUS at 10:33

## 2019-03-07 RX ADMIN — ALBUMIN (HUMAN) 12.5 G: 12.5 SOLUTION INTRAVENOUS at 16:32

## 2019-03-07 RX ADMIN — SODIUM CHLORIDE 20 ML/HR: 0.45 INJECTION, SOLUTION INTRAVENOUS at 12:22

## 2019-03-07 RX ADMIN — HEPARIN SODIUM 5000 UNITS: 5000 INJECTION INTRAVENOUS; SUBCUTANEOUS at 22:17

## 2019-03-07 RX ADMIN — ROCURONIUM BROMIDE 50 MG: 10 INJECTION INTRAVENOUS at 10:06

## 2019-03-07 RX ADMIN — SODIUM CHLORIDE 5 MG/HR: 0.9 INJECTION, SOLUTION INTRAVENOUS at 08:30

## 2019-03-07 RX ADMIN — PHENYLEPHRINE HYDROCHLORIDE 50 MCG/MIN: 10 INJECTION INTRAVENOUS at 10:52

## 2019-03-07 RX ADMIN — PROTAMINE SULFATE 300 MG: 10 INJECTION, SOLUTION INTRAVENOUS at 10:45

## 2019-03-07 RX ADMIN — AMINOCAPROIC ACID 1000 MG/HR: 250 INJECTION, SOLUTION INTRAVENOUS at 08:12

## 2019-03-07 RX ADMIN — PHENYLEPHRINE HYDROCHLORIDE 100 MCG/MIN: 10 INJECTION INTRAVENOUS at 12:00

## 2019-03-07 RX ADMIN — SODIUM CHLORIDE: 0.9 INJECTION, SOLUTION INTRAVENOUS at 07:29

## 2019-03-07 RX ADMIN — ALBUMIN (HUMAN) 12.5 G: 12.5 SOLUTION INTRAVENOUS at 17:44

## 2019-03-07 RX ADMIN — AMIODARONE HYDROCHLORIDE 200 MG: 200 TABLET ORAL at 22:17

## 2019-03-07 RX ADMIN — MILRINONE LACTATE IN DEXTROSE 0.5 MCG/KG/MIN: 200 INJECTION, SOLUTION INTRAVENOUS at 18:01

## 2019-03-07 RX ADMIN — MAGNESIUM SULFATE IN WATER 2 G: 40 INJECTION, SOLUTION INTRAVENOUS at 12:22

## 2019-03-07 RX ADMIN — TRAVOPROST 1 DROP: 0.04 SOLUTION/ DROPS OPHTHALMIC at 22:17

## 2019-03-07 RX ADMIN — HEPARIN SODIUM 30000 UNITS: 1000 INJECTION INTRAVENOUS; SUBCUTANEOUS at 08:34

## 2019-03-07 RX ADMIN — AMINOCAPROIC ACID 5 G: 250 INJECTION, SOLUTION INTRAVENOUS at 08:12

## 2019-03-07 RX ADMIN — PHENYLEPHRINE HYDROCHLORIDE 100 MCG/MIN: 10 INJECTION INTRAVENOUS at 17:48

## 2019-03-07 RX ADMIN — ALBUMIN, HUMAN INJ 5% 12.5 G: 5 SOLUTION at 17:44

## 2019-03-07 NOTE — ANESTHESIA PROCEDURE NOTES
Procedure Performed: PATIENCE Anesthesia  Start Time:  3/7/2019 8:22 AM        Preanesthesia Checklist    Patient identified, IV assessed, risks and benefits discussed, monitors and equipment assessed, procedure being performed at surgeon's request and anesthesia consent obtained  Procedure    Diagnostic Indications for PATIENCE:  assessment of surgical repair and defect repair evaluation  Type of PATIENCE: complete PATIENCE with interpretation  Images Saved: ultrasound permanent image saved  Physician Requesting Echo: Vipin Chi DO  Location performed: OR  Intubated  Bite block not placed  Heart visualized  Insertion of PATIENCE Probe:  Atraumatic  Probe Type:  Multiplane  Modalities:  3D and color flow mapping  Echocardiographic and Doppler Measurements    PREPROCEDURE    LEFT VENTRICLE:  Systolic Function: moderately impaired  Ejection Fraction: 40%  Cavity size: dilated  RIGHT VENTRICLE:  Systolic Function: moderately impaired  Cavity size mildly dilated  No hypertrophy              AORTIC VALVE:  Leaflets: bileaflet  Leaflet motions prolapse  Stenosis: none  Regurgitation: severe  MITRAL VALVE:  Leaflets: dilate annulus  Regurgitation: moderate  Stenosis: none  TRICUSPID VALVE:  Leaflets: normal  Leaflet Motions: normal  Stenosis: none  Regurgitation: trace  ASCENDING AORTA:  Size:  normal  Dissection not present  AORTIC ARCH:  Size:  normal  dissection not present  Grade 1: normal to mild intimal thickening  DESCENDING AORTA:  Size: normal  Dissection not present  Grade 1: normal to mild intimal thickening  RIGHT ATRIUM:  Size:  dilated  Spontaneous echo contrast     LEFT ATRIUM:  Size: dilated   Spontaneous echo contrast     LEFT ATRIAL APPENDAGE:  Size: normal  No spontaneous echo contrast         ATRIAL SEPTUM:  Intra-atrial septal morphology: normal          VENTRICULAR SEPTUM:  Intra-ventricular septum morphology: normal          EPIAORTIC:  Plaque Thickness: 0-5 mm     OTHER FINDINGS:  Pericardium:  normal  Pleural Effusion:  none  POSTPROCEDURE    LEFT VENTRICLE: Unchanged   Systolic Function: moderately depressed  Cavity Size: dilated  RIGHT VENTRICLE: Unchanged   AORTIC VALVE:   Leaflets: bioprosthetic  Stenosis: none  Regurgitation: none  MITRAL VALVE:   Leaflets: ring  Regurgitation: trace  Stenosis: none  TRICUSPID VALVE: Unchanged   ATRIA: Unchanged   Left Atrial Appendage [place]: Yes  AORTA: Unchanged   REMOVAL:  Probe Removal: atraumatic

## 2019-03-07 NOTE — CONSULTS
Consult - Critical Care    Erika Jacobs 80 y o  male MRN: [id number]  Unit/Bed#: [facility] 416-01 Encounter: [id number]      Physician Requesting Consult: Elliot Downs DO    Reason for Consult / Principal Problem: Bicuspid aortic valve    HPI: Erika Jacobs is a 81 yo M with known bicuspid aortic valve and severe aortic regurgitation who was referred to Dr Vanessa Baires for evaluation  Dr Vanessa Baires met with the patient in consultation and recommended proceeding with surgical repair of his aortic and mitral valve  The patient presents to ICU postoperatively today  PMH:  CKD 3, hypertension, hyperlipidemia, diabetes type 2, bicuspid aortic valve, severe aortic regurgitation, moderate mitral regurgitation, coronary artery disease stable with medical therapy, cardiomyopathy with an EF of 40%  History obtained from chart review due to patient being intubated and sedated  PMH:   Past Medical History:   Diagnosis Date    Diabetes mellitus ([address] )     Hyperlipidemia     Hypertension     Proteinuria     Stage 3 chronic kidney disease (HCC)     Vitamin D deficiency        PSH:   Past Surgical History:   Procedure Laterality Date    COLONOSCOPY      TOE SURGERY Left        Family History:   Family History   Problem Relation Age of Onset    Stroke Mother     Hypertension Mother     Blindness Father     Gout Brother     Heart Valve Disease Brother     Anuerysm Neg Hx        Social History:   Social History     Tobacco Use   Smoking Status Former Smoker    Last attempt to quit: 1968    Years since quittin 2   Smokeless Tobacco Never Used      Social History     Substance and Sexual Activity   Alcohol Use Yes    Frequency: Monthly or less    Drinks per session: 1 or 2    Binge frequency: Never    Comment: occasionally      Marital Status: /Civil Union    ROS: ROS unable to be obtained secondary to intubation and sedation  Allergies:    Allergies   Allergen Reactions    Ace Inhibitors Cough       Home Medications:   Prior to Admission medications    Medication Sig Start Date End Date Taking? Authorizing Provider   ascorbic acid (VITAMIN C) 500 mg tablet Take 500 mg by mouth daily   Yes Historical Provider, MD   atorvastatin (LIPITOR) 20 mg tablet Take 1 tablet by mouth daily 7/27/18  Yes Historical Provider, MD   calcitriol (ROCALTROL) 0 25 mcg capsule Take 1 capsule by mouth 2 (two) times a week 1/1/15  Yes Historical Provider, MD   Cholecalciferol (VITAMIN D3) 1000 units CAPS Take 1 capsule by mouth daily   Yes Historical Provider, MD   furosemide (LASIX) 20 mg tablet Take 20 mg by mouth 3 (three) times a week   Yes Historical Provider, MD   furosemide (LASIX) 40 mg tablet Take 40 mg by mouth 4 (four) times a week   Yes Historical Provider, MD   metFORMIN (GLUCOPHAGE) 500 mg tablet Take 1 tablet by mouth daily 2/19/18  Yes Historical Provider, MD   metoprolol succinate (TOPROL-XL) 50 mg 24 hr tablet Take 50 mg by mouth daily    Yes Historical Provider, MD   Multiple Vitamin (MULTIVITAMIN) tablet Take 1 tablet by mouth daily   Yes Historical Provider, MD   mupirocin (BACTROBAN) 2 % ointment Apply inside both nostrils two times per day  Start 5 days prior to surgery   2/27/19  Yes [facility], YINA   olmesartan (BENICAR) 20 mg tablet Take 1 tablet (20 mg total) by mouth 2 (two) times a day 2/26/19  Yes JHONATAN Romero   TRAVATAN Z 0 004 % ophthalmic solution  2/12/18  Yes Historical Provider, MD       Inpatient Medications:  Scheduled Meds:    Current Facility-Administered Medications:  acetaminophen 650 mg Rectal Q4H PRN Kitty Form, PADeanC    acetaminophen 650 mg Oral Q4H PRN Kitty Form, PAAMOR    amiodarone 200 mg Oral [facility] Kitty Form, PAAMOR    aspirin 325 mg Oral Daily Kitty Form, PAAMOR    atorvastatin 20 mg Oral Daily [employer] Shila, YINA    bisacodyl 10 mg Rectal Daily PRN Kitty Form, PA-NIC    calcium chloride 1 g Intravenous Once Kitty Form, PADeanC cefazolin 2,000 mg Intravenous Q8H Tawanda Almaguer PA-C    chlorhexidine 15 mL Mouth/Throat BID Tawanda Almaguer PA-C    epinephrine 1-20 mcg/min Intravenous Continuous Tawanda Almaguer PA-C Last Rate: 4 mcg/min (03/07/19 1200)   fentanyl citrate (PF) 50 mcg Intravenous Once Tawanda Almaguer PA-C    fentanyl citrate (PF) 50 mcg Intravenous Q1H PRN Tawanda Almaguer PA-C    furosemide 40 mg Intravenous Q6H PRN Tawanda Almaguer PA-C    heparin (porcine) 5,000 Units Subcutaneous [facility] Manuela Almendarez PA-C    HYDROmorphone 0 5 mg Intravenous Q1H PRN Tawanda Almaguer PA-C    HYDROmorphone 1 mg Intravenous Q1H PRN Tawanda Almaguer PA-C    insulin regular (HumuLIN R,NovoLIN R) infusion 0 3-21 Units/hr Intravenous Titrated Tawanda Almaguer PA-C    lactated ringers 500 mL Intravenous Q30 Min PRN Tawanda Almaguer PA-C    lidocaine (cardiac) 100 mg Intravenous Q30 Min PRN Tawanda Almaguer PA-C    magnesium sulfate 2 g Intravenous Once Tawanda Almaguer PA-C    milrinone [facility]) infusion 0 5 mcg/kg/min Intravenous Continuous Manuela Almendarez PA-C Last Rate: 0 5 mcg/kg/min (03/07/19 1200)   mupirocin 1 application Nasal [address] Tawanda Almaguer PA-C    niCARdipine 2 5-15 mg/hr Intravenous Titrated Tawanda Almaguer PA-C    ondansetron 4 mg Intravenous Q6H PRN Tawanda Almaguer PA-C    oxyCODONE-acetaminophen 1 tablet Oral Q4H PRN Elma Coburn DO    oxyCODONE-acetaminophen 2 tablet Oral Q6H PRN Tawanda Almaguer PA-C    [START ON 3/8/2019] pantoprazole 40 mg Oral Daily Before Breakfast Tawanda Almaguer PA-C    phenylephine  mcg/min Intravenous Titrated Tawanda Almaguer PA-C Last Rate: 70 mcg/min (03/07/19 1247)   polyethylene glycol 17 g Oral Daily Tawanda Almaguer PA-C    potassium chloride 20 mEq Intravenous Once PRN Tawanda Almaguer PA-C    potassium chloride 20 mEq Intravenous Q1H PRN Tawanda Almaguer PA-C    potassium chloride 20 mEq Intravenous Q30 Min PRN Tawanda Almaguer PA-C    sodium chloride 20 mL/hr Intravenous Continuous Breana Lucas YINA Fuchs Last Rate: 20 mL/hr (03/07/19 1222)   travoprost 1 drop Both Eyes HS Elijah Sanchez PA-C    vasopressin (PITRESSIN) in 0 9 % sodium chloride 100 mL 0 04 Units/min Intravenous Continuous Elijah Sanchez PA-C Last Rate: 0 04 Units/min (03/07/19 1200)     Continuous Infusions:    epinephrine 1-20 mcg/min Last Rate: 4 mcg/min (03/07/19 1200)   insulin regular (HumuLIN R,NovoLIN R) infusion 0 3-21 Units/hr    milrinone (PRIMACOR) infusion 0 5 mcg/kg/min Last Rate: 0 5 mcg/kg/min (03/07/19 1200)   niCARdipine 2 5-15 mg/hr    phenylephine  mcg/min Last Rate: 70 mcg/min (03/07/19 1247)   sodium chloride 20 mL/hr Last Rate: 20 mL/hr (03/07/19 1222)   vasopressin (PITRESSIN) in 0 9 % sodium chloride 100 mL 0 04 Units/min Last Rate: 0 04 Units/min (03/07/19 1200)     PRN Meds:    acetaminophen 650 mg Q4H PRN   acetaminophen 650 mg Q4H PRN   bisacodyl 10 mg Daily PRN   fentanyl citrate (PF) 50 mcg Q1H PRN   furosemide 40 mg Q6H PRN   HYDROmorphone 0 5 mg Q1H PRN   HYDROmorphone 1 mg Q1H PRN   lactated ringers 500 mL Q30 Min PRN   lidocaine (cardiac) 100 mg Q30 Min PRN   ondansetron 4 mg Q6H PRN   oxyCODONE-acetaminophen 1 tablet Q4H PRN   oxyCODONE-acetaminophen 2 tablet Q6H PRN   potassium chloride 20 mEq Once PRN   potassium chloride 20 mEq Q1H PRN   potassium chloride 20 mEq Q30 Min PRN       VTE Pharmacologic Prophylaxis: Heparin SQ  VTE Mechanical Prophylaxis: sequential compression device    Invasive lines and devices:   Invasive Devices     Central Venous Catheter Line            CVC Central Lines 03/07/19 Triple less than 1 day    Introducer 03/07/19 less than 1 day          Peripheral Intravenous Line            Peripheral IV 02/15/19 Left Wrist 20 days    Peripheral IV 03/07/19 Left Wrist less than 1 day    Peripheral IV 03/07/19 Right Wrist less than 1 day          Arterial Line            Arterial Line 03/07/19 Right Radial less than 1 day          Line            Pacer Wires less than 1 day Pacer Wires less than 1 day          Drain            Chest Tube 1 Posterior Mediastinal 32 Fr  less than 1 day    Chest Tube 2 Anterior Mediastinal 32 Fr  less than 1 day    Urethral Catheter Non-latex; Temperature probe 16 Fr  less than 1 day          Airway            ETT  Hi-Lo; Cuffed;Oral 8 mm less than 1 day                Vitals:   Vitals:    19 0613 19 1150   BP: 134/75    Pulse: 75 88   Resp: 18 12   Temp: 99 2 °F (37 3 °C)    TempSrc: Tympanic Core    SpO2: 99% 100%   Weight: 70 8 kg (156 lb)    Height: 5' 6" (1 676 m)              Temperature: Temp (24hrs), Av 2 °F (37 3 °C), Min:99 2 °F (37 3 °C), Max:99 2 °F (37 3 °C)  Current: Temperature: 99 2 °F (37 3 °C)    Weights: IBW: 63 8 kg  Body mass index is 25 18 kg/m²  Hemodynamic Monitoring:  PAP:  , CVP:  , SvO2:   , ScvO2:  , CO:  , CI:  , SVR:      Ventilator Settings:   Vent Mode: AC/VC  Resp Rate (BPM): 12 BPM  Vt (mL): 500 mL  FIO2 (%): 40 %  PEEP (cmH2O): 5 cmH2O  SpO2: 100 %    Physical Exam:    Constitutional: Adult male in NAD in bed  HENT: Atraumtic  Intubated  Neck: Supple  +CVC  Cardiovascular: Regular rate and rhythm  No murmur heard  Exam reveals no rub  Pulmonary/Chest: Breath sounds clear bilaterally  Abdominal: Soft  No distension  Musculoskeletal: No edema  Neurological: Unable to assess due to sedation  Skin: Skin is warm  Psychiatric: Unable to assess due to sedation  Labs/Imaging:   Results from last 7 days   Lab Units 19  1213 19  1204 19  1050  19  0946  19  0743   WBC Thousand/uL  --   --   --   --   --   --  6 29   HEMOGLOBIN g/dL  --  11 9*  --   --   --   --  14 1   I STAT HEMOGLOBIN g/dl 10 9*  --  9 2*   < >  --    < >  --    HEMATOCRIT %  --  35 0*  --   --   --   --  40 5   HEMATOCRIT, ISTAT % 32*  --  27*   < >  --    < >  --    PLATELETS Thousands/uL  --  144*  --   --  158  --  199    < > = values in this interval not displayed       Results from last 7 days   Lab Units 03/07/19  1213 03/07/19  1204 03/07/19  1050 03/07/19  1023  03/02/19  0743   SODIUM mmol/L  --  142  --   --   --  140   POTASSIUM mmol/L  --  4 3  --   --   --  4 5   CHLORIDE mmol/L  --  112*  --   --   --  107   CO2 mmol/L  --  23  --   --   --  24   CO2, I-STAT mmol/L 24  --  28 27   < >  --    BUN mg/dL  --  25  --   --   --  31*   CREATININE mg/dL  --  1 94*  --   --   --  1 84*   CALCIUM mg/dL  --  8 0*  --   --   --  8 4   GLUCOSE, ISTAT mg/dl 110  --  98 111   < >  --     < > = values in this interval not displayed  Results from last 7 days   Lab Units 03/02/19  0743   HEMOGLOBIN A1C % 5 9       Post-op ABG: 3 34/42/180/23/-2/100%  Post-op CXR: Lines and tubes in good position  I have personally reviewed pertinent films in PACS  Post-op EKG: A sensed V paced 90 bpm  This was personally reviewed by myself  Impression:  Principal Problem:    Bicuspid aortic valve  Active Problems:    S/P AVR    S/P MVR (mitral valve repair)    Nonrheumatic aortic valve insufficiency    Non-rheumatic mitral regurgitation    Dilated cardiomyopathy (HCC)    Chronic systolic congestive heart failure (HCC)    Coronary artery disease involving native coronary artery of native heart without angina pectoris    CKD (chronic kidney disease) stage 3, GFR 30-59 ml/min ([facility])    Diabetes mellitus type 2 in nonobese [facility])    Essential hypertension    Diabetic nephropathy associated with type 2 diabetes mellitus ([facility] [address] )    Secondary hyperparathyroidism ([facility] [address] )      Plan:    Neuro: D/C continuous sedation  PRN dilaudid and percocet for pain  Trend neuro exam   Delirium precautions  CV: MAP goal >65  SBP goal <130  CI>2 2  Post-op medications: Epinephrine, 4 mcg/min, Primacor, 0 5 mcg/kg/min, [address] 80, Vasopressin, 0 04 Units/min  Wean pressors and epi as able  Volume resuscitation as needed  Monitor rhythm on telemetry  Epicardial pacing wires in place  Will pace as needed for bradycardia or heart block   Intra-op PATIENCE LVEF 40%  Moderate RV dysfunction  Lung: Check STAT post-op ABG and CXR  Wean vent with spontaneous breathing trial with goal to extubate today  GI: GI prophylaxis with PPI  Bowel regimen  Zofran PRN for nausea  FEN: NPO  Replete K >4 0, Mag >2 0 and calcium >7 0  : Check STAT post-op BMP  Ovalles in place  Monitor UOP with goal >0 5cc/kg/hour  Lasix prn  Volume resuscitate as needed  ID: Prophylactic post-op abx  Maintain normothermia  Tylenol PRN for fevers  Heme: Check STAT post-op H/H and platelets  Monitor incision site, invasive lines, and chest tube outputs for bleeding  Send coag panel if needed  Endo: Insulin gtt for blood sugar <180  Disposition: ICU Care    Counseling / Coordination of Care  Total Critical Care time spent 0 minutes excluding procedures, teaching and family updates        SIGNATURE: Danielle Montes PA-C  DATE: March 7, 2019  TIME: 12:47 PM

## 2019-03-07 NOTE — ANESTHESIA PROCEDURE NOTES
Arterial Line Insertion  Performed by: Wendy Keller MD  Authorized by: Wendy Keller MD   Consent: Written consent obtained  Risks and benefits: risks, benefits and alternatives were discussed  Consent given by: patient  Patient identity confirmed: arm band  Preparation: Patient was prepped and draped in the usual sterile fashion  Indications: multiple ABGs and hemodynamic monitoring  Orientation:  Right  Location: radial arterylidocaine (XYLOCAINE) 1% local infiltration, 0 5 mL  Sedation:  Patient sedated: yes  Sedatives: midazolam  Vitals: Vital signs were monitored during sedation      Procedure Details:  Needle gauge: 20  Seldinger technique: Seldinger technique used  Number of attempts: 1    Post-procedure:  Post-procedure: chlorhexidine patch applied  Waveform: good waveform  Post-procedure CNS: normal  Patient tolerance: Patient tolerated the procedure well with no immediate complications

## 2019-03-07 NOTE — ANESTHESIA PROCEDURE NOTES
Central Line Insertion  Performed by: Brittany Orona MD  Authorized by: Brittany Orona MD   Date/Time: 3/7/2019 7:44 AM  Catheter Type:  triple lumen  Consent: Written consent obtained    Risks and benefits: risks, benefits and alternatives were discussed  Consent given by: patient  Patient identity confirmed: arm band  Indications: vascular access  Location details: left internal jugular  Catheter size: 7 Fr  Patient position: Trendelenburg    Sedation:  Patient sedated: no    Assessment: blood return through all ports,  no pneumothorax on x-ray,  free fluid flow and placement verified by x-ray  Preparation: skin prepped with 2% chlorhexidine  Skin prep agent dried: skin prep agent completely dried prior to procedure  Sterile barriers: all five maximum sterile barriers used - cap, mask, sterile gown, sterile gloves, and large sterile sheet  Hand hygiene: hand hygiene performed prior to central venous catheter insertion  Ultrasound guidance: yes  sterile gel and probe cover used in ultrasound-guided central venous catheter insertionVessel of Catheter Tip End: SVC  Number of attempts: 1  Successful placement: yes  Post-procedure: chlorhexidine patch applied,  dressing applied and line sutured  Patient tolerance: Patient tolerated the procedure well with no immediate complications

## 2019-03-07 NOTE — OP NOTE
OPERATIVE REPORT  PATIENT NAME: [facility]    :  1950  MRN: [id number]      SURGERY DATE: 3/7/2019    SURGEON: Stephanie Murphy DO    ASSISTANT: Clint Lopez PA-C    ADDITIONAL ASSISTANT: n/a    PREOPERATIVE DIAGNOSIS:   1  Severe Aortic stenosis  2  Bicuspid aortic valve  3 Moderate to Severe mitral regurgitation    POSTOPERATIVE DIAGNOSES:   1  Severe Aortic stenosis  2  Bicuspid aortic valve  3 Moderate to Severe mitral regurgitation    NYHA CLASS: 3    CCS CLASS: 3    PROCEDURE:  1  Aortic valve repair with 23mm Nettles Inspiris Resilia bovine tissue valve  2  Mitral valve repair with 30 mm Medtronic CG Future mitral annuloplasty ring   3  Ligation of left atrial appendage with 35mm AtriClip device    CARDIOPULMONARY BYPASS TIME: 96 minutes  CROSSCLAMP TIME: 87 minutes  ANESTHESIA: General endotracheal anesthesia with transesophageal echocardiogram guidance, Dr Chandler Brooks:  The patient is a 80y o  year-old male with clinical and echocardiographic findings consistent with severe mitral regurgitation  FINDINGS:  · Intraoperative transesophageal echocardiogram revealed still bicuspid aortic valve with severe aortic insufficiency  There is moderate to severe mitral regurgitation with annular dilatation  The LVEF was approximately 40%     · Epiaortic ultrasound showed less than 5 mm non-mobile plaque  · Inspection of the mitral valve reveals  functional mitral valve disease with annular dilatation central mitral regurgitation  · Final transesophageal echocardiogram demonstrated succesful replacement of the aortic valve without evidence of perivalvular leak, and repair of the mitral valve without evidence of regurgitation  LVEF was approximately 35-40%  OPERATIVE TECHNIQUE:   The patient was taken to the operating room and placed supine on the operating table   Following the satisfactory induction of general anesthesia and the placement of monitoring lines the patient was prepped and draped in the usual sterile fashion  A time-out procedure was performed  The patient underwent median sternotomy, pericardiotomy, and systemic heparinization  Epiaortic ultrasound showed less than 5 mm non-mobile plaque  Cannulation for cardiopulmonary bypass was performed with an arterial dispersion cannula, bicaval single stage venous cannulas and a vented antegrade cardioplegia cannula  A retrograde cannula was also placed  Once the ACT was greater than 480 seconds we placed the patient on cardiopulmonary bypass, the ascending aorta was crossclamped and cardiac arrest was obtained without complications with [place] cardioplegia  A CO2 flooded field was used during the entire case  The left atrial appendage was identified  This was sized and a 35 mm AtriClip ligation device was choosen  Waterson's groove was dissected, left atriotomy was performed and the mitral valve was exposed with a self-retaining retractor  Thorough mitral valve analysis was performed  There was functionalmitral valve disease with dilated mitral annulus  The anterior posterior leaflet were structurally normal with intact cords  I felt that the valve was therefore repairable  Annuloplasty sutures were placed with 2-0 Ethibond  The following techniques were used for valve repair: placement of a true-size 30 mm Medtronic CG Future mitral annuloplasty ring   The 2-0 Ethibond sutures were passed through the ring, the ring was seated and the sutures were tied down  The valve was tested and found to be competent  A left ventricular vent was placed through right superior pulmonary vein directed to the left ventricle  This was directed to the left ventricle  The left atriotomy was closed with 2 layers running Prolene  An aortotomy was performed  The valve was inspected  His bicuspid aortic valve with prolapsing non coronary cusp leaflet  The valve was excised    The annulus was sized and a 23 mm Nettles Inspiris Resilia bovine pericardial tissue valve was selected  Interrupted Ethibond pledget sutures were placed around the annulus and passed through the sewing ring  The valve was then lowered and secured  Valve was tested and appeared competent  The right and left coronary ostia were identified  The aortotomy was closed with 2 layers running Prolene  The heart was fully de-aired  A dose of warm blood was given in a retrograde fashion  This was followed by antegrade warm dose of blood  Temporary ventricular and atrial pacing wires were placed and brought through separate stab incisions  The cross-clamp was removed  The heart was AV paced  Patient was ventilated and the heart fully de-aired  Patient was then weaned from cardiopulmonary bypass with inotropics support  Heparinization was reversed with protamine  Thoracostomy tubes were placed  The pericardium was loosely reapproximated  The sternum was reapproximated with 7 interrupted simple sternal wires  Fascia was closed with 2 layers of absorbable suture skin closed subcuticular stitch  Patient is taken to the ICU in stable condition  COMPLICATIONS: None    PACKS/TUBES/DRAINS: Chest tubes x 2  EBL: 250mL  TRANSFUSION: None  SPECIMENS:  Bicuspid aortic valve    As the attending surgeon, I was present and scrubbed for all critical portions of this procedure  There was no qualified surgical resident available  Sponge, needle and instrument counts were reported as correct by the nursing staff  The assistance of a PA was required to complete this case, specifically for assistance with cannulation, decannulation, and exposure during aortic valve replacement and mitral valve repair           SIGNATURE: Elma Coburn DO  DATE: March 7, 2019  TIME: 11:39 AM

## 2019-03-07 NOTE — RESPIRATORY THERAPY NOTE
RT Ventilator Management Note  Isaias Tam 80 y o  male MRN: [id number]  Unit/Bed#: [facility] 416-01 Encounter: [id number]      Daily Screen       3/7/2019 1150             Patient safety screen outcome[de-identified]  Passed            Physical Exam:   Assessment Type: Assess only  General Appearance: Sedated  Respiratory Pattern: Assisted  Chest Assessment: Chest expansion symmetrical  Bilateral Breath Sounds: Clear  O2 Device: vent  Subjective Data: intubated/sedated      Resp Comments: (P) Pt from OR intubated and sedated placed on AC settings  Never a smoker, no pulmonary hx, no home respiratory meds  Plan to wean when more alert/awake

## 2019-03-07 NOTE — ANESTHESIA PROCEDURE NOTES
Introducer/Snyder-Anibal  Performed by: Beronica Chamorro MD  Authorized by: Beronica Chamorro MD   Date/Time: 3/7/2019 7:44 AM  Consent: Written consent obtained  Risks and benefits: risks, benefits and alternatives were discussed  Consent given by: patient  Patient identity confirmed: arm band  Indications: vascular access and central pressure monitoring  Location details: left internal jugular  Catheter size: 9 Fr  Patient position: Trendelenburg    Sedation:  Patient sedated: no    Assessment: blood return through all ports,  no pneumothorax on x-ray,  free fluid flow and placement verified by x-ray  Preparation: skin prepped with 2% chlorhexidine  Skin prep agent dried: skin prep agent completely dried prior to procedure  Sterile barriers: all five maximum sterile barriers used - cap, mask, sterile gown, sterile gloves, and large sterile sheet  Hand hygiene: hand hygiene performed prior to central venous catheter insertion  Ultrasound guidance: yes  ultrasound permanent image saved  Site selection rationale: Right Internal juglar diameter appeared too small, I unable to canulate right IJ   Left IJ was of much greater diameter than  the right, so the left was cannulated  Pre-procedure: landmarks identified  Number of attempts: 2  Post-procedure: line sutured and dressing applied  Patient tolerance: Patient tolerated the procedure well with no immediate complications

## 2019-03-07 NOTE — ANESTHESIA POSTPROCEDURE EVALUATION
Post-Op Assessment Note    CV Status:  Stable  Pain Score: 0    Pain management: adequate     Mental Status:  Unresponsive   Hydration Status:  Euvolemic   PONV Controlled:  Controlled   Airway Patency:  Patent  Airway: intubated   Post Op Vitals Reviewed: Yes      Staff: Anesthesiologist           BP   132/76   Temp      Pulse  80   Resp   vented   SpO2   99%

## 2019-03-08 ENCOUNTER — APPOINTMENT (INPATIENT)
Dept: RADIOLOGY | Facility: HOSPITAL | Age: 69
DRG: 219 | End: 2019-03-08
Payer: COMMERCIAL

## 2019-03-08 PROBLEM — N17.9 AKI (ACUTE KIDNEY INJURY) (HCC): Status: ACTIVE | Noted: 2019-03-08

## 2019-03-08 LAB
ANION GAP SERPL CALCULATED.3IONS-SCNC: 10 MMOL/L (ref 4–13)
ANION GAP SERPL CALCULATED.3IONS-SCNC: 8 MMOL/L (ref 4–13)
ATRIAL RATE: 88 BPM
BASE EX.OXY STD BLDV CALC-SCNC: 68.2 % (ref 60–80)
BASE EXCESS BLDV CALC-SCNC: -6.2 MMOL/L
BUN SERPL-MCNC: 34 MG/DL (ref 5–25)
BUN SERPL-MCNC: 39 MG/DL (ref 5–25)
CALCIUM SERPL-MCNC: 8.1 MG/DL (ref 8.3–10.1)
CALCIUM SERPL-MCNC: 8.2 MG/DL (ref 8.3–10.1)
CHLORIDE SERPL-SCNC: 109 MMOL/L (ref 100–108)
CHLORIDE SERPL-SCNC: 111 MMOL/L (ref 100–108)
CO2 SERPL-SCNC: 20 MMOL/L (ref 21–32)
CO2 SERPL-SCNC: 22 MMOL/L (ref 21–32)
CREAT SERPL-MCNC: 2.54 MG/DL (ref 0.6–1.3)
CREAT SERPL-MCNC: 3.02 MG/DL (ref 0.6–1.3)
ERYTHROCYTE [DISTWIDTH] IN BLOOD BY AUTOMATED COUNT: 13.9 % (ref 11.6–15.1)
GFR SERPL CREATININE-BSD FRML MDRD: 24 ML/MIN/1.73SQ M
GFR SERPL CREATININE-BSD FRML MDRD: 29 ML/MIN/1.73SQ M
GLUCOSE SERPL-MCNC: 109 MG/DL (ref 65–140)
GLUCOSE SERPL-MCNC: 125 MG/DL (ref 65–140)
GLUCOSE SERPL-MCNC: 129 MG/DL (ref 65–140)
GLUCOSE SERPL-MCNC: 142 MG/DL (ref 65–140)
GLUCOSE SERPL-MCNC: 152 MG/DL (ref 65–140)
GLUCOSE SERPL-MCNC: 154 MG/DL (ref 65–140)
GLUCOSE SERPL-MCNC: 157 MG/DL (ref 65–140)
GLUCOSE SERPL-MCNC: 170 MG/DL (ref 65–140)
GLUCOSE SERPL-MCNC: 175 MG/DL (ref 65–140)
GLUCOSE SERPL-MCNC: 178 MG/DL (ref 65–140)
GLUCOSE SERPL-MCNC: 192 MG/DL (ref 65–140)
GLUCOSE SERPL-MCNC: 210 MG/DL (ref 65–140)
HCO3 BLDV-SCNC: 20.3 MMOL/L (ref 24–30)
HCT VFR BLD AUTO: 33.2 % (ref 36.5–49.3)
HGB BLD-MCNC: 11.1 G/DL (ref 12–17)
INR PPP: 1.29 (ref 0.86–1.17)
MAGNESIUM SERPL-MCNC: 3.1 MG/DL (ref 1.6–2.6)
MCH RBC QN AUTO: 30.7 PG (ref 26.8–34.3)
MCHC RBC AUTO-ENTMCNC: 33.4 G/DL (ref 31.4–37.4)
MCV RBC AUTO: 92 FL (ref 82–98)
NASAL CANNULA: 3
O2 CT BLDV-SCNC: 11.5 ML/DL
P AXIS: 54 DEGREES
PCO2 BLDV: 43.8 MM HG (ref 42–50)
PH BLDV: 7.28 [PH] (ref 7.3–7.4)
PLATELET # BLD AUTO: 131 THOUSANDS/UL (ref 149–390)
PMV BLD AUTO: 10.3 FL (ref 8.9–12.7)
PO2 BLDV: 37.8 MM HG (ref 35–45)
POTASSIUM SERPL-SCNC: 5 MMOL/L (ref 3.5–5.3)
POTASSIUM SERPL-SCNC: 5.1 MMOL/L (ref 3.5–5.3)
PROTHROMBIN TIME: 16.2 SECONDS (ref 11.8–14.2)
QRS AXIS: -59 DEGREES
QRSD INTERVAL: 113 MS
QT INTERVAL: 450 MS
QTC INTERVAL: 545 MS
RBC # BLD AUTO: 3.61 MILLION/UL (ref 3.88–5.62)
SODIUM SERPL-SCNC: 139 MMOL/L (ref 136–145)
SODIUM SERPL-SCNC: 141 MMOL/L (ref 136–145)
T WAVE AXIS: 109 DEGREES
VENTRICULAR RATE: 88 BPM
WBC # BLD AUTO: 18.5 THOUSAND/UL (ref 4.31–10.16)

## 2019-03-08 PROCEDURE — 71045 X-RAY EXAM CHEST 1 VIEW: CPT

## 2019-03-08 PROCEDURE — 80048 BASIC METABOLIC PNL TOTAL CA: CPT | Performed by: PHYSICIAN ASSISTANT

## 2019-03-08 PROCEDURE — 82805 BLOOD GASES W/O2 SATURATION: CPT | Performed by: PHYSICIAN ASSISTANT

## 2019-03-08 PROCEDURE — 99024 POSTOP FOLLOW-UP VISIT: CPT | Performed by: THORACIC SURGERY (CARDIOTHORACIC VASCULAR SURGERY)

## 2019-03-08 PROCEDURE — 82948 REAGENT STRIP/BLOOD GLUCOSE: CPT

## 2019-03-08 PROCEDURE — 99255 IP/OBS CONSLTJ NEW/EST HI 80: CPT | Performed by: INTERNAL MEDICINE

## 2019-03-08 PROCEDURE — 83735 ASSAY OF MAGNESIUM: CPT | Performed by: PHYSICIAN ASSISTANT

## 2019-03-08 PROCEDURE — 85610 PROTHROMBIN TIME: CPT | Performed by: PHYSICIAN ASSISTANT

## 2019-03-08 PROCEDURE — 93010 ELECTROCARDIOGRAM REPORT: CPT | Performed by: INTERNAL MEDICINE

## 2019-03-08 PROCEDURE — 85027 COMPLETE CBC AUTOMATED: CPT | Performed by: PHYSICIAN ASSISTANT

## 2019-03-08 PROCEDURE — 93005 ELECTROCARDIOGRAM TRACING: CPT

## 2019-03-08 RX ORDER — FUROSEMIDE 10 MG/ML
80 INJECTION INTRAMUSCULAR; INTRAVENOUS ONCE
Status: COMPLETED | OUTPATIENT
Start: 2019-03-08 | End: 2019-03-08

## 2019-03-08 RX ORDER — ALBUMIN, HUMAN INJ 5% 5 %
25 SOLUTION INTRAVENOUS ONCE
Status: COMPLETED | OUTPATIENT
Start: 2019-03-08 | End: 2019-03-08

## 2019-03-08 RX ORDER — METOLAZONE 10 MG/1
10 TABLET ORAL ONCE
Status: COMPLETED | OUTPATIENT
Start: 2019-03-08 | End: 2019-03-08

## 2019-03-08 RX ORDER — FUROSEMIDE 10 MG/ML
5 SYRINGE (ML) INJECTION CONTINUOUS
Status: DISCONTINUED | OUTPATIENT
Start: 2019-03-08 | End: 2019-03-12

## 2019-03-08 RX ORDER — WARFARIN SODIUM 2.5 MG/1
2.5 TABLET ORAL
Status: COMPLETED | OUTPATIENT
Start: 2019-03-08 | End: 2019-03-08

## 2019-03-08 RX ORDER — ALBUMIN, HUMAN INJ 5% 5 %
12.5 SOLUTION INTRAVENOUS ONCE
Status: COMPLETED | OUTPATIENT
Start: 2019-03-08 | End: 2019-03-08

## 2019-03-08 RX ORDER — ALBUMIN, HUMAN INJ 5% 5 %
SOLUTION INTRAVENOUS
Status: COMPLETED
Start: 2019-03-08 | End: 2019-03-08

## 2019-03-08 RX ADMIN — HEPARIN SODIUM 5000 UNITS: 5000 INJECTION INTRAVENOUS; SUBCUTANEOUS at 21:00

## 2019-03-08 RX ADMIN — AMIODARONE HYDROCHLORIDE 200 MG: 200 TABLET ORAL at 06:22

## 2019-03-08 RX ADMIN — OXYCODONE HYDROCHLORIDE AND ACETAMINOPHEN 1 TABLET: 5; 325 TABLET ORAL at 05:29

## 2019-03-08 RX ADMIN — FUROSEMIDE 40 MG: 10 INJECTION, SOLUTION INTRAMUSCULAR; INTRAVENOUS at 12:45

## 2019-03-08 RX ADMIN — AMIODARONE HYDROCHLORIDE 200 MG: 200 TABLET ORAL at 21:00

## 2019-03-08 RX ADMIN — VASOPRESSIN 0.04 UNITS/MIN: 20 INJECTION INTRAVENOUS at 02:21

## 2019-03-08 RX ADMIN — TRAVOPROST 1 DROP: 0.04 SOLUTION/ DROPS OPHTHALMIC at 21:00

## 2019-03-08 RX ADMIN — CHLORHEXIDINE GLUCONATE 0.12% ORAL RINSE 15 ML: 1.2 LIQUID ORAL at 08:00

## 2019-03-08 RX ADMIN — MILRINONE LACTATE IN DEXTROSE 0.38 MCG/KG/MIN: 200 INJECTION, SOLUTION INTRAVENOUS at 21:42

## 2019-03-08 RX ADMIN — ALBUMIN, HUMAN INJ 5% 25 G: 5 SOLUTION at 11:17

## 2019-03-08 RX ADMIN — Medication 40 MG/HR: at 18:26

## 2019-03-08 RX ADMIN — SODIUM CHLORIDE 7 MG/HR: 0.9 INJECTION, SOLUTION INTRAVENOUS at 11:43

## 2019-03-08 RX ADMIN — ALBUMIN (HUMAN) 25 G: 12.5 SOLUTION INTRAVENOUS at 10:14

## 2019-03-08 RX ADMIN — WARFARIN SODIUM 2.5 MG: 2.5 TABLET ORAL at 17:08

## 2019-03-08 RX ADMIN — ATORVASTATIN CALCIUM 20 MG: 20 TABLET, FILM COATED ORAL at 17:08

## 2019-03-08 RX ADMIN — ALBUMIN, HUMAN INJ 5% 25 G: 5 SOLUTION at 10:14

## 2019-03-08 RX ADMIN — ASPIRIN 325 MG ORAL TABLET 325 MG: 325 PILL ORAL at 08:00

## 2019-03-08 RX ADMIN — PANTOPRAZOLE SODIUM 40 MG: 40 TABLET, DELAYED RELEASE ORAL at 06:22

## 2019-03-08 RX ADMIN — SODIUM CHLORIDE 3 UNITS/HR: 9 INJECTION, SOLUTION INTRAVENOUS at 21:42

## 2019-03-08 RX ADMIN — FUROSEMIDE 80 MG: 10 INJECTION, SOLUTION INTRAMUSCULAR; INTRAVENOUS at 16:10

## 2019-03-08 RX ADMIN — MUPIROCIN 1 APPLICATION: 20 OINTMENT TOPICAL at 08:00

## 2019-03-08 RX ADMIN — MILRINONE LACTATE IN DEXTROSE 0.5 MCG/KG/MIN: 200 INJECTION, SOLUTION INTRAVENOUS at 02:26

## 2019-03-08 RX ADMIN — CEFAZOLIN SODIUM 2000 MG: 10 INJECTION, POWDER, FOR SOLUTION INTRAVENOUS at 04:57

## 2019-03-08 RX ADMIN — ALBUMIN (HUMAN) 12.5 G: 12.5 SOLUTION INTRAVENOUS at 02:42

## 2019-03-08 RX ADMIN — SODIUM CHLORIDE 5 MG/HR: 0.9 INJECTION, SOLUTION INTRAVENOUS at 18:53

## 2019-03-08 RX ADMIN — EPINEPHRINE 2 MCG/MIN: 1 INJECTION, SOLUTION, CONCENTRATE INTRAVENOUS at 10:47

## 2019-03-08 RX ADMIN — CHLORHEXIDINE GLUCONATE 0.12% ORAL RINSE 15 ML: 1.2 LIQUID ORAL at 17:34

## 2019-03-08 RX ADMIN — AMIODARONE HYDROCHLORIDE 200 MG: 200 TABLET ORAL at 14:44

## 2019-03-08 RX ADMIN — MUPIROCIN 1 APPLICATION: 20 OINTMENT TOPICAL at 21:00

## 2019-03-08 RX ADMIN — HEPARIN SODIUM 5000 UNITS: 5000 INJECTION INTRAVENOUS; SUBCUTANEOUS at 14:44

## 2019-03-08 RX ADMIN — OXYCODONE HYDROCHLORIDE AND ACETAMINOPHEN 1 TABLET: 5; 325 TABLET ORAL at 21:07

## 2019-03-08 RX ADMIN — MILRINONE LACTATE IN DEXTROSE 0.5 MCG/KG/MIN: 200 INJECTION, SOLUTION INTRAVENOUS at 12:06

## 2019-03-08 RX ADMIN — POLYETHYLENE GLYCOL 3350 17 G: 17 POWDER, FOR SOLUTION ORAL at 08:00

## 2019-03-08 RX ADMIN — HEPARIN SODIUM 5000 UNITS: 5000 INJECTION INTRAVENOUS; SUBCUTANEOUS at 06:22

## 2019-03-08 RX ADMIN — METOLAZONE 10 MG: 10 TABLET ORAL at 21:00

## 2019-03-08 RX ADMIN — METOPROLOL TARTRATE 25 MG: 25 TABLET, FILM COATED ORAL at 17:34

## 2019-03-08 RX ADMIN — ALBUMIN (HUMAN) 25 G: 12.5 SOLUTION INTRAVENOUS at 11:17

## 2019-03-08 NOTE — PROGRESS NOTES
Progress Note - Cardiothoracic Surgery   [facility] 80 y o  male MRN: [id number]  Unit/Bed#: [facility] 416-01 Encounter: [id number]    Aortic regurgitation, Mitral regurgitation  S/P AVR (23mm Inspirus)/MV repairwith 30 mm Medtronic CG Future mitral annuloplasty ring /VIRI ligation; POD # 1    24 Hour Events: Extubated without incident  Gerardo and vasopressin weaned off  Remains supported with Epi 4, and primacor 0 5  Was initially AV paced without underlying rhythm; Now in sinus rhythm        Medications:   Scheduled Meds:  Current Facility-Administered Medications:  acetaminophen 650 mg Rectal Q4H PRN Helen ELANA Worrell-NIC    acetaminophen 650 mg Oral Q4H PRN Helen Worrell PA-C    amiodarone 200 mg Oral [facility] Helen Worrell PA-C    aspirin 325 mg Oral Daily Helen Worrell PA-C    atorvastatin 20 mg Oral Daily With Chad Fuchs PA-C    bisacodyl 10 mg Rectal Daily PRN Helen Worrell PA-C    calcium chloride 1 g Intravenous Once Helen Worrell PA-C    chlorhexidine 15 mL Mouth/Throat BID Helen Worrell PA-C    epinephrine 1-20 mcg/min Intravenous Continuous Helen Worrell PA-C Last Rate: 4 mcg/min (03/07/19 2156)   fentanyl citrate (PF) 50 mcg Intravenous Once Helen Worrell PA-C    fentanyl citrate (PF) 50 mcg Intravenous Q1H PRN Helen Worrell PA-C    furosemide 40 mg Intravenous Q6H PRN Helen Worrell PA-C    heparin (porcine) 5,000 Units Subcutaneous Q8H [facility] Ashanti Arrieta PA-C    HYDROmorphone 0 5 mg Intravenous Q1H PRN Helen Worrell PA-C    HYDROmorphone 1 mg Intravenous Q1H PRN Helen Worrell PA-C    insulin regular (HumuLIN R,NovoLIN R) infusion 0 3-21 Units/hr Intravenous Titrated Helen Worrell PA-C Last Rate: 1 Units/hr (03/08/19 0636)   lactated ringers 500 mL Intravenous Q30 Min PRN Helen Worrell PA-C    lidocaine (cardiac) 100 mg Intravenous Q30 Min PRN Helen Worrell PA-C    milrinone [facility]) infusion 0 5 mcg/kg/min Intravenous Continuous Ashanti Arrieta PA-C Last Rate: 0 5 mcg/kg/min (03/08/19 0226)   mupirocin 1 application Nasal [address] Matthew Beatriz, PA-C    niCARdipine 2 5-15 mg/hr Intravenous Titrated Matthew Beatriz, PA-C    ondansetron 4 mg Intravenous Q6H PRN Matthew Beatriz, PA-C    oxyCODONE-acetaminophen 1 tablet Oral Q4H PRN Arleth Nogueira, DO    oxyCODONE-acetaminophen 2 tablet Oral Q6H PRN Matthew Beatriz, PA-C    pantoprazole 40 mg Oral Daily Before Breakfast Matthew Beatriz, PA-C    phenylephine  mcg/min Intravenous Titrated Matthew Beatriz, PA-C Last Rate: 30 mcg/min (03/07/19 2250)   polyethylene glycol 17 g Oral Daily Matthew Beatriz, PA-C    potassium chloride 20 mEq Intravenous Once PRN Matthew Beatriz, PA-C    potassium chloride 20 mEq Intravenous Q1H PRN Matthew Beatriz, PA-C    potassium chloride 20 mEq Intravenous Q30 Min PRN Matthew Beatriz, PA-C    sodium chloride 20 mL/hr Intravenous Continuous Matthew Beatriz, PA-C Last Rate: 20 mL/hr (03/07/19 1222)   travoprost 1 drop Both Eyes HS Matthew Beatriz, PA-C    vasopressin (PITRESSIN) in 0 9 % sodium chloride 100 mL 0 04 Units/min Intravenous Continuous Matthew Beatriz, PA-C Last Rate: Stopped (03/08/19 6416)     Continuous Infusions:  epinephrine 1-20 mcg/min Last Rate: 4 mcg/min (03/07/19 2156)   insulin regular (HumuLIN R,NovoLIN R) infusion 0 3-21 Units/hr Last Rate: 1 Units/hr (03/08/19 0636)   milrinone (PRIMACOR) infusion 0 5 mcg/kg/min Last Rate: 0 5 mcg/kg/min (03/08/19 0226)   niCARdipine 2 5-15 mg/hr    phenylephine  mcg/min Last Rate: 30 mcg/min (03/07/19 2250)   sodium chloride 20 mL/hr Last Rate: 20 mL/hr (03/07/19 1222)   vasopressin (PITRESSIN) in 0 9 % sodium chloride 100 mL 0 04 Units/min Last Rate: Stopped (03/08/19 0605)     PRN Meds:   acetaminophen    acetaminophen    bisacodyl    fentanyl citrate (PF)    furosemide    HYDROmorphone    HYDROmorphone    lactated ringers    lidocaine (cardiac)    ondansetron    oxyCODONE-acetaminophen    oxyCODONE-acetaminophen   potassium chloride    potassium chloride    potassium chloride    Vitals:   Vitals:    03/08/19 0300 03/08/19 0400 03/08/19 0500 03/08/19 0600   BP:       Pulse: 84 84 86 90   Resp: 18 17 20 (!) 27   Temp: 98 8 °F (37 1 °C) 98 8 °F (37 1 °C)  98 2 °F (36 8 °C)   TempSrc:       SpO2: 100% 99% 99% 99%   Weight:    71 8 kg (158 lb 4 6 oz)   Height:           Hemodynamics:  PAP: (21-39)/(8-23) 21/8    Respiratory:   SpO2: SpO2: 99 %, SpO2 Activity: SpO2 Activity: At Rest; 2 LPM    Intake/Output:   I/O       03/06 0701 - 03/07 0700 03/07 0701 - 03/08 0700    I V  (mL/kg)  3580 5 (49 9)    IV Piggyback  950    Cell Saver  500    Total Intake(mL/kg)  5030 5 (70 1)    Urine (mL/kg/hr)  1645 (1)    Blood  500    Chest Tube  340    Total Output  2485    Net  +2545 5                Chest tube Output:    Mediastinal tubes: 110 mL/8 hours  340 mL/24 hours          Weights:   Weight (last 2 days)     Date/Time   Weight    03/08/19 0600   71 8 (158 29)    03/07/19 0613   70 8 (156)            Admit Weight: 70 8    Results:   Results from last 7 days   Lab Units 03/08/19 0346 03/07/19  2037 03/07/19  1213 03/07/19  1204  03/07/19  0946  03/02/19  0743   WBC Thousand/uL 18 50*  --   --   --   --   --   --  6 29   HEMOGLOBIN g/dL 11 1* 11 7*  --  11 9*  --   --   --  14 1   I STAT HEMOGLOBIN g/dl  --   --  10 9*  --    < >  --    < >  --    HEMATOCRIT % 33 2* 34 6*  --  35 0*  --   --   --  40 5   HEMATOCRIT, ISTAT %  --   --  32*  --    < >  --    < >  --    PLATELETS Thousands/uL 131*  --   --  144*  --  158  --  199    < > = values in this interval not displayed       Results from last 7 days   Lab Units 03/08/19  0346 03/07/19  2037 03/07/19  1641 03/07/19  1213 03/07/19  1204  03/02/19  0743   SODIUM mmol/L 141  --   --   --  142  --  140   POTASSIUM mmol/L 5 1 4 4 5 2  --  4 3  --  4 5   CHLORIDE mmol/L 111*  --   --   --  112*  --  107   CO2 mmol/L 22  --   --   --  23  --  24   CO2, I-STAT mmol/L  --   --   --  24  --    < >  --    BUN mg/dL 34*  --   --   --  25  --  31*   CREATININE mg/dL 2 54*  --   --   --  1 94*  --  1 84*   GLUCOSE, ISTAT mg/dl  --   --   --  110  --    < >  --    CALCIUM mg/dL 8 2*  --   --   --  8 0*  --  8 4    < > = values in this interval not displayed  Studies:  CXR: No effusion/pneumothorax  Large gastric bubble  EKG: NSR, without ischemic changes  Invasive Lines/Tubes:  Invasive Devices     Central Venous Catheter Line            CVC Central Lines 03/07/19 Triple less than 1 day    Introducer 03/07/19 less than 1 day          Peripheral Intravenous Line            Peripheral IV 02/15/19 Left Wrist 20 days    Peripheral IV 03/07/19 Left Wrist less than 1 day    Peripheral IV 03/07/19 Right Wrist less than 1 day          Arterial Line            Arterial Line 03/07/19 Right Radial less than 1 day          Line            Pacer Wires less than 1 day    Pacer Wires less than 1 day          Drain            Chest Tube 1 Posterior Mediastinal 32 Fr  less than 1 day    Chest Tube 2 Anterior Mediastinal 32 Fr  less than 1 day    Urethral Catheter Non-latex; Temperature probe 16 Fr  less than 1 day                Physical Exam:    HEENT/NECK:  PERRLA  No jugular venous distention  Cardiac: Regular rate and rhythm  No rubs/murmurs/gallops  Pulmonary:  Breath sounds slightly diminished at the bases bilaterally  Abdomen:  Non-tender, Non-distended  Positive bowel sounds  Incisions: Sternum is stable  Incision dressed with Acticoat  No erythema or drainage    Lower extremities: Extremities warm/dry  Radial/PT/DP pulses 2+ bilaterally  Trace edema B/L  Neuro: Alert and oriented X 3  Sensation is grossly intact  No focal deficits  Skin: Warm/Dry, without rashes or lesions      Assessment:  Patient Active Problem List   Diagnosis    CKD (chronic kidney disease) stage 3, GFR 30-59 ml/min ([facility])    Persistent proteinuria    Diabetic nephropathy associated with type 2 diabetes mellitus ([facility] [address] )  Vitamin D deficiency    Secondary hyperparathyroidism ([facility] [address] )    FSGS (focal segmental glomerulosclerosis)    Essential hypertension    Chronic systolic congestive heart failure (HCC)    Nonrheumatic aortic valve insufficiency    Non-rheumatic mitral regurgitation    Bicuspid aortic valve    Coronary artery disease involving native coronary artery of native heart without angina pectoris    S/P AVR    S/P MVR (mitral valve repair)    Dilated cardiomyopathy (HCC)    Diabetes mellitus type 2 in nonobese [facility])       Aortic regurgitation, Mitral regurgitation  S/P AVR (23mm Inspirus)/MV repairwith 30 mm Medtronic CG Future mitral annuloplasty ring /VIRI ligation; POD # 1      Plan:    1  Cardiac:   Cardiac infusions: Epinephrine, 4 mcg/min, Primacor, 0 5 mcg/kg/min    Slow epi wean as tolerated    T/C beta blocker inititation, after epi has been weaned down  Cardiac index > 2 2   Maintain [place] Anibal catheter   D/C Arterial line   Check INR and dose coumadin (2 5 mg)  Continue ASA and Statin therapy  Maintain epicardial pacing wires for one more day  Maintain central IV access today for inotropic support  Continue DVT prophylaxis  Consult cardiology for postoperative medical management    2  Pulmonary:   Extubated  CXR findings: Clear, with large gastric bubble  Acute post-op pulmonary insufficiency; Requiring 2 Liters via nasal cannula, secondary to pulmonary vascular congestion  Continue incentive spirometry/coughing/deep breathing exercises  Wean supplemental oxygen as tolerated for saturation > 90%  Chest tube output remains persistently high; Continue chest tubes to suction today    3  Renal:   Intake/Output net: +2500 mL/24 hours   H/O CKD III  Post op volume overload:    Hold Lasix this morning;  T/C later today, trending filling pressures  Post op CARY; Creatinine 2 54  Baseline 1 84; Follow up labs prn  Continue potassium replacement scales  Maintain maya catheter    4   Neuro:  Neurologically intact; No active issues  Incisional pain well-controlled; Continue prn Percocet    5  GI:  Tolerating clear liquid diet, without evidence of dysphagia; Initiate TLC 2 3 gm sodium diet with consistent carbohydrate modifier  Maintain 1800 mL daily fluid restriction   Continue stool softeners and prn suppository  Continue GI prophylaxis    6  Endo:    History of diabetes: Continue insulin infusion today  Consult Endocrinology for management    7  Hematology:   Post-operative acute blood loss anemia; Hemoglobin 11 1, from 14; trend prn    8   Disposition:  Continue ICU level of care for ongoing inotropic support    VTE Pharmacologic Prophylaxis: Sequential compression device (Venodyne)  and Heparin  VTE Mechanical Prophylaxis: sequential compression device    Collaborative rounds completed with DAMON Markham , and Maico Shaikh RN    SIGNATURE: Karel Nissen, PA-C  DATE: March 8, 2019  TIME: 6:47 AM

## 2019-03-08 NOTE — CONSULTS
Consultation - Sam Marte 80 y o  male MRN: [id number]    Unit/Bed#: [facility] 416-01 Encounter: [id number]      Assessment/Plan     Assessment: This is a 80y o -year-old male with type 2 diabetes with hyperglycemia, status post aortic valve repair, mitral valve repair-who has been on IV insulin infusion since the surgery    Plan:  Given poor oral intake will continue IV insulin infusion for another 24 hours-fingersticks to be monitored q 2 hours on IV insulin infusion-as oral intake improves will switch to subcu insulin therapy tomorrow  He probably will not need to be discharged home on insulin- however given chronic kidney disease he should not be restarted on metformin  Aortic regurgitation, mitral regurgitation-status post aortic and mitral valve repair-management as per primary team    CC: Diabetes Consult    History of Present Illness     HPI: Sam Marte is a 80y o  year old male with type 2 diabetes, aortic regurgitation, mitral regurgitation who underwent aortic valve replacement and mitral valve repair yesterday-since surgery he has been on IV insulin infusion and endocrine consult is requested for management of diabetes  Patient states that he has had type 2 diabetes for the past 3-4 years treated with oral hypoglycemics  He takes metformin at home  He checks his blood sugars once a day at different times and usually sugars are between 120 to 130s  He denies any polyuria, polydipsia, blurry vision  Denies any numbness or tingling in his feet  Denies any microvascular complications of diabetes  Currently on IV insulin infusion his fingersticks since morning have ranged between 109 to 210-requiring anywhere between 1-3 units of insulin an hour    He does not have much of an appetite-denies any nausea or vomiting    Inpatient consult to Endocrinology  Consult performed by: Tyson Winslow MD  Consult ordered by: Karel Nissen, PA-C          Review of Systems   Constitutional: Positive for fatigue  Negative for unexpected weight change  Eyes: Negative for visual disturbance  Respiratory: Negative for cough and shortness of breath  Cardiovascular: Negative for palpitations and leg swelling  Gastrointestinal: Negative for constipation, diarrhea, nausea and vomiting  Endocrine: Negative for polydipsia and polyuria  Musculoskeletal: Negative for gait problem  Skin: Negative for rash  Psychiatric/Behavioral: Negative for sleep disturbance  All other systems reviewed and are negative  Historical Information   Past Medical History:   Diagnosis Date    Diabetes mellitus ([facility] )     Hyperlipidemia     Hypertension     Proteinuria     Stage 3 chronic kidney disease ([facility] )     Vitamin D deficiency      Past Surgical History:   Procedure Laterality Date    COLONOSCOPY      KS ECHO TRANSESOPHAG R-T 2D W/PRB IMG ACQUISJ I&R N/A 3/7/2019    Procedure: INTRA-OP TRANSESOPHAGEAL ECHOCARDIOGRAM (PATIENCE);   Surgeon: Maryan Johnson DO;  [facility];  Service: Cardiac Surgery    KS PERQ CLSR TCAT L ATR APNDGE W/ENDOCARDIAL IMPLNT  3/7/2019    Procedure: LEFT ATRIAL APPENDAGE OCCLUSION CLIPPED with 35mm Dorothey Lo;  Surgeon: Maryan Johnson DO;  [facility];  Service: Cardiac Surgery    KS REPLACEMENT OF MITRAL VALVE N/A 3/7/2019    Procedure: REPLACEMENT VALVE MITRAL (MVR) REPAIR with a 30mm MEDTRONIC CG FUTURE RING;  Surgeon: Maryan Johnson DO;  [facility]: [facility];  Service: Cardiac Surgery    KS RPLCMT AORTIC VALVE OPN W/STENTLESS TISSUE VALVE N/A 3/7/2019    Procedure: REPLACEMENT VALVE AORTIC (AVR) with 23mm TAVERA INSPIRIS RESILIA  AORTIC VALVE;  Surgeon: Maryan Johnson DO;  [facility];  Service: Cardiac Surgery    TOE SURGERY Left      Social History   Social History     Substance and Sexual Activity   Alcohol Use Yes    Frequency: Monthly or less    Drinks per session: 1 or 2    Binge frequency: Never    Comment: occasionally Social History     Substance and Sexual Activity   Drug Use No     Social History     Tobacco Use   Smoking Status Former Smoker    Last attempt to quit: Pop Marvin Years since quittin 2   Smokeless Tobacco Never Used     Family History:   Family History   Problem Relation Age of Onset    Stroke Mother     Hypertension Mother     Blindness Father     Gout Brother     Heart Valve Disease Brother     Anuerysm Neg Hx     Mother and sister with type 2 diabetes    Meds/Allergies   Current Facility-Administered Medications   Medication Dose Route Frequency Provider Last Rate Last Dose    acetaminophen (TYLENOL) tablet 650 mg  650 mg Oral Q4H PRN DkolELANA Abdi-NIC        amiodarone tablet 200 mg  200 mg Oral [facility] Dkolm ELANA Delvalle-C   200 mg at 19 8962    aspirin tablet 325 mg  325 mg Oral Daily DkolELANA Abdi-C   325 mg at 19 0800    atorvastatin (LIPITOR) tablet 20 mg  20 mg Oral Daily With Group 1 Claribel Fuchs PA-C        bisacodyl (DULCOLAX) rectal suppository 10 mg  10 mg Rectal Daily PRN Kristian Delvalle PA-C        chlorhexidine (PERIDEX) 0 12 % oral rinse 15 mL  15 mL Mouth/Throat BID ELANA Machado-C   15 mL at 19 0800    EPINEPHrine 3000 mcg (STANDARD CONCENTRATION) IV in sodium chloride 0 9% 250 mL  1-20 mcg/min Intravenous Continuous Kristian Delvalle PA-C   Stopped at 19 1235    heparin (porcine) subcutaneous injection 5,000 Units  5,000 Units Subcutaneous Q8H [address] Ashanti Arrieta PA-C   5,000 Units at 19 0622    insulin regular (HumuLIN R,NovoLIN R) 1 Units/mL in sodium chloride 0 9 % 100 mL infusion  0 3-21 Units/hr Intravenous Titrated AdolSANDRA AbdiC 3 mL/hr at 19 1000 3 Units/hr at 19 1000    lidocaine (cardiac) injection 100 mg  100 mg Intravenous Q30 Min PRN Kristian Delvalle PA-C        Milrinone Lactate in Dextrose (PRIMACOR) 20 MG/100 ML infusion (premix)  0 5 mcg/kg/min Intravenous Continuous Ashanti Arrieta PA-C 10 6 mL/hr at 03/08/19 1206 0 5 mcg/kg/min at 03/08/19 1206    mupirocin (BACTROBAN) 2 % nasal ointment 1 application  1 application Nasal G18R [facility] ELANA Cardona-C   1 application at 35/79/82 0800    niCARdipine (CARDENE) 25 mg (STANDARD CONCENTRATION) in sodium chloride 0 9% 250 mL  1-15 mg/hr Intravenous Titrated ELANA Nielsen-C   Stopped at 03/08/19 1318    ondansetron (ZOFRAN) injection 4 mg  4 mg Intravenous Q6H PRN Matthew Henderson PA-C   4 mg at 03/07/19 2044    oxyCODONE-acetaminophen (PERCOCET) 5-325 mg per tablet 1 tablet  1 tablet Oral Q4H PRN Marquez Nogueira, DO   1 tablet at 03/08/19 0529    oxyCODONE-acetaminophen (PERCOCET) 5-325 mg per tablet 2 tablet  2 tablet Oral Q6H PRN Matthew Henderson PA-C        pantoprazole (PROTONIX) EC tablet 40 mg  40 mg Oral Daily Before Breakfast ELANA Cardona-C   40 mg at 03/08/19 0622    polyethylene glycol (MIRALAX) packet 17 g  17 g Oral Daily ELANA Cardona-C   17 g at 03/08/19 0800    potassium chloride 20 mEq IVPB (premix)  20 mEq Intravenous Once PRN Matthew Henderson, PA-C        potassium chloride 20 mEq IVPB (premix)  20 mEq Intravenous Q1H PRN Matthew Henderson, PA-C        potassium chloride 20 mEq IVPB (premix)  20 mEq Intravenous Q30 Min PRN Matthew Henderson, PA-C        sodium chloride infusion 0 45 %  20 mL/hr Intravenous Continuous Matthew Henderson PA-C 20 mL/hr at 03/07/19 1222 20 mL/hr at 03/07/19 1222    travoprost (TRAVATAN-Z) 0 004 % ophthalmic solution 1 drop  1 drop Both Eyes HS Matthew Henderson PA-C   1 drop at 03/07/19 2217    warfarin (COUMADIN) tablet 2 5 mg  2 5 mg Oral Once (warfarin) Matthew Henderson PA-C         Allergies   Allergen Reactions    Ace Inhibitors Cough       Objective   Vitals: Blood pressure 134/75, pulse 90, temperature 97 5 °F (36 4 °C), temperature source Tympanic, resp  rate 17, height 5' 6" (1 676 m), weight 71 8 kg (158 lb 4 6 oz), SpO2 99 %      Intake/Output Summary (Last 24 hours) at 3/8/2019 1436  Last data filed at 3/8/2019 1300  Gross per 24 hour   Intake 3833 98 ml   Output 1190 ml   Net 2643 98 ml     Invasive Devices     Central Venous Catheter Line            CVC Central Lines 03/07/19 Triple 1 day    Introducer 03/07/19 1 day          Peripheral Intravenous Line            Peripheral IV 02/15/19 Left Wrist 21 days    Peripheral IV 03/07/19 Left Wrist 1 day    Peripheral IV 03/07/19 Right Wrist 1 day          Arterial Line            Arterial Line 03/07/19 Right Radial 1 day          Line            Pacer Wires 1 day    Pacer Wires 1 day          Drain            Chest Tube 1 Posterior Mediastinal 32 Fr  1 day    Chest Tube 2 Anterior Mediastinal 32 Fr  1 day    Urethral Catheter Non-latex; Temperature probe 16 Fr  1 day                Physical Exam   Constitutional: He is oriented to person, place, and time  He appears well-developed and well-nourished  No distress  HENT:   Head: Normocephalic and atraumatic  Mouth/Throat: No oropharyngeal exudate  Eyes: EOM are normal  No scleral icterus  Neck: Normal range of motion  Neck supple  Cardiovascular: Normal rate, regular rhythm and normal heart sounds  No murmur heard  Pulmonary/Chest: Effort normal and breath sounds normal  No respiratory distress  He has no wheezes  He has no rales  Abdominal: Soft  Bowel sounds are normal  He exhibits no distension  There is no tenderness  Musculoskeletal: Normal range of motion  He exhibits no edema or deformity  Lymphadenopathy:     He has no cervical adenopathy  Neurological: He is alert and oriented to person, place, and time  Skin: Skin is warm and dry  Psychiatric: He has a normal mood and affect  His behavior is normal  Judgment and thought content normal        The history was obtained from the review of the chart, patient and family      Lab Results:   Results from last 7 days   Lab Units 03/02/19  0743   HEMOGLOBIN A1C % 5 9     Lab Results   Component Value Date    WBC 18 50 (H) 03/08/2019    HGB 11 1 (L) 03/08/2019    HCT 33 2 (L) 03/08/2019    MCV 92 03/08/2019     (L) 03/08/2019     Lab Results   Component Value Date/Time    BUN 34 (H) 03/08/2019 03:46 AM    K 5 1 03/08/2019 03:46 AM     (H) 03/08/2019 03:46 AM    CO2 22 03/08/2019 03:46 AM    CO2 24 03/07/2019 12:13 PM    CREATININE 2 54 (H) 03/08/2019 03:46 AM    GFRAA 47 04/13/2018    AST 62 (H) 01/29/2019 01:45 AM    ALT 75 01/29/2019 01:45 AM    ALB 2 4 (L) 01/29/2019 01:45 AM    ALB 3 2 04/13/2018     No results for input(s): CHOL, HDL, LDL, TRIG, VLDL in the last 72 hours  No results found for: Gerson Mullins  POC Glucose (mg/dl)   Date Value   03/08/2019 175 (H)   03/08/2019 210 (H)   03/08/2019 170 (H)   03/08/2019 142 (H)   03/08/2019 109   03/08/2019 125   03/07/2019 139   03/07/2019 142 (H)   03/07/2019 134   03/07/2019 166 (H)       Imaging Studies: I have personally reviewed pertinent reports  CHEST      INDICATION:   Cardiac surgery      COMPARISON:  March 7, 2019     EXAM PERFORMED/VIEWS:  XR CHEST PORTABLE        FINDINGS:  Endotracheal and enteric tubes been removed  Again noted are sternotomy wires, left atrial occlusion devices, and prosthetic cardiac valves  Also again noted is a left subclavian central venous catheter and a [place]-Anibal catheter with   unchanged position from prior examination  Mediastinal drain is reidentified      Cardiomediastinal silhouette appears unremarkable      The lungs are clear  No pneumothorax or pleural effusion      Osseous structures appear within normal limits for patient age      IMPRESSION:     Expected radiographic appearance after cardiac surgery      Portions of the record may have been created with voice recognition software

## 2019-03-08 NOTE — UTILIZATION REVIEW
Initial Clinical Review  Age/Sex: 80 y o  male  Surgery Date: 03/07/2019  Procedure: PROCEDURE:  1  Aortic valve repair with 23mm Nettles Inspiris Resilia bovine tissue valve  2  Mitral valve repair with 30 mm Medtronic CG Future mitral annuloplasty ring   3  Ligation of left atrial appendage with 35mm AtriClip device  Anesthesia: General endotracheal anesthesia with transesophageal echocardiogram guidance, Dr Pauly Nelson   Admission Orders: Date/Time/Statement: 3/7/19 @ 0660 [id number]   Orders Placed This Encounter   Procedures    Inpatient Admission     Standing Status:   Standing     Number of Occurrences:   1     Order Specific Question:   Admitting Physician     Answer:   Indra Luna [[id number]]     Order Specific Question:   Level of Care     Answer:   Critical Care [15]     Order Specific Question:   Estimated length of stay     Answer:   More than 2 Midnights     Order Specific Question:   Certification     Answer:   I certify that inpatient services are medically necessary for this patient for a duration of greater than two midnights  See H&P and MD Progress Notes for additional information about the patient's course of treatment  Vital Signs: /75   Pulse 88   Temp 97 5 °F (36 4 °C) (Oral)   Resp 22   Ht 5' 6" (1 676 m)   Wt 71 8 kg (158 lb 4 6 oz)   SpO2 99%   BMI 25 55 kg/m²   Diet:        Diet Orders   (From admission, onward)            Start     Ordered    03/08/19 0000  Diet Cardiovascular; Cardiac; Fluid Restriction 1800 ML, Consistent Carbohydrate Diet Level 2 (5 carb servings/75 grams CHO/meal)  Diet effective 0500     Question Answer Comment   Diet Type Cardiovascular    Cardiac Cardiac    Other Restriction(s): Fluid Restriction 1800 ML    Other Restriction(s): Consistent Carbohydrate Diet Level 2 (5 carb servings/75 grams CHO/meal)    RD to adjust diet per protocol? No        03/07/19 1150      Introducer / Trip;e CVC Line / A  Line right radial  Chest Tubes #1,2 (-20cm)  Epicardial pacing wires A/V  Mobility: Up with assistance / Up as tolerated / POD#1 OOB to chair and for all meals  DVT Prophylaxis: Dre SCD  Medication:   Scheduled Meds:  Current Facility-Administered Medications:  acetaminophen 650 mg Oral Q4H PRN Chrissy Pack, PA-C    amiodarone 200 mg Oral [facility] Chrissy Pack, PA-C    aspirin 325 mg Oral Daily Chrissy Pack, PA-C    atorvastatin 20 mg Oral Daily With [employer] Shila, PA-C    bisacodyl 10 mg Rectal Daily PRN Chrissy Pack, PA-C    chlorhexidine 15 mL Mouth/Throat BID Chrissy Pack, PA-C    epinephrine 1-20 mcg/min Intravenous Continuous Chrissy Pack, PA-C Last Rate: 1 mcg/min (03/08/19 1115)   heparin (porcine) 5,000 Units Subcutaneous [facility] Ashantijakob Arrieta, PA-C    insulin regular (HumuLIN R,NovoLIN R) infusion 0 3-21 Units/hr Intravenous Titrated Chrissy Pack, PA-C Last Rate: 3 Units/hr (03/08/19 1000)   lidocaine (cardiac) 100 mg Intravenous Q30 Min PRN Chrissy Pack, PA-C    milrinone [facility]) infusion 0 5 mcg/kg/min Intravenous Continuous [facility], PA-C Last Rate: 0 5 mcg/kg/min (03/08/19 1206)   mupirocin 1 application Nasal L69H [facility] Chrissy Pack, PA-C    niCARdipine 1-15 mg/hr Intravenous Titrated [facility], PA-C Last Rate: 4 mg/hr (03/08/19 1206)   ondansetron 4 mg Intravenous Q6H PRN Chrissy Pack, PA-C    oxyCODONE-acetaminophen 1 tablet Oral Q4H PRN Freddy Hernandez DO    oxyCODONE-acetaminophen 2 tablet Oral Q6H PRN Chrissy Pack, PA-C    pantoprazole 40 mg Oral Daily Before Breakfast Chrissy Pack, PA-C    polyethylene glycol 17 g Oral Daily Chrissy Pack, PA-C    potassium chloride 20 mEq Intravenous Once PRN Chrissy Pack, PA-C    potassium chloride 20 mEq Intravenous Q1H PRN Chrissy Pack, PA-C    potassium chloride 20 mEq Intravenous Q30 Min PRN Chrissy Pack, PA-C    sodium chloride 20 mL/hr Intravenous Continuous Chrissy Pack, PA-C Last Rate: 20 mL/hr (03/07/19 1222)   travoprost 1 drop Both Eyes HS Chrissy Pack, YINA    warfarin 2 5 mg Oral Once (warfarin) Rebecca Hawkins PA-C      Continuous Infusions:  epinephrine 1-20 mcg/min Last Rate: 1 mcg/min (03/08/19 1115)   insulin regular (HumuLIN R,NovoLIN R) infusion 0 3-21 Units/hr Last Rate: 3 Units/hr (03/08/19 1000)   milrinone (PRIMACOR) infusion 0 5 mcg/kg/min Last Rate: 0 5 mcg/kg/min (03/08/19 1206)   niCARdipine 1-15 mg/hr Last Rate: 4 mg/hr (03/08/19 1206)   sodium chloride 20 mL/hr Last Rate: 20 mL/hr (03/07/19 1222)     Network Utilization Review Department  Phone: [phone]; Fax [phone]  [email or web address]  ATTENTION: Please call with any questions or concerns to [phone]  and carefully listen to the prompts so that you are directed to the right person  Send all requests for admission clinical reviews, approved or denied determinations and any other requests to fax [phone]  All voicemails are confidential

## 2019-03-08 NOTE — PLAN OF CARE
Problem: Prexisting or High Potential for Compromised Skin Integrity  Goal: Skin integrity is maintained or improved  Description  INTERVENTIONS:  - Identify patients at risk for skin breakdown  - Assess and monitor skin integrity  - Assess and monitor nutrition and hydration status  - Monitor labs (i e  albumin)  - Assess for incontinence   - Turn and reposition patient  - Assist with mobility/ambulation  - Relieve pressure over bony prominences  - Avoid friction and shearing  - Provide appropriate hygiene as needed including keeping skin clean and dry  - Evaluate need for skin moisturizer/barrier cream  - Collaborate with interdisciplinary team (i e  Nutrition, Rehabilitation, etc )   - Patient/family teaching  Outcome: Progressing     Problem: Potential for Falls  Goal: Patient will remain free of falls  Description  INTERVENTIONS:  - Assess patient frequently for physical needs  -  Identify cognitive and physical deficits and behaviors that affect risk of falls    -  [place] fall precautions as indicated by assessment   - Educate patient/family on patient safety including physical limitations  - Instruct patient to call for assistance with activity based on assessment  - Modify environment to reduce risk of injury  - Consider OT/PT consult to assist with strengthening/mobility  Outcome: Progressing     Problem: DISCHARGE PLANNING - CARE MANAGEMENT  Goal: Discharge to post-acute care or home with appropriate resources  Description  INTERVENTIONS:  - Conduct assessment to determine patient/family and health care team treatment goals, and need for post-acute services based on payer coverage, community resources, and patient preferences, and barriers to discharge  - Address psychosocial, clinical, and financial barriers to discharge as identified in assessment in conjunction with the patient/family and health care team  - Arrange appropriate level of post-acute services according to patient?s   needs and preference and payer coverage in collaboration with the physician and health care team  - Communicate with and update the patient/family, physician, and health care team regarding progress on the discharge plan  - Arrange appropriate transportation to post-acute venues  Outcome: Progressing     Problem: PAIN - ADULT  Goal: Verbalizes/displays adequate comfort level or baseline comfort level  Description  Interventions:  - Encourage patient to monitor pain and request assistance  - Assess pain using appropriate pain scale  - Administer analgesics based on type and severity of pain and evaluate response  - Implement non-pharmacological measures as appropriate and evaluate response  - Consider cultural and social influences on pain and pain management  - Notify physician/advanced practitioner if interventions unsuccessful or patient reports new pain  Outcome: Progressing     Problem: INFECTION - ADULT  Goal: Absence or prevention of progression during hospitalization  Description  INTERVENTIONS:  - Assess and monitor for signs and symptoms of infection  - Monitor lab/diagnostic results  - Monitor all insertion sites, i e  indwelling lines, tubes, and drains  - Monitor endotracheal (as able) and nasal secretions for changes in amount and color  - [place] appropriate cooling/warming therapies per order  - Administer medications as ordered  - Instruct and encourage patient and family to use good hand hygiene technique  - Identify and instruct in appropriate isolation precautions for identified infection/condition  Outcome: Progressing  Goal: Absence of fever/infection during neutropenic period  Description  INTERVENTIONS:  - Monitor WBC  - Implement neutropenic guidelines  Outcome: Progressing     Problem: SAFETY ADULT  Goal: Maintain or return to baseline ADL function  Description  INTERVENTIONS:  -  Assess patient's ability to carry out ADLs; assess patient's baseline for ADL function and identify physical deficits which impact ability to perform ADLs (bathing, care of mouth/teeth, toileting, grooming, dressing, etc )  - Assess/evaluate cause of self-care deficits   - Assess range of motion  - Assess patient's mobility; develop plan if impaired  - Assess patient's need for assistive devices and provide as appropriate  - Encourage maximum independence but intervene and supervise when necessary  ¯ Involve family in performance of ADLs  ¯ Assess for home care needs following discharge   ¯ Request OT consult to assist with ADL evaluation and planning for discharge  ¯ Provide patient education as appropriate  Outcome: Progressing  Goal: Maintain or return mobility status to optimal level  Description  INTERVENTIONS:  - Assess patient's baseline mobility status (ambulation, transfers, stairs, etc )    - Identify cognitive and physical deficits and behaviors that affect mobility  - Identify mobility aids required to assist with transfers and/or ambulation (gait belt, sit-to-stand, lift, walker, cane, etc )  - [place] fall precautions as indicated by assessment  - Record patient progress and toleration of activity level on Mobility SBAR; progress patient to next Phase/Stage  - Instruct patient to call for assistance with activity based on assessment  - Request Rehabilitation consult to assist with strengthening/weightbearing, etc   Outcome: Progressing     Problem: DISCHARGE PLANNING  Goal: Discharge to home or other facility with appropriate resources  Description  INTERVENTIONS:  - Identify barriers to discharge w/patient and caregiver  - Arrange for needed discharge resources and transportation as appropriate  - Identify discharge learning needs (meds, wound care, etc )  - Arrange for interpretive services to assist at discharge as needed  - Refer to Case Management Department for coordinating discharge planning if the patient needs post-hospital services based on physician/advanced practitioner order or complex needs related to functional status, cognitive ability, or social support system  Outcome: Progressing     Problem: Knowledge Deficit  Goal: Patient/family/caregiver demonstrates understanding of disease process, treatment plan, medications, and discharge instructions  Description  Complete learning assessment and assess knowledge base  Interventions:  - Provide teaching at level of understanding  - Provide teaching via preferred learning methods  Outcome: Progressing     Problem: Nutrition/Hydration-ADULT  Goal: Nutrient/Hydration intake appropriate for improving, restoring or maintaining nutritional needs  Description  Monitor and assess patient's nutrition/hydration status for malnutrition (ex- brittle hair, bruises, dry skin, pale skin and conjunctiva, muscle wasting, smooth red tongue, and disorientation)  Collaborate with interdisciplinary team and initiate plan and interventions as ordered  Monitor patient's weight and dietary intake as ordered or per policy  Utilize nutrition screening tool and intervene per policy  Determine patient's food preferences and provide high-protein, high-caloric foods as appropriate       INTERVENTIONS:  - Monitor oral intake, urinary output, labs, and treatment plans  - Assess nutrition and hydration status and recommend course of action  - Evaluate amount of meals eaten  - Assist patient with eating if necessary   - Allow adequate time for meals  - Recommend/ encourage appropriate diets, oral nutritional supplements, and vitamin/mineral supplements  - Order, calculate, and assess calorie counts as needed  - Recommend, monitor, and adjust tube feedings and TPN/PPN based on assessed needs  - Assess need for intravenous fluids  - Provide specific nutrition/hydration education as appropriate  - Include patient/family/caregiver in decisions related to nutrition  Outcome: Progressing

## 2019-03-09 LAB
ABO GROUP BLD BPU: NORMAL
ANION GAP SERPL CALCULATED.3IONS-SCNC: 11 MMOL/L (ref 4–13)
ANION GAP SERPL CALCULATED.3IONS-SCNC: 9 MMOL/L (ref 4–13)
BASE EX.OXY STD BLDV CALC-SCNC: 47.9 % (ref 60–80)
BASE EX.OXY STD BLDV CALC-SCNC: 61.4 % (ref 60–80)
BASE EXCESS BLDV CALC-SCNC: -6 MMOL/L
BASE EXCESS BLDV CALC-SCNC: -6.7 MMOL/L
BODY TEMPERATURE: 99.3 DEGREES FEHRENHEIT
BPU ID: NORMAL
BUN SERPL-MCNC: 45 MG/DL (ref 5–25)
BUN SERPL-MCNC: 49 MG/DL (ref 5–25)
CALCIUM SERPL-MCNC: 8.2 MG/DL (ref 8.3–10.1)
CALCIUM SERPL-MCNC: 8.3 MG/DL (ref 8.3–10.1)
CHLORIDE SERPL-SCNC: 105 MMOL/L (ref 100–108)
CHLORIDE SERPL-SCNC: 107 MMOL/L (ref 100–108)
CO2 SERPL-SCNC: 18 MMOL/L (ref 21–32)
CO2 SERPL-SCNC: 21 MMOL/L (ref 21–32)
CREAT SERPL-MCNC: 3.48 MG/DL (ref 0.6–1.3)
CREAT SERPL-MCNC: 3.89 MG/DL (ref 0.6–1.3)
CROSSMATCH: NORMAL
ERYTHROCYTE [DISTWIDTH] IN BLOOD BY AUTOMATED COUNT: 14.2 % (ref 11.6–15.1)
GFR SERPL CREATININE-BSD FRML MDRD: 17 ML/MIN/1.73SQ M
GFR SERPL CREATININE-BSD FRML MDRD: 20 ML/MIN/1.73SQ M
GLUCOSE SERPL-MCNC: 106 MG/DL (ref 65–140)
GLUCOSE SERPL-MCNC: 107 MG/DL (ref 65–140)
GLUCOSE SERPL-MCNC: 109 MG/DL (ref 65–140)
GLUCOSE SERPL-MCNC: 114 MG/DL (ref 65–140)
GLUCOSE SERPL-MCNC: 121 MG/DL (ref 65–140)
GLUCOSE SERPL-MCNC: 125 MG/DL (ref 65–140)
GLUCOSE SERPL-MCNC: 127 MG/DL (ref 65–140)
GLUCOSE SERPL-MCNC: 128 MG/DL (ref 65–140)
GLUCOSE SERPL-MCNC: 129 MG/DL (ref 65–140)
GLUCOSE SERPL-MCNC: 142 MG/DL (ref 65–140)
GLUCOSE SERPL-MCNC: 144 MG/DL (ref 65–140)
GLUCOSE SERPL-MCNC: 159 MG/DL (ref 65–140)
GLUCOSE SERPL-MCNC: 163 MG/DL (ref 65–140)
GLUCOSE SERPL-MCNC: 168 MG/DL (ref 65–140)
GLUCOSE SERPL-MCNC: 180 MG/DL (ref 65–140)
HCO3 BLDV-SCNC: 19.1 MMOL/L (ref 24–30)
HCO3 BLDV-SCNC: 19.6 MMOL/L (ref 24–30)
HCT VFR BLD AUTO: 29.4 % (ref 36.5–49.3)
HGB BLD-MCNC: 9.5 G/DL (ref 12–17)
HGB BLD-MCNC: 9.9 G/DL (ref 12–17)
INR PPP: 1.47 (ref 0.86–1.17)
MAGNESIUM SERPL-MCNC: 2.8 MG/DL (ref 1.6–2.6)
MCH RBC QN AUTO: 31 PG (ref 26.8–34.3)
MCHC RBC AUTO-ENTMCNC: 33.7 G/DL (ref 31.4–37.4)
MCV RBC AUTO: 92 FL (ref 82–98)
NASAL CANNULA: 2
NON VENT ROOM AIR: ABNORMAL %
O2 CT BLDV-SCNC: 7.3 ML/DL
O2 CT BLDV-SCNC: 9.6 ML/DL
PCO2 BLDV: 39.1 MM HG (ref 42–50)
PCO2 BLDV: 39.1 MM HG (ref 42–50)
PH BLDV: 7.31 [PH] (ref 7.3–7.4)
PH BLDV: 7.32 [PH] (ref 7.3–7.4)
PHOSPHATE SERPL-MCNC: 5.4 MG/DL (ref 2.3–4.1)
PLATELET # BLD AUTO: 73 THOUSANDS/UL (ref 149–390)
PMV BLD AUTO: 10.9 FL (ref 8.9–12.7)
PO2 BLDV: 28.5 MM HG (ref 35–45)
PO2 BLDV: 34.3 MM HG (ref 35–45)
POTASSIUM SERPL-SCNC: 4.5 MMOL/L (ref 3.5–5.3)
POTASSIUM SERPL-SCNC: 4.9 MMOL/L (ref 3.5–5.3)
POTASSIUM SERPL-SCNC: 5 MMOL/L (ref 3.5–5.3)
POTASSIUM SERPL-SCNC: 5.1 MMOL/L (ref 3.5–5.3)
PROTHROMBIN TIME: 17.9 SECONDS (ref 11.8–14.2)
RBC # BLD AUTO: 3.19 MILLION/UL (ref 3.88–5.62)
SODIUM SERPL-SCNC: 134 MMOL/L (ref 136–145)
SODIUM SERPL-SCNC: 137 MMOL/L (ref 136–145)
UNIT DISPENSE STATUS: NORMAL
UNIT PRODUCT CODE: NORMAL
UNIT RH: NORMAL
WBC # BLD AUTO: 16.89 THOUSAND/UL (ref 4.31–10.16)

## 2019-03-09 PROCEDURE — G8979 MOBILITY GOAL STATUS: HCPCS

## 2019-03-09 PROCEDURE — 99232 SBSQ HOSP IP/OBS MODERATE 35: CPT | Performed by: EMERGENCY MEDICINE

## 2019-03-09 PROCEDURE — 82805 BLOOD GASES W/O2 SATURATION: CPT | Performed by: NURSE PRACTITIONER

## 2019-03-09 PROCEDURE — G8978 MOBILITY CURRENT STATUS: HCPCS

## 2019-03-09 PROCEDURE — 99232 SBSQ HOSP IP/OBS MODERATE 35: CPT | Performed by: INTERNAL MEDICINE

## 2019-03-09 PROCEDURE — 84132 ASSAY OF SERUM POTASSIUM: CPT | Performed by: PHYSICIAN ASSISTANT

## 2019-03-09 PROCEDURE — 82948 REAGENT STRIP/BLOOD GLUCOSE: CPT

## 2019-03-09 PROCEDURE — 80048 BASIC METABOLIC PNL TOTAL CA: CPT | Performed by: THORACIC SURGERY (CARDIOTHORACIC VASCULAR SURGERY)

## 2019-03-09 PROCEDURE — 82805 BLOOD GASES W/O2 SATURATION: CPT | Performed by: THORACIC SURGERY (CARDIOTHORACIC VASCULAR SURGERY)

## 2019-03-09 PROCEDURE — 85610 PROTHROMBIN TIME: CPT | Performed by: THORACIC SURGERY (CARDIOTHORACIC VASCULAR SURGERY)

## 2019-03-09 PROCEDURE — 85027 COMPLETE CBC AUTOMATED: CPT | Performed by: THORACIC SURGERY (CARDIOTHORACIC VASCULAR SURGERY)

## 2019-03-09 PROCEDURE — 80048 BASIC METABOLIC PNL TOTAL CA: CPT | Performed by: NURSE PRACTITIONER

## 2019-03-09 PROCEDURE — 85018 HEMOGLOBIN: CPT | Performed by: NURSE PRACTITIONER

## 2019-03-09 PROCEDURE — 99024 POSTOP FOLLOW-UP VISIT: CPT | Performed by: THORACIC SURGERY (CARDIOTHORACIC VASCULAR SURGERY)

## 2019-03-09 PROCEDURE — 84100 ASSAY OF PHOSPHORUS: CPT | Performed by: THORACIC SURGERY (CARDIOTHORACIC VASCULAR SURGERY)

## 2019-03-09 PROCEDURE — 97163 PT EVAL HIGH COMPLEX 45 MIN: CPT

## 2019-03-09 PROCEDURE — 83735 ASSAY OF MAGNESIUM: CPT | Performed by: THORACIC SURGERY (CARDIOTHORACIC VASCULAR SURGERY)

## 2019-03-09 PROCEDURE — 99223 1ST HOSP IP/OBS HIGH 75: CPT | Performed by: INTERNAL MEDICINE

## 2019-03-09 RX ORDER — WARFARIN SODIUM 2.5 MG/1
2.5 TABLET ORAL
Status: COMPLETED | OUTPATIENT
Start: 2019-03-09 | End: 2019-03-09

## 2019-03-09 RX ORDER — INSULIN GLARGINE 100 [IU]/ML
20 INJECTION, SOLUTION SUBCUTANEOUS
Status: DISCONTINUED | OUTPATIENT
Start: 2019-03-09 | End: 2019-03-13

## 2019-03-09 RX ADMIN — MUPIROCIN 1 APPLICATION: 20 OINTMENT TOPICAL at 08:24

## 2019-03-09 RX ADMIN — HEPARIN SODIUM 5000 UNITS: 5000 INJECTION INTRAVENOUS; SUBCUTANEOUS at 14:45

## 2019-03-09 RX ADMIN — HEPARIN SODIUM 5000 UNITS: 5000 INJECTION INTRAVENOUS; SUBCUTANEOUS at 06:05

## 2019-03-09 RX ADMIN — MILRINONE LACTATE IN DEXTROSE 0.25 MCG/KG/MIN: 200 INJECTION, SOLUTION INTRAVENOUS at 12:23

## 2019-03-09 RX ADMIN — METOPROLOL TARTRATE 25 MG: 25 TABLET, FILM COATED ORAL at 22:05

## 2019-03-09 RX ADMIN — ASPIRIN 325 MG ORAL TABLET 325 MG: 325 PILL ORAL at 08:24

## 2019-03-09 RX ADMIN — Medication 40 MG/HR: at 12:23

## 2019-03-09 RX ADMIN — AMIODARONE HYDROCHLORIDE 200 MG: 200 TABLET ORAL at 22:05

## 2019-03-09 RX ADMIN — TRAVOPROST 1 DROP: 0.04 SOLUTION/ DROPS OPHTHALMIC at 22:07

## 2019-03-09 RX ADMIN — HEPARIN SODIUM 5000 UNITS: 5000 INJECTION INTRAVENOUS; SUBCUTANEOUS at 22:05

## 2019-03-09 RX ADMIN — POLYETHYLENE GLYCOL 3350 17 G: 17 POWDER, FOR SOLUTION ORAL at 08:24

## 2019-03-09 RX ADMIN — AMIODARONE HYDROCHLORIDE 200 MG: 200 TABLET ORAL at 06:05

## 2019-03-09 RX ADMIN — METOPROLOL TARTRATE 25 MG: 25 TABLET, FILM COATED ORAL at 08:24

## 2019-03-09 RX ADMIN — PANTOPRAZOLE SODIUM 40 MG: 40 TABLET, DELAYED RELEASE ORAL at 06:05

## 2019-03-09 RX ADMIN — Medication 40 MG/HR: at 00:18

## 2019-03-09 RX ADMIN — MUPIROCIN 1 APPLICATION: 20 OINTMENT TOPICAL at 22:05

## 2019-03-09 RX ADMIN — AMIODARONE HYDROCHLORIDE 200 MG: 200 TABLET ORAL at 14:44

## 2019-03-09 RX ADMIN — WARFARIN SODIUM 2.5 MG: 2.5 TABLET ORAL at 17:18

## 2019-03-09 RX ADMIN — CHLORHEXIDINE GLUCONATE 0.12% ORAL RINSE 15 ML: 1.2 LIQUID ORAL at 08:24

## 2019-03-09 RX ADMIN — INSULIN GLARGINE 20 UNITS: 100 INJECTION, SOLUTION SUBCUTANEOUS at 22:05

## 2019-03-09 RX ADMIN — SODIUM CHLORIDE 5 MG/HR: 0.9 INJECTION, SOLUTION INTRAVENOUS at 20:00

## 2019-03-09 RX ADMIN — ATORVASTATIN CALCIUM 20 MG: 20 TABLET, FILM COATED ORAL at 17:18

## 2019-03-09 NOTE — PROGRESS NOTES
Progress Note - Hannah Jordan 80 y o  male MRN: [id number]    Unit/Bed#: [facility] 416-01 Encounter: [id number]      CC: diabetes f/u    Subjective:   Hannah Jordan is a 80y o  year old male with type 2 diabetes  Feels well  No complaints  No hypoglycemia  Tolerating p o  Well at % of meals  Still on IV insulin and IV milrinone along with IV Lasix  Objective:     Vitals: Blood pressure (!) 82/58, pulse 88, temperature 99 3 °F (37 4 °C), temperature source Probe, resp  rate 19, height 5' 6" (1 676 m), weight 73 8 kg (162 lb 11 2 oz), SpO2 97 %  ,Body mass index is 26 26 kg/m²  Intake/Output Summary (Last 24 hours) at 3/9/2019 1440  Last data filed at 3/9/2019 1300  Gross per 24 hour   Intake 1319 94 ml   Output 1555 ml   Net -235 06 ml       Physical Exam:  General Appearance: awake, appears stated age and cooperative  Head: Normocephalic, without obvious abnormality, atraumatic  Extremities: moves all extremities  Skin: Skin color and temperature normal    Pulm: no labored breathing    Lab:    POC Glucose (mg/dl)   Date Value   03/09/2019 144 (H)   03/09/2019 159 (H)   03/09/2019 163 (H)   03/09/2019 180 (H)   03/09/2019 121   03/09/2019 128   03/09/2019 127   03/09/2019 107   03/08/2019 109   03/08/2019 152 (H)       Assessment:  diabetes with hyperglycemia  Type 2 diabetes  Currently on an insulin drip post AVR  Tolerating p o  Well at 75 to 100% of meals  Plan:  1  Will switch to basal/bolus insulin regimen today  We will start Lantus insulin 20 units at bedtime and discontinue IV insulin drip 2 hours later  2  Will start Humalog insulin subcu 5 units with each meal   3  Tomorrow, will start Humalog insulin sliding scale with q i d  Blood sugar testing  Portions of the record may have been created with voice recognition software

## 2019-03-09 NOTE — CONSULTS
Consultation - Nephrology   [facility] CTR 80 y o  male MRN: [id number]  Unit/Bed#: [facility] 416-01 Encounter: [id number]    ASSESSMENT AND PLAN:  Patient is 52-year-old male with diabetes, CKD stage 3 with baseline creatinine 1 6 to 1 8, biopsy-proven secondary FSGS, history of severe aortic insufficiency, MR, presented for AVR/mitral valve repair  We are consulted for CARY/CKD management  Nonoliguric CARY on CKD stage 3 with baseline creatinine 1 6 to 1 8, follows with Dr Zachariah Headley  -creatinine 1 9 on admission slightly elevated from his baseline now significantly worsening at 3 8 today  -patient was started on Lasix drip at 40 mg/hour with decent urine output currently  His urine output is overall fluctuating anywhere from 50 mL to 150 mL/hour  -CARY likely secondary to ischemic ATN with intraoperative hypotension episodes, cardiopulmonary bypass time 96 minutes, cross-clamp time 87 minutes  -urinalysis earlier this month showed very concentrated sample, greater than 3+ proteinuria, no hematuria  -previous UPC ratio 4 g in October 2018   -continue to closely monitor  No emergent indication for dialysis yet  If creatinine continued to worsen, he may need dialysis in the near future  This was discussed with him and all risks versus benefits were explained  Patient is agreeable for dialysis if needed and provided consent which is in the chart  -BMP in a m  Volume overload  -PAD 21 to 23, currently remains on Milrinone along with Lasix drip  Status post metolazone one dose yesterday  Urine output overall decent and continue to closely monitor   -goal to keep negative balance in 24 hours  Can give p r n  metolazone with current Lasix 40 mg per hour IV drip   -clinically patient does not seem to be in respiratory distress    Remains on room air   -currently remains on Milrinone IV drip    Biopsy-proven FSGS  -this was thought to be secondary to secondary FSGS with glomerular megaly and only 15% foot process effacement  Since then patient has been trying to lose weight   -he was on ARB due to significant proteinuria  Currently holding ARB due to CARY  Proteinuria  -last UPC ratio 4 g secondary to FSGS  -will need eventual ARB which can be restarted as outpatient  Mild metabolic acidosis with worsening renal failure  -if this is worsening, consider checking lactic acid  -venous pH 7 31, continue to monitor blood gas  Severe aortic insufficiency/MR  -status post AVR, mitral valve repair, VIRI ligation  -close ICU follow-up  Discussed with ICU team     HISTORY OF PRESENT ILLNESS:  Requesting Physician: Bj Jean-Baptiste DO  Reason for Consult:  Hallie Bowers is a 80y o  year old male who was admitted to [facility] after presenting with for elective AVR/mitral valve repair  A renal consultation is requested today for assistance in the management of CARY/CKD  Old medical records were reviewed  Patient's baseline serum creatinine seems to be 1 6 to 1 8  On admission patient's creatinine was 1 9 slightly elevated from his baseline  Patient does have biopsy-proven secondary FSGS as per my review of outpatient records  Patient was on ARB due to ongoing proteinuria and patient was working on weight loss  Patient presented for elective AVR/mitral valve repair  Postoperatively overall has been doing well  His urine output was lower with worsening CARY and patient was started on Lasix drip with overall improving urine output  Patient was given one dose of metolazone with overall decent urine output currently at the time of my encounter  Currently has [place] catheter  Denies any chest pain or shortness of Breath at the time of my encounter  Denies any nausea or vomiting  Has fair appetite      PAST MEDICAL HISTORY:  Past Medical History:   Diagnosis Date    Diabetes mellitus ([facility] )     Hyperlipidemia     Hypertension     Proteinuria     Stage 3 chronic kidney disease ([facility] )     Vitamin D deficiency        PAST SURGICAL HISTORY:  Past Surgical History:   Procedure Laterality Date    COLONOSCOPY      [place] ECHO TRANSESOPHAG R-T 2D W/PRB IMG ACQUISJ I&R N/A 3/7/2019    Procedure: INTRA-OP TRANSESOPHAGEAL ECHOCARDIOGRAM (PATIENCE);   Surgeon: Sujey Senior DO;  [facility];  Service: Cardiac Surgery    NY PERQ CLSR TCAT L ATR APNDGE W/ENDOCARDIAL IMPLNT  3/7/2019    Procedure: LEFT ATRIAL APPENDAGE OCCLUSION CLIPPED with 35mm Bennetta Cruel;  Surgeon: Sujey Senior DO;  [facility];  Service: Cardiac Surgery    [place] REPLACEMENT OF MITRAL VALVE N/A 3/7/2019    Procedure: REPLACEMENT VALVE MITRAL (MVR) REPAIR with a 30mm MEDTRONIC CG FUTURE RING;  Surgeon: Sujey Senior DO;  [facility];  Service: Cardiac Surgery    [place] RPLCMT AORTIC VALVE OPN W/STENTLESS TISSUE VALVE N/A 3/7/2019    Procedure: REPLACEMENT VALVE AORTIC (AVR) with 23mm TAVERA INSPIRIS RESILIA  AORTIC VALVE;  Surgeon: Sujey Senior DO;  [facility];  Service: Cardiac Surgery    TOE SURGERY Left        ALLERGIES:  Allergies   Allergen Reactions    Ace Inhibitors Cough       SOCIAL HISTORY:  Social History     Substance and Sexual Activity   Alcohol Use Yes    Frequency: Monthly or less    Drinks per session: 1 or 2    Binge frequency: Never    Comment: occasionally     Social History     Substance and Sexual Activity   Drug Use No     Social History     Tobacco Use   Smoking Status Former Smoker    Last attempt to quit: Moises Born Years since quittin 2   Smokeless Tobacco Never Used       FAMILY HISTORY:  Family History   Problem Relation Age of Onset    Stroke Mother     Hypertension Mother     Blindness Father     Gout Brother     Heart Valve Disease Brother     Anuerysm Neg Hx        MEDICATIONS:    Current Facility-Administered Medications:     acetaminophen (TYLENOL) tablet 650 mg, 650 mg, Oral, Q4H PRN, Campbell Fam PA-C    amiodarone tablet 200 mg, 200 mg, Oral, Q8H [address], Cheryl Currie PA-C, 200 mg at 03/09/19 1444    aspirin tablet 325 mg, 325 mg, Oral, Daily, Cheryl Currie PA-C, 325 mg at 03/09/19 5721    atorvastatin (LIPITOR) tablet 20 mg, 20 mg, Oral, Daily With Radha Lang PA-C, 20 mg at 03/09/19 1718    bisacodyl (DULCOLAX) rectal suppository 10 mg, 10 mg, Rectal, Daily PRN, Cheryl Currie PA-C    furosemide (LASIX) 500 mg infusion 50 mL, 40 mg/hr, Intravenous, Continuous, Ashanti Arrieta PA-C, Last Rate: 4 mL/hr at 03/09/19 1223, 40 mg/hr at 03/09/19 1223    heparin (porcine) subcutaneous injection 5,000 Units, 5,000 Units, Subcutaneous, Q8H [address], Ashanti Arrieta PA-C, 5,000 Units at 03/09/19 1445    insulin glargine (LANTUS) subcutaneous injection 20 Units 0 2 mL, 20 Units, Subcutaneous, HS, MD Stephanie Rosales  [START ON 3/10/2019] insulin lispro (HumaLOG) 100 units/mL subcutaneous injection 1-5 Units, 1-5 Units, Subcutaneous, TID AC **AND** [START ON 3/10/2019] Fingerstick Glucose (POCT), , , TID AC, Sapna Tao MD    [START ON 3/10/2019] insulin lispro (HumaLOG) 100 units/mL subcutaneous injection 1-5 Units, 1-5 Units, Subcutaneous, HS, Sapna Tao MD    insulin lispro (HumaLOG) 100 units/mL subcutaneous injection 5 Units, 5 Units, Subcutaneous, TID With Meals, Sapna Tao MD    insulin regular (HumuLIN R,NovoLIN R) 1 Units/mL in sodium chloride 0 9 % 100 mL infusion, 0 3-21 Units/hr, Intravenous, Titrated, Sapna Tao MD, Last Rate: 3 mL/hr at 03/09/19 1059, 3 Units/hr at 03/09/19 1059    lidocaine (cardiac) injection 100 mg, 100 mg, Intravenous, Q30 Min PRN, Cheryl Currie PA-C    metoprolol tartrate (LOPRESSOR) tablet 25 mg, 25 mg, Oral, Q12H [address], Ashanti Arrieta PA-C, 25 mg at 03/09/19 0824    Milrinone Lactate in Dextrose (PRIMACOR) 20 MG/100 ML infusion (premix), 0 38 mcg/kg/min, Intravenous, Continuous, Everlena Gilford, CRNP, Last Rate: 8 1 mL/hr at 03/09/19 1432, 0 38 mcg/kg/min at 03/09/19 1432    mupirocin (BACTROBAN) 2 % nasal ointment 1 application, 1 application, Nasal, Z17N [facility], Lauryn López PA-C, 1 application at 02/63/76 0824    ondansetron [facility]) injection 4 mg, 4 mg, Intravenous, Q6H PRN, Lauryn López PA-C, 4 mg at 03/07/19 2044    oxyCODONE-acetaminophen (PERCOCET) 5-325 mg per tablet 1 tablet, 1 tablet, Oral, Q4H PRN, Joss Zaragoza, DO, 1 tablet at 03/08/19 2107    oxyCODONE-acetaminophen (PERCOCET) 5-325 mg per tablet 2 tablet, 2 tablet, Oral, Q6H PRN, Lauryn López PA-C    pantoprazole (PROTONIX) EC tablet 40 mg, 40 mg, Oral, Daily Before Breakfast, Lauryn López PA-C, 40 mg at 03/09/19 0605    polyethylene glycol (MIRALAX) packet 17 g, 17 g, Oral, Daily, Lauryn López PA-C, 17 g at 03/09/19 2133    sodium chloride infusion 0 45 %, 20 mL/hr, Intravenous, Continuous, Lauryn López PA-C, Last Rate: 20 mL/hr at 03/07/19 1222, 20 mL/hr at 03/07/19 1222    travoprost (TRAVATAN-Z) 0 004 % ophthalmic solution 1 drop, 1 drop, Both Eyes, HS, Lauryn López PA-C, 1 drop at 03/08/19 2100    REVIEW OF SYSTEMS:  Complete 10 point review of systems were obtained and discussed in length with the patient  Complete review of systems were negative / unremarkable except mentioned in the HPI section       PHYSICAL EXAM:  Current Weight: Weight - Scale: 73 8 kg (162 lb 11 2 oz)  First Weight: Weight - Scale: 70 8 kg (156 lb)  Vitals:    03/09/19 1700   BP: 114/65   Pulse: 88   Resp: 22   Temp: 99 7 °F (37 6 °C)   SpO2: 99%       Intake/Output Summary (Last 24 hours) at 3/9/2019 1732  Last data filed at 3/9/2019 1701  Gross per 24 hour   Intake 2080 77 ml   Output 1765 ml   Net 315 77 ml       Physical Examination:  General:  Sitting in chair, no acute distress  Eyes:  Mild conjunctival pallor present  ENT:  External examination of ears and nose unremarkable  Neck:  Supple  Respiratory:  Bilateral air entry present no significant crackles appreciated  CVS:  S1, S2 present  GI:  Soft, nontender, nondistended  CNS:  Active alert oriented x3  Extremities:  No significant edema  Psych:  Conscious, coherent, oriented  Skin:  No new rash in legs    Invasive Devices:   Urethral Catheter Non-latex; Temperature probe 16 Fr   (Active)   Reasons to continue Urinary Catheter  Accurate I&O assessment in critically ill patients (48 hr  max) 3/9/2019 10:09 AM   Goal for Removal Remove after 48 hrs of I/O monitoring 3/9/2019 10:09 AM   Site Assessment Clean;Skin intact 3/9/2019  4:00 PM   Collection Container Standard drainage bag 3/9/2019  4:00 PM   Securement Method Securing device (Describe) 3/9/2019  4:00 PM   Output (mL) 175 mL 3/9/2019  5:00 PM     Lab Results:   Results from last 7 days   Lab Units 03/09/19  1556 03/09/19  1354 03/09/19  0945 03/09/19  0458 03/09/19  0021 03/09/19 03/08/19  1616 03/08/19  0346 03/07/19  2037  03/07/19  1213 03/07/19  1204 03/07/19  1050 03/07/19  1023 03/07/19  1004 03/07/19  0946 03/07/19  0945 03/07/19  0937 03/07/19  0923   WBC Thousand/uL  --   --   --   --   --  16 89*  --  18 50*  --   --   --   --   --   --   --   --   --   --   --    HEMOGLOBIN g/dL 9 5*  --   --   --   --  9 9*  --  11 1* 11 7*  --   --  11 9*  --   --   --   --   --   --   --    I STAT HEMOGLOBIN g/dl  --   --   --   --   --   --   --   --   --   --  10 9*  --  9 2* 9 9* 9 9*  --  10 2* 10 2* 8 2*   HEMATOCRIT %  --   --   --   --   --  29 4*  --  33 2* 34 6*  --   --  35 0*  --   --   --   --   --   --   --    HEMATOCRIT, ISTAT %  --   --   --   --   --   --   --   --   --   --  32*  --  27* 29* 29*  --  30* 30* 24*   PLATELETS Thousands/uL  --   --   --   --   --  73*  --  131*  --   --   --  144*  --   --   --  158  --   --   --    POTASSIUM mmol/L  --  4 5 4 9 5 1 5 0  --  5 0 5 1 4 4   < >  --  4 3  --   --   --   --   --   --   --    CHLORIDE mmol/L  --  105  --  107  --   --  109* 111*  --   --   --  112*  --   --   --   --   --   --   --    CO2 mmol/L  --  18*  --  21  --   --  20* 22  --   --   -- 23  --   --   --   --   --   --   --    CO2, I-STAT mmol/L  --   --   --   --   --   --   --   --   --   --  24  --  28 27 31  --  24 33* 27   BUN mg/dL  --  49*  --  45*  --   --  39* 34*  --   --   --  25  --   --   --   --   --   --   --    CREATININE mg/dL  --  3 89*  --  3 48*  --   --  3 02* 2 54*  --   --   --  1 94*  --   --   --   --   --   --   --    CALCIUM mg/dL  --  8 2*  --  8 3  --   --  8 1* 8 2*  --   --   --  8 0*  --   --   --   --   --   --   --    MAGNESIUM mg/dL  --   --   --  2 8*  --   --   --  3 1*  --   --   --   --   --   --   --   --   --   --   --    PHOSPHORUS mg/dL  --   --   --  5 4*  --   --   --   --   --   --   --   --   --   --   --   --   --   --   --    GLUCOSE, ISTAT mg/dl  --   --   --   --   --   --   --   --   --   --  110  --  98 111 154*  --  185* 171* 140    < > = values in this interval not displayed  Other Studies:   Results for orders placed during the hospital encounter of 03/07/19   XR chest portable    Narrative CHEST     INDICATION:   Cardiac surgery  COMPARISON:  March 7, 2019    EXAM PERFORMED/VIEWS:  XR CHEST PORTABLE      FINDINGS:  Endotracheal and enteric tubes been removed  Again noted are sternotomy wires, left atrial occlusion devices, and prosthetic cardiac valves  Also again noted is a left subclavian central venous catheter and a [place] catheter with   unchanged position from prior examination  Mediastinal drain is reidentified  Cardiomediastinal silhouette appears unremarkable  The lungs are clear  No pneumothorax or pleural effusion  Osseous structures appear within normal limits for patient age  Impression Expected radiographic appearance after cardiac surgery  Workstation performed: [id number]       Results for orders placed during the hospital encounter of 01/29/19   XR chest 2 views    Narrative CHEST     INDICATION:   sob      COMPARISON:  None    EXAM PERFORMED/VIEWS:  XR CHEST PA & LATERAL      FINDINGS:    Heart enlarged  Pulmonary vessels unremarkable  Subsegmental atelectasis in the left lower lobe  Lungs otherwise clear  Small amount of bibasilar pleural fluid or thickening  Osseous structures appear within normal limits for patient age  Impression Bibasilar pleural effusions or thickening  Left lower lobe subsegmental atelectasis  Workstation performed: [id number]       Results for orders placed during the hospital encounter of 02/21/19   CT chest wo contrast    Narrative CT CHEST WITHOUT IV CONTRAST    INDICATION:   Q23 1: Congenital insufficiency of aortic valve  I35 1: Nonrheumatic aortic (valve) insufficiency  I34 0: Nonrheumatic mitral (valve) insufficiency  COMPARISON:  CT chest 8/11/2016    TECHNIQUE: CT examination of the chest was performed without intravenous contrast   Axial, sagittal, and coronal 2D reformatted images were created from the source data and submitted for interpretation  Radiation dose length product (DLP) for this visit:  264 mGy-cm   This examination, like all CT scans performed in the [facility], was performed utilizing techniques to minimize radiation dose exposure, including the use of iterative   reconstruction and automated exposure control  FINDINGS:    LUNGS:  Lungs are clear  There is no tracheal or endobronchial lesion  PLEURA:  Unremarkable  HEART/GREAT VESSELS:  Atherosclerotic changes are noted in thoracic aorta and coronary arteries  There is no evidence of atherosclerotic disease involving the ascending aorta  There is minimal calcification of the aortic valve  Ascending aorta measures 3 2 cm in diameter  Distance from ascending aorta to the sternum measures 2 3 cm with a small   amount of intervening mediastinal fat  MEDIASTINUM AND DIMITRIOS:  Unremarkable  CHEST WALL AND LOWER NECK:   Unremarkable  VISUALIZED STRUCTURES IN THE UPPER ABDOMEN:  Unremarkable      OSSEOUS STRUCTURES:  No acute fracture or destructive osseous lesion  Impression No evidence of acute intrathoracic pathology  Workstation performed: [id number]     Chest x-ray personally reviewed which shows lungs clear  No results found for this or any previous visit  Portions of the record may have been created with voice recognition software  Occasional wrong word or "sound a like" substitutions may have occurred due to the inherent limitations of voice recognition software  Read the chart carefully and recognize, using context, where substitutions have occurred

## 2019-03-09 NOTE — PHYSICAL THERAPY NOTE
PHYSICAL THERAPY EVALUATION          Patient Name: Cheli Erwin  GGMCN'F Date: 3/9/2019     Past Medical History:   Diagnosis Date    Diabetes mellitus ([address] )      Hyperlipidemia      Hypertension      Proteinuria      Stage 3 chronic kidney disease (HCC)      Vitamin D deficiency              Past Surgical History:   Past Surgical History:   Procedure Laterality Date    COLONOSCOPY        TOE SURGERY Left              Family History:        Family History   Problem Relation Age of Onset    Stroke Mother      Hypertension Mother      Blindness Father      Gout Brother      Heart Valve Disease Brother      Anuerysm Neg Hx             03/09/19 1340   Note Type   Note type Eval only   Pain Assessment   Pain Assessment No/denies pain   Pain Score No Pain   Home Living   Type of [address] One level;Stairs to enter with rails  (1 MICHELLE)   Home Equipment Cane;Walker  (belongs to his wife)   Prior Function   Level of [place] Independent with ADLs and functional mobility   Lives With Spouse   Receives Help From Family   ADL Assistance Independent   IADLs Independent   Falls in the last 6 months 0   Vocational Part time employment   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   Restrictions/Precautions   Weight Bearing Precautions Per Order No   Other Precautions Multiple lines;Telemetry; Fall Risk  (Sternal precautions, chest tube, swanz alban)   General   Family/Caregiver Present Yes  (pt wife and daughter)   Cognition   Overall Cognitive Status WFL   Arousal/Participation Alert   Orientation Level Oriented X4   Following Commands Follows multistep commands with increased time or repetition   RUE Assessment   RUE Assessment WFL   LUE Assessment   LUE Assessment WFL   RLE Assessment   RLE Assessment X   Strength RLE   RLE Overall Strength 4/5   LLE Assessment   LLE Assessment X   Strength LLE   LLE Overall Strength 4/5 Coordination   Movements are Fluid and Coordinated 0   Coordination and Movement Description slow cautious movements secondary to multiple lines   Bed Mobility   Additional Comments Not assessed, pt receive sitting in bedside chair, agreeable to therapy session, pt left sitting in bedside chair at termination of session, all needs within reach   Transfers   Sit to Stand 4  Minimal assistance   Additional items Assist x 1; Increased time required;Verbal cues  (additional person for line management)   Stand to Sit 4  Minimal assistance   Additional items Assist x 1; Increased time required;Verbal cues  (additional person for line management)   Toilet transfer 4  Minimal assistance   Additional items Assist x 1; Increased time required;Commode  (additional person for line management)   Ambulation/Elevation   Gait pattern Improper Weight shift;Decreased foot clearance;Shuffling; Short stride; Step to;Excessively slow   Gait Assistance 3  Moderate assist   Additional items Assist x 1;Verbal cues; Tactile cues  (additional person for line management)   Assistive Device Rolling walker   Distance 2' from bedside chair to commode  (distance limited by hiogiVaughn cath)   Stair Management Assistance Not tested   Balance   Static Sitting Good   Dynamic Sitting Fair +   Static Standing Fair  (RW)   Dynamic Standing Fair -  (RW)   Ambulatory Fair -  (RW)   Endurance Deficit   Endurance Deficit No   Activity Tolerance   Activity Tolerance Patient tolerated treatment well   Medical Staff Made Aware Restorative, Annie   Nurse Made Aware yes, RN cleared pt for PT evaluation   Assessment   Prognosis Good   Problem List Decreased strength;Decreased range of motion;Decreased endurance; Impaired balance;Decreased mobility; Decreased coordination;Decreased skin integrity;Orthopedic restrictions   Assessment Pt is 80 y o  male seen for PT evaluation s/p [facility]  Two pt identifiers were used to confirm   Pt presented s/p aortic valve repair which was performed on 3/7/19  Pt was admitted with a primary dx of: biscuspid aortic valve  PT now consulted for assessment of mobility and d/c needs  Pts current co morbidities effecting treatment include: DM,CKD, diabetic neuropathy, HTN, CHF, coronary artery disease and personal factors including 1 MICHELLE home environment and being the primary caregiver for his wife  Pt currently lives at home with his wife  Pts current clinical presentation is Unstable/ Unpredictable (high complexity) due to Ongoing medical management for primary dx, Increased reliance on more restrictive AD compared to baseline, decreased activity tolerance compared to baseline, fall risk, increased assistance needed from caregiver at current time, continuous pulse oximetry monitoring, multiple lines, decline in overall functional mobility status  Prior to admission, pt was I with ambulation without the use of an AD as per pt  Upon evaluation, pt currently is requiring min A x 1 plus additional person for line management for transfers and min A x 1 plus additional person for line management for ambulation w/ RW  Pt displays decreased step and stride length, decreased gait speed, shuffling gait pattern, improper weight shift  Pt presents at PT eval functioning below baseline and currently w/ overall mobility deficits secondary to: decreased strength, decreased endurance, impaired balance, decreased coordination  Pt currently at a fall risk secondary to impairments listed above  Based on PT evaluation, pt will continue to benefit from skilled acute PT interventions to address stated impairments; to maximize functional mobility; for ongoing pt/ family training; and DME needs  At conclusion of PT session pt was left seated in bedside chair, all needs within reach  Provided pt education regarding PT plan to improve functional mobility status  PT is currently recommending home with family support and outpatient PT    Pt agreeable to plan and goals as stated on evaluation  PT will continue to follow during hospital stay  Goals   Patient Goals get back to normal   STG Expiration Date 03/19/19   Short Term Goal #1 1  Pt will increased strength by 1 grade in order to increase functional independence with transfers  2  Pt will increase balance grade by 1 in order to improve safety  3  Pt will improve transfer ability to mod I to increase functional independence and decrease caregiver burden  4  Pt will perform bed mobility independently to decrease caregiver burden  5  Pt will be able to amb 150 ft with RW and mod I to increase functional independence in home and community environments  6  Pt will be able to ascend and descend 1 step mod I to facilitate pt ability to enter home environment  Treatment Day 1   Plan   Treatment/Interventions Functional transfer training;LE strengthening/ROM; Elevations; Therapeutic exercise; Endurance training;Patient/family training;Gait training;Spoke to nursing;Family   PT Frequency Other (Comment)  (4-6x/wk)   Recommendation   Recommendation Home with family support; Outpatient PT   Equipment Recommended Walker  (RW)   PT - OK to Discharge No  (increase ambulation distance and trial stairs)   Modified [place] Scale   Modified Yen Scale 4   Barthel Index   Feeding 10   Bathing 0   Grooming Score 0   Dressing Score 0   Bladder Score 0   Bowels Score 10   Toilet Use Score 5   Transfers (Bed/Chair) Score 5   Mobility (Level Surface) Score 0   Stairs Score 0   Barthel Index Score 30     Pasha Ayala PT, DPT

## 2019-03-09 NOTE — PLAN OF CARE
Problem: PHYSICAL THERAPY ADULT  Goal: Performs mobility at highest level of function for planned discharge setting  See evaluation for individualized goals  Description  Treatment/Interventions: Functional transfer training, LE strengthening/ROM, Elevations, Therapeutic exercise, Endurance training, Patient/family training, Gait training, Spoke to nursing, Family  Equipment Recommended: Walker(KYLAH)       See flowsheet documentation for full assessment, interventions and recommendations  Note:   Prognosis: Good  Problem List: Decreased strength, Decreased range of motion, Decreased endurance, Impaired balance, Decreased mobility, Decreased coordination, Decreased skin integrity, Orthopedic restrictions  Assessment: Pt is 80 y o  male seen for PT evaluation s/p admit to [facility] on 3/7/19  Two pt identifiers were used to confirm  Pt presented s/p aortic valve repair which was performed on 3/7/19  Pt was admitted with a primary dx of: biscuspid aortic valve  PT now consulted for assessment of mobility and d/c needs  Pts current co morbidities effecting treatment include: DM,CKD, diabetic neuropathy, HTN, CHF, coronary artery disease and personal factors including 1 MICHELLE home environment and being the primary caregiver for his wife  Pt currently lives at home with his wife  Pts current clinical presentation is Unstable/ Unpredictable (high complexity) due to Ongoing medical management for primary dx, Increased reliance on more restrictive AD compared to baseline, decreased activity tolerance compared to baseline, fall risk, increased assistance needed from caregiver at current time, continuous pulse oximetry monitoring, multiple lines, decline in overall functional mobility status  Prior to admission, pt was I with ambulation without the use of an AD as per pt   Upon evaluation, pt currently is requiring min A x 1 plus additional person for line management for transfers and min A x 1 plus additional person for line management for ambulation w/ RW  Pt displays decreased step and stride length, decreased gait speed, shuffling gait pattern, improper weight shift  Pt presents at PT eval functioning below baseline and currently w/ overall mobility deficits secondary to: decreased strength, decreased endurance, impaired balance, decreased coordination  Pt currently at a fall risk secondary to impairments listed above  Based on PT evaluation, pt will continue to benefit from skilled acute PT interventions to address stated impairments; to maximize functional mobility; for ongoing pt/ family training; and DME needs  At conclusion of PT session pt was left seated in bedside chair, all needs within reach  Provided pt education regarding PT plan to improve functional mobility status  PT is currently recommending home with family support and outpatient PT  Pt agreeable to plan and goals as stated on evaluation  PT will continue to follow during hospital stay  Recommendation: Home with family support, Outpatient PT     PT - OK to Discharge: No(increase ambulation distance and trial stairs)    See flowsheet documentation for full assessment

## 2019-03-09 NOTE — PROGRESS NOTES
Progress Note - Critical Care   Myra Mustafa 80 y o  male MRN: [id number]  Unit/Bed#: [facility] 416-01 Encounter: [id number]      Attending Physician: Mary Ontiveros DO    24 Hour Events:   POD # 2 s/p AVR (23mm Inspirus), mitral valve repair (30 mm [employer] cG futures mitral) annuloplasty ring)/VIRI ligation  UO marginal->Lasix gtt at 40mg/hr started yesterday evening; Zaroxolyn 10 mg x 1 dose overnight up /hr, net positive 950ml/24 hours    Milrinone 0 38    Allergies:    Allergies   Allergen Reactions    Ace Inhibitors Cough       Medications:   Scheduled Meds:  Current Facility-Administered Medications:  acetaminophen 650 mg Oral Q4H PRN Kristian Delvalle PA-C    amiodarone 200 mg Oral [facility] Dkolmarquis Delvalle PA-C    aspirin 325 mg Oral Daily Adolmarquis Delvalle PA-C    atorvastatin 20 mg Oral Daily With [employer] ShilaYINA    bisacodyl 10 mg Rectal Daily PRN Dkolmarquis Delvalle PA-C    chlorhexidine 15 mL Mouth/Throat BID Dkolmarquis Delvalle PA-C    epinephrine 1-20 mcg/min Intravenous Continuous Kristian Delvalle PA-C Last Rate: Stopped (03/08/19 1235)   furosemide 40 mg/hr Intravenous Continuous Ashanti Arrieta PA-C Last Rate: 40 mg/hr (03/09/19 0018)   heparin (porcine) 5,000 Units Subcutaneous [facility] Ashanti Arrieta PA-C    insulin regular (HumuLIN R,NovoLIN R) infusion 0 3-21 Units/hr Intravenous Titrated Kristian Delvalle PA-C Last Rate: 1 5 Units/hr (03/09/19 0604)   lidocaine (cardiac) 100 mg Intravenous Q30 Min PRN Kristian Delvalle PA-C    metoprolol tartrate 25 mg Oral Q12H [facility] Ashanti Arrieta PA-C    milrinone [facility]) infusion 0 38 mcg/kg/min Intravenous Continuous Parvez Cazares PA-C Last Rate: 0 38 mcg/kg/min (03/08/19 7660)   mupirocin 1 application Nasal S17P [facility] Kristian Delvalle PA-C    niCARdipine 1-15 mg/hr Intravenous Titrated Ida Young PA-C Last Rate: Stopped (03/08/19 1936)   ondansetron 4 mg Intravenous Q6H PRN Kristian Delvalle PA-C    oxyCODONE-acetaminophen 1 tablet Oral Q4H PRN Elizabeth Zavala DO Suly    oxyCODONE-acetaminophen 2 tablet Oral Q6H PRN ELANA Alonzo-C    pantoprazole 40 mg Oral Daily Before Breakfast SANDRA AlonzoC    phenylephine  mcg/min Intravenous Titrated ELANA Webster-C Last Rate: Stopped (03/08/19 2143)   polyethylene glycol 17 g Oral Daily Clint Lopez, PA-C    potassium chloride 20 mEq Intravenous Once PRN Clint Lopez, PA-C    potassium chloride 20 mEq Intravenous Q1H PRN Clint Lopez, PA-C    potassium chloride 20 mEq Intravenous Q30 Min PRN Clint Lopez, PA-C    sodium chloride 20 mL/hr Intravenous Continuous Clint Lopez, PA-C Last Rate: 20 mL/hr (03/07/19 1222)   travoprost 1 drop Both Eyes HS ELANA Alonzo-C        VTE Pharmacologic Prophylaxis: Heparin  VTE Mechanical Prophylaxis: sequential compression device    Continuous Infusions:  epinephrine 1-20 mcg/min Last Rate: Stopped (03/08/19 1235)   furosemide 40 mg/hr Last Rate: 40 mg/hr (03/09/19 0018)   insulin regular (HumuLIN R,NovoLIN R) infusion 0 3-21 Units/hr Last Rate: 1 5 Units/hr (03/09/19 0604)   milrinone (PRIMACOR) infusion 0 38 mcg/kg/min Last Rate: 0 38 mcg/kg/min (03/08/19 2147)   niCARdipine 1-15 mg/hr Last Rate: Stopped (03/08/19 1936)   phenylephine  mcg/min Last Rate: Stopped (03/08/19 2143)   sodium chloride 20 mL/hr Last Rate: 20 mL/hr (03/07/19 1222)     PRN Meds:    acetaminophen 650 mg Q4H PRN   bisacodyl 10 mg Daily PRN   lidocaine (cardiac) 100 mg Q30 Min PRN   ondansetron 4 mg Q6H PRN   oxyCODONE-acetaminophen 1 tablet Q4H PRN   oxyCODONE-acetaminophen 2 tablet Q6H PRN   potassium chloride 20 mEq Once PRN   potassium chloride 20 mEq Q1H PRN   potassium chloride 20 mEq Q30 Min PRN       Home Medications:   Prior to Admission medications    Medication Sig Start Date End Date Taking?  Authorizing Provider   ascorbic acid (VITAMIN C) 500 mg tablet Take 500 mg by mouth daily   Yes Historical Provider, MD   atorvastatin (LIPITOR) 20 mg tablet Take 1 tablet by mouth daily 18  Yes Historical Provider, MD   calcitriol (ROCALTROL) 0 25 mcg capsule Take 1 capsule by mouth 2 (two) times a week 1/1/15  Yes Historical Provider, MD   Cholecalciferol (VITAMIN D3) 1000 units CAPS Take 1 capsule by mouth daily   Yes Historical Provider, MD   furosemide (LASIX) 20 mg tablet Take 20 mg by mouth 3 (three) times a week   Yes Historical Provider, MD   furosemide (LASIX) 40 mg tablet Take 40 mg by mouth 4 (four) times a week   Yes Historical Provider, MD   metFORMIN (GLUCOPHAGE) 500 mg tablet Take 1 tablet by mouth daily 18  Yes Historical Provider, MD   metoprolol succinate (TOPROL-XL) 50 mg 24 hr tablet Take 50 mg by mouth daily    Yes Historical Provider, MD   Multiple Vitamin (MULTIVITAMIN) tablet Take 1 tablet by mouth daily   Yes Historical Provider, MD   mupirocin (BACTROBAN) 2 % ointment Apply inside both nostrils two times per day  Start 5 days prior to surgery  19  Yes Demetri Dailey PA-C   olmesartan (BENICAR) 20 mg tablet Take 1 tablet (20 mg total) by mouth 2 (two) times a day 19  Yes JHONATAN Stewart   TRAVATAN Z 0 004 % ophthalmic solution  18  Yes Historical Provider, MD       Vitals:   Esabilly Skeens:    19 0100 19 0200 19 0300 19 0400   BP: 113/68 114/70 116/72 119/73   Pulse: 94 94 98 100   Resp:    Temp: 98 °F (36 7 °C) 98 2 °F (36 8 °C) 98 2 °F (36 8 °C) 98 1 °F (36 7 °C)   TempSrc: Probe Probe Probe Probe   SpO2: 98% 98% 98% 98%   Weight:       Height:         Arterial Line BP: 144/62  Arterial Line MAP (mmHg): 82 mmHg    Tele Rhythm: Sinus Tachycardia This was personally reviewed by myself      Respiratory:  SpO2: SpO2: 98 %  O2 Flow Rate (L/min): 2 L/min    Temperature: Temp (24hrs), Av 9 °F (36 6 °C), Min:97 1 °F (36 2 °C), Max:98 4 °F (36 9 °C)  Current: Temperature: 98 1 °F (36 7 °C)    Weights:   Weight (last 2 days)     Date/Time   Weight    19 06   71 8 (158 29)    19 0613   70 8 (156)            IBW: 63 8 kg  Body mass index is 25 55 kg/m²  Hemodynamic Monitoring:  PAP: PAP: 37/17, PAP mean:   , CVP: CVP (mean): 13 mmHg, PCWP:   , CO:  , CI:2 3-3 1  , SVR: SVR (dyne*sec)/cm5: 1206 (dyne*sec)/cm5  PAP: (21-39)/(7-18) 37/17    Intake and Outputs:    Intake/Output Summary (Last 24 hours) at 3/9/2019 0639  Last data filed at 3/9/2019 0400  Gross per 24 hour   Intake 1866 87 ml   Output 915 ml   Net 951 87 ml     I/O last 24 hours: In: 3163 7 [P O :300; I V :2013 7; IV Piggyback:850]  Out: 7129 [Urine:1240; Chest Tube:290]    UOP: /hour     Chest tube Output:  Mediastinal tubes: 20 mL/8 hours  180 mL/24 hours   Pleural tubes:        Labs:  Results from last 7 days   Lab Units 03/09/19 03/08/19  0346 03/07/19  2037  03/07/19  1204  03/07/19  0946  03/02/19  0743   WBC Thousand/uL 16 89* 18 50*  --   --   --   --   --   --  6 29   HEMOGLOBIN g/dL 9 9* 11 1* 11 7*  --  11 9*  --   --   --  14 1   I STAT HEMOGLOBIN   --   --   --    < >  --    < >  --    < >  --    HEMATOCRIT % 29 4* 33 2* 34 6*  --  35 0*  --   --   --  40 5   HEMATOCRIT, ISTAT   --   --   --    < >  --    < >  --    < >  --    PLATELETS Thousands/uL  --  131*  --   --  144*  --  158  --  199    < > = values in this interval not displayed       Results from last 7 days   Lab Units 03/09/19  0458 03/09/19  0021 03/08/19  1616 03/08/19  0346  03/07/19  1213  03/07/19  1050 03/07/19  1023   SODIUM mmol/L 137  --  139 141  --   --    < >  --   --    POTASSIUM mmol/L 5 1 5 0 5 0 5 1   < >  --    < >  --   --    CHLORIDE mmol/L 107  --  109* 111*  --   --    < >  --   --    CO2 mmol/L 21  --  20* 22  --   --    < >  --   --    CO2, I-STAT mmol/L  --   --   --   --   --  24  --  28 27   BUN mg/dL 45*  --  39* 34*  --   --    < >  --   --    CREATININE mg/dL 3 48*  --  3 02* 2 54*  --   --    < >  --   --    CALCIUM mg/dL 8 3  --  8 1* 8 2*  --   --    < >  --   --    GLUCOSE, ISTAT mg/dl  --   --   --   --   --  110  --  98 111 < > = values in this interval not displayed  Baseline creat 1 7    Results from last 7 days   Lab Units 03/09/19  0458 03/08/19  0346   MAGNESIUM mg/dL 2 8* 3 1*     Results from last 7 days   Lab Units 03/09/19  0604 03/08/19  1050   INR  1 47* 1 29*     Results from last 7 days   Lab Units 03/02/19  0743   HEMOGLOBIN A1C % 5 9     SVO2 61  Imaging:  AM CXR: none this AM  AM EKG: ST on tele    Physical Exam:    General Appearance:  OOB to chair, NAD  HENT: Atraumatic without obvious abnormality  Neck: No JVDCVC in place  Eyes: no icterus  Cardiac: ST RRR , DP pulses 1+  Pulmonary: CTA to auscultation bilaterally  decreased bases, -750  Gastrointestinal: soft NDNT +BS  : Ovalles present: yes   Musculoskeletal: 1+ edema bilaterally  Neuro:  Screening neurologic exam reveals no deficits  follows commands  Psych: Mood and affect appropriate to clinical situation  Skin: warm dry and intact  Incisions: Sternum is stable  Incision dressed with Acticoat  No erythema or drainage     Invasive lines and devices: Invasive Devices     Central Venous Catheter Line            CVC Central Lines 03/07/19 Triple 1 day    Introducer 03/07/19 1 day          Peripheral Intravenous Line            Peripheral IV 02/15/19 Left Wrist 21 days    Peripheral IV 03/07/19 Left Wrist 1 day    Peripheral IV 03/07/19 Right Wrist 1 day          Arterial Line            Arterial Line 03/07/19 Right Radial 1 day          Line            Pacer Wires 1 day    Pacer Wires 1 day          Drain            Chest Tube 1 Posterior Mediastinal 32 Fr  1 day    Chest Tube 2 Anterior Mediastinal 32 Fr  1 day    Urethral Catheter Non-latex; Temperature probe 16 Fr  1 day                Assessment:  Principal Problem:    Bicuspid aortic valve  Active Problems:    S/P AVR    S/P MVR (mitral valve repair)    Nonrheumatic aortic valve insufficiency    CARY (acute kidney injury) (HCC)    Non-rheumatic mitral regurgitation    Dilated cardiomyopathy ([place] [id number] )    Chronic systolic congestive heart failure (HCC)    Coronary artery disease involving native coronary artery of native heart without angina pectoris    CKD (chronic kidney disease) stage 3, GFR 30-59 ml/min (HCC)    Diabetes mellitus type 2 in nonobese [facility])    Essential hypertension    Diabetic nephropathy associated with type 2 diabetes mellitus ([place] [address] )    Secondary hyperparathyroidism ([place] [address] )      Plan:    Neuro:   · Pain controlled with: percocet and tylenol PRN  · Regulate sleep/wake cycle  · Restoril QHS PRN  · Trend neuro exam  CV:   · Cardiac infusions: Primacor, 0 38 mcg/kg/min, decrease to 0 25 this AM  · Wean Primacore to off as able  · MAP goal > 65 and CI >2 2  · Continue [place] catheter and cordis  · Continue Arterial line  · Rhythm: Sinus Tachycardia  · Follow rhythm on telemetry  · Start lopressor 25 mg PO BID  · Maintain epicardial pacing wires for PRN pacing  · Continue PO amio, statin, and ASA therapy  · Consult cardiology for post-op management  · Continue DVT prophylaxis    Lung:   · Acute post-op pulmonary insufficiency; Requiring 2 Liters via nasal cannula, secondary to poor inspiratory effort secondary to pain  Continue incentive spirometry/coughing/deep breathing exercises  Wean supplemental oxygen as tolerated for saturation > 90%  · Wean FiO as tolerated  · SpO2 goal >92%  · Continue IS, deep breathing and coughing exercises    · Chest tube output remains persistently high; Continue chest tubes to suction today  GI:   · Continue PPI for stress ulcer prophylaxis  · Continue bowel regimen  FEN:   · Diuretic plan: Lasix gtt at 40 mg/hr, if UO decrease redose Zaroxolyn  · K-dur PRN for goal K >4  · Goal 24 hour fluid balance: net negative 500-1000  · Nutrition/diet plan: Initiate TLC 2 3 gm sodium diet with 1800 mL fluid restriction   · Replete electrolytes with goals: K >4 0, Mag >2 0, and Phos >3 0  :   · Indwelling Ovalles present: yes   · Continue Ovalles  · Trend UOP and BUN/creat  · Strict I and O  ID:   · Trend temps and WBC count  · Maintain normothermia  · Tylenol PRN for fevers  Heme:   · Trend hgb and plts  · Transfuse as needed for goal hgb >7  · Redose coumadin 2 5 mg HS, INR in theAM  Endo:   · Glycemic control plan: Endocrine following, continue insulin gtt  MSK/Skin:  · Mobility goal: OOB and ambulating as able  · PT consult: yes  · OT consult: yes  · Frequent turning and pressure off-loading  · Local wound care as needed  Disposition:  Continue in critical care setting    Collaborative bedside rounds performed with cardiac surgery attending and bedside RN      SIGNATURE: JHONATAN Ramirez  DATE: March 9, 2019  TIME: 6:39 AM

## 2019-03-09 NOTE — PROCEDURES
03/09/19    Procedure: Epicardial Pacing Wire removal    Myra Mustafa was returned to bed and informed of mandatory one hour post-procedure bed rest   The assigned nurse was notified  Epicardial pacing wires removed in routine fashion, without incident  The patient tolerated the procedure well  Vital signs ordered  q 15 minutes for one hour, as per protocol      SIGNATURE: Kristin Kenyon PA-C  DATE: March 9, 2019  TIME: 6:47 PM

## 2019-03-09 NOTE — PROGRESS NOTES
Progress Note - Cardiothoracic Surgery   [facility] 80 y o  male MRN: [id number]  Unit/Bed#: [facility] 416-01 Encounter: [id number]      POD # 2 s/p AVR, MV repair, VIRI ligation    Pt seen/examined  Interval history and data reviewed with critical care team   Pt doing well  No specific complaints      Milrinone 0 5  Lasix gtt 40      Medications:   Scheduled Meds:  Current Facility-Administered Medications:  acetaminophen 650 mg Oral Q4H PRN Isaac Host, PA-C    amiodarone 200 mg Oral [facility] Isaac Host, PA-C    aspirin 325 mg Oral Daily Isaac Host, PA-C    atorvastatin 20 mg Oral Daily With [employer] ShilaYINA    bisacodyl 10 mg Rectal Daily PRN Isaac Host, PA-NIC    chlorhexidine 15 mL Mouth/Throat BID Isaac Sullivan, PA-NIC    furosemide 40 mg/hr Intravenous Continuous Bentley Hartmann PA-C Last Rate: 40 mg/hr (03/09/19 0018)   heparin (porcine) 5,000 Units Subcutaneous [facility] Ashanti Arrieta PA-C    insulin regular (HumuLIN R,NovoLIN R) infusion 0 3-21 Units/hr Intravenous Titrated ELANA Byers-NIC Last Rate: 1 5 Units/hr (03/09/19 0604)   lidocaine (cardiac) 100 mg Intravenous Q30 Min PRN Isaac Sullivan PA-C    metoprolol tartrate 25 mg Oral Q12H [address] Ashanti Arrieta PA-C    milrinone [facility]) infusion 0 38 mcg/kg/min Intravenous Continuous Staci ChanelYINA li Last Rate: 0 25 mcg/kg/min (03/09/19 0800)   mupirocin 1 application Nasal X94F [address] IsaacELANA Robb-NIC    ondansetron 4 mg Intravenous Q6H PRN Isaac , PA-C    oxyCODONE-acetaminophen 1 tablet Oral Q4H PRN Sen SensorDO    oxyCODONE-acetaminophen 2 tablet Oral Q6H PRN Isaac Host, PA-NIC    pantoprazole 40 mg Oral Daily Before Breakfast Isaac Sullivan, PA-C    polyethylene glycol 17 g Oral Daily Isaac Host, PA-C    potassium chloride 20 mEq Intravenous Once PRN Isaac Host, PA-C    potassium chloride 20 mEq Intravenous Q1H PRN Isaac Host, PA-C    potassium chloride 20 mEq Intravenous Q30 Min PRN Manuel De Leon YINA Fuchs    sodium chloride 20 mL/hr Intravenous Continuous Love Do, PA-C Last Rate: 20 mL/hr (03/07/19 1222)   travoprost 1 drop Both Eyes HS Love Do, PA-C      Continuous Infusions:  furosemide 40 mg/hr Last Rate: 40 mg/hr (03/09/19 0018)   insulin regular (HumuLIN R,NovoLIN R) infusion 0 3-21 Units/hr Last Rate: 1 5 Units/hr (03/09/19 0604)   milrinone (PRIMACOR) infusion 0 38 mcg/kg/min Last Rate: 0 25 mcg/kg/min (03/09/19 0800)   sodium chloride 20 mL/hr Last Rate: 20 mL/hr (03/07/19 1222)     PRN Meds:   acetaminophen    bisacodyl    lidocaine (cardiac)    ondansetron    oxyCODONE-acetaminophen    oxyCODONE-acetaminophen    potassium chloride    potassium chloride    potassium chloride    Vitals: Blood pressure 121/73, pulse (!) 108, temperature 99 7 °F (37 6 °C), temperature source Probe, resp  rate (!) 34, height 5' 6" (1 676 m), weight 73 8 kg (162 lb 11 2 oz), SpO2 97 %  ,Body mass index is 26 26 kg/m²  I/O last 24 hours: In: 2152 1 [P O :420; I V :1232 1; IV Piggyback:500]  Out: 7882 [Urine:1255; Chest Tube:200]  Invasive Devices     Central Venous Catheter Line            CVC Central Lines 03/07/19 Triple 2 days    Introducer 03/07/19 2 days          Peripheral Intravenous Line            Peripheral IV 02/15/19 Left Wrist 22 days    Peripheral IV 03/07/19 Left Wrist 2 days    Peripheral IV 03/07/19 Right Wrist 2 days          Arterial Line            Arterial Line 03/07/19 Right Radial 2 days          Line            Pacer Wires 1 day    Pacer Wires 1 day          Drain            Urethral Catheter Non-latex; Temperature probe 16 Fr  2 days    Chest Tube 1 Posterior Mediastinal 32 Fr  1 day    Chest Tube 2 Anterior Mediastinal 32 Fr  1 day                  Lab, Imaging and other studies:   Results from last 7 days   Lab Units 03/09/19 03/08/19  0346 03/07/19  2037  03/07/19  1204   WBC Thousand/uL 16 89* 18 50*  --   --   --    HEMOGLOBIN g/dL 9 9* 11 1* 11 7*  --  11 9*   I STAT HEMOGLOBIN   --   --   --    < >  --    HEMATOCRIT % 29 4* 33 2* 34 6*  --  35 0*   HEMATOCRIT, ISTAT   --   --   --    < >  --    PLATELETS Thousands/uL 73* 131*  --   --  144*    < > = values in this interval not displayed  Results from last 7 days   Lab Units 03/09/19  0458 03/09/19  0021 03/08/19  1616 03/08/19  0346  03/07/19  1213   POTASSIUM mmol/L 5 1 5 0 5 0 5 1   < >  --    CHLORIDE mmol/L 107  --  109* 111*  --   --    CO2 mmol/L 21  --  20* 22  --   --    CO2, I-STAT mmol/L  --   --   --   --   --  24   BUN mg/dL 45*  --  39* 34*  --   --    CREATININE mg/dL 3 48*  --  3 02* 2 54*  --   --    GLUCOSE, ISTAT mg/dl  --   --   --   --   --  110   CALCIUM mg/dL 8 3  --  8 1* 8 2*  --   --     < > = values in this interval not displayed  Results from last 7 days   Lab Units 03/09/19  0604 03/08/19  1050   INR  1 47* 1 29*     No results for input(s): PHART, HIU7VLC, PO2ART, CDG9QTW, D9BVCCYC, BEART in the last 72 hours  Plan:    Wean inotropic support as able  DC TPW's  Cont CT's  Follow UOP and recheck creatinine in PM   Dose Coumadin for AFib/MAZE  Continue invasive monitoring        Ancelmo Shi MD  DATE: March 9, 2019  TIME: 9:32 AM

## 2019-03-10 LAB
ALBUMIN SERPL BCP-MCNC: 2.7 G/DL (ref 3.5–5)
ALP SERPL-CCNC: 53 U/L (ref 46–116)
ALT SERPL W P-5'-P-CCNC: <6 U/L (ref 12–78)
ANION GAP SERPL CALCULATED.3IONS-SCNC: 11 MMOL/L (ref 4–13)
ANION GAP SERPL CALCULATED.3IONS-SCNC: 9 MMOL/L (ref 4–13)
AST SERPL W P-5'-P-CCNC: 47 U/L (ref 5–45)
BASE EX.OXY STD BLDV CALC-SCNC: 51.8 % (ref 60–80)
BASE EXCESS BLDV CALC-SCNC: -2.3 MMOL/L
BILIRUB SERPL-MCNC: 0.38 MG/DL (ref 0.2–1)
BUN SERPL-MCNC: 57 MG/DL (ref 5–25)
BUN SERPL-MCNC: 62 MG/DL (ref 5–25)
CA-I BLD-SCNC: 1.06 MMOL/L (ref 1.12–1.32)
CALCIUM SERPL-MCNC: 7.9 MG/DL (ref 8.3–10.1)
CALCIUM SERPL-MCNC: 8.2 MG/DL (ref 8.3–10.1)
CHLORIDE SERPL-SCNC: 101 MMOL/L (ref 100–108)
CHLORIDE SERPL-SCNC: 103 MMOL/L (ref 100–108)
CO2 SERPL-SCNC: 22 MMOL/L (ref 21–32)
CO2 SERPL-SCNC: 23 MMOL/L (ref 21–32)
CREAT SERPL-MCNC: 4.25 MG/DL (ref 0.6–1.3)
CREAT SERPL-MCNC: 4.49 MG/DL (ref 0.6–1.3)
ERYTHROCYTE [DISTWIDTH] IN BLOOD BY AUTOMATED COUNT: 13.7 % (ref 11.6–15.1)
GFR SERPL CREATININE-BSD FRML MDRD: 15 ML/MIN/1.73SQ M
GFR SERPL CREATININE-BSD FRML MDRD: 16 ML/MIN/1.73SQ M
GLUCOSE SERPL-MCNC: 105 MG/DL (ref 65–140)
GLUCOSE SERPL-MCNC: 112 MG/DL (ref 65–140)
GLUCOSE SERPL-MCNC: 118 MG/DL (ref 65–140)
GLUCOSE SERPL-MCNC: 129 MG/DL (ref 65–140)
GLUCOSE SERPL-MCNC: 131 MG/DL (ref 65–140)
GLUCOSE SERPL-MCNC: 188 MG/DL (ref 65–140)
HCO3 BLDV-SCNC: 22.6 MMOL/L (ref 24–30)
HCT VFR BLD AUTO: 27.4 % (ref 36.5–49.3)
HGB BLD-MCNC: 9.5 G/DL (ref 12–17)
INR PPP: 1.88 (ref 0.86–1.17)
MAGNESIUM SERPL-MCNC: 2.4 MG/DL (ref 1.6–2.6)
MCH RBC QN AUTO: 31.4 PG (ref 26.8–34.3)
MCHC RBC AUTO-ENTMCNC: 34.7 G/DL (ref 31.4–37.4)
MCV RBC AUTO: 90 FL (ref 82–98)
NASAL CANNULA: 2
O2 CT BLDV-SCNC: 7.6 ML/DL
PCO2 BLDV: 39.1 MM HG (ref 42–50)
PH BLDV: 7.38 [PH] (ref 7.3–7.4)
PHOSPHATE SERPL-MCNC: 5 MG/DL (ref 2.3–4.1)
PLATELET # BLD AUTO: 85 THOUSANDS/UL (ref 149–390)
PMV BLD AUTO: 10.8 FL (ref 8.9–12.7)
PO2 BLDV: 28.8 MM HG (ref 35–45)
POTASSIUM SERPL-SCNC: 3.9 MMOL/L (ref 3.5–5.3)
POTASSIUM SERPL-SCNC: 3.9 MMOL/L (ref 3.5–5.3)
POTASSIUM SERPL-SCNC: 4.4 MMOL/L (ref 3.5–5.3)
POTASSIUM SERPL-SCNC: 4.5 MMOL/L (ref 3.5–5.3)
POTASSIUM SERPL-SCNC: 4.5 MMOL/L (ref 3.5–5.3)
PROT SERPL-MCNC: 6.2 G/DL (ref 6.4–8.2)
PROTHROMBIN TIME: 21.7 SECONDS (ref 11.8–14.2)
RBC # BLD AUTO: 3.03 MILLION/UL (ref 3.88–5.62)
SODIUM SERPL-SCNC: 134 MMOL/L (ref 136–145)
SODIUM SERPL-SCNC: 135 MMOL/L (ref 136–145)
WBC # BLD AUTO: 12.74 THOUSAND/UL (ref 4.31–10.16)

## 2019-03-10 PROCEDURE — 82948 REAGENT STRIP/BLOOD GLUCOSE: CPT

## 2019-03-10 PROCEDURE — 83735 ASSAY OF MAGNESIUM: CPT | Performed by: NURSE PRACTITIONER

## 2019-03-10 PROCEDURE — 99221 1ST HOSP IP/OBS SF/LOW 40: CPT | Performed by: INTERNAL MEDICINE

## 2019-03-10 PROCEDURE — 85610 PROTHROMBIN TIME: CPT | Performed by: NURSE PRACTITIONER

## 2019-03-10 PROCEDURE — 84132 ASSAY OF SERUM POTASSIUM: CPT | Performed by: PHYSICIAN ASSISTANT

## 2019-03-10 PROCEDURE — 99233 SBSQ HOSP IP/OBS HIGH 50: CPT | Performed by: NURSE PRACTITIONER

## 2019-03-10 PROCEDURE — 93005 ELECTROCARDIOGRAM TRACING: CPT

## 2019-03-10 PROCEDURE — 82805 BLOOD GASES W/O2 SATURATION: CPT | Performed by: PHYSICIAN ASSISTANT

## 2019-03-10 PROCEDURE — 84100 ASSAY OF PHOSPHORUS: CPT | Performed by: NURSE PRACTITIONER

## 2019-03-10 PROCEDURE — 80053 COMPREHEN METABOLIC PANEL: CPT | Performed by: NURSE PRACTITIONER

## 2019-03-10 PROCEDURE — 82330 ASSAY OF CALCIUM: CPT | Performed by: NURSE PRACTITIONER

## 2019-03-10 PROCEDURE — 85027 COMPLETE CBC AUTOMATED: CPT | Performed by: NURSE PRACTITIONER

## 2019-03-10 PROCEDURE — 80048 BASIC METABOLIC PNL TOTAL CA: CPT | Performed by: NURSE PRACTITIONER

## 2019-03-10 PROCEDURE — 99024 POSTOP FOLLOW-UP VISIT: CPT | Performed by: THORACIC SURGERY (CARDIOTHORACIC VASCULAR SURGERY)

## 2019-03-10 PROCEDURE — 99233 SBSQ HOSP IP/OBS HIGH 50: CPT | Performed by: INTERNAL MEDICINE

## 2019-03-10 RX ORDER — AMIODARONE HYDROCHLORIDE 50 MG/ML
INJECTION, SOLUTION INTRAVENOUS
Status: DISCONTINUED
Start: 2019-03-10 | End: 2019-03-10 | Stop reason: WASHOUT

## 2019-03-10 RX ORDER — WARFARIN SODIUM 2.5 MG/1
2.5 TABLET ORAL
Status: COMPLETED | OUTPATIENT
Start: 2019-03-10 | End: 2019-03-10

## 2019-03-10 RX ORDER — METOPROLOL TARTRATE 5 MG/5ML
2.5 INJECTION INTRAVENOUS ONCE
Status: COMPLETED | OUTPATIENT
Start: 2019-03-10 | End: 2019-03-10

## 2019-03-10 RX ORDER — POTASSIUM CHLORIDE 20 MEQ/1
20 TABLET, EXTENDED RELEASE ORAL ONCE
Status: COMPLETED | OUTPATIENT
Start: 2019-03-10 | End: 2019-03-10

## 2019-03-10 RX ORDER — AMIODARONE HYDROCHLORIDE 50 MG/ML
INJECTION, SOLUTION INTRAVENOUS
Status: DISPENSED
Start: 2019-03-10 | End: 2019-03-11

## 2019-03-10 RX ADMIN — MILRINONE LACTATE IN DEXTROSE 0.25 MCG/KG/MIN: 200 INJECTION, SOLUTION INTRAVENOUS at 17:08

## 2019-03-10 RX ADMIN — MUPIROCIN 1 APPLICATION: 20 OINTMENT TOPICAL at 21:13

## 2019-03-10 RX ADMIN — INSULIN GLARGINE 20 UNITS: 100 INJECTION, SOLUTION SUBCUTANEOUS at 21:12

## 2019-03-10 RX ADMIN — AMIODARONE HYDROCHLORIDE 200 MG: 200 TABLET ORAL at 15:00

## 2019-03-10 RX ADMIN — AMIODARONE HYDROCHLORIDE 200 MG: 200 TABLET ORAL at 21:12

## 2019-03-10 RX ADMIN — WARFARIN SODIUM 2.5 MG: 2.5 TABLET ORAL at 17:08

## 2019-03-10 RX ADMIN — AMIODARONE HYDROCHLORIDE 1 MG/MIN: 50 INJECTION, SOLUTION INTRAVENOUS at 19:21

## 2019-03-10 RX ADMIN — METOPROLOL TARTRATE 25 MG: 25 TABLET, FILM COATED ORAL at 08:20

## 2019-03-10 RX ADMIN — ASPIRIN 325 MG ORAL TABLET 325 MG: 325 PILL ORAL at 08:20

## 2019-03-10 RX ADMIN — DEXTROSE MONOHYDRATE 150 MG: 5 INJECTION INTRAVENOUS at 19:10

## 2019-03-10 RX ADMIN — METOPROLOL TARTRATE 2.5 MG: 5 INJECTION INTRAVENOUS at 07:14

## 2019-03-10 RX ADMIN — MUPIROCIN 1 APPLICATION: 20 OINTMENT TOPICAL at 08:20

## 2019-03-10 RX ADMIN — ACETAMINOPHEN 650 MG: 325 TABLET ORAL at 21:12

## 2019-03-10 RX ADMIN — AMIODARONE HYDROCHLORIDE 150 MG: 50 INJECTION, SOLUTION INTRAVENOUS at 09:25

## 2019-03-10 RX ADMIN — INSULIN LISPRO 5 UNITS: 100 INJECTION, SOLUTION INTRAVENOUS; SUBCUTANEOUS at 11:36

## 2019-03-10 RX ADMIN — AMIODARONE HYDROCHLORIDE 200 MG: 200 TABLET ORAL at 06:05

## 2019-03-10 RX ADMIN — ATORVASTATIN CALCIUM 20 MG: 20 TABLET, FILM COATED ORAL at 17:08

## 2019-03-10 RX ADMIN — Medication 40 MG/HR: at 17:08

## 2019-03-10 RX ADMIN — MILRINONE LACTATE IN DEXTROSE 0.38 MCG/KG/MIN: 200 INJECTION, SOLUTION INTRAVENOUS at 00:04

## 2019-03-10 RX ADMIN — POTASSIUM CHLORIDE 20 MEQ: 1500 TABLET, EXTENDED RELEASE ORAL at 21:16

## 2019-03-10 RX ADMIN — POLYETHYLENE GLYCOL 3350 17 G: 17 POWDER, FOR SOLUTION ORAL at 08:20

## 2019-03-10 RX ADMIN — INSULIN LISPRO 5 UNITS: 100 INJECTION, SOLUTION INTRAVENOUS; SUBCUTANEOUS at 17:10

## 2019-03-10 RX ADMIN — METOPROLOL TARTRATE 25 MG: 25 TABLET, FILM COATED ORAL at 21:12

## 2019-03-10 RX ADMIN — Medication 40 MG/HR: at 00:04

## 2019-03-10 RX ADMIN — PANTOPRAZOLE SODIUM 40 MG: 40 TABLET, DELAYED RELEASE ORAL at 06:05

## 2019-03-10 RX ADMIN — INSULIN LISPRO 1 UNITS: 100 INJECTION, SOLUTION INTRAVENOUS; SUBCUTANEOUS at 11:37

## 2019-03-10 RX ADMIN — HEPARIN SODIUM 5000 UNITS: 5000 INJECTION INTRAVENOUS; SUBCUTANEOUS at 06:05

## 2019-03-10 RX ADMIN — TRAVOPROST 1 DROP: 0.04 SOLUTION/ DROPS OPHTHALMIC at 21:15

## 2019-03-10 NOTE — PROGRESS NOTES
Progress Note - Cardiothoracic Surgery   [facility] 80 y o  male MRN: [id number]  Unit/Bed#: [facility] 416-01 Encounter: [id number]      POD # 3 s/p AVR, MV repair, VIRI ligation    Pt seen/examined  Interval history and data reviewed with critical care team   Pt doing well  No specific complaints      Milrinone 0 5  Lasix gtt 40      Medications:   Scheduled Meds:    Current Facility-Administered Medications:  acetaminophen 650 mg Oral Q4H PRN Clint Lopez PA-C    amiodarone 1 mg/min Intravenous Continuous Oral Due, CRNP    Followed by        amiodarone 0 5 mg/min Intravenous Continuous Oral Due, CRNP    amiodarone 200 mg Oral [facility] Clint Lopez PA-C    aspirin 325 mg Oral Daily Clint Lopez PA-C    atorvastatin 20 mg Oral Daily With [employer] Fuchs PA-C    bisacodyl 10 mg Rectal Daily PRN Clint Lopez PA-C    furosemide 40 mg/hr Intravenous Continuous Adlambrocio Shelton PA-C Last Rate: 40 mg/hr (03/10/19 0004)   insulin glargine 20 Units Subcutaneous HS Sendy Toledo MD    insulin lispro 1-5 Units Subcutaneous TID AC Sendy Toledo MD    insulin lispro 1-5 Units Subcutaneous HS Sendy Toledo MD    insulin lispro 5 Units Subcutaneous TID With Meals Sendy Toledo MD    lidocaine (cardiac) 100 mg Intravenous Q30 Min PRN Clint Lopez PA-C    metoprolol tartrate 25 mg Oral Q12H [address] Ashanti Arrieta PA-C    milrinone [facility]) infusion 0 38 mcg/kg/min Intravenous Continuous Oral Due, CRNP Last Rate: 0 25 mcg/kg/min (03/10/19 0900)   mupirocin 1 application Nasal L03T [address] Clint Lopez PA-C    niCARdipine 1-15 mg/hr Intravenous Titrated Magdiel Nichole PA-C Last Rate: Stopped (03/10/19 0700)   ondansetron 4 mg Intravenous Q6H PRN Clint Lopez PA-C    oxyCODONE-acetaminophen 1 tablet Oral Q4H PRN Elliot Downs DO    oxyCODONE-acetaminophen 2 tablet Oral Q6H PRN Clint Lopez PA-C    pantoprazole 40 mg Oral Daily Before Breakfast Clint Lopez PA-C    polyethylene glycol 17 g Oral Daily Cindia Plunk, PA-C    sodium chloride 20 mL/hr Intravenous Continuous Cindia Plunk, PA-C Last Rate: Stopped (03/09/19 2200)   travoprost 1 drop Both Eyes HS Cindia Plunk, PA-C    warfarin 2 5 mg Oral Once (warfarin) JHONATAN Grover      Continuous Infusions:    amiodarone 1 mg/min    Followed by     amiodarone 0 5 mg/min    furosemide 40 mg/hr Last Rate: 40 mg/hr (03/10/19 0004)   milrinone (PRIMACOR) infusion 0 38 mcg/kg/min Last Rate: 0 25 mcg/kg/min (03/10/19 0900)   niCARdipine 1-15 mg/hr Last Rate: Stopped (03/10/19 0700)   sodium chloride 20 mL/hr Last Rate: Stopped (03/09/19 2200)     PRN Meds:   acetaminophen    bisacodyl    lidocaine (cardiac)    ondansetron    oxyCODONE-acetaminophen    oxyCODONE-acetaminophen    Vitals: Blood pressure 114/65, pulse 90, temperature 100 2 °F (37 9 °C), temperature source Probe, resp  rate (!) 34, height 5' 6" (1 676 m), weight 87 3 kg (192 lb 7 4 oz), SpO2 95 %  ,Body mass index is 31 06 kg/m²  I/O last 24 hours: In: Kalinatristan Zavalaon [P O :1060; I V :812]  Out: 9771 [Urine:3095; Chest Tube:120]  Invasive Devices     Central Venous Catheter Line            CVC Central Lines 03/07/19 Triple 3 days    Introducer 03/07/19 3 days          Peripheral Intravenous Line            Peripheral IV 02/15/19 Left Wrist 23 days    Peripheral IV 03/07/19 Left Wrist 3 days    Peripheral IV 03/07/19 Right Wrist 3 days          Arterial Line            Arterial Line 03/07/19 Right Radial 2 days          Drain            Urethral Catheter Non-latex; Temperature probe 16 Fr  3 days    Chest Tube 1 Posterior Mediastinal 32 Fr  2 days    Chest Tube 2 Anterior Mediastinal 32 Fr  2 days                  Lab, Imaging and other studies:   Results from last 7 days   Lab Units 03/10/19  0451 03/09/19  1556 03/09/19 03/08/19  0346   WBC Thousand/uL 12 74*  --  16 89* 18 50*   HEMOGLOBIN g/dL 9 5* 9 5* 9 9* 11 1*   HEMATOCRIT % 27 4*  --  29 4* 33 2*   PLATELETS Thousands/uL 85*  --  73* 131*     Results from last 7 days   Lab Units 03/10/19  0928 03/10/19  0512 03/10/19  0012 03/09/19  1354  03/09/19  0458  03/07/19  1213   POTASSIUM mmol/L 4 4 4 5 4 5 4 5   < > 5 1   < >  --    CHLORIDE mmol/L  --  103  --  105  --  107   < >  --    CO2 mmol/L  --  23  --  18*  --  21   < >  --    CO2, I-STAT mmol/L  --   --   --   --   --   --   --  24   BUN mg/dL  --  57*  --  49*  --  45*   < >  --    CREATININE mg/dL  --  4 25*  --  3 89*  --  3 48*   < >  --    GLUCOSE, ISTAT mg/dl  --   --   --   --   --   --   --  110   CALCIUM mg/dL  --  8 2*  --  8 2*  --  8 3   < >  --     < > = values in this interval not displayed  Results from last 7 days   Lab Units 03/10/19  0452 03/09/19  0604 03/08/19  1050   INR  1 88* 1 47* 1 29*     No results for input(s): PHART, JWR1IUA, PO2ART, PNL1DAC, T9VKYSAB, BEART in the last 72 hours  Plan:    Wean inotropic support as able  DC CT's  Nephrology managing CARY on CKD/diuretics  Dose Coumadin for AFib/MAZE  Possible removal of [place]/Cordis if hemodynamics stable with decreased inotropic support        SIGNATUREAbjusto Tillman MD  DATE: March 10, 2019  TIME: 10:07 AM

## 2019-03-10 NOTE — QUICK NOTE
Procedure: Chest Tube Removal    03/10/19    Mediastinal CT x 2  d/c'd in typical fashion  Patient tolerated procedure well  No immediate complications  Acticote Dressing applied  Patient's nurse was made aware         JHONATAN Ramirez

## 2019-03-10 NOTE — PROGRESS NOTES
Progress Note - Critical Care   Myra Mustafa 80 y o  male MRN: [id number]  Unit/Bed#: [facility] 416-01 Encounter: [id number]      Attending Physician: Bj Jean-Baptiste DO    24 Hour Events:   · POD # 3 AVR with tissue valve, MV repair with 30mm [employer] CG Future mitral annuloplasty ring and VIRI Ligation  · Milrinone weaned yesterday to 0 25 with decrease in CI and UO improved with increase in milrinone and addition of Cardene  · Renal Consult: continues on lasix gtt at 40mg/hr; -150 uo/hr; net negative 750 ml/24 hours; Cr increased to 4 25  · EPW d/c'd  · Coumadin 2 5 dosed    Allergies:    Allergies   Allergen Reactions    Ace Inhibitors Cough       Medications:   Scheduled Meds:  Current Facility-Administered Medications:  acetaminophen 650 mg Oral Q4H PRN Mary Fillers, PA-NIC    amiodarone 200 mg Oral [facility] Mary FillersYINA    aspirin 325 mg Oral Daily Mary FillersYINA    atorvastatin 20 mg Oral Daily With [employer] Shila, YINA    bisacodyl 10 mg Rectal Daily PRN Mary FillersYINA    furosemide 40 mg/hr Intravenous Continuous Vianca Flood PA-C Last Rate: 40 mg/hr (03/10/19 0004)   heparin (porcine) 5,000 Units Subcutaneous Q8H [address] Ashanti Arrieta PA-C    insulin glargine 20 Units Subcutaneous HS Earl Burrell MD    insulin lispro 1-5 Units Subcutaneous TID AC Earl Burrell MD    insulin lispro 1-5 Units Subcutaneous HS Earl Burrell MD    insulin lispro 5 Units Subcutaneous TID With Meals Earl Burrell MD    lidocaine (cardiac) 100 mg Intravenous Q30 Min PRN Mary YINA Nunn    metoprolol tartrate 25 mg Oral Q12H [address] Ashanti Arrieta PA-C    milrinone [facility]) infusion 0 38 mcg/kg/min Intravenous Continuous [place]hes Peaks, CRNP Last Rate: 0 38 mcg/kg/min (03/10/19 0004)   mupirocin 1 application Nasal O45K [address] Mary Nunn PA-C    niCARdipine 1-15 mg/hr Intravenous Titrated French Mckenzie PA-C Last Rate: Stopped (03/10/19 0400)   ondansetron 4 mg Intravenous Q6H PRN Jaswant Gomez YINA Fuchs    oxyCODONE-acetaminophen 1 tablet Oral Q4H PRN Leveda Budds, DO    oxyCODONE-acetaminophen 2 tablet Oral Q6H PRN Aditi Wasserman PA-C    pantoprazole 40 mg Oral Daily Before Breakfast Aditi Wasserman PA-C    polyethylene glycol 17 g Oral Daily Aditi aWsserman PA-C    sodium chloride 20 mL/hr Intravenous Continuous Aditi Wasserman PA-C Last Rate: Stopped (03/09/19 2200)   travoprost 1 drop Both Eyes HS Aditi Wasserman PA-C        VTE Pharmacologic Prophylaxis: Heparin, Coumadin 2 5  VTE Mechanical Prophylaxis: sequential compression device    Continuous Infusions:  furosemide 40 mg/hr Last Rate: 40 mg/hr (03/10/19 0004)   milrinone (PRIMACOR) infusion 0 38 mcg/kg/min Last Rate: 0 38 mcg/kg/min (03/10/19 0004)   niCARdipine 1-15 mg/hr Last Rate: Stopped (03/10/19 0400)   sodium chloride 20 mL/hr Last Rate: Stopped (03/09/19 2200)     PRN Meds:    acetaminophen 650 mg Q4H PRN   bisacodyl 10 mg Daily PRN   lidocaine (cardiac) 100 mg Q30 Min PRN   ondansetron 4 mg Q6H PRN   oxyCODONE-acetaminophen 1 tablet Q4H PRN   oxyCODONE-acetaminophen 2 tablet Q6H PRN       Home Medications:   Prior to Admission medications    Medication Sig Start Date End Date Taking?  Authorizing Provider   ascorbic acid (VITAMIN C) 500 mg tablet Take 500 mg by mouth daily   Yes Historical Provider, MD   atorvastatin (LIPITOR) 20 mg tablet Take 1 tablet by mouth daily 7/27/18  Yes Historical Provider, MD   calcitriol (ROCALTROL) 0 25 mcg capsule Take 1 capsule by mouth 2 (two) times a week 1/1/15  Yes Historical Provider, MD   Cholecalciferol (VITAMIN D3) 1000 units CAPS Take 1 capsule by mouth daily   Yes Historical Provider, MD   furosemide (LASIX) 20 mg tablet Take 20 mg by mouth 3 (three) times a week   Yes Historical Provider, MD   furosemide (LASIX) 40 mg tablet Take 40 mg by mouth 4 (four) times a week   Yes Historical Provider, MD   metFORMIN (GLUCOPHAGE) 500 mg tablet Take 1 tablet by mouth daily 2/19/18  Yes Historical Provider, MD   metoprolol succinate (TOPROL-XL) 50 mg 24 hr tablet Take 50 mg by mouth daily    Yes Historical Provider, MD   Multiple Vitamin (MULTIVITAMIN) tablet Take 1 tablet by mouth daily   Yes Historical Provider, MD   mupirocin (BACTROBAN) 2 % ointment Apply inside both nostrils two times per day  Start 5 days prior to surgery  19  Yes YINA Martínez   olmesartan (BENICAR) 20 mg tablet Take 1 tablet (20 mg total) by mouth 2 (two) times a day 19  Yes JHONATAN Vega   TRAVATADIPESH Z 0 004 % ophthalmic solution  18  Yes Historical Provider, MD       Vitals:   Mayra Fly:    03/10/19 0000 03/10/19 0100 03/10/19 0300 03/10/19 0400   BP:       Pulse: 94 94 96 96   Resp: (!) 27 20 (!) 32 21   Temp: 100 2 °F (37 9 °C)   100 °F (37 8 °C)   TempSrc: Probe   Probe   SpO2: 95% 94% 93% 94%   Weight:       Height:         Arterial Line BP: 120/58  Arterial Line MAP (mmHg): 74 mmHg    Tele Rhythm: Sinus Tachycardia, first degree AVB This was personally reviewed by myself  Respiratory:  SpO2: SpO2: 94 %  O2 Flow Rate (L/min): 2 L/min weaned to RA this AM    Temperature: Temp (24hrs), Av 8 °F (37 7 °C), Min:99 3 °F (37 4 °C), Max:100 2 °F (37 9 °C)  Current: Temperature: 100 °F (37 8 °C)    Weights:   Weight (last 2 days)     Date/Time   Weight    19 0600   73 8 (162 7)    19 0600   71 8 (158 29)            IBW: 63 8 kg  Body mass index is 26 26 kg/m²  Hemodynamic Monitoring:  PAP: PAP: 35/18, PAP mean:   , CVP: CVP (mean): 13 mmHg, SvO2: SVO2 (%): 61 %(SQI 1) , ScvO2:  , CO:  , CI: 2 1-3 1  , SVR: SVR (dyne*sec)/cm5: 1026 (dyne*sec)/cm5  PAP: (30-54)/(12-) 35/18    Intake and Outputs:    Intake/Output Summary (Last 24 hours) at 3/10/2019 0620  Last data filed at 3/10/2019 0400  Gross per 24 hour   Intake 1791 54 ml   Output 2545 ml   Net -753 46 ml     I/O last 24 hours: In: 2277 3 [P O :1060; I V :1217 3]  Out: 3250 [Urine:3120;  Chest Tube:130]    UOP: 100/hour Chest tube Output:  Mediastinal tubes: 0 mL/8 hours  100 mL/24 hours   Pleural tubes:        Labs:  Results from last 7 days   Lab Units 03/10/19  0451 03/09/19  1556 03/09/19 03/08/19  0346   WBC Thousand/uL 12 74*  --  16 89* 18 50*   HEMOGLOBIN g/dL 9 5* 9 5* 9 9* 11 1*   HEMATOCRIT % 27 4*  --  29 4* 33 2*   PLATELETS Thousands/uL 85*  --  73* 131*     Results from last 7 days   Lab Units 03/10/19  0512 03/10/19  0012 03/09/19  1354  03/09/19  0458  03/07/19  1213  03/07/19  1050 03/07/19  1023   SODIUM mmol/L 135*  --  134*  --  137   < >  --    < >  --   --    POTASSIUM mmol/L 4 5 4 5 4 5   < > 5 1   < >  --    < >  --   --    CHLORIDE mmol/L 103  --  105  --  107   < >  --    < >  --   --    CO2 mmol/L 23  --  18*  --  21   < >  --    < >  --   --    CO2, I-STAT mmol/L  --   --   --   --   --   --  24  --  28 27   BUN mg/dL 57*  --  49*  --  45*   < >  --    < >  --   --    CREATININE mg/dL 4 25*  --  3 89*  --  3 48*   < >  --    < >  --   --    CALCIUM mg/dL 8 2*  --  8 2*  --  8 3   < >  --    < >  --   --    ALK PHOS U/L 53  --   --   --   --   --   --   --   --   --    ALT U/L <6*  --   --   --   --   --   --   --   --   --    AST U/L 47*  --   --   --   --   --   --   --   --   --    GLUCOSE, ISTAT mg/dl  --   --   --   --   --   --  110  --  98 111    < > = values in this interval not displayed  Baseline creat 1 7    Results from last 7 days   Lab Units 03/10/19  0504 03/09/19  0458 03/08/19  0346   MAGNESIUM mg/dL 2 4 2 8* 3 1*     Results from last 7 days   Lab Units 03/10/19  0452 03/09/19  0604 03/08/19  1050   INR  1 88* 1 47* 1 29*     VBG SVO2 51 this AM        Imaging:  AM CXR: none this AM  AM EKG: SR/ST on tele, occasional bigemeny    Physical Exam:    General Appearance:  OOB to chair, no acute distress  HENT: Atraumatic without obvious abnormality  Neck: No JVD  CVC in place     Eyes: EMY, No icterus  Cardiac: RRR no MRG, 104 bpm  Pulmonary: Regular unlabored, CTA no cracles  Gastrointestinal: abdomen soft, ND, NT +ve BS; +ve flatus  : Ovalles present: yes   Musculoskeletal: No edema bilaterally  Neuro:  Screening neurologic exam reveals no deficits  follows commands  Psych: Mood and affect appropriate for clinical situation  Skin: warm well perfused, DP pulses 1+  Incisions: Sternum is stable  Incision dressed with Acticoat  No erythema or drainage      Invasive lines and devices: Invasive Devices     Central Venous Catheter Line            CVC Central Lines 03/07/19 Triple 2 days    Introducer 03/07/19 2 days          Peripheral Intravenous Line            Peripheral IV 02/15/19 Left Wrist 22 days    Peripheral IV 03/07/19 Left Wrist 2 days    Peripheral IV 03/07/19 Right Wrist 2 days          Arterial Line            Arterial Line 03/07/19 Right Radial 2 days          Drain            Chest Tube 1 Posterior Mediastinal 32 Fr  2 days    Chest Tube 2 Anterior Mediastinal 32 Fr  2 days    Urethral Catheter Non-latex; Temperature probe 16 Fr  2 days                Assessment:  Principal Problem:    Bicuspid aortic valve  Active Problems:    S/P AVR    S/P MVR (mitral valve repair)    Nonrheumatic aortic valve insufficiency    CARY (acute kidney injury) ([facility] [address] )    Non-rheumatic mitral regurgitation    Dilated cardiomyopathy (HCC)    Chronic systolic congestive heart failure (HCC)    Coronary artery disease involving native coronary artery of native heart without angina pectoris    CKD (chronic kidney disease) stage 3, GFR 30-59 ml/min ([facility])    Diabetes mellitus type 2 in nonobese [facility])    Essential hypertension    Diabetic nephropathy associated with type 2 diabetes mellitus ([facility] [address] )    Secondary hyperparathyroidism ([facility] )      Plan:    Neuro:   · Pain controlled with: percocet and tylenol PRN  · Regulate sleep/wake cycle  · Restoril QHS PRN  · Trend neuro exam  CV:   · Cardiac infusions: Primacor, 0 38 mcg/kg/min, Cardene, 2 mg/hour  · Wean Primacor 0 25 mcg/kg/min  · MAP goal > 65 and CI >2 2  · Continue [place] Anibal catheter and cordis  · Continue Arterial line  · Rhythm: Sinus Tachycardia  · Follow rhythm on telemetry  · Increase Lopressor 25mg TID  · Consider small dose of Norvasc for afterload reduction  · Epicardial pacing wires out  · Continue PO amio, statin, and ASA therapy  · Consult cardiology for post-op management  · Continue DVT prophylaxis  Lung:   · Good Room air oxygen saturation; Continue incentive spirometry/Coughing/Deep breathing exercises  · SpO2 goal >92%  · Continue IS, deep breathing and coughing exercises  · Chest tube drainage diminished; D/C today  GI:   · Continue PPI for stress ulcer prophylaxis  · Continue bowel regimen  FEN:   · Diuretic plan: Lasix gtt 40mg/hr  · Goal 24 hour fluid balance: -500 to -1000  · Nutrition/diet plan: Initiate TLC 2 3 gm sodium diet with 1800 mL fluid restriction   · Replete electrolytes with goals: K >4 0, Mag >2 0, and Phos >3 0  :   · Appreciate Nephro input  · Indwelling Ovalles present: yes   · Continue Ovalles  · Trend UOP and BUN/creat  · Strict I and O  ID:   · Trend temps and WBC count  · Maintain normothermia  · Tylenol PRN for fevers  Heme:   · Trend hgb and plts  · Transfuse as needed for goal hgb >7  · Coumadin 2 5 mg and monitor INR in the AM  Endo:   · Glycemic control plan: History of DM, Endocrine following; Lantus and SSI  MSK/Skin:  · Mobility goal: OOB and ambulating as able  · PT consult: yes  · OT consult: yes  · Frequent turning and pressure off-loading  · Local wound care as needed    Disposition:  Continue in ICU today    Collaborative bedside rounds performed with cardiac surgery attending and bedside RN      SIGNATURE: JHONATAN Hernández  DATE: March 10, 2019  TIME: 6:20 AM

## 2019-03-10 NOTE — PROGRESS NOTES
NEPHROLOGY PROGRESS NOTE   Cash Trinidad 80 y o  male MRN: [id number]  Unit/Bed#: [facility] 416-01 Encounter: [id number]  Reason for Consult: CARY    ASSESSMENT AND PLAN:  Patient is 31-year-old male with diabetes, CKD stage 3 with baseline creatinine 1 6 to 1 8, biopsy-proven secondary FSGS, history of severe aortic insufficiency, MR, presented for AVR/mitral valve repair  We are consulted for CARY/CKD management      Nonoliguric CARY on CKD stage 3 with baseline creatinine 1 6 to 1 8, follows with Dr Kelvin Daniel  -creatinine 1 9 on admission slightly elevated from his baseline now significantly worsening at 4 2 today  -continue Lasix drip at 40 mg/hour with overall good urine output in last 24 hours  -CARY likely secondary to ischemic ATN with intraoperative hypotension episodes, cardiopulmonary bypass time 96 minutes, cross-clamp time 87 minutes  -urinalysis earlier this month showed very concentrated sample, greater than 3+ proteinuria, no hematuria  -previous UPC ratio 4 g in October 2018   -continue to closely monitor  No emergent indication for dialysis yet  If creatinine continued to worsen, he may need dialysis in the near future  Dialysis consent is obtained from the patient and in the chart  -BMP in a m   -no obvious uremic symptoms      Volume overload  -PAD has improved at 14 to 15 from yesterday 21 to 23, currently remains on Milrinone along with Lasix drip     -goal to keep negative balance in 24 hours  Can give p r n  metolazone with current Lasix 40 mg per hour IV drip   -clinically patient does not seem to be in respiratory distress   -currently remains on Milrinone IV drip     Biopsy-proven FSGS  -this was thought to be secondary to secondary FSGS with glomerular megaly and only 15% foot process effacement  Since then patient has been trying to lose weight   -he was on ARB due to significant proteinuria    Currently holding ARB due to CARY      Proteinuria  -last UPC ratio 4 g secondary to FSGS  -will need eventual ARB which can be restarted as outpatient      Mild metabolic acidosis with worsening renal failure  -bicarb level has improved at 23 today  Venous pH 7 37 today      Severe aortic insufficiency/MR  -status post AVR, mitral valve repair, VIRI ligation  -close ICU follow-up      Discussed with ICU team     SUBJECTIVE:  Patient seen and examined at bedside  No chest pain, shortness of breath, nausea, vomiting, abdominal pain  Has good urine output with Lasix drip      OBJECTIVE:  Current Weight: Weight - Scale: 87 3 kg (192 lb 7 4 oz)  Vitals:    03/10/19 0600   BP:    Pulse: 102   Resp: (!) 28   Temp: 100 2 °F (37 9 °C)   SpO2: 94%       Intake/Output Summary (Last 24 hours) at 3/10/2019 0827  Last data filed at 3/10/2019 0600  Gross per 24 hour   Intake 1628 54 ml   Output 2525 ml   Net -896 46 ml       Physical Examination:  General:  Sitting in chair, no acute distress   Eyes:  Mild conjunctival pallor present  ENT:  External examination of ears and nose unremarkable  Neck:  Supple  Respiratory:  Bilateral air entry present  CVS:  S1, S2 present  GI:  Soft, nontender, nondistended  CNS:  Active alert oriented x3  Extremities:  No significant edema in legs  Skin:  No new rash in legs    Medications:    Current Facility-Administered Medications:     acetaminophen (TYLENOL) tablet 650 mg, 650 mg, Oral, Q4H PRN, Claude Apley, PA-C    amiodarone 150 mg in dextrose 5 % 100 mL IV bolus, 150 mg, Intravenous, Once **FOLLOWED BY** amiodarone (CORDARONE) 900 mg in dextrose 5 % 500 mL infusion, 1 mg/min, Intravenous, Continuous **FOLLOWED BY** amiodarone (CORDARONE) 900 mg in dextrose 5 % 500 mL infusion, 0 5 mg/min, Intravenous, Continuous, Abbey Prudent, CRNP    amiodarone tablet 200 mg, 200 mg, Oral, Q8H SHOSHANA, Claude Apley, PA-C, 200 mg at 03/10/19 0605    aspirin tablet 325 mg, 325 mg, Oral, Daily, Claude Apley, PA-C, 325 mg at 03/10/19 0820    atorvastatin (LIPITOR) tablet 20 mg, 20 mg, Oral, Daily With Enoch Nix PA-C, 20 mg at 03/09/19 1718    bisacodyl (DULCOLAX) rectal suppository 10 mg, 10 mg, Rectal, Daily PRN, Isaac Sullivan PA-C    furosemide (LASIX) 500 mg infusion 50 mL, 40 mg/hr, Intravenous, Continuous, Ashanti Arrieta PA-C, Last Rate: 4 mL/hr at 03/10/19 0004, 40 mg/hr at 03/10/19 0004    heparin (porcine) subcutaneous injection 5,000 Units, 5,000 Units, Subcutaneous, Q8H [address], Ashanti Arrieta PA-C, 5,000 Units at 03/10/19 0605    insulin glargine (LANTUS) subcutaneous injection 20 Units 0 2 mL, 20 Units, Subcutaneous, HS, Raymond Corral MD, 20 Units at 03/09/19 2205    insulin lispro (HumaLOG) 100 units/mL subcutaneous injection 1-5 Units, 1-5 Units, Subcutaneous, TID AC **AND** Fingerstick Glucose (POCT), , , TID AC, Raymond Corral MD    insulin lispro (HumaLOG) 100 units/mL subcutaneous injection 1-5 Units, 1-5 Units, Subcutaneous, HS, Raymond Corral MD    insulin lispro (HumaLOG) 100 units/mL subcutaneous injection 5 Units, 5 Units, Subcutaneous, TID With Meals, Raymond Corral MD    lidocaine (cardiac) injection 100 mg, 100 mg, Intravenous, Q30 Min PRN, Isaac Sullivan PA-C    metoprolol tartrate (LOPRESSOR) tablet 25 mg, 25 mg, Oral, Q12H SHOSHANA, Ashanti Arrieta PA-C, 25 mg at 03/10/19 0820    Milrinone Lactate in Dextrose (PRIMACOR) 20 MG/100 ML infusion (premix), 0 38 mcg/kg/min, Intravenous, Continuous, JHONATAN Ann, Last Rate: 8 1 mL/hr at 03/10/19 0004, 0 38 mcg/kg/min at 03/10/19 0004    mupirocin (BACTROBAN) 2 % nasal ointment 1 application, 1 application, Nasal, C79R [address], Isaac Sullivan PA-C, 1 application at 62/92/67 0820    niCARdipine (CARDENE) 25 mg (STANDARD CONCENTRATION) in sodium chloride 0 9% 250 mL, 1-15 mg/hr, Intravenous, Titrated, Staci Buchanan PA-C, Last Rate: 20 mL/hr at 03/10/19 0600, 2 mg/hr at 03/10/19 0600    ondansetron (ZOFRAN) injection 4 mg, 4 mg, Intravenous, Q6H PRN, Isaac Sullivan PA-C, 4 mg at 03/07/19 2043   oxyCODONE-acetaminophen (PERCOCET) 5-325 mg per tablet 1 tablet, 1 tablet, Oral, Q4H PRN, Carleen Shows, DO, 1 tablet at 03/08/19 2107    oxyCODONE-acetaminophen (PERCOCET) 5-325 mg per tablet 2 tablet, 2 tablet, Oral, Q6H PRN, [place] Ashing, PA-C    pantoprazole (PROTONIX) EC tablet 40 mg, 40 mg, Oral, Daily Before Breakfast, Jose Ashing, PA-C, 40 mg at 03/10/19 0605    polyethylene glycol (MIRALAX) packet 17 g, 17 g, Oral, Daily, Jose Ashing, PA-C, 17 g at 03/10/19 0820    sodium chloride infusion 0 45 %, 20 mL/hr, Intravenous, Continuous, [place] Ashdottie PA-C, Stopped at 03/09/19 2200    travoprost (TRAVATAN-Z) 0 004 % ophthalmic solution 1 drop, 1 drop, Both Eyes, HS, Jose Yakov PA-C, 1 drop at 03/09/19 2207    Laboratory Results:  Results from last 7 days   Lab Units 03/10/19  0512 03/10/19  0504 03/10/19  0451 03/10/19  0012 03/09/19  1556 03/09/19  1354 03/09/19  0945 03/09/19  0458 03/09/19  0021 03/09/19 03/08/19  1616 03/08/19  0346 03/07/19  2037  03/07/19  1213 03/07/19  1204 03/07/19  1050 03/07/19  1023 03/07/19  1004 03/07/19  0946 03/07/19  0945 03/07/19  0937 03/07/19  0923   WBC Thousand/uL  --   --  12 74*  --   --   --   --   --   --  16 89*  --  18 50*  --   --   --   --   --   --   --   --   --   --   --    HEMOGLOBIN g/dL  --   --  9 5*  --  9 5*  --   --   --   --  9 9*  --  11 1* 11 7*  --   --  11 9*  --   --   --   --   --   --   --    I STAT HEMOGLOBIN g/dl  --   --   --   --   --   --   --   --   --   --   --   --   --   --  10 9*  --  9 2* 9 9* 9 9*  --  10 2* 10 2* 8 2*   HEMATOCRIT %  --   --  27 4*  --   --   --   --   --   --  29 4*  --  33 2* 34 6*  --   --  35 0*  --   --   --   --   --   --   --    HEMATOCRIT, ISTAT %  --   --   --   --   --   --   --   --   --   --   --   --   --   --  32*  --  27* 29* 29*  --  30* 30* 24*   PLATELETS Thousands/uL  --   --  85*  --   --   --   --   --   --  73*  --  131*  --   --   --  144*  --   --   --  158  --   --   -- POTASSIUM mmol/L 4 5  --   --  4 5  --  4 5 4 9 5 1 5 0  --  5 0 5 1 4 4   < >  --  4 3  --   --   --   --   --   --   --    CHLORIDE mmol/L 103  --   --   --   --  105  --  107  --   --  109* 111*  --   --   --  112*  --   --   --   --   --   --   --    CO2 mmol/L 23  --   --   --   --  18*  --  21  --   --  20* 22  --   --   --  23  --   --   --   --   --   --   --    CO2, I-STAT mmol/L  --   --   --   --   --   --   --   --   --   --   --   --   --   --  24  --  28 27 31  --  24 33* 27   BUN mg/dL 57*  --   --   --   --  49*  --  45*  --   --  39* 34*  --   --   --  25  --   --   --   --   --   --   --    CREATININE mg/dL 4 25*  --   --   --   --  3 89*  --  3 48*  --   --  3 02* 2 54*  --   --   --  1 94*  --   --   --   --   --   --   --    CALCIUM mg/dL 8 2*  --   --   --   --  8 2*  --  8 3  --   --  8 1* 8 2*  --   --   --  8 0*  --   --   --   --   --   --   --    MAGNESIUM mg/dL  --  2 4  --   --   --   --   --  2 8*  --   --   --  3 1*  --   --   --   --   --   --   --   --   --   --   --    PHOSPHORUS mg/dL  --  5 0*  --   --   --   --   --  5 4*  --   --   --   --   --   --   --   --   --   --   --   --   --   --   --    GLUCOSE, ISTAT mg/dl  --   --   --   --   --   --   --   --   --   --   --   --   --   --  110  --  98 111 154*  --  185* 171* 140    < > = values in this interval not displayed  Results for orders placed during the hospital encounter of 03/07/19   XR chest portable    Narrative CHEST     INDICATION:   Cardiac surgery  COMPARISON:  March 7, 2019    EXAM PERFORMED/VIEWS:  XR CHEST PORTABLE      FINDINGS:  Endotracheal and enteric tubes been removed  Again noted are sternotomy wires, left atrial occlusion devices, and prosthetic cardiac valves  Also again noted is a left subclavian central venous catheter and a [place] catheter with   unchanged position from prior examination  Mediastinal drain is reidentified  Cardiomediastinal silhouette appears unremarkable      The lungs are clear  No pneumothorax or pleural effusion  Osseous structures appear within normal limits for patient age  Impression Expected radiographic appearance after cardiac surgery  Workstation performed: [id number]       Results for orders placed during the hospital encounter of 01/29/19   XR chest 2 views    Narrative CHEST     INDICATION:   sob  COMPARISON:  None    EXAM PERFORMED/VIEWS:  XR CHEST PA & LATERAL      FINDINGS:    Heart enlarged  Pulmonary vessels unremarkable  Subsegmental atelectasis in the left lower lobe  Lungs otherwise clear  Small amount of bibasilar pleural fluid or thickening  Osseous structures appear within normal limits for patient age  Impression Bibasilar pleural effusions or thickening  Left lower lobe subsegmental atelectasis  Workstation performed: [id number]       Results for orders placed during the hospital encounter of 02/21/19   CT chest wo contrast    Narrative CT CHEST WITHOUT IV CONTRAST    INDICATION:   Q23 1: Congenital insufficiency of aortic valve  I35 1: Nonrheumatic aortic (valve) insufficiency  I34 0: Nonrheumatic mitral (valve) insufficiency  COMPARISON:  CT chest 8/11/2016    TECHNIQUE: CT examination of the chest was performed without intravenous contrast   Axial, sagittal, and coronal 2D reformatted images were created from the source data and submitted for interpretation  Radiation dose length product (DLP) for this visit:  264 mGy-cm   This examination, like all CT scans performed in the [facility], was performed utilizing techniques to minimize radiation dose exposure, including the use of iterative   reconstruction and automated exposure control  FINDINGS:    LUNGS:  Lungs are clear  There is no tracheal or endobronchial lesion  PLEURA:  Unremarkable  HEART/GREAT VESSELS:  Atherosclerotic changes are noted in thoracic aorta and coronary arteries      There is no evidence of atherosclerotic disease involving the ascending aorta  There is minimal calcification of the aortic valve  Ascending aorta measures 3 2 cm in diameter  Distance from ascending aorta to the sternum measures 2 3 cm with a small   amount of intervening mediastinal fat  MEDIASTINUM AND DIMITRIOS:  Unremarkable  CHEST WALL AND LOWER NECK:   Unremarkable  VISUALIZED STRUCTURES IN THE UPPER ABDOMEN:  Unremarkable  OSSEOUS STRUCTURES:  No acute fracture or destructive osseous lesion  Impression No evidence of acute intrathoracic pathology  Workstation performed: [id number]       No results found for this or any previous visit  No results found for this or any previous visit  No results found for this or any previous visit  Portions of the record may have been created with voice recognition software  Occasional wrong word or "sound a like" substitutions may have occurred due to the inherent limitations of voice recognition software  Read the chart carefully and recognize, using context, where substitutions have occurred

## 2019-03-10 NOTE — CONSULTS
Cardiology   [facility] 80 y o  male MRN: [id number]  Unit/Bed#: [facility] 416-01 Encounter: [id number]      Reason for Consult / Principal Problem: Post op mitral and aortic valve replacement    Physician Requesting Consult:  Huber Mckeon DO    Cardiologist: Dr Michoacano Weaver      Assessment/Plan:    >> POD # 4 s/p  1  Aortic valve repair with 23mm Nettles Inspiris Resilia bovine tissue valve   2  Mitral valve repair with 30 mm Medtronic CG Future mitral annuloplasty ring   3  Ligation of left atrial appendage with 35mm AtriClip device    >> Atrial tachyarrhythmia: Appears to be 2:1 atrial flutter and short paroxsyms of atrial fibrillation  >> NICM EF 40%:  Secondary to valvular disease  >> HTN  >> HLD  >> DM  >> CKD 3 with acute renal failure:  Likely secondary to ischemic ATN after cardiopulmonary bypass  >> CAD    Plan:    Continue beta-blocker, amiodarone per CT surgery  Continue Coumadin per CT surgery  Wean off Milrinone as tolerated  Strict monitoring of intake, output and daily weights  Nephrology on board for management of acute kidney injury, no indications for dialysis at this time  CC:  Admitted for elective replacement/repair of aortic and mitral valve    HPI: Myra Mustafa 80y o  year old male who presents for elective replacement of aortic valve and mitral valve repair  He is postop day 4 and has no complaints  He was low output post cardiac surgery and was on a Milrinone drip and Lasix infusion, he developed acute kidney injury in the immediate postoperative stage which is thought to be due to ischemic ATN from cardiopulmonary bypass  The patient had some runs of atrial tachyarrhythmia overnight, received 2 5 mg of IV Lopressor, was loaded with IV amiodarone as well and subsequently these arrhythmias resolved      Denies chest pain, shortness of breath, palpitations, orthopnea, PND,  syncope, presyncope, diaphoresis, nausea/vomiting     Remainder of ROS done and negative    Telemetry:  Tachyarrhythmia 144, regular, NOT noted as an event on telemetry, also had small bursts of irregular NCT    Net fluid balance:     Weight:  87 3 kg    ECHO: Jan 2019: LEFT VENTRICLE:  The ventricle was mildly dilated  Systolic function was mildly to moderately reduced  Ejection fraction was estimated to be 40 %  There was mild diffuse hypokinesis  Wall thickness was mildly increased      RIGHT VENTRICLE:  The ventricle was mildly dilated  Systolic function was normal      LEFT ATRIUM:  The atrium was mildly dilated      MITRAL VALVE:  There was moderate to severe regurgitation      AORTIC VALVE:  The valve was possibly bicuspid  Leaflets exhibited normal thickness, normal cuspal separation, and significant diastolic prolapse  There was moderate to severe regurgitation      TRICUSPID VALVE:  There was mild regurgitation  Pulmonary artery systolic pressure was moderately to markedly increased  The findings suggest moderate to severe pulmonary hypertension      AORTA:  The root exhibited mild dilatation      IVC, HEPATIC VEINS:  The inferior vena cava was mildly dilated  Respirophasic changes were blunted (less than 50% variation)      PERICARDIUM:  There was a left pleural effusion  Cardiac Catheterization: 2/2019: CORONARY CIRCULATION:  Left main: Normal   LAD: The LAD was large and extended well beyond the apex  No atherosclerosis was seen  The diagonal branches were also normal   Circumflex: The vessel was normal sized There was a 70% stenosis in mid vessel  Ramus intermedius: There was a 70% stenosis in mid vessel  RCA: The vessel was large sized and dominant, giving rise to the PDA  No atherosclerosis was seen                Family History:   Family History   Problem Relation Age of Onset    Stroke Mother     Hypertension Mother     Blindness Father     Gout Brother     Heart Valve Disease Brother     Anuerysm Neg Hx      Historical Information   Past Medical History: Diagnosis Date    Diabetes mellitus ([facility] )     Hyperlipidemia     Hypertension     Proteinuria     Stage 3 chronic kidney disease ([facility] )     Vitamin D deficiency      Past Surgical History:   Procedure Laterality Date    COLONOSCOPY      AZ ECHO TRANSESOPHAG R-T 2D W/PRB IMG ACQUISJ I&R N/A 3/7/2019    Procedure: INTRA-OP TRANSESOPHAGEAL ECHOCARDIOGRAM (PATIENCE);   Surgeon: Francies Homans, DO;  [facility];  Service: Cardiac Surgery    AZ PERQ CLSR TCAT L ATR APNDGE W/ENDOCARDIAL IMPLNT  3/7/2019    Procedure: LEFT ATRIAL APPENDAGE OCCLUSION CLIPPED with 35mm Maurice Pottier;  Surgeon: Francies Homans, DO;  [facility];  Service: Cardiac Surgery    AZ REPLACEMENT OF MITRAL VALVE N/A 3/7/2019    Procedure: REPLACEMENT VALVE MITRAL (MVR) REPAIR with a 30mm MEDTRONIC CG FUTURE RING;  Surgeon: Francies Homans, DO;  [facility];  Service: Cardiac Surgery    AZ RPLCMT AORTIC VALVE OPN W/STENTLESS TISSUE VALVE N/A 3/7/2019    Procedure: REPLACEMENT VALVE AORTIC (AVR) with 23mm TAVERA INSPIRIS RESILIA  AORTIC VALVE;  Surgeon: Francies Homans, DO;  [facility];  Service: Cardiac Surgery    TOE SURGERY Left      Social History   Social History     Substance and Sexual Activity   Alcohol Use Yes    Frequency: Monthly or less    Drinks per session: 1 or 2    Binge frequency: Never    Comment: occasionally     Social History     Substance and Sexual Activity   Drug Use No     Social History     Tobacco Use   Smoking Status Former Smoker    Last attempt to quit: Carlene Mckeon Years since quittin 2   Smokeless Tobacco Never Used     Family History:   Family History   Problem Relation Age of Onset    Stroke Mother     Hypertension Mother     Blindness Father     Gout Brother     Heart Valve Disease Brother     Anuerysm Neg Hx        Review of Systems:  Review of Systems        Scheduled Meds:  Current Facility-Administered Medications:  acetaminophen 650 mg Oral Q4H PRN Aditi Wasserman PA-C    amiodarone 1 mg/min Intravenous Continuous JHONATAN Austin    Followed by        amiodarone 0 5 mg/min Intravenous Continuous JHONATAN Austin    amiodarone 200 mg Oral [facility] Aditi Wasserman PA-C    aspirin 325 mg Oral Daily Aditi Wasserman PA-C    atorvastatin 20 mg Oral Daily With [employer] ShilaYINA sherman    bisacodyl 10 mg Rectal Daily PRN Aditi Wasserman PA-C    furosemide 40 mg/hr Intravenous Continuous Sebastianquinn Avila PA-C Last Rate: 40 mg/hr (03/10/19 0004)   insulin glargine 20 Units Subcutaneous HS Stacey Weathers MD    insulin lispro 1-5 Units Subcutaneous TID AC Stacey Weathers MD    insulin lispro 1-5 Units Subcutaneous HS Stacey Weathers MD    insulin lispro 5 Units Subcutaneous TID With Meals Stacey Weathers MD    lidocaine (cardiac) 100 mg Intravenous Q30 Min PRN Aditi Wasserman PA-C    metoprolol tartrate 25 mg Oral Q12H [facility] Ashanti Arrieta PA-C    milrinone [facility]) infusion 0 38 mcg/kg/min Intravenous Continuous JHONATAN Austin Last Rate: 0 25 mcg/kg/min (03/10/19 0900)   mupirocin 1 application Nasal G00J [facility] Aditi Wasserman PA-C    niCARdipine 1-15 mg/hr Intravenous Titrated Quinn Canales PA-C Last Rate: Stopped (03/10/19 0700)   ondansetron 4 mg Intravenous Q6H PRN Aditi Wasserman PA-C    oxyCODONE-acetaminophen 1 tablet Oral Q4H PRN Leveda Budds, DO    oxyCODONE-acetaminophen 2 tablet Oral Q6H PRN Aditi Wasserman PA-C    pantoprazole 40 mg Oral Daily Before Breakfast Aditi Wasserman PA-C    polyethylene glycol 17 g Oral Daily Aditi Wasserman PA-C    sodium chloride 20 mL/hr Intravenous Continuous Aditi Wasserman PA-C Last Rate: Stopped (03/09/19 2200)   travoprost 1 drop Both Eyes HS Aditi Wasserman PA-C    warfarin 2 5 mg Oral Once (warfarin) Sree Stanville, CRNP      Continuous Infusions:  amiodarone 1 mg/min    Followed by     amiodarone 0 5 mg/min    furosemide 40 mg/hr Last Rate: 40 mg/hr (03/10/19 0004)   milrinone (PRIMACOR) infusion 0 38 mcg/kg/min Last Rate: 0 25 mcg/kg/min (03/10/19 0900)   niCARdipine 1-15 mg/hr Last Rate: Stopped (03/10/19 0700)   sodium chloride 20 mL/hr Last Rate: Stopped (03/09/19 2200)     PRN Meds:   acetaminophen    bisacodyl    lidocaine (cardiac)    ondansetron    oxyCODONE-acetaminophen    oxyCODONE-acetaminophen  current meds:   Current Facility-Administered Medications   Medication Dose Route Frequency    acetaminophen (TYLENOL) tablet 650 mg  650 mg Oral Q4H PRN    amiodarone (CORDARONE) 900 mg in dextrose 5 % 500 mL infusion  1 mg/min Intravenous Continuous    Followed by   [facility] amiodarone (CORDARONE) 900 mg in dextrose 5 % 500 mL infusion  0 5 mg/min Intravenous Continuous    amiodarone tablet 200 mg  200 mg Oral Q8H [address]    aspirin tablet 325 mg  325 mg Oral Daily    atorvastatin (LIPITOR) tablet 20 mg  20 mg Oral Daily With Dinner    bisacodyl (DULCOLAX) rectal suppository 10 mg  10 mg Rectal Daily PRN    furosemide (LASIX) 500 mg infusion 50 mL  40 mg/hr Intravenous Continuous    insulin glargine (LANTUS) subcutaneous injection 20 Units 0 2 mL  20 Units Subcutaneous HS    insulin lispro (HumaLOG) 100 units/mL subcutaneous injection 1-5 Units  1-5 Units Subcutaneous TID AC    insulin lispro (HumaLOG) 100 units/mL subcutaneous injection 1-5 Units  1-5 Units Subcutaneous HS    insulin lispro (HumaLOG) 100 units/mL subcutaneous injection 5 Units  5 Units Subcutaneous TID With Meals    lidocaine (cardiac) injection 100 mg  100 mg Intravenous Q30 Min PRN    metoprolol tartrate (LOPRESSOR) tablet 25 mg  25 mg Oral Q12H SHOSHANA    Milrinone Lactate in Dextrose (PRIMACOR) 20 MG/100 ML infusion (premix)  0 38 mcg/kg/min Intravenous Continuous    mupirocin (BACTROBAN) 2 % nasal ointment 1 application  1 application Nasal T67G [address]    niCARdipine (CARDENE) 25 mg (STANDARD CONCENTRATION) in sodium chloride 0 9% 250 mL  1-15 mg/hr Intravenous Titrated    ondansetron (ZOFRAN) injection 4 mg  4 mg Intravenous Q6H PRN    oxyCODONE-acetaminophen (PERCOCET) 5-325 mg per tablet 1 tablet  1 tablet Oral Q4H PRN    oxyCODONE-acetaminophen (PERCOCET) 5-325 mg per tablet 2 tablet  2 tablet Oral Q6H PRN    pantoprazole (PROTONIX) EC tablet 40 mg  40 mg Oral Daily Before Breakfast    polyethylene glycol (MIRALAX) packet 17 g  17 g Oral Daily    sodium chloride infusion 0 45 %  20 mL/hr Intravenous Continuous    travoprost (TRAVATAN-Z) 0 004 % ophthalmic solution 1 drop  1 drop Both Eyes HS    warfarin (COUMADIN) tablet 2 5 mg  2 5 mg Oral Once (warfarin)    and PTA meds:   Prior to Admission Medications   Prescriptions Last Dose Informant Patient Reported? Taking? Cholecalciferol (VITAMIN D3) 1000 units CAPS 2/27/2019 Self Yes Yes   Sig: Take 1 capsule by mouth daily   Multiple Vitamin (MULTIVITAMIN) tablet 2/20/2019 Self Yes Yes   Sig: Take 1 tablet by mouth daily   TRAVATAN Z 0 004 % ophthalmic solution 3/6/2019 at Unknown time Self Yes Yes   ascorbic acid (VITAMIN C) 500 mg tablet 2/27/2019 Self Yes Yes   Sig: Take 500 mg by mouth daily   atorvastatin (LIPITOR) 20 mg tablet 3/4/2019 Self Yes Yes   Sig: Take 1 tablet by mouth daily   calcitriol (ROCALTROL) 0 25 mcg capsule 3/4/2019 Self Yes Yes   Sig: Take 1 capsule by mouth 2 (two) times a week   furosemide (LASIX) 20 mg tablet 3/4/2019  Yes Yes   Sig: Take 20 mg by mouth 3 (three) times a week   furosemide (LASIX) 40 mg tablet 3/4/2019  Yes Yes   Sig: Take 40 mg by mouth 4 (four) times a week   metFORMIN (GLUCOPHAGE) 500 mg tablet 3/4/2019 Self Yes Yes   Sig: Take 1 tablet by mouth daily   metoprolol succinate (TOPROL-XL) 50 mg 24 hr tablet 3/7/2019 at 0430 Self Yes Yes   Sig: Take 50 mg by mouth daily    mupirocin (BACTROBAN) 2 % ointment 3/6/2019 at Unknown time  No Yes   Sig: Apply inside both nostrils two times per day  Start 5 days prior to surgery     olmesartan (BENICAR) 20 mg tablet 3/4/2019 Self No Yes   Sig: Take 1 tablet (20 mg total) by mouth 2 (two) times a day Facility-Administered Medications: None       Allergies   Allergen Reactions    Ace Inhibitors Cough       Objective   Vitals: Blood pressure 114/65, pulse 90, temperature 100 2 °F (37 9 °C), temperature source Probe, resp  rate (!) 34, height 5' 6" (1 676 m), weight 87 3 kg (192 lb 7 4 oz), SpO2 95 %  , Body mass index is 31 06 kg/m² , Orthostatic Blood Pressures      Most Recent Value   Blood Pressure  114/65 filed at 03/09/2019 1700            Intake/Output Summary (Last 24 hours) at 3/10/2019 1108  Last data filed at 3/10/2019 1000  Gross per 24 hour   Intake 1701 71 ml   Output 2800 ml   Net -1098 29 ml       Invasive Devices     Central Venous Catheter Line            CVC Central Lines 03/07/19 Triple 3 days    Introducer 03/07/19 3 days          Peripheral Intravenous Line            Peripheral IV 02/15/19 Left Wrist 23 days    Peripheral IV 03/07/19 Left Wrist 3 days    Peripheral IV 03/07/19 Right Wrist 3 days          Arterial Line            Arterial Line 03/07/19 Right Radial 3 days          Drain            Urethral Catheter Non-latex; Temperature probe 16 Fr  3 days    Chest Tube 1 Posterior Mediastinal 32 Fr  2 days    Chest Tube 2 Anterior Mediastinal 32 Fr  2 days                Physical Exam:    General:  AO x3, no acute distress  Cardiac:  S1-S2 normal  central line in-situ, sternal incision appears to be clean and nontender  Lungs:  Reduced air entry bilaterally    Abdomen:  Soft nontender nondistended, positive bowel sounds  Extremities:  Warm, well perfused, pulses palpable, mild bilateral pitting edema  Neuro: Grossly nonfocal  Psych:  Normal affect      Lab Results:   Recent Results (from the past 24 hour(s))   Fingerstick Glucose (POCT)    Collection Time: 03/09/19 11:52 AM   Result Value Ref Range    POC Glucose 159 (H) 65 - 140 mg/dl   Basic metabolic panel    Collection Time: 03/09/19  1:54 PM   Result Value Ref Range    Sodium 134 (L) 136 - 145 mmol/L    Potassium 4 5 3 5 - 5 3 mmol/L Chloride 105 100 - 108 mmol/L    CO2 18 (L) 21 - 32 mmol/L    ANION GAP 11 4 - 13 mmol/L    BUN 49 (H) 5 - 25 mg/dL    Creatinine 3 89 (H) 0 60 - 1 30 mg/dL    Glucose 142 (H) 65 - 140 mg/dL    Calcium 8 2 (L) 8 3 - 10 1 mg/dL    eGFR 17 ml/min/1 73sq m   Fingerstick Glucose (POCT)    Collection Time: 03/09/19  1:54 PM   Result Value Ref Range    POC Glucose 144 (H) 65 - 140 mg/dl   Blood gas, venous    Collection Time: 03/09/19  3:56 PM   Result Value Ref Range    pH, Chris 7 318 7 300 - 7 400    pCO2, Chris 39 1 (L) 42 0 - 50 0 mm Hg    pO2, Chris 28 5 (L) 35 0 - 45 0 mm Hg    HCO3, Chris 19 6 (L) 24 - 30 mmol/L    Base Excess, Chris -6 0 mmol/L    O2 Content, Chris 7 3 ml/dL    O2 HGB, VENOUS 47 9 (L) 60 0 - 80 0 %    Temperature 99 3 Degrees Fehrenheit    ROOM AIR FIO2 RA %   Hemoglobin    Collection Time: 03/09/19  3:56 PM   Result Value Ref Range    Hemoglobin 9 5 (L) 12 0 - 17 0 g/dL   Fingerstick Glucose (POCT)    Collection Time: 03/09/19  4:12 PM   Result Value Ref Range    POC Glucose 114 65 - 140 mg/dl   Fingerstick Glucose (POCT)    Collection Time: 03/09/19  6:10 PM   Result Value Ref Range    POC Glucose 168 (H) 65 - 140 mg/dl   Fingerstick Glucose (POCT)    Collection Time: 03/09/19  8:02 PM   Result Value Ref Range    POC Glucose 106 65 - 140 mg/dl   Fingerstick Glucose (POCT)    Collection Time: 03/09/19  9:58 PM   Result Value Ref Range    POC Glucose 125 65 - 140 mg/dl   Potassium    Collection Time: 03/10/19 12:12 AM   Result Value Ref Range    Potassium 4 5 3 5 - 5 3 mmol/L   CBC    Collection Time: 03/10/19  4:51 AM   Result Value Ref Range    WBC 12 74 (H) 4 31 - 10 16 Thousand/uL    RBC 3 03 (L) 3 88 - 5 62 Million/uL    Hemoglobin 9 5 (L) 12 0 - 17 0 g/dL    Hematocrit 27 4 (L) 36 5 - 49 3 %    MCV 90 82 - 98 fL    MCH 31 4 26 8 - 34 3 pg    MCHC 34 7 31 4 - 37 4 g/dL    RDW 13 7 11 6 - 15 1 %    Platelets 85 (L) 910 - 390 Thousands/uL    MPV 10 8 8 9 - 12 7 fL   Protime-INR    Collection Time: 03/10/19  4:52 AM   Result Value Ref Range    Protime 21 7 (H) 11 8 - 14 2 seconds    INR 1 88 (H) 0 86 - 1 17   Calcium, ionized    Collection Time: 03/10/19  4:55 AM   Result Value Ref Range    Calcium, Ionized 1 06 (L) 1 12 - 1 32 mmol/L   Blood gas, venous    Collection Time: 03/10/19  4:55 AM   Result Value Ref Range    pH, Chris 7 379 7 300 - 7 400    pCO2, Chris 39 1 (L) 42 0 - 50 0 mm Hg    pO2, Chris 28 8 (L) 35 0 - 45 0 mm Hg    HCO3, Chris 22 6 (L) 24 - 30 mmol/L    Base Excess, Chris -2 3 mmol/L    O2 Content, Chris 7 6 ml/dL    O2 HGB, VENOUS 51 8 (L) 60 0 - 80 0 %    Nasal Cannula 2    Phosphorus    Collection Time: 03/10/19  5:04 AM   Result Value Ref Range    Phosphorus 5 0 (H) 2 3 - 4 1 mg/dL   Magnesium    Collection Time: 03/10/19  5:04 AM   Result Value Ref Range    Magnesium 2 4 1 6 - 2 6 mg/dL   Comprehensive metabolic panel    Collection Time: 03/10/19  5:12 AM   Result Value Ref Range    Sodium 135 (L) 136 - 145 mmol/L    Potassium 4 5 3 5 - 5 3 mmol/L    Chloride 103 100 - 108 mmol/L    CO2 23 21 - 32 mmol/L    ANION GAP 9 4 - 13 mmol/L    BUN 57 (H) 5 - 25 mg/dL    Creatinine 4 25 (H) 0 60 - 1 30 mg/dL    Glucose 131 65 - 140 mg/dL    Calcium 8 2 (L) 8 3 - 10 1 mg/dL    AST 47 (H) 5 - 45 U/L    ALT <6 (L) 12 - 78 U/L    Alkaline Phosphatase 53 46 - 116 U/L    Total Protein 6 2 (L) 6 4 - 8 2 g/dL    Albumin 2 7 (L) 3 5 - 5 0 g/dL    Total Bilirubin 0 38 0 20 - 1 00 mg/dL    eGFR 16 ml/min/1 73sq m   Fingerstick Glucose (POCT)    Collection Time: 03/10/19  5:58 AM   Result Value Ref Range    POC Glucose 129 65 - 140 mg/dl   Potassium    Collection Time: 03/10/19  9:28 AM   Result Value Ref Range    Potassium 4 4 3 5 - 5 3 mmol/L     Imaging: I have personally reviewed pertinent reports

## 2019-03-10 NOTE — SOCIAL WORK
CM met with patient at bedside to explain CM role and discuss d/c plan  Pt resides with his wife Steve Corral [phone] in a 1-story home with no MICHELLE  Pt reports that brother also resides in the home  Pt was independent with ADLs and ambulation PTA  No DME  No history of HHC or STR  Pt drives and works from home  Preferred Rx:  [facility] on [id number] in [place]  POA is patient's wife  CM requested copy  No history of MH or D/A treatment reported  Pt will need RN for follow up care  Pt provided freedom of choice  Pt agreeable to referral to [facility]  Referral placed in [facility]  CM reviewed d/c planning process including the following: identifying help at home, patient preference for d/c planning needs, Discharge Lounge, [facility] Meds to Bed program, availability of treatment team to discuss questions or concerns patient and/or family may have regarding understanding medications and recognizing signs and symptoms once discharged  CM also encouraged patient to follow up with all recommended appointments after discharge  Patient advised of importance for patient and family to participate in managing patients medical well being

## 2019-03-11 LAB
ANION GAP SERPL CALCULATED.3IONS-SCNC: 12 MMOL/L (ref 4–13)
ATRIAL RATE: 101 BPM
ATRIAL RATE: 143 BPM
BASE EX.OXY STD BLDV CALC-SCNC: 51 % (ref 60–80)
BASE EXCESS BLDV CALC-SCNC: -0.2 MMOL/L
BUN SERPL-MCNC: 68 MG/DL (ref 5–25)
CA-I BLD-SCNC: 1.1 MMOL/L (ref 1.12–1.32)
CALCIUM SERPL-MCNC: 8.1 MG/DL (ref 8.3–10.1)
CHLORIDE SERPL-SCNC: 100 MMOL/L (ref 100–108)
CHOLEST SERPL-MCNC: 81 MG/DL (ref 50–200)
CO2 SERPL-SCNC: 23 MMOL/L (ref 21–32)
CREAT SERPL-MCNC: 4.63 MG/DL (ref 0.6–1.3)
ERYTHROCYTE [DISTWIDTH] IN BLOOD BY AUTOMATED COUNT: 13.3 % (ref 11.6–15.1)
GFR SERPL CREATININE-BSD FRML MDRD: 14 ML/MIN/1.73SQ M
GLUCOSE SERPL-MCNC: 101 MG/DL (ref 65–140)
GLUCOSE SERPL-MCNC: 123 MG/DL (ref 65–140)
GLUCOSE SERPL-MCNC: 163 MG/DL (ref 65–140)
GLUCOSE SERPL-MCNC: 170 MG/DL (ref 65–140)
GLUCOSE SERPL-MCNC: 70 MG/DL (ref 65–140)
GLUCOSE SERPL-MCNC: 80 MG/DL (ref 65–140)
GLUCOSE SERPL-MCNC: 84 MG/DL (ref 65–140)
HCO3 BLDV-SCNC: 24.7 MMOL/L (ref 24–30)
HCT VFR BLD AUTO: 27.2 % (ref 36.5–49.3)
HDLC SERPL-MCNC: 40 MG/DL (ref 40–60)
HGB BLD-MCNC: 9.3 G/DL (ref 12–17)
INR PPP: 2.3 (ref 0.86–1.17)
LDLC SERPL CALC-MCNC: 28 MG/DL (ref 0–100)
MAGNESIUM SERPL-MCNC: 2.5 MG/DL (ref 1.6–2.6)
MCH RBC QN AUTO: 30.7 PG (ref 26.8–34.3)
MCHC RBC AUTO-ENTMCNC: 34.2 G/DL (ref 31.4–37.4)
MCV RBC AUTO: 90 FL (ref 82–98)
NASAL CANNULA: 4
O2 CT BLDV-SCNC: 7.3 ML/DL
PCO2 BLDV: 41.1 MM HG (ref 42–50)
PH BLDV: 7.4 [PH] (ref 7.3–7.4)
PHOSPHATE SERPL-MCNC: 6.3 MG/DL (ref 2.3–4.1)
PLATELET # BLD AUTO: 101 THOUSANDS/UL (ref 149–390)
PMV BLD AUTO: 10.8 FL (ref 8.9–12.7)
PO2 BLDV: 30.2 MM HG (ref 35–45)
POTASSIUM SERPL-SCNC: 3.7 MMOL/L (ref 3.5–5.3)
POTASSIUM SERPL-SCNC: 3.9 MMOL/L (ref 3.5–5.3)
POTASSIUM SERPL-SCNC: 3.9 MMOL/L (ref 3.5–5.3)
POTASSIUM SERPL-SCNC: 4.2 MMOL/L (ref 3.5–5.3)
PROTHROMBIN TIME: 25.4 SECONDS (ref 11.8–14.2)
QRS AXIS: -71 DEGREES
QRS AXIS: -71 DEGREES
QRSD INTERVAL: 138 MS
QRSD INTERVAL: 150 MS
QT INTERVAL: 367 MS
QT INTERVAL: 421 MS
QTC INTERVAL: 546 MS
QTC INTERVAL: 567 MS
RBC # BLD AUTO: 3.03 MILLION/UL (ref 3.88–5.62)
SODIUM SERPL-SCNC: 135 MMOL/L (ref 136–145)
T WAVE AXIS: 76 DEGREES
T WAVE AXIS: 81 DEGREES
TRIGL SERPL-MCNC: 63 MG/DL
VENTRICULAR RATE: 101 BPM
VENTRICULAR RATE: 143 BPM
WBC # BLD AUTO: 11.15 THOUSAND/UL (ref 4.31–10.16)

## 2019-03-11 PROCEDURE — 83735 ASSAY OF MAGNESIUM: CPT | Performed by: NURSE PRACTITIONER

## 2019-03-11 PROCEDURE — 99232 SBSQ HOSP IP/OBS MODERATE 35: CPT | Performed by: INTERNAL MEDICINE

## 2019-03-11 PROCEDURE — 93005 ELECTROCARDIOGRAM TRACING: CPT

## 2019-03-11 PROCEDURE — 99233 SBSQ HOSP IP/OBS HIGH 50: CPT | Performed by: INTERNAL MEDICINE

## 2019-03-11 PROCEDURE — 84132 ASSAY OF SERUM POTASSIUM: CPT | Performed by: PHYSICIAN ASSISTANT

## 2019-03-11 PROCEDURE — 99024 POSTOP FOLLOW-UP VISIT: CPT | Performed by: THORACIC SURGERY (CARDIOTHORACIC VASCULAR SURGERY)

## 2019-03-11 PROCEDURE — 85027 COMPLETE CBC AUTOMATED: CPT | Performed by: NURSE PRACTITIONER

## 2019-03-11 PROCEDURE — 80048 BASIC METABOLIC PNL TOTAL CA: CPT | Performed by: NURSE PRACTITIONER

## 2019-03-11 PROCEDURE — 99233 SBSQ HOSP IP/OBS HIGH 50: CPT | Performed by: PHYSICIAN ASSISTANT

## 2019-03-11 PROCEDURE — 85610 PROTHROMBIN TIME: CPT | Performed by: NURSE PRACTITIONER

## 2019-03-11 PROCEDURE — 82948 REAGENT STRIP/BLOOD GLUCOSE: CPT

## 2019-03-11 PROCEDURE — 82330 ASSAY OF CALCIUM: CPT | Performed by: NURSE PRACTITIONER

## 2019-03-11 PROCEDURE — 84100 ASSAY OF PHOSPHORUS: CPT | Performed by: NURSE PRACTITIONER

## 2019-03-11 PROCEDURE — 82805 BLOOD GASES W/O2 SATURATION: CPT | Performed by: NURSE PRACTITIONER

## 2019-03-11 PROCEDURE — 93010 ELECTROCARDIOGRAM REPORT: CPT | Performed by: INTERNAL MEDICINE

## 2019-03-11 PROCEDURE — 80061 LIPID PANEL: CPT | Performed by: INTERNAL MEDICINE

## 2019-03-11 PROCEDURE — 97110 THERAPEUTIC EXERCISES: CPT

## 2019-03-11 RX ORDER — ASPIRIN 81 MG/1
81 TABLET ORAL DAILY
Status: DISCONTINUED | OUTPATIENT
Start: 2019-03-11 | End: 2019-03-15 | Stop reason: HOSPADM

## 2019-03-11 RX ORDER — POTASSIUM CHLORIDE 29.8 MG/ML
40 INJECTION INTRAVENOUS ONCE
Status: COMPLETED | OUTPATIENT
Start: 2019-03-11 | End: 2019-03-11

## 2019-03-11 RX ORDER — METOPROLOL TARTRATE 5 MG/5ML
5 INJECTION INTRAVENOUS ONCE
Status: COMPLETED | OUTPATIENT
Start: 2019-03-11 | End: 2019-03-11

## 2019-03-11 RX ORDER — DOCUSATE SODIUM 100 MG/1
100 CAPSULE, LIQUID FILLED ORAL 2 TIMES DAILY
Status: DISCONTINUED | OUTPATIENT
Start: 2019-03-11 | End: 2019-03-15 | Stop reason: HOSPADM

## 2019-03-11 RX ORDER — ALBUMIN, HUMAN INJ 5% 5 %
12.5 SOLUTION INTRAVENOUS ONCE
Status: COMPLETED | OUTPATIENT
Start: 2019-03-11 | End: 2019-03-11

## 2019-03-11 RX ORDER — TEMAZEPAM 15 MG/1
15 CAPSULE ORAL
Status: DISCONTINUED | OUTPATIENT
Start: 2019-03-11 | End: 2019-03-15 | Stop reason: HOSPADM

## 2019-03-11 RX ORDER — WARFARIN SODIUM 2.5 MG/1
2.5 TABLET ORAL
Status: COMPLETED | OUTPATIENT
Start: 2019-03-11 | End: 2019-03-11

## 2019-03-11 RX ORDER — AMLODIPINE BESYLATE 5 MG/1
5 TABLET ORAL DAILY
Status: DISCONTINUED | OUTPATIENT
Start: 2019-03-11 | End: 2019-03-11

## 2019-03-11 RX ORDER — METOPROLOL TARTRATE 50 MG/1
50 TABLET, FILM COATED ORAL EVERY 12 HOURS SCHEDULED
Status: DISCONTINUED | OUTPATIENT
Start: 2019-03-11 | End: 2019-03-11

## 2019-03-11 RX ORDER — ALBUMIN, HUMAN INJ 5% 5 %
SOLUTION INTRAVENOUS
Status: COMPLETED
Start: 2019-03-11 | End: 2019-03-11

## 2019-03-11 RX ADMIN — DEXTROSE 150 MG: 50 INJECTION, SOLUTION INTRAVENOUS at 10:56

## 2019-03-11 RX ADMIN — WARFARIN SODIUM 2.5 MG: 2.5 TABLET ORAL at 17:57

## 2019-03-11 RX ADMIN — METOPROLOL TARTRATE 25 MG: 25 TABLET, FILM COATED ORAL at 17:57

## 2019-03-11 RX ADMIN — METOPROLOL TARTRATE 50 MG: 50 TABLET, FILM COATED ORAL at 08:08

## 2019-03-11 RX ADMIN — ASPIRIN 325 MG ORAL TABLET 325 MG: 325 PILL ORAL at 08:09

## 2019-03-11 RX ADMIN — Medication 40 MG/HR: at 02:12

## 2019-03-11 RX ADMIN — SODIUM CHLORIDE 3 MG/HR: 0.9 INJECTION, SOLUTION INTRAVENOUS at 06:18

## 2019-03-11 RX ADMIN — AMIODARONE HYDROCHLORIDE 200 MG: 200 TABLET ORAL at 21:15

## 2019-03-11 RX ADMIN — DOCUSATE SODIUM 100 MG: 100 CAPSULE, LIQUID FILLED ORAL at 17:57

## 2019-03-11 RX ADMIN — INSULIN LISPRO 5 UNITS: 100 INJECTION, SOLUTION INTRAVENOUS; SUBCUTANEOUS at 09:00

## 2019-03-11 RX ADMIN — INSULIN LISPRO 1 UNITS: 100 INJECTION, SOLUTION INTRAVENOUS; SUBCUTANEOUS at 21:14

## 2019-03-11 RX ADMIN — PHENYLEPHRINE HYDROCHLORIDE 50 MCG/MIN: 10 INJECTION INTRAVENOUS at 10:15

## 2019-03-11 RX ADMIN — INSULIN LISPRO 5 UNITS: 100 INJECTION, SOLUTION INTRAVENOUS; SUBCUTANEOUS at 12:32

## 2019-03-11 RX ADMIN — ALTEPLASE 2 MG: 2.2 INJECTION, POWDER, LYOPHILIZED, FOR SOLUTION INTRAVENOUS at 14:50

## 2019-03-11 RX ADMIN — ATORVASTATIN CALCIUM 20 MG: 20 TABLET, FILM COATED ORAL at 16:52

## 2019-03-11 RX ADMIN — AMLODIPINE BESYLATE 5 MG: 5 TABLET ORAL at 08:09

## 2019-03-11 RX ADMIN — ALBUMIN (HUMAN) 12.5 G: 12.5 SOLUTION INTRAVENOUS at 10:18

## 2019-03-11 RX ADMIN — MUPIROCIN 1 APPLICATION: 20 OINTMENT TOPICAL at 21:15

## 2019-03-11 RX ADMIN — POLYETHYLENE GLYCOL 3350 17 G: 17 POWDER, FOR SOLUTION ORAL at 08:09

## 2019-03-11 RX ADMIN — INSULIN LISPRO 5 UNITS: 100 INJECTION, SOLUTION INTRAVENOUS; SUBCUTANEOUS at 17:55

## 2019-03-11 RX ADMIN — AMIODARONE HYDROCHLORIDE 200 MG: 200 TABLET ORAL at 06:08

## 2019-03-11 RX ADMIN — PANTOPRAZOLE SODIUM 40 MG: 40 TABLET, DELAYED RELEASE ORAL at 06:08

## 2019-03-11 RX ADMIN — POTASSIUM CHLORIDE 40 MEQ: 400 INJECTION, SOLUTION INTRAVENOUS at 18:32

## 2019-03-11 RX ADMIN — AMIODARONE HYDROCHLORIDE 200 MG: 200 TABLET ORAL at 13:45

## 2019-03-11 RX ADMIN — DOCUSATE SODIUM 100 MG: 100 CAPSULE, LIQUID FILLED ORAL at 08:09

## 2019-03-11 RX ADMIN — AMIODARONE HYDROCHLORIDE 1 MG/MIN: 50 INJECTION, SOLUTION INTRAVENOUS at 15:52

## 2019-03-11 RX ADMIN — INSULIN GLARGINE 20 UNITS: 100 INJECTION, SOLUTION SUBCUTANEOUS at 21:15

## 2019-03-11 RX ADMIN — ALBUMIN, HUMAN INJ 5% 12.5 G: 5 SOLUTION at 10:18

## 2019-03-11 RX ADMIN — TRAVOPROST 1 DROP: 0.04 SOLUTION/ DROPS OPHTHALMIC at 21:15

## 2019-03-11 RX ADMIN — METOPROLOL TARTRATE 5 MG: 5 INJECTION INTRAVENOUS at 07:21

## 2019-03-11 RX ADMIN — MILRINONE LACTATE IN DEXTROSE 0.13 MCG/KG/MIN: 200 INJECTION, SOLUTION INTRAVENOUS at 20:26

## 2019-03-11 RX ADMIN — PHENYLEPHRINE HYDROCHLORIDE 10000 MCG: 10 INJECTION INTRAVENOUS at 10:00

## 2019-03-11 RX ADMIN — MUPIROCIN 1 APPLICATION: 20 OINTMENT TOPICAL at 08:08

## 2019-03-11 NOTE — OCCUPATIONAL THERAPY NOTE
Occupational Therapy Cancellation Note        Patient Name: Isela Solis  [id number] Date: 3/11/2019    Consult received, chart reviewed  Per PT, pt with high HR and low Blood pressure with sitting B LE exercises, not appropriate for transfers during PT evaluation- RN was notified will hold OT session this am and re-attempt initial evaluation as medically appropriate    Lupe Cruz OT

## 2019-03-11 NOTE — PROGRESS NOTES
Progress Note - Nephrology   [facility] CTR 80 y o  male MRN: [id number]  Unit/Bed#: [facility] 416-01 Encounter: [id number]  LATE NOTE, patient seen earlier this am    Assessment / Plan:  1  Nonoliguric CARY on CKD stage 3 with baseline creatinine 1 6 to 1 8, follows with Dr Thien Patten  -creatinine 1 9 on admission, has risen up to 4 5-4 6, stable and hopefully plateaued now    -continue Lasix drip at 10 mg/hour with overall excellent urine output in last 24 hours  -CARY likely secondary to ischemic ATN with intraoperative hypotension episodes, cardiopulmonary bypass time 96 minutes, cross-clamp time 87 minutes  -HD consent on file if needed  -f/u BMP in a m   -no obvious uremic symptoms      2  Volume overload  -currently remains on Milrinone along with Lasix drip   -goal to keep negative balance in 24 hours  -clinically patient does not seem to be in respiratory distress   -currently remains on Milrinone IV drip     3  Biopsy-proven FSGS  -this was thought to be secondary to secondary FSGS and only 15% foot process effacement   Since then patient has been trying to lose weight   -he was on ARB due to significant proteinuria   Currently holding ARB due to CARY      4  Proteinuria d/t FSGS  -last UPC ratio 4 g  -will need eventual ARB which can be restarted as outpatient      5  Mild metabolic acidosis with worsening renal failure  -bicarb level has improved at 23 today  Venous pH 7 37 today      6  Severe aortic insufficiency/MR  -status post AVR, mitral valve repair, VIRI ligation - per ICU team    7  Mild hyponatremia likely dilutional, expect improvement with diuresis    8  Anemia - Hgb stable in 9s, I do note low platelets, monitor this       Discussed with ICU team             Subjective:   He is feeling better  He denies any chest pain or shortness of Breath  He is urinating quite a bit on Lasix drip  No complaints        Objective:     Vitals: Blood pressure 120/58, pulse (!) 124, temperature 99 3 °F (37 4 °C), temperature source Probe, resp  rate (!) 38, height 5' 6" (1 676 m), weight 71 2 kg (156 lb 15 5 oz), SpO2 95 %  ,Body mass index is 25 34 kg/m²  Temp (24hrs), Av 6 °F (37 6 °C), Min:98 3 °F (36 8 °C), Max:101 3 °F (38 5 °C)      Weight (last 2 days)     Date/Time   Weight    19 06   71 2 (156 97)    03/10/19 0600   87 3 (192 46)    19 0600   73 8 (162 7)                Intake/Output Summary (Last 24 hours) at 3/11/2019 1455  Last data filed at 3/11/2019 1251  Gross per 24 hour   Intake 2139 99 ml   Output 3920 ml   Net -1780 01 ml     I/O last 24 hours: In: 2582 8 [P O :1380; I V :652 8; IV Piggyback:550]  Out: 2698 [Urine:4665]        Physical Exam:   Physical Exam   Constitutional: He appears well-developed and well-nourished  No distress  HENT:   Head: Normocephalic and atraumatic  Mouth/Throat: No oropharyngeal exudate  Eyes: Right eye exhibits no discharge  Left eye exhibits no discharge  No scleral icterus  Neck: Neck supple  Cardiovascular: Normal rate, regular rhythm and normal heart sounds  Pulmonary/Chest: Effort normal and breath sounds normal  He has no wheezes  He has no rales  Abdominal: Soft  Bowel sounds are normal  He exhibits no distension  There is no tenderness  Genitourinary:   Genitourinary Comments: +maya   Musculoskeletal: Normal range of motion  He exhibits no edema  Neurological: He is alert  awake   Skin: Skin is warm and dry  No rash noted  He is not diaphoretic  Vertical incision over chest well healed    Psychiatric: He has a normal mood and affect  His behavior is normal    Vitals reviewed        Invasive Devices     Central Venous Catheter Line            CVC Central Lines 19 Triple 4 days    Introducer 19 4 days          Peripheral Intravenous Line            Peripheral IV 02/15/19 Left Wrist 24 days    Peripheral IV 19 Left Wrist 4 days    Peripheral IV 19 Right Wrist 4 days          Arterial Line Arterial Line 03/07/19 Right Radial 4 days          Drain            Urethral Catheter Non-latex; Temperature probe 16 Fr  4 days                Medications:    Scheduled Meds:  Current Facility-Administered Medications:  acetaminophen 650 mg Oral Q4H PRN Campbell Fam PA-C    amiodarone 0 5 mg/min Intravenous Continuous JHONATAN Funes    amiodarone 200 mg Oral [facility] Campbell Fam PA-C    aspirin 81 mg Oral Daily Ashanti Arrieta PA-C    atorvastatin 20 mg Oral Daily With [employer] Shila, PA-C    bisacodyl 10 mg Rectal Daily PRN Campbell Fam PA-C    docusate sodium 100 mg Oral BID Monique Edmondson PA-C    furosemide 10 mg/hr Intravenous Continuous Monique Edmondson PA-C Last Rate: 10 mg/hr (03/11/19 1050)   insulin glargine 20 Units Subcutaneous HS Jordyn Yee MD    insulin lispro 1-5 Units Subcutaneous TID AC Jordyn Yee MD    insulin lispro 1-5 Units Subcutaneous HS Jordyn Yee MD    insulin lispro 5 Units Subcutaneous TID With Meals Jordyn Yee MD    lidocaine (cardiac) 100 mg Intravenous Q30 Min PRN Campbell Fam PA-C    metoprolol tartrate 50 mg Oral Q12H [address] Ashanti Arrieta PA-C    milrinone [facility]) infusion 0 13 mcg/kg/min Intravenous Continuous Monique Edmondson PA-C Last Rate: 0 13 mcg/kg/min (03/11/19 1245)   mupirocin 1 application Nasal E57R [address] Ashanti Arrieta PA-C    niCARdipine 1-15 mg/hr Intravenous Titrated Minna Cody PA-C Last Rate: Stopped (03/11/19 0919)   ondansetron 4 mg Intravenous Q6H PRN Campbell Fam PA-C    oxyCODONE-acetaminophen 1 tablet Oral Q4H PRN Sujey Senior DO    oxyCODONE-acetaminophen 2 tablet Oral Q6H PRN Campbell Fam PA-C    pantoprazole 40 mg Oral Daily Before Breakfast Campbell Fam PA-C    phenylephine  mcg/min Intravenous Titrated Monique Edmondson PA-C Last Rate: Stopped (03/11/19 1427)   polyethylene glycol 17 g Oral Daily Pablo Fuchs PA-C    temazepam 15 mg Oral HS PRN Ashanti Arrieta PA-C    travoprost 1 drop [facility] YINA Fuchs    warfarin 2 5 mg Oral Once (warfarin) Elijah Pryor PA-C        PRN Meds:   acetaminophen    bisacodyl    lidocaine (cardiac)    ondansetron    oxyCODONE-acetaminophen    oxyCODONE-acetaminophen    temazepam    Continuous Infusions:  amiodarone 0 5 mg/min    furosemide 10 mg/hr Last Rate: 10 mg/hr (03/11/19 1050)   milrinone (PRIMACOR) infusion 0 13 mcg/kg/min Last Rate: 0 13 mcg/kg/min (03/11/19 2175)   niCARdipine 1-15 mg/hr Last Rate: Stopped (03/11/19 0919)   phenylephine  mcg/min Last Rate: Stopped (03/11/19 1427)           LAB RESULTS:      Results from last 7 days   Lab Units 03/11/19  1301 03/11/19  0530 03/11/19  0453 03/11/19  0012 03/10/19  1836 03/10/19  1633 03/10/19  0928 03/10/19  0512 03/10/19  0504 03/10/19  0451  03/09/19  1556 03/09/19  1354  03/09/19  0458  03/09/19 03/08/19  1616 03/08/19  0346 03/07/19  2037  03/07/19  1213 03/07/19  1204  03/07/19  1050 03/07/19  1023 03/07/19  1004 03/07/19  0946 03/07/19  0945 03/07/19  0937 03/07/19  0923   WBC Thousand/uL  --  11 15*  --   --   --   --   --   --   --  12 74*  --   --   --   --   --   --  16 89*  --  18 50*  --   --   --   --   --   --   --   --   --   --   --   --    HEMOGLOBIN g/dL  --  9 3*  --   --   --   --   --   --   --  9 5*  --  9 5*  --   --   --   --  9 9*  --  11 1* 11 7*  --   --  11 9*  --   --   --   --   --   --   --   --    I STAT HEMOGLOBIN g/dl  --   --   --   --   --   --   --   --   --   --   --   --   --   --   --   --   --   --   --   --   --  10 9*  --   --  9 2* 9 9* 9 9*  --  10 2* 10 2* 8 2*   HEMATOCRIT %  --  27 2*  --   --   --   --   --   --   --  27 4*  --   --   --   --   --   --  29 4*  --  33 2* 34 6*  --   --  35 0*  --   --   --   --   --   --   --   --    HEMATOCRIT, ISTAT %  --   --   --   --   --   --   --   --   --   --   --   --   --   --   --   --   --   --   --   --   --  32*  --   --  27* 29* 29*  --  30* 30* 24*   PLATELETS Thousands/uL  --  101*  --   --   --   -- --   --   --  80*  --   --   --   --   --   --  73*  --  131*  --   --   --  144*  --   --   --   --  158  --   --   --    POTASSIUM mmol/L 3 9  --  3 9 4 2 3 9 3 9 4 4 4 5  --   --    < >  --  4 5   < > 5 1   < >  --  5 0 5 1 4 4   < >  --  4 3  --   --   --   --   --   --   --   --    CHLORIDE mmol/L  --   --  100  --  101  --   --  103  --   --   --   --  105  --  107  --   --  109* 111*  --   --   --  112*   < >  --   --   --   --   --   --   --    CO2 mmol/L  --   --  23  --  22  --   --  23  --   --   --   --  18*  --  21  --   --  20* 22  --   --   --  23   < >  --   --   --   --   --   --   --    CO2, I-STAT mmol/L  --   --   --   --   --   --   --   --   --   --   --   --   --   --   --   --   --   --   --   --   --  24  --   --  28 27 31  --  24 33* 27   BUN mg/dL  --   --  68*  --  62*  --   --  57*  --   --   --   --  49*  --  45*  --   --  39* 34*  --   --   --  25   < >  --   --   --   --   --   --   --    CREATININE mg/dL  --   --  4 63*  --  4 49*  --   --  4 25*  --   --   --   --  3 89*  --  3 48*  --   --  3 02* 2 54*  --   --   --  1 94*   < >  --   --   --   --   --   --   --    CALCIUM mg/dL  --   --  8 1*  --  7 9*  --   --  8 2*  --   --   --   --  8 2*  --  8 3  --   --  8 1* 8 2*  --   --   --  8 0*   < >  --   --   --   --   --   --   --    ALK PHOS U/L  --   --   --   --   --   --   --  53  --   --   --   --   --   --   --   --   --   --   --   --   --   --   --   --   --   --   --   --   --   --   --    ALT U/L  --   --   --   --   --   --   --  <6*  --   --   --   --   --   --   --   --   --   --   --   --   --   --   --   --   --   --   --   --   --   --   --    AST U/L  --   --   --   --   --   --   --  52*  --   --   --   --   --   --   --   --   --   --   --   --   --   --   --   --   --   --   --   --   --   --   --    GLUCOSE, ISTAT mg/dl  --   --   --   --   --   --   --   --   --   --   --   --   --   --   --   --   --   --   --   --   --  110  --   --  98 111 154*  --  185* 171* 140   MAGNESIUM mg/dL  --   --  2 5  --   --   --   --   --  2 4  --   --   --   --   --  2 8*  --   --   --  3 1*  --   --   --   --   --   --   --   --   --   --   --   --    PHOSPHORUS mg/dL  --   --  6 3*  --   --   --   --   --  5 0*  --   --   --   --   --  5 4*  --   --   --   --   --   --   --   --   --   --   --   --   --   --   --   --     < > = values in this interval not displayed  CUTURES:  No results found for: Adelene Pucker              Portions of the record may have been created with voice recognition software  Occasional wrong word or "sound a like" substitutions may have occurred due to the inherent limitations of voice recognition software  Read the chart carefully and recognize, using context, where substitutions have occurred  If you have any questions, please contact the dictating provider

## 2019-03-11 NOTE — PROGRESS NOTES
Progress Note - Cardiothoracic Surgery   [facility] CTR 80 y o  male MRN: [id number]  Unit/Bed#: [facility] 416-01 Encounter: [id number]      POD # 4 s/p AVR, MV repair, VIRI ligation    Pt seen/examined    Interval history and data reviewed with critical care team   Sinus tachycardia      Medications:   Scheduled Meds:    Current Facility-Administered Medications:  acetaminophen 650 mg Oral Q4H PRN Kristen Joyner PA-C    amiodarone 0 5 mg/min Intravenous Continuous Eulis Aschoff, CRNP    amiodarone 200 mg Oral [facility] Kristen Joyner PA-C    amLODIPine 5 mg Oral Daily Ashanti Arrieta PA-C    aspirin 325 mg Oral Daily Kristen Joyner PA-C    atorvastatin 20 mg Oral Daily With [employer] Shila, PA-C    bisacodyl 10 mg Rectal Daily PRN Kristen Joyner PA-C    docusate sodium 100 mg Oral BID Jeanine Barbour PA-C    furosemide 40 mg/hr Intravenous Continuous Jeanine Barbour PA-C Last Rate: 40 mg/hr (03/11/19 0212)   insulin glargine 20 Units Subcutaneous HS Pranav Cuello MD    insulin lispro 1-5 Units Subcutaneous TID AC Pranav Cuello MD    insulin lispro 1-5 Units Subcutaneous HS Pranav Cuello MD    insulin lispro 5 Units Subcutaneous TID With Meals Pranav Cuello MD    lidocaine (cardiac) 100 mg Intravenous Q30 Min PRN Kristen Joyner PA-C    metoprolol tartrate 50 mg Oral Q12H [facility] Ashanti Arrieta PA-C    milrinone [facility]) infusion 0 13 mcg/kg/min Intravenous Continuous Jeanine Barbour PA-C Last Rate: 0 13 mcg/kg/min (03/11/19 0605)   mupirocin 1 application Nasal Q33B [facility] Ashanti Arrieta PA-C    niCARdipine 1-15 mg/hr Intravenous Titrated Benjamin Vega PA-C Last Rate: 3 mg/hr (03/11/19 0618)   ondansetron 4 mg Intravenous Q6H PRN Kristen Joyner PA-C    oxyCODONE-acetaminophen 1 tablet Oral Q4H PRN Rebekah Fenton DO    oxyCODONE-acetaminophen 2 tablet Oral Q6H PRN Kristen Joyner PA-C    pantoprazole 40 mg Oral Daily Before Breakfast Kristen Joyner PA-C    polyethylene glycol 17 g Oral Daily Kristen Joyner PA-C temazepam 15 mg Oral HS PRN Herrera Weller PA-C    travoprost 1 drop Both Eyes [facility] Claude Apley, PA-C    warfarin 2 5 mg Oral Once (warfarin) Herrera Weller PA-C      Continuous Infusions:    amiodarone 0 5 mg/min    furosemide 40 mg/hr Last Rate: 40 mg/hr (03/11/19 0212)   milrinone (PRIMACOR) infusion 0 13 mcg/kg/min Last Rate: 0 13 mcg/kg/min (03/11/19 0605)   niCARdipine 1-15 mg/hr Last Rate: 3 mg/hr (03/11/19 0618)     PRN Meds:   acetaminophen    bisacodyl    lidocaine (cardiac)    ondansetron    oxyCODONE-acetaminophen    oxyCODONE-acetaminophen    temazepam    Vitals: Blood pressure 114/65, pulse (!) 134, temperature 99 2 °F (37 3 °C), temperature source Probe, resp  rate 18, height 5' 6" (1 676 m), weight 71 2 kg (156 lb 15 5 oz), SpO2 96 %  ,Body mass index is 25 34 kg/m²  I/O last 24 hours: In: 1954 9 [P O :1260; I V :494 9; IV Piggyback:200]  Out: 3605 [Urine:3605]  Invasive Devices     Central Venous Catheter Line            CVC Central Lines 03/07/19 Triple 3 days    Introducer 03/07/19 3 days          Peripheral Intravenous Line            Peripheral IV 02/15/19 Left Wrist 23 days    Peripheral IV 03/07/19 Left Wrist 3 days    Peripheral IV 03/07/19 Right Wrist 3 days          Arterial Line            Arterial Line 03/07/19 Right Radial 3 days          Drain            Urethral Catheter Non-latex; Temperature probe 16 Fr  3 days                  Lab, Imaging and other studies:   Results from last 7 days   Lab Units 03/11/19  0530 03/10/19  0451 03/09/19  1556 03/09/19   WBC Thousand/uL 11 15* 12 74*  --  16 89*   HEMOGLOBIN g/dL 9 3* 9 5* 9 5* 9 9*   HEMATOCRIT % 27 2* 27 4*  --  29 4*   PLATELETS Thousands/uL 101* 85*  --  73*     Results from last 7 days   Lab Units 03/11/19  0453 03/11/19  0012 03/10/19  1836  03/10/19  0512  03/07/19  1213   POTASSIUM mmol/L 3 9 4 2 3 9   < > 4 5   < >  --    CHLORIDE mmol/L 100  --  101  --  103   < >  --    CO2 mmol/L 23  --  22  --  23   < >  --    CO2, I-STAT mmol/L  --   --   --   --   --   --  24   BUN mg/dL 68*  --  62*  --  57*   < >  --    CREATININE mg/dL 4 63*  --  4 49*  --  4 25*   < >  --    GLUCOSE, ISTAT mg/dl  --   --   --   --   --   --  110   CALCIUM mg/dL 8 1*  --  7 9*  --  8 2*   < >  --     < > = values in this interval not displayed  Results from last 7 days   Lab Units 03/11/19  0525 03/10/19  0452 03/09/19  0604   INR  2 30* 1 88* 1 47*     No results for input(s): PHART, SXY0FQH, PO2ART, KQO5ZLG, K7XKRRKN, BEART in the last 72 hours          Plan:    Wean milrinone as tolerated  D/c beverly     SIGNATURE: Huber Mckeon DO  DATE: March 11, 2019  TIME: 8:02 AM

## 2019-03-11 NOTE — PROGRESS NOTES
Progress Note - Critical Care   Lidia Goodwin 80 y o  male MRN: [id number]  Unit/Bed#: [facility] 416-01 Encounter: [id number]    Attending Physician: Sujey Senior DO    24 Hour Events: POD # 4 s/p tissue AVR, MV ring repair, and VIRI ligation  Converted to AF  Amio bolus and drip but started with frequent pauses  Amio drip stopped  Continues on lasix infusion  Milrinone weaned to 0 125  Cardene started  Allergies:    Allergies   Allergen Reactions    Ace Inhibitors Cough       Medications:   Scheduled Meds:  Current Facility-Administered Medications:  acetaminophen 650 mg Oral Q4H PRN Campbell Fam PA-C    amiodarone 0 5 mg/min Intravenous Continuous Albertina Yulye, CRNP    amiodarone        amiodarone 200 mg Oral [facility] Campbell Fam PA-C    aspirin 325 mg Oral Daily Campbell Fam PA-C    atorvastatin 20 mg Oral Daily With [employer] Shila, PA-C    bisacodyl 10 mg Rectal Daily PRN Campbell Fam PA-C    furosemide 40 mg/hr Intravenous Continuous Monique Edmondson PA-C Last Rate: 40 mg/hr (03/11/19 0212)   insulin glargine 20 Units Subcutaneous HS Jordyn Yee MD    insulin lispro 1-5 Units Subcutaneous TID AC Jordyn Yee MD    insulin lispro 1-5 Units Subcutaneous HS Jordyn Yee MD    insulin lispro 5 Units Subcutaneous TID With Meals Jordyn Yee MD    lidocaine (cardiac) 100 mg Intravenous Q30 Min PRN Campbell Fam PA-C    metoprolol tartrate 25 mg Oral Q12H [address] Ashanti Arrieta PA-C    milrinone [facility]) infusion 0 38 mcg/kg/min Intravenous Continuous KYLE FunesNP Last Rate: 0 13 mcg/kg/min (03/11/19 0605)   mupirocin 1 application Nasal W30B [address] Campbell Fam PA-C    niCARdipine 1-15 mg/hr Intravenous Titrated Minna Cody PA-C Last Rate: 3 mg/hr (03/11/19 0618)   ondansetron 4 mg Intravenous Q6H PRN Campbell Fam PA-C    oxyCODONE-acetaminophen 1 tablet Oral Q4H PRN Sujey Senior DO    oxyCODONE-acetaminophen 2 tablet Oral Q6H PRN Campbell Fam PA-C    pantoprazole 40 mg Oral Daily Before Breakfast Montano Dues, PA-C    polyethylene glycol 17 g Oral Daily Montano Dues, PA-C    sodium chloride 20 mL/hr Intravenous Continuous Montano Dues, PA-C Last Rate: Stopped (03/09/19 2200)   travoprost 1 drop Both Eyes HS Montano Dues, PA-C        VTE Pharmacologic Prophylaxis: Warfarin (Coumadin)  VTE Mechanical Prophylaxis: sequential compression device    Continuous Infusions:  amiodarone 0 5 mg/min    furosemide 40 mg/hr Last Rate: 40 mg/hr (03/11/19 0212)   milrinone (PRIMACOR) infusion 0 38 mcg/kg/min Last Rate: 0 13 mcg/kg/min (03/11/19 0605)   niCARdipine 1-15 mg/hr Last Rate: 3 mg/hr (03/11/19 0618)   sodium chloride 20 mL/hr Last Rate: Stopped (03/09/19 2200)     PRN Meds:    acetaminophen 650 mg Q4H PRN   bisacodyl 10 mg Daily PRN   lidocaine (cardiac) 100 mg Q30 Min PRN   ondansetron 4 mg Q6H PRN   oxyCODONE-acetaminophen 1 tablet Q4H PRN   oxyCODONE-acetaminophen 2 tablet Q6H PRN       Home Medications:   Prior to Admission medications    Medication Sig Start Date End Date Taking?  Authorizing Provider   ascorbic acid (VITAMIN C) 500 mg tablet Take 500 mg by mouth daily   Yes Historical Provider, MD   atorvastatin (LIPITOR) 20 mg tablet Take 1 tablet by mouth daily 7/27/18  Yes Historical Provider, MD   calcitriol (ROCALTROL) 0 25 mcg capsule Take 1 capsule by mouth 2 (two) times a week 1/1/15  Yes Historical Provider, MD   Cholecalciferol (VITAMIN D3) 1000 units CAPS Take 1 capsule by mouth daily   Yes Historical Provider, MD   furosemide (LASIX) 20 mg tablet Take 20 mg by mouth 3 (three) times a week   Yes Historical Provider, MD   furosemide (LASIX) 40 mg tablet Take 40 mg by mouth 4 (four) times a week   Yes Historical Provider, MD   metFORMIN (GLUCOPHAGE) 500 mg tablet Take 1 tablet by mouth daily 2/19/18  Yes Historical Provider, MD   metoprolol succinate (TOPROL-XL) 50 mg 24 hr tablet Take 50 mg by mouth daily    Yes Historical Provider, MD   Multiple Vitamin (MULTIVITAMIN) tablet Take 1 tablet by mouth daily   Yes Historical Provider, MD   mupirocin (BACTROBAN) 2 % ointment Apply inside both nostrils two times per day  Start 5 days prior to surgery  19  Yes Hilario Cotton PA-C   olmesartan (BENICAR) 20 mg tablet Take 1 tablet (20 mg total) by mouth 2 (two) times a day 19  Yes JHONATAN Jacobs   TRAVATAN Z 0 004 % ophthalmic solution  18  Yes Historical Provider, MD       Vitals:   Maria Kawasaki:    19 0200 19 0300 19 0400 19 0600   BP:       Pulse: (!) 126 (!) 128 (!) 132    Resp: (!) 27 14 15    Temp: 98 3 °F (36 8 °C)  98 6 °F (37 °C)    TempSrc: Probe  Probe    SpO2: 99% 97% 98%    Weight:    71 2 kg (156 lb 15 5 oz)   Height:         Arterial Line BP: 114/60  Arterial Line MAP (mmHg): 76 mmHg    Tele Rhythm: Atrial Fibrillation This was personally reviewed by myself  Respiratory:  SpO2: SpO2: 98 %, SpO2 Activity: SpO2 Activity: At Rest, SpO2 Device: O2 Device: Nasal cannula  O2 Flow Rate (L/min): 4 L/min    Temperature: Temp (24hrs), Av 7 °F (37 6 °C), Min:98 3 °F (36 8 °C), Max:101 3 °F (38 5 °C)  Current: Temperature: 98 6 °F (37 °C)    Weights:   Weight (last 2 days)     Date/Time   Weight    19 06   71 2 (156 97)    03/10/19 0600   87 3 (192 46)    19 0600   73 8 (162 7)            IBW: 63 8 kg  Body mass index is 25 34 kg/m²  Hemodynamic Monitoring:  PAP: PAP: , CVP: CVP (mean): 8 mmHg, SvO2: SVO2 (%): 72 %(SQI 1) , ScvO2:  , CO:  , CI: 2 1, SVR: SVR (dyne*sec)/cm5: 1146 (dyne*sec)/cm5  PAP: (28-45)/(10-23)     Intake and Outputs:    Intake/Output Summary (Last 24 hours) at 3/11/2019 0622  Last data filed at 3/11/2019 0400  Gross per 24 hour   Intake 1636 25 ml   Output 3170 ml   Net -1533 75 ml     I/O last 24 hours:   In:  [P O :960; I V :837; IV Piggyback:200]  Out: 8214 [OMPMC:5052; Chest Tube:30]    UOP: 150-325/hour     Labs:  Results from last 7 days   Lab Units 19  9444 03/10/19  0451 03/09/19  1556 03/09/19   WBC Thousand/uL 11 15* 12 74*  --  16 89*   HEMOGLOBIN g/dL 9 3* 9 5* 9 5* 9 9*   HEMATOCRIT % 27 2* 27 4*  --  29 4*   PLATELETS Thousands/uL 101* 85*  --  73*     Results from last 7 days   Lab Units 03/11/19  0453 03/11/19  0012 03/10/19  1836  03/10/19  0512  03/07/19  1213  03/07/19  1050 03/07/19  1023   SODIUM mmol/L 135*  --  134*  --  135*   < >  --    < >  --   --    POTASSIUM mmol/L 3 9 4 2 3 9   < > 4 5   < >  --    < >  --   --    CHLORIDE mmol/L 100  --  101  --  103   < >  --    < >  --   --    CO2 mmol/L 23  --  22  --  23   < >  --    < >  --   --    CO2, I-STAT mmol/L  --   --   --   --   --   --  24  --  28 27   BUN mg/dL 68*  --  62*  --  57*   < >  --    < >  --   --    CREATININE mg/dL 4 63*  --  4 49*  --  4 25*   < >  --    < >  --   --    CALCIUM mg/dL 8 1*  --  7 9*  --  8 2*   < >  --    < >  --   --    ALK PHOS U/L  --   --   --   --  53  --   --   --   --   --    ALT U/L  --   --   --   --  <6*  --   --   --   --   --    AST U/L  --   --   --   --  47*  --   --   --   --   --    GLUCOSE, ISTAT mg/dl  --   --   --   --   --   --  110  --  98 111    < > = values in this interval not displayed       Baseline creat 1 7    Results from last 7 days   Lab Units 03/11/19  0453 03/10/19  0504 03/09/19  0458   MAGNESIUM mg/dL 2 5 2 4 2 8*     Results from last 7 days   Lab Units 03/11/19  0453 03/10/19  0504 03/09/19  0458   PHOSPHORUS mg/dL 6 3* 5 0* 5 4*      Results from last 7 days   Lab Units 03/11/19  0525 03/10/19  0452 03/09/19  0604   INR  2 30* 1 88* 1 47*     Coumadin mg 2 5 2 5 2 5 -- --     SVO2: 51%    Micro:   Blood Culture: No results found for: BLOODCX  Urine Culture: No results found for: URINECX  Sputum Culture: No components found for: SPUTUMCX  Wound Culure: No results found for: WOUNDCULT        Imaging:  No new imaging    Nutrition:        Diet Orders   (From admission, onward)            Start     Ordered    03/08/19 0000  Diet Cardiovascular; Cardiac; Fluid Restriction 1800 ML, Consistent Carbohydrate Diet Level 2 (5 carb servings/75 grams CHO/meal)  Diet effective 0500     Question Answer Comment   Diet Type Cardiovascular    Cardiac Cardiac    Other Restriction(s): Fluid Restriction 1800 ML    Other Restriction(s): Consistent Carbohydrate Diet Level 2 (5 carb servings/75 grams CHO/meal)    RD to adjust diet per protocol? No        03/07/19 1150        Physical Exam:    General Appearance:  Adult male in bedside chair in NAD  HENT: Atraumatic without obvious abnormality  Neck: Supple  +CVC in place  Eyes: No icterus  Cardiac: Rapid rate and irregular rhythm, no murmur, no rub  Pulmonary: Clear to auscultation bilaterally  Gastrointestinal: Soft, no distention  : Ovalles present: yes   Musculoskeletal: 1+ edema bilaterally  Neuro:  Alert and oriented  Psych: Mood and affect seems appropriate  Skin: Warm  Incisions: Sternum is stable  Incision is clean, dry, and intact  Invasive lines and devices: Invasive Devices     Central Venous Catheter Line            CVC Central Lines 03/07/19 Triple 3 days    Introducer 03/07/19 3 days          Peripheral Intravenous Line            Peripheral IV 02/15/19 Left Wrist 23 days    Peripheral IV 03/07/19 Left Wrist 3 days    Peripheral IV 03/07/19 Right Wrist 3 days          Arterial Line            Arterial Line 03/07/19 Right Radial 3 days          Drain            Urethral Catheter Non-latex; Temperature probe 16 Fr  3 days                Assessment:  Principal Problem:    Bicuspid aortic valve  Active Problems:    S/P AVR    S/P MVR (mitral valve repair)    Nonrheumatic aortic valve insufficiency    CARY (acute kidney injury) ([address] )    Non-rheumatic mitral regurgitation    Dilated cardiomyopathy (HCC)    Chronic systolic congestive heart failure (HCC)    Coronary artery disease involving native coronary artery of native heart without angina pectoris    CKD (chronic kidney disease) stage 3, GFR 30-59 ml/min ([facility])    Diabetes mellitus type 2 in nonobese [facility])    Essential hypertension    Diabetic nephropathy associated with type 2 diabetes mellitus ([facility] [address] )    Secondary hyperparathyroidism ([facility] )      Plan:    Neuro:   · Pain controlled with: percocet PRn  · Regulate sleep/wake cycle  · Restoril QHS  · Delirium precautions  · CAM-ICU daily  · Trend neuro exam  CV:   · Cardiac infusions: Primacor, 0 125 mcg/kg/min, Cardene, 3 mg/hour  · Wean cardene as able  · MAP goal > 65 and CI >2 2  · D/C [place] catheter  · D/C Arterial line  · Rhythm: Atrial Fibrillation  · Follow rhythm on telemetry  · Lopressor 25 mg PO BID  · Add Norvasc 5 mg QD  · Epicardial pacing wires out  · Continue PO amio, statin, and ASA therapy  Lung:   · Acute post-op pulmonary insufficiency; Requiring 4 liters via nasal cannla, secondary to pulmonary vascular congestion  Continue incentive spirometry/coughing/deep breathing exercises    Wean supplemental oxygen as tolerated for saturation > 90%  · Wean NC as tolerated  · SpO2 goal >90%  · Pulmonary toileting with IS and deep breathing  · Chest tubes have been discontinued  GI:   · Continue PPI for stress ulcer prophylaxis  · Continue bowel regimen  · Zofran PRN for nausea  FEN:   · Diuretic plan: Lasix infusion 40mg/hour  · Goal 24 hour fluid balance: -1 to 1 5 L  · Nutrition/diet plan: Oral diet  · Replete electrolytes with goals: K >4 0, Mag >2 0, and Phos >3 0  :   · Indwelling Ovalles present: yes   · Keep Ovalles  · Trend UOP and BUN/creat  · Strict I and O  ID:   · Trend temps and WBC count  · Maintain normothermia  · Tylenol PRN for fevers  Heme:   · Trend hgb and plts  · Transfuse as needed for goal hgb >7 0  Endo:   · Glycemic control plan: SQ insulin  MSK/Skin:  · Mobility goal: OOB and ambulate as able  · PT consult: yes  · OT consult: yes  · Frequent turning and pressure off-loading  · Local wound care as needed    Disposition: Transfer to SD level 1    Code Status: Level 1 - Full Code    Counseling / Coordination of Care  Total Critical Care time spent 0 minutes excluding procedures, teaching and family updates  Collaborative bedside rounds performed with cardiac surgery attending and bedside RN      SIGNATURE: Jeanine Barbuor PA-C  DATE: March 11, 2019  TIME: 6:22 AM

## 2019-03-11 NOTE — PROGRESS NOTES
Cardiology Progress Note - Cheli Situ 80 y o  male MRN: [id number]    Unit/Bed#: [facility] 416-01 Encounter: [id number]        Subjective:   Patient with intermittent atrial flutter  Weaning off milrinone  Review of Systems   Cardiovascular: Negative for chest pain, leg swelling and palpitations  Respiratory: Negative for shortness of breath  Objective:   Vitals: Blood pressure 120/58, pulse (!) 136, temperature 99 2 °F (37 3 °C), temperature source Probe, resp  rate 18, height 5' 6" (1 676 m), weight 71 2 kg (156 lb 15 5 oz), SpO2 96 %  , Body mass index is 25 34 kg/m² , Orthostatic Blood Pressures      Most Recent Value   Blood Pressure  120/58 [MAP 54] filed at 03/11/2019 4039         Systolic (96YYD), GJU:095 , Min:120 , AON:028     Diastolic (40QBS), WDR:58, Min:58, Max:58      Intake/Output Summary (Last 24 hours) at 3/11/2019 0826  Last data filed at 3/11/2019 0747  Gross per 24 hour   Intake 1910 65 ml   Output 3280 ml   Net -1369 35 ml     Weight (last 2 days)     Date/Time   Weight    03/11/19 0600   71 2 (156 97)    03/10/19 0600   87 3 (192 46)    03/09/19 0600   73 8 (162 7)                  Telemetry Review: Aflutter    Physical Exam   Cardiovascular: Regular rhythm and normal heart sounds  Tachycardia present  Exam reveals no gallop and no friction rub  No murmur heard  Pulmonary/Chest: Breath sounds normal  He has no wheezes  He has no rales  Musculoskeletal: He exhibits no edema           Laboratory Results:        CBC with diff: Results from last 7 days   Lab Units 03/11/19  0530 03/10/19  0451 03/09/19  1556 03/09/19 03/08/19  0346 03/07/19  2037 03/07/19  1213 03/07/19  1204  03/07/19  0946   WBC Thousand/uL 11 15* 12 74*  --  16 89* 18 50*  --   --   --   --   --    HEMOGLOBIN g/dL 9 3* 9 5* 9 5* 9 9* 11 1* 11 7*  --  11 9*  --   --    I STAT HEMOGLOBIN g/dl  --   --   --   --   --   --  10 9*  --    < >  --    HEMATOCRIT % 27 2* 27 4*  --  29 4* 33 2* 34 6*  --  35 0*  --   -- HEMATOCRIT, ISTAT %  --   --   --   --   --   --  32*  --    < >  --    MCV fL 90 90  --  92 92  --   --   --   --   --    PLATELETS Thousands/uL 101* 85*  --  73* 131*  --   --  144*  --  158   MCH pg 30 7 31 4  --  31 0 30 7  --   --   --   --   --    MCHC g/dL 34 2 34 7  --  33 7 33 4  --   --   --   --   --    RDW % 13 3 13 7  --  14 2 13 9  --   --   --   --   --    MPV fL 10 8 10 8  --  10 9 10 3  --   --  10 0  --  10 6    < > = values in this interval not displayed           CMP:  Results from last 7 days   Lab Units 03/11/19  0453 03/11/19  0012 03/10/19  1836 03/10/19  1633 03/10/19  0928 03/10/19  0512 03/10/19  0012 03/09/19  1354  03/09/19  0458  03/08/19  1616 03/08/19  0346  03/07/19  1213  03/07/19  1050 03/07/19  1023 03/07/19  1004 03/07/19  0945 03/07/19  0937 03/07/19  0923   POTASSIUM mmol/L 3 9 4 2 3 9 3 9 4 4 4 5 4 5 4 5   < > 5 1   < > 5 0 5 1   < >  --    < >  --   --   --   --   --   --    CHLORIDE mmol/L 100  --  101  --   --  103  --  105  --  107  --  109* 111*  --   --    < >  --   --   --   --   --   --    CO2 mmol/L 23  --  22  --   --  23  --  18*  --  21  --  20* 22  --   --    < >  --   --   --   --   --   --    CO2, I-STAT mmol/L  --   --   --   --   --   --   --   --   --   --   --   --   --   --  24  --  28 27 31 24 33* 27   BUN mg/dL 68*  --  62*  --   --  57*  --  49*  --  45*  --  39* 34*  --   --    < >  --   --   --   --   --   --    CREATININE mg/dL 4 63*  --  4 49*  --   --  4 25*  --  3 89*  --  3 48*  --  3 02* 2 54*  --   --    < >  --   --   --   --   --   --    GLUCOSE, ISTAT mg/dl  --   --   --   --   --   --   --   --   --   --   --   --   --   --  110  --  98 111 154* 185* 171* 140   CALCIUM mg/dL 8 1*  --  7 9*  --   --  8 2*  --  8 2*  --  8 3  --  8 1* 8 2*  --   --    < >  --   --   --   --   --   --    AST U/L  --   --   --   --   --  52*  --   --   --   --   --   --   --   --   --   --   --   --   --   --   --   --    ALT U/L  --   --   --   --   --  <6* --   --   --   --   --   --   --   --   --   --   --   --   --   --   --   --    ALK PHOS U/L  --   --   --   --   --  53  --   --   --   --   --   --   --   --   --   --   --   --   --   --   --   --    EGFR ml/min/1 73sq m 14  --  15  --   --  16  --  17  --  20  --  24 29  --   --    < >  --   --   --   --   --   --     < > = values in this interval not displayed  BMP:  Results from last 7 days   Lab Units 03/11/19  0453 03/11/19  0012 03/10/19  1836 03/10/19  1633 03/10/19  8937 03/10/19  0512 03/10/19  0012 03/09/19  1354  03/09/19  0458  03/08/19  1616 03/08/19  0346  03/07/19  1213   POTASSIUM mmol/L 3 9 4 2 3 9 3 9 4 4 4 5 4 5 4 5   < > 5 1   < > 5 0 5 1   < >  --    CHLORIDE mmol/L 100  --  101  --   --  103  --  105  --  107  --  109* 111*  --   --    CO2 mmol/L 23  --  22  --   --  23  --  18*  --  21  --  20* 22  --   --    CO2, I-STAT mmol/L  --   --   --   --   --   --   --   --   --   --   --   --   --   --  24   BUN mg/dL 68*  --  62*  --   --  57*  --  49*  --  45*  --  39* 34*  --   --    CREATININE mg/dL 4 63*  --  4 49*  --   --  4 25*  --  3 89*  --  3 48*  --  3 02* 2 54*  --   --    GLUCOSE, ISTAT mg/dl  --   --   --   --   --   --   --   --   --   --   --   --   --   --  110   CALCIUM mg/dL 8 1*  --  7 9*  --   --  8 2*  --  8 2*  --  8 3  --  8 1* 8 2*  --   --     < > = values in this interval not displayed  BNP:   Results Reviewed     None        No results for input(s): BNP in the last 72 hours      Magnesium:   Results from last 7 days   Lab Units 03/11/19  0453 03/10/19  0504 03/09/19  0458 03/08/19  0346   MAGNESIUM mg/dL 2 5 2 4 2 8* 3 1*       Coags:   Results from last 7 days   Lab Units 03/11/19  0525 03/10/19  0452 03/09/19  0604 03/08/19  1050   INR  2 30* 1 88* 1 47* 1 29*       TSH:       Lipid Profile:   Results from last 7 days   Lab Units 03/11/19  0453   TRIGLYCERIDES mg/dL 63   HDL mg/dL 40           Cardiac testing:   Results for orders placed during the hospital encounter of 19   Echo complete with contrast if indicated    Narrative [address], [address] [facility]  [phone]    Transthoracic Echocardiogram  2D, M-mode, Doppler, and Color Doppler    Study date:  2019    Patient: Reji Almendarez  MR number: [id number]  Account number: [de-identified]  : 1950  Age: 80 years  Gender: Male  Status: Inpatient  Location: Bedside  Height: 68 in  Weight: 163 lb  BP: 155/ 70 mmHg    Indications: Heart Failure    Diagnoses: I50 9 - Heart failure, unspecified    Sonographer:  TIMMY Donis  Primary Physician:  Keisha Mejia  Referring Physician:  Litzy Vo MD  Group:  [id number]  Interpreting Physician:  Litzy Vo MD    SUMMARY    LEFT VENTRICLE:  The ventricle was mildly dilated  Systolic function was mildly to moderately reduced  Ejection fraction was estimated to be 40 %  There was mild diffuse hypokinesis  Wall thickness was mildly increased  RIGHT VENTRICLE:  The ventricle was mildly dilated  Systolic function was normal     LEFT ATRIUM:  The atrium was mildly dilated  MITRAL VALVE:  There was moderate to severe regurgitation  AORTIC VALVE:  The valve was possibly bicuspid  Leaflets exhibited normal thickness, normal cuspal separation, and significant diastolic prolapse  There was moderate to severe regurgitation  TRICUSPID VALVE:  There was mild regurgitation  Pulmonary artery systolic pressure was moderately to markedly increased  The findings suggest moderate to severe pulmonary hypertension  AORTA:  The root exhibited mild dilatation  IVC, HEPATIC VEINS:  The inferior vena cava was mildly dilated  Respirophasic changes were blunted (less than 50% variation)  PERICARDIUM:  There was a left pleural effusion  HISTORY: PRIOR HISTORY: Murmur, HTN, DM2, HLD, CKD3    PROCEDURE: The procedure was performed at the bedside  This was a routine study   The transthoracic approach was used  The study included complete 2D imaging, M-mode, complete spectral Doppler, and color Doppler  The heart rate was 95 bpm,  at the start of the study  Images were obtained from the parasternal, apical, subcostal, and suprasternal notch acoustic windows  Image quality was adequate  LEFT VENTRICLE: The ventricle was mildly dilated  Systolic function was mildly to moderately reduced  Ejection fraction was estimated to be 40 %  There was mild diffuse hypokinesis  Wall thickness was mildly increased  RIGHT VENTRICLE: The ventricle was mildly dilated  Systolic function was normal  Wall thickness was normal     LEFT ATRIUM: The atrium was mildly dilated  RIGHT ATRIUM: Size was normal     MITRAL VALVE: Valve structure was normal  There was normal leaflet separation  DOPPLER: The transmitral velocity was within the normal range  There was no evidence for stenosis  There was moderate to severe regurgitation  AORTIC VALVE: The valve was possibly bicuspid  Leaflets exhibited normal thickness, normal cuspal separation, and significant diastolic prolapse  DOPPLER: Transaortic velocity was within the normal range  There was no evidence for  stenosis  There was moderate to severe regurgitation  The regurgitant jet was eccentric  TRICUSPID VALVE: The valve structure was normal  There was normal leaflet separation  DOPPLER: The transtricuspid velocity was within the normal range  There was no evidence for stenosis  There was mild regurgitation  Pulmonary artery  systolic pressure was moderately to markedly increased  Estimated peak PA pressure was 60 mmHg  The findings suggest moderate to severe pulmonary hypertension  PULMONIC VALVE: Leaflets exhibited normal thickness, no calcification, and normal cuspal separation  DOPPLER: The transpulmonic velocity was within the normal range  There was no regurgitation  PERICARDIUM: There was no pericardial effusion   There was a left pleural effusion  The pericardium was normal in appearance  AORTA: The root exhibited mild dilatation  SYSTEMIC VEINS: IVC: The inferior vena cava was mildly dilated  Respirophasic changes were blunted (less than 50% variation)      MEASUREMENT TABLES    2D MEASUREMENTS  Aorta   (Reference normals)  Root diam   39 mm   (--)    SYSTEM MEASUREMENT TABLES    2D  %FS: 23 42 %  Ao Diam: 3 91 cm  EDV(Teich): 179 53 ml  EF Biplane: 40 37 %  EF(Teich): 46 07 %  ESV(Teich): 96 82 ml  IVSd: 1 02 cm  LA Area: 12 24 cm2  LA Diam: 4 3 cm  LVEDV MOD A2C: 129 37 ml  LVEDV MOD A4C: 151 37 ml  LVEDV MOD BP: 140 9 ml  LVEF MOD A2C: 42 79 %  LVEF MOD A4C: 40 91 %  LVESV MOD A2C: 74 02 ml  LVESV MOD A4C: 89 45 ml  LVESV MOD BP: 84 02 ml  LVIDd: 5 99 cm  LVIDs: 4 59 cm  LVLd A2C: 8 45 cm  LVLd A4C: 8 58 cm  LVLs A2C: 7 63 cm  LVLs A4C: 8 25 cm  LVPWd: 1 21 cm  RA Area: 15 27 cm2  RVIDd: 4 39 cm  SV MOD A2C: 55 35 ml  SV MOD A4C: 61 92 ml  SV(Teich): 82 71 ml    CW  TR MaxP 66 mmHg  TR Vmax: 3 52 m/s    MM  TAPSE: 1 8 cm    [facility]    Prepared and electronically signed by    Allegra Chang MD  Signed 2019 16:15:06           Meds/Allergies     Current Facility-Administered Medications:  acetaminophen 650 mg Oral Q4H PRN Perlita Sensing, YINA    amiodarone 0 5 mg/min Intravenous Continuous JHONATAN Roche    amiodarone 200 mg Oral Q8H [address] Perlita Sensing, YINA    amLODIPine 5 mg Oral Daily Ashanti Arrieta PA-C    aspirin 81 mg Oral Daily Ashanti Arrieta PA-C    atorvastatin 20 mg Oral Daily With [employer] ShilaYINA    bisacodyl 10 mg Rectal Daily PRN Perlita Sensing, YINA    docusate sodium 100 mg Oral BID Wilber León PA-C    furosemide 40 mg/hr Intravenous Continuous Ashanti Arrieta PA-C Last Rate: 40 mg/hr (19)   insulin glargine 20 Units Subcutaneous HS Shyla Pastrana MD    insulin lispro 1-5 Units Subcutaneous TID AC Shyla Pastrana MD    insulin lispro 1-5 Units Subcutaneous HS Katiuska Sheikh MD    insulin lispro 5 Units Subcutaneous TID With Meals Katiuska Sheikh MD    lidocaine (cardiac) 100 mg Intravenous Q30 Min PRN Cohen YINA Pierre    metoprolol tartrate 50 mg Oral Q12H [address] Ashanti Arrieta PA-C    milrinone [facility]) infusion 0 13 mcg/kg/min Intravenous Continuous Tanisha ChoYINA Last Rate: 0 13 mcg/kg/min (03/11/19 3652)   mupirocin 1 application Nasal S66R [address] Ashanti Arrieta PA-C    niCARdipine 1-15 mg/hr Intravenous Titrated Watson YINA Mosquera Last Rate: 3 mg/hr (03/11/19 0618)   ondansetron 4 mg Intravenous Q6H PRN Cohen IgnacioYINA lora    oxyCODONE-acetaminophen 1 tablet Oral Q4H PRN Sanjeev Bateman DO    oxyCODONE-acetaminophen 2 tablet Oral Q6H PRN Cohen IgnacioYINA lora    pantoprazole 40 mg Oral Daily Before Breakfast Cohen IgnacioYINA lora    polyethylene glycol 17 g Oral Daily Cohen IgnacioYINA lora    temazepam 15 mg Oral HS PRN Tanisha ChoYINA    travoprost 1 drop Both Eyes HS Cohen IgnacioYINA lora    warfarin 2 5 mg Oral Once (warfarin) Tanisha ChoYINA        amiodarone 0 5 mg/min    furosemide 40 mg/hr Last Rate: 40 mg/hr (03/11/19 0212)   milrinone (PRIMACOR) infusion 0 13 mcg/kg/min Last Rate: 0 13 mcg/kg/min (03/11/19 0720)   niCARdipine 1-15 mg/hr Last Rate: 3 mg/hr (03/11/19 0618)     Medications Prior to Admission   Medication    ascorbic acid (VITAMIN C) 500 mg tablet    atorvastatin (LIPITOR) 20 mg tablet    calcitriol (ROCALTROL) 0 25 mcg capsule    Cholecalciferol (VITAMIN D3) 1000 units CAPS    furosemide (LASIX) 20 mg tablet    furosemide (LASIX) 40 mg tablet    metFORMIN (GLUCOPHAGE) 500 mg tablet    metoprolol succinate (TOPROL-XL) 50 mg 24 hr tablet    Multiple Vitamin (MULTIVITAMIN) tablet    mupirocin (BACTROBAN) 2 % ointment    olmesartan (BENICAR) 20 mg tablet    TRAVATAN Z 0 004 % ophthalmic solution       Assessment:  Principal Problem:    Bicuspid aortic valve  Active Problems:    CKD (chronic kidney disease) stage 3, GFR 30-59 ml/min ([facility])    Diabetic nephropathy associated with type 2 diabetes mellitus ([facility] )    Secondary hyperparathyroidism ([facility] )    Essential hypertension    Chronic systolic congestive heart failure ([facility])    Nonrheumatic aortic valve insufficiency    Non-rheumatic mitral regurgitation    Coronary artery disease involving native coronary artery of native heart without angina pectoris    S/P AVR    S/P MVR (mitral valve repair)    Dilated cardiomyopathy ([facility])    Diabetes mellitus type 2 in nonobese ([facility])    CARY (acute kidney injury) ([facility] )      Impression:  1  S/P AVR/MV repair - hemodynamically stable  Weaning off milrinone  2  CARY - on furosemide gtt  3  Paroxysmal atrial flutter - on amio gtt  Would bolus with IV amio  Plan:  1  Bolus IV amiodarone  2  Wean milrinone as tolerated  3  Continue lasix gtt

## 2019-03-11 NOTE — PHYSICAL THERAPY NOTE
PHYSICAL THERAPY NOTE          Patient Name: Josiah Bamberger  MOCLS'V Date: 3/11/2019     03/11/19 0930   Restrictions/Precautions   Other Precautions Cardiac/sternal;Multiple lines;Telemetry;O2;Fall Risk  ([place] catheter; (R) UE a-line)   General   Chart Reviewed Yes   Additional Pertinent History cleared for Tx session (spoke to nsg); Response to Previous Treatment Patient with no complaints from previous session  Cognition   Overall Cognitive Status WFL   Arousal/Participation Alert; Cooperative   Attention Attends with cues to redirect   Orientation Level Oriented to person;Oriented to place;Oriented to situation   Memory Decreased recall of precautions   Following Commands Follows one step commands without difficulty   Subjective   Subjective Pt was observed reclined in the chair; agreeable to LE therex; also agreeable to reposition in the chair later during the session as he appeared to be low in the chair;   Transfers   Sit to Stand Unable to assess  (noted decreased BP and elevated HR post repositioning)   Balance   Static Sitting Fair -   Dynamic Sitting Poor +   Activity Tolerance   Activity Tolerance Patient limited by fatigue;Treatment limited secondary to medical complications (Comment)  (HR to 130s bpm post repositioning; decreased BP)   Nurse Made Aware spoke to Trevor Kelly   Hip Abduction Sitting;10 reps;AAROM; Bilateral  (2 sets; reclined in the chair)   Knee AROM Short Arc Quad Sitting;10 reps;AAROM;AROM; Bilateral  (2 sets; reclined in the chair)   Ankle Pumps Sitting;AAROM;AROM; Bilateral  (2 sets; reclined in the chair)   Equipment Use   Comments During LE therex, HR in 100s-110s bpm; after sitting up and repositioning further back in the chair, noted HR to 130s bpm; further mobilization was not attempted at that point; nsg aware; pt was reclined w/ LE elevated at the end of session; call bell placed w/in reach   Assessment   Prognosis Guarded   Problem List Decreased strength;Decreased endurance;Decreased mobility   Assessment Limited LE therex performed in the chair as outlined above; pt cont to exhibit general weakness and decreased tolerance to positional changes w/ elevated HR noted upon sitting up in the chair w/ LE down to floor; also generally decreased BP (per a-line) was noted t/o the session and nsg was working on (R) UE and a-line positioning intermittently during the session; functional mobility was not performed due to above and pt was reclined back in the chair w/ LE elevated at the end of session; remained in NAD w/ vague c/o transient weakness and dizziness reported; will cont to follow pt in PT for graded mobilization as clinical course allows; if pt returns home w/ family support upon D/C, home PT follow up is recommended; will follow;   Barriers to Discharge Other (Comment)  (1 MICHELLE; med status)   Goals   Patient Goals to go home   STG Expiration Date 03/19/19   Treatment Day 1   Plan   Treatment/Interventions Functional transfer training;LE strengthening/ROM; Elevations; Therapeutic exercise; Endurance training;Bed mobility;Gait training;Spoke to nursing;OT;Spoke to case management   Progress Slow progress, medical status limitations   PT Frequency Other (Comment)  (4-6x/wk)   Recommendation   Recommendation Home PT; Home with family support  (pending progress)       Clementmahin Hopes, PT

## 2019-03-11 NOTE — PLAN OF CARE
Problem: PHYSICAL THERAPY ADULT  Goal: Performs mobility at highest level of function for planned discharge setting  See evaluation for individualized goals  Description  Treatment/Interventions: Functional transfer training, LE strengthening/ROM, Elevations, Therapeutic exercise, Endurance training, Patient/family training, Gait training, Spoke to nursing, Family  Equipment Recommended: Walker(KYLAH)       See flowsheet documentation for full assessment, interventions and recommendations  Outcome: Progressing  Note:   Prognosis: Guarded  Problem List: Decreased strength, Decreased endurance, Decreased mobility  Assessment: Limited LE therex performed in the chair as outlined above; pt cont to exhibit general weakness and decreased tolerance to positional changes w/ elevated HR noted upon sitting up in the chair w/ LE down to floor; also generally decreased BP (per a-line) was noted t/o the session and nsg was working on (R) UE and a-line positioning intermittently during the session; functional mobility was not performed due to above and pt was reclined back in the chair w/ LE elevated at the end of session; remained in NAD w/ vague c/o transient weakness and dizziness reported; will cont to follow pt in PT for graded mobilization as clinical course allows; if pt returns home w/ family support upon D/C, home PT follow up is recommended; will follow;  Barriers to Discharge: Other (Comment)(1 MICHELLE; med status)     Recommendation: Home PT, Home with family support(pending progress)     PT - OK to Discharge: No(increase ambulation distance and trial stairs)    See flowsheet documentation for full assessment

## 2019-03-11 NOTE — NURSING NOTE
Patient working with PT in chair became hypotensive and stated he felt sweaty and "weird" with MAP in low 50's, cardene was put on hold and [employer] YINA notified  Was told to put lasix infusion to 10mg/hr  Patient BP stabililzing with MAP's in 62s  Told to hold Amiodarone bolus for now  Will continue to monitor

## 2019-03-12 LAB
ANION GAP SERPL CALCULATED.3IONS-SCNC: 11 MMOL/L (ref 4–13)
ARTERIAL PATENCY WRIST A: NO
ATRIAL RATE: 258 BPM
BASE EX.OXY STD BLDV CALC-SCNC: 67.8 % (ref 60–80)
BASE EXCESS BLDV CALC-SCNC: -2.3 MMOL/L
BUN SERPL-MCNC: 76 MG/DL (ref 5–25)
CALCIUM SERPL-MCNC: 8 MG/DL (ref 8.3–10.1)
CHLORIDE SERPL-SCNC: 98 MMOL/L (ref 100–108)
CO2 SERPL-SCNC: 22 MMOL/L (ref 21–32)
CREAT SERPL-MCNC: 4.61 MG/DL (ref 0.6–1.3)
ERYTHROCYTE [DISTWIDTH] IN BLOOD BY AUTOMATED COUNT: 13.5 % (ref 11.6–15.1)
GFR SERPL CREATININE-BSD FRML MDRD: 14 ML/MIN/1.73SQ M
GLUCOSE SERPL-MCNC: 107 MG/DL (ref 65–140)
GLUCOSE SERPL-MCNC: 108 MG/DL (ref 65–140)
GLUCOSE SERPL-MCNC: 110 MG/DL (ref 65–140)
GLUCOSE SERPL-MCNC: 241 MG/DL (ref 65–140)
GLUCOSE SERPL-MCNC: 84 MG/DL (ref 65–140)
HCO3 BLDV-SCNC: 22.1 MMOL/L (ref 24–30)
HCT VFR BLD AUTO: 27.3 % (ref 36.5–49.3)
HGB BLD-MCNC: 9.7 G/DL (ref 12–17)
INR PPP: 2.84 (ref 0.86–1.17)
MAGNESIUM SERPL-MCNC: 2.3 MG/DL (ref 1.6–2.6)
MCH RBC QN AUTO: 31.2 PG (ref 26.8–34.3)
MCHC RBC AUTO-ENTMCNC: 35.5 G/DL (ref 31.4–37.4)
MCV RBC AUTO: 88 FL (ref 82–98)
NASAL CANNULA: 3
O2 CT BLDV-SCNC: 10.2 ML/DL
P AXIS: 81 DEGREES
PCO2 BLDV: 36.9 MM HG (ref 42–50)
PH BLDV: 7.4 [PH] (ref 7.3–7.4)
PLATELET # BLD AUTO: 128 THOUSANDS/UL (ref 149–390)
PMV BLD AUTO: 10.3 FL (ref 8.9–12.7)
PO2 BLDV: 41.3 MM HG (ref 35–45)
POTASSIUM SERPL-SCNC: 3.7 MMOL/L (ref 3.5–5.3)
POTASSIUM SERPL-SCNC: 3.8 MMOL/L (ref 3.5–5.3)
POTASSIUM SERPL-SCNC: 4.2 MMOL/L (ref 3.5–5.3)
PROTHROMBIN TIME: 29.9 SECONDS (ref 11.8–14.2)
QRS AXIS: -62 DEGREES
QRSD INTERVAL: 154 MS
QT INTERVAL: 458 MS
QTC INTERVAL: 561 MS
RBC # BLD AUTO: 3.11 MILLION/UL (ref 3.88–5.62)
SODIUM SERPL-SCNC: 131 MMOL/L (ref 136–145)
T WAVE AXIS: 87 DEGREES
VENTRICULAR RATE: 90 BPM
WBC # BLD AUTO: 12.02 THOUSAND/UL (ref 4.31–10.16)

## 2019-03-12 PROCEDURE — 99024 POSTOP FOLLOW-UP VISIT: CPT | Performed by: THORACIC SURGERY (CARDIOTHORACIC VASCULAR SURGERY)

## 2019-03-12 PROCEDURE — 84132 ASSAY OF SERUM POTASSIUM: CPT | Performed by: PHYSICIAN ASSISTANT

## 2019-03-12 PROCEDURE — 97166 OT EVAL MOD COMPLEX 45 MIN: CPT

## 2019-03-12 PROCEDURE — 85027 COMPLETE CBC AUTOMATED: CPT | Performed by: PHYSICIAN ASSISTANT

## 2019-03-12 PROCEDURE — 99232 SBSQ HOSP IP/OBS MODERATE 35: CPT | Performed by: INTERNAL MEDICINE

## 2019-03-12 PROCEDURE — 93010 ELECTROCARDIOGRAM REPORT: CPT | Performed by: INTERNAL MEDICINE

## 2019-03-12 PROCEDURE — 83735 ASSAY OF MAGNESIUM: CPT | Performed by: PHYSICIAN ASSISTANT

## 2019-03-12 PROCEDURE — G8988 SELF CARE GOAL STATUS: HCPCS

## 2019-03-12 PROCEDURE — G8987 SELF CARE CURRENT STATUS: HCPCS

## 2019-03-12 PROCEDURE — 80048 BASIC METABOLIC PNL TOTAL CA: CPT | Performed by: PHYSICIAN ASSISTANT

## 2019-03-12 PROCEDURE — 97110 THERAPEUTIC EXERCISES: CPT

## 2019-03-12 PROCEDURE — 85610 PROTHROMBIN TIME: CPT | Performed by: PHYSICIAN ASSISTANT

## 2019-03-12 PROCEDURE — 82948 REAGENT STRIP/BLOOD GLUCOSE: CPT

## 2019-03-12 PROCEDURE — 82805 BLOOD GASES W/O2 SATURATION: CPT | Performed by: PHYSICIAN ASSISTANT

## 2019-03-12 PROCEDURE — 97530 THERAPEUTIC ACTIVITIES: CPT

## 2019-03-12 PROCEDURE — 99233 SBSQ HOSP IP/OBS HIGH 50: CPT | Performed by: INTERNAL MEDICINE

## 2019-03-12 RX ORDER — METOPROLOL TARTRATE 50 MG/1
50 TABLET, FILM COATED ORAL EVERY 12 HOURS SCHEDULED
Status: DISCONTINUED | OUTPATIENT
Start: 2019-03-12 | End: 2019-03-13

## 2019-03-12 RX ORDER — POTASSIUM CHLORIDE 29.8 MG/ML
40 INJECTION INTRAVENOUS ONCE
Status: COMPLETED | OUTPATIENT
Start: 2019-03-12 | End: 2019-03-12

## 2019-03-12 RX ORDER — WARFARIN SODIUM 2.5 MG/1
2.5 TABLET ORAL
Status: COMPLETED | OUTPATIENT
Start: 2019-03-12 | End: 2019-03-12

## 2019-03-12 RX ORDER — FUROSEMIDE 10 MG/ML
40 INJECTION INTRAMUSCULAR; INTRAVENOUS
Status: DISCONTINUED | OUTPATIENT
Start: 2019-03-12 | End: 2019-03-14

## 2019-03-12 RX ADMIN — INSULIN LISPRO 5 UNITS: 100 INJECTION, SOLUTION INTRAVENOUS; SUBCUTANEOUS at 09:48

## 2019-03-12 RX ADMIN — INSULIN LISPRO 5 UNITS: 100 INJECTION, SOLUTION INTRAVENOUS; SUBCUTANEOUS at 11:55

## 2019-03-12 RX ADMIN — WARFARIN SODIUM 2.5 MG: 2.5 TABLET ORAL at 17:26

## 2019-03-12 RX ADMIN — METOPROLOL TARTRATE 50 MG: 50 TABLET, FILM COATED ORAL at 22:39

## 2019-03-12 RX ADMIN — PANTOPRAZOLE SODIUM 40 MG: 40 TABLET, DELAYED RELEASE ORAL at 06:10

## 2019-03-12 RX ADMIN — INSULIN LISPRO 2 UNITS: 100 INJECTION, SOLUTION INTRAVENOUS; SUBCUTANEOUS at 11:56

## 2019-03-12 RX ADMIN — FUROSEMIDE 40 MG: 10 INJECTION, SOLUTION INTRAMUSCULAR; INTRAVENOUS at 11:51

## 2019-03-12 RX ADMIN — INSULIN GLARGINE 20 UNITS: 100 INJECTION, SOLUTION SUBCUTANEOUS at 22:40

## 2019-03-12 RX ADMIN — AMIODARONE HYDROCHLORIDE 0.5 MG/MIN: 50 INJECTION, SOLUTION INTRAVENOUS at 07:57

## 2019-03-12 RX ADMIN — AMIODARONE HYDROCHLORIDE 200 MG: 200 TABLET ORAL at 14:48

## 2019-03-12 RX ADMIN — INSULIN LISPRO 5 UNITS: 100 INJECTION, SOLUTION INTRAVENOUS; SUBCUTANEOUS at 17:31

## 2019-03-12 RX ADMIN — MUPIROCIN 1 APPLICATION: 20 OINTMENT TOPICAL at 08:39

## 2019-03-12 RX ADMIN — ASPIRIN 81 MG: 81 TABLET, COATED ORAL at 08:39

## 2019-03-12 RX ADMIN — ATORVASTATIN CALCIUM 20 MG: 20 TABLET, FILM COATED ORAL at 17:26

## 2019-03-12 RX ADMIN — DOCUSATE SODIUM 100 MG: 100 CAPSULE, LIQUID FILLED ORAL at 17:26

## 2019-03-12 RX ADMIN — POTASSIUM CHLORIDE 40 MEQ: 400 INJECTION, SOLUTION INTRAVENOUS at 01:53

## 2019-03-12 RX ADMIN — AMIODARONE HYDROCHLORIDE 200 MG: 200 TABLET ORAL at 06:10

## 2019-03-12 RX ADMIN — AMIODARONE HYDROCHLORIDE 200 MG: 200 TABLET ORAL at 22:39

## 2019-03-12 RX ADMIN — METOPROLOL TARTRATE 50 MG: 50 TABLET, FILM COATED ORAL at 08:39

## 2019-03-12 RX ADMIN — FUROSEMIDE 40 MG: 10 INJECTION, SOLUTION INTRAMUSCULAR; INTRAVENOUS at 08:39

## 2019-03-12 RX ADMIN — FUROSEMIDE 40 MG: 10 INJECTION, SOLUTION INTRAMUSCULAR; INTRAVENOUS at 17:30

## 2019-03-12 RX ADMIN — METOPROLOL TARTRATE 25 MG: 25 TABLET, FILM COATED ORAL at 01:05

## 2019-03-12 RX ADMIN — MUPIROCIN 1 APPLICATION: 20 OINTMENT TOPICAL at 22:40

## 2019-03-12 NOTE — PHYSICAL THERAPY NOTE
PHYSICAL THERAPY NOTE          Patient Name: Anthony Paige  TKVDT'S Date: 3/12/2019     03/12/19 1013   Pain Assessment   Pain Assessment No/denies pain   Restrictions/Precautions   Other Precautions Cardiac/sternal;Telemetry; Fall Risk   General   Chart Reviewed Yes   Additional Pertinent History cleared for Tx session (spoke to nsg)   Response to Previous Treatment Patient with no complaints from previous session  Cognition   Overall Cognitive Status WFL   Arousal/Participation Alert; Cooperative   Attention Attends with cues to redirect   Orientation Level Oriented to person;Oriented to place;Oriented to situation   Memory Decreased recall of precautions   Following Commands Follows one step commands without difficulty   Subjective   Subjective Pt is in the chair; agreeable to mobilize; denies dizziness; overall feeling better   Transfers   Sit to Stand 4  Minimal assistance   Additional items Assist x 1;Verbal cues  (2 trials;)   Stand to Sit 4  Minimal assistance   Additional items Assist x 1;Verbal cues  (2 trials;)   Ambulation/Elevation   Gait pattern Excessively slow; Short stride; Inconsistent consuelo   Gait Assistance 4  Minimal assist   Additional items Assist x 1;Verbal cues; Tactile cues  (stand by (A) of 2nd for chair follow)   Assistive Device Rolling walker   Distance 80 ft and 70 ft w/ seated rest period in between   Balance   Static Sitting Fair +   Static Standing Fair -   Ambulatory Poor +   Activity Tolerance   Activity Tolerance Patient tolerated treatment well   Nurse Made Aware spoke to [facility]   Exercises   Hip Abduction Sitting;15 reps;AAROM; Bilateral  (2 sets)   Knee AROM Long Arc Quad Sitting;15 reps;AROM; Bilateral  (2 sets)   Ankle Pumps Sitting;15 reps;AROM; Bilateral  (2 sets)   Marching Sitting;5 reps;AROM; Bilateral  (2 sets)   Equipment Use   Comments Pt was reclined in the chair w/ LE elevated at the end of session; pillows placed for back and UE support; SCD re-activated; call bell placed w/in reach; Assessment   Prognosis Good   Problem List Decreased strength;Decreased mobility; Decreased endurance; Impaired balance   Assessment Pt demonstrated overall improvement in functional mobility skills progressing w/ amb distances w/ use of rw and chair follow; min (A)x1 was required w/ all aspects of observed mobility to assure safety; pt remained in NAD w/ rest periods provided t/o the session; currently, cont to anticipate pt will return home w/ family support upon D/C pending progress; home PT follow up is recommended; rw for amb at this time; will follow  Barriers to Discharge Other (Comment)  (1 MICHELLE)   Goals   Patient Goals to go home   STG Expiration Date 03/19/19   Treatment Day 2   Plan   Treatment/Interventions Functional transfer training;LE strengthening/ROM; Elevations; Therapeutic exercise; Endurance training;Bed mobility;Gait training;Spoke to nursing;OT   Progress Progressing toward goals   PT Frequency Other (Comment)  (4-6x/wk)   Recommendation   Recommendation Home PT; Home with family support   Equipment Recommended Walker  (at this time)       Vladimir Rosas, PT

## 2019-03-12 NOTE — UTILIZATION REVIEW
Continued Stay Review - INPATIENT  Date: 03/12/2019  POD # 5 s/p AVR, MV repair, VIRI ligation  Vital Signs: /58   Pulse 99   Temp 98 4 °F (36 9 °C) (Oral)   Resp 17   Ht 5' 6" (1 676 m)   Wt 71 7 kg (158 lb 1 1 oz)   SpO2 97%   BMI 25 51 kg/m²    A  Line right radial / Triple CVC Line  Assessment/Plan:   1  Cardiac:   Cardiac infusions: Primacor, 0 13 mcg/kg/min - stop today              Complete IV amiodarone and then when bag complete maintain po dosing   Sidney previously removed,  D/C bambi  Atrial Fibrillation; HR/BP well-controlled  Lopressor, 50 mg PO BID              Coumadin 2 5 mg tonight, INR therapeutic   Continue ASA and Statin therapy  Epicardial pacing wires out  Maintain central IV access today for today  Continue DVT prophylaxis  Consult cardiology for postoperative medical management     2  Pulmonary: Wean oxygen as tolerated  Acute post-op pulmonary insufficiency; Requiring 2 Liters via nasal cannula, secondary to pulmonary vascular congestion  Continue incentive spirometry/coughing/deep breathing exercises  Wean supplemental oxygen as tolerated for saturation > 90%  Chest tubes have been discontinued     3  Renal:   Intake/Output net: -2417 mL/24 hours  Post op volume overload:  Transition to bolus dosing lasix  Add Lasix, 40 mg IV BID  Add Potassium supplementation, 20 mEq QD  Nephrology following, Cr stable from yesterday   Discontinue maya catheter at [facility]     4  Neuro:  Neurologically intact; No active issues  Incisional pain well-controlled; Continue prn Percocet     5  GI:  Tolerating TLC 2 3 gm sodium diet  Maintain 1800 mL daily fluid restriction   Continue stool softeners and prn suppository  Continue GI prophylaxis     6  Endo: SQ insulin as directed by endocrinology      7  Hematology:   Post-operative acute blood loss anemia; Hemoglobin 9 7; trend prn     8   Disposition:  Transfer from ICU to telemetry today     VTE Pharmacologic Prophylaxis: INR therapeutic on Coumadin VTE Mechanical Prophylaxis: sequential compression device    Medications:   Scheduled Meds:   Current Facility-Administered Medications:  acetaminophen 650 mg Oral Q4H PRN Jeffry Thompson PA-C    amiodarone 0 5 mg/min Intravenous Continuous Kali Sierra PA-C Last Rate: 0 5 mg/min (03/12/19 0757)   amiodarone 200 mg Oral Q8H [address] Jeffry Thompson PA-C    aspirin 81 mg Oral Daily Ashanti Arrieta PA-C    atorvastatin 20 mg Oral Daily With [employer] ShilaYINA    bisacodyl 10 mg Rectal Daily PRN Jeffry Thompson PA-C    docusate sodium 100 mg Oral BID Gwendolyn Amezcua PA-C    furosemide 40 mg Intravenous TID (diuretic) Singh Ly PA-C    insulin glargine 20 Units Subcutaneous HS Em Camera, MD    insulin lispro 1-5 Units Subcutaneous TID AC Em Camera, MD    insulin lispro 1-5 Units Subcutaneous HS Em Camera, MD    insulin lispro 5 Units Subcutaneous TID With Meals Em Zendejas MD    metoprolol tartrate 50 mg Oral Q12H [address] Singh Ly PA-C    mupirocin 1 application Nasal O14H [address] Ashanti Arrieta PA-C    ondansetron 4 mg Intravenous Q6H PRN Jeffry Thompson PA-C    oxyCODONE-acetaminophen 1 tablet Oral Q4H PRN Maryan Johnson DO    oxyCODONE-acetaminophen 2 tablet Oral Q6H PRN Jeffry Thompson PA-C    pantoprazole 40 mg Oral Daily Before Breakfast Jeffry Thompson PA-C    polyethylene glycol 17 g Oral Daily Jeffry Thompson PA-C    temazepam 15 mg Oral HS PRN Gwendolyn Amezcua PA-C    travoprost 1 drop Both Eyes HS Jeffry Thompson PA-C    warfarin 2 5 mg Oral Once (warfarin) Kali Sierra PA-C      Continuous Infusions:   amiodarone 0 5 mg/min Last Rate: 0 5 mg/min (03/12/19 0757)   Pertinent Labs/Diagnostic Results:   WBC 12 02  Hgl 9 7 / Hct 27 3  Platelets 475  BUN 76 / Cr 4 61  INR 2 84  Age/Sex: 80 y o  male   Discharge Plan: TBD    Network Utilization Review Department  Phone: [phone]; Fax [phone]  [email or web address]  ATTENTION: Please call with any questions or concerns to [phone]  and carefully listen to the prompts so that you are directed to the right person  Send all requests for admission clinical reviews, approved or denied determinations and any other requests to fax [phone]   All voicemails are confidential

## 2019-03-12 NOTE — PLAN OF CARE
Problem: OCCUPATIONAL THERAPY ADULT  Goal: Performs self-care activities at highest level of function for planned discharge setting  See evaluation for individualized goals  Description  Treatment Interventions: ADL retraining, Functional transfer training, Compensatory technique education, Cardiac education, Energy conservation, Activityengagement, Equipment evaluation/education, Patient/family training, Endurance training  Equipment Recommended: Bedside commode       See flowsheet documentation for full assessment, interventions and recommendations  Note:   Limitation: Decreased ADL status, Decreased endurance, Decreased self-care trans, Decreased high-level ADLs  Prognosis: Good  Assessment: Pt is a 80 y o  male who was admitted to [facility] on 3/7/2019 with Bicuspid aortic valve s/p aortic valve repair performed 3/7/19  Pt's problem list also includes PMH of diabetes, hyperlipidemia, HTN, proteinuria, CKD stage 3, vitamin D deficiency, toe surgery  At baseline pt was completing ADL's/IADL's with I, no AD with functional ambulation  Pt lives with spouse in a 1 SH with 1STE, +cane, RW   Currently pt requires max a  for overall ADLS and min a with RW and SBA of another (s) with line managment for functional mobility/transfers  Pt currently presents with impairments in the following categories -steps to enter environment, difficulty performing ADLS and difficulty performing IADLS  activity tolerance, endurance and standing balance/tolerance  These impairments, as well as pt's fatigue, pain and cardiac/sternal precautions  limit pt's ability to safely engage in all baseline areas of occupation, includingbathing, dressing, toileting, functional mobility/transfers, community mobility, laundry , driving, house maintenance, meal prep and cleaning From OT standpoint, recommend home OT and family support upon D/C  OT will continue to follow to address the below stated goals        OT Discharge Recommendation: Home OT  OT - OK to Discharge: No

## 2019-03-12 NOTE — PROGRESS NOTES
03/12/19 1000   Spiritual Beliefs/Perceptions   Support Systems Spouse/significant other;Children   Stress Factors   Patient Stress Factors None identified   Coping Responses   Patient Coping Accepting;Open/discussion   Plan of Care   Comments Explored pt's emotional needs,  needs, pt doing well, provided a caring, supportive presence     Assessment Completed by: Unit visit

## 2019-03-12 NOTE — PROGRESS NOTES
03/12/19 1000   Clinical Encounter Type   Visited With Patient   Routine Visit Introduction   Continue Visiting Yes

## 2019-03-12 NOTE — PROGRESS NOTES
Progress Note - Cardiothoracic Surgery   [facility] 80 y o  male MRN: [id number]  Unit/Bed#: [facility] 416-01 Encounter: [id number]  POD # 5 s/p AVR, MV repair, VIRI ligation    24 Hour Events: Remains on IV Milrinone 0 13, Lasix drip at 5 mg/hr and Amiodarone 1 mg/hr  Making 100 ml to 200 ml/hr of urine  Remains in Afib       Medications:   Scheduled Meds:  Current Facility-Administered Medications:  acetaminophen 650 mg Oral Q4H PRN Zohaib Diana PA-C    amiodarone 1 mg/min Intravenous Continuous Ashanti Arrieta PA-C Last Rate: 1 mg/min (03/11/19 1552)   amiodarone 200 mg Oral Q8H [address] Zohaib Diana PA-C    aspirin 81 mg Oral Daily Ashanti Arrieta PA-C    atorvastatin 20 mg Oral Daily With [employer] ShilaYINA    bisacodyl 10 mg Rectal Daily PRN Zohaib Diana PA-C    docusate sodium 100 mg Oral BID Nicci Bush PA-C    furosemide 5 mg/hr Intravenous Continuous Jannie Loyola PA-C Last Rate: 5 mg/hr (03/11/19 1841)   insulin glargine 20 Units Subcutaneous HS Fátima Paredes MD    insulin lispro 1-5 Units Subcutaneous TID AC Fátima Paredes MD    insulin lispro 1-5 Units Subcutaneous HS Fátima Paredes MD    insulin lispro 5 Units Subcutaneous TID With Meals Fátima Paredes MD    lidocaine (cardiac) 100 mg Intravenous Q30 Min PRN Zohaib Diana PA-C    metoprolol tartrate 25 mg Oral Q8H Ashanti Arrieta PA-C    milrinone [facility]) infusion 0 13 mcg/kg/min Intravenous Continuous Ashanti rArieta PA-C Last Rate: 0 13 mcg/kg/min (03/11/19 2026)   mupirocin 1 application Nasal M56K [address] Ashanti Arrieta PA-C    niCARdipine 1-15 mg/hr Intravenous Titrated Indira Oquendo PA-C Last Rate: Stopped (03/11/19 1803)   ondansetron 4 mg Intravenous Q6H PRN Zohaib Diana PA-C    oxyCODONE-acetaminophen 1 tablet Oral Q4H PRN Radha Gr DO    oxyCODONE-acetaminophen 2 tablet Oral Q6H PRN Zohaib Diana PA-C    pantoprazole 40 mg Oral Daily Before Breakfast Zohaib Diana PA-C    phenylephine  mcg/min Intravenous Titrated Sebastian Avila PA-C Last Rate: Stopped (03/12/19 0400)   polyethylene glycol 17 g Oral Daily Aditi Wasserman PA-C    temazepam 15 mg Oral HS PRN Sebastian Avila PA-C    travoprost 1 drop Both Eyes HS Aditi Wasserman PA-C      Continuous Infusions:  amiodarone 1 mg/min Last Rate: 1 mg/min (03/11/19 1552)   furosemide 5 mg/hr Last Rate: 5 mg/hr (03/11/19 1841)   milrinone (PRIMACOR) infusion 0 13 mcg/kg/min Last Rate: 0 13 mcg/kg/min (03/11/19 2026)   niCARdipine 1-15 mg/hr Last Rate: Stopped (03/11/19 1803)   phenylephine  mcg/min Last Rate: Stopped (03/12/19 0400)     PRN Meds:   acetaminophen    bisacodyl    lidocaine (cardiac)    ondansetron    oxyCODONE-acetaminophen    oxyCODONE-acetaminophen    temazepam    Vitals: Tm 100 8  Vitals:    03/12/19 0400 03/12/19 0500 03/12/19 0550 03/12/19 0600   BP: 121/62 120/58  129/59   Pulse: 80 88  88   Resp: (!) 26 18  22   Temp:       TempSrc:       SpO2: 96% 96%  97%   Weight:   71 7 kg (158 lb 1 1 oz)    Height:           Hemodynamics:  PAP: (22-39)/(11-22) 39/22   VBG: SVO2 67 8    Respiratory:   SpO2: SpO2: 97 %; 2 LPM    Intake/Output:   I/O       03/10 0701 - 03/11 0700 03/11 0701 - 03/12 0700 03/12 0701 - 03/13 0700    P  O  960 420     I V  (mL/kg) 494 9 (7) 777 7 (10 8)     IV Piggyback 200 580     Total Intake(mL/kg) 1654 9 (23 2) 1777 7 (24 8)     Urine (mL/kg/hr) 3605 (2 1) 4195 (2 4)     Stool  0     Chest Tube 0      Total Output 3605 4195     Net -1950 2 -2417 3            Unmeasured Stool Occurrence  3 x         Weights:   Weight (last 2 days)     Date/Time   Weight    03/12/19 0550   71 7 (158 07)    03/11/19 0600   71 2 (156 97)    03/10/19 0600   87 3 (192 46)            Admit Weight: 70 8    Results:   Results from last 7 days   Lab Units 03/12/19  0448 03/11/19  0530 03/10/19  0451   WBC Thousand/uL 12 02* 11 15* 12 74*   HEMOGLOBIN g/dL 9 7* 9 3* 9 5*   HEMATOCRIT % 27 3* 27 2* 27 4*   PLATELETS Thousands/uL 128* 101* 85*     Results from last 7 days   Lab Units 03/12/19  0448 03/12/19  0000 03/11/19  1650  03/11/19  0453  03/10/19  1836  03/07/19  1213   SODIUM mmol/L 131*  --   --   --  135*  --  134*   < >  --    POTASSIUM mmol/L 4 2 3 7 3 7   < > 3 9   < > 3 9   < >  --    CHLORIDE mmol/L 98*  --   --   --  100  --  101   < >  --    CO2 mmol/L 22  --   --   --  23  --  22   < >  --    CO2, I-STAT mmol/L  --   --   --   --   --   --   --   --  24   BUN mg/dL 76*  --   --   --  68*  --  62*   < >  --    CREATININE mg/dL 4 61*  --   --   --  4 63*  --  4 49*   < >  --    GLUCOSE, ISTAT mg/dl  --   --   --   --   --   --   --   --  110   CALCIUM mg/dL 8 0*  --   --   --  8 1*  --  7 9*   < >  --     < > = values in this interval not displayed  Results from last 7 days   Lab Units 03/12/19  0448 03/11/19  0525 03/10/19  0452   INR  2 84* 2 30* 1 88*   2 5 mg x prior 4 days     Blood glucose: 70 to 170     Invasive Lines/Tubes:  Invasive Devices     Central Venous Catheter Line            CVC Central Lines 03/07/19 Triple 4 days          Peripheral Intravenous Line            Peripheral IV 02/15/19 Left Wrist 24 days    Peripheral IV 03/11/19 Left Forearm less than 1 day          Arterial Line            Arterial Line 03/07/19 Right Radial 4 days          Drain            Urethral Catheter Non-latex; Temperature probe 16 Fr  4 days                Physical Exam:    HEENT/NECK:  PERRLA  No jugular venous distention  Cardiac: irregularly irregular rate of 90s   Pulmonary:  Breath sounds slightly diminished at the bases bilaterally  Abdomen:  Non-tender, Non-distended  Positive bowel sounds  Incisions: Sternum is stable  Incision is clean, dry, and intact  Lower extremities: Extremities warm/dry  Radial/PT/DP pulses 2+ bilaterally  Trace edema B/L  Neuro: Alert and oriented X 3  Sensation is grossly intact  No focal deficits  Skin: Warm/Dry, without rashes or lesions      Assessment:  Patient Active Problem List   Diagnosis    CKD (chronic kidney disease) stage 3, GFR 30-59 ml/min ([facility])    Persistent proteinuria    Diabetic nephropathy associated with type 2 diabetes mellitus ([facility])    Vitamin D deficiency    [facility] hyperparathyroidism ([facility] )    FSGS (focal segmental glomerulosclerosis)    Essential hypertension    Chronic systolic congestive heart failure ([facility])    Nonrheumatic aortic valve insufficiency    Non-rheumatic mitral regurgitation    Bicuspid aortic valve    Coronary artery disease involving native coronary artery of native heart without angina pectoris    S/P AVR    S/P MVR (mitral valve repair)    Dilated cardiomyopathy ([place] [address] )    Diabetes mellitus type 2 in nonobese ([place] [address] )    CARY (acute kidney injury) ([place] [address] )       POD # 5 s/p AVR, MV repair, VIRI ligation    Plan:    1  Cardiac:   Cardiac infusions: Primacor, 0 13 mcg/kg/min - stop today   Complete IV amiodarone and then when bag complete maintain po dosing   Mchenry previously removed,  D/C bambi  Atrial Fibrillation; HR/BP well-controlled  Lopressor, 50 mg PO BID   Coumadin 2 5 mg tonight, INR therapeutic   Continue ASA and Statin therapy  Epicardial pacing wires out  Maintain central IV access today for today  Continue DVT prophylaxis  Consult cardiology for postoperative medical management    2  Pulmonary: Wean oxygen as tolerated  Acute post-op pulmonary insufficiency; Requiring 2 Liters via nasal cannula, secondary to pulmonary vascular congestion  Continue incentive spirometry/coughing/deep breathing exercises  Wean supplemental oxygen as tolerated for saturation > 90%  Chest tubes have been discontinued    3  Renal:   Intake/Output net: -2417 mL/24 hours  Post op volume overload:  Transition to bolus dosing lasix  Add Lasix, 40 mg IV BID  Add Potassium supplementation, 20 mEq QD  Nephrology following, Cr stable from yesterday   Discontinue maya catheter at MN    4  Neuro:  Neurologically intact;  No active issues  Incisional pain well-controlled; Continue prn Percocet    5  GI:  Tolerating TLC 2 3 gm sodium diet  Maintain 1800 mL daily fluid restriction   Continue stool softeners and prn suppository  Continue GI prophylaxis    6  Endo: SQ insulin as directed by endocrinology     7  Hematology:   Post-operative acute blood loss anemia; Hemoglobin 9 7; trend prn    8   Disposition:  Transfer from ICU to telemetry today    VTE Pharmacologic Prophylaxis: INR therapeutic on Coumadin   VTE Mechanical Prophylaxis: sequential compression device    Collaborative rounds completed with DAMON Argueta , and Piedad More RN    SIGNATURE: Virgil Braun  DATE: March 12, 2019  TIME: 7:10 AM

## 2019-03-12 NOTE — PROGRESS NOTES
Progress Note - Nephrology   [facility] 80 y o  male MRN: [id number]  Unit/Bed#: [facility] 424-01 Encounter: [id number]      Assessment / Plan:  1  Nonoliguric CARY on CKD stage 3 with baseline creatinine 1 6 to 1 8, follows with Dr Daxa Turcios  -creatinine 1 9 on admission, has risen up to 4 5-4 6, stable and hopefully plateaued now    -transitioned from lasix gtt to lasix 40mg IV TID  -appears to be polyuric with 4 1L urine output yesterday ? D/t diuresis vs post ATN recovery  -CARY likely secondary to ischemic ATN with intraoperative hypotension episodes, cardiopulmonary bypass time 96 minutes, cross-clamp time 87 minutes  -HD consent on file if needed  -f/u BMP in a m      2  Volume overload  -improving on IV diuresis as above  -clinically patient does not seem to be in respiratory distress   -currently remains on amio IV drip     3  Biopsy-proven FSGS  -this was thought to be secondary to secondary FSGS and only 15% foot process effacement   Since then patient has been trying to lose weight   -he was on ARB due to significant proteinuria   Currently holding ARB due to CARY      4  Proteinuria d/t FSGS  -last UPC ratio 4 g  -will need eventual ARB which can be restarted as outpatient      5  Mild metabolic acidosis with worsening renal failure  -bicarb level stable at 22 today        6  Severe aortic insufficiency/MR  -status post AVR, mitral valve repair, VIRI ligation - per ICU team     7  Hyponatremia - sNa has dropped from 135 to 131 and I note hypotensive episode yesterday  ? D/t volume depletion  F/u am BMP  May need to hold diuretics if further episode of hypotension and/or sNa lower      8  Anemia - Hgb stable in 9s, I do note low platelets which are rising, monitor this  Subjective:   Denies chest pain or shortness of breath  No complaints  Urinating well  Objective:     Vitals: Blood pressure 109/58, pulse 78, temperature 97 5 °F (36 4 °C), temperature source Oral, resp   rate 18, height 5' 6" (1 676 m), weight 71 7 kg (158 lb 1 1 oz), SpO2 98 %  ,Body mass index is 25 51 kg/m²  Temp (24hrs), Av 2 °F (37 3 °C), Min:97 5 °F (36 4 °C), Max:100 8 °F (38 2 °C)      Weight (last 2 days)     Date/Time   Weight    19 0550   71 7 (158 07)    19 0600   71 2 (156 97)    03/10/19 0600   87 3 (192 46)                Intake/Output Summary (Last 24 hours) at 3/12/2019 1153  Last data filed at 3/12/2019 0900  Gross per 24 hour   Intake 1317 94 ml   Output 3695 ml   Net -2377 06 ml     I/O last 24 hours: In: 2106 8 [P O :660; I V :866 8; IV Piggyback:580]  Out: 2937 [Urine:4470]        Physical Exam:   Physical Exam   Constitutional: He appears well-developed and well-nourished  No distress  HENT:   Head: Normocephalic and atraumatic  Mouth/Throat: No oropharyngeal exudate  Eyes: Right eye exhibits no discharge  Left eye exhibits no discharge  No scleral icterus  Neck: Neck supple  Cardiovascular: Normal rate, regular rhythm and normal heart sounds  Pulmonary/Chest: Effort normal and breath sounds normal  He has no wheezes  He has no rales  Abdominal: Soft  Bowel sounds are normal  He exhibits no distension  There is no tenderness  Musculoskeletal: Normal range of motion  He exhibits no edema  Neurological: He is alert  awake   Skin: Skin is warm and dry  No rash noted  He is not diaphoretic  Vertical chest incision well healed   Psychiatric: He has a normal mood and affect  His behavior is normal    Vitals reviewed        Invasive Devices     Central Venous Catheter Line            CVC Central Lines 19 Triple 5 days          Peripheral Intravenous Line            Peripheral IV 02/15/19 Left Wrist 25 days    Peripheral IV 19 Left Forearm less than 1 day                Medications:    Scheduled Meds:  Current Facility-Administered Medications:  acetaminophen 650 mg Oral Q4H PRN Helen Worrell PA-C    amiodarone 0 5 mg/min Intravenous Continuous Helen Worrell PA-C Last Rate: 0 5 mg/min (03/12/19 0757)   amiodarone 200 mg Oral Q8H [address] Claude Apley, PA-C    aspirin 81 mg Oral Daily Claude Apley, PA-C    atorvastatin 20 mg Oral Daily With Justine Fuchs PA-C    bisacodyl 10 mg Rectal Daily PRN Claude Apley, PA-C    docusate sodium 100 mg Oral BID Claude Apley, PA-C    furosemide 40 mg Intravenous TID (diuretic) Claude Apley, PA-C    insulin glargine 20 Units Subcutaneous HS Claude Apley, PA-C    insulin lispro 1-5 Units Subcutaneous TID AC Claude Apley, PA-C    insulin lispro 1-5 Units Subcutaneous HS Claude Apley, PA-C    insulin lispro 5 Units Subcutaneous TID With Meals Claude Apley, PA-C    metoprolol tartrate 50 mg Oral Q12H [address] Claude Apley, PA-C    mupirocin 1 application Nasal E65Q [address] Claude Apley, PA-C    ondansetron 4 mg Intravenous Q6H PRN Claude Apley, PA-C    oxyCODONE-acetaminophen 1 tablet Oral Q4H PRN Claude Apley, PA-C    oxyCODONE-acetaminophen 2 tablet Oral Q6H PRN Claude Apley, PA-C    pantoprazole 40 mg Oral Daily Before Breakfast Claude Apley, PA-C    polyethylene glycol 17 g Oral Daily Claude Apley, PA-C    temazepam 15 mg Oral HS PRN Claude Apley, PA-C    travoprost 1 drop Both Eyes HS Claude Apley, PA-C    warfarin 2 5 mg Oral Once (warfarin) Claude Apley, PA-C        PRN Meds:   acetaminophen    bisacodyl    ondansetron    oxyCODONE-acetaminophen    oxyCODONE-acetaminophen    temazepam    Continuous Infusions:  amiodarone 0 5 mg/min Last Rate: 0 5 mg/min (03/12/19 0757)           LAB RESULTS:      Results from last 7 days   Lab Units 03/12/19  0448 03/12/19  0000 03/11/19  1650 03/11/19  1301 03/11/19  0530 03/11/19  0453 03/11/19  0012 03/10/19  1836  03/10/19  0512 03/10/19  0504 03/10/19  0451  03/09/19  1556 03/09/19  1354  03/09/19  0458  03/09/19 03/08/19  1616 03/08/19  0346 03/07/19  2037  03/07/19  1213 03/07/19  1204  03/07/19  1050 03/07/19  1023 03/07/19  1004 03/07/19  0946 03/07/19  0945 03/07/19 0937 03/07/19 0923   WBC Thousand/uL 12 02*  --   --   --  11 15*  --   --   --   --   --   --  12 74*  --   --   --   --   --   --  16 89*  --  18 50*  --   --   --   --   --   --   --   --   --   --   --   --    HEMOGLOBIN g/dL 9 7*  --   --   --  9 3*  --   --   --   --   --   --  9 5*  --  9 5*  --   --   --   --  9 9*  --  11 1* 11 7*  --   --  11 9*   < >  --   --   --   --   --   --   --    I STAT HEMOGLOBIN g/dl  --   --   --   --   --   --   --   --   --   --   --   --   --   --   --   --   --   --   --   --   --   --   --  10 9*  --   --  9 2* 9 9* 9 9*  --  10 2* 10 2* 8 2*   HEMATOCRIT % 27 3*  --   --   --  27 2*  --   --   --   --   --   --  27 4*  --   --   --   --   --   --  29 4*  --  33 2* 34 6*  --   --  35 0*  --   --   --   --   --   --   --   --    HEMATOCRIT, ISTAT %  --   --   --   --   --   --   --   --   --   --   --   --   --   --   --   --   --   --   --   --   --   --   --  32*  --   --  27* 29* 29*  --  30* 30* 24*   PLATELETS Thousands/uL 128*  --   --   --  101*  --   --   --   --   --   --  85*  --   --   --   --   --   --  73*  --  131*  --   --   --  144*  --   --   --   --  158  --   --   --    POTASSIUM mmol/L 4 2 3 7 3 7 3 9  --  3 9 4 2 3 9   < > 4 5  --   --    < >  --  4 5   < > 5 1   < >  --  5 0 5 1 4 4   < >  --  4 3  --   --   --   --   --   --   --   --    CHLORIDE mmol/L 98*  --   --   --   --  100  --  101  --  103  --   --   --   --  105  --  107  --   --  109* 111*  --   --   --  112*   < >  --   --   --   --   --   --   --    CO2 mmol/L 22  --   --   --   --  23  --  22  --  23  --   --   --   --  18*  --  21  --   --  20* 22  --   --   --  23   < >  --   --   --   --   --   --   --    CO2, I-STAT mmol/L  --   --   --   --   --   --   --   --   --   --   --   --   --   --   --   --   --   --   --   --   --   --   --  24  --   --  28 27 31  --  24 33* 27   BUN mg/dL 76*  --   --   --   --  68*  --  62*  --  57*  --   --   --   --  49*  --  45*  --   -- 39* 34*  --   --   --  25   < >  --   --   --   --   --   --   --    CREATININE mg/dL 4 61*  --   --   --   --  4 63*  --  4 49*  --  4 25*  --   --   --   --  3 89*  --  3 48*  --   --  3 02* 2 54*  --   --   --  1 94*   < >  --   --   --   --   --   --   --    CALCIUM mg/dL 8 0*  --   --   --   --  8 1*  --  7 9*  --  8 2*  --   --   --   --  8 2*  --  8 3  --   --  8 1* 8 2*  --   --   --  8 0*   < >  --   --   --   --   --   --   --    ALK PHOS U/L  --   --   --   --   --   --   --   --   --  53  --   --   --   --   --   --   --   --   --   --   --   --   --   --   --   --   --   --   --   --   --   --   --    ALT U/L  --   --   --   --   --   --   --   --   --  <6*  --   --   --   --   --   --   --   --   --   --   --   --   --   --   --   --   --   --   --   --   --   --   --    AST U/L  --   --   --   --   --   --   --   --   --  52*  --   --   --   --   --   --   --   --   --   --   --   --   --   --   --   --   --   --   --   --   --   --   --    GLUCOSE, ISTAT mg/dl  --   --   --   --   --   --   --   --   --   --   --   --   --   --   --   --   --   --   --   --   --   --   --  110  --   --  98 111 154*  --  185* 171* 140   MAGNESIUM mg/dL 2 3  --   --   --   --  2 5  --   --   --   --  2 4  --   --   --   --   --  2 8*  --   --   --  3 1*  --   --   --   --   --   --   --   --   --   --   --   --    PHOSPHORUS mg/dL  --   --   --   --   --  6 3*  --   --   --   --  5 0*  --   --   --   --   --  5 4*  --   --   --   --   --   --   --   --   --   --   --   --   --   --   --   --     < > = values in this interval not displayed  CUTURES:  No results found for: Rush Better              Portions of the record may have been created with voice recognition software  Occasional wrong word or "sound a like" substitutions may have occurred due to the inherent limitations of voice recognition software  Read the chart carefully and recognize, using context, where substitutions have occurred  If you have any questions, please contact the dictating provider

## 2019-03-12 NOTE — PLAN OF CARE
Problem: PHYSICAL THERAPY ADULT  Goal: Performs mobility at highest level of function for planned discharge setting  See evaluation for individualized goals  Description  Treatment/Interventions: Functional transfer training, LE strengthening/ROM, Elevations, Therapeutic exercise, Endurance training, Patient/family training, Gait training, Spoke to nursing, Family  Equipment Recommended: Walker(RW)       See flowsheet documentation for full assessment, interventions and recommendations  Outcome: Progressing  Note:   Prognosis: Good  Problem List: Decreased strength, Decreased mobility, Decreased endurance, Impaired balance  Assessment: Pt demonstrated overall improvement in functional mobility skills progressing w/ amb distances w/ use of rw and chair follow; min (A)x1 was required w/ all aspects of observed mobility to assure safety; pt remained in NAD w/ rest periods provided t/o the session; currently, cont to anticipate pt will return home w/ family support upon D/C pending progress; home PT follow up is recommended; rw for amb at this time; will follow  Barriers to Discharge: Other (Comment)(1 MICHELLE)     Recommendation: Home PT, Home with family support     PT - OK to Discharge: No(increase ambulation distance and trial stairs)    See flowsheet documentation for full assessment

## 2019-03-12 NOTE — OCCUPATIONAL THERAPY NOTE
OccupationalTherapy Evaluation     Patient Name: Cheli Erwni  KIBPLMISAEL Date: 3/12/2019  Problem List  Patient Active Problem List   Diagnosis    CKD (chronic kidney disease) stage 3, GFR 30-59 ml/min ([facility])    Persistent proteinuria    Diabetic nephropathy associated with type 2 diabetes mellitus [facility] )    Vitamin D deficiency    [facility]    FSGS (focal segmental glomerulosclerosis)    Essential hypertension    Chronic systolic congestive heart failure ([facility]    Nonrheumatic aortic valve insufficiency    Non-rheumatic mitral regurgitation    Bicuspid aortic valve    Coronary artery disease involving native coronary artery of native heart without angina pectoris    S/P AVR    S/P MVR (mitral valve repair)    Dilated cardiomyopathy ([place] [address] )    Diabetes mellitus type 2 in nonobese ([place] [address] )    CARY (acute kidney injury) ([place] [address] )     Past Medical History  Past Medical History:   Diagnosis Date    Diabetes mellitus ([place] [address] )     Hyperlipidemia     Hypertension     Proteinuria     Stage 3 chronic kidney disease ([place] [address] )     Vitamin D deficiency      Past Surgical History  Past Surgical History:   Procedure Laterality Date    COLONOSCOPY      MS ECHO TRANSESOPHAG R-T 2D W/PRB IMG ACQUISJ I&R N/A 3/7/2019    Procedure: INTRA-OP TRANSESOPHAGEAL ECHOCARDIOGRAM (PATIENCE);   Surgeon: Deepika Gutierrez DO;  [facility];  Service: Cardiac Surgery    MS PERQ CLSR TCAT L ATR APNDGE W/ENDOCARDIAL IMPLNT  3/7/2019    Procedure: LEFT ATRIAL APPENDAGE OCCLUSION CLIPPED with 35mm Bob Pollen;  Surgeon: Deepika Gutierrez DO;  [facility];  Service: Cardiac Surgery    MS REPLACEMENT OF MITRAL VALVE N/A 3/7/2019    Procedure: REPLACEMENT VALVE MITRAL (MVR) REPAIR with a 30mm MEDTRONIC CG FUTURE RING;  Surgeon: Deepika Gutierrez DO;  [facility];  Service: Cardiac Surgery    MS RPLCMT AORTIC VALVE OPN W/STENTLESS TISSUE VALVE N/A 3/7/2019    Procedure: REPLACEMENT VALVE AORTIC (AVR) with 23mm TAVERA INSPIRIS RESILIA  AORTIC VALVE;  Surgeon: Vipin Chi DO;  [facility];  Service: Cardiac Surgery    TOE SURGERY Left          03/12/19 0958   Note Type   Note type Eval only   Restrictions/Precautions   Weight Bearing Precautions Per Order No   Other Precautions Cardiac/sternal;Multiple lines;Telemetry;Pain   Pain Assessment   Pain Assessment No/denies pain   Pain Score No Pain   Home Living   Type of [address] Two level;Stairs to enter with rails  (MICHELLE)   Bathroom Shower/Tub Tub/shower unit   Bathroom Toilet Standard   Bathroom Equipment Shower chair   Bathroom Accessibility Accessible via walker   Home Equipment Walker;Cane   Prior Function   Level of [place] Independent with ADLs and functional mobility   Lives With Andre-Verma Help From Family   ADL Assistance Independent   IADLs Independent   Falls in the last 6 months 0   Vocational Part time employment   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   Lifestyle   Autonomy I ADL's/IADL's, no AD with functional ambulation, +part time employement   Reciprocal Relationships family   Service to Others retired   Intrinsic Gratification spend time with 3year old granddaughter   Psychosocial   Psychosocial (WDL) WDL   Subjective   Subjective pleasant and cooperative   ADL   Where Assessed Chair   Eating Assistance 7  Independent   Grooming Assistance 4  Minimal Assistance   [address] 3  Moderate Assistance   LB [address] 2  Maximal [address] 3  Moderate Assistance    [address] 2  Maximal Assistance   LB Dressing Deficit Don/doff R sock; Don/doff L sock   Toileting Assistance  1  Total Assistance   Bed Mobility   Supine to Sit Unable to assess   Sit to Supine Unable to assess   Additional Comments pt in chair when received   Transfers   Sit to Stand 4  Minimal assistance   Additional items Assist x 1;Verbal cues   Stand to Sit 4  Minimal assistance   Additional items Assist x 1;Verbal cues   Toilet transfer 4  Minimal assistance   Additional items Commode;Assist x 1; Increased time required  (simulated commode )   Functional Mobility   Functional Mobility 4  Minimal assistance   Additional Comments min a with RW and SBA of another for line management   Additional items Rolling walker   Balance   Static Sitting Fair +   Dynamic Sitting Fair   Static Standing Fair -   Dynamic Standing Poor +   Activity Tolerance   Activity Tolerance Patient tolerated treatment well;Patient limited by fatigue   Medical Staff Made Aware RN present during evaluation   Nurse Made Aware RN cleared for therapy   RUE Assessment   RUE Assessment WFL  (within cardiac precautions)   LUE Assessment   LUE Assessment WFL  (with in cardiac precautions)   Hand Function   Gross Motor Coordination Functional   Fine Motor Coordination Functional   Cognition   Arousal/Participation Alert; Cooperative   Attention Attends with cues to redirect   Orientation Level Oriented X4   Memory Decreased recall of precautions   Following Commands Follows multistep commands with increased time or repetition   Comments verbal cues for new learning with cardiac precautions due to fatigue limiting attention/carryover  Assessment   Limitation Decreased ADL status; Decreased endurance;Decreased self-care trans;Decreased high-level ADLs   Prognosis Good   Assessment Pt is a 80 y o  male who was admitted to [facility] on 3/7/2019 with Bicuspid aortic valve s/p aortic valve repair performed 3/7/19  Pt's problem list also includes PMH of diabetes, hyperlipidemia, HTN, proteinuria, CKD stage 3, vitamin D deficiency, toe surgery  At baseline pt was completing ADL's/IADL's with I, no AD with functional ambulation   Pt lives with spouse in a 1 SH with 1STE, +cane, RW   Currently pt requires max a  for overall ADLS and min a with RW and SBA of another (s) with line managment for functional mobility/transfers  Pt currently presents with impairments in the following categories -steps to enter environment, difficulty performing ADLS and difficulty performing IADLS  activity tolerance, endurance and standing balance/tolerance  These impairments, as well as pt's fatigue, pain and cardiac/sternal precautions  limit pt's ability to safely engage in all baseline areas of occupation, includingbathing, dressing, toileting, functional mobility/transfers, community mobility, laundry , driving, house maintenance, meal prep and cleaning From OT standpoint, recommend home OT and family support upon D/C  OT will continue to follow to address the below stated goals  Goals   Patient Goals none stated   LTG Time Frame 10-14   Long Term Goal #1 see goals below   Plan   Treatment Interventions ADL retraining;Functional transfer training; Compensatory technique education;Cardiac education; Energy conservation; Activityengagement;Equipment evaluation/education;Patient/family training; Endurance training   Goal Expiration Date 03/26/19   Recommendation   OT Discharge Recommendation Home OT   Equipment Recommended Bedside commode   OT - OK to Discharge No   Barthel Index   Feeding 10   Bathing 0   Grooming Score 0   Dressing Score 0   Bladder Score 10   Bowels Score 10   Toilet Use Score 5   Transfers (Bed/Chair) Score 10   Mobility (Level Surface) Score 10   Stairs Score 0   Barthel Index Score 55   Modified Yen Scale   Modified Yen Scale 4     1) Pt will improve activity tolerance to G for min 30 min txment sessions to increase participation in ADLs  2) Pt will complete ADLs/self care w/ mod I using adaptive device and DME as needed  3) Pt will complete toileting w/ mod I w/ G hygiene/thoroughness using DME as needed  4) Pt will improve functional transfers on/off all surfaces using DME as needed w/ G balance/safety including w/ mod I  5) Pt will improve functional mobility during ADL/IADL/leisure tasks using DME as needed w/ G balance/safety w/ mod I  6) Pt will engage in ongoing cognitive assessment w/ G participation w/ mod I to assist w/ safe d/c planning/recommendations  7) Pt will demonstrate G carryover of pt/caregiver education and training as appropriate w/ mod I w/o cues w/ good tolerance  8) Pt will demonstrate 100% carryover of energy conservation techniques w/ mod I t/o functional I/ADL/leisure tasks w/o cues s/p skilled education  9) Pt will engage in cardiac education using the Recovering After Cardiac Rehabilitation packet w/ G participation and G carryover    10) Pt will demonstrate 100% carryover of precautions s/p review w/o cues w/ mod I w/ G tolerance/participation t/o functional ADL/IADL/leisure tasks      Tony Gleason, OT

## 2019-03-12 NOTE — PROGRESS NOTES
Progress Note - Rahel Rowley 80 y o  male MRN: [id number]    Unit/Bed#: [facility] 424-01 Encounter: [id number]      CC: diabetes f/u    Subjective:   Rahel Rowley is a 80y o  year old male with type 2  diabetes  Feels well  No complaints  No hypoglycemia  Appetite improving    Objective:     Vitals: Blood pressure 109/58, pulse 78, temperature 97 5 °F (36 4 °C), temperature source Oral, resp  rate 18, height 5' 6" (1 676 m), weight 71 7 kg (158 lb 1 1 oz), SpO2 98 %  ,Body mass index is 25 51 kg/m²  Intake/Output Summary (Last 24 hours) at 3/12/2019 1402  Last data filed at 3/12/2019 1301  Gross per 24 hour   Intake 1250 66 ml   Output 3535 ml   Net -2284 34 ml       Physical Exam:  General Appearance: awake, appears stated age and cooperative  Head: Normocephalic, without obvious abnormality, atraumatic  Extremities: moves all extremities  Skin: Skin color and temperature normal    Pulm: no labored breathing    Lab, Imaging and other studies: I have personally reviewed pertinent reports  POC Glucose (mg/dl)   Date Value   03/12/2019 241 (H)   03/12/2019 110   03/11/2019 170 (H)   03/11/2019 123   03/11/2019 70   03/11/2019 101   03/11/2019 163 (H)   03/11/2019 80   03/10/2019 112   03/10/2019 118       Assessment:  Type 2 diabetes with hyperglycemia  Acute kidney injury/chronic kidney disease  Bicuspid aortic valve-status post AVR  Status post MVR      Plan:  Sugars have been fairly stable with occasional excursions-he will need adjustment in his insulin regimen as his appetite improves  Discussed with the patient her that given acute kidney injury/chronic kidney disease insulin therapy is the safest choice     Continue  Insulin at current dose for now      Acute kidney injury/chronic kidney disease-nephrology on board        Portions of the record may have been created with voice recognition software

## 2019-03-12 NOTE — PROGRESS NOTES
Cardiology Progress Note - Lidia Goodwin 80 y o  male MRN: [id number]    Unit/Bed#: [facility] 416-01 Encounter: [id number]        Subjective:   Patient with intermittent atrial flutter  Review of Systems   Cardiovascular: Negative for chest pain, leg swelling and palpitations  Respiratory: Negative for shortness of breath  Objective:   Vitals: Blood pressure 116/58, pulse 99, temperature 98 4 °F (36 9 °C), temperature source Oral, resp  rate 17, height 5' 6" (1 676 m), weight 71 7 kg (158 lb 1 1 oz), SpO2 97 %  , Body mass index is 25 51 kg/m² ,   Orthostatic Blood Pressures      Most Recent Value   Blood Pressure  116/58 filed at 03/12/2019 0755         Systolic (19UUS), NPV:357 , Min:73 , LOQ:704     Diastolic (23QGY), WAK:68, Min:51, Max:65      Intake/Output Summary (Last 24 hours) at 3/12/2019 1001  Last data filed at 3/12/2019 0900  Gross per 24 hour   Intake 1361 01 ml   Output 3695 ml   Net -2333 99 ml     Weight (last 2 days)     Date/Time   Weight    03/12/19 0550   71 7 (158 07)    03/11/19 0600   71 2 (156 97)    03/10/19 0600   87 3 (192 46)                  Telemetry Review: Aflutter    Physical Exam   Cardiovascular: Normal rate and normal heart sounds  An irregularly irregular rhythm present  Exam reveals no gallop and no friction rub  No murmur heard  Pulmonary/Chest: Breath sounds normal  He has no wheezes  He has no rales  Musculoskeletal: He exhibits no edema           Laboratory Results:        CBC with diff:   Results from last 7 days   Lab Units 03/12/19  0448 03/11/19  0530 03/10/19  0451 03/09/19  1556 03/09/19 03/08/19  0346 03/07/19  2037 03/07/19  1213 03/07/19  1204  03/07/19  0946   WBC Thousand/uL 12 02* 11 15* 12 74*  --  16 89* 18 50*  --   --   --   --   --    HEMOGLOBIN g/dL 9 7* 9 3* 9 5* 9 5* 9 9* 11 1* 11 7*  --  11 9*   < >  --    I STAT HEMOGLOBIN g/dl  --   --   --   --   --   --   --  10 9*  --    < >  --    HEMATOCRIT % 27 3* 27 2* 27 4*  --  29 4* 33 2* 34 6* --  35 0*  --   --    HEMATOCRIT, ISTAT %  --   --   --   --   --   --   --  32*  --    < >  --    MCV fL 88 90 90  --  92 92  --   --   --   --   --    PLATELETS Thousands/uL 128* 101* 85*  --  73* 131*  --   --  144*  --  158   MCH pg 31 2 30 7 31 4  --  31 0 30 7  --   --   --   --   --    MCHC g/dL 35 5 34 2 34 7  --  33 7 33 4  --   --   --   --   --    RDW % 13 5 13 3 13 7  --  14 2 13 9  --   --   --   --   --    MPV fL 10 3 10 8 10 8  --  10 9 10 3  --   --  10 0  --  10 6    < > = values in this interval not displayed           CMP:  Results from last 7 days   Lab Units 03/12/19  0448 03/12/19  0000 03/11/19  1650 03/11/19  1301 03/11/19  0453 03/11/19  0012 03/10/19  1836  03/10/19  0512  03/09/19  1354  03/09/19  0458  03/08/19  1616  03/07/19  1213  03/07/19  1050 03/07/19  1023 03/07/19  1004 03/07/19  0945 03/07/19  0937 03/07/19  0923   POTASSIUM mmol/L 4 2 3 7 3 7 3 9 3 9 4 2 3 9   < > 4 5   < > 4 5   < > 5 1   < > 5 0   < >  --    < >  --   --   --   --   --   --    CHLORIDE mmol/L 98*  --   --   --  100  --  101  --  103  --  105  --  107  --  109*   < >  --    < >  --   --   --   --   --   --    CO2 mmol/L 22  --   --   --  23  --  22  --  23  --  18*  --  21  --  20*   < >  --    < >  --   --   --   --   --   --    CO2, I-STAT mmol/L  --   --   --   --   --   --   --   --   --   --   --   --   --   --   --   --  24  --  28 27 31 24 33* 27   BUN mg/dL 76*  --   --   --  68*  --  62*  --  57*  --  49*  --  45*  --  39*   < >  --    < >  --   --   --   --   --   --    CREATININE mg/dL 4 61*  --   --   --  4 63*  --  4 49*  --  4 25*  --  3 89*  --  3 48*  --  3 02*   < >  --    < >  --   --   --   --   --   --    GLUCOSE, ISTAT mg/dl  --   --   --   --   --   --   --   --   --   --   --   --   --   --   --   --  110  --  98 111 154* 185* 171* 140   CALCIUM mg/dL 8 0*  --   --   --  8 1*  --  7 9*  --  8 2*  --  8 2*  --  8 3  --  8 1*   < >  --    < >  --   --   --   --   --   --    AST U/L  --   -- --   --   --   --   --   --  52*  --   --   --   --   --   --   --   --   --   --   --   --   --   --   --    ALT U/L  --   --   --   --   --   --   --   --  <6*  --   --   --   --   --   --   --   --   --   --   --   --   --   --   --    ALK PHOS U/L  --   --   --   --   --   --   --   --  53  --   --   --   --   --   --   --   --   --   --   --   --   --   --   --    EGFR ml/min/1 73sq m 14  --   --   --  14  --  15  --  16  --  17  --  20  --  24   < >  --    < >  --   --   --   --   --   --     < > = values in this interval not displayed  BMP:  Results from last 7 days   Lab Units 03/12/19  0448 03/12/19  0000 03/11/19  1650 03/11/19  1301 03/11/19  0453 03/11/19  0012 03/10/19  1836  03/10/19  0512  03/09/19  1354  03/09/19  0458  03/08/19  1616  03/07/19  1213   POTASSIUM mmol/L 4 2 3 7 3 7 3 9 3 9 4 2 3 9   < > 4 5   < > 4 5   < > 5 1   < > 5 0   < >  --    CHLORIDE mmol/L 98*  --   --   --  100  --  101  --  103  --  105  --  107  --  109*   < >  --    CO2 mmol/L 22  --   --   --  23  --  22  --  23  --  18*  --  21  --  20*   < >  --    CO2, I-STAT mmol/L  --   --   --   --   --   --   --   --   --   --   --   --   --   --   --   --  24   BUN mg/dL 76*  --   --   --  68*  --  62*  --  57*  --  49*  --  45*  --  39*   < >  --    CREATININE mg/dL 4 61*  --   --   --  4 63*  --  4 49*  --  4 25*  --  3 89*  --  3 48*  --  3 02*   < >  --    GLUCOSE, ISTAT mg/dl  --   --   --   --   --   --   --   --   --   --   --   --   --   --   --   --  110   CALCIUM mg/dL 8 0*  --   --   --  8 1*  --  7 9*  --  8 2*  --  8 2*  --  8 3  --  8 1*   < >  --     < > = values in this interval not displayed  BNP:   Results Reviewed     None        No results for input(s): BNP in the last 72 hours      Magnesium:   Results from last 7 days   Lab Units 03/12/19  0448 03/11/19  0453 03/10/19  0504 03/09/19  0458 03/08/19  0346   MAGNESIUM mg/dL 2 3 2 5 2 4 2 8* 3 1*       Coags:   Results from last 7 days   Lab Units 19  0448 19  0525 03/10/19  0452 19  0604 19  1050   INR  2 84* 2 30* 1 88* 1 47* 1 29*       TSH:       Lipid Profile:   Results from last 7 days   Lab Units 19  0453   TRIGLYCERIDES mg/dL 63   HDL mg/dL 40           Cardiac testing:   Results for orders placed during the hospital encounter of 19   Echo complete with contrast if indicated    Narrative [facility], [address] [facility]  [phone]    Transthoracic Echocardiogram  2D, M-mode, Doppler, and Color Doppler    Study date:  2019    Patient: Nu Bartholomew  MR number: [id number]  Account number: [de-identified]  : 1950  Age: 80 years  Gender: Male  Status: Inpatient  Location: Bedside  Height: 68 in  Weight: 163 lb  BP: 155/ 70 mmHg    Indications: Heart Failure    Diagnoses: I50 9 - Heart failure, unspecified    Sonographer:  TIMMY Bonilla  Primary Physician:  Saied Persaud  Referring Physician:  Angélica Mckeon MD  Group:  [facility]  Interpreting Physician:  Angélica Mckeon MD    SUMMARY    LEFT VENTRICLE:  The ventricle was mildly dilated  Systolic function was mildly to moderately reduced  Ejection fraction was estimated to be 40 %  There was mild diffuse hypokinesis  Wall thickness was mildly increased  RIGHT VENTRICLE:  The ventricle was mildly dilated  Systolic function was normal     LEFT ATRIUM:  The atrium was mildly dilated  MITRAL VALVE:  There was moderate to severe regurgitation  AORTIC VALVE:  The valve was possibly bicuspid  Leaflets exhibited normal thickness, normal cuspal separation, and significant diastolic prolapse  There was moderate to severe regurgitation  TRICUSPID VALVE:  There was mild regurgitation  Pulmonary artery systolic pressure was moderately to markedly increased  The findings suggest moderate to severe pulmonary hypertension  AORTA:  The root exhibited mild dilatation      IVC, HEPATIC VEINS:  The inferior vena cava was mildly dilated  Respirophasic changes were blunted (less than 50% variation)  PERICARDIUM:  There was a left pleural effusion  HISTORY: PRIOR HISTORY: Murmur, HTN, DM2, HLD, CKD3    PROCEDURE: The procedure was performed at the bedside  This was a routine study  The transthoracic approach was used  The study included complete 2D imaging, M-mode, complete spectral Doppler, and color Doppler  The heart rate was 95 bpm,  at the start of the study  Images were obtained from the parasternal, apical, subcostal, and suprasternal notch acoustic windows  Image quality was adequate  LEFT VENTRICLE: The ventricle was mildly dilated  Systolic function was mildly to moderately reduced  Ejection fraction was estimated to be 40 %  There was mild diffuse hypokinesis  Wall thickness was mildly increased  RIGHT VENTRICLE: The ventricle was mildly dilated  Systolic function was normal  Wall thickness was normal     LEFT ATRIUM: The atrium was mildly dilated  RIGHT ATRIUM: Size was normal     MITRAL VALVE: Valve structure was normal  There was normal leaflet separation  DOPPLER: The transmitral velocity was within the normal range  There was no evidence for stenosis  There was moderate to severe regurgitation  AORTIC VALVE: The valve was possibly bicuspid  Leaflets exhibited normal thickness, normal cuspal separation, and significant diastolic prolapse  DOPPLER: Transaortic velocity was within the normal range  There was no evidence for  stenosis  There was moderate to severe regurgitation  The regurgitant jet was eccentric  TRICUSPID VALVE: The valve structure was normal  There was normal leaflet separation  DOPPLER: The transtricuspid velocity was within the normal range  There was no evidence for stenosis  There was mild regurgitation  Pulmonary artery  systolic pressure was moderately to markedly increased  Estimated peak PA pressure was 60 mmHg   The findings suggest moderate to severe pulmonary hypertension  PULMONIC VALVE: Leaflets exhibited normal thickness, no calcification, and normal cuspal separation  DOPPLER: The transpulmonic velocity was within the normal range  There was no regurgitation  PERICARDIUM: There was no pericardial effusion  There was a left pleural effusion  The pericardium was normal in appearance  AORTA: The root exhibited mild dilatation  SYSTEMIC VEINS: IVC: The inferior vena cava was mildly dilated  Respirophasic changes were blunted (less than 50% variation)      MEASUREMENT TABLES    2D MEASUREMENTS  Aorta   (Reference normals)  Root diam   39 mm   (--)    SYSTEM MEASUREMENT TABLES    2D  %FS: 23 42 %  Ao Diam: 3 91 cm  EDV(Teich): 179 53 ml  EF Biplane: 40 37 %  EF(Teich): 46 07 %  ESV(Teich): 96 82 ml  IVSd: 1 02 cm  LA Area: 12 24 cm2  LA Diam: 4 3 cm  LVEDV MOD A2C: 129 37 ml  LVEDV MOD A4C: 151 37 ml  LVEDV MOD BP: 140 9 ml  LVEF MOD A2C: 42 79 %  LVEF MOD A4C: 40 91 %  LVESV MOD A2C: 74 02 ml  LVESV MOD A4C: 89 45 ml  LVESV MOD BP: 84 02 ml  LVIDd: 5 99 cm  LVIDs: 4 59 cm  LVLd A2C: 8 45 cm  LVLd A4C: 8 58 cm  LVLs A2C: 7 63 cm  LVLs A4C: 8 25 cm  LVPWd: 1 21 cm  RA Area: 15 27 cm2  RVIDd: 4 39 cm  SV MOD A2C: 55 35 ml  SV MOD A4C: 61 92 ml  SV(Teich): 82 71 ml    CW  TR MaxP 66 mmHg  TR Vmax: 3 52 m/s    MM  TAPSE: 1 8 cm    [facility]    Prepared and electronically signed by    Nayeli Valdez MD  Signed 2019 16:15:06           Meds/Allergies     Current Facility-Administered Medications:  acetaminophen 650 mg Oral Q4H PRN Jose Nagy PA-C    amiodarone 0 5 mg/min Intravenous Continuous Jp Martínez PA-C Last Rate: 0 5 mg/min (19 0757)   amiodarone 200 mg Oral Q8H [address] Jose Nagy PA-C    aspirin 81 mg Oral Daily Ashanti Noraas, PA-C    atorvastatin 20 mg Oral Daily With Group 1 Claribel Fuchs PA-C    bisacodyl 10 mg Rectal Daily PRN Jose Nagy PA-C docusate sodium 100 mg Oral BID Gustavo Rivera PA-C    furosemide 40 mg Intravenous TID (diuretic) Lena Castellanos PA-C    insulin glargine 20 Units Subcutaneous HS Arya Camacho MD    insulin lispro 1-5 Units Subcutaneous TID AC Arya Camacho MD    insulin lispro 1-5 Units Subcutaneous HS Arya Camacho MD    insulin lispro 5 Units Subcutaneous TID With Meals Arya Camacho MD    metoprolol tartrate 50 mg Oral Q12H [facility] Lena Castellanos PA-C    mupirocin 1 application Nasal T05Q [facility] Ashanti Arrieta PA-C    ondansetron 4 mg Intravenous Q6H PRN Humberto Taylor PA-C    oxyCODONE-acetaminophen 1 tablet Oral Q4H PRN Marco Sharma DO    oxyCODONE-acetaminophen 2 tablet Oral Q6H PRN Humberto Taylor PA-C    pantoprazole 40 mg Oral Daily Before Breakfast Humberto Taylor PA-C    polyethylene glycol 17 g Oral Daily Humberto Taylor PA-C    temazepam 15 mg Oral HS PRN Gustavo Rivera PA-C    travoprost 1 drop Both Eyes HS Humberto Taylor PA-C    warfarin 2 5 mg Oral Once (warfarin) Vielka Alexander PA-C        amiodarone 0 5 mg/min Last Rate: 0 5 mg/min (19 0757)     Medications Prior to Admission   Medication    ascorbic acid (VITAMIN C) 500 mg tablet    atorvastatin (LIPITOR) 20 mg tablet    calcitriol (ROCALTROL) 0 25 mcg capsule    Cholecalciferol (VITAMIN D3) 1000 units CAPS    furosemide (LASIX) 20 mg tablet    furosemide (LASIX) 40 mg tablet    metFORMIN (GLUCOPHAGE) 500 mg tablet    metoprolol succinate (TOPROL-XL) 50 mg 24 hr tablet    Multiple Vitamin (MULTIVITAMIN) tablet    mupirocin (BACTROBAN) 2 % ointment    olmesartan (BENICAR) 20 mg tablet    TRAVATAN Z 0 004 % ophthalmic solution       Assessment:  Principal Problem:    Bicuspid aortic valve  Active Problems:    CKD (chronic kidney disease) stage 3, GFR 30-59 ml/min (HCC)    Diabetic nephropathy associated with type 2 diabetes mellitus (HCC)    Secondary hyperparathyroidism (HCC)    Essential hypertension    Chronic systolic congestive heart failure (HCC)    Nonrheumatic aortic valve insufficiency    Non-rheumatic mitral regurgitation    Coronary artery disease involving native coronary artery of native heart without angina pectoris    S/P AVR    S/P MVR (mitral valve repair)    Dilated cardiomyopathy ([facility])    Diabetes mellitus type 2 in nonobese ([facility])    CARY (acute kidney injury) ([facility] [address] )      Impression:  1  S/P AVR/MV repair - hemodynamically stable  Off milrinone  2  CARY - on IV furosemide  Renal function stable  3  Paroxysmal atrial flutter - on amio gtt  Rate controlled  Plan:  1  Continue IV amiodarone for now  2  Continue remainder of medications  3  Follow renal function

## 2019-03-13 LAB
ANION GAP SERPL CALCULATED.3IONS-SCNC: 11 MMOL/L (ref 4–13)
BUN SERPL-MCNC: 93 MG/DL (ref 5–25)
CALCIUM SERPL-MCNC: 8.5 MG/DL (ref 8.3–10.1)
CHLORIDE SERPL-SCNC: 94 MMOL/L (ref 100–108)
CO2 SERPL-SCNC: 27 MMOL/L (ref 21–32)
CREAT SERPL-MCNC: 4.63 MG/DL (ref 0.6–1.3)
ERYTHROCYTE [DISTWIDTH] IN BLOOD BY AUTOMATED COUNT: 13.3 % (ref 11.6–15.1)
GFR SERPL CREATININE-BSD FRML MDRD: 14 ML/MIN/1.73SQ M
GLUCOSE SERPL-MCNC: 118 MG/DL (ref 65–140)
GLUCOSE SERPL-MCNC: 123 MG/DL (ref 65–140)
GLUCOSE SERPL-MCNC: 222 MG/DL (ref 65–140)
GLUCOSE SERPL-MCNC: 81 MG/DL (ref 65–140)
GLUCOSE SERPL-MCNC: 88 MG/DL (ref 65–140)
HCT VFR BLD AUTO: 30 % (ref 36.5–49.3)
HGB BLD-MCNC: 10.4 G/DL (ref 12–17)
INR PPP: 2.99 (ref 0.86–1.17)
MCH RBC QN AUTO: 30.6 PG (ref 26.8–34.3)
MCHC RBC AUTO-ENTMCNC: 34.7 G/DL (ref 31.4–37.4)
MCV RBC AUTO: 88 FL (ref 82–98)
PLATELET # BLD AUTO: 165 THOUSANDS/UL (ref 149–390)
PMV BLD AUTO: 9.8 FL (ref 8.9–12.7)
POTASSIUM SERPL-SCNC: 3.4 MMOL/L (ref 3.5–5.3)
PROTHROMBIN TIME: 31.1 SECONDS (ref 11.8–14.2)
RBC # BLD AUTO: 3.4 MILLION/UL (ref 3.88–5.62)
SODIUM SERPL-SCNC: 132 MMOL/L (ref 136–145)
WBC # BLD AUTO: 12.6 THOUSAND/UL (ref 4.31–10.16)

## 2019-03-13 PROCEDURE — 80048 BASIC METABOLIC PNL TOTAL CA: CPT | Performed by: PHYSICIAN ASSISTANT

## 2019-03-13 PROCEDURE — 85610 PROTHROMBIN TIME: CPT | Performed by: PHYSICIAN ASSISTANT

## 2019-03-13 PROCEDURE — 85027 COMPLETE CBC AUTOMATED: CPT | Performed by: PHYSICIAN ASSISTANT

## 2019-03-13 PROCEDURE — 99024 POSTOP FOLLOW-UP VISIT: CPT | Performed by: PHYSICIAN ASSISTANT

## 2019-03-13 PROCEDURE — 82948 REAGENT STRIP/BLOOD GLUCOSE: CPT

## 2019-03-13 PROCEDURE — 99232 SBSQ HOSP IP/OBS MODERATE 35: CPT | Performed by: INTERNAL MEDICINE

## 2019-03-13 PROCEDURE — 97116 GAIT TRAINING THERAPY: CPT

## 2019-03-13 PROCEDURE — 99233 SBSQ HOSP IP/OBS HIGH 50: CPT | Performed by: INTERNAL MEDICINE

## 2019-03-13 PROCEDURE — 97530 THERAPEUTIC ACTIVITIES: CPT

## 2019-03-13 RX ORDER — WARFARIN SODIUM 1 MG/1
2 TABLET ORAL
Status: COMPLETED | OUTPATIENT
Start: 2019-03-13 | End: 2019-03-13

## 2019-03-13 RX ORDER — POTASSIUM CHLORIDE 20 MEQ/1
20 TABLET, EXTENDED RELEASE ORAL 2 TIMES DAILY
Status: DISCONTINUED | OUTPATIENT
Start: 2019-03-13 | End: 2019-03-13

## 2019-03-13 RX ORDER — POTASSIUM CHLORIDE 20 MEQ/1
20 TABLET, EXTENDED RELEASE ORAL DAILY
Status: DISCONTINUED | OUTPATIENT
Start: 2019-03-13 | End: 2019-03-14

## 2019-03-13 RX ORDER — INSULIN GLARGINE 100 [IU]/ML
16 INJECTION, SOLUTION SUBCUTANEOUS
Status: DISCONTINUED | OUTPATIENT
Start: 2019-03-13 | End: 2019-03-15 | Stop reason: HOSPADM

## 2019-03-13 RX ADMIN — INSULIN LISPRO 4 UNITS: 100 INJECTION, SOLUTION INTRAVENOUS; SUBCUTANEOUS at 17:32

## 2019-03-13 RX ADMIN — TRAVOPROST 1 DROP: 0.04 SOLUTION/ DROPS OPHTHALMIC at 21:33

## 2019-03-13 RX ADMIN — POTASSIUM CHLORIDE 20 MEQ: 1500 TABLET, EXTENDED RELEASE ORAL at 09:57

## 2019-03-13 RX ADMIN — ATORVASTATIN CALCIUM 20 MG: 20 TABLET, FILM COATED ORAL at 15:48

## 2019-03-13 RX ADMIN — INSULIN GLARGINE 16 UNITS: 100 INJECTION, SOLUTION SUBCUTANEOUS at 21:32

## 2019-03-13 RX ADMIN — INSULIN LISPRO 2 UNITS: 100 INJECTION, SOLUTION INTRAVENOUS; SUBCUTANEOUS at 12:28

## 2019-03-13 RX ADMIN — MUPIROCIN 1 APPLICATION: 20 OINTMENT TOPICAL at 21:32

## 2019-03-13 RX ADMIN — WARFARIN SODIUM 2 MG: 1 TABLET ORAL at 17:25

## 2019-03-13 RX ADMIN — FUROSEMIDE 40 MG: 10 INJECTION, SOLUTION INTRAMUSCULAR; INTRAVENOUS at 06:17

## 2019-03-13 RX ADMIN — AMIODARONE HYDROCHLORIDE 200 MG: 200 TABLET ORAL at 21:32

## 2019-03-13 RX ADMIN — INSULIN LISPRO 4 UNITS: 100 INJECTION, SOLUTION INTRAVENOUS; SUBCUTANEOUS at 09:57

## 2019-03-13 RX ADMIN — METOPROLOL TARTRATE 75 MG: 50 TABLET, FILM COATED ORAL at 21:32

## 2019-03-13 RX ADMIN — ASPIRIN 81 MG: 81 TABLET, COATED ORAL at 08:45

## 2019-03-13 RX ADMIN — METOPROLOL TARTRATE 75 MG: 50 TABLET, FILM COATED ORAL at 08:45

## 2019-03-13 RX ADMIN — DOCUSATE SODIUM 100 MG: 100 CAPSULE, LIQUID FILLED ORAL at 17:25

## 2019-03-13 RX ADMIN — MUPIROCIN 1 APPLICATION: 20 OINTMENT TOPICAL at 08:48

## 2019-03-13 RX ADMIN — PANTOPRAZOLE SODIUM 40 MG: 40 TABLET, DELAYED RELEASE ORAL at 06:17

## 2019-03-13 RX ADMIN — FUROSEMIDE 40 MG: 10 INJECTION, SOLUTION INTRAMUSCULAR; INTRAVENOUS at 17:26

## 2019-03-13 RX ADMIN — INSULIN LISPRO 4 UNITS: 100 INJECTION, SOLUTION INTRAVENOUS; SUBCUTANEOUS at 12:29

## 2019-03-13 RX ADMIN — AMIODARONE HYDROCHLORIDE 200 MG: 200 TABLET ORAL at 15:48

## 2019-03-13 RX ADMIN — FUROSEMIDE 40 MG: 10 INJECTION, SOLUTION INTRAMUSCULAR; INTRAVENOUS at 12:30

## 2019-03-13 RX ADMIN — AMIODARONE HYDROCHLORIDE 200 MG: 200 TABLET ORAL at 06:17

## 2019-03-13 NOTE — PROGRESS NOTES
Progress Note - Nephrology   Myra Mustafa 80 y o  male MRN: [id number]  Unit/Bed#: [facility] 424-01 Encounter: [id number]      Assessment / Plan:  1  Nonoliguric CARY on CKD stage 3 with baseline creatinine 1 6 to 1 8, follows with Dr Sohan Mckeon  -creatinine 1 9 on admission, has risen up to 4 5-4 6, stable and hopefully plateaued now    -on lasix 40mg IV TID  -appears to be polyuric with 3 1L urine output yesterday and already 1L today ? D/t diuresis vs post ATN recovery  -CARY likely secondary to ischemic ATN with intraoperative hypotension episodes, cardiopulmonary bypass time 96 minutes, cross-clamp time 87 minutes  -HD consent on file if needed  -f/u BMP in a m      2  Volume overload  -improving on IV diuresis as above  -clinically patient does not seem to be in respiratory distress      3  Biopsy-proven FSGS  -this was thought to be secondary to secondary FSGS and only 15% foot process effacement   Since renal biopsy results, patient has been trying to lose weight   -he was on ARB due to significant proteinuria   Currently holding ARB due to CARY      4  Proteinuria d/t FSGS  -last UPC ratio 4 g  -will need eventual ARB which can be restarted as outpatient      5  Mild metabolic acidosis with worsening renal failure  -bicarb level stable at 22 today        6  Severe aortic insufficiency/MR  -status post AVR, mitral valve repair, VIRI ligation - per ICU team     7  Hyponatremia - sNa has dropped from 135 to 132  Continue to monitor on IV diuresis  Would hold diuretics if low BP noted      8  Anemia - Hgb stable in 10s  Thrombocytopenia has resolved  9  Hypokalemia - likely diuretic induced, monitor mag/K, on daily K repletion  Subjective:   Denies any chest pain, shortness of Breath or difficulty with urination  He does complain of back pain  Objective:     Vitals: Blood pressure 126/66, pulse 80, temperature 98 3 °F (36 8 °C), temperature source Oral, resp   rate 18, height 5' 6" (1 676 m), weight 70 6 kg (155 lb 10 3 oz), SpO2 97 %  ,Body mass index is 25 12 kg/m²  Temp (24hrs), Av 1 °F (36 7 °C), Min:97 8 °F (36 6 °C), Max:98 4 °F (36 9 °C)      Weight (last 2 days)     Date/Time   Weight    19 0644   70 6 (155 65)    19 0550   71 7 (158 07)    19 0600   71 2 (156 97)                Intake/Output Summary (Last 24 hours) at 3/13/2019 1248  Last data filed at 3/13/2019 1227  Gross per 24 hour   Intake 1190 7 ml   Output 3850 ml   Net -2659 3 ml     I/O last 24 hours: In: 1619 8 [P O :1180; I V :439 8]  Out: 4125 [Urine:4125]        Physical Exam:   Physical Exam   Constitutional: He appears well-developed and well-nourished  No distress  HENT:   Head: Normocephalic and atraumatic  Mouth/Throat: No oropharyngeal exudate  Eyes: Right eye exhibits no discharge  Left eye exhibits no discharge  No scleral icterus  Neck: Neck supple  Cardiovascular: Normal rate, regular rhythm and normal heart sounds  Pulmonary/Chest: Effort normal and breath sounds normal  He has no wheezes  He has no rales  Abdominal: Soft  Bowel sounds are normal  He exhibits no distension  There is no tenderness  Musculoskeletal: Normal range of motion  He exhibits no edema  Neurological: He is alert  awake   Skin: Skin is warm and dry  No rash noted  He is not diaphoretic  Vertical incision over sternum c/d/i   Psychiatric: He has a normal mood and affect  His behavior is normal    Vitals reviewed        Invasive Devices     Central Venous Catheter Line            CVC Central Lines 19 Triple 6 days          Peripheral Intravenous Line            Peripheral IV 19 Left Forearm 1 day                Medications:    Scheduled Meds:  Current Facility-Administered Medications:  acetaminophen 650 mg Oral Q4H PRN Matthew Henderson PA-C   amiodarone 200 mg Oral [facility] Matthew Henderson PA-C   aspirin 81 mg Oral Daily Matthew Henderson PA-C   atorvastatin 20 mg Oral Daily With Laura Aparicio PA-C bisacodyl 10 mg Rectal Daily PRN Edison Nissen, PA-C   docusate sodium 100 mg Oral BID Edison Nissen, PA-C   furosemide 40 mg Intravenous TID (diuretic) Edison Nissen, PA-C   insulin glargine 20 Units Subcutaneous HS Edison Nissen, PA-C   insulin lispro 1-5 Units Subcutaneous TID AC Edison Nissen, PA-C   insulin lispro 1-5 Units Subcutaneous HS Edison Nissen, PA-C   insulin lispro 4 Units Subcutaneous TID With Meals Tyson Winslow MD   metoprolol tartrate 75 mg Oral Q12H [facility] Edison Nissen, PADeanC   mupirocin 1 application Nasal V10N [facility] Edison Nissen, PA-C   ondansetron 4 mg Intravenous Q6H PRN Edison Nissen, PA-C   oxyCODONE-acetaminophen 1 tablet Oral Q4H PRN Edison Nissen, PA-C   oxyCODONE-acetaminophen 2 tablet Oral Q6H PRN Edison Nissen, PA-C   pantoprazole 40 mg Oral Daily Before Breakfast Edison Nissen, PA-C   polyethylene glycol 17 g Oral Daily Edison Nissen, PA-C   potassium chloride 20 mEq Oral Daily Edison Nissen, PA-C   temazepam 15 mg Oral HS PRN Edison Nissen, PA-C   travoprost 1 drop Both Eyes HS Edison Nissen, PA-C   warfarin 2 mg Oral Once (warfarin) Edison Nissen, PA-C       PRN Meds:   acetaminophen    bisacodyl    ondansetron    oxyCODONE-acetaminophen    oxyCODONE-acetaminophen    temazepam    Continuous Infusions:         LAB RESULTS:      Results from last 7 days   Lab Units 03/13/19  0547 03/12/19  1451 03/12/19  0448 03/12/19  0000 03/11/19  1650 03/11/19  1301 03/11/19  0530 03/11/19  0453  03/10/19  1836  03/10/19  0512 03/10/19  0504 03/10/19  0451  03/09/19  1556 03/09/19  1354  03/09/19  0458  03/09/19 03/08/19  0346 03/07/19  2037  03/07/19  1213 03/07/19  1204  03/07/19  1050 03/07/19  1023 03/07/19  1004  03/07/19  0945 03/07/19  0937 03/07/19  0923   WBC [place]ousand/uL 12 60*  --  12 02*  --   --   --  11 15*  --   --   --   --   --   --  12 74*  --   --   --   --   --   --  16 89*  --  18 50*  --   --   --   --   --   --   --   --   --   --   --   -- HEMOGLOBIN g/dL 10 4*  --  9 7*  --   --   --  9 3*  --   --   --   --   --   --  9 5*  --  9 5*  --   --   --   --  9 9*  --  11 1* 11 7*  --   --  11 9*   < >  --   --   --   --   --   --   --    I STAT HEMOGLOBIN g/dl  --   --   --   --   --   --   --   --   --   --   --   --   --   --   --   --   --   --   --   --   --   --   --   --   --  10 9*  --   --  9 2* 9 9* 9 9*  --  10 2* 10 2* 8 2*   HEMATOCRIT % 30 0*  --  27 3*  --   --   --  27 2*  --   --   --   --   --   --  27 4*  --   --   --   --   --   --  29 4*  --  33 2* 34 6*  --   --  35 0*   < >  --   --   --   --   --   --   --    HEMATOCRIT, ISTAT %  --   --   --   --   --   --   --   --   --   --   --   --   --   --   --   --   --   --   --   --   --   --   --   --   --  32*  --   --  27* 29* 29*  --  30* 30* 24*   PLATELETS Thousands/uL 165  --  128*  --   --   --  101*  --   --   --   --   --   --  85*  --   --   --   --   --   --  73*  --  131*  --   --   --  144*  --   --   --   --    < >  --   --   --    POTASSIUM mmol/L 3 4* 3 8 4 2 3 7 3 7 3 9  --  3 9   < > 3 9   < > 4 5  --   --    < >  --  4 5   < > 5 1   < >  --    < > 5 1 4 4   < >  --  4 3  --   --   --   --   --   --   --   --    CHLORIDE mmol/L 94*  --  98*  --   --   --   --  100  --  101  --  103  --   --   --   --  105  --  107  --   --    < > 111*  --   --   --  112*   < >  --   --   --   --   --   --   --    CO2 mmol/L 27  --  22  --   --   --   --  23  --  22  --  23  --   --   --   --  18*  --  21  --   --    < > 22  --   --   --  23   < >  --   --   --   --   --   --   --    CO2, I-STAT mmol/L  --   --   --   --   --   --   --   --   --   --   --   --   --   --   --   --   --   --   --   --   --   --   --   --   --  24  --   --  28 27 31  --  24 33* 27   BUN mg/dL 93*  --  76*  --   --   --   --  68*  --  62*  --  57*  --   --   --   --  49*  --  45*  --   --    < > 34*  --   --   --  25   < >  --   --   --   --   --   --   --    CREATININE mg/dL 4 63*  --  4 61*  --   --   -- --  4 63*  --  4 49*  --  4 25*  --   --   --   --  3 89*  --  3 48*  --   --    < > 2 54*  --   --   --  1 94*   < >  --   --   --   --   --   --   --    CALCIUM mg/dL 8 5  --  8 0*  --   --   --   --  8 1*  --  7 9*  --  8 2*  --   --   --   --  8 2*  --  8 3  --   --    < > 8 2*  --   --   --  8 0*   < >  --   --   --   --   --   --   --    ALK PHOS U/L  --   --   --   --   --   --   --   --   --   --   --  53  --   --   --   --   --   --   --   --   --   --   --   --   --   --   --   --   --   --   --   --   --   --   --    ALT U/L  --   --   --   --   --   --   --   --   --   --   --  <6*  --   --   --   --   --   --   --   --   --   --   --   --   --   --   --   --   --   --   --   --   --   --   --    AST U/L  --   --   --   --   --   --   --   --   --   --   --  52*  --   --   --   --   --   --   --   --   --   --   --   --   --   --   --   --   --   --   --   --   --   --   --    GLUCOSE, ISTAT mg/dl  --   --   --   --   --   --   --   --   --   --   --   --   --   --   --   --   --   --   --   --   --   --   --   --   --  110  --   --  98 111 154*  --  185* 171* 140   MAGNESIUM mg/dL  --   --  2 3  --   --   --   --  2 5  --   --   --   --  2 4  --   --   --   --   --  2 8*  --   --   --  3 1*  --   --   --   --   --   --   --   --   --   --   --   --    PHOSPHORUS mg/dL  --   --   --   --   --   --   --  6 3*  --   --   --   --  5 0*  --   --   --   --   --  5 4*  --   --   --   --   --   --   --   --   --   --   --   --   --   --   --   --     < > = values in this interval not displayed  CUTURES:  No results found for: Hetal Monahan              Portions of the record may have been created with voice recognition software  Occasional wrong word or "sound a like" substitutions may have occurred due to the inherent limitations of voice recognition software  Read the chart carefully and recognize, using context, where substitutions have occurred  If you have any questions, please contact the dictating provider

## 2019-03-13 NOTE — PROGRESS NOTES
Cardiology Progress Note - Percy Miller 80 y o  male MRN: [id number]    Unit/Bed#: [facility] 424-01 Encounter: [id number]        Subjective:   Patient with intermittent atrial flutter  Review of Systems   Cardiovascular: Negative for chest pain, leg swelling and palpitations  Respiratory: Negative for shortness of breath  Objective:   Vitals: Blood pressure 145/86, pulse 90, temperature 97 8 °F (36 6 °C), temperature source Oral, resp  rate 18, height 5' 6" (1 676 m), weight 70 6 kg (155 lb 10 3 oz), SpO2 95 %  , Body mass index is 25 12 kg/m² ,   Orthostatic Blood Pressures      Most Recent Value   Blood Pressure  145/86 filed at 03/13/2019 0730   Patient Position - Orthostatic VS  Sitting filed at 03/13/2019 5848         Systolic (13XRA), NJU:407 , Min:109 , TEL:813     Diastolic (55MYS), CQK:57, Min:56, Max:86      Intake/Output Summary (Last 24 hours) at 3/13/2019 0920  Last data filed at 3/13/2019 0644  Gross per 24 hour   Intake 650 7 ml   Output 2850 ml   Net -2199 3 ml     Weight (last 2 days)     Date/Time   Weight    03/13/19 0644   70 6 (155 65)    03/12/19 0550   71 7 (158 07)    03/11/19 0600   71 2 (156 97)                  Telemetry Review: Aflutter    Physical Exam   Cardiovascular: Normal rate and normal heart sounds  An irregularly irregular rhythm present  Exam reveals no gallop and no friction rub  No murmur heard  Pulmonary/Chest: Breath sounds normal  He has no wheezes  He has no rales  Musculoskeletal: He exhibits no edema           Laboratory Results:        CBC with diff:   Results from last 7 days   Lab Units 03/13/19  0547 03/12/19  0448 03/11/19  0530 03/10/19  0451 03/09/19  1556 03/09/19 03/08/19  0346 03/07/19  2037  03/07/19  1204   WBC Thousand/uL 12 60* 12 02* 11 15* 12 74*  --  16 89* 18 50*  --   --   --    HEMOGLOBIN g/dL 10 4* 9 7* 9 3* 9 5* 9 5* 9 9* 11 1* 11 7*  --  11 9*   I STAT HEMOGLOBIN   --   --   --   --   --   --   --   --    < >  --    HEMATOCRIT % 30 0* 27 3* 27 2* 27 4*  --  29 4* 33 2* 34 6*  --  35 0*   HEMATOCRIT, ISTAT   --   --   --   --   --   --   --   --    < >  --    MCV fL 88 88 90 90  --  92 92  --   --   --    PLATELETS Thousands/uL 165 128* 101* 85*  --  73* 131*  --   --  144*   MCH pg 30 6 31 2 30 7 31 4  --  31 0 30 7  --   --   --    MCHC g/dL 34 7 35 5 34 2 34 7  --  33 7 33 4  --   --   --    RDW % 13 3 13 5 13 3 13 7  --  14 2 13 9  --   --   --    MPV fL 9 8 10 3 10 8 10 8  --  10 9 10 3  --   --  10 0    < > = values in this interval not displayed           CMP:  Results from last 7 days   Lab Units 03/13/19  0547 03/12/19  1451 03/12/19  0448 03/12/19  0000 03/11/19  1650 03/11/19  1301 03/11/19  0453  03/10/19  1836  03/10/19  0512  03/09/19  1354  03/09/19  0458  03/07/19  1213  03/07/19  1050 03/07/19  1023 03/07/19  1004 03/07/19  0945 03/07/19  0937 03/07/19  0923   POTASSIUM mmol/L 3 4* 3 8 4 2 3 7 3 7 3 9 3 9   < > 3 9   < > 4 5   < > 4 5   < > 5 1   < >  --    < >  --   --   --   --   --   --    CHLORIDE mmol/L 94*  --  98*  --   --   --  100  --  101  --  103  --  105  --  107   < >  --    < >  --   --   --   --   --   --    CO2 mmol/L 27  --  22  --   --   --  23  --  22  --  23  --  18*  --  21   < >  --    < >  --   --   --   --   --   --    CO2, I-STAT mmol/L  --   --   --   --   --   --   --   --   --   --   --   --   --   --   --   --  24  --  28 27 31 24 33* 27   BUN mg/dL 93*  --  76*  --   --   --  68*  --  62*  --  57*  --  49*  --  45*   < >  --    < >  --   --   --   --   --   --    CREATININE mg/dL 4 63*  --  4 61*  --   --   --  4 63*  --  4 49*  --  4 25*  --  3 89*  --  3 48*   < >  --    < >  --   --   --   --   --   --    GLUCOSE, ISTAT mg/dl  --   --   --   --   --   --   --   --   --   --   --   --   --   --   --   --  110  --  98 111 154* 185* 171* 140   CALCIUM mg/dL 8 5  --  8 0*  --   --   --  8 1*  --  7 9*  --  8 2*  --  8 2*  --  8 3   < >  --    < >  --   --   --   --   --   --    AST U/L  --   --   -- --   --   --   --   --   --   --  52*  --   --   --   --   --   --   --   --   --   --   --   --   --    ALT U/L  --   --   --   --   --   --   --   --   --   --  <6*  --   --   --   --   --   --   --   --   --   --   --   --   --    ALK PHOS U/L  --   --   --   --   --   --   --   --   --   --  53  --   --   --   --   --   --   --   --   --   --   --   --   --    EGFR ml/min/1 73sq m 14  --  14  --   --   --  14  --  15  --  16  --  17  --  20   < >  --    < >  --   --   --   --   --   --     < > = values in this interval not displayed  BMP:  Results from last 7 days   Lab Units 03/13/19  0547 03/12/19  1451 03/12/19  0448 03/12/19  0000 03/11/19  1650 03/11/19  1301 03/11/19  0453  03/10/19  1836  03/10/19  0512  03/09/19  1354  03/09/19  0458  03/07/19  1213   POTASSIUM mmol/L 3 4* 3 8 4 2 3 7 3 7 3 9 3 9   < > 3 9   < > 4 5   < > 4 5   < > 5 1   < >  --    CHLORIDE mmol/L 94*  --  98*  --   --   --  100  --  101  --  103  --  105  --  107   < >  --    CO2 mmol/L 27  --  22  --   --   --  23  --  22  --  23  --  18*  --  21   < >  --    CO2, I-STAT mmol/L  --   --   --   --   --   --   --   --   --   --   --   --   --   --   --   --  24   BUN mg/dL 93*  --  76*  --   --   --  68*  --  62*  --  57*  --  49*  --  45*   < >  --    CREATININE mg/dL 4 63*  --  4 61*  --   --   --  4 63*  --  4 49*  --  4 25*  --  3 89*  --  3 48*   < >  --    GLUCOSE, ISTAT mg/dl  --   --   --   --   --   --   --   --   --   --   --   --   --   --   --   --  110   CALCIUM mg/dL 8 5  --  8 0*  --   --   --  8 1*  --  7 9*  --  8 2*  --  8 2*  --  8 3   < >  --     < > = values in this interval not displayed  BNP:   Results Reviewed     None        No results for input(s): BNP in the last 72 hours      Magnesium:   Results from last 7 days   Lab Units 03/12/19  0448 03/11/19  0453 03/10/19  0504 03/09/19  0458 03/08/19  0346   MAGNESIUM mg/dL 2 3 2 5 2 4 2 8* 3 1*       Coags:   Results from last 7 days   Lab Units 19  0547 19  0448 19  0525 03/10/19  0452 19  0604 19  1050   INR  2 99* 2 84* 2 30* 1 88* 1 47* 1 29*       TSH:       Lipid Profile:   Results from last 7 days   Lab Units 19  0453   TRIGLYCERIDES mg/dL 63   HDL mg/dL 40           Cardiac testing:   Results for orders placed during the hospital encounter of 19   Echo complete with contrast if indicated    Narrative [place] [id number], [facility]  [phone]    Transthoracic Echocardiogram  2D, M-mode, Doppler, and Color Doppler    Study date:  2019    Patient: Army Martinez  MR number: [id number]  Account number: [de-identified]  : 1950  Age: 80 years  Gender: Male  Status: Inpatient  Location: Bedside  Height: 68 in  Weight: 163 lb  BP: 155/ 70 mmHg    Indications: Heart Failure    Diagnoses: I50 9 - Heart failure, unspecified    Sonographer:  TIMMY Griffin  Primary Physician:  Damien Vieira  Referring Physician:  Kelsea Lemus MD  Group:  [facility]  Interpreting Physician:  Kelsea Lemus MD    SUMMARY    LEFT VENTRICLE:  The ventricle was mildly dilated  Systolic function was mildly to moderately reduced  Ejection fraction was estimated to be 40 %  There was mild diffuse hypokinesis  Wall thickness was mildly increased  RIGHT VENTRICLE:  The ventricle was mildly dilated  Systolic function was normal     LEFT ATRIUM:  The atrium was mildly dilated  MITRAL VALVE:  There was moderate to severe regurgitation  AORTIC VALVE:  The valve was possibly bicuspid  Leaflets exhibited normal thickness, normal cuspal separation, and significant diastolic prolapse  There was moderate to severe regurgitation  TRICUSPID VALVE:  There was mild regurgitation  Pulmonary artery systolic pressure was moderately to markedly increased  The findings suggest moderate to severe pulmonary hypertension      AORTA:  The root exhibited mild dilatation  IVC, HEPATIC VEINS:  The inferior vena cava was mildly dilated  Respirophasic changes were blunted (less than 50% variation)  PERICARDIUM:  There was a left pleural effusion  HISTORY: PRIOR HISTORY: Murmur, HTN, DM2, HLD, CKD3    PROCEDURE: The procedure was performed at the bedside  This was a routine study  The transthoracic approach was used  The study included complete 2D imaging, M-mode, complete spectral Doppler, and color Doppler  The heart rate was 95 bpm,  at the start of the study  Images were obtained from the parasternal, apical, subcostal, and suprasternal notch acoustic windows  Image quality was adequate  LEFT VENTRICLE: The ventricle was mildly dilated  Systolic function was mildly to moderately reduced  Ejection fraction was estimated to be 40 %  There was mild diffuse hypokinesis  Wall thickness was mildly increased  RIGHT VENTRICLE: The ventricle was mildly dilated  Systolic function was normal  Wall thickness was normal     LEFT ATRIUM: The atrium was mildly dilated  RIGHT ATRIUM: Size was normal     MITRAL VALVE: Valve structure was normal  There was normal leaflet separation  DOPPLER: The transmitral velocity was within the normal range  There was no evidence for stenosis  There was moderate to severe regurgitation  AORTIC VALVE: The valve was possibly bicuspid  Leaflets exhibited normal thickness, normal cuspal separation, and significant diastolic prolapse  DOPPLER: Transaortic velocity was within the normal range  There was no evidence for  stenosis  There was moderate to severe regurgitation  The regurgitant jet was eccentric  TRICUSPID VALVE: The valve structure was normal  There was normal leaflet separation  DOPPLER: The transtricuspid velocity was within the normal range  There was no evidence for stenosis  There was mild regurgitation  Pulmonary artery  systolic pressure was moderately to markedly increased  Estimated peak PA pressure was 60 mmHg  The findings suggest moderate to severe pulmonary hypertension  PULMONIC VALVE: Leaflets exhibited normal thickness, no calcification, and normal cuspal separation  DOPPLER: The transpulmonic velocity was within the normal range  There was no regurgitation  PERICARDIUM: There was no pericardial effusion  There was a left pleural effusion  The pericardium was normal in appearance  AORTA: The root exhibited mild dilatation  SYSTEMIC VEINS: IVC: The inferior vena cava was mildly dilated  Respirophasic changes were blunted (less than 50% variation)      MEASUREMENT TABLES    2D MEASUREMENTS  Aorta   (Reference normals)  Root diam   39 mm   (--)    SYSTEM MEASUREMENT TABLES    2D  %FS: 23 42 %  Ao Diam: 3 91 cm  EDV(Teich): 179 53 ml  EF Biplane: 40 37 %  EF(Teich): 46 07 %  ESV(Teich): 96 82 ml  IVSd: 1 02 cm  LA Area: 12 24 cm2  LA Diam: 4 3 cm  LVEDV MOD A2C: 129 37 ml  LVEDV MOD A4C: 151 37 ml  LVEDV MOD BP: 140 9 ml  LVEF MOD A2C: 42 79 %  LVEF MOD A4C: 40 91 %  LVESV MOD A2C: 74 02 ml  LVESV MOD A4C: 89 45 ml  LVESV MOD BP: 84 02 ml  LVIDd: 5 99 cm  LVIDs: 4 59 cm  LVLd A2C: 8 45 cm  LVLd A4C: 8 58 cm  LVLs A2C: 7 63 cm  LVLs A4C: 8 25 cm  LVPWd: 1 21 cm  RA Area: 15 27 cm2  RVIDd: 4 39 cm  SV MOD A2C: 55 35 ml  SV MOD A4C: 61 92 ml  SV(Teich): 82 71 ml    CW  TR MaxP 66 mmHg  TR Vmax: 3 52 m/s    MM  TAPSE: 1 8 cm    [facility]    Prepared and electronically signed by    Ana Masters MD  Signed 2019 16:15:06           Meds/Allergies     Current Facility-Administered Medications:  acetaminophen 650 mg Oral Q4H PRN Ty Sparrow, PA-C   amiodarone 200 mg Oral Q8H [address] Ty Sparrow, PA-C   aspirin 81 mg Oral Daily Ty Sparrow, PA-C   atorvastatin 20 mg Oral Daily With [employer] Shila, PA-C   bisacodyl 10 mg Rectal Daily PRN Ty Sparrow, PA-C   docusate sodium 100 mg Oral BID Ty Shields PA-C   furosemide 40 mg Intravenous TID (diuretic) Love Do, PA-NIC   insulin glargine 20 Units Subcutaneous HS Lvoe Do, PA-C   insulin lispro 1-5 Units Subcutaneous TID AC Love Do, PA-NIC   insulin lispro 1-5 Units Subcutaneous HS Love Do, PA-NIC   insulin lispro 4 Units Subcutaneous TID With Meals Shell Montalvo MD   metoprolol tartrate 75 mg Oral Q12H [facility], YINA   mupirocin 1 application Nasal C18J [facility], YINA   ondansetron 4 mg Intravenous Q6H PRN Love Do, YINA   oxyCODONE-acetaminophen 1 tablet Oral Q4H PRN Love Do, YINA   oxyCODONE-acetaminophen 2 tablet Oral Q6H PRN Love Do, PA-NIC   pantoprazole 40 mg Oral Daily Before Breakfast Love Do, YINA   polyethylene glycol 17 g Oral Daily Love Do, PA-NIC   potassium chloride 20 mEq Oral Daily Love Do, YINA   temazepam 15 mg Oral HS PRN Love Do, YINA   travoprost 1 drop Both Eyes HS Love Do, YINA   warfarin 2 mg Oral Once (warfarin) Love Do, YINA        Medications Prior to Admission   Medication    ascorbic acid (VITAMIN C) 500 mg tablet    atorvastatin (LIPITOR) 20 mg tablet    calcitriol (ROCALTROL) 0 25 mcg capsule    Cholecalciferol (VITAMIN D3) 1000 units CAPS    furosemide (LASIX) 20 mg tablet    furosemide (LASIX) 40 mg tablet    metFORMIN (GLUCOPHAGE) 500 mg tablet    metoprolol succinate (TOPROL-XL) 50 mg 24 hr tablet    Multiple Vitamin (MULTIVITAMIN) tablet    mupirocin (BACTROBAN) 2 % ointment    olmesartan (BENICAR) 20 mg tablet    TRAVATAN Z 0 004 % ophthalmic solution       Assessment:  Principal Problem:    Bicuspid aortic valve  Active Problems:    CKD (chronic kidney disease) stage 3, GFR 30-59 ml/min (HCC)    Diabetic nephropathy associated with type 2 diabetes mellitus (HCC)    Secondary hyperparathyroidism (HCC)    Essential hypertension    Chronic systolic congestive heart failure (HCC)    Nonrheumatic aortic valve insufficiency    Non-rheumatic mitral regurgitation    Coronary artery disease involving native coronary artery of native heart without angina pectoris    S/P AVR    S/P MVR (mitral valve repair)    Dilated cardiomyopathy ([facility])    Diabetes mellitus type 2 in nonobese ([facility])    CARY (acute kidney injury) ([facility] [address] )      Impression:  1  S/P AVR/MV repair - hemodynamically stable  Off milrinone  2  CARY - on IV furosemide  Renal function plateaued at Cr 4 6  Non-oliguric  3  Paroxysmal atrial flutter - on PO amiodarone  Rate controlled  Plan:  1  Continue PO amiodarone  If remains in atrial flutter, would consider cardioversion prior to discharge vs as an outpatient  2  Continue remainder of medications  3  Follow renal function

## 2019-03-13 NOTE — SOCIAL WORK
CM was informed that pt required a RW and BSC  Script was signed and pt had no DME preference  Referral sent to [facility] DME

## 2019-03-13 NOTE — PLAN OF CARE
Problem: PHYSICAL THERAPY ADULT  Goal: Performs mobility at highest level of function for planned discharge setting  See evaluation for individualized goals  Description  Treatment/Interventions: Functional transfer training, LE strengthening/ROM, Elevations, Therapeutic exercise, Endurance training, Patient/family training, Gait training, Spoke to nursing, Family  Equipment Recommended: Walker(RW)       See flowsheet documentation for full assessment, interventions and recommendations     Outcome: Progressing

## 2019-03-13 NOTE — PHYSICAL THERAPY NOTE
Physical Therapy Progress Note     03/13/19 1510   Pain Assessment   Pain Assessment No/denies pain   Pain Score No Pain   Restrictions/Precautions   Other Precautions Cardiac/sternal;Telemetry; Fall Risk   Subjective   Subjective The pt  states that he is feeling better, and that he is ready to go home soon  Transfers   Sit to Stand 6  Modified independent   Stand to Sit 6  Modified independent   Ambulation/Elevation   Gait pattern Excessively slow   Gait Assistance 6  Modified independent   Assistive Device Rolling walker   Distance 380 feet  Stair Management Assistance Not tested  (Pt  declined need to trial step )   Balance   Static Sitting Normal   Dynamic Sitting Good   Static Standing Good   Ambulatory Fair +   Activity Tolerance   Activity Tolerance Patient tolerated treatment well   Nurse Chelsea Orr RN  Assessment   Prognosis Excellent   Problem List Decreased mobility; Impaired balance   Assessment The pt  is ambulating far beyond community distances without any loss of balance  He readily manuevered around obstacles without difficulty as well as performed dynamic head and trunk movements  He declined any need to trial a curb step prior to returning home  He has demonstrated the necessary mobility in order to safely return home  Goals   Patient Goals To go home  STG Expiration Date 03/19/19   Treatment Day 3   Plan   Treatment/Interventions Functional transfer training;LE strengthening/ROM; Therapeutic exercise; Endurance training;Patient/family training;Bed mobility;Gait training;Elevations   Progress Progressing toward goals   PT Frequency   (4-6x a week )   Recommendation   Recommendation Home PT; Home with family support   Equipment Recommended Rachelle Lopez   PT - OK to Discharge Yes     Luis F Bronson, PTA

## 2019-03-13 NOTE — PROGRESS NOTES
Progress Note - Cardiothoracic Surgery   Counts include 234 beds at the [facility] 80 y o  male MRN: [id number]  Unit/Bed#: [facility] 424-01 Encounter: [id number]    Aortic regurgitation, Mitral regurgitation  S/P AVR (23mm Inspiris)/MV repairwith 30 mm Medtronic CG Future mitral annuloplasty ring /VIRI ligation; POD # 6      24 Hour Events: Transferred from ICU to stepdown  Voiding well with maya removed        Medications:   Scheduled Meds:  Current Facility-Administered Medications:  acetaminophen 650 mg Oral Q4H PRN Nissa Gimenez PA-C    amiodarone 0 5 mg/min Intravenous Continuous Nissa Gimenez PA-C Last Rate: 0 5 mg/min (03/12/19 0757)   amiodarone 200 mg Oral Q8H [facility] Nissa Gimenez PA-C    aspirin 81 mg Oral Daily Nissa Gimenez PA-C    atorvastatin 20 mg Oral Daily With [employer] ShilaYINA sherman    bisacodyl 10 mg Rectal Daily PRN Nissa Gimenez PA-C    docusate sodium 100 mg Oral BID Nissa Gimenez PA-C    furosemide 40 mg Intravenous TID (diuretic) Nissa Gimenez PA-C    insulin glargine 20 Units Subcutaneous HS Nissa Gimenez PA-C    insulin lispro 1-5 Units Subcutaneous TID AC Nissa Gimenez PA-C    insulin lispro 1-5 Units Subcutaneous HS Nissa Gimenez PA-C    insulin lispro 4 Units Subcutaneous TID With Meals Mary Anne Guerra MD    metoprolol tartrate 50 mg Oral Q12H [facility] Nissa Gimenez PA-C    mupirocin 1 application Nasal K03S [facility] Nissa Gimenez PA-C    ondansetron 4 mg Intravenous Q6H PRN Nissa Gimenez PA-C    oxyCODONE-acetaminophen 1 tablet Oral Q4H PRN Nissa Gimenez PA-C    oxyCODONE-acetaminophen 2 tablet Oral Q6H PRN Nissa Gimenez PA-C    pantoprazole 40 mg Oral Daily Before Breakfast Nissa Gimenez PA-C    polyethylene glycol 17 g Oral Daily Nissa Gimenez PA-C    temazepam 15 mg Oral HS PRN Nissa Gimenez PA-C    travoprost 1 drop Both Eyes HS Nissa Gimenez PA-C      Continuous Infusions:  amiodarone 0 5 mg/min Last Rate: 0 5 mg/min (03/12/19 0757)     PRN Meds:   acetaminophen    bisacodyl   ondansetron    oxyCODONE-acetaminophen    oxyCODONE-acetaminophen    temazepam    Vitals:   Vitals:    03/12/19 2300 03/13/19 0300 03/13/19 0644 03/13/19 0730   BP:  137/62  145/86   BP Location:  Right arm  Right arm   Pulse: 88 77  90   Resp: 20 20 18   Temp:  98 °F (36 7 °C)  97 8 °F (36 6 °C)   TempSrc:  Oral  Oral   SpO2:  97%  95%   Weight:   70 6 kg (155 lb 10 3 oz)    Height:           Telemetry: Atrial Fibrillation; Heart Rate: 82    Respiratory:   SpO2: SpO2: 95 %, SpO2 Activity: SpO2 Activity: At Rest; Room Air    Intake/Output:   I/O       03/11 0701 - 03/12 0700 03/12 0701 - 03/13 0700 03/13 0701 - 03/14 0700    P  O  420 540     I V  (mL/kg) 777 7 (10 8) 439 8 (6 2)     IV Piggyback 580      Total Intake(mL/kg) 1777 7 (24 8) 979 8 (13 9)     Urine (mL/kg/hr) 4195 (2 4) 3125 (1 8)     Stool 0 0     Chest Tube       Total Output 4195 3125     Net -2417 3 -2145  2            Unmeasured Stool Occurrence 3 x 2 x             Weights:   Weight (last 2 days)     Date/Time   Weight    03/13/19 0644   70 6 (155 65)    03/12/19 0550   71 7 (158 07)    03/11/19 0600   71 2 (156 97)            Admit weight: 70 8    Results:   Results from last 7 days   Lab Units 03/13/19  0547 03/12/19  0448 03/11/19  0530   WBC Thousand/uL 12 60* 12 02* 11 15*   HEMOGLOBIN g/dL 10 4* 9 7* 9 3*   HEMATOCRIT % 30 0* 27 3* 27 2*   PLATELETS Thousands/uL 165 128* 101*     Results from last 7 days   Lab Units 03/13/19  0547 03/12/19  1451 03/12/19  0448  03/11/19  0453  03/07/19  1213   SODIUM mmol/L 132*  --  131*  --  135*   < >  --    POTASSIUM mmol/L 3 4* 3 8 4 2   < > 3 9   < >  --    CHLORIDE mmol/L 94*  --  98*  --  100   < >  --    CO2 mmol/L 27  --  22  --  23   < >  --    CO2, I-STAT mmol/L  --   --   --   --   --   --  24   BUN mg/dL 93*  --  76*  --  68*   < >  --    CREATININE mg/dL 4 63*  --  4 61*  --  4 63*   < >  --    GLUCOSE, ISTAT mg/dl  --   --   --   --   --   --  110   CALCIUM mg/dL 8 5  --  8 0*  --  8 1*   < >  --     < > = values in this interval not displayed  Results from last 7 days   Lab Units 03/13/19  0547 03/12/19  0448 03/11/19  0525   INR  2 99* 2 84* 2 30*     Coumadin mg 2 2 5 2 5  2 5 2 5       Point of care glucose: 81-88      Invasive Lines/Tubes:  Invasive Devices     Central Venous Catheter Line            CVC Central Lines 03/07/19 Triple 5 days          Peripheral Intravenous Line            Peripheral IV 03/11/19 Left Forearm 1 day                Physical Exam:    HEENT/NECK:  PERRLA  No jugular venous distention  Cardiac: Regular rate and rhythm  No rubs/murmurs/gallops  Pulmonary:  Breath sounds slightly diminished at the bases bilaterally  Abdomen:  Non-tender, Non-distended  Positive bowel sounds  Incisions: Sternum is stable  Incision is clean, dry, and intact  Lower extremities: Extremities warm/dry  Radial/PT/DP pulses 2+ bilaterally  Trace edema B/L  Neuro: Alert and oriented X 3  Sensation is grossly intact  No focal deficits  Skin: Warm/Dry, without rashes or lesions  Assessment:  Patient Active Problem List   Diagnosis    CKD (chronic kidney disease) stage 3, GFR 30-59 ml/min ([facility]    Persistent proteinuria    Diabetic nephropathy associated with type 2 diabetes mellitus ([address] )    Vitamin D deficiency    Secondary hyperpar[facility]roidism [facility]    FSGS (focal segmental glomerulosclerosis)    Essential hypertension    Chronic systolic congestive heart failure ([facility]    Nonrheumatic aortic valve insufficiency    Non-rheumatic mitral regurgitation    Bicuspid aortic valve    Coronary artery disease involving native coronary artery of native heart without angina pectoris    S/P AVR    S/P MVR (mitral valve repair)    Dilated cardiomyopathy ([facility]    Diabetes mellitus type 2 in nonobese ([address] )    CARY (acute kidney injury) ([address] )         Aortic regurgitation, Mitral regurgitation   S/P AVR (23mm Inspiris)/MV repairwith 30 mm Medtronic CG Future mitral annuloplasty ring /VIRI ligation; POD # 6    Plan:    1  Cardiac:   Atrial Fibrillation; Hypertensive  Increase Lopressor 75 mg PO BID   INR 3; Coumadin 2 mg  Continue ASA and Statin therapy  Epicardial pacing wires out  Central IV access no longer required; Remove central venous catheter today    2  Pulmonary:   Good Room air oxygen saturation; Continue incentive spirometry/Coughing/Deep breathing exercises  Chest tubes have been discontinued    3  Renal:   Intake/Output net: -2100 mL/24 hours  Continue diuresis   Lasix 40  mg IV TID  Potassium Chloride 20 mEq PO TID  CKD III with baseline creatinine 1 7;   Post op CARY; Creatinine stable at 4 63    Check daily BMP    Follow urine output    4  Neuro:  Neurologically intact; No active issues  Incisional pain well-controlled; Continue prn Percocet    5  GI:  Tolerating TLC 2 3 gm sodium diet  Maintain 1800 mL daily fluid restriction   Continue stool softeners and prn suppository  Continue GI prophylaxis    6  Endo:    History of diabetes; Continue SQ insulin therapy as directed by endocrinology physician  Insulin administration teaching for home therapy ordered    7  Hematology:   Post-operative blood count acceptable; Trend prn    8   Disposition:  Ambulating independently, Anticipate discharge to home 24-48 hours     VTE Pharmacologic Prophylaxis: Sequential compression device (Venodyne)  and Warfarin (Coumadin)  VTE Mechanical Prophylaxis: sequential compression device    Collaborative rounds completed with DAMON James , and Caridad Hwang RN    SIGNATURE: Ty Shields PA-C  DATE: March 13, 2019  TIME: 8:04 AM

## 2019-03-13 NOTE — PROGRESS NOTES
Sugars reviewed-fasting sugars have been 80s in the past couple of days-decrease Lantus to 16 units at bedtime

## 2019-03-13 NOTE — PLAN OF CARE
Problem: Prexisting or High Potential for Compromised Skin Integrity  Goal: Skin integrity is maintained or improved  Description  INTERVENTIONS:  - Identify patients at risk for skin breakdown  - Assess and monitor skin integrity  - Assess and monitor nutrition and hydration status  - Monitor labs (i e  albumin)  - Assess for incontinence   - Turn and reposition patient  - Assist with mobility/ambulation  - Relieve pressure over bony prominences  - Avoid friction and shearing  - Provide appropriate hygiene as needed including keeping skin clean and dry  - Evaluate need for skin moisturizer/barrier cream  - Collaborate with interdisciplinary team (i e  Nutrition, Rehabilitation, etc )   - Patient/family teaching  Outcome: Progressing     Problem: Potential for Falls  Goal: Patient will remain free of falls  Description  INTERVENTIONS:  - Assess patient frequently for physical needs  -  Identify cognitive and physical deficits and behaviors that affect risk of falls    -  [place] fall precautions as indicated by assessment   - Educate patient/family on patient safety including physical limitations  - Instruct patient to call for assistance with activity based on assessment  - Modify environment to reduce risk of injury  - Consider OT/PT consult to assist with strengthening/mobility  Outcome: Progressing     Problem: DISCHARGE PLANNING - CARE MANAGEMENT  Goal: Discharge to post-acute care or home with appropriate resources  Description  INTERVENTIONS:  - Conduct assessment to determine patient/family and health care team treatment goals, and need for post-acute services based on payer coverage, community resources, and patient preferences, and barriers to discharge  - Address psychosocial, clinical, and financial barriers to discharge as identified in assessment in conjunction with the patient/family and health care team  - Arrange appropriate level of post-acute services according to patient?s   needs and preference and payer coverage in collaboration with the physician and health care team  - Communicate with and update the patient/family, physician, and health care team regarding progress on the discharge plan  - Arrange appropriate transportation to post-acute venues  Outcome: Progressing     Problem: PAIN - ADULT  Goal: Verbalizes/displays adequate comfort level or baseline comfort level  Description  Interventions:  - Encourage patient to monitor pain and request assistance  - Assess pain using appropriate pain scale  - Administer analgesics based on type and severity of pain and evaluate response  - Implement non-pharmacological measures as appropriate and evaluate response  - Consider cultural and social influences on pain and pain management  - Notify physician/advanced practitioner if interventions unsuccessful or patient reports new pain  Outcome: Progressing     Problem: INFECTION - ADULT  Goal: Absence or prevention of progression during hospitalization  Description  INTERVENTIONS:  - Assess and monitor for signs and symptoms of infection  - Monitor lab/diagnostic results  - Monitor all insertion sites, i e  indwelling lines, tubes, and drains  - Monitor endotracheal (as able) and nasal secretions for changes in amount and color  - [place] appropriate cooling/warming therapies per order  - Administer medications as ordered  - Instruct and encourage patient and family to use good hand hygiene technique  - Identify and instruct in appropriate isolation precautions for identified infection/condition  Outcome: Progressing  Goal: Absence of fever/infection during neutropenic period  Description  INTERVENTIONS:  - Monitor WBC  - Implement neutropenic guidelines  Outcome: Progressing     Problem: SAFETY ADULT  Goal: Maintain or return to baseline ADL function  Description  INTERVENTIONS:  -  Assess patient's ability to carry out ADLs; assess patient's baseline for ADL function and identify physical deficits which impact ability to perform ADLs (bathing, care of mouth/teeth, toileting, grooming, dressing, etc )  - Assess/evaluate cause of self-care deficits   - Assess range of motion  - Assess patient's mobility; develop plan if impaired  - Assess patient's need for assistive devices and provide as appropriate  - Encourage maximum independence but intervene and supervise when necessary  ¯ Involve family in performance of ADLs  ¯ Assess for home care needs following discharge   ¯ Request OT consult to assist with ADL evaluation and planning for discharge  ¯ Provide patient education as appropriate  Outcome: Progressing  Goal: Maintain or return mobility status to optimal level  Description  INTERVENTIONS:  - Assess patient's baseline mobility status (ambulation, transfers, stairs, etc )    - Identify cognitive and physical deficits and behaviors that affect mobility  - Identify mobility aids required to assist with transfers and/or ambulation (gait belt, sit-to-stand, lift, walker, cane, etc )  - [place] fall precautions as indicated by assessment  - Record patient progress and toleration of activity level on Mobility SBAR; progress patient to next Phase/Stage  - Instruct patient to call for assistance with activity based on assessment  - Request Rehabilitation consult to assist with strengthening/weightbearing, etc   Outcome: Progressing     Problem: DISCHARGE PLANNING  Goal: Discharge to home or other facility with appropriate resources  Description  INTERVENTIONS:  - Identify barriers to discharge w/patient and caregiver  - Arrange for needed discharge resources and transportation as appropriate  - Identify discharge learning needs (meds, wound care, etc )  - Arrange for interpretive services to assist at discharge as needed  - Refer to Case Management Department for coordinating discharge planning if the patient needs post-hospital services based on physician/advanced practitioner order or complex needs related to functional status, cognitive ability, or social support system  Outcome: Progressing     Problem: Knowledge Deficit  Goal: Patient/family/caregiver demonstrates understanding of disease process, treatment plan, medications, and discharge instructions  Description  Complete learning assessment and assess knowledge base  Interventions:  - Provide teaching at level of understanding  - Provide teaching via preferred learning methods  Outcome: Progressing     Problem: Nutrition/Hydration-ADULT  Goal: Nutrient/Hydration intake appropriate for improving, restoring or maintaining nutritional needs  Description  Monitor and assess patient's nutrition/hydration status for malnutrition (ex- brittle hair, bruises, dry skin, pale skin and conjunctiva, muscle wasting, smooth red tongue, and disorientation)  Collaborate with interdisciplinary team and initiate plan and interventions as ordered  Monitor patient's weight and dietary intake as ordered or per policy  Utilize nutrition screening tool and intervene per policy  Determine patient's food preferences and provide high-protein, high-caloric foods as appropriate       INTERVENTIONS:  - Monitor oral intake, urinary output, labs, and treatment plans  - Assess nutrition and hydration status and recommend course of action  - Evaluate amount of meals eaten  - Assist patient with eating if necessary   - Allow adequate time for meals  - Recommend/ encourage appropriate diets, oral nutritional supplements, and vitamin/mineral supplements  - Order, calculate, and assess calorie counts as needed  - Recommend, monitor, and adjust tube feedings and TPN/PPN based on assessed needs  - Assess need for intravenous fluids  - Provide specific nutrition/hydration education as appropriate  - Include patient/family/caregiver in decisions related to nutrition  Outcome: Progressing

## 2019-03-13 NOTE — RESTORATIVE TECHNICIAN NOTE
Restorative Specialist Mobility Note       Activity: Ambulate in [place], Chair     Assistive Device: Front wheel walker

## 2019-03-14 LAB
ANION GAP SERPL CALCULATED.3IONS-SCNC: 11 MMOL/L (ref 4–13)
BUN SERPL-MCNC: 94 MG/DL (ref 5–25)
CALCIUM SERPL-MCNC: 8.5 MG/DL (ref 8.3–10.1)
CHLORIDE SERPL-SCNC: 94 MMOL/L (ref 100–108)
CO2 SERPL-SCNC: 27 MMOL/L (ref 21–32)
CREAT SERPL-MCNC: 4.35 MG/DL (ref 0.6–1.3)
GFR SERPL CREATININE-BSD FRML MDRD: 15 ML/MIN/1.73SQ M
GLUCOSE SERPL-MCNC: 182 MG/DL (ref 65–140)
GLUCOSE SERPL-MCNC: 198 MG/DL (ref 65–140)
GLUCOSE SERPL-MCNC: 77 MG/DL (ref 65–140)
GLUCOSE SERPL-MCNC: 81 MG/DL (ref 65–140)
GLUCOSE SERPL-MCNC: 90 MG/DL (ref 65–140)
INR PPP: 3.15 (ref 0.86–1.17)
POTASSIUM SERPL-SCNC: 3.1 MMOL/L (ref 3.5–5.3)
PROTHROMBIN TIME: 32.4 SECONDS (ref 11.8–14.2)
SODIUM SERPL-SCNC: 132 MMOL/L (ref 136–145)

## 2019-03-14 PROCEDURE — 97530 THERAPEUTIC ACTIVITIES: CPT

## 2019-03-14 PROCEDURE — 99024 POSTOP FOLLOW-UP VISIT: CPT | Performed by: THORACIC SURGERY (CARDIOTHORACIC VASCULAR SURGERY)

## 2019-03-14 PROCEDURE — 99232 SBSQ HOSP IP/OBS MODERATE 35: CPT | Performed by: INTERNAL MEDICINE

## 2019-03-14 PROCEDURE — 85610 PROTHROMBIN TIME: CPT | Performed by: PHYSICIAN ASSISTANT

## 2019-03-14 PROCEDURE — 82948 REAGENT STRIP/BLOOD GLUCOSE: CPT

## 2019-03-14 PROCEDURE — 97535 SELF CARE MNGMENT TRAINING: CPT

## 2019-03-14 PROCEDURE — 80048 BASIC METABOLIC PNL TOTAL CA: CPT | Performed by: PHYSICIAN ASSISTANT

## 2019-03-14 RX ORDER — POTASSIUM CHLORIDE 20 MEQ/1
40 TABLET, EXTENDED RELEASE ORAL ONCE
Status: COMPLETED | OUTPATIENT
Start: 2019-03-14 | End: 2019-03-14

## 2019-03-14 RX ORDER — WARFARIN SODIUM 1 MG/1
1 TABLET ORAL
Status: DISCONTINUED | OUTPATIENT
Start: 2019-03-14 | End: 2019-03-14

## 2019-03-14 RX ORDER — POTASSIUM CHLORIDE 20 MEQ/1
20 TABLET, EXTENDED RELEASE ORAL 2 TIMES DAILY
Status: DISCONTINUED | OUTPATIENT
Start: 2019-03-14 | End: 2019-03-15 | Stop reason: HOSPADM

## 2019-03-14 RX ORDER — FUROSEMIDE 40 MG/1
40 TABLET ORAL
Status: DISCONTINUED | OUTPATIENT
Start: 2019-03-14 | End: 2019-03-15

## 2019-03-14 RX ORDER — FUROSEMIDE 10 MG/ML
40 INJECTION INTRAMUSCULAR; INTRAVENOUS
Status: DISCONTINUED | OUTPATIENT
Start: 2019-03-14 | End: 2019-03-14

## 2019-03-14 RX ADMIN — TRAVOPROST 1 DROP: 0.04 SOLUTION/ DROPS OPHTHALMIC at 21:47

## 2019-03-14 RX ADMIN — AMIODARONE HYDROCHLORIDE 200 MG: 200 TABLET ORAL at 13:12

## 2019-03-14 RX ADMIN — INSULIN LISPRO 4 UNITS: 100 INJECTION, SOLUTION INTRAVENOUS; SUBCUTANEOUS at 17:58

## 2019-03-14 RX ADMIN — POTASSIUM CHLORIDE 40 MEQ: 1500 TABLET, EXTENDED RELEASE ORAL at 13:11

## 2019-03-14 RX ADMIN — METOPROLOL TARTRATE 75 MG: 50 TABLET, FILM COATED ORAL at 21:50

## 2019-03-14 RX ADMIN — INSULIN GLARGINE 16 UNITS: 100 INJECTION, SOLUTION SUBCUTANEOUS at 21:45

## 2019-03-14 RX ADMIN — OXYCODONE HYDROCHLORIDE AND ACETAMINOPHEN 1 TABLET: 5; 325 TABLET ORAL at 13:17

## 2019-03-14 RX ADMIN — METOPROLOL TARTRATE 75 MG: 50 TABLET, FILM COATED ORAL at 08:58

## 2019-03-14 RX ADMIN — INSULIN LISPRO 4 UNITS: 100 INJECTION, SOLUTION INTRAVENOUS; SUBCUTANEOUS at 08:55

## 2019-03-14 RX ADMIN — INSULIN LISPRO 1 UNITS: 100 INJECTION, SOLUTION INTRAVENOUS; SUBCUTANEOUS at 21:44

## 2019-03-14 RX ADMIN — AMIODARONE HYDROCHLORIDE 200 MG: 200 TABLET ORAL at 21:43

## 2019-03-14 RX ADMIN — ATORVASTATIN CALCIUM 20 MG: 20 TABLET, FILM COATED ORAL at 17:37

## 2019-03-14 RX ADMIN — MUPIROCIN 1 APPLICATION: 20 OINTMENT TOPICAL at 09:02

## 2019-03-14 RX ADMIN — INSULIN LISPRO 4 UNITS: 100 INJECTION, SOLUTION INTRAVENOUS; SUBCUTANEOUS at 13:10

## 2019-03-14 RX ADMIN — PANTOPRAZOLE SODIUM 40 MG: 40 TABLET, DELAYED RELEASE ORAL at 06:13

## 2019-03-14 RX ADMIN — MUPIROCIN 1 APPLICATION: 20 OINTMENT TOPICAL at 21:46

## 2019-03-14 RX ADMIN — POTASSIUM CHLORIDE 20 MEQ: 1500 TABLET, EXTENDED RELEASE ORAL at 17:36

## 2019-03-14 RX ADMIN — FUROSEMIDE 40 MG: 40 TABLET ORAL at 17:36

## 2019-03-14 RX ADMIN — ASPIRIN 81 MG: 81 TABLET, COATED ORAL at 08:59

## 2019-03-14 RX ADMIN — AMIODARONE HYDROCHLORIDE 200 MG: 200 TABLET ORAL at 06:13

## 2019-03-14 RX ADMIN — POTASSIUM CHLORIDE 20 MEQ: 1500 TABLET, EXTENDED RELEASE ORAL at 09:03

## 2019-03-14 RX ADMIN — FUROSEMIDE 40 MG: 10 INJECTION, SOLUTION INTRAMUSCULAR; INTRAVENOUS at 06:13

## 2019-03-14 RX ADMIN — INSULIN LISPRO 1 UNITS: 100 INJECTION, SOLUTION INTRAVENOUS; SUBCUTANEOUS at 13:09

## 2019-03-14 NOTE — PROGRESS NOTES
Progress Note - Nephrology   Myra Mustafa 80 y o  male MRN: [id number]  Unit/Bed#: [facility] 424-01 Encounter: [id number]      Assessment / Plan:  1  Nonoliguric CARY on CKD stage 3 with baseline creatinine 1 6 to 1 8, follows with Dr Cassi Nichols  -creatinine 1 9 on admission, has risen up to 4 5-4 6 but is starting to downtrend  -monitor renal indices on lasix 40mg po BID  -CARY likely secondary to ischemic ATN with intraoperative hypotension episodes, cardiopulmonary bypass time 96 minutes, cross-clamp time 87 minutes  -HD consent on file if needed  -f/u BMP in a m , UOP improving  Expect ongoing recovery      2  Volume overload  -improving on oral diuresis as above  -clinically patient does not seem to be in respiratory distress      3  Biopsy-proven FSGS  -this was thought to be secondary to secondary FSGS and only 15% foot process effacement   Since renal biopsy results, patient has been trying to lose weight   -he was on ARB due to significant proteinuria   Currently holding ARB due to CARY      4  Proteinuria d/t FSGS  -last UPC ratio 4 g  -will need eventual ARB which can be restarted as outpatient      5  Mild metabolic acidosis with worsening renal failure  -resolved     6  Severe aortic insufficiency/MR  -status post AVR, mitral valve repair, VIRI ligation - per ICU team     7  Hyponatremia - sNa has dropped from 135 to 132 and stable  Continue to monitor on oral diuresis  Would hold diuretics if low BP noted      8  Anemia - Hgb stable in 10s  Thrombocytopenia has resolved      9  Hypokalemia - likely diuretic induced, monitor mag/K, on daily K repletion  Subjective:   He feels well  No complaints  Denies chest pain or shortness of Breath  Objective:     Vitals: Blood pressure 107/73, pulse 62, temperature 97 9 °F (36 6 °C), temperature source Oral, resp  rate 18, height 5' 6" (1 676 m), weight 68 5 kg (151 lb 0 2 oz), SpO2 99 %  ,Body mass index is 24 37 kg/m²  Temp (24hrs), Av 7 °F (36 5 °C), Min:97 4 °F (36 3 °C), Max:98 °F (36 7 °C)      Weight (last 2 days)     Date/Time   Weight    03/14/19 0600   68 5 (151 02)    03/13/19 0644   70 6 (155 65)    03/12/19 0550   71 7 (158 07)                Intake/Output Summary (Last 24 hours) at 3/14/2019 1215  Last data filed at 3/14/2019 0920  Gross per 24 hour   Intake 910 ml   Output 2775 ml   Net -1865 ml     I/O last 24 hours: In: 1030 [P O :1030]  Out: 3475 [Urine:3475]        Physical Exam:   Physical Exam   Constitutional: He appears well-developed and well-nourished  No distress  HENT:   Head: Normocephalic and atraumatic  Mouth/Throat: No oropharyngeal exudate  Eyes: Right eye exhibits no discharge  Left eye exhibits no discharge  No scleral icterus  Neck: Neck supple  Cardiovascular: Normal rate, regular rhythm and normal heart sounds  Pulmonary/Chest: Effort normal and breath sounds normal  He has no wheezes  He has no rales  Abdominal: Soft  Bowel sounds are normal  He exhibits no distension  There is no tenderness  Musculoskeletal: Normal range of motion  He exhibits no edema  Neurological: He is alert  awake   Skin: Skin is warm and dry  No rash noted  He is not diaphoretic  Vertical chest incision c/d/i   Psychiatric: He has a normal mood and affect  His behavior is normal    Vitals reviewed        Invasive Devices     Peripheral Intravenous Line            Peripheral IV 03/11/19 Left Forearm 2 days                Medications:    Scheduled Meds:  Current Facility-Administered Medications:  acetaminophen 650 mg Oral Q4H PRN Tawanda Almaguer PA-C   amiodarone 200 mg Oral [facility] Tawanda Almaguer PA-C   aspirin 81 mg Oral Daily Tawanda Almaguer PA-C   atorvastatin 20 mg Oral Daily [employer] YINA Fuchs   bisacodyl 10 mg Rectal Daily PRN Tawanda Almaguer PA-C   docusate sodium 100 mg Oral BID Tawanda Almaguer PA-C   furosemide 40 mg Oral BID (diuretic) Hudson Roger MD   insulin glargine 16 Units Subcutaneous [facility] MD Jose Eduardo   insulin lispro 1-5 Units Subcutaneous TID AC Norwood Ashing, PA-C   insulin lispro 1-5 Units Subcutaneous HS Jose Ashing, PA-C   insulin lispro 4 Units Subcutaneous TID With Meals Paco Zendejas MD   metoprolol tartrate 75 mg Oral Q12H [address] Jose Ashing, PA-NIC   mupirocin 1 application Nasal I31H [address] Norwood Ashing, PA-C   ondansetron 4 mg Intravenous Q6H PRN Jose Ashing, PA-C   oxyCODONE-acetaminophen 1 tablet Oral Q4H PRN Norwood Ashing, PA-C   oxyCODONE-acetaminophen 2 tablet Oral Q6H PRN Jose Ashing, PA-C   pantoprazole 40 mg Oral Daily Before Breakfast Jose Ashing, PA-C   polyethylene glycol 17 g Oral Daily Jose Ashing, PA-C   potassium chloride 20 mEq Oral BID Jose Ashing, PA-C   potassium chloride 40 mEq Oral Once Nayeli Valdez MD   temazepam 15 mg Oral HS PRN Jose Ashing, YINA   travoprost 1 drop Both Eyes HS Norwood Ashing, PA-C       PRN Meds:   acetaminophen    bisacodyl    ondansetron    oxyCODONE-acetaminophen    oxyCODONE-acetaminophen    temazepam    Continuous Infusions:         LAB RESULTS:      Results from last 7 days   Lab Units 03/14/19  0518 03/13/19  0547 03/12/19  1451 03/12/19  0448 03/12/19  0000 03/11/19  1650 03/11/19  1301 03/11/19  0530 03/11/19  0453  03/10/19  1836  03/10/19  0512 03/10/19  0504 03/10/19  0451  03/09/19  1556 03/09/19  1354  03/09/19  0458  03/09/19 03/08/19  0346 03/07/19  2037  03/07/19  1213 03/07/19  1204   WBC [place]/uL  --  12 60*  --  12 02*  --   --   --  11 15*  --   --   --   --   --   --  12 74*  --   --   --   --   --   --  16 89*  --  18 50*  --   --   --   --    HEMOGLOBIN g/dL  --  10 4*  --  9 7*  --   --   --  9 3*  --   --   --   --   --   --  9 5*  --  9 5*  --   --   --   --  9 9*  --  11 1* 11 7*  --   --  11 9*   I STAT HEMOGLOBIN g/dl  --   --   --   --   --   --   --   --   --   --   --   --   --   --   --   --   --   --   --   --   --   --   --   --   --   --  10 9*  --    HEMATOCRIT %  --  30 0* --  27 3*  --   --   --  27 2*  --   --   --   --   --   --  27 4*  --   --   --   --   --   --  29 4*  --  33 2* 34 6*  --   --  35 0*   HEMATOCRIT, ISTAT %  --   --   --   --   --   --   --   --   --   --   --   --   --   --   --   --   --   --   --   --   --   --   --   --   --   --  32*  --    PLATELETS [place]/uL  --  165  --  128*  --   --   --  101*  --   --   --   --   --   --  85*  --   --   --   --   --   --  73*  --  131*  --   --   --  144*   POTASSIUM mmol/L 3 1* 3 4* 3 8 4 2 3 7 3 7 3 9  --  3 9   < > 3 9   < > 4 5  --   --    < >  --  4 5   < > 5 1   < >  --    < > 5 1 4 4   < >  --  4 3   CHLORIDE mmol/L 94* 94*  --  98*  --   --   --   --  100  --  101  --  103  --   --   --   --  105  --  107  --   --    < > 111*  --   --   --  112*   CO2 mmol/L 27 27  --  22  --   --   --   --  23  --  22  --  23  --   --   --   --  18*  --  21  --   --    < > 22  --   --   --  23   CO2, I-STAT mmol/L  --   --   --   --   --   --   --   --   --   --   --   --   --   --   --   --   --   --   --   --   --   --   --   --   --   --  24  --    BUN mg/dL 94* 93*  --  76*  --   --   --   --  68*  --  62*  --  57*  --   --   --   --  49*  --  45*  --   --    < > 34*  --   --   --  25   CREATININE mg/dL 4 35* 4 63*  --  4 61*  --   --   --   --  4 63*  --  4 49*  --  4 25*  --   --   --   --  3 89*  --  3 48*  --   --    < > 2 54*  --   --   --  1 94*   CALCIUM mg/dL 8 5 8 5  --  8 0*  --   --   --   --  8 1*  --  7 9*  --  8 2*  --   --   --   --  8 2*  --  8 3  --   --    < > 8 2*  --   --   --  8 0*   ALK PHOS U/L  --   --   --   --   --   --   --   --   --   --   --   --  53  --   --   --   --   --   --   --   --   --   --   --   --   --   --   --    ALT U/L  --   --   --   --   --   --   --   --   --   --   --   --  <6*  --   --   --   --   --   --   --   --   --   --   --   --   --   --   --    AST U/L  --   --   --   --   --   --   --   --   --   --   --   --  52*  --   --   --   --   --   --   --   --   --   -- --   --   --   --   --    GLUCOSE, ISTAT mg/dl  --   --   --   --   --   --   --   --   --   --   --   --   --   --   --   --   --   --   --   --   --   --   --   --   --   --  110  --    MAGNESIUM mg/dL  --   --   --  2 3  --   --   --   --  2 5  --   --   --   --  2 4  --   --   --   --   --  2 8*  --   --   --  3 1*  --   --   --   --    PHOSPHORUS mg/dL  --   --   --   --   --   --   --   --  6 3*  --   --   --   --  5 0*  --   --   --   --   --  5 4*  --   --   --   --   --   --   --   --     < > = values in this interval not displayed  CUTURES:  No results found for: Celine Johnston              Portions of the record may have been created with voice recognition software  Occasional wrong word or "sound a like" substitutions may have occurred due to the inherent limitations of voice recognition software  Read the chart carefully and recognize, using context, where substitutions have occurred  If you have any questions, please contact the dictating provider

## 2019-03-14 NOTE — OCCUPATIONAL THERAPY NOTE
Occupational Therapy Treatment Note:       03/14/19 1037   Restrictions/Precautions   Other Precautions Cardiac/sternal;Telemetry; Fall Risk   Pain Assessment   Pain Assessment No/denies pain   Pain Score No Pain   ADL   Where Assessed Chair   Eating Assistance 6  Modified independent   Grooming Assistance 5  Supervision/Setup   Grooming Deficit Setup; Wash/dry hands; Wash/dry face; Teeth care   UB Bathing Assistance 4  Minimal Assistance   UB Bathing Deficit Setup;Supervision/safety; Increased time to complete   LB Bathing Assistance 4  Minimal Assistance   LB Bathing Deficit Setup;Verbal cueing;Supervision/safety; Increased time to complete;Right lower leg including foot; Left lower leg including foot   UB Dressing Assistance 4  Minimal Assistance   UB Dressing Deficit Setup;Verbal cueing; Increased time to complete   LB Dressing Assistance 4  Minimal Assistance   LB Dressing Deficit Setup;Verbal cueing;Supervision/safety; Increased time to complete; Don/doff R sock; Don/doff L sock   Toileting Assistance  4  Minimal Assistance   Toileting Deficit Setup;Verbal cueing;Supervison/safety; Increased time to complete   Transfers   Toilet transfer 4  Minimal assistance   Additional items Assist x 1; Increased time required;Verbal cues; Commode   Functional Mobility   Functional Mobility 4  Minimal assistance   Additional items Rolling walker   Cognition   Overall Cognitive Status WFL   Arousal/Participation Alert; Cooperative   Attention Within functional limits   Orientation Level Oriented X4   Memory Decreased recall of precautions   Following Commands Follows multistep commands with increased time or repetition   Comments min repetition with new learning i e  cardiac precautions/adaptive techniques   Activity Tolerance   Activity Tolerance Patient limited by fatigue   Medical Staff Made Aware ok to see per RN Jesus Paci   Assessment   Assessment Patient participated in skilled OT with focus on ADL skills, bathing, dressing, transfer skills, use of DME/adaptive devices/adaptive techniques as related to cardiac precautions and energy conservation  Patient presents in recliner agreeable to engage in OT  Patient identified by name and   Patient issued Cardiac Packet and reviewed in detail as applicable to his daily routine  Patient gave specific examples of activities which he had questions about  Patient was very motivated with learning these techniques and applying to his daily ADL skills  Patient would benefit from Home OT with focus on increasing functional strength and endurance, functional independence with transfer skills, increase independence with ADl/self care tasks for carryover into his home enviroment  Plan   Treatment Interventions ADL retraining; Endurance training; Compensatory technique education   Goal Expiration Date 19   Treatment Day 1   OT Frequency 3-5x/wk   Recommendation   OT Discharge Recommendation Home OT   Equipment Recommended Bedside commode   OT - OK to Discharge   (when medically cleared)   Mickey Hilton

## 2019-03-14 NOTE — PLAN OF CARE
Problem: Prexisting or High Potential for Compromised Skin Integrity  Goal: Skin integrity is maintained or improved  Description  INTERVENTIONS:  - Identify patients at risk for skin breakdown  - Assess and monitor skin integrity  - Assess and monitor nutrition and hydration status  - Monitor labs (i e  albumin)  - Assess for incontinence   - Turn and reposition patient  - Assist with mobility/ambulation  - Relieve pressure over bony prominences  - Avoid friction and shearing  - Provide appropriate hygiene as needed including keeping skin clean and dry  - Evaluate need for skin moisturizer/barrier cream  - Collaborate with interdisciplinary team (i e  Nutrition, Rehabilitation, etc )   - Patient/family teaching  Outcome: Progressing     Problem: Potential for Falls  Goal: Patient will remain free of falls  Description  INTERVENTIONS:  - Assess patient frequently for physical needs  -  Identify cognitive and physical deficits and behaviors that affect risk of falls    -  [place] fall precautions as indicated by assessment   - Educate patient/family on patient safety including physical limitations  - Instruct patient to call for assistance with activity based on assessment  - Modify environment to reduce risk of injury  - Consider OT/PT consult to assist with strengthening/mobility  Outcome: Progressing     Problem: DISCHARGE PLANNING - CARE MANAGEMENT  Goal: Discharge to post-acute care or home with appropriate resources  Description  INTERVENTIONS:  - Conduct assessment to determine patient/family and health care team treatment goals, and need for post-acute services based on payer coverage, community resources, and patient preferences, and barriers to discharge  - Address psychosocial, clinical, and financial barriers to discharge as identified in assessment in conjunction with the patient/family and health care team  - Arrange appropriate level of post-acute services according to patient?s   needs and preference and payer coverage in collaboration with the physician and health care team  - Communicate with and update the patient/family, physician, and health care team regarding progress on the discharge plan  - Arrange appropriate transportation to post-acute venues  Outcome: Progressing     Problem: PAIN - ADULT  Goal: Verbalizes/displays adequate comfort level or baseline comfort level  Description  Interventions:  - Encourage patient to monitor pain and request assistance  - Assess pain using appropriate pain scale  - Administer analgesics based on type and severity of pain and evaluate response  - Implement non-pharmacological measures as appropriate and evaluate response  - Consider cultural and social influences on pain and pain management  - Notify physician/advanced practitioner if interventions unsuccessful or patient reports new pain  Outcome: Progressing     Problem: INFECTION - ADULT  Goal: Absence or prevention of progression during hospitalization  Description  INTERVENTIONS:  - Assess and monitor for signs and symptoms of infection  - Monitor lab/diagnostic results  - Monitor all insertion sites, i e  indwelling lines, tubes, and drains  - Monitor endotracheal (as able) and nasal secretions for changes in amount and color  - [place] appropriate cooling/warming therapies per order  - Administer medications as ordered  - Instruct and encourage patient and family to use good hand hygiene technique  - Identify and instruct in appropriate isolation precautions for identified infection/condition  Outcome: Progressing  Goal: Absence of fever/infection during neutropenic period  Description  INTERVENTIONS:  - Monitor WBC  - Implement neutropenic guidelines  Outcome: Progressing     Problem: SAFETY ADULT  Goal: Maintain or return to baseline ADL function  Description  INTERVENTIONS:  -  Assess patient's ability to carry out ADLs; assess patient's baseline for ADL function and identify physical deficits which impact ability to perform ADLs (bathing, care of mouth/teeth, toileting, grooming, dressing, etc )  - Assess/evaluate cause of self-care deficits   - Assess range of motion  - Assess patient's mobility; develop plan if impaired  - Assess patient's need for assistive devices and provide as appropriate  - Encourage maximum independence but intervene and supervise when necessary  ¯ Involve family in performance of ADLs  ¯ Assess for home care needs following discharge   ¯ Request OT consult to assist with ADL evaluation and planning for discharge  ¯ Provide patient education as appropriate  Outcome: Progressing  Goal: Maintain or return mobility status to optimal level  Description  INTERVENTIONS:  - Assess patient's baseline mobility status (ambulation, transfers, stairs, etc )    - Identify cognitive and physical deficits and behaviors that affect mobility  - Identify mobility aids required to assist with transfers and/or ambulation (gait belt, sit-to-stand, lift, walker, cane, etc )  - [place] fall precautions as indicated by assessment  - Record patient progress and toleration of activity level on Mobility SBAR; progress patient to next Phase/Stage  - Instruct patient to call for assistance with activity based on assessment  - Request Rehabilitation consult to assist with strengthening/weightbearing, etc   Outcome: Progressing     Problem: DISCHARGE PLANNING  Goal: Discharge to home or other facility with appropriate resources  Description  INTERVENTIONS:  - Identify barriers to discharge w/patient and caregiver  - Arrange for needed discharge resources and transportation as appropriate  - Identify discharge learning needs (meds, wound care, etc )  - Arrange for interpretive services to assist at discharge as needed  - Refer to Case Management Department for coordinating discharge planning if the patient needs post-hospital services based on physician/advanced practitioner order or complex needs related to functional status, cognitive ability, or social support system  Outcome: Progressing     Problem: Knowledge Deficit  Goal: Patient/family/caregiver demonstrates understanding of disease process, treatment plan, medications, and discharge instructions  Description  Complete learning assessment and assess knowledge base  Interventions:  - Provide teaching at level of understanding  - Provide teaching via preferred learning methods  Outcome: Progressing     Problem: Nutrition/Hydration-ADULT  Goal: Nutrient/Hydration intake appropriate for improving, restoring or maintaining nutritional needs  Description  Monitor and assess patient's nutrition/hydration status for malnutrition (ex- brittle hair, bruises, dry skin, pale skin and conjunctiva, muscle wasting, smooth red tongue, and disorientation)  Collaborate with interdisciplinary team and initiate plan and interventions as ordered  Monitor patient's weight and dietary intake as ordered or per policy  Utilize nutrition screening tool and intervene per policy  Determine patient's food preferences and provide high-protein, high-caloric foods as appropriate       INTERVENTIONS:  - Monitor oral intake, urinary output, labs, and treatment plans  - Assess nutrition and hydration status and recommend course of action  - Evaluate amount of meals eaten  - Assist patient with eating if necessary   - Allow adequate time for meals  - Recommend/ encourage appropriate diets, oral nutritional supplements, and vitamin/mineral supplements  - Order, calculate, and assess calorie counts as needed  - Recommend, monitor, and adjust tube feedings and TPN/PPN based on assessed needs  - Assess need for intravenous fluids  - Provide specific nutrition/hydration education as appropriate  - Include patient/family/caregiver in decisions related to nutrition  Outcome: Progressing     Problem: CARDIOVASCULAR - ADULT  Goal: Maintains optimal cardiac output and hemodynamic stability  Description  INTERVENTIONS:  - Monitor I/O, vital signs and rhythm  - Monitor for S/S and trends of decreased cardiac output i e  bleeding, hypotension  - Administer and titrate ordered vasoactive medications to optimize hemodynamic stability  - Assess quality of pulses, skin color and temperature  - Assess for signs of decreased coronary artery perfusion - ex   Angina  - Instruct patient to report change in severity of symptoms  Outcome: Progressing  Goal: Absence of cardiac dysrhythmias or at baseline rhythm  Description  INTERVENTIONS:  - Continuous cardiac monitoring, monitor vital signs, obtain 12 lead EKG if indicated  - Administer antiarrhythmic and heart rate control medications as ordered  - Monitor electrolytes and administer replacement therapy as ordered  Outcome: Progressing     Problem: GENITOURINARY - ADULT  Goal: Maintains or returns to baseline urinary function  Description  INTERVENTIONS:  - Assess urinary function  - Encourage oral fluids to ensure adequate hydration  - Administer IV fluids as ordered to ensure adequate hydration  - Administer ordered medications as needed  - Offer frequent toileting  - Follow urinary retention protocol if ordered  Outcome: Progressing  Goal: Absence of urinary retention  Description  INTERVENTIONS:  - Assess patient?s ability to void and empty bladder  - Monitor I/O  - Bladder scan as needed  - Discuss with physician/AP medications to alleviate retention as needed  - Discuss catheterization for long term situations as appropriate  Outcome: Progressing

## 2019-03-14 NOTE — PROGRESS NOTES
Progress Note - Cardiothoracic Surgery   [facility] 80 y o  male MRN: [id number]  Unit/Bed#: [facility] 424-01 Encounter: [id number]    Aortic regurgitation, Mitral regurgitation  S/P AVR (23mm Inspiris)/MV repairwith 30 mm Medtronic CG Future mitral annuloplasty ring /VIRI ligation; POD # 7      24 Hour Events: No events  Pain well-controlled  Breathing comfortably        Medications:   Scheduled Meds:    Current Facility-Administered Medications:  acetaminophen 650 mg Oral Q4H PRN Arsen Causey PA-C   amiodarone 200 mg Oral [facility] Arsen Causey PA-C   aspirin 81 mg Oral Daily Arsen Causey PA-C   atorvastatin 20 mg Oral Daily With [employer] Shila, PA-C   bisacodyl 10 mg Rectal Daily PRN Arsen Causey PA-C   docusate sodium 100 mg Oral BID Arsen Causey PA-C   furosemide 40 mg Intravenous TID (diuretic) Arsen Causey PA-C   insulin glargine 16 Units Subcutaneous HS Lauren Palmer MD   insulin lispro 1-5 Units Subcutaneous TID AC Arsen Causey PA-C   insulin lispro 1-5 Units Subcutaneous HS Arsen Causey PA-C   insulin lispro 4 Units Subcutaneous TID With Meals Lauren Palmer MD   metoprolol tartrate 75 mg Oral Q12H [facility] Arsen Causey PA-C   mupirocin 1 application Nasal T19I [facility] Arsen Causey PA-C   ondansetron 4 mg Intravenous Q6H PRN Arsen Causey PA-C   oxyCODONE-acetaminophen 1 tablet Oral Q4H PRN Arsen Causey PA-C   oxyCODONE-acetaminophen 2 tablet Oral Q6H PRN Arsen Causey PA-C   pantoprazole 40 mg Oral Daily Before Breakfast Arsen Causey PA-C   polyethylene glycol 17 g Oral Daily Arsen Causey PA-C   potassium chloride 20 mEq Oral Daily Arsen Causey PA-C   temazepam 15 mg Oral HS PRN Arsen Causey PA-C   travoprost 1 drop Both Eyes HS Arsen Causey PA-C     Continuous Infusions:   PRN Meds:   acetaminophen    bisacodyl    ondansetron    oxyCODONE-acetaminophen    oxyCODONE-acetaminophen    temazepam    Vitals:   Vitals:    03/13/19 2346 03/14/19 0348 03/14/19 0600 03/14/19 0709   BP: 130/63 119/76  115/65   BP Location: Right arm Right arm  Right arm   Pulse: 83 64  65   Resp: 18 16  18   Temp: 97 8 °F (36 6 °C) 98 °F (36 7 °C)  97 5 °F (36 4 °C)   TempSrc: Oral Oral  Oral   SpO2: 98% 98%  98%   Weight:   68 5 kg (151 lb 0 2 oz)    Height:           Telemetry: Atrial Fibrillation; Heart Rate: 65    Respiratory:   SpO2: SpO2: 98 %, SpO2 Activity: SpO2 Activity: At Rest; Room Air    Intake/Output:   I/O       03/11 0701 - 03/12 0700 03/12 0701 - 03/13 0700 03/13 0701 - 03/14 0700    P  O  420 540     I V  (mL/kg) 777 7 (10 8) 439 8 (6 2)     IV Piggyback 580      Total Intake(mL/kg) 1777 7 (24 8) 979 8 (13 9)     Urine (mL/kg/hr) 4195 (2 4) 3125 (1 8)     Stool 0 0     Chest Tube       Total Output 4195 3125     Net -2417 3 -2145  2            Unmeasured Stool Occurrence 3 x 2 x         I/O: -2065 mL/24 hours      Weights:   Weight (last 2 days)     Date/Time   Weight    03/14/19 0600   68 5 (151 02)    03/13/19 0644   70 6 (155 65)    03/12/19 0550   71 7 (158 07)            Admit weight: 70 8    Results:   Results from last 7 days   Lab Units 03/13/19  0547 03/12/19  0448 03/11/19  0530   WBC Thousand/uL 12 60* 12 02* 11 15*   HEMOGLOBIN g/dL 10 4* 9 7* 9 3*   HEMATOCRIT % 30 0* 27 3* 27 2*   PLATELETS Thousands/uL 165 128* 101*     Results from last 7 days   Lab Units 03/14/19  0518 03/13/19  0547 03/12/19  1451 03/12/19  0448  03/07/19  1213   SODIUM mmol/L 132* 132*  --  131*   < >  --    POTASSIUM mmol/L 3 1* 3 4* 3 8 4 2   < >  --    CHLORIDE mmol/L 94* 94*  --  98*   < >  --    CO2 mmol/L 27 27  --  22   < >  --    CO2, I-STAT mmol/L  --   --   --   --   --  24   BUN mg/dL 94* 93*  --  76*   < >  --    CREATININE mg/dL 4 35* 4 63*  --  4 61*   < >  --    GLUCOSE, ISTAT mg/dl  --   --   --   --   --  110   CALCIUM mg/dL 8 5 8 5  --  8 0*   < >  --     < > = values in this interval not displayed       Results from last 7 days   Lab Units 03/14/19  0519 03/13/19  0547 03/12/19  0448   INR  3 15* 2 99* 2 84*     Coumadin mg 1 2 2 5  2 5 2 5       Point of care glucose:       Invasive Lines/Tubes:  Invasive Devices     Peripheral Intravenous Line            Peripheral IV 03/11/19 Left Forearm 2 days                Physical Exam:    HEENT/NECK:  PERRLA  No jugular venous distention  Cardiac: Regular rate and rhythm  No rubs/murmurs/gallops  Pulmonary:  Breath sounds slightly diminished at the bases bilaterally  Abdomen:  Non-tender, Non-distended  Positive bowel sounds  Incisions: Sternum is stable  Incision is clean, dry, and intact  Lower extremities: Extremities warm/dry  Radial/PT/DP pulses 2+ bilaterally  1+ edema B/L  Neuro: Alert and oriented X 3  Sensation is grossly intact  No focal deficits  Skin: Warm/Dry, without rashes or lesions  Assessment:  Patient Active Problem List   Diagnosis    CKD (chronic kidney disease) stage 3, GFR 30-59 ml/min ([facility]    Persistent proteinuria    Diabetic nephropathy associated with type 2 diabetes mellitus [facility]    Vitamin D deficiency    Secondary hyperparathyroidism ([facility]    FSGS (focal segmental glomerulosclerosis)    Essential hypertension    Chronic systolic congestive heart failure ([facility]    Nonrheumatic aortic valve insufficiency    Non-rheumatic mitral regurgitation    Bicuspid aortic valve    Coronary artery disease involving native coronary artery of native heart without angina pectoris    S/P AVR    S/P MVR (mitral valve repair)    Dilated cardiomyopathy ([facility]    Diabetes mellitus type 2 in nonobese ([facility] )    CARY (acute kidney injury) ([facility] )         Aortic regurgitation, Mitral regurgitation  S/P AVR (23mm Inspiris)/MV repairwith 30 mm Medtronic CG Future mitral annuloplasty ring /VIRI ligation; POD # 7    Plan:    1  Cardiac:   Atrial Fibrillation;  Hypertensive  Tolerating Lopressor 75 mg PO BID   INR 3 15 ; Coumadin 1 mg  Continue ASA and Statin therapy  Epicardial pacing wires out    2  Pulmonary:   Good Room air oxygen saturation; Continue incentive spirometry/Coughing/Deep breathing exercises  Chest tubes have been discontinued    3  Renal:   Intake/Output net: -2000 mL/24 hours  Decrease diuresis   Lasix 40  mg IV BID  Increase Potassium Chloride 20 mEq PO BID  CKD III with baseline creatinine 1 7;   Post op CARY; Creatinine improved 4 35    Check daily BMP    Follow urine output    4  Neuro:  Neurologically intact; No active issues  Incisional pain well-controlled; Continue prn Percocet    5  GI:  Tolerating TLC 2 3 gm sodium diet  Maintain 1800 mL daily fluid restriction   Continue stool softeners and prn suppository  Continue GI prophylaxis    6  Endo:    History of diabetes; Continue SQ insulin therapy as directed by endocrinology physician  Insulin administration teaching for home therapy ordered    7  Hematology:   Post-operative blood count acceptable; Trend prn    8  Disposition:  Ambulating independently, Anticipate discharge to home today vs tomorrow       VTE Pharmacologic Prophylaxis: Sequential compression device (Venodyne)  and Warfarin (Coumadin)  VTE Mechanical Prophylaxis: sequential compression device    Collaborative rounds completed with DAMON Vallejo , and Rosamaria Wiggins RN    SIGNATURE: Love Brody PA-C  DATE: March 14, 2019  TIME: 7:36 AM

## 2019-03-14 NOTE — PLAN OF CARE
Problem: Prexisting or High Potential for Compromised Skin Integrity  Goal: Skin integrity is maintained or improved  Description  INTERVENTIONS:  - Identify patients at risk for skin breakdown  - Assess and monitor skin integrity  - Assess and monitor nutrition and hydration status  - Monitor labs (i e  albumin)  - Assess for incontinence   - Turn and reposition patient  - Assist with mobility/ambulation  - Relieve pressure over bony prominences  - Avoid friction and shearing  - Provide appropriate hygiene as needed including keeping skin clean and dry  - Evaluate need for skin moisturizer/barrier cream  - Collaborate with interdisciplinary team (i e  Nutrition, Rehabilitation, etc )   - Patient/family teaching  Outcome: Progressing     Problem: Potential for Falls  Goal: Patient will remain free of falls  Description  INTERVENTIONS:  - Assess patient frequently for physical needs  -  Identify cognitive and physical deficits and behaviors that affect risk of falls    -  [place] fall precautions as indicated by assessment   - Educate patient/family on patient safety including physical limitations  - Instruct patient to call for assistance with activity based on assessment  - Modify environment to reduce risk of injury  - Consider OT/PT consult to assist with strengthening/mobility  Outcome: Progressing     Problem: DISCHARGE PLANNING - CARE MANAGEMENT  Goal: Discharge to post-acute care or home with appropriate resources  Description  INTERVENTIONS:  - Conduct assessment to determine patient/family and health care team treatment goals, and need for post-acute services based on payer coverage, community resources, and patient preferences, and barriers to discharge  - Address psychosocial, clinical, and financial barriers to discharge as identified in assessment in conjunction with the patient/family and health care team  - Arrange appropriate level of post-acute services according to patient?s   needs and preference and payer coverage in collaboration with the physician and health care team  - Communicate with and update the patient/family, physician, and health care team regarding progress on the discharge plan  - Arrange appropriate transportation to post-acute venues  Outcome: Progressing     Problem: PAIN - ADULT  Goal: Verbalizes/displays adequate comfort level or baseline comfort level  Description  Interventions:  - Encourage patient to monitor pain and request assistance  - Assess pain using appropriate pain scale  - Administer analgesics based on type and severity of pain and evaluate response  - Implement non-pharmacological measures as appropriate and evaluate response  - Consider cultural and social influences on pain and pain management  - Notify physician/advanced practitioner if interventions unsuccessful or patient reports new pain  Outcome: Progressing     Problem: INFECTION - ADULT  Goal: Absence or prevention of progression during hospitalization  Description  INTERVENTIONS:  - Assess and monitor for signs and symptoms of infection  - Monitor lab/diagnostic results  - Monitor all insertion sites, i e  indwelling lines, tubes, and drains  - Monitor endotracheal (as able) and nasal secretions for changes in amount and color  - [place] appropriate cooling/warming therapies per order  - Administer medications as ordered  - Instruct and encourage patient and family to use good hand hygiene technique  - Identify and instruct in appropriate isolation precautions for identified infection/condition  Outcome: Progressing  Goal: Absence of fever/infection during neutropenic period  Description  INTERVENTIONS:  - Monitor WBC  - Implement neutropenic guidelines  Outcome: Progressing     Problem: SAFETY ADULT  Goal: Maintain or return to baseline ADL function  Description  INTERVENTIONS:  -  Assess patient's ability to carry out ADLs; assess patient's baseline for ADL function and identify physical deficits which impact ability to perform ADLs (bathing, care of mouth/teeth, toileting, grooming, dressing, etc )  - Assess/evaluate cause of self-care deficits   - Assess range of motion  - Assess patient's mobility; develop plan if impaired  - Assess patient's need for assistive devices and provide as appropriate  - Encourage maximum independence but intervene and supervise when necessary  ¯ Involve family in performance of ADLs  ¯ Assess for home care needs following discharge   ¯ Request OT consult to assist with ADL evaluation and planning for discharge  ¯ Provide patient education as appropriate  Outcome: Progressing  Goal: Maintain or return mobility status to optimal level  Description  INTERVENTIONS:  - Assess patient's baseline mobility status (ambulation, transfers, stairs, etc )    - Identify cognitive and physical deficits and behaviors that affect mobility  - Identify mobility aids required to assist with transfers and/or ambulation (gait belt, sit-to-stand, lift, walker, cane, etc )  - [place] fall precautions as indicated by assessment  - Record patient progress and toleration of activity level on Mobility SBAR; progress patient to next Phase/Stage  - Instruct patient to call for assistance with activity based on assessment  - Request Rehabilitation consult to assist with strengthening/weightbearing, etc   Outcome: Progressing     Problem: DISCHARGE PLANNING  Goal: Discharge to home or other facility with appropriate resources  Description  INTERVENTIONS:  - Identify barriers to discharge w/patient and caregiver  - Arrange for needed discharge resources and transportation as appropriate  - Identify discharge learning needs (meds, wound care, etc )  - Arrange for interpretive services to assist at discharge as needed  - Refer to Case Management Department for coordinating discharge planning if the patient needs post-hospital services based on physician/advanced practitioner order or complex needs related to functional status, cognitive ability, or social support system  Outcome: Progressing     Problem: Knowledge Deficit  Goal: Patient/family/caregiver demonstrates understanding of disease process, treatment plan, medications, and discharge instructions  Description  Complete learning assessment and assess knowledge base  Interventions:  - Provide teaching at level of understanding  - Provide teaching via preferred learning methods  Outcome: Progressing     Problem: Nutrition/Hydration-ADULT  Goal: Nutrient/Hydration intake appropriate for improving, restoring or maintaining nutritional needs  Description  Monitor and assess patient's nutrition/hydration status for malnutrition (ex- brittle hair, bruises, dry skin, pale skin and conjunctiva, muscle wasting, smooth red tongue, and disorientation)  Collaborate with interdisciplinary team and initiate plan and interventions as ordered  Monitor patient's weight and dietary intake as ordered or per policy  Utilize nutrition screening tool and intervene per policy  Determine patient's food preferences and provide high-protein, high-caloric foods as appropriate       INTERVENTIONS:  - Monitor oral intake, urinary output, labs, and treatment plans  - Assess nutrition and hydration status and recommend course of action  - Evaluate amount of meals eaten  - Assist patient with eating if necessary   - Allow adequate time for meals  - Recommend/ encourage appropriate diets, oral nutritional supplements, and vitamin/mineral supplements  - Order, calculate, and assess calorie counts as needed  - Recommend, monitor, and adjust tube feedings and TPN/PPN based on assessed needs  - Assess need for intravenous fluids  - Provide specific nutrition/hydration education as appropriate  - Include patient/family/caregiver in decisions related to nutrition  Outcome: Progressing     Problem: CARDIOVASCULAR - ADULT  Goal: Maintains optimal cardiac output and hemodynamic stability  Description  INTERVENTIONS:  - Monitor I/O, vital signs and rhythm  - Monitor for S/S and trends of decreased cardiac output i e  bleeding, hypotension  - Administer and titrate ordered vasoactive medications to optimize hemodynamic stability  - Assess quality of pulses, skin color and temperature  - Assess for signs of decreased coronary artery perfusion - ex   Angina  - Instruct patient to report change in severity of symptoms  Outcome: Progressing  Goal: Absence of cardiac dysrhythmias or at baseline rhythm  Description  INTERVENTIONS:  - Continuous cardiac monitoring, monitor vital signs, obtain 12 lead EKG if indicated  - Administer antiarrhythmic and heart rate control medications as ordered  - Monitor electrolytes and administer replacement therapy as ordered  Outcome: Progressing     Problem: GENITOURINARY - ADULT  Goal: Maintains or returns to baseline urinary function  Description  INTERVENTIONS:  - Assess urinary function  - Encourage oral fluids to ensure adequate hydration  - Administer IV fluids as ordered to ensure adequate hydration  - Administer ordered medications as needed  - Offer frequent toileting  - Follow urinary retention protocol if ordered  Outcome: Progressing  Goal: Absence of urinary retention  Description  INTERVENTIONS:  - Assess patient?s ability to void and empty bladder  - Monitor I/O  - Bladder scan as needed  - Discuss with physician/AP medications to alleviate retention as needed  - Discuss catheterization for long term situations as appropriate  Outcome: Progressing

## 2019-03-14 NOTE — PLAN OF CARE
Problem: OCCUPATIONAL THERAPY ADULT  Goal: Performs self-care activities at highest level of function for planned discharge setting  See evaluation for individualized goals  Description  Treatment Interventions: ADL retraining, Functional transfer training, Compensatory technique education, Cardiac education, Energy conservation, Activityengagement, Equipment evaluation/education, Patient/family training, Endurance training  Equipment Recommended: Bedside commode       See flowsheet documentation for full assessment, interventions and recommendations      Outcome: [facility]

## 2019-03-14 NOTE — PROGRESS NOTES
Cardiology Progress Note - Gurdeep Vazquez 80 y o  male MRN: [id number]    Unit/Bed#: [facility] 424-01 Encounter: [id number]        Subjective:   Patient with intermittent atrial flutter  Review of Systems   Cardiovascular: Negative for chest pain, leg swelling and palpitations  Respiratory: Negative for shortness of breath  Objective:   Vitals: Blood pressure 115/65, pulse 65, temperature 97 5 °F (36 4 °C), temperature source Oral, resp  rate 18, height 5' 6" (1 676 m), weight 68 5 kg (151 lb 0 2 oz), SpO2 98 %  , Body mass index is 24 37 kg/m² ,   Orthostatic Blood Pressures      Most Recent Value   Blood Pressure  115/65 [Map 84] filed at 03/14/2019 0709   Patient Position - Orthostatic VS  Sitting filed at 03/14/2019 5318         Systolic (40TXL), OXE:369 , Min:115 , XYF:039     Diastolic (41RNG), ZVJ:34, Min:63, Max:76      Intake/Output Summary (Last 24 hours) at 3/14/2019 0937  Last data filed at 3/14/2019 0920  Gross per 24 hour   Intake 910 ml   Output 2775 ml   Net -1865 ml     Weight (last 2 days)     Date/Time   Weight    03/14/19 0600   68 5 (151 02)    03/13/19 0644   70 6 (155 65)    03/12/19 0550   71 7 (158 07)                  Telemetry Review: Aflutter    Physical Exam   Cardiovascular: Normal rate and normal heart sounds  An irregularly irregular rhythm present  Exam reveals no gallop and no friction rub  No murmur heard  Pulmonary/Chest: Breath sounds normal  He has no wheezes  He has no rales  Musculoskeletal: He exhibits no edema           Laboratory Results:        CBC with diff:   Results from last 7 days   Lab Units 03/13/19  0547 03/12/19  0448 03/11/19  0530 03/10/19  0451 03/09/19  1556 03/09/19 03/08/19  0346 03/07/19  2037  03/07/19  1204   WBC Thousand/uL 12 60* 12 02* 11 15* 12 74*  --  16 89* 18 50*  --   --   --    HEMOGLOBIN g/dL 10 4* 9 7* 9 3* 9 5* 9 5* 9 9* 11 1* 11 7*  --  11 9*   I STAT HEMOGLOBIN   --   --   --   --   --   --   --   --    < >  --    HEMATOCRIT % 30 0* 27 3* 27 2* 27 4*  --  29 4* 33 2* 34 6*  --  35 0*   HEMATOCRIT, ISTAT   --   --   --   --   --   --   --   --    < >  --    MCV fL 88 88 90 90  --  92 92  --   --   --    PLATELETS Thousands/uL 165 128* 101* 85*  --  73* 131*  --   --  144*   MCH pg 30 6 31 2 30 7 31 4  --  31 0 30 7  --   --   --    MCHC g/dL 34 7 35 5 34 2 34 7  --  33 7 33 4  --   --   --    RDW % 13 3 13 5 13 3 13 7  --  14 2 13 9  --   --   --    MPV fL 9 8 10 3 10 8 10 8  --  10 9 10 3  --   --  10 0    < > = values in this interval not displayed           CMP:  Results from last 7 days   Lab Units 03/14/19  0518 03/13/19  0547 03/12/19  1451 03/12/19  0448 03/12/19  0000 03/11/19  1650 03/11/19  1301 03/11/19  0453  03/10/19  1836  03/10/19  0512  03/09/19  1354  03/07/19  1213  03/07/19  1050 03/07/19  1023 03/07/19  1004 03/07/19  0945 03/07/19  0937 03/07/19  0923   POTASSIUM mmol/L 3 1* 3 4* 3 8 4 2 3 7 3 7 3 9 3 9   < > 3 9   < > 4 5   < > 4 5   < >  --    < >  --   --   --   --   --   --    CHLORIDE mmol/L 94* 94*  --  98*  --   --   --  100  --  101  --  103  --  105   < >  --    < >  --   --   --   --   --   --    CO2 mmol/L 27 27  --  22  --   --   --  23  --  22  --  23  --  18*   < >  --    < >  --   --   --   --   --   --    CO2, I-STAT mmol/L  --   --   --   --   --   --   --   --   --   --   --   --   --   --   --  24  --  28 27 31 24 33* 27   BUN mg/dL 94* 93*  --  76*  --   --   --  68*  --  62*  --  57*  --  49*   < >  --    < >  --   --   --   --   --   --    CREATININE mg/dL 4 35* 4 63*  --  4 61*  --   --   --  4 63*  --  4 49*  --  4 25*  --  3 89*   < >  --    < >  --   --   --   --   --   --    GLUCOSE, ISTAT mg/dl  --   --   --   --   --   --   --   --   --   --   --   --   --   --   --  110  --  98 111 154* 185* 171* 140   CALCIUM mg/dL 8 5 8 5  --  8 0*  --   --   --  8 1*  --  7 9*  --  8 2*  --  8 2*   < >  --    < >  --   --   --   --   --   --    AST U/L  --   --   --   --   --   --   --   --   --   -- --  52*  --   --   --   --   --   --   --   --   --   --   --    ALT U/L  --   --   --   --   --   --   --   --   --   --   --  <6*  --   --   --   --   --   --   --   --   --   --   --    ALK PHOS U/L  --   --   --   --   --   --   --   --   --   --   --  53  --   --   --   --   --   --   --   --   --   --   --    EGFR ml/min/1 73sq m 15 14  --  14  --   --   --  14  --  15  --  16  --  17   < >  --    < >  --   --   --   --   --   --     < > = values in this interval not displayed  BMP:  Results from last 7 days   Lab Units 03/14/19  0518 03/13/19  0547 03/12/19  1451 03/12/19  0448 03/12/19  0000 03/11/19  1650 03/11/19  1301 03/11/19  0453  03/10/19  1836  03/10/19  0512  03/09/19  1354  03/07/19  1213   POTASSIUM mmol/L 3 1* 3 4* 3 8 4 2 3 7 3 7 3 9 3 9   < > 3 9   < > 4 5   < > 4 5   < >  --    CHLORIDE mmol/L 94* 94*  --  98*  --   --   --  100  --  101  --  103  --  105   < >  --    CO2 mmol/L 27 27  --  22  --   --   --  23  --  22  --  23  --  18*   < >  --    CO2, I-STAT mmol/L  --   --   --   --   --   --   --   --   --   --   --   --   --   --   --  24   BUN mg/dL 94* 93*  --  76*  --   --   --  68*  --  62*  --  57*  --  49*   < >  --    CREATININE mg/dL 4 35* 4 63*  --  4 61*  --   --   --  4 63*  --  4 49*  --  4 25*  --  3 89*   < >  --    GLUCOSE, ISTAT mg/dl  --   --   --   --   --   --   --   --   --   --   --   --   --   --   --  110   CALCIUM mg/dL 8 5 8 5  --  8 0*  --   --   --  8 1*  --  7 9*  --  8 2*  --  8 2*   < >  --     < > = values in this interval not displayed  BNP:   Results Reviewed     None        No results for input(s): BNP in the last 72 hours      Magnesium:   Results from last 7 days   Lab Units 03/12/19  0448 03/11/19  0453 03/10/19  0504 03/09/19  0458 03/08/19  0346   MAGNESIUM mg/dL 2 3 2 5 2 4 2 8* 3 1*       Coags:   Results from last 7 days   Lab Units 03/14/19  0519 03/13/19  0547 03/12/19  0448 03/11/19  0525 03/10/19  0452 03/09/19  0604 03/08/19  1050 INR  3 15* 2 99* 2 84* 2 30* 1 88* 1 47* 1 29*       TSH:       Lipid Profile:   Results from last 7 days   Lab Units 19  0453   TRIGLYCERIDES mg/dL 63   HDL mg/dL 40           Cardiac testing:   Results for orders placed during the hospital encounter of 19   Echo complete with contrast if indicated    Narrative [address] [facility]  [phone]    Transthoracic Echocardiogram  2D, M-mode, Doppler, and Color Doppler    Study date:  2019    Patient: Rose Mohs  MR number: [id number]  Account number: [de-identified]  : 1950  Age: 80 years  Gender: Male  Status: Inpatient  Location: Bedside  Height: 68 in  Weight: 163 lb  BP: 155/ 70 mmHg    Indications: Heart Failure    Diagnoses: I50 9 - Heart failure, unspecified    Sonographer:  TIMMY Bullock  Primary Physician:  Yue Araujo  Referring Physician:  Nicci Malik MD  Group:  [facility]  Interpreting Physician:  Nicci Malik MD    SUMMARY    LEFT VENTRICLE:  The ventricle was mildly dilated  Systolic function was mildly to moderately reduced  Ejection fraction was estimated to be 40 %  There was mild diffuse hypokinesis  Wall thickness was mildly increased  RIGHT VENTRICLE:  The ventricle was mildly dilated  Systolic function was normal     LEFT ATRIUM:  The atrium was mildly dilated  MITRAL VALVE:  There was moderate to severe regurgitation  AORTIC VALVE:  The valve was possibly bicuspid  Leaflets exhibited normal thickness, normal cuspal separation, and significant diastolic prolapse  There was moderate to severe regurgitation  TRICUSPID VALVE:  There was mild regurgitation  Pulmonary artery systolic pressure was moderately to markedly increased  The findings suggest moderate to severe pulmonary hypertension  AORTA:  The root exhibited mild dilatation  IVC, HEPATIC VEINS:  The inferior vena cava was mildly dilated    Respirophasic changes were blunted (less than 50% variation)  PERICARDIUM:  There was a left pleural effusion  HISTORY: PRIOR HISTORY: Murmur, HTN, DM2, HLD, CKD3    PROCEDURE: The procedure was performed at the bedside  This was a routine study  The transthoracic approach was used  The study included complete 2D imaging, M-mode, complete spectral Doppler, and color Doppler  The heart rate was 95 bpm,  at the start of the study  Images were obtained from the parasternal, apical, subcostal, and suprasternal notch acoustic windows  Image quality was adequate  LEFT VENTRICLE: The ventricle was mildly dilated  Systolic function was mildly to moderately reduced  Ejection fraction was estimated to be 40 %  There was mild diffuse hypokinesis  Wall thickness was mildly increased  RIGHT VENTRICLE: The ventricle was mildly dilated  Systolic function was normal  Wall thickness was normal     LEFT ATRIUM: The atrium was mildly dilated  RIGHT ATRIUM: Size was normal     MITRAL VALVE: Valve structure was normal  There was normal leaflet separation  DOPPLER: The transmitral velocity was within the normal range  There was no evidence for stenosis  There was moderate to severe regurgitation  AORTIC VALVE: The valve was possibly bicuspid  Leaflets exhibited normal thickness, normal cuspal separation, and significant diastolic prolapse  DOPPLER: Transaortic velocity was within the normal range  There was no evidence for  stenosis  There was moderate to severe regurgitation  The regurgitant jet was eccentric  TRICUSPID VALVE: The valve structure was normal  There was normal leaflet separation  DOPPLER: The transtricuspid velocity was within the normal range  There was no evidence for stenosis  There was mild regurgitation  Pulmonary artery  systolic pressure was moderately to markedly increased  Estimated peak PA pressure was 60 mmHg  The findings suggest moderate to severe pulmonary hypertension      PULMONIC VALVE: Leaflets exhibited normal thickness, no calcification, and normal cuspal separation  DOPPLER: The transpulmonic velocity was within the normal range  There was no regurgitation  PERICARDIUM: There was no pericardial effusion  There was a left pleural effusion  The pericardium was normal in appearance  AORTA: The root exhibited mild dilatation  SYSTEMIC VEINS: IVC: The inferior vena cava was mildly dilated  Respirophasic changes were blunted (less than 50% variation)      MEASUREMENT TABLES    2D MEASUREMENTS  Aorta   (Reference normals)  Root diam   39 mm   (--)    SYSTEM MEASUREMENT TABLES    2D  %FS: 23 42 %  Ao Diam: 3 91 cm  EDV(Teich): 179 53 ml  EF Biplane: 40 37 %  EF(Teich): 46 07 %  ESV(Teich): 96 82 ml  IVSd: 1 02 cm  LA Area: 12 24 cm2  LA Diam: 4 3 cm  LVEDV MOD A2C: 129 37 ml  LVEDV MOD A4C: 151 37 ml  LVEDV MOD BP: 140 9 ml  LVEF MOD A2C: 42 79 %  LVEF MOD A4C: 40 91 %  LVESV MOD A2C: 74 02 ml  LVESV MOD A4C: 89 45 ml  LVESV MOD BP: 84 02 ml  LVIDd: 5 99 cm  LVIDs: 4 59 cm  LVLd A2C: 8 45 cm  LVLd A4C: 8 58 cm  LVLs A2C: 7 63 cm  LVLs A4C: 8 25 cm  LVPWd: 1 21 cm  RA Area: 15 27 cm2  RVIDd: 4 39 cm  SV MOD A2C: 55 35 ml  SV MOD A4C: 61 92 ml  SV(Teich): 82 71 ml    CW  TR MaxP 66 mmHg  TR Vmax: 3 52 m/s    MM  TAPSE: 1 8 cm    [facility]    Prepared and electronically signed by    Greg Manley MD  Signed 2019 16:15:06           Meds/Allergies     Current Facility-Administered Medications:  acetaminophen 650 mg Oral Q4H PRN  YINA Hawkins   amiodarone 200 mg Oral Q8H [address] YINA crisostomo   aspirin 81 mg Oral Daily YINA crisostomo   atorvastatin 20 mg Oral Daily With [employer] ShilaYINA   bisacodyl 10 mg Rectal Daily PRN YINA crisostomo   docusate sodium 100 mg Oral BID as, PA-C   furosemide 40 mg Intravenous BID (diuretic) Rebecca Hawkins PA-C   insulin glargine 16 Units Subcutaneous  Jeremie Cleary MD Jose Eduardo   insulin lispro 1-5 Units Subcutaneous TID AC Jalaine Jeans, PA-C   insulin lispro 1-5 Units Subcutaneous HS Jalaine Jeans, PA-C   insulin lispro 4 Units Subcutaneous TID With Meals Dominique Cardozo MD   metoprolol tartrate 75 mg Oral Q12H [address] Jalaine Jeans, PA-C   mupirocin 1 application Nasal M35T [address] Jalaine Jeans, PA-C   ondansetron 4 mg Intravenous Q6H PRN Jalaine Jeans, PA-C   oxyCODONE-acetaminophen 1 tablet Oral Q4H PRN Jalaine Jeans, PA-C   oxyCODONE-acetaminophen 2 tablet Oral Q6H PRN Jalaine Jeans, PA-C   pantoprazole 40 mg Oral Daily Before Breakfast Jalaine Jeans, PA-C   polyethylene glycol 17 g Oral Daily Jalaine Jeans, PA-C   potassium chloride 20 mEq Oral BID Jalaine Jeans, PA-C   temazepam 15 mg Oral HS PRN Jalaine Jeans, PA-C   travoprost 1 drop Both Eyes HS Jalaine Jeans, PA-C        Medications Prior to Admission   Medication    ascorbic acid (VITAMIN C) 500 mg tablet    atorvastatin (LIPITOR) 20 mg tablet    calcitriol (ROCALTROL) 0 25 mcg capsule    Cholecalciferol (VITAMIN D3) 1000 units CAPS    furosemide (LASIX) 20 mg tablet    furosemide (LASIX) 40 mg tablet    metFORMIN (GLUCOPHAGE) 500 mg tablet    metoprolol succinate (TOPROL-XL) 50 mg 24 hr tablet    Multiple Vitamin (MULTIVITAMIN) tablet    mupirocin (BACTROBAN) 2 % ointment    olmesartan (BENICAR) 20 mg tablet    TRAVATAN Z 0 004 % ophthalmic solution       Assessment:  Principal Problem:    Bicuspid aortic valve  Active Problems:    CKD (chronic kidney disease) stage 3, GFR 30-59 ml/min ([facility])    Diabetic nephropathy associated with type 2 diabetes mellitus (HCC)    Secondary hyperparathyroidism (HCC)    Essential hypertension    Chronic systolic congestive heart failure (HCC)    Nonrheumatic aortic valve insufficiency    Non-rheumatic mitral regurgitation    Coronary artery disease involving native coronary artery of native heart without angina pectoris    S/P AVR    S/P MVR (mitral valve repair) Dilated cardiomyopathy (HCC)    Diabetes mellitus type 2 in nonobese (HCC)    CARY (acute kidney injury) ([facility] [address] )      Impression:  1  S/P AVR/MV repair - hemodynamically stable  Off milrinone  2  CARY - on IV furosemide  Renal function stable  3  Paroxysmal atrial flutter - on amio gtt  Rate controlled  Plan:  1  Change to oral furosemide  2  Replete potassium  3  Continue remainder of medications  4  Plan on PATIENCE/DCCV tomorrow

## 2019-03-15 ENCOUNTER — ANESTHESIA (INPATIENT)
Dept: NON INVASIVE DIAGNOSTICS | Facility: HOSPITAL | Age: 69
DRG: 219 | End: 2019-03-15
Payer: COMMERCIAL

## 2019-03-15 ENCOUNTER — APPOINTMENT (INPATIENT)
Dept: NON INVASIVE DIAGNOSTICS | Facility: HOSPITAL | Age: 69
DRG: 219 | End: 2019-03-15
Attending: INTERNAL MEDICINE
Payer: COMMERCIAL

## 2019-03-15 VITALS
OXYGEN SATURATION: 96 % | HEART RATE: 63 BPM | DIASTOLIC BLOOD PRESSURE: 62 MMHG | WEIGHT: 149.47 LBS | RESPIRATION RATE: 16 BRPM | BODY MASS INDEX: 24.02 KG/M2 | SYSTOLIC BLOOD PRESSURE: 100 MMHG | TEMPERATURE: 98.1 F | HEIGHT: 66 IN

## 2019-03-15 LAB
ANION GAP SERPL CALCULATED.3IONS-SCNC: 8 MMOL/L (ref 4–13)
ATRIAL RATE: 250 BPM
BUN SERPL-MCNC: 88 MG/DL (ref 5–25)
CALCIUM SERPL-MCNC: 8.6 MG/DL (ref 8.3–10.1)
CHLORIDE SERPL-SCNC: 96 MMOL/L (ref 100–108)
CO2 SERPL-SCNC: 27 MMOL/L (ref 21–32)
CREAT SERPL-MCNC: 4.3 MG/DL (ref 0.6–1.3)
GFR SERPL CREATININE-BSD FRML MDRD: 15 ML/MIN/1.73SQ M
GLUCOSE SERPL-MCNC: 102 MG/DL (ref 65–140)
GLUCOSE SERPL-MCNC: 118 MG/DL (ref 65–140)
GLUCOSE SERPL-MCNC: 203 MG/DL (ref 65–140)
INR PPP: 3.55 (ref 0.86–1.17)
P AXIS: -89 DEGREES
POTASSIUM SERPL-SCNC: 3.8 MMOL/L (ref 3.5–5.3)
PROTHROMBIN TIME: 35.5 SECONDS (ref 11.8–14.2)
QRS AXIS: -83 DEGREES
QRSD INTERVAL: 154 MS
QT INTERVAL: 428 MS
QTC INTERVAL: 512 MS
SODIUM SERPL-SCNC: 131 MMOL/L (ref 136–145)
T WAVE AXIS: 42 DEGREES
VENTRICULAR RATE: 86 BPM

## 2019-03-15 PROCEDURE — 93321 DOPPLER ECHO F-UP/LMTD STD: CPT | Performed by: INTERNAL MEDICINE

## 2019-03-15 PROCEDURE — 82948 REAGENT STRIP/BLOOD GLUCOSE: CPT

## 2019-03-15 PROCEDURE — 85610 PROTHROMBIN TIME: CPT | Performed by: PHYSICIAN ASSISTANT

## 2019-03-15 PROCEDURE — 99232 SBSQ HOSP IP/OBS MODERATE 35: CPT | Performed by: INTERNAL MEDICINE

## 2019-03-15 PROCEDURE — 93010 ELECTROCARDIOGRAM REPORT: CPT | Performed by: INTERNAL MEDICINE

## 2019-03-15 PROCEDURE — 93312 ECHO TRANSESOPHAGEAL: CPT | Performed by: INTERNAL MEDICINE

## 2019-03-15 PROCEDURE — 93325 DOPPLER ECHO COLOR FLOW MAPG: CPT | Performed by: INTERNAL MEDICINE

## 2019-03-15 PROCEDURE — 93005 ELECTROCARDIOGRAM TRACING: CPT

## 2019-03-15 PROCEDURE — 80048 BASIC METABOLIC PNL TOTAL CA: CPT | Performed by: PHYSICIAN ASSISTANT

## 2019-03-15 PROCEDURE — 99024 POSTOP FOLLOW-UP VISIT: CPT | Performed by: THORACIC SURGERY (CARDIOTHORACIC VASCULAR SURGERY)

## 2019-03-15 PROCEDURE — 93312 ECHO TRANSESOPHAGEAL: CPT

## 2019-03-15 RX ORDER — TORSEMIDE 20 MG/1
20 TABLET ORAL 2 TIMES DAILY
Qty: 28 TABLET | Refills: 2 | Status: SHIPPED | OUTPATIENT
Start: 2019-03-15 | End: 2019-03-27

## 2019-03-15 RX ORDER — METOPROLOL TARTRATE 75 MG/1
75 TABLET, FILM COATED ORAL EVERY 12 HOURS SCHEDULED
Qty: 60 TABLET | Refills: 2 | Status: SHIPPED | OUTPATIENT
Start: 2019-03-15 | End: 2019-03-19

## 2019-03-15 RX ORDER — ASPIRIN 81 MG/1
81 TABLET ORAL DAILY
Qty: 30 TABLET | Refills: 2 | Status: SHIPPED | OUTPATIENT
Start: 2019-03-16 | End: 2020-06-21

## 2019-03-15 RX ORDER — OXYCODONE HYDROCHLORIDE AND ACETAMINOPHEN 5; 325 MG/1; MG/1
TABLET ORAL
Qty: 30 TABLET | Refills: 0 | Status: SHIPPED | OUTPATIENT
Start: 2019-03-15 | End: 2019-04-10

## 2019-03-15 RX ORDER — TORSEMIDE 20 MG/1
20 TABLET ORAL 2 TIMES DAILY
Status: DISCONTINUED | OUTPATIENT
Start: 2019-03-15 | End: 2019-03-15 | Stop reason: HOSPADM

## 2019-03-15 RX ORDER — WARFARIN SODIUM 1 MG/1
TABLET ORAL
Qty: 60 TABLET | Refills: 2 | Status: SHIPPED | OUTPATIENT
Start: 2019-03-15 | End: 2019-03-28 | Stop reason: SDUPTHER

## 2019-03-15 RX ORDER — PROPOFOL 10 MG/ML
INJECTION, EMULSION INTRAVENOUS AS NEEDED
Status: DISCONTINUED | OUTPATIENT
Start: 2019-03-15 | End: 2019-03-15 | Stop reason: SURG

## 2019-03-15 RX ORDER — DOCUSATE SODIUM 100 MG/1
100 CAPSULE, LIQUID FILLED ORAL 2 TIMES DAILY
Qty: 60 CAPSULE | Refills: 0 | Status: SHIPPED | OUTPATIENT
Start: 2019-03-15 | End: 2019-04-10

## 2019-03-15 RX ORDER — SODIUM CHLORIDE 9 MG/ML
INJECTION, SOLUTION INTRAVENOUS CONTINUOUS PRN
Status: DISCONTINUED | OUTPATIENT
Start: 2019-03-15 | End: 2019-03-15 | Stop reason: SURG

## 2019-03-15 RX ORDER — SENNOSIDES 8.6 MG
650 CAPSULE ORAL EVERY 8 HOURS PRN
Qty: 30 TABLET | Refills: 0 | Status: SHIPPED | OUTPATIENT
Start: 2019-03-15 | End: 2019-04-14

## 2019-03-15 RX ORDER — OMEPRAZOLE 20 MG/1
20 CAPSULE, DELAYED RELEASE ORAL DAILY
Qty: 30 CAPSULE | Refills: 0 | Status: SHIPPED | OUTPATIENT
Start: 2019-03-15 | End: 2019-06-25

## 2019-03-15 RX ORDER — POLYETHYLENE GLYCOL 3350 17 G/17G
17 POWDER, FOR SOLUTION ORAL DAILY
Qty: 510 G | Refills: 0 | Status: SHIPPED | OUTPATIENT
Start: 2019-03-15 | End: 2019-04-10

## 2019-03-15 RX ORDER — POTASSIUM CHLORIDE 20 MEQ/1
20 TABLET, EXTENDED RELEASE ORAL 2 TIMES DAILY
Qty: 14 TABLET | Refills: 2 | Status: SHIPPED | OUTPATIENT
Start: 2019-03-15 | End: 2019-03-22

## 2019-03-15 RX ADMIN — INSULIN LISPRO 4 UNITS: 100 INJECTION, SOLUTION INTRAVENOUS; SUBCUTANEOUS at 12:21

## 2019-03-15 RX ADMIN — DOCUSATE SODIUM 100 MG: 100 CAPSULE, LIQUID FILLED ORAL at 09:52

## 2019-03-15 RX ADMIN — PROPOFOL 20 MG: 10 INJECTION, EMULSION INTRAVENOUS at 07:58

## 2019-03-15 RX ADMIN — PHENYLEPHRINE HYDROCHLORIDE 100 MCG: 10 INJECTION INTRAVENOUS at 07:59

## 2019-03-15 RX ADMIN — MUPIROCIN 1 APPLICATION: 20 OINTMENT TOPICAL at 09:52

## 2019-03-15 RX ADMIN — SODIUM CHLORIDE: 9 INJECTION, SOLUTION INTRAVENOUS at 07:35

## 2019-03-15 RX ADMIN — AMIODARONE HYDROCHLORIDE 200 MG: 200 TABLET ORAL at 06:37

## 2019-03-15 RX ADMIN — PROPOFOL 20 MG: 10 INJECTION, EMULSION INTRAVENOUS at 08:20

## 2019-03-15 RX ADMIN — PROPOFOL 80 MG: 10 INJECTION, EMULSION INTRAVENOUS at 07:54

## 2019-03-15 RX ADMIN — PROPOFOL 20 MG: 10 INJECTION, EMULSION INTRAVENOUS at 08:02

## 2019-03-15 RX ADMIN — PROPOFOL 20 MG: 10 INJECTION, EMULSION INTRAVENOUS at 08:07

## 2019-03-15 RX ADMIN — TORSEMIDE 20 MG: 20 TABLET ORAL at 09:55

## 2019-03-15 RX ADMIN — PHENYLEPHRINE HYDROCHLORIDE 100 MCG: 10 INJECTION INTRAVENOUS at 08:21

## 2019-03-15 RX ADMIN — PROPOFOL 20 MG: 10 INJECTION, EMULSION INTRAVENOUS at 08:12

## 2019-03-15 RX ADMIN — INSULIN LISPRO 4 UNITS: 100 INJECTION, SOLUTION INTRAVENOUS; SUBCUTANEOUS at 10:10

## 2019-03-15 RX ADMIN — INSULIN LISPRO 1 UNITS: 100 INJECTION, SOLUTION INTRAVENOUS; SUBCUTANEOUS at 12:21

## 2019-03-15 RX ADMIN — ASPIRIN 81 MG: 81 TABLET, COATED ORAL at 09:52

## 2019-03-15 RX ADMIN — POTASSIUM CHLORIDE 20 MEQ: 1500 TABLET, EXTENDED RELEASE ORAL at 09:52

## 2019-03-15 RX ADMIN — PHENYLEPHRINE HYDROCHLORIDE 100 MCG: 10 INJECTION INTRAVENOUS at 08:10

## 2019-03-15 RX ADMIN — PHENYLEPHRINE HYDROCHLORIDE 100 MCG: 10 INJECTION INTRAVENOUS at 08:04

## 2019-03-15 RX ADMIN — PROPOFOL 20 MG: 10 INJECTION, EMULSION INTRAVENOUS at 08:10

## 2019-03-15 RX ADMIN — PROPOFOL 20 MG: 10 INJECTION, EMULSION INTRAVENOUS at 08:15

## 2019-03-15 RX ADMIN — LIDOCAINE HYDROCHLORIDE 80 MG: 20 INJECTION, SOLUTION INTRAVENOUS at 07:51

## 2019-03-15 RX ADMIN — METOPROLOL TARTRATE 75 MG: 50 TABLET, FILM COATED ORAL at 09:57

## 2019-03-15 RX ADMIN — POLYETHYLENE GLYCOL 3350 17 G: 17 POWDER, FOR SOLUTION ORAL at 09:52

## 2019-03-15 RX ADMIN — PANTOPRAZOLE SODIUM 40 MG: 40 TABLET, DELAYED RELEASE ORAL at 06:37

## 2019-03-15 RX ADMIN — PHENYLEPHRINE HYDROCHLORIDE 100 MCG: 10 INJECTION INTRAVENOUS at 08:14

## 2019-03-15 RX ADMIN — PHENYLEPHRINE HYDROCHLORIDE 100 MCG: 10 INJECTION INTRAVENOUS at 07:54

## 2019-03-15 NOTE — PHYSICAL THERAPY NOTE
Physical Therapy Cancellation Note    The pt  Is off of the floor at the cath lab  Will re-attempt as able      Erin Valdivia, PTA

## 2019-03-15 NOTE — ANESTHESIA POSTPROCEDURE EVALUATION
Post-Op Assessment Note    CV Status:  Stable    Pain management: adequate     Mental Status:  Alert and awake   Hydration Status:  Euvolemic   PONV Controlled:  Controlled   Airway Patency:  Patent   Post Op Vitals Reviewed: Yes      Staff: Anesthesiologist, with CRNAs   Comments: No cardioversion due to suspected clot      Vitals:    03/15/19 0900   BP: 118/60   Pulse: 62   Resp: 18   Temp: 98 2 °F (36 8 °C)   SpO2: 97%

## 2019-03-15 NOTE — PROGRESS NOTES
Progress Note - Nephrology   Myra Mustafa 80 y o  male MRN: [id number]  Unit/Bed#: [facility] 424-01 Encounter: [id number]      Assessment / Plan:  1  Nonoliguric CARY on CKD stage 3 with baseline creatinine 1 6 to 1 8, follows with Dr Devin Soulier  -creatinine 1 9 on admission, has risen up to 4 5-4 6 but is starting to downtrend, BUN improving  -monitor weekly BMP starting 3/18/19  -monitor renal indices on lasix 40mg po BID  -CARY likely secondary to ischemic ATN with intraoperative hypotension episodes, cardiopulmonary bypass time 96 minutes, cross-clamp time 87 minutes  -f/u BMP in a m , UOP improving  Expect ongoing recovery      2  Volume overload  -improving on oral diuresis as above  -clinically patient does not seem to be in respiratory distress      3  Biopsy-proven FSGS  -this was thought to be secondary to secondary FSGS and only 15% foot process effacement   Since renal biopsy results, patient has been trying to lose weight   -he was on ARB due to significant proteinuria   Currently holding ARB due to CARY      4  Proteinuria d/t FSGS  -last UPC ratio 4 g  -will need eventual ARB which can be restarted as outpatient  Defer to Dr Vela on restarting this  Currently in CARY       5  Mild metabolic acidosis with worsening renal failure  -resolved     6  Severe aortic insufficiency/MR  -status post AVR, mitral valve repair, VIRI ligation - per ICU team     7  Hyponatremia - sNa has dropped from 135 to 131 and stable  Continue to monitor on oral diuresis       8  Anemia - Hgb stable in 10s  Thrombocytopenia has resolved        Should have repeat BMP weekly starting on Monday 3/18  He should see Dr Dossie Skiff outpatient within the next 1-2 weeks  Office to call and schedule follow up appointment  Subjective:   Patient feels well  He hopes to be discharged today  He denies any chest pain or shortness of Breath        Objective:     Vitals: Blood pressure 100/62, pulse 63, temperature 98 1 °F (36 7 °C), temperature source Oral, resp  rate 16, height 5' 6" (1 676 m), weight 67 8 kg (149 lb 7 6 oz), SpO2 96 %  ,Body mass index is 24 13 kg/m²  Temp (24hrs), Av 9 °F (36 6 °C), Min:97 5 °F (36 4 °C), Max:98 2 °F (36 8 °C)      Weight (last 2 days)     Date/Time   Weight    03/15/19 0600   67 8 (149 47)    19 0600   68 5 (151 02)    19 0644   70 6 (155 65)                Intake/Output Summary (Last 24 hours) at 3/15/2019 1327  Last data filed at 3/15/2019 1100  Gross per 24 hour   Intake 570 ml   Output 1150 ml   Net -580 ml     I/O last 24 hours: In: 80 [P O :490; I V :200]  Out: 2250 [Urine:2250]        Physical Exam:   Physical Exam   Constitutional: He appears well-developed and well-nourished  No distress  HENT:   Head: Normocephalic and atraumatic  Mouth/Throat: No oropharyngeal exudate  Eyes: Right eye exhibits no discharge  Left eye exhibits no discharge  No scleral icterus  Neck: Neck supple  Cardiovascular: Normal rate, regular rhythm and normal heart sounds  Pulmonary/Chest: Effort normal and breath sounds normal  He has no wheezes  He has no rales  Abdominal: Soft  Bowel sounds are normal  He exhibits no distension  There is no tenderness  Musculoskeletal: Normal range of motion  He exhibits no edema  Neurological: He is alert  awake   Skin: Skin is warm and dry  No rash noted  He is not diaphoretic  Vertical incision c/d/i   Psychiatric: He has a normal mood and affect  His behavior is normal    Vitals reviewed        Invasive Devices     Peripheral Intravenous Line            Peripheral IV 19 Left Forearm 3 days                Medications:    Scheduled Meds:  Current Facility-Administered Medications:  acetaminophen 650 mg Oral Q4H PRN Kristian Delvalle PA-C   amiodarone 200 mg Oral [facility] Kristian Delvalle PA-C   aspirin 81 mg Oral Daily Kristian Delvalle PA-C   atorvastatin 20 mg Oral Daily With Mecca Fuchs PA-C   bisacodyl 10 mg Rectal Daily PRN Sarah Day Shila, PA-C   docusate sodium 100 mg Oral BID [facility], PA-C   insulin glargine 16 Units Subcutaneous HS Melvin Vargas MD   insulin lispro 1-5 Units Subcutaneous TID AC [facility], PA-C   insulin lispro 1-5 Units Subcutaneous HS [facility], PA-C   insulin lispro 4 Units Subcutaneous TID With Meals Melvin Vargas MD   metoprolol tartrate 75 mg Oral Q12H [facility], PA-C   mupirocin 1 application Nasal K37X [facility], PA-C   ondansetron 4 mg Intravenous Q6H PRN [facility], PA-C   oxyCODONE-acetaminophen 1 tablet Oral Q4H PRN [facility], PA-C   oxyCODONE-acetaminophen 2 tablet Oral Q6H PRN [facility], PA-C   pantoprazole 40 mg Oral Daily Before Breakfast [facility], PA-C   polyethylene glycol 17 g Oral Daily [facility], PA-C   potassium chloride 20 mEq Oral BID [facility], PA-C   temazepam 15 mg Oral HS PRN [facility], PA-C   torsemide 20 mg Oral BID [facility], PA-C   travoprost 1 drop Both Eyes HS [facility], PA-C       PRN Meds:   acetaminophen    bisacodyl    ondansetron    oxyCODONE-acetaminophen    oxyCODONE-acetaminophen    temazepam    Continuous Infusions:         LAB RESULTS:      Results from last 7 days   Lab Units 03/15/19  0504 03/14/19  0518 03/13/19  0547 03/12/19  1451 03/12/19  0448 03/12/19  0000 03/11/19  1650  03/11/19  0530 03/11/19  0453  03/10/19  1836  03/10/19  0512 03/10/19  0504 03/10/19  0451  03/09/19  1556  03/09/19  0458  03/09/19   WBC Thousand/uL  --   --  12 60*  --  12 02*  --   --   --  11 15*  --   --   --   --   --   --  12 74*  --   --   --   --   --  16 89*   HEMOGLOBIN g/dL  --   --  10 4*  --  9 7*  --   --   --  9 3*  --   --   --   --   --   --  9 5*  --  9 5*  --   --   --  9 9*   HEMATOCRIT %  --   --  30 0*  --  27 3*  --   --   --  27 2*  --   --   --   --   --   --  27 4*  --   --   --   --   --  29 4*   PLATELETS Thousands/uL  --   --  165  --  128*  --   --   --  101*  --   --   --   --   -- --  85*  --   --   --   --   --  73*   POTASSIUM mmol/L 3 8 3 1* 3 4* 3 8 4 2 3 7 3 7   < >  --  3 9   < > 3 9   < > 4 5  --   --    < >  --    < > 5 1   < >  --    CHLORIDE mmol/L 96* 94* 94*  --  98*  --   --   --   --  100  --  101  --  103  --   --   --   --    < > 107  --   --    CO2 mmol/L 27 27 27  --  22  --   --   --   --  23  --  22  --  23  --   --   --   --    < > 21  --   --    BUN mg/dL 88* 94* 93*  --  76*  --   --   --   --  68*  --  62*  --  57*  --   --   --   --    < > 45*  --   --    CREATININE mg/dL 4 30* 4 35* 4 63*  --  4 61*  --   --   --   --  4 63*  --  4 49*  --  4 25*  --   --   --   --    < > 3 48*  --   --    CALCIUM mg/dL 8 6 8 5 8 5  --  8 0*  --   --   --   --  8 1*  --  7 9*  --  8 2*  --   --   --   --    < > 8 3  --   --    ALK PHOS U/L  --   --   --   --   --   --   --   --   --   --   --   --   --  53  --   --   --   --   --   --   --   --    ALT U/L  --   --   --   --   --   --   --   --   --   --   --   --   --  <6*  --   --   --   --   --   --   --   --    AST U/L  --   --   --   --   --   --   --   --   --   --   --   --   --  52*  --   --   --   --   --   --   --   --    MAGNESIUM mg/dL  --   --   --   --  2 3  --   --   --   --  2 5  --   --   --   --  2 4  --   --   --   --  2 8*  --   --    PHOSPHORUS mg/dL  --   --   --   --   --   --   --   --   --  6 3*  --   --   --   --  5 0*  --   --   --   --  5 4*  --   --     < > = values in this interval not displayed  CUTURES:  No results found for: Delicia Scale              Portions of the record may have been created with voice recognition software  Occasional wrong word or "sound a like" substitutions may have occurred due to the inherent limitations of voice recognition software  Read the chart carefully and recognize, using context, where substitutions have occurred  If you have any questions, please contact the dictating provider

## 2019-03-15 NOTE — PROGRESS NOTES
Progress Note - Cardiothoracic Surgery   [facility] 80 y o  male MRN: [id number]  Unit/Bed#: [facility] 424-01 Encounter: [id number]    Aortic regurgitation, Mitral regurgitation  S/P AVR (23mm Inspiris)/MV repairwith 30 mm Medtronic CG Future mitral annuloplasty ring /VIRI ligation; POD # 8      24 Hour Events: NPO for attempted cardioversion; Possible thrombus visualized, so cardioversion aborted  Incisional pain well-controlled        Medications:   Scheduled Meds:    Current Facility-Administered Medications:  acetaminophen 650 mg Oral Q4H PRN Kitty Form, PA-C   amiodarone 200 mg Oral [facility] Kitty Form, PA-C   aspirin 81 mg Oral Daily Kitty Form, PA-C   atorvastatin 20 mg Oral Daily With [employer] Shila, PA-C   bisacodyl 10 mg Rectal Daily PRN Kitty Form, PA-C   docusate sodium 100 mg Oral BID Kitty Form, PA-C   furosemide 40 mg Oral BID (diuretic) Jaz Peña MD   insulin glargine 16 Units Subcutaneous HS Anna Marie Knight MD   insulin lispro 1-5 Units Subcutaneous TID AC Kitty Form, PA-C   insulin lispro 1-5 Units Subcutaneous HS Kitty Form, PA-C   insulin lispro 4 Units Subcutaneous TID With Meals Anna Marie Knight MD   metoprolol tartrate 75 mg Oral Q12H [address] Kitty Form, PA-C   mupirocin 1 application Nasal L84E [address] Kitty Form, PA-C   ondansetron 4 mg Intravenous Q6H PRN Kitty Form, PA-C   oxyCODONE-acetaminophen 1 tablet Oral Q4H PRN Kitty Form, PA-C   oxyCODONE-acetaminophen 2 tablet Oral Q6H PRN Kitty Form, PA-C   pantoprazole 40 mg Oral Daily Before Breakfast Kitty Form, PA-C   polyethylene glycol 17 g Oral Daily Kitty Form, PA-C   potassium chloride 20 mEq Oral BID Kitty Form, PA-C   temazepam 15 mg Oral HS PRN Kitty Form, PA-C   travoprost 1 drop Both Eyes HS Kitty Form, PA-C     Facility-Administered Medications Ordered in Other Encounters:  lidocaine (cardiac)   PRN Jonny Loupe, CRNA    phenylephrine   PRN Jonny Loupe, CRNA    propofol  Intravenous PRN Verlyn Max, CRNA    sodium chloride   Continuous PRN Verlyn Max, CRNA Last Rate: Stopped (03/15/19 9909)     Continuous Infusions:   PRN Meds:   acetaminophen    bisacodyl    ondansetron    oxyCODONE-acetaminophen    oxyCODONE-acetaminophen    temazepam    Vitals:   Vitals:    03/14/19 2150 03/14/19 2300 03/15/19 0300 03/15/19 0600   BP: 134/64 115/75 110/58    BP Location:  Right arm Right arm    Pulse: 83 60 62    Resp:  20 20    Temp:  97 9 °F (36 6 °C) 98 2 °F (36 8 °C)    TempSrc:  Oral Oral    SpO2:  100% 97%    Weight:    67 8 kg (149 lb 7 6 oz)   Height:           Telemetry: Atrial flutter, 90    Respiratory:   SpO2: SpO2: 97 %, SpO2 Activity: SpO2 Activity: At Rest; Room Air    Intake/Output:   I/O       03/11 0701 - 03/12 0700 03/12 0701 - 03/13 0700 03/13 0701 - 03/14 0700    P  O  420 540     I V  (mL/kg) 777 7 (10 8) 439 8 (6 2)     IV Piggyback 580      Total Intake(mL/kg) 1777 7 (24 8) 979 8 (13 9)     Urine (mL/kg/hr) 4195 (2 4) 3125 (1 8)     Stool 0 0     Chest Tube       Total Output 4195 3125     Net -2417 3 -2145  2            Unmeasured Stool Occurrence 3 x 2 x         I/O: -1600 mL/24 hours      Weights:   Weight (last 2 days)     Date/Time   Weight    03/15/19 0600   67 8 (149 47)    03/14/19 0600   68 5 (151 02)    03/13/19 0644   70 6 (155 65)            Admit weight: 70 8    Results:   Results from last 7 days   Lab Units 03/13/19  0547 03/12/19  0448 03/11/19  0530   WBC Thousand/uL 12 60* 12 02* 11 15*   HEMOGLOBIN g/dL 10 4* 9 7* 9 3*   HEMATOCRIT % 30 0* 27 3* 27 2*   PLATELETS Thousands/uL 165 128* 101*     Results from last 7 days   Lab Units 03/15/19  0504 03/14/19  0518 03/13/19  0547   SODIUM mmol/L 131* 132* 132*   POTASSIUM mmol/L 3 8 3 1* 3 4*   CHLORIDE mmol/L 96* 94* 94*   CO2 mmol/L 27 27 27   BUN mg/dL 88* 94* 93*   CREATININE mg/dL 4 30* 4 35* 4 63*   CALCIUM mg/dL 8 6 8 5 8 5     Results from last 7 days   Lab Units 03/15/19  0504 03/14/19  0519 03/13/19  0547   INR  3 55* 3 15* 2 99*     Coumadin mg 0 0 2 2 5 2 5 2 5       Point of care glucose: 102-118      Invasive Lines/Tubes:  Invasive Devices     Peripheral Intravenous Line            Peripheral IV 03/11/19 Left Forearm 3 days                Physical Exam:    HEENT/NECK:  PERRLA  No jugular venous distention  Cardiac: Regular rate and rhythm  No rubs/murmurs/gallops  Pulmonary:  Breath sounds slightly diminished at the bases bilaterally  Abdomen:  Non-tender, Non-distended  Positive bowel sounds  Incisions: Sternum is stable  Incision is clean, dry, and intact  Lower extremities: Extremities warm/dry  Radial/PT/DP pulses 2+ bilaterally  Trace edema B/L  Neuro: Alert and oriented X 3  Sensation is grossly intact  No focal deficits  Skin: Warm/Dry, without rashes or lesions  Assessment:  Patient Active Problem List   Diagnosis    CKD (chronic kidney disease) stage 3, GFR 30-59 ml/min ([facility])    Persistent proteinuria    Diabetic nephropathy associated with type 2 diabetes mellitus ([facility] )    Vitamin D deficiency    Secondary hyperparathyroidism ([facility] )    FSGS (focal segmental glomerulosclerosis)    Essential hypertension    Chronic systolic congestive heart failure ([facility] )    Nonrheumatic aortic valve insufficiency    Non-rheumatic mitral regurgitation    Bicuspid aortic valve    Coronary artery disease involving native coronary artery of native heart without angina pectoris    S/P AVR    S/P MVR (mitral valve repair)    Dilated cardiomyopathy ([facility])    Diabetes mellitus type 2 in nonobese ([facility] )    CARY (acute kidney injury) ([facility] )         Aortic regurgitation, Mitral regurgitation  S/P AVR (23mm Inspiris)/MV repairwith 30 mm Medtronic CG Future mitral annuloplasty ring /VIRI ligation; POD # 8    Plan:    1   Cardiac:   Atrial Flutter; HR BP well-controlled    Cardioversion aborted 2/2 possible thrombus visualized on PATIENCE;    F/U for outpatient cardioversion in 1-2 months    Continue anticoagulation;      INR 3 55 ; Hold coumadin today   Continue Lopressor 75 mg PO BID  Continue ASA and Statin therapy  Epicardial pacing wires out    2  Pulmonary:   Good Room air oxygen saturation; Continue incentive spirometry/Coughing/Deep breathing exercises  Chest tubes have been discontinued    3  Renal:   Intake/Output net: -1600 mL/24 hours  D/C IV diuresis   Add Torsemide 20 PO BID  Continue Potassium Chloride 20 mEq PO BID  CKD III with baseline creatinine 1 7;   Post op CARY; Creatinine improved 4 3    Check daily BMP    Follow urine output    4  Neuro:  Neurologically intact; No active issues  Incisional pain well-controlled; Continue prn Percocet    5  GI:  Tolerating TLC 2 3 gm sodium diet  Maintain 1800 mL daily fluid restriction   Continue stool softeners and prn suppository  Continue GI prophylaxis    6  Endo:    History of diabetes; Continue SQ insulin therapy as directed by endocrinology physician  Insulin administration teaching for home therapy ordered    7  Hematology:   Post-operative blood count acceptable; Trend prn    8  Disposition:  Ambulating independently, Anticipate discharge to home today        VTE Pharmacologic Prophylaxis: Sequential compression device (Venodyne)  and Warfarin (Coumadin)  VTE Mechanical Prophylaxis: sequential compression device    Collaborative rounds completed with DAMON Ortega , and Issa Leonard RN    SIGNATURE: Cheryl Currie PA-C  DATE: March 15, 2019  TIME: 9:19 AM

## 2019-03-15 NOTE — OCCUPATIONAL THERAPY NOTE
Occupational Therapy Cancel Note    Chart reviewed, pt currently off floor in cardiac cath lab, not available for OT tx at this time  Continue to follow as able       Eliezer Batista

## 2019-03-15 NOTE — ANESTHESIA PREPROCEDURE EVALUATION
Review of Systems/Medical History  Patient summary reviewed  Chart reviewed  No history of anesthetic complications     Cardiovascular  Hyperlipidemia, Hypertension controlled, CAD , CAD status: 2VD, Dysrhythmias , atrial fibrillation, CHF ,   Comment: 8 days s/p AVR/MV repair/VIRI occlusion  EF 40%,  Pulmonary  Negative pulmonary ROS No sleep apnea ,        GI/Hepatic  Negative GI/hepatic ROS          Kidney disease CKD and ARF,        Endo/Other  Diabetes well controlled type 2 Oral agent,      GYN       Hematology  Anemia ,  Coagulation disorder (coumadin) currently taking oral anticoagulants,    Musculoskeletal       Neurology  Negative neurology ROS      Psychology   Negative psychology ROS            Physical Exam    Airway    Mallampati score: I  TM Distance: >3 FB  Neck ROM: full     Dental   Comment: Partial removed,     Cardiovascular  Rhythm: irregular, Rate: normal,     Pulmonary  Pulmonary exam normal     Other Findings      Lab Results   Component Value Date    WBC 12 60 (H) 03/13/2019    HGB 10 4 (L) 03/13/2019     03/13/2019     Lab Results   Component Value Date    K 3 8 03/15/2019    BUN 88 (H) 03/15/2019    CREATININE 4 30 (H) 03/15/2019    GLUCOSE 110 03/07/2019     Lab Results   Component Value Date    PTT 29 01/29/2019      Lab Results   Component Value Date    INR 3 55 (H) 03/15/2019     Lab Results   Component Value Date    HGBA1C 5 9 03/02/2019     Anesthesia Plan  ASA Score- 4     Anesthesia Type- IV sedation with anesthesia with ASA Monitors  Additional Monitors:   Airway Plan:         Plan Factors-    Induction- intravenous  Postoperative Plan-     Informed Consent- Anesthetic plan and risks discussed with patient  I personally reviewed this patient with the CRNA  Discussed and agreed on the Anesthesia Plan with the CRNA  Desmond Erickson

## 2019-03-15 NOTE — MALNUTRITION/BMI
This medical record reflects one or more clinical indicators suggestive of malnutrition  Malnutrition Findings:   Malnutrition type: Acute illness(r/t decreased appetite s/p procedure, as evidenced by 5% wt loss x 1 week (3/8/19 wt: 158#), and intake meeting <75% estimated needs >4 days  Treatment: liberal diet as able, offered nutrition supplements)  Degree of Malnutrition: Malnutrition of moderate degree  Malnutrition Characteristics: Weight loss, Inadequate energy       Body mass index is 24 13 kg/m²  See Nutrition note dated 3/15/19 for additional details  Completed nutrition assessment is viewable in the nutrition documentation

## 2019-03-15 NOTE — SOCIAL WORK
Pt is cleared for d/c by Cardiothoracic Surgery YINA Arriaza was notified of d/c order  Pt is accepted for resumption of services by [facility] for his aftercare plan  Pt will also receive a rolling walker and bedside commode from [facility] prior to d/c  The pt and his family were informed of d/c  Family will transport pt home later this day, pickup time TBD  IMM signed by pt on 3/15/19  No chart copy required  CM to follow

## 2019-03-15 NOTE — DISCHARGE SUMMARY
Discharge Summary - Cardiothoracic Surgery   [facility] 80 y o  male MRN: [id number]  Unit/Bed#: [facility] 424-01 Encounter: [id number]    Admission Date: 3/7/2019     Discharge Date: 03/15/19    Admitting Diagnosis: Bicuspid aortic valve [Q23 1]  Nonrheumatic aortic valve insufficiency [I35 1]  Non-rheumatic mitral regurgitation [I34 0]  Coronary artery disease involving native coronary artery of native heart without angina pectoris [I25 10]    Primary Discharge Diagnosis:   Aortic regurgitation, Mitral regurgitation  S/P AVR (23mm Inspiris)/MV repairwith 30 mm Medtronic CG Future mitral annuloplasty ring /VIRI ligation    Secondary Discharge Diagnosis:   MR, HTN, HLD, DM II, CKD stage III (creatinine baseline 1 7)    Attending: DAMON Townsend  Consulting Physician(s):   Cardiology  Medical/Critical Care  Endocrine    Procedures Performed:   Orders Placed This Encounter   Procedures    Cardiac cardioversion (cath lab / OR only)   3/7: Elective admission for AVR (23mm Inspiris)/MV repairwith 30 mm Medtronic CG Future mitral annuloplasty ring /VIRI ligation  Hospital Course: The patient was seen in consultation prior to this admission for evaluation of Aortic regurgitation, Mitral regurgitation  Risks and benefits of AVR (23mm Inspiris)/MV repairwith 30 mm Medtronic CG Future mitral annuloplasty ring /VIRI ligation were discussed in detail, and patient was agreeable  Routine preoperative evaluation was completed and informed consent was obtained prior to admission  3/7: Elective admission for AVR (23mm Inspiris)/MV repairwith 30 mm Medtronic CG Future mitral annuloplasty ring   /VIRI ligation  Transferred to ICU, supported with Epi 4, Primacor 0 4, Gerardo 60, Vaso 0 04   No significant postoperative bleeding  Wean towards extubation  3/8: Extubated without incident  Gerardo and vasopressin weaned off  Remains supported with Epi 4, and primacor 0 5    Was initially AV paced without underlying rhythm; Now in sinus rhythm  Slow epi wean as tolerated  T/C beta blocker initiation, after epi has been weaned down  Maintain Leocadia Orn catheter  D/C Arterial line  Start Coumadin, 2 5 mg  Maintain ICU level of care  3/9: UOP decreased yesterday, now on Lasix gtt at 40 and PRN zaroxolyn  UOP 50-100cc/hr  Cr 3 48 (3 54)  Keep maya  Remains on milrinone 0 38 decrease to 0 25 today  Sinus tach, keep Lopressor 25 BID  INR 1 47, dose 2 5mg Coumadin tonight  Keep on insulin drip  Keep chest tubes  Discontinue EPW   3/10:Failed milrinone wean yesterday, remains on 0 38, wean to 0 25 today  SVO2 51  On Lasix gtt at 40  Cr 4 25  -150cc/hr  -750cc/24hrs  INR 1 88, dose 2 5 mg Coumadin tonight, discontinue heparin  Plts 85  Having intermittent tachy arrhythmia with HR 140s, given Lopressor 2 5 IV  Converted to NSR with 1st degree AVB  Discontinue chest tubes  Transitioned to [facility]  Keep ICU   3/11: Milrinone decreased to 0 13 this morning and Lasix gtt 40mg and Cardene 3mg  Rapid Afib, given Amio bolus and gtt, but then discontinued due to pauses  Continue PO Amio  Tmax 101 3  Creatinine 4 6  INR 2 3  Maintain Milrinone at 0 13mcg today  D/C Fulda and Cordis  Increase Metoprolol 50mg bid  Wean Lasix gtt as tolerated  PM: Lasix 10mg/hr with good response maintained maya for output  Received metoprolol this morning with hypotension will trial again tonight  3/12: Remains on IV Milrinone 0 13, SVO2 68, Lasix drip 5mg/hr making 100  200ml/hr of urine Cr 4 6 and amiodarone 1 mg/min  In Afib with better rate control in 80s  Discontinue IV Milrinone, transition Lasix to 40 mg TID, d/c maya at MN   D/C bambi  Decrease amiodarone to 0 5 mg/min  INR 2 5  keep 2 5 mg Coumadin  Transfer to telemetry  3/13: Voiding well with maya removed  Insulin teaching ordered  Hypertensive; Increase Lopressor 75 mg PO BID  Developed atrial flutter this morning  3/14: Made NPO after MN for cardioversion tomorrow   Creatinine stable at 4 63  INR 3, Coumadin 2 mg today  D/C IV amiodarone  Central IV removed  K 3 4; K-dur 20 mEq QD   3/15: Cardioversion aborted 2/2 possible thrombus visualized on PATIENCE; F/U for outpatient cardioversion in 1-2 months  Continue anticoagulation;  INR 3 55 ; Hold Coumadin today  Fit for discharge to home  Condition at Discharge:   good     Discharge Physical Exam:  HEENT/NECK:  PERRLA  No jugular venous distention  Cardiac: Regular rate and rhythm  No rubs/murmurs/gallops  Pulmonary:  Breath sounds slightly diminished at the bases bilaterally  Abdomen:  Non-tender, Non-distended  Positive bowel sounds  Incisions: Sternum is stable  Incision is clean, dry, and intact  Lower extremities: Extremities warm/dry  Radial/PT/DP pulses 2+ bilaterally  1+ edema B/L  Neuro: Alert and oriented X 3  Sensation is grossly intact  No focal deficits  Skin: Warm/Dry, without rashes or lesions  Discharge Data:  Results from last 7 days   Lab Units 03/13/19  0547 03/12/19  0448 03/11/19  0530   WBC Thousand/uL 12 60* 12 02* 11 15*   HEMOGLOBIN g/dL 10 4* 9 7* 9 3*   HEMATOCRIT % 30 0* 27 3* 27 2*   PLATELETS Thousands/uL 165 128* 101*     Results from last 7 days   Lab Units 03/15/19  0504 03/14/19  0518 03/13/19  0547   POTASSIUM mmol/L 3 8 3 1* 3 4*   CHLORIDE mmol/L 96* 94* 94*   CO2 mmol/L 27 27 27   BUN mg/dL 88* 94* 93*   CREATININE mg/dL 4 30* 4 35* 4 63*   CALCIUM mg/dL 8 6 8 5 8 5     Results from last 7 days   Lab Units 03/15/19  0504 03/14/19  0519 03/13/19  0547   INR  3 55* 3 15* 2 99*       Discharge instructions/Information to patient and family:   See after visit summary for information provided to patient and family  Cash Trinidad was educated on restrictions regarding driving and lifting, and techniques of proper incisional care    They were specifically counselled on signs and symptoms of a sternal wound infection, and advised to contact our service immediately should they develop fevers, sweats, chill, redness or drainage at the site of any incisions  Provisions for Follow-Up Care:  See after visit summary for information related to follow-up care and any pertinent home health orders  Disposition:  Home    Planned Readmission:   No    Discharge Medications:  See after visit summary for reconciled discharge medications provided to patient and family  Mayur Pacheco was provided contact information and scheduled a follow up appointment with DAMON Klein  Additionally, they were advised to schedule follow up appointments to be seen by their primary care physician within 7-10 days, and their cardiologist within 2-3 weeks  Contact information was provided  Shymary Tara was counseled on the importance of avoiding tobacco products  As with all patients whom have undergone open heart surgery, tobacco cessation medication was contraindicated at the time of discharge  ACE/ARB was Contraindicated secondary to renal dysfunction     Myra Mustafa is being discharged on anticoagulation therapy for treatment of AF  As they are new to Coumadin, arrangements have been made for Dr Heidi Salas office to monitor INR levels and provide dosing instructions  Their office was notified by phone call and facsimile report which itemized the patients daily INRs and correlating Coumadin doses during their hospitalization  The patient has been provided a prescription for PT/INR to be drawn every Monday and Thursday, with results to Dr Heidi Salas office  They have been prescribed Coumadin, 1 mg tabs, with 60 tablets being dispensed  Finally, they have been advised to take 1 mg daily, starting 3/17, unless otherwise directed  The patient was discharged on ongoing diuretic therapy with Torsemide 20 mg, PO BID and Potassium Chloride 20 mEq, PO BID  They were advised to continue these medications for 14 days, unless otherwise directed   BMP was ordered for one week follow up  Narcotic pain medication was prescribed in the form of Percocet  Prior to prescribing, their prescription profile was reviewed on the [place] department of health prescription drug monitoring program     Cailin Granda was prescribed injectable insulin for management of their pre-existing diabetes  Insulin was prescribed as Lantus qHS and Humalog TID with meals, delivered via injectable pen system  As injectable insulin is a new therapy for this patient, they were educated extensively on dosing and self-administration  The patient was able to demonstrate competency with the pen needle system and felt comfortable doing so  The patient was informed that following their postoperative surgical evaluation, they will be referred to outpatient cardiac rehabilitation  They were counseled that this program is run by specialists who will help them safely strengthen their heart and prevent more heart disease  Cardiac rehabilitation will include exercise, relaxation, stress management, and heart-healthy nutrition  Caregivers will also check to make sure their medication regimen is working  I spent 20 minutes discharging the patient  This time was spent on the day of discharge  I had direct contact with the patient on the day of discharge  Additional documentation is required if more than 30 minutes were spent on discharge       SIGNATURE: Cheryl Currie PA-C  DATE: March 15, 2019  TIME: 1:15 PM

## 2019-03-16 ENCOUNTER — LAB REQUISITION (OUTPATIENT)
Dept: LAB | Facility: HOSPITAL | Age: 69
End: 2019-03-16
Payer: COMMERCIAL

## 2019-03-16 DIAGNOSIS — I48.0 PAROXYSMAL ATRIAL FIBRILLATION (HCC): ICD-10-CM

## 2019-03-16 LAB
INR PPP: 2.59 (ref 0.86–1.17)
PROTHROMBIN TIME: 27 SECONDS (ref 11.8–14.2)

## 2019-03-16 PROCEDURE — 85610 PROTHROMBIN TIME: CPT | Performed by: INTERNAL MEDICINE

## 2019-03-18 ENCOUNTER — TELEPHONE (OUTPATIENT)
Dept: CARDIAC SURGERY | Facility: CLINIC | Age: 69
End: 2019-03-18

## 2019-03-18 ENCOUNTER — ANTICOAG VISIT (OUTPATIENT)
Dept: CARDIOLOGY CLINIC | Facility: CLINIC | Age: 69
End: 2019-03-18

## 2019-03-18 LAB — INR PPP: 1.7 (ref 0.86–1.17)

## 2019-03-18 NOTE — TELEPHONE ENCOUNTER
POST OP CALL    3/7/19: AVR/MV REPAIR/VIRI LIGATION  3/15/19: DISCHARGE HOME  3/18/19  Visiting nurse called today to report patients HR is 40, regular and /62 (R arm, sitting)  He feels extremely tired  No lightheadedness  Current weight 145 lb (pre op wt 156 lb, discharge weight 149 lb), no edema  Lungs clear  No SOB  Incision healing well  Patient discharged on Metoprolol 75 mg BID (patient has 25 mg & 50 mg tablets at home) and Torsemide 20 mg BID x 14 days (patient has 10 mg tablets at home)    Provided following instructions:  1  Do not take torsemide tonight  2  Reduce evening dose of Metoprolol to 25 mg tonight  3  Call me in the AM prior to taking medications with pulse and weight  4   VNA to see pt on Thursday and will be drawing BMP/INR

## 2019-03-19 ENCOUNTER — TELEPHONE (OUTPATIENT)
Dept: NEPHROLOGY | Facility: CLINIC | Age: 69
End: 2019-03-19

## 2019-03-19 ENCOUNTER — OFFICE VISIT (OUTPATIENT)
Dept: CARDIOLOGY CLINIC | Facility: CLINIC | Age: 69
End: 2019-03-19
Payer: COMMERCIAL

## 2019-03-19 VITALS
DIASTOLIC BLOOD PRESSURE: 50 MMHG | OXYGEN SATURATION: 99 % | BODY MASS INDEX: 23.95 KG/M2 | HEIGHT: 66 IN | WEIGHT: 149 LBS | HEART RATE: 113 BPM | SYSTOLIC BLOOD PRESSURE: 120 MMHG

## 2019-03-19 DIAGNOSIS — I50.9 OTHER CONGESTIVE HEART FAILURE (HCC): ICD-10-CM

## 2019-03-19 DIAGNOSIS — N18.30 CKD (CHRONIC KIDNEY DISEASE) STAGE 3, GFR 30-59 ML/MIN (HCC): Primary | Chronic | ICD-10-CM

## 2019-03-19 DIAGNOSIS — N17.9 AKI (ACUTE KIDNEY INJURY) (HCC): ICD-10-CM

## 2019-03-19 DIAGNOSIS — R00.2 PALPITATIONS: Primary | ICD-10-CM

## 2019-03-19 DIAGNOSIS — Z95.2 S/P AVR: ICD-10-CM

## 2019-03-19 DIAGNOSIS — I48.0 PAROXYSMAL ATRIAL FIBRILLATION (HCC): ICD-10-CM

## 2019-03-19 PROCEDURE — 99205 OFFICE O/P NEW HI 60 MIN: CPT | Performed by: INTERNAL MEDICINE

## 2019-03-19 PROCEDURE — 93000 ELECTROCARDIOGRAM COMPLETE: CPT | Performed by: INTERNAL MEDICINE

## 2019-03-19 NOTE — PATIENT INSTRUCTIONS
Decrease Metoprolol to 25 mg twice daily  A-fib (Atrial Fibrillation)   WHAT YOU NEED TO KNOW:   A-fib may come and go, or it may be a long-term condition  A-fib can cause blood clots, stroke, or heart failure  These conditions may become life-threatening  It is important to treat and manage a-fib to help prevent a blood clot, stroke, or heart failure  DISCHARGE INSTRUCTIONS:   Call 911 for any of the following:   · You have any of the following signs of a heart attack:      ¨ Squeezing, pressure, or pain in your chest that lasts longer than 5 minutes or returns    ¨ Discomfort or pain in your back, neck, jaw, stomach, or arm     ¨ Trouble breathing    ¨ Nausea or vomiting    ¨ Lightheadedness or a sudden cold sweat, especially with chest pain or trouble breathing    · You have any of the following signs of a stroke:      ¨ Numbness or drooping on one side of your face     ¨ Weakness in an arm or leg    ¨ Confusion or difficulty speaking    ¨ Dizziness, a severe headache, or vision loss  Return to the emergency department if:  You have any of the following signs of a blood clot:  · You feel lightheaded, are short of breath, and have chest pain  · You cough up blood  · You have swelling, redness, pain, or warmth in your arm or leg  Contact your cardiologist or healthcare provider if:   · Your target heart rate is not in the range it should be  · You have new or worsening swelling in your legs, feet, ankles, or abdomen  · You are short of breath, even at rest      · You have questions or concerns about your condition or care  Medicines: You may need any of the following:  · Heart medicines  help control your heart rate and rhythm  You may need more than one medicine to treat your symptoms  · Blood thinners    help prevent blood clots  Examples of blood thinners include heparin and warfarin  Clots can cause strokes, heart attacks, and death   The following are general safety guidelines to follow while you are taking a blood thinner:    ¨ Watch for bleeding and bruising while you take blood thinners  Watch for bleeding from your gums or nose  Watch for blood in your urine and bowel movements  Use a soft washcloth on your skin, and a soft toothbrush to brush your teeth  This can keep your skin and gums from bleeding  If you shave, use an electric shaver  Do not play contact sports  ¨ Tell your dentist and other healthcare providers that you take anticoagulants  Wear a bracelet or necklace that says you take this medicine  ¨ Do not start or stop any medicines unless your healthcare provider tells you to  Many medicines cannot be used with blood thinners  ¨ Tell your healthcare provider right away if you forget to take the medicine, or if you take too much  ¨ Warfarin  is a blood thinner that you may need to take  The following are things you should be aware of if you take warfarin  § Foods and medicines can affect the amount of warfarin in your blood  Do not make major changes to your diet while you take warfarin  Warfarin works best when you eat about the same amount of vitamin K every day  Vitamin K is found in green leafy vegetables and certain other foods  Ask for more information about what to eat when you are taking warfarin  § You will need to see your healthcare provider for follow-up visits when you are on warfarin  You will need regular blood tests  These tests are used to decide how much medicine you need  · Antiplatelets , such as aspirin, help prevent blood clots  Take your antiplatelet medicine exactly as directed  These medicines make it more likely for you to bleed or bruise  If you are told to take aspirin, do not take acetaminophen or ibuprofen instead  · Take your medicine as directed  Contact your healthcare provider if you think your medicine is not helping or if you have side effects  Tell him or her if you are allergic to any medicine   Keep a list of the medicines, vitamins, and herbs you take  Include the amounts, and when and why you take them  Bring the list or the pill bottles to follow-up visits  Carry your medicine list with you in case of an emergency  Follow up with your cardiologist as directed: You will need regular blood tests and monitoring  Write down your questions so you remember to ask them during your visits  Manage A-fib:   · Know your target heart rate  Learn how to take your pulse and monitor your heart rate  · Manage other health conditions  This includes high blood pressure, sleep apnea, thyroid disease, diabetes, and other heart conditions  Take medicine as directed and follow your treatment plan  · Limit or do not drink alcohol  Alcohol can make a-fib hard to manage  Ask your healthcare provider if it is safe for you to drink alcohol  A drink of alcohol is 12 ounces of beer, 5 ounces of wine, or 1½ ounces of liquor  · Do not smoke  Nicotine and other chemicals in cigarettes and cigars can cause heart and lung damage  Ask your healthcare provider for information if you currently smoke and need help to quit  E-cigarettes or smokeless tobacco still contain nicotine  Talk to your healthcare provider before you use these products  · Eat heart-healthy foods  Heart healthy foods will help keep your cholesterol low  These include fruits, vegetables, whole-grain breads, low-fat dairy products, beans, lean meats, and fish  Replace butter and margarine with heart-healthy oils such as olive oil and canola oil  · Maintain a healthy weight  Ask your healthcare provider how much you should weigh  Ask him to help you create a weight loss plan if you are overweight  · Exercise for 30 minutes  most days of the week  Ask your healthcare provider about the best exercise plan for you    © 2017 [employer] Information is for End User's use only and may not be sold, redistributed or otherwise used for commercial purposes  All illustrations and images included in CareNotes® are the copyrighted property of [employer]  or [employer]  The above information is an  only  It is not intended as medical advice for individual conditions or treatments  Talk to your doctor, nurse or pharmacist before following any medical regimen to see if it is safe and effective for you  Mitral Valve Replacement   WHAT YOU NEED TO KNOW:   Mitral valve replacement is open heart surgery to replace all or part of the mitral valve  The mitral valve normally opens and closes to let blood pass through the heart  If your mitral valve does not open or close correctly, blood may not flow as it should through your heart  DISCHARGE INSTRUCTIONS:   Medicines:   · Antiplatelets  help prevent blood clots  This medicine makes it more likely for you to bleed or bruise  · Blood thinners    help prevent blood clots  Examples of blood thinners include heparin and warfarin  Clots can cause strokes, heart attacks, and death  The following are general safety guidelines to follow while you are taking a blood thinner:    ¨ Watch for bleeding and bruising while you take blood thinners  Watch for bleeding from your gums or nose  Watch for blood in your urine and bowel movements  Use a soft washcloth on your skin, and a soft toothbrush to brush your teeth  This can keep your skin and gums from bleeding  If you shave, use an electric shaver  Do not play contact sports  ¨ Tell your dentist and other healthcare providers that you take anticoagulants  Wear a bracelet or necklace that says you take this medicine  ¨ Do not start or stop any medicines unless your healthcare provider tells you to  Many medicines cannot be used with blood thinners  ¨ Tell your healthcare provider right away if you forget to take the medicine, or if you take too much  ¨ Warfarin  is a blood thinner that you may need to take   The following are things you should be aware of if you take warfarin  § Foods and medicines can affect the amount of warfarin in your blood  Do not make major changes to your diet while you take warfarin  Warfarin works best when you eat about the same amount of vitamin K every day  Vitamin K is found in green leafy vegetables and certain other foods  Ask for more information about what to eat when you are taking warfarin  § You will need to see your healthcare provider for follow-up visits when you are on warfarin  You will need regular blood tests  These tests are used to decide how much medicine you need  · Heart medicine: This medicine is given to strengthen or regulate your heartbeat  · Blood pressure medicine: This is given to lower your blood pressure  · Antibiotics: This medicine may be given to help prevent or treat infection caused by bacteria  · Take your medicine as directed  Contact your healthcare provider if you think your medicine is not helping or if you have side effects  Tell him or her if you are allergic to any medicine  Keep a list of the medicines, vitamins, and herbs you take  Include the amounts, and when and why you take them  Bring the list or the pill bottles to follow-up visits  Carry your medicine list with you in case of an emergency  Follow up with your cardiologist or heart surgeon as directed: You may need to return for blood tests  Write down your questions so you remember to ask them during your visits  Wound care:  Carefully wash the wound with soap and water  Pat the area dry  Put on new, clean bandages as directed  Change your bandages when they get wet or dirty  Cardiac rehabilitation:  Your cardiologist or heart surgeon may recommend that you attend cardiac rehabilitation (rehab)  This is a program run by specialists who will help you safely strengthen your heart and prevent more heart disease   The plan includes exercise, relaxation, stress management, and heart-healthy nutrition  Healthcare providers will also check to make sure any medicines you are taking are working  The plan may also include instructions for when you can drive, return to work, and do other normal daily activities  Self-care:   · Care for your teeth and gums:  Brush and floss your teeth, and see your dentist regularly  This may help prevent an infection  Tell your dentist that you have had heart valve surgery  · Eat heart healthy foods:  Healthy foods include fruits, vegetables, whole-grain breads, low-fat dairy products, beans, lean meats, and fish  Eat foods that contain healthy fats, such as walnuts, salmon, and canola and soybean oils  Fruits and vegetables, nuts, and fiber may help to protect the heart  Ask if you need to be on a low-sodium (salt) or low-fat diet  · Maintain a healthy weight:  Ask your healthcare provider or cardiologist how much you should weigh  Ask him to help you create a weight loss plan if you are overweight  · Do not smoke: If you smoke, it is never too late to quit  Smoking can make your symptoms worse or increase the risk that you may need surgery again  Ask for information if you need help quitting  Contact your cardiologist or heart surgeon if:   · You have a fever  · Your hands, ankles, or feet are swollen  · You urinate less, or not at all  · You feel extremely tired or weak  · You have questions or concerns about your condition or care  Seek care immediately or call 911 if:   · Your wound area is painful, red, or is oozing fluid  · Your arm or leg feels warm, tender, and painful  It may look swollen and red  · You have blood in your urine or bowel movement  · You bleed from your nose, mouth, or wound  · You have weakness or numbness in your arm, leg, or face  · You have chest pain when you take a deep breath or cough  You may cough up blood  · You have dizziness, a severe headache, or vision loss      · You are confused or have problems speaking or understanding speech  · You suddenly feel lightheaded or have trouble breathing  · You have pain that spreads to your arm, jaw, or back, or sudden back pain  © 2017 [employer] Information is for End User's use only and may not be sold, redistributed or otherwise used for commercial purposes  All illustrations and images included in CareNotes® are the copyrighted property of [employer]  or [employer]  The above information is an  only  It is not intended as medical advice for individual conditions or treatments  Talk to your doctor, nurse or pharmacist before following any medical regimen to see if it is safe and effective for you  Aortic Valve Replacement   WHAT YOU NEED TO KNOW:   Aortic valve replacement is surgery to put a new aortic valve in your heart  Your aortic valve separates the lower section of your heart from your aorta  The aorta is the large blood vessel that carries blood from your heart to your body  Your aortic valve opens and closes to let blood flow from your heart  When your aortic valve does not open or close as it should, the amount of blood that your heart can pump to your body decreases  DISCHARGE INSTRUCTIONS:   Medicines:   · Antiplatelets  help prevent blood clots  This medicine makes it more likely for you to bleed or bruise  · Blood thinners    help prevent blood clots  Examples of blood thinners include heparin and warfarin  Clots can cause strokes, heart attacks, and death  The following are general safety guidelines to follow while you are taking a blood thinner:    ¨ Watch for bleeding and bruising while you take blood thinners  Watch for bleeding from your gums or nose  Watch for blood in your urine and bowel movements  Use a soft washcloth on your skin, and a soft toothbrush to brush your teeth  This can keep your skin and gums from bleeding  If you shave, use an electric shaver   Do not play contact sports  ¨ Tell your dentist and other healthcare providers that you take anticoagulants  Wear a bracelet or necklace that says you take this medicine  ¨ Do not start or stop any medicines unless your healthcare provider tells you to  Many medicines cannot be used with blood thinners  ¨ Tell your healthcare provider right away if you forget to take the medicine, or if you take too much  ¨ Warfarin  is a blood thinner that you may need to take  The following are things you should be aware of if you take warfarin  § Foods and medicines can affect the amount of warfarin in your blood  Do not make major changes to your diet while you take warfarin  Warfarin works best when you eat about the same amount of vitamin K every day  Vitamin K is found in green leafy vegetables and certain other foods  Ask for more information about what to eat when you are taking warfarin  § You will need to see your healthcare provider for follow-up visits when you are on warfarin  You will need regular blood tests  These tests are used to decide how much medicine you need  · Heart medicine: This medicine is given to strengthen or regulate your heartbeat  It also may help your heart in other ways  Talk with your caregiver to find out what your heart medicine is and why you are taking it  · Blood pressure medicine: This is given to lower your blood pressure  A controlled blood pressure helps protect your organs, such as your heart, lungs, brain, and kidneys  Take your blood pressure medicine exactly as directed  · Antibiotics: This medicine will help kill germs that may get into your blood and cause an infection in your heart  Healthcare providers may tell you to take antibiotics before and after dental work, surgery, and some procedures  This is important after heart valve disease  · Take your medicine as directed    Contact your healthcare provider if you think your medicine is not helping or if you have side effects  Tell him or her if you are allergic to any medicine  Keep a list of the medicines, vitamins, and herbs you take  Include the amounts, and when and why you take them  Bring the list or the pill bottles to follow-up visits  Carry your medicine list with you in case of an emergency  Follow up with your healthcare provider as directed:  Write down your questions so you remember to ask them during your visits  Care for your wound:  Ask healthcare providers how to take care of your incision wound  Check your wound, clean it, and change the bandages each day  If the bandage gets dirty or falls off, put on a new one  Mouth care: Take care of your teeth and gums  Brush and floss your teeth, and see your dentist regularly  You may help prevent an infection in your heart if you do this  Tell your dentist that you have had heart valve surgery  Cardiac rehabilitation:  Your cardiologist or heart surgeon may recommend that you attend cardiac rehabilitation (rehab)  This is a program run by specialists who will help you safely strengthen your heart and prevent more heart disease  The plan includes exercise, relaxation, stress management, and heart-healthy nutrition  Healthcare providers will also check to make sure any medicines you are taking are working  The plan may also include instructions for when you can drive, return to work, and do other normal daily activities  Good nutrition for your heart:  Get enough calories, protein, vitamins, and minerals to help prevent poor nutrition and muscle wasting  You may be told to eat foods low in cholesterol or sodium (salt)  You also may be told to limit saturated and trans fats  Do eat foods that contain healthy fats, such as walnuts, salmon, and canola and soybean oils  Eat foods that help protect the heart, including plenty of fruits and vegetables, nuts, and sources of fiber  Ask what a healthy weight is for you   Set goals to reach and stay at that weight  Contact your healthcare provider if:   · You have a fever  · Your wound area is painful, red, or oozing fluid  · You feel too tired for normal activities weeks after your surgery  · Your hands, ankles, or feet are swollen  · You urinate less, or not at all  · You feel tired or weak and are short of breath  · Your arm or leg feels warm, tender, and painful  It may look swollen and red  · You have questions or concerns about your condition or care  Seek care immediately or call 911 if:   · You have blood in your bowel movements or urine  You are bleeding from your nose, mouth, or incision wound  · You have a severe headache  This may feel like the worst headache of your life  · You have weakness or numbness in your arm, leg, or face  This may happen on only 1 side of your body  · You feel lightheaded, dizzy, or sick to your stomach  You may have cold sweats and bluish skin, lips, or nail beds  · You have trouble breathing or are coughing up blood  You may have more pain when you take deep breaths or cough  · You have chest pain, or discomfort that spreads to your arms, jaw, or back, or sudden back pain  · You are confused or have problems speaking or understanding speech  You have vision changes or loss of vision  © 2017 [facility] [facility] Information is for End User's use only and may not be sold, redistributed or otherwise used for commercial purposes  All illustrations and images included in CareNotes® are the copyrighted property of [employer]  or [employer]  The above information is an  only  It is not intended as medical advice for individual conditions or treatments  Talk to your doctor, nurse or pharmacist before following any medical regimen to see if it is safe and effective for you

## 2019-03-19 NOTE — PROGRESS NOTES
Heart Failure Outpatient Consult Note Joelle Hearn 80 y o  male MRN: [id number]    @ Encounter: [id number]      Assessment/Plan:    Patient Active Problem List    Diagnosis Date Noted    CARY (acute kidney injury) ([place] [address] ) 03/08/2019    S/P AVR 03/07/2019    S/P MVR (mitral valve repair) 03/07/2019    Dilated cardiomyopathy ([place] [address] ) 03/07/2019    Diabetes mellitus type 2 in nonobese ([place] [address] ) 03/07/2019    Bicuspid aortic valve 02/27/2019    Coronary artery disease involving native coronary artery of native heart without angina pectoris 02/27/2019    Nonrheumatic aortic valve insufficiency 02/06/2019    Non-rheumatic mitral regurgitation 02/06/2019    Chronic systolic congestive heart failure ([place] [address] ) 01/29/2019    Essential hypertension 05/11/2018    FSGS (focal segmental glomerulosclerosis) 05/10/2018    CKD (chronic kidney disease) stage 3, GFR 30-59 ml/min ([place] [address] ) 03/01/2018    Persistent proteinuria 03/01/2018    Diabetic nephropathy associated with type 2 diabetes mellitus ([place] [address] ) 03/01/2018    Vitamin D deficiency 03/01/2018    Secondary hyperparathyroidism ([place] [address] ) 03/01/2018     Assessment/Plan:    1  H/O Bicuspid aortic valve, S/P AVR with 23 mm Nettles Inspiris Resilia bovine tissue valve and S/P MVR with 30 mm Medtronic CG Future mitral annuloplasty ring 3/6/19  Post Operative PATIENCE:    LVEF 50%,     "The left atrial appendage had been previously clipped, and there was a small residual area of appendage with a suspicious small echodensity (3mm x 4 mm) at the mouth of the appendage, which may represent clipped atrial tissue or thrombus  "MITRAL VALVE:A 30 mm Medtronic CG Future mitral annuloplasty ring prosthesis was present  It exhibited normal function  There was mild regurgitation"      "AORTIC VALVE:  A bioprosthesis (23mm Nettles Inspiris Resilia bovine tissue valve) was present  It exhibited normal function  There was no significant perivalvular regurgitation"      Echocardiogram 1/29/19:  LVEF: 40%  LVIDd:5 99 cm  RV: mildly dilated with normal systolic functin  MR:moderate to severe   PASP:60 mm Hg  RVOT:   Other:mild diffuse hypokinesis         2  PAF  Currently in atrial flutter with HR of 100 bpm however only took 25 mg of Metoprolol last PM and none today   Doses reduced yesterday due to  VNA RN reporting HR in the 40's during visit  S/P VIRI ligation with 35 mm AtriClip device  Recent cardioversion aborted due to concern for possible thrombus at area of previous clipped  VIRI  Continue oral AC with Coumadin and will restart Metoprolol at 25 mg BID for now and continue to monitor  Consideration for repeat PATIENCE and possible cardioversion in next month or two  3 NICM, likely valvular related, with prior  LVEF of 40%  PATIENCE as described above demonstrating improvement in LV function  Continue Metoprolol  and Torsemide  4 HTN, Well controlled on current regimen  5 HLD,     6  Type II DM,     7 CKD, Creatinine was as high as 4 5-4 6 during admission, downtrended by discharge at  4 30  Thought to be 2/2 ATN with intraoperative hypotensive episodes, bypass time of 96 mins, cross clamp time 87 mins  Recommendations per renal to start patient on ARB as an outpatient when possible  BMP this Thursday  8 CAD  Per preoperative cath with 70% stenosis in both  the mid Cx and Ramus    RTO 6 weeks  Lab Results   Component Value Date    NTBNP 16,175 (H) 01/29/2019        Neurohormonal Blockade:  --Beta-Blocker: Metoprolol tartrate 75 mg twice daily, reduced today to 25 mg PO twice daily  --ACEi, ARB or ARNi:  --Aldosterone Receptor Blocker:  --Diuretic:Torsemide 20 mg twice daily x14 days per CT surgery    Sudden Cardiac Death Risk Reduction:  --ICD: EF 50%    Electrical Resynchronization:  --Candidacy for BiV device: narrow QRS    Advanced Therapies (if appropriate):   --Inotrope:  --LVAD/Transplant Candidacy:    Today's Plan:    --2g sodium diet  Weights daily    HPI: 80year old male with PMH of bicuspid aortic valve, S/P AVR with 23 mm Nettles Inspiris Resilia bovine tissues valve and S/P MVR with 30 mm Medtronic CG Future mitral annuloplasty ring ,on 3/6/19, S/P VIRI ligation with 35 mm AtriClip device, PAF, NICM with LVEF of 40%,HTN, HLD, Type II DM, CKD, CAD per preoperative cath with 70% stenosis of the mid Cx and Ramus, presents for heart failure consultation  Briefly, patient was hospitalized from 3/7/19-3/15/19 for the above noted procedure  The post operative period was complicated by low output heart failure requiring Milrinone infusion and vasopressors, which were successfully weaned off  He also experienced intermittent tachy arrhythmia including bouts of rapid atrial fib and atrial flutter, which appeared to improve with Metoprolol and Amiodarone  He was tentatively scheduled for cardioversion prior to discharge however this could not be accomplished due to presence of possible thrombus seen on PATIENCE  Patient was therefore continued on oral AC with a plan for outpatient cardioversion in one to two months  He was discharged home on a regimen of Metoprolol tartrate, Torsemide, Coumadin, ASA, and a statin  Apparently has VNA at home who noted a heart rate of 40 bpm during his visit yesterday with complaints of fatigue  He presents today with reports of  fatigue which has actually improved a bit from yesterday with reduction of his Metoprolol dose  In terms of his post operative recovery, he feels he is doing well  Has minimal incisional pain  Appears euvolemic on exam  Warm, well perfused on exam  Blood pressure stable  Has plans to attend cardiac rehab in the future and has his initial consultation later this week  Will see nephrology and CT surgery for follow up within the next week or two         Past Medical History:   Diagnosis Date    Diabetes mellitus ([facility] [address] )     Hyperlipidemia     Hypertension     Proteinuria     Stage 3 chronic kidney disease (HCC)     Vitamin D deficiency 12 point ROS negative other than that stated in HPI    Allergies   Allergen Reactions    Ace Inhibitors Cough       Current Outpatient Medications:     acetaminophen (TYLENOL) 650 mg CR tablet, Take 1 tablet (650 mg total) by mouth every 8 (eight) hours as needed for mild pain for up to 30 days, Disp: 30 tablet, Rfl: 0    ascorbic acid (VITAMIN C) 500 mg tablet, Take 500 mg by mouth daily, Disp: , Rfl:     aspirin (ECOTRIN LOW STRENGTH) 81 mg EC tablet, Take 1 tablet (81 mg total) by mouth daily, Disp: 30 tablet, Rfl: 2    atorvastatin (LIPITOR) 20 mg tablet, Take 1 tablet by mouth daily, Disp: , Rfl:     calcitriol (ROCALTROL) 0 25 mcg capsule, Take 1 capsule by mouth 2 (two) times a week, Disp: , Rfl:     Cholecalciferol (VITAMIN D3) 1000 units CAPS, Take 1 capsule by mouth daily, Disp: , Rfl:     docusate sodium (COLACE) 100 mg capsule, Take 1 capsule (100 mg total) by mouth 2 (two) times a day for 30 days, Disp: 60 capsule, Rfl: 0    insulin glargine (LANTUS SOLOSTAR) 100 units/mL injection pen, Inject 16 Units under the skin daily at bedtime for 30 days, Disp: 5 pen, Rfl: 2    insulin isophane (HUMULIN N KWIKPEN) 100 units/mL injection pen, Inject 4 Units under the skin 3 (three) times a day before meals, Disp: 5 pen, Rfl: 2    Insulin Pen Needle 32G X 4 MM MISC, Inject Lantus qHS and Novolog TID with meals, as directed  Dx: DM Ell 9, Disp: 100 each, Rfl: 0    metoprolol tartrate 75 MG TABS, Take 1 tablet (75 mg total) by mouth every 12 (twelve) hours, Disp: 60 tablet, Rfl: 2    omeprazole (PriLOSEC) 20 mg delayed release capsule, Take 1 capsule (20 mg total) by mouth daily for 30 days, Disp: 30 capsule, Rfl: 0    oxyCODONE-acetaminophen (PERCOCET) 5-325 mg per tablet, Take 1 tablet every 6 hours, as needed for moderate pain  Take 2 tablets every 8 hours, as needed for severe pain  Ongoing Therapy  , Disp: 30 tablet, Rfl: 0    polyethylene glycol (GLYCOLAX) powder, Take 17 g by mouth daily for 30 days, Disp: 510 g, Rfl: 0    potassium chloride (K-DUR,KLOR-CON) 20 mEq tablet, Take 1 tablet (20 mEq total) by mouth 2 (two) times a day for 14 days, Disp: 14 tablet, Rfl: 2    torsemide (DEMADEX) 20 mg tablet, Take 1 tablet (20 mg total) by mouth 2 (two) times a day for 14 days, Disp: 28 tablet, Rfl: 2    TRAVATAN Z 0 004 % ophthalmic solution, , Disp: , Rfl: 0    warfarin (COUMADIN) 1 mg tablet, Take first dose ,  Take one mg daily, unless otherwise directed by Dr Hui Granado office  , Disp: 60 tablet, Rfl: 2    Social History     Socioeconomic History    Marital status: /Civil Union     Spouse name: Not on file    Number of children: Not on file    Years of education: Not on file    Highest education level: Not on file   Occupational History    Occupation: RETIRED   Social Needs    Financial resource strain: Not on file    Food insecurity:     Worry: Not on file     Inability: Not on file    Transportation needs:     Medical: Not on file     Non-medical: Not on file   Tobacco Use    Smoking status: Former Smoker     Last attempt to quit:      Years since quittin 2    Smokeless tobacco: Never Used   Substance and Sexual Activity    Alcohol use: Yes     Frequency: Monthly or less     Drinks per session: 1 or 2     Binge frequency: Never     Comment: occasionally    Drug use: No    Sexual activity: Not on file   Lifestyle    Physical activity:     Days per week: Not on file     Minutes per session: Not on file    Stress: Not on file   Relationships    Social connections:     Talks on phone: Not on file     Gets together: Not on file     Attends Yazdanism service: Not on file     Active member of club or organization: Not on file     Attends meetings of clubs or organizations: Not on file     Relationship status: Not on file    Intimate partner violence:     Fear of current or ex partner: Not on file     Emotionally abused: Not on file     Physically abused: Not on file     Forced sexual activity: Not on file   Other Topics Concern    Not on file   Social History Narrative    Not on file       Family History   Problem Relation Age of Onset    Stroke Mother     Hypertension Mother     Blindness Father     Gout Brother     Heart Valve Disease Brother     Anuerysm Neg Hx        Physical Exam:    Vitals: There were no vitals taken for this visit  , There is no height or weight on file to calculate BMI ,   Wt Readings from Last 3 Encounters:   03/15/19 67 8 kg (149 lb 7 6 oz)   03/02/19 72 9 kg (160 lb 11 5 oz)   02/27/19 73 kg (161 lb)       Physical Exam:    GEN: Hannah Jordan appears well, alert and oriented x 3, pleasant and cooperative   HEENT: pupils equal, round, and reactive to light; extraocular muscles intact  NECK: supple, no carotid bruits   HEART: regular rhythm, normal S1 and S2, no murmurs, clicks, gallops or rubs, JVP is down   LUNGS: clear to auscultation bilaterally; no wheezes, rales, or rhonchi   ABDOMEN: normal bowel sounds, soft, no tenderness, no distention  EXTREMITIES: peripheral pulses normal; no clubbing, cyanosis, or edema  NEURO: no focal findings   SKIN: normal without suspicious lesions on exposed skin    Labs & Results:    Lab Results   Component Value Date    WBC 12 60 (H) 03/13/2019    HGB 10 4 (L) 03/13/2019    HCT 30 0 (L) 03/13/2019    MCV 88 03/13/2019     03/13/2019     Lab Results   Component Value Date    GLUCOSE 110 03/07/2019    CALCIUM 8 6 03/15/2019    SODIUM 131 (L) 03/15/2019    K 3 8 03/15/2019    CO2 27 03/15/2019    CL 96 (L) 03/15/2019    BUN 88 (H) 03/15/2019    CREATININE 4 30 (H) 03/15/2019     Lab Results   Component Value Date    NTBNP 16,175 (H) 01/29/2019      No results found for: CHOL  Lab Results   Component Value Date    HDL 40 03/11/2019    HDL 45 02/15/2019     Lab Results   Component Value Date    LDLCALC 28 03/11/2019    [facility] 63 02/15/2019     Lab Results   Component Value Date    TRIG 63 03/11/2019    TRIG 88 02/15/2019     No results found for: CHOLHDL    EKG personally reviewed by JHONATAN Rdz  No results found for this visit on 03/19/19  Counseling / Coordination of Care  Total floor / unit time spent today 40 minutes  Greater than 50% of total time was spent with the patient and / or family counseling and / or coordination of care  A description of the counseling / coordination of care: 25 min  Thank you for the opportunity to participate in the care of this patient  Cely Kirby

## 2019-03-19 NOTE — TELEPHONE ENCOUNTER
I spoke to the patient and his daughter, we scheduled a follow up appointment on 3/27 in the [facility] office with Linda Mackey  I faxed over the orders for the BMP to [facility] visiting nurses  They will be coming to the house on 3/21 and will draw the blood

## 2019-03-21 ENCOUNTER — APPOINTMENT (OUTPATIENT)
Dept: LAB | Facility: CLINIC | Age: 69
End: 2019-03-21
Payer: COMMERCIAL

## 2019-03-21 ENCOUNTER — TELEPHONE (OUTPATIENT)
Dept: CARDIOLOGY CLINIC | Facility: CLINIC | Age: 69
End: 2019-03-21

## 2019-03-21 ENCOUNTER — TELEPHONE (OUTPATIENT)
Dept: CARDIAC SURGERY | Facility: CLINIC | Age: 69
End: 2019-03-21

## 2019-03-21 ENCOUNTER — ANTICOAG VISIT (OUTPATIENT)
Dept: CARDIOLOGY CLINIC | Facility: CLINIC | Age: 69
End: 2019-03-21

## 2019-03-21 DIAGNOSIS — N17.9 AKI (ACUTE KIDNEY INJURY) (HCC): ICD-10-CM

## 2019-03-21 DIAGNOSIS — I48.91 ATRIAL FIBRILLATION, UNSPECIFIED TYPE (HCC): Primary | ICD-10-CM

## 2019-03-21 DIAGNOSIS — Z95.2 S/P AVR: ICD-10-CM

## 2019-03-21 DIAGNOSIS — N18.30 CKD (CHRONIC KIDNEY DISEASE) STAGE 3, GFR 30-59 ML/MIN (HCC): Chronic | ICD-10-CM

## 2019-03-21 LAB
ANION GAP SERPL CALCULATED.3IONS-SCNC: 10 MMOL/L (ref 4–13)
BUN SERPL-MCNC: 91 MG/DL (ref 5–25)
CALCIUM SERPL-MCNC: 8.6 MG/DL (ref 8.3–10.1)
CHLORIDE SERPL-SCNC: 102 MMOL/L (ref 100–108)
CO2 SERPL-SCNC: 23 MMOL/L (ref 21–32)
CREAT SERPL-MCNC: 4.29 MG/DL (ref 0.6–1.3)
GFR SERPL CREATININE-BSD FRML MDRD: 15 ML/MIN/1.73SQ M
GLUCOSE P FAST SERPL-MCNC: 79 MG/DL (ref 65–99)
INR PPP: 1.3 (ref 0.86–1.17)
POTASSIUM SERPL-SCNC: 5.7 MMOL/L (ref 3.5–5.3)
SODIUM SERPL-SCNC: 135 MMOL/L (ref 136–145)

## 2019-03-21 PROCEDURE — 80048 BASIC METABOLIC PNL TOTAL CA: CPT

## 2019-03-21 PROCEDURE — 36415 COLL VENOUS BLD VENIPUNCTURE: CPT

## 2019-03-21 NOTE — TELEPHONE ENCOUNTER
Pt seen in office 2 days ago when he complained of HR of 40  In the office, his HR was 113  His metoprolol tartrate was decreased to 25 mg bid  Called today as advised by VNA to report HR this AM is in 120's, 130's  Highest was 133  He said he is feeling better than when his HR was in the 40's  Has f/u appt with Dr Leesa Roberts in one week  Cardiac surgeon on 4/10, and has Cardiac Rehab evaluation scheduled today  Please advise of any changes or recommendations

## 2019-03-21 NOTE — TELEPHONE ENCOUNTER
Spoke to: Patient   Procedure & Date: AVR/MVR 3/7/19  Surgeon: Maite Kenny     Patients VONA Hilario Nobles called and left an update to report that patient's HR was 120 today /72 saw Dr Mahesh Jolley on 3/19/19  I called the patient back and advised to contact Cardiology to report BP and HR for further recomendations  Otherwise, patient is doing well, his incisions are clean/dry/intact, no s/s of infection, ambulating without complaints, no SOB, no LH/dizziness, no swelling  Patient had no further questions/complaints       Follow-up APPTs:   Cardiology: 3/19/19, 3/28/19  CT surgery: 4/10/19

## 2019-03-22 DIAGNOSIS — E87.5 HYPERKALEMIA: Primary | ICD-10-CM

## 2019-03-22 NOTE — PROGRESS NOTES
Called patient and spoke with the patient's daughter  His creatinine is stable  His sodium has improved but his potassium is now elevated  He is taking 20meq potassium per day  I spoke with daughter, April, and asked her to stop the potassium completely  We will get blood work on Monday to recheck his levels  They will see me in the office next week after this  She is agreeable to this plan

## 2019-03-25 ENCOUNTER — TELEPHONE (OUTPATIENT)
Dept: CARDIOLOGY CLINIC | Facility: CLINIC | Age: 69
End: 2019-03-25

## 2019-03-25 ENCOUNTER — ANTICOAG VISIT (OUTPATIENT)
Dept: CARDIOLOGY CLINIC | Facility: CLINIC | Age: 69
End: 2019-03-25

## 2019-03-25 ENCOUNTER — TELEPHONE (OUTPATIENT)
Dept: OTHER | Facility: HOSPITAL | Age: 69
End: 2019-03-25

## 2019-03-25 ENCOUNTER — LAB REQUISITION (OUTPATIENT)
Dept: LAB | Facility: HOSPITAL | Age: 69
End: 2019-03-25
Payer: COMMERCIAL

## 2019-03-25 DIAGNOSIS — E87.5 HYPERKALEMIA: ICD-10-CM

## 2019-03-25 LAB
ANION GAP SERPL CALCULATED.3IONS-SCNC: 10 MMOL/L (ref 4–13)
BUN SERPL-MCNC: 95 MG/DL (ref 5–25)
CALCIUM SERPL-MCNC: 8.7 MG/DL (ref 8.3–10.1)
CHLORIDE SERPL-SCNC: 101 MMOL/L (ref 100–108)
CO2 SERPL-SCNC: 23 MMOL/L (ref 21–32)
CREAT SERPL-MCNC: 5.03 MG/DL (ref 0.6–1.3)
GFR SERPL CREATININE-BSD FRML MDRD: 13 ML/MIN/1.73SQ M
GLUCOSE SERPL-MCNC: 109 MG/DL (ref 65–140)
INR PPP: 1.3 (ref 0.86–1.17)
POTASSIUM SERPL-SCNC: 4.9 MMOL/L (ref 3.5–5.3)
SODIUM SERPL-SCNC: 134 MMOL/L (ref 136–145)

## 2019-03-25 PROCEDURE — 80048 BASIC METABOLIC PNL TOTAL CA: CPT | Performed by: INTERNAL MEDICINE

## 2019-03-25 NOTE — TELEPHONE ENCOUNTER
I spoke with  The patient's daughter on the phone  I called the patient after seeing the patient's labs on the phone  His Cr had increased to 5 08 from 4 2 4 days prior  His daughter, April, has been staying with him since his hospital discharge  She noted he has been dizzy with standing up  Plan to hold demadex  Tomorrow and decrease to once a day on Wednesday in the morning   Patient to be seen in our office on Wednesday, will check labs at that time,

## 2019-03-25 NOTE — TELEPHONE ENCOUNTER
VN called states pt hr 120 irreg  BP 98/78 Wt decreased in the last week by 8 lbs  Bmp was drawn today per Dr Caroline HERNANDEZ on 3/21 lab was 5 7 therefore K-dur stopped by Dr Gamal Olivarez  Vn will see pt again on the 28th      Taking: Torsemide 20 mg bid last dose Friday          Please advise

## 2019-03-25 NOTE — TELEPHONE ENCOUNTER
Continue the course without changes for now  HR became bradycardic on higher doses of beta-blocker  Currently limited in our options  Plan for eventual PATIENCE/DCCV  Thanks!

## 2019-03-27 ENCOUNTER — OFFICE VISIT (OUTPATIENT)
Dept: NEPHROLOGY | Facility: CLINIC | Age: 69
End: 2019-03-27
Payer: COMMERCIAL

## 2019-03-27 VITALS
HEART RATE: 120 BPM | BODY MASS INDEX: 23.63 KG/M2 | HEIGHT: 66 IN | DIASTOLIC BLOOD PRESSURE: 72 MMHG | WEIGHT: 147 LBS | SYSTOLIC BLOOD PRESSURE: 100 MMHG

## 2019-03-27 DIAGNOSIS — N18.30 CKD (CHRONIC KIDNEY DISEASE) STAGE 3, GFR 30-59 ML/MIN (HCC): Primary | Chronic | ICD-10-CM

## 2019-03-27 DIAGNOSIS — N17.9 AKI (ACUTE KIDNEY INJURY) (HCC): ICD-10-CM

## 2019-03-27 DIAGNOSIS — R80.1 PERSISTENT PROTEINURIA: ICD-10-CM

## 2019-03-27 DIAGNOSIS — N25.81 SECONDARY HYPERPARATHYROIDISM (HCC): Chronic | ICD-10-CM

## 2019-03-27 PROCEDURE — 99214 OFFICE O/P EST MOD 30 MIN: CPT | Performed by: PHYSICIAN ASSISTANT

## 2019-03-27 NOTE — PROGRESS NOTES
Assessment and Plan:    Myra was seen today for follow-up and chronic kidney disease  Diagnoses and all orders for this visit:    CKD (chronic kidney disease) stage 3, GFR 30-59 ml/min ([facility])  -     Basic metabolic panel; Future  -     Cancel: Basic metabolic panel; Future  -     Cancel: Magnesium; Future  -     Cancel: Phosphorus; Future  -     Cancel: Protein / creatinine ratio, urine; Future  -     Cancel: PTH, intact; Future  -     Cancel: CBC; Future  -     Basic metabolic panel; Future  -     CBC; Future  -     Magnesium; Future  -     Phosphorus; Future  -     Protein / creatinine ratio, urine; Future  -     PTH, intact; Future    Secondary hyperparathyroidism ([facility] [address] )    Persistent proteinuria    CARY (acute kidney injury) ([facility] [address] )  -     Cancel: Basic metabolic panel; Future      Acute Kidney Injury- felt secondary to ischemic ATN with intraoperative hypotension episodes (cardiopulmonary bypass time 96 minutes, cross-clamp time 87 minutes)  His creatinine was slowly improving during hospitalization and was stable one week post discharge but then worsened as an outpatient from 4 3 to 5  He was asked by Dr Alberto Hernández to decrease his torsemide by half  Please stop the torsemide for now completely  Chronic Kidney Disease stage III- Baseline creatinine prior to surgery was 1 6-1 8  Biopsy proven FSGS with 15% foot process effacement  Proteinuria- secondary to FSGS    Volume Status- His weight in December 2018 was 170 pounds  Currently, his weight is 142 to 145 pounds at home  He is eating better now and trying to gain back some of the weight he lost       Blood Pressure- Avoid salt in your diet  Blood pressure is low  Stop torsemide  May need to increase metoprolol due to tachycardia  (Will reach out to Dr Denise Shearer)  Secondary Hyperparathyroidism- continue calcitriol 0 25mcg twice a week  Diabetes- now started on insulin    Follow up with Dr Alberto Hernández in 1 month    Please call the office with any questions or concerns  Reason for Visit: Follow-up and Chronic Kidney Disease    HPI: Milton Serrato is a 80 y o  male who is here for follow up after hospitalization at [place] [address] for elective aortic valve and mitral valve repair  He was lightheaded a week ago but the past two days he has been feeling better  He decreased the torsemide and his blood pressures are in the 311O systolic / 16-32 diastolic  At the lowest, his family states his BP was in the 16Z systolic  He denies SOB  He denies LE edema  He is eating better  He denies urinary complaints except for mild hesitancy and states he empties completely  ROS: A complete review of systems was performed and was negative unless otherwise noted in the history of present illness  Allergies:   Ace inhibitors    Medications:     Current Outpatient Medications:     acetaminophen (TYLENOL) 650 mg CR tablet, Take 1 tablet (650 mg total) by mouth every 8 (eight) hours as needed for mild pain for up to 30 days, Disp: 30 tablet, Rfl: 0    ascorbic acid (VITAMIN C) 500 mg tablet, Take 500 mg by mouth daily, Disp: , Rfl:     aspirin (ECOTRIN LOW STRENGTH) 81 mg EC tablet, Take 1 tablet (81 mg total) by mouth daily, Disp: 30 tablet, Rfl: 2    atorvastatin (LIPITOR) 20 mg tablet, Take 1 tablet by mouth daily, Disp: , Rfl:     calcitriol (ROCALTROL) 0 25 mcg capsule, Take 1 capsule by mouth 2 (two) times a week, Disp: , Rfl:     Cholecalciferol (VITAMIN D3) 1000 units CAPS, Take 1 capsule by mouth daily, Disp: , Rfl:     insulin glargine (LANTUS SOLOSTAR) 100 units/mL injection pen, Inject 16 Units under the skin daily at bedtime for 30 days, Disp: 5 pen, Rfl: 2    Insulin Pen Needle 32G X 4 MM MISC, Inject Lantus qHS and Novolog TID with meals, as directed   Dx: DM Ell 9, Disp: 100 each, Rfl: 0    metoprolol tartrate (LOPRESSOR) 25 mg tablet, Take 50 mg by mouth every 12 (twelve) hours , Disp: , Rfl:     omeprazole (PriLOSEC) 20 mg delayed release capsule, Take 1 capsule (20 mg total) by mouth daily for 30 days, Disp: 30 capsule, Rfl: 0    TRAVATAN Z 0 004 % ophthalmic solution, , Disp: , Rfl: 0    warfarin (COUMADIN) 1 mg tablet, Take first dose Sunday, March 17 Take one mg daily, unless otherwise directed by Dr Issa Paci office  , Disp: 60 tablet, Rfl: 2    docusate sodium (COLACE) 100 mg capsule, Take 1 capsule (100 mg total) by mouth 2 (two) times a day for 30 days (Patient not taking: Reported on 3/27/2019), Disp: 60 capsule, Rfl: 0    insulin isophane (HUMULIN N KWIKPEN) 100 units/mL injection pen, Inject 4 Units under the skin 3 (three) times a day before meals (Patient not taking: Reported on 3/27/2019), Disp: 5 pen, Rfl: 2    oxyCODONE-acetaminophen (PERCOCET) 5-325 mg per tablet, Take 1 tablet every 6 hours, as needed for moderate pain  Take 2 tablets every 8 hours, as needed for severe pain  Ongoing Therapy  (Patient not taking: Reported on 3/27/2019), Disp: 30 tablet, Rfl: 0    polyethylene glycol (GLYCOLAX) powder, Take 17 g by mouth daily for 30 days, Disp: 510 g, Rfl: 0    Past Medical History:   Diagnosis Date    Diabetes mellitus ([facility] [address] )     Hyperlipidemia     Hypertension     Proteinuria     Stage 3 chronic kidney disease ([facility] [address] )     Vitamin D deficiency      Past Surgical History:   Procedure Laterality Date    COLONOSCOPY      MN ECHO TRANSESOPHAG R-T 2D W/PRB IMG ACQUISJ I&R N/A 3/7/2019    Procedure: INTRA-OP TRANSESOPHAGEAL ECHOCARDIOGRAM (PATIENCE);   Surgeon: Lb Le DO;  [facility];  Service: Cardiac Surgery    MN PERQ CLSR TCAT L ATR APNDGE W/ENDOCARDIAL IMPLNT  3/7/2019    Procedure: LEFT ATRIAL APPENDAGE OCCLUSION CLIPPED with 35mm Cherilynn Parminder;  Surgeon: Lb Le DO;  [facility];  Service: Cardiac Surgery    MN REPLACEMENT OF MITRAL VALVE N/A 3/7/2019    Procedure: REPLACEMENT VALVE MITRAL (MVR) REPAIR with a 30mm MEDTRONIC CG FUTURE RING;  Surgeon: Elio Buenrostro DO Suly;  [facility];  Service: Cardiac Surgery    PA RPLCMT AORTIC VALVE OPN W/STENTLESS TISSUE VALVE N/A 3/7/2019    Procedure: REPLACEMENT VALVE AORTIC (AVR) with 23mm TAVERA INSPIRIS RESILIA  AORTIC VALVE;  Surgeon: Sen Contreras DO;  [facility];  Service: Cardiac Surgery    TOE SURGERY Left      Family History   Problem Relation Age of Onset    Stroke Mother     Hypertension Mother     Blindness Father    [facility] Gout Brother     Heart Valve Disease Brother     Anuerysm Neg Hx       reports that he quit smoking about 51 years ago  He has never used smokeless tobacco  He reports that he drinks alcohol  He reports that he does not use drugs  Physical Exam:   Vitals:    03/27/19 1406 03/27/19 1431   BP:  100/72   BP Location:  Right arm   Patient Position:  Sitting   Cuff Size:  Standard   Pulse:  (!) 120   Weight: 66 7 kg (147 lb)    Height: 5' 6" (1 676 m)      Body mass index is 23 73 kg/m²  General: NAD  Neuro: AAO  Neck: supple  Skin: no rash  Heart: RRR  Lungs: CTAB  Abdomen: soft nt nd  Extremities: no edema    Procedure:  No results found for this or any previous visit  Labs reviewed      Lab Results   Component Value Date    GLUCOSE 110 03/07/2019    CALCIUM 8 7 03/25/2019    K 4 9 03/25/2019    CO2 23 03/25/2019     03/25/2019    BUN 95 (H) 03/25/2019    CREATININE 5 03 (H) 03/25/2019       Results from last 7 days   Lab Units 03/25/19  1458 03/21/19  1110   POTASSIUM mmol/L 4 9 5 7*   CHLORIDE mmol/L 101 102   CO2 mmol/L 23 23   BUN mg/dL 95* 91*   CREATININE mg/dL 5 03* 4 29*   CALCIUM mg/dL 8 7 8 6

## 2019-03-27 NOTE — PATIENT INSTRUCTIONS
Acute Kidney Injury- felt secondary to ischemic ATN with intraoperative hypotension episodes (cardiopulmonary bypass time 96 minutes, cross-clamp time 87 minutes)  His creatinine was slowly improving during hospitalization and was stable one week post discharge but then worsened as an outpatient from 4 3 to 5  He was asked by Dr Candido Robert to decrease his torsemide by half  Please stop the torsemide for now completely  Chronic Kidney Disease stage III- Baseline creatinine prior to surgery was 1 6-1 8  Biopsy proven FSGS with 15% foot process effacement  Proteinuria- secondary to FSGS    Volume Status- His weight in December 2018 was 170 pounds  Currently, his weight is 142 to 145 pounds at home  Blood Pressure- Avoid salt in your diet  Blood pressure is low  Stop torsemide  May need to increase metoprolol due to tachycardia  (Will reach out to Dr Dodie Johnson)  Secondary Hyperparathyroidism- continue calcitriol 0 25mcg twice a week  Diabetes- now started on insulin    Follow up with Dr Candido Robert in 1 month  Please call the office with any questions or concerns

## 2019-03-28 ENCOUNTER — OFFICE VISIT (OUTPATIENT)
Dept: CARDIOLOGY CLINIC | Facility: CLINIC | Age: 69
End: 2019-03-28
Payer: COMMERCIAL

## 2019-03-28 ENCOUNTER — LAB REQUISITION (OUTPATIENT)
Dept: LAB | Facility: HOSPITAL | Age: 69
End: 2019-03-28
Payer: COMMERCIAL

## 2019-03-28 ENCOUNTER — ANTICOAG VISIT (OUTPATIENT)
Dept: CARDIOLOGY CLINIC | Facility: CLINIC | Age: 69
End: 2019-03-28

## 2019-03-28 VITALS
HEIGHT: 66 IN | OXYGEN SATURATION: 98 % | BODY MASS INDEX: 23.45 KG/M2 | WEIGHT: 145.9 LBS | DIASTOLIC BLOOD PRESSURE: 64 MMHG | SYSTOLIC BLOOD PRESSURE: 120 MMHG

## 2019-03-28 DIAGNOSIS — I10 ESSENTIAL HYPERTENSION: Chronic | ICD-10-CM

## 2019-03-28 DIAGNOSIS — I25.10 CORONARY ARTERY DISEASE INVOLVING NATIVE CORONARY ARTERY OF NATIVE HEART WITHOUT ANGINA PECTORIS: Primary | ICD-10-CM

## 2019-03-28 DIAGNOSIS — I48.92 ATRIAL FLUTTER, UNSPECIFIED TYPE (HCC): ICD-10-CM

## 2019-03-28 DIAGNOSIS — I48.0 PAROXYSMAL ATRIAL FIBRILLATION (HCC): ICD-10-CM

## 2019-03-28 DIAGNOSIS — I34.0 NON-RHEUMATIC MITRAL REGURGITATION: Chronic | ICD-10-CM

## 2019-03-28 DIAGNOSIS — Z98.890 S/P MVR (MITRAL VALVE REPAIR): ICD-10-CM

## 2019-03-28 DIAGNOSIS — Z95.2 S/P AVR: ICD-10-CM

## 2019-03-28 DIAGNOSIS — N18.30 CHRONIC KIDNEY DISEASE, STAGE III (MODERATE) (HCC): ICD-10-CM

## 2019-03-28 DIAGNOSIS — I35.1 NONRHEUMATIC AORTIC VALVE INSUFFICIENCY: Chronic | ICD-10-CM

## 2019-03-28 DIAGNOSIS — I42.0 DILATED CARDIOMYOPATHY (HCC): Chronic | ICD-10-CM

## 2019-03-28 LAB
ANION GAP SERPL CALCULATED.3IONS-SCNC: 11 MMOL/L (ref 4–13)
BUN SERPL-MCNC: 89 MG/DL (ref 5–25)
CALCIUM SERPL-MCNC: 8.4 MG/DL (ref 8.3–10.1)
CHLORIDE SERPL-SCNC: 103 MMOL/L (ref 100–108)
CO2 SERPL-SCNC: 20 MMOL/L (ref 21–32)
CREAT SERPL-MCNC: 4.12 MG/DL (ref 0.6–1.3)
GFR SERPL CREATININE-BSD FRML MDRD: 16 ML/MIN/1.73SQ M
GLUCOSE SERPL-MCNC: 107 MG/DL (ref 65–140)
INR PPP: 1.4 (ref 0.86–1.17)
POTASSIUM SERPL-SCNC: 4.8 MMOL/L (ref 3.5–5.3)
SODIUM SERPL-SCNC: 134 MMOL/L (ref 136–145)

## 2019-03-28 PROCEDURE — 93000 ELECTROCARDIOGRAM COMPLETE: CPT | Performed by: INTERNAL MEDICINE

## 2019-03-28 PROCEDURE — 99214 OFFICE O/P EST MOD 30 MIN: CPT | Performed by: INTERNAL MEDICINE

## 2019-03-28 PROCEDURE — 80048 BASIC METABOLIC PNL TOTAL CA: CPT | Performed by: INTERNAL MEDICINE

## 2019-03-28 RX ORDER — WARFARIN SODIUM 2 MG/1
TABLET ORAL
Qty: 60 TABLET | Refills: 11 | Status: SHIPPED | OUTPATIENT
Start: 2019-03-28 | End: 2020-04-21 | Stop reason: SDUPTHER

## 2019-03-28 RX ORDER — AMIODARONE HYDROCHLORIDE 200 MG/1
200 TABLET ORAL DAILY
Qty: 30 TABLET | Refills: 5 | Status: SHIPPED | OUTPATIENT
Start: 2019-03-28 | End: 2019-09-23 | Stop reason: SDUPTHER

## 2019-03-28 NOTE — PATIENT INSTRUCTIONS
Recommendations:  1  Start amiodarone 200mg daily  2  Continue remainder of medications  3  Follow up in one month - If still in atrial flutter, will consider repeat PATIENCE/cardioversion

## 2019-03-28 NOTE — TELEPHONE ENCOUNTER
VN saw pt today, BMP was drawn and torsemide D/C'd by Dr Vela yesterday  3/25 Bun/Cr 9 5/5  03  BP 98/68 /reg  Wt 143 stable, no SOB or dizziness  Has appt today  Is drinking fluids         Thank you

## 2019-03-28 NOTE — PROGRESS NOTES
Cardiology   KAYLEN HARRIS [facility] 80 y o  male MRN: [id number]        Impression:  1  S/P AVR/MV repair 3/19 - doing well, but fatigued  2  Atrial flutter - 2:1 block  PATIENCE 2 weeks ago suggested possible VIRI thrombus, but may be folded over tissue from clipping  On anticoagulation  3  Hypertension - controlled  Recommendations:  1  Start amiodarone 200mg daily  2  Continue remainder of medications  3  Follow up in one month - If still in atrial flutter, will consider repeat PATIENCE/cardioversion  HPI: KAYLEN HARRSI [facility] is a 80y o  year old male with Bicuspid Aortic Valve with mod-severe MR/mod-severe AI and EF 40% s/p bioAVR and MV repair with annuloplasty ring and IVRI clipping 3/19, with paroxysmal atrial fibrillation/atrial flutter who returns for follow up  Had a PATIENCE for a cardioversion which demonstrated EF 50%, mild MR and normal AVR, but possible small echodensity on appendage which was concerning for thrombus vs clipped atrial tissue  Major complaint is fatigue, and some palpitations  No chest pain or shortness of breath  Review of Systems   Constitutional: Negative  HENT: Negative  Eyes: Negative  Respiratory: Positive for shortness of breath  Negative for chest tightness  Cardiovascular: Negative for chest pain, palpitations and leg swelling  Gastrointestinal: Negative  Endocrine: Negative  Genitourinary: Negative  Musculoskeletal: Negative  Skin: Negative  Allergic/Immunologic: Negative  Neurological: Negative  Hematological: Negative  Psychiatric/Behavioral: Negative  All other systems reviewed and are negative          Past Medical History:   Diagnosis Date    Diabetes mellitus ([facility] [address] )     Hyperlipidemia     Hypertension     Proteinuria     Stage 3 chronic kidney disease ([facility] [address] )     Vitamin D deficiency      Past Surgical History:   Procedure Laterality Date    COLONOSCOPY      WA ECHO TRANSESOPHAG R-T 2D W/PRB IMG ACQUISJ I&R N/A 3/7/2019 Procedure: INTRA-OP TRANSESOPHAGEAL ECHOCARDIOGRAM (PATIENCE); Surgeon: Sujey Senior DO;  [facility];  Service: Cardiac Surgery    PA PERQ CLSR TCAT L ATR APNDGE W/ENDOCARDIAL IMPLNT  3/7/2019    Procedure: LEFT ATRIAL APPENDAGE OCCLUSION CLIPPED with 35mm Bennetta Cruel;  Surgeon: Sujey Senior DO;  [facility];  Service: Cardiac Surgery    PA REPLACEMENT OF MITRAL VALVE N/A 3/7/2019    Procedure: REPLACEMENT VALVE MITRAL (MVR) REPAIR with a 30mm MEDTRONIC CG FUTURE RING;  Surgeon: Sujey Senior DO;  [facility];  Service: Cardiac Surgery    PA RPLCMT AORTIC VALVE OPN W/STENTLESS TISSUE VALVE N/A 3/7/2019    Procedure: REPLACEMENT VALVE AORTIC (AVR) with 23mm TAVERA INSPIRIS RESILIA  AORTIC VALVE;  Surgeon: Sujey Senior DO;  [facility];  Service: Cardiac Surgery    TOE SURGERY Left      Social History     Substance and Sexual Activity   Alcohol Use Yes    Frequency: Monthly or less    Drinks per session: 1 or 2    Binge frequency: Never    Comment: occasionally     Social History     Substance and Sexual Activity   Drug Use No     Social History     Tobacco Use   Smoking Status Former Smoker    Last attempt to quit: Moises Born Years since quittin 2   Smokeless Tobacco Never Used     Family History   Problem Relation Age of Onset    Stroke Mother     Hypertension Mother     Blindness Father     Hyperlipidemia Father     Gout Brother     Heart Valve Disease Brother     Cancer Sister     Anuerysm Neg Hx     Heart attack Neg Hx     Arrhythmia Neg Hx     Heart failure Neg Hx     Coronary artery disease Neg Hx     Sudden death Neg Hx         scd       Allergies:   Allergies   Allergen Reactions    Ace Inhibitors Cough       Medications:     Current Outpatient Medications:     acetaminophen (TYLENOL) 650 mg CR tablet, Take 1 tablet (650 mg total) by mouth every 8 (eight) hours as needed for mild pain for up to 30 days, Disp: 30 tablet, Rfl: 0    ascorbic acid (VITAMIN C) 500 mg tablet, Take 500 mg by mouth daily, Disp: , Rfl:     aspirin (ECOTRIN LOW STRENGTH) 81 mg EC tablet, Take 1 tablet (81 mg total) by mouth daily, Disp: 30 tablet, Rfl: 2    atorvastatin (LIPITOR) 20 mg tablet, Take 1 tablet by mouth daily, Disp: , Rfl:     calcitriol (ROCALTROL) 0 25 mcg capsule, Take 1 capsule by mouth 2 (two) times a week, Disp: , Rfl:     Cholecalciferol (VITAMIN D3) 1000 units CAPS, Take 1 capsule by mouth daily, Disp: , Rfl:     docusate sodium (COLACE) 100 mg capsule, Take 1 capsule (100 mg total) by mouth 2 (two) times a day for 30 days, Disp: 60 capsule, Rfl: 0    insulin glargine (LANTUS SOLOSTAR) 100 units/mL injection pen, Inject 16 Units under the skin daily at bedtime for 30 days, Disp: 5 pen, Rfl: 2    insulin isophane (HUMULIN N KWIKPEN) 100 units/mL injection pen, Inject 4 Units under the skin 3 (three) times a day before meals (Patient taking differently: Inject 5 Units under the skin 3 (three) times a day before meals ), Disp: 5 pen, Rfl: 2    Insulin Pen Needle 32G X 4 MM MISC, Inject Lantus qHS and Novolog TID with meals, as directed  Dx: DM Ell 9, Disp: 100 each, Rfl: 0    metoprolol tartrate (LOPRESSOR) 25 mg tablet, Take 25 mg by mouth every 12 (twelve) hours , Disp: , Rfl:     omeprazole (PriLOSEC) 20 mg delayed release capsule, Take 1 capsule (20 mg total) by mouth daily for 30 days, Disp: 30 capsule, Rfl: 0    oxyCODONE-acetaminophen (PERCOCET) 5-325 mg per tablet, Take 1 tablet every 6 hours, as needed for moderate pain  Take 2 tablets every 8 hours, as needed for severe pain  Ongoing Therapy  , Disp: 30 tablet, Rfl: 0    polyethylene glycol (GLYCOLAX) powder, Take 17 g by mouth daily for 30 days, Disp: 510 g, Rfl: 0    TRAVATAN Z 0 004 % ophthalmic solution, , Disp: , Rfl: 0    warfarin (COUMADIN) 2 mg tablet, TAKE 1 TO 2 TABLETS DAILY BY MOUTH OR AS DIRECTED BY PHYSICIAN   (Patient taking differently: Take 4 mg by mouth daily TAKE 1 TO 2 TABLETS DAILY BY MOUTH OR AS DIRECTED BY PHYSICIAN  ), Disp: 60 tablet, Rfl: 11    amiodarone 200 mg tablet, Take 1 tablet (200 mg total) by mouth daily, Disp: 30 tablet, Rfl: 5      Wt Readings from Last 3 Encounters:   03/28/19 66 2 kg (145 lb 14 4 oz)   03/27/19 66 7 kg (147 lb)   03/19/19 67 6 kg (149 lb)     Temp Readings from Last 3 Encounters:   03/15/19 98 1 °F (36 7 °C) (Oral)   03/02/19 98 3 °F (36 8 °C) (Oral)   02/27/19 97 5 °F (36 4 °C) (Oral)     BP Readings from Last 3 Encounters:   03/28/19 120/64   03/27/19 100/72   03/19/19 120/50     Pulse Readings from Last 3 Encounters:   03/27/19 (!) 120   03/19/19 (!) 113   03/15/19 63         Physical Exam   Constitutional: He is oriented to person, place, and time  He appears well-developed  HENT:   Head: Atraumatic  Eyes: EOM are normal    Neck: Normal range of motion  Cardiovascular: Regular rhythm  Tachycardia present  Exam reveals no gallop  No murmur heard  Pulmonary/Chest: Effort normal and breath sounds normal  No stridor  No respiratory distress  He has no wheezes  Abdominal: Soft  Musculoskeletal: Normal range of motion  Neurological: He is alert and oriented to person, place, and time  Skin: Skin is warm and dry  Psychiatric: He has a normal mood and affect           Laboratory Studies:  CMP:  Lab Results   Component Value Date    K 4 8 03/28/2019     03/28/2019    CO2 20 (L) 03/28/2019    BUN 89 (H) 03/28/2019    CREATININE 4 12 (H) 03/28/2019    GLUCOSE 110 03/07/2019    AST 47 (H) 03/10/2019    ALT <6 (L) 03/10/2019    EGFR 16 03/28/2019       Lipid Profile:   No results found for: CHOL  Lab Results   Component Value Date    HDL 40 03/11/2019     Lab Results   Component Value Date    LDLCALC 28 03/11/2019     Lab Results   Component Value Date    TRIG 63 03/11/2019       Cardiac testing:   EKG reviewed personally: Aflutter 2:1 133 RBBB LAFB  Results for orders placed during the hospital encounter of 19   Echo complete with contrast if indicated    Narrative [address] [facility]  [phone]    Transthoracic Echocardiogram  2D, M-mode, Doppler, and Color Doppler    Study date:  2019    Patient: Ashanti John  MR number: [id number]  Account number: [de-identified]  : 1950  Age: 80 years  Gender: Male  Status: Inpatient  Location: Bedside  Height: 68 in  Weight: 163 lb  BP: 155/ 70 mmHg    Indications: Heart Failure    Diagnoses: I50 9 - Heart failure, unspecified    Sonographer:  TIMMY Stubbs  Primary Physician:  Yvan Williamson  Referring Physician:  Linda Wilson MD  Group:  [facility]  Interpreting Physician:  Linda Wilson MD    SUMMARY    LEFT VENTRICLE:  The ventricle was mildly dilated  Systolic function was mildly to moderately reduced  Ejection fraction was estimated to be 40 %  There was mild diffuse hypokinesis  Wall thickness was mildly increased  RIGHT VENTRICLE:  The ventricle was mildly dilated  Systolic function was normal     LEFT ATRIUM:  The atrium was mildly dilated  MITRAL VALVE:  There was moderate to severe regurgitation  AORTIC VALVE:  The valve was possibly bicuspid  Leaflets exhibited normal thickness, normal cuspal separation, and significant diastolic prolapse  There was moderate to severe regurgitation  TRICUSPID VALVE:  There was mild regurgitation  Pulmonary artery systolic pressure was moderately to markedly increased  The findings suggest moderate to severe pulmonary hypertension  AORTA:  The root exhibited mild dilatation  IVC, HEPATIC VEINS:  The inferior vena cava was mildly dilated  Respirophasic changes were blunted (less than 50% variation)  PERICARDIUM:  There was a left pleural effusion  HISTORY: PRIOR HISTORY: Murmur, HTN, DM2, HLD, CKD3    PROCEDURE: The procedure was performed at the bedside  This was a routine study   The transthoracic approach was used  The study included complete 2D imaging, M-mode, complete spectral Doppler, and color Doppler  The heart rate was 95 bpm,  at the start of the study  Images were obtained from the parasternal, apical, subcostal, and suprasternal notch acoustic windows  Image quality was adequate  LEFT VENTRICLE: The ventricle was mildly dilated  Systolic function was mildly to moderately reduced  Ejection fraction was estimated to be 40 %  There was mild diffuse hypokinesis  Wall thickness was mildly increased  RIGHT VENTRICLE: The ventricle was mildly dilated  Systolic function was normal  Wall thickness was normal     LEFT ATRIUM: The atrium was mildly dilated  RIGHT ATRIUM: Size was normal     MITRAL VALVE: Valve structure was normal  There was normal leaflet separation  DOPPLER: The transmitral velocity was within the normal range  There was no evidence for stenosis  There was moderate to severe regurgitation  AORTIC VALVE: The valve was possibly bicuspid  Leaflets exhibited normal thickness, normal cuspal separation, and significant diastolic prolapse  DOPPLER: Transaortic velocity was within the normal range  There was no evidence for  stenosis  There was moderate to severe regurgitation  The regurgitant jet was eccentric  TRICUSPID VALVE: The valve structure was normal  There was normal leaflet separation  DOPPLER: The transtricuspid velocity was within the normal range  There was no evidence for stenosis  There was mild regurgitation  Pulmonary artery  systolic pressure was moderately to markedly increased  Estimated peak PA pressure was 60 mmHg  The findings suggest moderate to severe pulmonary hypertension  PULMONIC VALVE: Leaflets exhibited normal thickness, no calcification, and normal cuspal separation  DOPPLER: The transpulmonic velocity was within the normal range  There was no regurgitation  PERICARDIUM: There was no pericardial effusion  There was a left pleural effusion   The pericardium was normal in appearance  AORTA: The root exhibited mild dilatation  SYSTEMIC VEINS: IVC: The inferior vena cava was mildly dilated  Respirophasic changes were blunted (less than 50% variation)  MEASUREMENT TABLES    2D MEASUREMENTS  Aorta   (Reference normals)  Root diam   39 mm   (--)    SYSTEM MEASUREMENT TABLES    2D  %FS: 23 42 %  Ao Diam: 3 91 cm  EDV(Teich): 179 53 ml  EF Biplane: 40 37 %  EF(Teich): 46 07 %  ESV(Teich): 96 82 ml  IVSd: 1 02 cm  LA Area: 12 24 cm2  LA Diam: 4 3 cm  LVEDV MOD A2C: 129 37 ml  LVEDV MOD A4C: 151 37 ml  LVEDV MOD BP: 140 9 ml  LVEF MOD A2C: 42 79 %  LVEF MOD A4C: 40 91 %  LVESV MOD A2C: 74 02 ml  LVESV MOD A4C: 89 45 ml  LVESV MOD BP: 84 02 ml  LVIDd: 5 99 cm  LVIDs: 4 59 cm  LVLd A2C: 8 45 cm  LVLd A4C: 8 58 cm  LVLs A2C: 7 63 cm  LVLs A4C: 8 25 cm  LVPWd: 1 21 cm  RA Area: 15 27 cm2  RVIDd: 4 39 cm  SV MOD A2C: 55 35 ml  SV MOD A4C: 61 92 ml  SV(Teich): 82 71 ml    CW  TR MaxP 66 mmHg  TR Vmax: 3 52 m/s    MM  TAPSE: 1 8 cm    [facility]    Prepared and electronically signed by    Rosalio Gonzalez MD  Signed 2019 16:15:06       Results for orders placed during the hospital encounter of 19   PATIENCE    Narrative [address] [address]  [phone]    Transesophageal Echocardiogram  Limited 2D, 3D, Doppler, and Color Doppler    Study date:  15-Mar-2019    Patient: Chon Bird  MR number: [id number]  Account number: [de-identified]  : 1950  Age: 80 years  Gender: Male  Status: Inpatient  Location: Cath lab  Height: 66 in  Weight: 152 lb  BP: 94/ 53 mmHg    Indications: Atrial fibrillation      Diagnoses: I48 0 - Atrial fibrillation    Sonographer:  Lou Lea RDCS  Interpreting Physician:  Nelson Gibbons DO  Referring Physician:  Rosalio Gonzalez MD  Group:  [facility]  Cardiology Fellow:  Traci Feliciano Melisa Goldman MD    SUMMARY    LEFT VENTRICLE:  Systolic function was normal  Ejection fraction was estimated to be 50 %  There were no regional wall motion abnormalities  LEFT ATRIAL APPENDAGE:  The left atrial appendage had been previously clipped, and there was a small residual area of appendage with a suspicious small echodensity (3mm x 4 mm) at the mouth of the appendage, which may represent clipped atrial tissue or thrombus  MITRAL VALVE:  A 30 mm Medtronic CG Future mitral annuloplasty ring prosthesis was present  It exhibited normal function  There was mild regurgitation  AORTIC VALVE:  A bioprosthesis (23mm Nettles Inspiris Resilia bovine tissue valve) was present  It exhibited normal function  There was no significant perivalvular regurgitation  TRICUSPID VALVE:  There was trace regurgitation  HISTORY: PRIOR HISTORY: Chronic kidney disease (stage 3), hypertension, hyperlipidemia, diabetes mellitus  Family history of stroke, hypertension and valvular heart disease  PROCEDURE: The procedure was performed in the catheterization laboratory  This was a routine study  The risks and alternatives of the procedure were explained to the patient and informed consent was obtained  The transesophageal approach  was used  The study included limited 2D imaging, 3D imaging, limited spectral Doppler, and color Doppler  The heart rate was 66 bpm, at the start of the study  An adult omniplane probe was inserted by the cardiology fellow under direct  supervision of the attending cardiologist  Intubated with moderate difficulty  Three intubation attempt(s)  There was no blood detected on the probe  Image quality was adequate  There were no complications during the procedure  MEDICATIONS: Anesthesia administered by anesthesia team     LEFT VENTRICLE: Size was normal  Systolic function was normal  Ejection fraction was estimated to be 50 %  There were no regional wall motion abnormalities   Wall thickness was normal     RIGHT VENTRICLE: The size was normal  Systolic function was normal  Wall thickness was normal     LEFT ATRIUM: Size was normal  No thrombus was identified  APPENDAGE: The left atrial appendage had been previously clipped, and there was a small residual area of appendage with a suspicious small echodensity (3mm x 4 mm) at the mouth of  the appendage, which may represent clipped atrial tissue or thrombus  RIGHT ATRIUM: Size was normal     MITRAL VALVE: There was normal leaflet separation  Prior repair procedures: surgical repair A 30 mm Graffiti CG Future mitral annuloplasty ring prosthesis was present  It exhibited normal function  DOPPLER: There was mild regurgitation  AORTIC VALVE: A bioprosthesis (23mm Nettles Inspiris Resilia bovine tissue valve) was present  It exhibited normal function  DOPPLER: There was no significant regurgitation  There was no significant perivalvular regurgitation  TRICUSPID VALVE: The valve structure was normal  There was normal leaflet separation  DOPPLER: There was trace regurgitation  PULMONIC VALVE: Leaflets exhibited normal thickness, no calcification, and normal cuspal separation  Not well visualized  PERICARDIUM: There was no pericardial effusion  AORTA: The root exhibited normal size      [address] Accredited Echocardiography Laboratory    Prepared and electronically signed by    Hannah Fung DO  Signed 15-Mar-2019 10:26:43

## 2019-04-01 ENCOUNTER — TELEPHONE (OUTPATIENT)
Dept: CARDIOLOGY CLINIC | Facility: CLINIC | Age: 69
End: 2019-04-01

## 2019-04-01 ENCOUNTER — ANTICOAG VISIT (OUTPATIENT)
Dept: CARDIOLOGY CLINIC | Facility: CLINIC | Age: 69
End: 2019-04-01

## 2019-04-01 LAB — INR PPP: 2.3 (ref 0.86–1.17)

## 2019-04-01 RX ORDER — METOPROLOL TARTRATE 50 MG/1
50 TABLET, FILM COATED ORAL DAILY
COMMUNITY
End: 2019-04-25 | Stop reason: SDUPTHER

## 2019-04-04 ENCOUNTER — ANTICOAG VISIT (OUTPATIENT)
Dept: CARDIOLOGY CLINIC | Facility: CLINIC | Age: 69
End: 2019-04-04

## 2019-04-04 LAB — INR PPP: 3.5 (ref 0.86–1.17)

## 2019-04-08 ENCOUNTER — ANTICOAG VISIT (OUTPATIENT)
Dept: CARDIOLOGY CLINIC | Facility: CLINIC | Age: 69
End: 2019-04-08

## 2019-04-08 ENCOUNTER — LAB REQUISITION (OUTPATIENT)
Dept: LAB | Facility: HOSPITAL | Age: 69
End: 2019-04-08
Payer: COMMERCIAL

## 2019-04-08 DIAGNOSIS — N18.30 CHRONIC KIDNEY DISEASE, STAGE III (MODERATE) (HCC): ICD-10-CM

## 2019-04-08 LAB
ANION GAP SERPL CALCULATED.3IONS-SCNC: 12 MMOL/L (ref 4–13)
BUN SERPL-MCNC: 37 MG/DL (ref 5–25)
CALCIUM SERPL-MCNC: 8.2 MG/DL (ref 8.3–10.1)
CHLORIDE SERPL-SCNC: 109 MMOL/L (ref 100–108)
CO2 SERPL-SCNC: 20 MMOL/L (ref 21–32)
CREAT SERPL-MCNC: 2.71 MG/DL (ref 0.6–1.3)
GFR SERPL CREATININE-BSD FRML MDRD: 27 ML/MIN/1.73SQ M
GLUCOSE SERPL-MCNC: 93 MG/DL (ref 65–140)
INR PPP: 5.1 (ref 0.86–1.17)
MAGNESIUM SERPL-MCNC: 1.6 MG/DL (ref 1.6–2.6)
PHOSPHATE SERPL-MCNC: 3.6 MG/DL (ref 2.3–4.1)
POTASSIUM SERPL-SCNC: 5.1 MMOL/L (ref 3.5–5.3)
PTH-INTACT SERPL-MCNC: 109.9 PG/ML (ref 18.4–80.1)
SODIUM SERPL-SCNC: 141 MMOL/L (ref 136–145)

## 2019-04-08 PROCEDURE — 84100 ASSAY OF PHOSPHORUS: CPT | Performed by: INTERNAL MEDICINE

## 2019-04-08 PROCEDURE — 83970 ASSAY OF PARATHORMONE: CPT | Performed by: INTERNAL MEDICINE

## 2019-04-08 PROCEDURE — 80048 BASIC METABOLIC PNL TOTAL CA: CPT | Performed by: INTERNAL MEDICINE

## 2019-04-08 PROCEDURE — 83735 ASSAY OF MAGNESIUM: CPT | Performed by: INTERNAL MEDICINE

## 2019-04-08 RX ORDER — TORSEMIDE 10 MG/1
10 TABLET ORAL AS NEEDED
COMMUNITY
End: 2022-04-26

## 2019-04-09 ENCOUNTER — APPOINTMENT (OUTPATIENT)
Dept: LAB | Facility: CLINIC | Age: 69
End: 2019-04-09
Payer: COMMERCIAL

## 2019-04-09 ENCOUNTER — TRANSCRIBE ORDERS (OUTPATIENT)
Dept: LAB | Facility: CLINIC | Age: 69
End: 2019-04-09

## 2019-04-09 DIAGNOSIS — N18.30 CKD (CHRONIC KIDNEY DISEASE) STAGE 3, GFR 30-59 ML/MIN (HCC): Chronic | ICD-10-CM

## 2019-04-09 LAB
ANION GAP SERPL CALCULATED.3IONS-SCNC: 8 MMOL/L (ref 4–13)
BUN SERPL-MCNC: 38 MG/DL (ref 5–25)
CALCIUM SERPL-MCNC: 9.2 MG/DL (ref 8.3–10.1)
CHLORIDE SERPL-SCNC: 107 MMOL/L (ref 100–108)
CO2 SERPL-SCNC: 24 MMOL/L (ref 21–32)
CREAT SERPL-MCNC: 3.14 MG/DL (ref 0.6–1.3)
CREAT UR-MCNC: 67.3 MG/DL
ERYTHROCYTE [DISTWIDTH] IN BLOOD BY AUTOMATED COUNT: 14.6 % (ref 11.6–15.1)
GFR SERPL CREATININE-BSD FRML MDRD: 22 ML/MIN/1.73SQ M
GLUCOSE P FAST SERPL-MCNC: 75 MG/DL (ref 65–99)
HCT VFR BLD AUTO: 36.1 % (ref 36.5–49.3)
HGB BLD-MCNC: 11.6 G/DL (ref 12–17)
MAGNESIUM SERPL-MCNC: 1.7 MG/DL (ref 1.6–2.6)
MCH RBC QN AUTO: 30 PG (ref 26.8–34.3)
MCHC RBC AUTO-ENTMCNC: 32.1 G/DL (ref 31.4–37.4)
MCV RBC AUTO: 93 FL (ref 82–98)
PHOSPHATE SERPL-MCNC: 4 MG/DL (ref 2.3–4.1)
PLATELET # BLD AUTO: 183 THOUSANDS/UL (ref 149–390)
PMV BLD AUTO: 9.8 FL (ref 8.9–12.7)
POTASSIUM SERPL-SCNC: 5.1 MMOL/L (ref 3.5–5.3)
PROT UR-MCNC: 189 MG/DL
PROT/CREAT UR: 2.81 MG/G{CREAT} (ref 0–0.1)
PTH-INTACT SERPL-MCNC: 116.8 PG/ML (ref 18.4–80.1)
RBC # BLD AUTO: 3.87 MILLION/UL (ref 3.88–5.62)
SODIUM SERPL-SCNC: 139 MMOL/L (ref 136–145)
WBC # BLD AUTO: 7.13 THOUSAND/UL (ref 4.31–10.16)

## 2019-04-09 PROCEDURE — 85027 COMPLETE CBC AUTOMATED: CPT

## 2019-04-09 PROCEDURE — 82570 ASSAY OF URINE CREATININE: CPT

## 2019-04-09 PROCEDURE — 36415 COLL VENOUS BLD VENIPUNCTURE: CPT

## 2019-04-09 PROCEDURE — 83735 ASSAY OF MAGNESIUM: CPT

## 2019-04-09 PROCEDURE — 84100 ASSAY OF PHOSPHORUS: CPT

## 2019-04-09 PROCEDURE — 83970 ASSAY OF PARATHORMONE: CPT

## 2019-04-09 PROCEDURE — 84156 ASSAY OF PROTEIN URINE: CPT

## 2019-04-09 PROCEDURE — 80048 BASIC METABOLIC PNL TOTAL CA: CPT

## 2019-04-10 ENCOUNTER — OFFICE VISIT (OUTPATIENT)
Dept: CARDIAC SURGERY | Facility: CLINIC | Age: 69
End: 2019-04-10

## 2019-04-10 VITALS
SYSTOLIC BLOOD PRESSURE: 122 MMHG | HEIGHT: 66 IN | OXYGEN SATURATION: 99 % | WEIGHT: 156 LBS | RESPIRATION RATE: 12 BRPM | HEART RATE: 124 BPM | BODY MASS INDEX: 25.07 KG/M2 | TEMPERATURE: 97.1 F | DIASTOLIC BLOOD PRESSURE: 76 MMHG

## 2019-04-10 DIAGNOSIS — I34.0 NON-RHEUMATIC MITRAL REGURGITATION: Chronic | ICD-10-CM

## 2019-04-10 DIAGNOSIS — I35.1 NONRHEUMATIC AORTIC VALVE INSUFFICIENCY: Chronic | ICD-10-CM

## 2019-04-10 DIAGNOSIS — Z48.89 ENCOUNTER FOR POSTOPERATIVE CARE: Primary | ICD-10-CM

## 2019-04-10 DIAGNOSIS — Q23.1 BICUSPID AORTIC VALVE: Chronic | ICD-10-CM

## 2019-04-10 DIAGNOSIS — Z98.890 S/P MVR (MITRAL VALVE REPAIR): ICD-10-CM

## 2019-04-10 DIAGNOSIS — Z95.2 S/P AVR: ICD-10-CM

## 2019-04-10 PROCEDURE — 99024 POSTOP FOLLOW-UP VISIT: CPT | Performed by: THORACIC SURGERY (CARDIOTHORACIC VASCULAR SURGERY)

## 2019-04-11 ENCOUNTER — TELEPHONE (OUTPATIENT)
Dept: CARDIOLOGY CLINIC | Facility: CLINIC | Age: 69
End: 2019-04-11

## 2019-04-11 ENCOUNTER — ANTICOAG VISIT (OUTPATIENT)
Dept: CARDIOLOGY CLINIC | Facility: CLINIC | Age: 69
End: 2019-04-11

## 2019-04-11 ENCOUNTER — CLINICAL SUPPORT (OUTPATIENT)
Dept: CARDIAC REHAB | Facility: CLINIC | Age: 69
End: 2019-04-11
Payer: COMMERCIAL

## 2019-04-11 VITALS — BODY MASS INDEX: 24.99 KG/M2 | WEIGHT: 155.5 LBS | HEIGHT: 66 IN

## 2019-04-11 DIAGNOSIS — Z95.2 S/P AVR: ICD-10-CM

## 2019-04-11 DIAGNOSIS — I48.92 ATRIAL FLUTTER, UNSPECIFIED TYPE (HCC): Primary | ICD-10-CM

## 2019-04-11 DIAGNOSIS — Z86.79 H/O ATRIAL FLUTTER: Primary | ICD-10-CM

## 2019-04-11 LAB — INR PPP: 3.3 (ref 0.86–1.17)

## 2019-04-11 PROCEDURE — 93797 PHYS/QHP OP CAR RHAB WO ECG: CPT

## 2019-04-11 RX ORDER — SODIUM CHLORIDE 9 MG/ML
50 INJECTION, SOLUTION INTRAVENOUS CONTINUOUS
Status: CANCELLED | OUTPATIENT
Start: 2019-04-11

## 2019-04-12 ENCOUNTER — HOSPITAL ENCOUNTER (OUTPATIENT)
Dept: NON INVASIVE DIAGNOSTICS | Facility: HOSPITAL | Age: 69
Discharge: HOME | End: 2019-04-12
Attending: INTERNAL MEDICINE | Admitting: INTERNAL MEDICINE
Payer: COMMERCIAL

## 2019-04-12 ENCOUNTER — ANTICOAG VISIT (OUTPATIENT)
Dept: CARDIOLOGY CLINIC | Facility: CLINIC | Age: 69
End: 2019-04-12

## 2019-04-12 ENCOUNTER — ANESTHESIA (OUTPATIENT)
Dept: NON INVASIVE DIAGNOSTICS | Facility: HOSPITAL | Age: 69
End: 2019-04-12

## 2019-04-12 ENCOUNTER — ANESTHESIA EVENT (OUTPATIENT)
Dept: NON INVASIVE DIAGNOSTICS | Facility: HOSPITAL | Age: 69
End: 2019-04-12

## 2019-04-12 ENCOUNTER — HOSPITAL ENCOUNTER (OUTPATIENT)
Dept: NON INVASIVE DIAGNOSTICS | Facility: HOSPITAL | Age: 69
Discharge: HOME | End: 2019-04-12
Attending: INTERNAL MEDICINE
Payer: COMMERCIAL

## 2019-04-12 VITALS
OXYGEN SATURATION: 100 % | RESPIRATION RATE: 18 BRPM | HEART RATE: 77 BPM | DIASTOLIC BLOOD PRESSURE: 74 MMHG | TEMPERATURE: 98 F | SYSTOLIC BLOOD PRESSURE: 120 MMHG

## 2019-04-12 DIAGNOSIS — I48.92 ATRIAL FLUTTER, UNSPECIFIED TYPE (HCC): ICD-10-CM

## 2019-04-12 LAB
ANION GAP SERPL CALCULATED.3IONS-SCNC: 9 MMOL/L (ref 4–13)
ATRIAL RATE: 132 BPM
ATRIAL RATE: 76 BPM
BUN SERPL-MCNC: 45 MG/DL (ref 5–25)
CALCIUM SERPL-MCNC: 8.7 MG/DL (ref 8.3–10.1)
CHLORIDE SERPL-SCNC: 107 MMOL/L (ref 100–108)
CO2 SERPL-SCNC: 24 MMOL/L (ref 21–32)
CREAT SERPL-MCNC: 2.94 MG/DL (ref 0.6–1.3)
ERYTHROCYTE [DISTWIDTH] IN BLOOD BY AUTOMATED COUNT: 15 % (ref 11.6–15.1)
GFR SERPL CREATININE-BSD FRML MDRD: 24 ML/MIN/1.73SQ M
GLUCOSE P FAST SERPL-MCNC: 95 MG/DL (ref 65–99)
GLUCOSE SERPL-MCNC: 95 MG/DL (ref 65–140)
GLUCOSE SERPL-MCNC: 98 MG/DL (ref 65–140)
HCT VFR BLD AUTO: 36.1 % (ref 36.5–49.3)
HGB BLD-MCNC: 11.9 G/DL (ref 12–17)
INR PPP: 2.52 (ref 0.86–1.17)
MAGNESIUM SERPL-MCNC: 1.7 MG/DL (ref 1.6–2.6)
MCH RBC QN AUTO: 30.7 PG (ref 26.8–34.3)
MCHC RBC AUTO-ENTMCNC: 33 G/DL (ref 31.4–37.4)
MCV RBC AUTO: 93 FL (ref 82–98)
P AXIS: -86 DEGREES
P AXIS: 65 DEGREES
PLATELET # BLD AUTO: 145 THOUSANDS/UL (ref 149–390)
PMV BLD AUTO: 9.2 FL (ref 8.9–12.7)
POTASSIUM SERPL-SCNC: 4.7 MMOL/L (ref 3.5–5.3)
PR INTERVAL: 256 MS
PROTHROMBIN TIME: 26.4 SECONDS (ref 11.8–14.2)
QRS AXIS: -71 DEGREES
QRS AXIS: -85 DEGREES
QRSD INTERVAL: 142 MS
QRSD INTERVAL: 142 MS
QT INTERVAL: 340 MS
QT INTERVAL: 438 MS
QTC INTERVAL: 492 MS
QTC INTERVAL: 503 MS
RBC # BLD AUTO: 3.88 MILLION/UL (ref 3.88–5.62)
SODIUM SERPL-SCNC: 140 MMOL/L (ref 136–145)
T WAVE AXIS: 115 DEGREES
T WAVE AXIS: 91 DEGREES
VENTRICULAR RATE: 132 BPM
VENTRICULAR RATE: 76 BPM
WBC # BLD AUTO: 7.1 THOUSAND/UL (ref 4.31–10.16)

## 2019-04-12 PROCEDURE — 92960 CARDIOVERSION ELECTRIC EXT: CPT | Performed by: INTERNAL MEDICINE

## 2019-04-12 PROCEDURE — 93312 ECHO TRANSESOPHAGEAL: CPT

## 2019-04-12 PROCEDURE — 93325 DOPPLER ECHO COLOR FLOW MAPG: CPT | Performed by: INTERNAL MEDICINE

## 2019-04-12 PROCEDURE — 80048 BASIC METABOLIC PNL TOTAL CA: CPT | Performed by: INTERNAL MEDICINE

## 2019-04-12 PROCEDURE — 93005 ELECTROCARDIOGRAM TRACING: CPT

## 2019-04-12 PROCEDURE — 83735 ASSAY OF MAGNESIUM: CPT | Performed by: INTERNAL MEDICINE

## 2019-04-12 PROCEDURE — 92960 CARDIOVERSION ELECTRIC EXT: CPT

## 2019-04-12 PROCEDURE — 93010 ELECTROCARDIOGRAM REPORT: CPT | Performed by: INTERNAL MEDICINE

## 2019-04-12 PROCEDURE — 85027 COMPLETE CBC AUTOMATED: CPT | Performed by: INTERNAL MEDICINE

## 2019-04-12 PROCEDURE — 93321 DOPPLER ECHO F-UP/LMTD STD: CPT | Performed by: INTERNAL MEDICINE

## 2019-04-12 PROCEDURE — 85610 PROTHROMBIN TIME: CPT | Performed by: INTERNAL MEDICINE

## 2019-04-12 PROCEDURE — 93312 ECHO TRANSESOPHAGEAL: CPT | Performed by: INTERNAL MEDICINE

## 2019-04-12 PROCEDURE — 82948 REAGENT STRIP/BLOOD GLUCOSE: CPT

## 2019-04-12 RX ORDER — SODIUM CHLORIDE 9 MG/ML
50 INJECTION, SOLUTION INTRAVENOUS CONTINUOUS
Status: DISCONTINUED | OUTPATIENT
Start: 2019-04-12 | End: 2019-04-13 | Stop reason: HOSPADM

## 2019-04-12 RX ORDER — ONDANSETRON 2 MG/ML
4 INJECTION INTRAMUSCULAR; INTRAVENOUS ONCE AS NEEDED
Status: DISCONTINUED | OUTPATIENT
Start: 2019-04-12 | End: 2019-04-13 | Stop reason: HOSPADM

## 2019-04-12 RX ORDER — PROPOFOL 10 MG/ML
INJECTION, EMULSION INTRAVENOUS AS NEEDED
Status: DISCONTINUED | OUTPATIENT
Start: 2019-04-12 | End: 2019-04-12 | Stop reason: SURG

## 2019-04-12 RX ORDER — LIDOCAINE HYDROCHLORIDE 10 MG/ML
INJECTION, SOLUTION EPIDURAL; INFILTRATION; INTRACAUDAL; PERINEURAL AS NEEDED
Status: DISCONTINUED | OUTPATIENT
Start: 2019-04-12 | End: 2019-04-12 | Stop reason: SURG

## 2019-04-12 RX ORDER — SODIUM CHLORIDE 9 MG/ML
INJECTION, SOLUTION INTRAVENOUS CONTINUOUS PRN
Status: DISCONTINUED | OUTPATIENT
Start: 2019-04-12 | End: 2019-04-12 | Stop reason: SURG

## 2019-04-12 RX ADMIN — PROPOFOL 20 MG: 10 INJECTION, EMULSION INTRAVENOUS at 10:53

## 2019-04-12 RX ADMIN — LIDOCAINE HYDROCHLORIDE 50 MG: 10 INJECTION, SOLUTION EPIDURAL; INFILTRATION; INTRACAUDAL; PERINEURAL at 10:50

## 2019-04-12 RX ADMIN — PROPOFOL 80 MG: 10 INJECTION, EMULSION INTRAVENOUS at 10:50

## 2019-04-12 RX ADMIN — SODIUM CHLORIDE: 0.9 INJECTION, SOLUTION INTRAVENOUS at 10:35

## 2019-04-12 RX ADMIN — TOPICAL ANESTHETIC 1 SPRAY: 200 SPRAY DENTAL; PERIODONTAL at 10:49

## 2019-04-19 ENCOUNTER — OFFICE VISIT (OUTPATIENT)
Dept: NEPHROLOGY | Facility: CLINIC | Age: 69
End: 2019-04-19
Payer: COMMERCIAL

## 2019-04-19 VITALS — HEIGHT: 66 IN | BODY MASS INDEX: 25.55 KG/M2 | WEIGHT: 159 LBS

## 2019-04-19 DIAGNOSIS — N18.30 CKD (CHRONIC KIDNEY DISEASE) STAGE 3, GFR 30-59 ML/MIN (HCC): Chronic | ICD-10-CM

## 2019-04-19 DIAGNOSIS — N05.1 FSGS (FOCAL SEGMENTAL GLOMERULOSCLEROSIS): ICD-10-CM

## 2019-04-19 DIAGNOSIS — N25.81 SECONDARY HYPERPARATHYROIDISM (HCC): Chronic | ICD-10-CM

## 2019-04-19 DIAGNOSIS — N17.9 AKI (ACUTE KIDNEY INJURY) (HCC): Primary | ICD-10-CM

## 2019-04-19 DIAGNOSIS — I10 ESSENTIAL HYPERTENSION: Chronic | ICD-10-CM

## 2019-04-19 DIAGNOSIS — R80.1 PERSISTENT PROTEINURIA: ICD-10-CM

## 2019-04-19 PROBLEM — D63.1 ANEMIA DUE TO STAGE 3 CHRONIC KIDNEY DISEASE (HCC): Chronic | Status: ACTIVE | Noted: 2018-03-01

## 2019-04-19 PROCEDURE — 99214 OFFICE O/P EST MOD 30 MIN: CPT | Performed by: INTERNAL MEDICINE

## 2019-04-19 PROCEDURE — 3066F NEPHROPATHY DOC TX: CPT | Performed by: INTERNAL MEDICINE

## 2019-04-19 RX ORDER — CALCITRIOL 0.25 UG/1
0.25 CAPSULE, LIQUID FILLED ORAL 3 TIMES WEEKLY
Qty: 15 CAPSULE | Refills: 4 | Status: SHIPPED | OUTPATIENT
Start: 2019-04-19 | End: 2019-09-22 | Stop reason: SDUPTHER

## 2019-04-22 ENCOUNTER — ANTICOAG VISIT (OUTPATIENT)
Dept: CARDIOLOGY CLINIC | Facility: CLINIC | Age: 69
End: 2019-04-22

## 2019-04-22 ENCOUNTER — CLINICAL SUPPORT (OUTPATIENT)
Dept: CARDIAC REHAB | Facility: CLINIC | Age: 69
End: 2019-04-22
Payer: COMMERCIAL

## 2019-04-22 ENCOUNTER — APPOINTMENT (OUTPATIENT)
Dept: LAB | Facility: CLINIC | Age: 69
End: 2019-04-22
Payer: COMMERCIAL

## 2019-04-22 DIAGNOSIS — Z95.2 S/P AVR: ICD-10-CM

## 2019-04-22 DIAGNOSIS — N25.81 SECONDARY HYPERPARATHYROIDISM (HCC): Chronic | ICD-10-CM

## 2019-04-22 DIAGNOSIS — N17.9 AKI (ACUTE KIDNEY INJURY) (HCC): ICD-10-CM

## 2019-04-22 LAB
25(OH)D3 SERPL-MCNC: 35.1 NG/ML (ref 30–100)
ALBUMIN SERPL BCP-MCNC: 2.8 G/DL (ref 3.5–5)
ALP SERPL-CCNC: 80 U/L (ref 46–116)
ALT SERPL W P-5'-P-CCNC: 22 U/L (ref 12–78)
ANION GAP SERPL CALCULATED.3IONS-SCNC: 8 MMOL/L (ref 4–13)
AST SERPL W P-5'-P-CCNC: 20 U/L (ref 5–45)
BILIRUB SERPL-MCNC: 0.3 MG/DL (ref 0.2–1)
BUN SERPL-MCNC: 38 MG/DL (ref 5–25)
CALCIUM SERPL-MCNC: 8.9 MG/DL (ref 8.3–10.1)
CHLORIDE SERPL-SCNC: 107 MMOL/L (ref 100–108)
CO2 SERPL-SCNC: 27 MMOL/L (ref 21–32)
CREAT SERPL-MCNC: 2.88 MG/DL (ref 0.6–1.3)
GFR SERPL CREATININE-BSD FRML MDRD: 25 ML/MIN/1.73SQ M
GLUCOSE P FAST SERPL-MCNC: 87 MG/DL (ref 65–99)
INR PPP: 1.5 (ref 0.86–1.17)
POTASSIUM SERPL-SCNC: 4.5 MMOL/L (ref 3.5–5.3)
PROT SERPL-MCNC: 7.5 G/DL (ref 6.4–8.2)
PROTHROMBIN TIME: 17.7 SECONDS (ref 11.8–14.2)
SODIUM SERPL-SCNC: 142 MMOL/L (ref 136–145)

## 2019-04-22 PROCEDURE — 80053 COMPREHEN METABOLIC PANEL: CPT

## 2019-04-22 PROCEDURE — 93798 PHYS/QHP OP CAR RHAB W/ECG: CPT

## 2019-04-22 PROCEDURE — 85610 PROTHROMBIN TIME: CPT

## 2019-04-22 PROCEDURE — 36415 COLL VENOUS BLD VENIPUNCTURE: CPT

## 2019-04-22 PROCEDURE — 82306 VITAMIN D 25 HYDROXY: CPT

## 2019-04-24 ENCOUNTER — CLINICAL SUPPORT (OUTPATIENT)
Dept: CARDIAC REHAB | Facility: CLINIC | Age: 69
End: 2019-04-24
Payer: COMMERCIAL

## 2019-04-24 DIAGNOSIS — Z98.890 S/P MVR (MITRAL VALVE REPAIR): ICD-10-CM

## 2019-04-24 DIAGNOSIS — Z95.2 S/P AVR: ICD-10-CM

## 2019-04-24 PROCEDURE — 93798 PHYS/QHP OP CAR RHAB W/ECG: CPT

## 2019-04-25 ENCOUNTER — TELEPHONE (OUTPATIENT)
Dept: CARDIOLOGY CLINIC | Facility: CLINIC | Age: 69
End: 2019-04-25

## 2019-04-25 ENCOUNTER — ANTICOAG VISIT (OUTPATIENT)
Dept: CARDIOLOGY CLINIC | Facility: CLINIC | Age: 69
End: 2019-04-25

## 2019-04-25 ENCOUNTER — OFFICE VISIT (OUTPATIENT)
Dept: CARDIOLOGY CLINIC | Facility: CLINIC | Age: 69
End: 2019-04-25
Payer: COMMERCIAL

## 2019-04-25 ENCOUNTER — APPOINTMENT (OUTPATIENT)
Dept: LAB | Facility: CLINIC | Age: 69
End: 2019-04-25
Payer: COMMERCIAL

## 2019-04-25 VITALS
BODY MASS INDEX: 25.38 KG/M2 | DIASTOLIC BLOOD PRESSURE: 72 MMHG | SYSTOLIC BLOOD PRESSURE: 140 MMHG | OXYGEN SATURATION: 97 % | HEIGHT: 66 IN | HEART RATE: 68 BPM | WEIGHT: 157.9 LBS

## 2019-04-25 DIAGNOSIS — I34.0 NON-RHEUMATIC MITRAL REGURGITATION: Chronic | ICD-10-CM

## 2019-04-25 DIAGNOSIS — I48.0 PAROXYSMAL ATRIAL FIBRILLATION (HCC): Primary | ICD-10-CM

## 2019-04-25 DIAGNOSIS — I42.0 DILATED CARDIOMYOPATHY (HCC): Chronic | ICD-10-CM

## 2019-04-25 DIAGNOSIS — I25.10 CORONARY ARTERY DISEASE INVOLVING NATIVE CORONARY ARTERY OF NATIVE HEART WITHOUT ANGINA PECTORIS: ICD-10-CM

## 2019-04-25 DIAGNOSIS — I48.0 PAROXYSMAL ATRIAL FIBRILLATION (HCC): ICD-10-CM

## 2019-04-25 DIAGNOSIS — I10 ESSENTIAL HYPERTENSION: Chronic | ICD-10-CM

## 2019-04-25 DIAGNOSIS — I35.1 NONRHEUMATIC AORTIC VALVE INSUFFICIENCY: Chronic | ICD-10-CM

## 2019-04-25 DIAGNOSIS — Z86.79 H/O ATRIAL FLUTTER: ICD-10-CM

## 2019-04-25 DIAGNOSIS — I48.92 ATRIAL FLUTTER, UNSPECIFIED TYPE (HCC): Primary | ICD-10-CM

## 2019-04-25 LAB
INR PPP: 1.64 (ref 0.86–1.17)
PROTHROMBIN TIME: 18.9 SECONDS (ref 11.8–14.2)

## 2019-04-25 PROCEDURE — 99214 OFFICE O/P EST MOD 30 MIN: CPT | Performed by: INTERNAL MEDICINE

## 2019-04-25 PROCEDURE — 85610 PROTHROMBIN TIME: CPT

## 2019-04-25 PROCEDURE — 36415 COLL VENOUS BLD VENIPUNCTURE: CPT

## 2019-04-25 PROCEDURE — 93000 ELECTROCARDIOGRAM COMPLETE: CPT | Performed by: INTERNAL MEDICINE

## 2019-04-25 RX ORDER — METOPROLOL TARTRATE 50 MG/1
50 TABLET, FILM COATED ORAL EVERY 12 HOURS SCHEDULED
Qty: 180 TABLET | Refills: 3 | Status: SHIPPED | OUTPATIENT
Start: 2019-04-25 | End: 2019-06-25 | Stop reason: ALTCHOICE

## 2019-04-26 ENCOUNTER — APPOINTMENT (OUTPATIENT)
Dept: CARDIAC REHAB | Facility: CLINIC | Age: 69
End: 2019-04-26
Payer: COMMERCIAL

## 2019-04-29 ENCOUNTER — ANTICOAG VISIT (OUTPATIENT)
Dept: CARDIOLOGY CLINIC | Facility: CLINIC | Age: 69
End: 2019-04-29

## 2019-04-29 ENCOUNTER — CLINICAL SUPPORT (OUTPATIENT)
Dept: CARDIAC REHAB | Facility: CLINIC | Age: 69
End: 2019-04-29
Payer: COMMERCIAL

## 2019-04-29 ENCOUNTER — APPOINTMENT (OUTPATIENT)
Dept: LAB | Facility: CLINIC | Age: 69
End: 2019-04-29
Payer: COMMERCIAL

## 2019-04-29 DIAGNOSIS — Z95.2 S/P AVR: ICD-10-CM

## 2019-04-29 DIAGNOSIS — Z86.79 H/O ATRIAL FLUTTER: ICD-10-CM

## 2019-04-29 DIAGNOSIS — Z98.890 S/P MVR (MITRAL VALVE REPAIR): ICD-10-CM

## 2019-04-29 PROCEDURE — 93798 PHYS/QHP OP CAR RHAB W/ECG: CPT

## 2019-04-30 ENCOUNTER — OFFICE VISIT (OUTPATIENT)
Dept: CARDIOLOGY CLINIC | Facility: CLINIC | Age: 69
End: 2019-04-30
Payer: COMMERCIAL

## 2019-04-30 VITALS
HEART RATE: 63 BPM | BODY MASS INDEX: 26.03 KG/M2 | HEIGHT: 66 IN | WEIGHT: 162 LBS | DIASTOLIC BLOOD PRESSURE: 68 MMHG | OXYGEN SATURATION: 97 % | SYSTOLIC BLOOD PRESSURE: 130 MMHG

## 2019-04-30 DIAGNOSIS — I50.22 CHRONIC SYSTOLIC CONGESTIVE HEART FAILURE (HCC): Primary | ICD-10-CM

## 2019-04-30 PROCEDURE — 99214 OFFICE O/P EST MOD 30 MIN: CPT | Performed by: INTERNAL MEDICINE

## 2019-04-30 RX ORDER — BRIMONIDINE TARTRATE/TIMOLOL 0.2%-0.5%
DROPS OPHTHALMIC (EYE)
Refills: 0 | COMMUNITY
Start: 2019-04-26

## 2019-05-01 ENCOUNTER — CLINICAL SUPPORT (OUTPATIENT)
Dept: CARDIAC REHAB | Facility: CLINIC | Age: 69
End: 2019-05-01
Payer: COMMERCIAL

## 2019-05-01 DIAGNOSIS — Z98.890 S/P MVR (MITRAL VALVE REPAIR): ICD-10-CM

## 2019-05-01 DIAGNOSIS — Z95.2 S/P AVR: ICD-10-CM

## 2019-05-01 PROCEDURE — 93798 PHYS/QHP OP CAR RHAB W/ECG: CPT

## 2019-05-03 ENCOUNTER — ANTICOAG VISIT (OUTPATIENT)
Dept: CARDIOLOGY CLINIC | Facility: CLINIC | Age: 69
End: 2019-05-03

## 2019-05-03 ENCOUNTER — CLINICAL SUPPORT (OUTPATIENT)
Dept: CARDIAC REHAB | Facility: CLINIC | Age: 69
End: 2019-05-03
Payer: COMMERCIAL

## 2019-05-03 ENCOUNTER — APPOINTMENT (OUTPATIENT)
Dept: LAB | Facility: CLINIC | Age: 69
End: 2019-05-03
Payer: COMMERCIAL

## 2019-05-03 DIAGNOSIS — N18.30 OTHER SPECIFIED DIABETES MELLITUS WITH STAGE 3 CHRONIC KIDNEY DISEASE, WITHOUT LONG-TERM CURRENT USE OF INSULIN: ICD-10-CM

## 2019-05-03 DIAGNOSIS — Z86.79 H/O ATRIAL FLUTTER: ICD-10-CM

## 2019-05-03 DIAGNOSIS — I50.22 CHRONIC SYSTOLIC CONGESTIVE HEART FAILURE (HCC): Chronic | ICD-10-CM

## 2019-05-03 DIAGNOSIS — N18.30 CKD (CHRONIC KIDNEY DISEASE) STAGE 3, GFR 30-59 ML/MIN (HCC): ICD-10-CM

## 2019-05-03 DIAGNOSIS — E13.22 OTHER SPECIFIED DIABETES MELLITUS WITH STAGE 3 CHRONIC KIDNEY DISEASE, WITHOUT LONG-TERM CURRENT USE OF INSULIN: ICD-10-CM

## 2019-05-03 DIAGNOSIS — E78.5 HYPERLIPIDEMIA, UNSPECIFIED HYPERLIPIDEMIA TYPE: ICD-10-CM

## 2019-05-03 DIAGNOSIS — N25.81 SECONDARY HYPERPARATHYROIDISM (HCC): ICD-10-CM

## 2019-05-03 DIAGNOSIS — I42.9 CARDIOMYOPATHY, UNSPECIFIED TYPE (HCC): ICD-10-CM

## 2019-05-03 DIAGNOSIS — R80.1 PERSISTENT PROTEINURIA: ICD-10-CM

## 2019-05-03 LAB
25(OH)D3 SERPL-MCNC: 38.5 NG/ML (ref 30–100)
ALBUMIN SERPL BCP-MCNC: 2.7 G/DL (ref 3.5–5)
ALP SERPL-CCNC: 81 U/L (ref 46–116)
ALT SERPL W P-5'-P-CCNC: 50 U/L (ref 12–78)
ANION GAP SERPL CALCULATED.3IONS-SCNC: 10 MMOL/L (ref 4–13)
AST SERPL W P-5'-P-CCNC: 31 U/L (ref 5–45)
BACTERIA UR QL AUTO: ABNORMAL /HPF
BILIRUB SERPL-MCNC: 0.2 MG/DL (ref 0.2–1)
BILIRUB UR QL STRIP: NEGATIVE
BUN SERPL-MCNC: 35 MG/DL (ref 5–25)
CALCIUM SERPL-MCNC: 8.6 MG/DL (ref 8.3–10.1)
CHLORIDE SERPL-SCNC: 108 MMOL/L (ref 100–108)
CLARITY UR: CLEAR
CO2 SERPL-SCNC: 23 MMOL/L (ref 21–32)
COLOR UR: YELLOW
CREAT SERPL-MCNC: 2.56 MG/DL (ref 0.6–1.3)
GFR SERPL CREATININE-BSD FRML MDRD: 29 ML/MIN/1.73SQ M
GLUCOSE P FAST SERPL-MCNC: 82 MG/DL (ref 65–99)
GLUCOSE UR STRIP-MCNC: NEGATIVE MG/DL
HGB UR QL STRIP.AUTO: ABNORMAL
KETONES UR STRIP-MCNC: NEGATIVE MG/DL
LEUKOCYTE ESTERASE UR QL STRIP: NEGATIVE
MUCOUS THREADS UR QL AUTO: ABNORMAL
NITRITE UR QL STRIP: NEGATIVE
NON-SQ EPI CELLS URNS QL MICRO: ABNORMAL /HPF
PH UR STRIP.AUTO: 6 [PH]
POTASSIUM SERPL-SCNC: 4.5 MMOL/L (ref 3.5–5.3)
PROT SERPL-MCNC: 7.1 G/DL (ref 6.4–8.2)
PROT UR STRIP-MCNC: ABNORMAL MG/DL
RBC #/AREA URNS AUTO: ABNORMAL /HPF
SODIUM SERPL-SCNC: 141 MMOL/L (ref 136–145)
SP GR UR STRIP.AUTO: 1.02 (ref 1–1.03)
UROBILINOGEN UR QL STRIP.AUTO: 0.2 E.U./DL
WBC #/AREA URNS AUTO: ABNORMAL /HPF

## 2019-05-03 PROCEDURE — 81001 URINALYSIS AUTO W/SCOPE: CPT

## 2019-05-03 PROCEDURE — 80053 COMPREHEN METABOLIC PANEL: CPT

## 2019-05-03 PROCEDURE — 93798 PHYS/QHP OP CAR RHAB W/ECG: CPT

## 2019-05-03 PROCEDURE — 82306 VITAMIN D 25 HYDROXY: CPT

## 2019-05-06 ENCOUNTER — CLINICAL SUPPORT (OUTPATIENT)
Dept: CARDIAC REHAB | Facility: CLINIC | Age: 69
End: 2019-05-06
Payer: COMMERCIAL

## 2019-05-06 DIAGNOSIS — Z95.2 S/P AVR: ICD-10-CM

## 2019-05-06 DIAGNOSIS — Z98.890 S/P MVR (MITRAL VALVE REPAIR): ICD-10-CM

## 2019-05-06 PROCEDURE — 93798 PHYS/QHP OP CAR RHAB W/ECG: CPT

## 2019-05-08 ENCOUNTER — CLINICAL SUPPORT (OUTPATIENT)
Dept: CARDIAC REHAB | Facility: CLINIC | Age: 69
End: 2019-05-08
Payer: COMMERCIAL

## 2019-05-08 DIAGNOSIS — Z95.2 S/P AVR: ICD-10-CM

## 2019-05-08 DIAGNOSIS — Z98.890 S/P MVR (MITRAL VALVE REPAIR): ICD-10-CM

## 2019-05-08 PROCEDURE — 93798 PHYS/QHP OP CAR RHAB W/ECG: CPT

## 2019-05-10 ENCOUNTER — CLINICAL SUPPORT (OUTPATIENT)
Dept: CARDIAC REHAB | Facility: CLINIC | Age: 69
End: 2019-05-10
Payer: COMMERCIAL

## 2019-05-10 ENCOUNTER — APPOINTMENT (OUTPATIENT)
Dept: LAB | Facility: CLINIC | Age: 69
End: 2019-05-10
Payer: COMMERCIAL

## 2019-05-10 ENCOUNTER — TRANSCRIBE ORDERS (OUTPATIENT)
Dept: LAB | Facility: CLINIC | Age: 69
End: 2019-05-10

## 2019-05-10 ENCOUNTER — ANTICOAG VISIT (OUTPATIENT)
Dept: CARDIOLOGY CLINIC | Facility: CLINIC | Age: 69
End: 2019-05-10

## 2019-05-10 DIAGNOSIS — Z86.79 H/O ATRIAL FLUTTER: ICD-10-CM

## 2019-05-10 DIAGNOSIS — Z95.2 S/P AVR: ICD-10-CM

## 2019-05-10 LAB
ANION GAP SERPL CALCULATED.3IONS-SCNC: 10 MMOL/L (ref 4–13)
BUN SERPL-MCNC: 41 MG/DL (ref 5–25)
CALCIUM SERPL-MCNC: 8.9 MG/DL (ref 8.3–10.1)
CHLORIDE SERPL-SCNC: 107 MMOL/L (ref 100–108)
CO2 SERPL-SCNC: 24 MMOL/L (ref 21–32)
CREAT SERPL-MCNC: 2.63 MG/DL (ref 0.6–1.3)
ERYTHROCYTE [DISTWIDTH] IN BLOOD BY AUTOMATED COUNT: 14.4 % (ref 11.6–15.1)
EST. AVERAGE GLUCOSE BLD GHB EST-MCNC: 114 MG/DL
GFR SERPL CREATININE-BSD FRML MDRD: 28 ML/MIN/1.73SQ M
GLUCOSE SERPL-MCNC: 76 MG/DL (ref 65–140)
HBA1C MFR BLD: 5.6 % (ref 4.2–6.3)
HCT VFR BLD AUTO: 36.9 % (ref 36.5–49.3)
HGB BLD-MCNC: 12 G/DL (ref 12–17)
MCH RBC QN AUTO: 30.2 PG (ref 26.8–34.3)
MCHC RBC AUTO-ENTMCNC: 32.5 G/DL (ref 31.4–37.4)
MCV RBC AUTO: 93 FL (ref 82–98)
PLATELET # BLD AUTO: 221 THOUSANDS/UL (ref 149–390)
PMV BLD AUTO: 9.5 FL (ref 8.9–12.7)
POTASSIUM SERPL-SCNC: 5 MMOL/L (ref 3.5–5.3)
RBC # BLD AUTO: 3.97 MILLION/UL (ref 3.88–5.62)
SODIUM SERPL-SCNC: 141 MMOL/L (ref 136–145)
WBC # BLD AUTO: 7.11 THOUSAND/UL (ref 4.31–10.16)

## 2019-05-10 PROCEDURE — 36415 COLL VENOUS BLD VENIPUNCTURE: CPT

## 2019-05-10 PROCEDURE — 80048 BASIC METABOLIC PNL TOTAL CA: CPT

## 2019-05-10 PROCEDURE — 93798 PHYS/QHP OP CAR RHAB W/ECG: CPT

## 2019-05-10 PROCEDURE — 83036 HEMOGLOBIN GLYCOSYLATED A1C: CPT

## 2019-05-10 PROCEDURE — 85027 COMPLETE CBC AUTOMATED: CPT

## 2019-05-13 ENCOUNTER — CLINICAL SUPPORT (OUTPATIENT)
Dept: CARDIAC REHAB | Facility: CLINIC | Age: 69
End: 2019-05-13
Payer: COMMERCIAL

## 2019-05-13 DIAGNOSIS — Z95.2 S/P AVR: ICD-10-CM

## 2019-05-13 PROCEDURE — 93798 PHYS/QHP OP CAR RHAB W/ECG: CPT

## 2019-05-15 ENCOUNTER — CLINICAL SUPPORT (OUTPATIENT)
Dept: CARDIAC REHAB | Facility: CLINIC | Age: 69
End: 2019-05-15
Payer: COMMERCIAL

## 2019-05-15 DIAGNOSIS — Z95.2 S/P AVR: ICD-10-CM

## 2019-05-15 PROCEDURE — 93798 PHYS/QHP OP CAR RHAB W/ECG: CPT

## 2019-05-17 ENCOUNTER — ANTICOAG VISIT (OUTPATIENT)
Dept: CARDIOLOGY CLINIC | Facility: CLINIC | Age: 69
End: 2019-05-17

## 2019-05-17 ENCOUNTER — APPOINTMENT (OUTPATIENT)
Dept: LAB | Facility: CLINIC | Age: 69
End: 2019-05-17
Payer: COMMERCIAL

## 2019-05-17 ENCOUNTER — CLINICAL SUPPORT (OUTPATIENT)
Dept: CARDIAC REHAB | Facility: CLINIC | Age: 69
End: 2019-05-17
Payer: COMMERCIAL

## 2019-05-17 DIAGNOSIS — N18.30 CKD (CHRONIC KIDNEY DISEASE) STAGE 3, GFR 30-59 ML/MIN (HCC): Chronic | ICD-10-CM

## 2019-05-17 DIAGNOSIS — Z86.79 H/O ATRIAL FLUTTER: ICD-10-CM

## 2019-05-17 DIAGNOSIS — Z95.2 S/P AVR: ICD-10-CM

## 2019-05-17 LAB
ANION GAP SERPL CALCULATED.3IONS-SCNC: 8 MMOL/L (ref 4–13)
BUN SERPL-MCNC: 42 MG/DL (ref 5–25)
CALCIUM SERPL-MCNC: 8.9 MG/DL (ref 8.3–10.1)
CHLORIDE SERPL-SCNC: 108 MMOL/L (ref 100–108)
CO2 SERPL-SCNC: 23 MMOL/L (ref 21–32)
CREAT SERPL-MCNC: 2.8 MG/DL (ref 0.6–1.3)
GFR SERPL CREATININE-BSD FRML MDRD: 26 ML/MIN/1.73SQ M
GLUCOSE P FAST SERPL-MCNC: 80 MG/DL (ref 65–99)
INR PPP: 2.83 (ref 0.86–1.17)
POTASSIUM SERPL-SCNC: 4.9 MMOL/L (ref 3.5–5.3)
PROTHROMBIN TIME: 28.9 SECONDS (ref 11.8–14.2)
SODIUM SERPL-SCNC: 139 MMOL/L (ref 136–145)

## 2019-05-17 PROCEDURE — 36415 COLL VENOUS BLD VENIPUNCTURE: CPT

## 2019-05-17 PROCEDURE — 80048 BASIC METABOLIC PNL TOTAL CA: CPT

## 2019-05-17 PROCEDURE — 85610 PROTHROMBIN TIME: CPT

## 2019-05-17 PROCEDURE — 93798 PHYS/QHP OP CAR RHAB W/ECG: CPT

## 2019-05-20 ENCOUNTER — CLINICAL SUPPORT (OUTPATIENT)
Dept: CARDIAC REHAB | Facility: CLINIC | Age: 69
End: 2019-05-20
Payer: COMMERCIAL

## 2019-05-20 DIAGNOSIS — Z95.2 S/P AVR: ICD-10-CM

## 2019-05-20 DIAGNOSIS — Z98.890 S/P MVR (MITRAL VALVE REPAIR): ICD-10-CM

## 2019-05-20 PROCEDURE — 93798 PHYS/QHP OP CAR RHAB W/ECG: CPT

## 2019-05-23 ENCOUNTER — ANTICOAG VISIT (OUTPATIENT)
Dept: CARDIOLOGY CLINIC | Facility: CLINIC | Age: 69
End: 2019-05-23

## 2019-05-23 ENCOUNTER — APPOINTMENT (OUTPATIENT)
Dept: LAB | Facility: CLINIC | Age: 69
End: 2019-05-23
Payer: COMMERCIAL

## 2019-05-23 DIAGNOSIS — N05.1 FSGS (FOCAL SEGMENTAL GLOMERULOSCLEROSIS): ICD-10-CM

## 2019-05-23 DIAGNOSIS — N17.9 AKI (ACUTE KIDNEY INJURY) (HCC): ICD-10-CM

## 2019-05-23 DIAGNOSIS — Z86.79 H/O ATRIAL FLUTTER: ICD-10-CM

## 2019-05-23 DIAGNOSIS — N25.81 SECONDARY HYPERPARATHYROIDISM (HCC): Chronic | ICD-10-CM

## 2019-05-23 LAB
ALBUMIN SERPL BCP-MCNC: 2.8 G/DL (ref 3.5–5)
ALP SERPL-CCNC: 85 U/L (ref 46–116)
ALT SERPL W P-5'-P-CCNC: 28 U/L (ref 12–78)
ANION GAP SERPL CALCULATED.3IONS-SCNC: 8 MMOL/L (ref 4–13)
AST SERPL W P-5'-P-CCNC: 28 U/L (ref 5–45)
BACTERIA UR QL AUTO: ABNORMAL /HPF
BILIRUB SERPL-MCNC: 0.4 MG/DL (ref 0.2–1)
BILIRUB UR QL STRIP: NEGATIVE
BUN SERPL-MCNC: 34 MG/DL (ref 5–25)
CALCIUM SERPL-MCNC: 8.8 MG/DL (ref 8.3–10.1)
CHLORIDE SERPL-SCNC: 108 MMOL/L (ref 100–108)
CLARITY UR: CLEAR
CO2 SERPL-SCNC: 24 MMOL/L (ref 21–32)
COLOR UR: YELLOW
CREAT SERPL-MCNC: 2.56 MG/DL (ref 0.6–1.3)
CREAT UR-MCNC: 124 MG/DL
GFR SERPL CREATININE-BSD FRML MDRD: 29 ML/MIN/1.73SQ M
GLUCOSE P FAST SERPL-MCNC: 92 MG/DL (ref 65–99)
GLUCOSE UR STRIP-MCNC: NEGATIVE MG/DL
HGB UR QL STRIP.AUTO: ABNORMAL
HYALINE CASTS #/AREA URNS LPF: ABNORMAL /LPF
INR PPP: 2.32 (ref 0.86–1.17)
KETONES UR STRIP-MCNC: NEGATIVE MG/DL
LEUKOCYTE ESTERASE UR QL STRIP: NEGATIVE
MUCOUS THREADS UR QL AUTO: ABNORMAL
NITRITE UR QL STRIP: NEGATIVE
NON-SQ EPI CELLS URNS QL MICRO: ABNORMAL /HPF
PH UR STRIP.AUTO: 6 [PH]
PHOSPHATE SERPL-MCNC: 4.5 MG/DL (ref 2.3–4.1)
POTASSIUM SERPL-SCNC: 4.8 MMOL/L (ref 3.5–5.3)
PROT SERPL-MCNC: 7.4 G/DL (ref 6.4–8.2)
PROT UR STRIP-MCNC: ABNORMAL MG/DL
PROT UR-MCNC: 339 MG/DL
PROT/CREAT UR: 2.73 MG/G{CREAT} (ref 0–0.1)
PROTHROMBIN TIME: 24.8 SECONDS (ref 11.8–14.2)
PTH-INTACT SERPL-MCNC: 76.2 PG/ML (ref 18.4–80.1)
RBC #/AREA URNS AUTO: ABNORMAL /HPF
SODIUM SERPL-SCNC: 140 MMOL/L (ref 136–145)
SP GR UR STRIP.AUTO: >=1.03 (ref 1–1.03)
UROBILINOGEN UR QL STRIP.AUTO: 0.2 E.U./DL
WBC #/AREA URNS AUTO: ABNORMAL /HPF

## 2019-05-23 PROCEDURE — 84156 ASSAY OF PROTEIN URINE: CPT

## 2019-05-23 PROCEDURE — 36415 COLL VENOUS BLD VENIPUNCTURE: CPT

## 2019-05-23 PROCEDURE — 80053 COMPREHEN METABOLIC PANEL: CPT

## 2019-05-23 PROCEDURE — 81001 URINALYSIS AUTO W/SCOPE: CPT

## 2019-05-23 PROCEDURE — 85610 PROTHROMBIN TIME: CPT

## 2019-05-23 PROCEDURE — 84100 ASSAY OF PHOSPHORUS: CPT

## 2019-05-23 PROCEDURE — 82570 ASSAY OF URINE CREATININE: CPT

## 2019-05-23 PROCEDURE — 83970 ASSAY OF PARATHORMONE: CPT

## 2019-05-24 ENCOUNTER — CLINICAL SUPPORT (OUTPATIENT)
Dept: CARDIAC REHAB | Facility: CLINIC | Age: 69
End: 2019-05-24
Payer: COMMERCIAL

## 2019-05-24 DIAGNOSIS — Z98.890 S/P MVR (MITRAL VALVE REPAIR): ICD-10-CM

## 2019-05-24 DIAGNOSIS — Z95.2 S/P AVR: ICD-10-CM

## 2019-05-24 PROCEDURE — 93798 PHYS/QHP OP CAR RHAB W/ECG: CPT

## 2019-05-27 ENCOUNTER — APPOINTMENT (OUTPATIENT)
Dept: CARDIAC REHAB | Facility: CLINIC | Age: 69
End: 2019-05-27
Payer: COMMERCIAL

## 2019-05-28 ENCOUNTER — CLINICAL SUPPORT (OUTPATIENT)
Dept: CARDIAC REHAB | Facility: CLINIC | Age: 69
End: 2019-05-28
Payer: COMMERCIAL

## 2019-05-28 DIAGNOSIS — Z95.2 S/P AVR: ICD-10-CM

## 2019-05-28 DIAGNOSIS — Z98.890 S/P MVR (MITRAL VALVE REPAIR): ICD-10-CM

## 2019-05-28 PROCEDURE — 93798 PHYS/QHP OP CAR RHAB W/ECG: CPT

## 2019-05-31 ENCOUNTER — CLINICAL SUPPORT (OUTPATIENT)
Dept: CARDIAC REHAB | Facility: CLINIC | Age: 69
End: 2019-05-31
Payer: COMMERCIAL

## 2019-05-31 DIAGNOSIS — Z98.890 S/P MVR (MITRAL VALVE REPAIR): ICD-10-CM

## 2019-05-31 DIAGNOSIS — Z95.2 S/P AVR: ICD-10-CM

## 2019-05-31 PROCEDURE — 93798 PHYS/QHP OP CAR RHAB W/ECG: CPT

## 2019-06-03 ENCOUNTER — CLINICAL SUPPORT (OUTPATIENT)
Dept: CARDIAC REHAB | Facility: CLINIC | Age: 69
End: 2019-06-03
Payer: COMMERCIAL

## 2019-06-03 DIAGNOSIS — Z95.2 S/P AVR: ICD-10-CM

## 2019-06-03 PROCEDURE — 93798 PHYS/QHP OP CAR RHAB W/ECG: CPT

## 2019-06-05 ENCOUNTER — CLINICAL SUPPORT (OUTPATIENT)
Dept: CARDIAC REHAB | Facility: CLINIC | Age: 69
End: 2019-06-05
Payer: COMMERCIAL

## 2019-06-05 DIAGNOSIS — Z95.2 S/P AVR: ICD-10-CM

## 2019-06-05 PROCEDURE — 93798 PHYS/QHP OP CAR RHAB W/ECG: CPT

## 2019-06-07 ENCOUNTER — APPOINTMENT (OUTPATIENT)
Dept: CARDIAC REHAB | Facility: CLINIC | Age: 69
End: 2019-06-07
Payer: COMMERCIAL

## 2019-06-10 ENCOUNTER — APPOINTMENT (OUTPATIENT)
Dept: LAB | Facility: CLINIC | Age: 69
End: 2019-06-10
Payer: COMMERCIAL

## 2019-06-10 ENCOUNTER — ANTICOAG VISIT (OUTPATIENT)
Dept: CARDIOLOGY CLINIC | Facility: CLINIC | Age: 69
End: 2019-06-10

## 2019-06-10 ENCOUNTER — CLINICAL SUPPORT (OUTPATIENT)
Dept: CARDIAC REHAB | Facility: CLINIC | Age: 69
End: 2019-06-10
Payer: COMMERCIAL

## 2019-06-10 DIAGNOSIS — Z95.2 S/P AVR: ICD-10-CM

## 2019-06-10 DIAGNOSIS — Z98.890 S/P MVR (MITRAL VALVE REPAIR): ICD-10-CM

## 2019-06-10 PROCEDURE — 93798 PHYS/QHP OP CAR RHAB W/ECG: CPT

## 2019-06-14 ENCOUNTER — APPOINTMENT (OUTPATIENT)
Dept: CARDIAC REHAB | Facility: CLINIC | Age: 69
End: 2019-06-14
Payer: COMMERCIAL

## 2019-06-17 ENCOUNTER — CLINICAL SUPPORT (OUTPATIENT)
Dept: CARDIAC REHAB | Facility: CLINIC | Age: 69
End: 2019-06-17
Payer: COMMERCIAL

## 2019-06-17 DIAGNOSIS — Z98.890 S/P MVR (MITRAL VALVE REPAIR): ICD-10-CM

## 2019-06-17 DIAGNOSIS — Z95.2 S/P AVR: ICD-10-CM

## 2019-06-17 PROCEDURE — 93798 PHYS/QHP OP CAR RHAB W/ECG: CPT

## 2019-06-19 ENCOUNTER — CLINICAL SUPPORT (OUTPATIENT)
Dept: CARDIAC REHAB | Facility: CLINIC | Age: 69
End: 2019-06-19
Payer: COMMERCIAL

## 2019-06-19 DIAGNOSIS — Z98.890 S/P MVR (MITRAL VALVE REPAIR): ICD-10-CM

## 2019-06-19 DIAGNOSIS — Z95.2 S/P AVR: ICD-10-CM

## 2019-06-19 PROCEDURE — 93798 PHYS/QHP OP CAR RHAB W/ECG: CPT

## 2019-06-21 ENCOUNTER — CLINICAL SUPPORT (OUTPATIENT)
Dept: CARDIAC REHAB | Facility: CLINIC | Age: 69
End: 2019-06-21
Payer: COMMERCIAL

## 2019-06-21 ENCOUNTER — OFFICE VISIT (OUTPATIENT)
Dept: NEPHROLOGY | Facility: CLINIC | Age: 69
End: 2019-06-21
Payer: COMMERCIAL

## 2019-06-21 VITALS — HEIGHT: 66 IN | BODY MASS INDEX: 26.2 KG/M2 | WEIGHT: 163 LBS

## 2019-06-21 DIAGNOSIS — N17.9 AKI (ACUTE KIDNEY INJURY) (HCC): Primary | ICD-10-CM

## 2019-06-21 DIAGNOSIS — Z98.890 S/P MVR (MITRAL VALVE REPAIR): ICD-10-CM

## 2019-06-21 DIAGNOSIS — I10 ESSENTIAL HYPERTENSION: Chronic | ICD-10-CM

## 2019-06-21 DIAGNOSIS — N05.1 FSGS (FOCAL SEGMENTAL GLOMERULOSCLEROSIS): ICD-10-CM

## 2019-06-21 DIAGNOSIS — N18.30 STAGE 3 CHRONIC KIDNEY DISEASE (HCC): Chronic | ICD-10-CM

## 2019-06-21 PROCEDURE — 99214 OFFICE O/P EST MOD 30 MIN: CPT | Performed by: INTERNAL MEDICINE

## 2019-06-21 PROCEDURE — 93798 PHYS/QHP OP CAR RHAB W/ECG: CPT

## 2019-06-21 RX ORDER — AMLODIPINE BESYLATE 10 MG/1
10 TABLET ORAL DAILY
COMMUNITY
End: 2019-07-08 | Stop reason: SDUPTHER

## 2019-06-21 RX ORDER — HYDRALAZINE HYDROCHLORIDE 10 MG/1
10 TABLET, FILM COATED ORAL AS NEEDED
COMMUNITY
End: 2019-11-27

## 2019-06-24 ENCOUNTER — CLINICAL SUPPORT (OUTPATIENT)
Dept: CARDIAC REHAB | Facility: CLINIC | Age: 69
End: 2019-06-24
Payer: COMMERCIAL

## 2019-06-24 DIAGNOSIS — Z95.2 S/P AVR: ICD-10-CM

## 2019-06-24 PROCEDURE — 93798 PHYS/QHP OP CAR RHAB W/ECG: CPT

## 2019-06-25 ENCOUNTER — OFFICE VISIT (OUTPATIENT)
Dept: CARDIOLOGY CLINIC | Facility: CLINIC | Age: 69
End: 2019-06-25
Payer: COMMERCIAL

## 2019-06-25 VITALS
SYSTOLIC BLOOD PRESSURE: 122 MMHG | WEIGHT: 165 LBS | DIASTOLIC BLOOD PRESSURE: 70 MMHG | OXYGEN SATURATION: 98 % | BODY MASS INDEX: 26.52 KG/M2 | HEIGHT: 66 IN | HEART RATE: 74 BPM

## 2019-06-25 DIAGNOSIS — I48.0 PAF (PAROXYSMAL ATRIAL FIBRILLATION) (HCC): ICD-10-CM

## 2019-06-25 DIAGNOSIS — I48.92 PAROXYSMAL ATRIAL FLUTTER (HCC): ICD-10-CM

## 2019-06-25 DIAGNOSIS — I50.22 CHRONIC SYSTOLIC CONGESTIVE HEART FAILURE (HCC): Primary | ICD-10-CM

## 2019-06-25 DIAGNOSIS — Z95.2 S/P AVR: ICD-10-CM

## 2019-06-25 DIAGNOSIS — Z98.890 S/P MVR (MITRAL VALVE REPAIR): ICD-10-CM

## 2019-06-25 DIAGNOSIS — I10 ESSENTIAL HYPERTENSION: Chronic | ICD-10-CM

## 2019-06-25 DIAGNOSIS — I42.0 DILATED CARDIOMYOPATHY (HCC): ICD-10-CM

## 2019-06-25 PROCEDURE — 99214 OFFICE O/P EST MOD 30 MIN: CPT | Performed by: NURSE PRACTITIONER

## 2019-06-25 PROCEDURE — 93000 ELECTROCARDIOGRAM COMPLETE: CPT | Performed by: NURSE PRACTITIONER

## 2019-06-25 RX ORDER — METOPROLOL SUCCINATE 50 MG/1
50 TABLET, EXTENDED RELEASE ORAL DAILY
Qty: 180 TABLET | Refills: 3 | Status: SHIPPED | OUTPATIENT
Start: 2019-06-25 | End: 2020-06-30 | Stop reason: SDUPTHER

## 2019-06-26 ENCOUNTER — CLINICAL SUPPORT (OUTPATIENT)
Dept: CARDIAC REHAB | Facility: CLINIC | Age: 69
End: 2019-06-26
Payer: COMMERCIAL

## 2019-06-26 DIAGNOSIS — Z95.2 S/P AVR: ICD-10-CM

## 2019-06-26 PROCEDURE — 93798 PHYS/QHP OP CAR RHAB W/ECG: CPT

## 2019-06-28 ENCOUNTER — CLINICAL SUPPORT (OUTPATIENT)
Dept: CARDIAC REHAB | Facility: CLINIC | Age: 69
End: 2019-06-28
Payer: COMMERCIAL

## 2019-06-28 DIAGNOSIS — Z95.2 S/P AVR: ICD-10-CM

## 2019-06-28 PROCEDURE — 93798 PHYS/QHP OP CAR RHAB W/ECG: CPT

## 2019-07-01 ENCOUNTER — CLINICAL SUPPORT (OUTPATIENT)
Dept: CARDIAC REHAB | Facility: CLINIC | Age: 69
End: 2019-07-01
Payer: COMMERCIAL

## 2019-07-01 DIAGNOSIS — Z95.2 S/P AVR: ICD-10-CM

## 2019-07-01 DIAGNOSIS — Z98.890 S/P MVR (MITRAL VALVE REPAIR): ICD-10-CM

## 2019-07-01 PROCEDURE — 93798 PHYS/QHP OP CAR RHAB W/ECG: CPT

## 2019-07-02 ENCOUNTER — CLINICAL SUPPORT (OUTPATIENT)
Dept: CARDIAC REHAB | Facility: CLINIC | Age: 69
End: 2019-07-02
Payer: COMMERCIAL

## 2019-07-02 DIAGNOSIS — I50.22 CHRONIC SYSTOLIC CONGESTIVE HEART FAILURE (HCC): Chronic | ICD-10-CM

## 2019-07-02 PROCEDURE — 93798 PHYS/QHP OP CAR RHAB W/ECG: CPT

## 2019-07-03 ENCOUNTER — CLINICAL SUPPORT (OUTPATIENT)
Dept: CARDIAC REHAB | Facility: CLINIC | Age: 69
End: 2019-07-03
Payer: COMMERCIAL

## 2019-07-03 DIAGNOSIS — Z95.2 S/P AVR: ICD-10-CM

## 2019-07-03 DIAGNOSIS — Z98.890 S/P MVR (MITRAL VALVE REPAIR): ICD-10-CM

## 2019-07-03 PROCEDURE — 93798 PHYS/QHP OP CAR RHAB W/ECG: CPT

## 2019-07-05 ENCOUNTER — APPOINTMENT (OUTPATIENT)
Dept: CARDIAC REHAB | Facility: CLINIC | Age: 69
End: 2019-07-05
Payer: COMMERCIAL

## 2019-07-08 DIAGNOSIS — I10 ESSENTIAL HYPERTENSION: Primary | Chronic | ICD-10-CM

## 2019-07-08 RX ORDER — AMLODIPINE BESYLATE 10 MG/1
10 TABLET ORAL DAILY
Qty: 30 TABLET | Refills: 11 | Status: SHIPPED | OUTPATIENT
Start: 2019-07-08 | End: 2019-11-27

## 2019-07-08 NOTE — TELEPHONE ENCOUNTER
Pt called and needs a 90 day refill of [facility] [address]

## 2019-07-10 ENCOUNTER — APPOINTMENT (OUTPATIENT)
Dept: CARDIAC REHAB | Facility: CLINIC | Age: 69
End: 2019-07-10
Payer: COMMERCIAL

## 2019-07-13 ENCOUNTER — HOSPITAL ENCOUNTER (EMERGENCY)
Facility: HOSPITAL | Age: 69
Discharge: HOME | End: 2019-07-13
Attending: EMERGENCY MEDICINE | Admitting: EMERGENCY MEDICINE
Payer: COMMERCIAL

## 2019-07-13 ENCOUNTER — APPOINTMENT (EMERGENCY)
Dept: RADIOLOGY | Facility: HOSPITAL | Age: 69
End: 2019-07-13
Payer: COMMERCIAL

## 2019-07-13 VITALS
OXYGEN SATURATION: 98 % | DIASTOLIC BLOOD PRESSURE: 78 MMHG | HEART RATE: 72 BPM | RESPIRATION RATE: 18 BRPM | TEMPERATURE: 98 F | SYSTOLIC BLOOD PRESSURE: 131 MMHG

## 2019-07-13 DIAGNOSIS — S00.93XA HEAD CONTUSION: ICD-10-CM
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
## 2019-07-13 PROCEDURE — 90471 IMMUNIZATION ADMIN: CPT

## 2019-07-13 PROCEDURE — 70450 CT HEAD/BRAIN W/O DYE: CPT

## 2019-07-13 PROCEDURE — 90715 TDAP VACCINE 7 YRS/> IM: CPT | Performed by: EMERGENCY MEDICINE

## 2019-07-13 PROCEDURE — 99284 EMERGENCY DEPT VISIT MOD MDM: CPT | Performed by: EMERGENCY MEDICINE

## 2019-07-13 PROCEDURE — 99283 EMERGENCY DEPT VISIT LOW MDM: CPT

## 2019-07-13 RX ADMIN — TETANUS TOXOID, REDUCED DIPHTHERIA TOXOID AND ACELLULAR PERTUSSIS VACCINE, ADSORBED 0.5 ML: 5; 2.5; 8; 8; 2.5 SUSPENSION INTRAMUSCULAR at 22:05

## 2019-07-14 NOTE — ED PROVIDER NOTES
History  Chief Complaint   Patient presents with    Fall     Pt tripped going down 2 steps, -LOC witnessed fall  This is a 76 yo male on coumadin who fell walking down the steps and landed on his head  The patient reports falling from a height of 2 steps and trying to brace his fall with his hands but landed on the side of his head  The fall was witnessed and the patient did not lose consciousness  After the fall the patient stated he was feeling mild dizziness which has since resolved  The patient denies headache, blurry vision, nausea, vomiting, chest pain, shortness of breath, neck pain or abdominal pain  Prior to Admission Medications   Prescriptions Last Dose Informant Patient Reported? Taking? COMBIGAN 0 2-0 5 %  Self Yes No   Sig: instill 1 drop into both eyes twice a day   Cholecalciferol (VITAMIN D3) 1000 units CAPS  Self Yes No   Sig: Take 1 capsule by mouth daily   Insulin Pen Needle 32G X 4 MM MISC  Self No No   Sig: Inject Lantus qHS and Novolog TID with meals, as directed    Dx: DM Ell 9   TRAVATAN Z 0 004 % ophthalmic solution  Self Yes No   amLODIPine (NORVASC) 10 mg tablet   No No   Sig: Take 1 tablet (10 mg total) by mouth daily   amiodarone 200 mg tablet  Self No No   Sig: Take 1 tablet (200 mg total) by mouth daily   ascorbic acid (VITAMIN C) 500 mg tablet  Self Yes No   Sig: Take 500 mg by mouth daily   aspirin (ECOTRIN LOW STRENGTH) 81 mg EC tablet  Self No No   Sig: Take 1 tablet (81 mg total) by mouth daily   atorvastatin (LIPITOR) 20 mg tablet  Self Yes No   Sig: Take 1 tablet by mouth daily   calcitriol (ROCALTROL) 0 25 mcg capsule  Self No No   Sig: Take 1 capsule (0 25 mcg total) by mouth 3 (three) times a week   hydrALAZINE (APRESOLINE) 10 mg tablet  Self Yes No   Sig: Take 10 mg by mouth as needed As needed for BP >160/90   insulin glargine (LANTUS SOLOSTAR) 100 units/mL injection pen  Self No No   Sig: Inject 16 Units under the skin daily at bedtime for 30 days Patient taking differently: Inject 15 Units under the skin daily at bedtime    insulin isophane (HUMULIN N KWIKPEN) 100 units/mL injection pen  Self No No   Sig: Inject 4 Units under the skin 3 (three) times a day before meals   Patient taking differently: Inject 5 Units under the skin 3 (three) times a day before meals    metoprolol succinate (TOPROL-XL) 50 mg 24 hr tablet   No No   Sig: Take 1 tablet (50 mg total) by mouth daily   torsemide (DEMADEX) 10 mg tablet  Self Yes No   Sig: Take 10 mg by mouth as needed Take one tablet QOD    warfarin (COUMADIN) 2 mg tablet  Self No No   Sig: TAKE 1 TO 2 TABLETS DAILY BY MOUTH OR AS DIRECTED BY PHYSICIAN  Patient taking differently: Take 3 mg by mouth daily 3 mg daily      Facility-Administered Medications: None       Past Medical History:   Diagnosis Date    Diabetes mellitus ([facility] )     Hyperlipidemia     Hypertension     Proteinuria     Stage 3 chronic kidney disease ([facility] )     Vitamin D deficiency        Past Surgical History:   Procedure Laterality Date    COLONOSCOPY      LA ECHO TRANSESOPHAG R-T 2D W/PRB IMG ACQUISJ I&R N/A 3/7/2019    Procedure: INTRA-OP TRANSESOPHAGEAL ECHOCARDIOGRAM (PATIENCE);   Surgeon: Kandace Prader, DO;  [facility];  Service: Cardiac Surgery    LA PERQ CLSR TCAT L ATR APNDGE W/ENDOCARDIAL IMPLNT  3/7/2019    Procedure: LEFT ATRIAL APPENDAGE OCCLUSION CLIPPED with 35mm Leida Lavender;  Surgeon: Kandace Prader, DO;  [facility];  Service: Cardiac Surgery    LA REPLACEMENT OF MITRAL VALVE N/A 3/7/2019    Procedure: REPLACEMENT VALVE MITRAL (MVR) REPAIR with a 30mm MEDTRONIC CG FUTURE RING;  Surgeon: Kandace Prader, DO;  [facility];  Service: Cardiac Surgery    LA RPLCMT AORTIC VALVE OPN W/STENTLESS TISSUE VALVE N/A 3/7/2019    Procedure: REPLACEMENT VALVE AORTIC (AVR) with 23mm TAVERA INSPIRIS RESILIA  AORTIC VALVE;  Surgeon: Kandace Prader, DO;  [facility];  Service: Cardiac Surgery  TOE SURGERY Left        Family History   Problem Relation Age of Onset   Skylar Mejia Stroke Mother     Hypertension Mother     Blindness Father     Hyperlipidemia Father     Gout Brother     Heart Valve Disease Brother     Cancer Sister     Anuerysm Neg Hx     Heart attack Neg Hx     Arrhythmia Neg Hx     Heart failure Neg Hx     Coronary artery disease Neg Hx     Sudden death Neg Hx         scd     I have reviewed and agree with the history as documented  Social History     Tobacco Use    Smoking status: Former Smoker     Last attempt to quit:      Years since quittin 5    Smokeless tobacco: Never Used   Substance Use Topics    Alcohol use: Yes     Frequency: Monthly or less     Drinks per session: 1 or 2     Binge frequency: Never     Comment: occasionally    Drug use: No        Review of Systems   Constitutional: Negative for chills and fever  HENT: Negative for sore throat  Eyes: Negative for photophobia and visual disturbance  Respiratory: Negative for shortness of breath  Cardiovascular: Negative for chest pain  Gastrointestinal: Negative for abdominal pain, constipation and diarrhea  Genitourinary: Negative for difficulty urinating  Musculoskeletal: Negative for back pain and neck pain  Neurological: Negative for dizziness, syncope, weakness, light-headedness, numbness and headaches  Psychiatric/Behavioral: Negative for confusion  Physical Exam  ED Triage Vitals [19]   Temperature Pulse Respirations Blood Pressure SpO2   98 °F (36 7 °C) 72 18 131/78 98 %      Temp Source Heart Rate Source Patient Position - Orthostatic VS BP Location FiO2 (%)   Oral Monitor -- -- --      Pain Score       --             Orthostatic Vital Signs  Vitals:    19   BP: 131/78   Pulse: 72       Physical Exam   Constitutional: He is oriented to person, place, and time  He appears well-developed and well-nourished  HENT:   Head: Normocephalic   Head is with contusion  Eyes: Pupils are equal, round, and reactive to light  EOM are normal    Neck: Normal range of motion  Neck supple  Cardiovascular: Normal rate, regular rhythm, normal heart sounds and intact distal pulses  Pulmonary/Chest: Effort normal and breath sounds normal    Abdominal: Soft  Bowel sounds are normal  He exhibits no distension  There is no tenderness  There is no guarding  Musculoskeletal: Normal range of motion  Neurological: He is alert and oriented to person, place, and time  No cranial nerve deficit  Skin: Skin is warm and dry  Abrasion noted  ED Medications  Medications   tetanus-diphtheria-acellular pertussis (BOOSTRIX) IM injection 0 5 mL (0 5 mL Intramuscular Given 7/13/19 2205)       Diagnostic Studies  Results Reviewed     None                 TRAUMA - CT head wo contrast   Final Result by Stacy Pruitt MD (07/13 2135)      No acute intracranial abnormality  Workstation performed: [id number]               Procedures  Procedures        ED Course  ED Course as of Jul 13 2231   Sat Jul 13, 2019 2125 76 yo male on coumadin fell walking down stairs and hit his head  CT scan ordered      2154 FINDINGS:  PARENCHYMA:  No intracranial mass, mass effect or midline shift  No CT signs of acute infarction  No acute parenchymal hemorrhage  TRAUMA - CT head wo contrast   2154 Patient with low risk mechanism cleared c-spine via NEXUS criteria      2155 Patient reassessed: no complaints      2200 Patient unsure of tetanus status  Will give Tdap and discharge home      2227 Counseled patient that over the next few days he may get headaches, nausea and vomiting  Recommended patient follow up with PCP as needed and to return to the ED if symptoms worsen                                     MDM  Number of Diagnoses or Management Options  Fall, initial encounter: minor  Head contusion: minor     Amount and/or Complexity of Data Reviewed  Tests in the radiology section of CPT®: ordered and reviewed  Tests in the medicine section of CPT®: ordered and reviewed  Discussion of test results with the performing providers: yes  Decide to obtain previous medical records or to obtain history from someone other than the patient: yes  Obtain history from someone other than the patient: yes  Review and summarize past medical records: yes  Discuss the patient with other providers: yes  Independent visualization of images, tracings, or specimens: yes    Patient Progress  Patient progress: stable      Disposition  Final diagnoses:   Fall, initial encounter   Head contusion     Time reflects when diagnosis was documented in both MDM as applicable and the Disposition within this note     Time User Action Codes Description Comment    7/13/2019 10:13 PM Chrissy Reyez Add [R74  LDIJ] Fall, initial encounter     7/13/2019 10:18 PM Chrissy Aissatou Add [J44 [id number]] Head contusion       ED Disposition     ED Disposition Condition Date/Time Comment    Discharge Stable Sat Jul 13, 2019 10:17 PM Myra Mustafa discharge to home/self care  Follow-up Information     Follow up With Specialties Details Why Contact Info Additional [address] Emergency Department Emergency Medicine  If symptoms worsen [address]  [phone]  ED, [address], [address] [place] [address], [address] MD Family Medicine  As needed [address]  [address] [address]  [phone]             Patient's Medications   Discharge Prescriptions    No medications on file     No discharge procedures on file  ED Provider  Attending physically available and evaluated Myra Mustafa I managed the patient along with the ED Attending      Electronically Signed by         Sukumar Anne MD  07/13/19 9285

## 2019-07-15 ENCOUNTER — CLINICAL SUPPORT (OUTPATIENT)
Dept: CARDIAC REHAB | Facility: CLINIC | Age: 69
End: 2019-07-15
Payer: COMMERCIAL

## 2019-07-15 DIAGNOSIS — Z98.890 S/P MVR (MITRAL VALVE REPAIR): ICD-10-CM

## 2019-07-15 PROCEDURE — 93798 PHYS/QHP OP CAR RHAB W/ECG: CPT

## 2019-07-17 ENCOUNTER — CLINICAL SUPPORT (OUTPATIENT)
Dept: CARDIAC REHAB | Facility: CLINIC | Age: 69
End: 2019-07-17
Payer: COMMERCIAL

## 2019-07-17 ENCOUNTER — APPOINTMENT (OUTPATIENT)
Dept: LAB | Facility: CLINIC | Age: 69
End: 2019-07-17
Payer: COMMERCIAL

## 2019-07-17 ENCOUNTER — ANTICOAG VISIT (OUTPATIENT)
Dept: CARDIOLOGY CLINIC | Facility: CLINIC | Age: 69
End: 2019-07-17

## 2019-07-17 DIAGNOSIS — Z95.2 S/P AVR: ICD-10-CM

## 2019-07-17 PROCEDURE — 93798 PHYS/QHP OP CAR RHAB W/ECG: CPT

## 2019-07-18 NOTE — ED ATTENDING ATTESTATION
Rubens Mendoza MD, saw and evaluated the patient  I have discussed the patient with the resident/non-physician practitioner and agree with the resident's/non-physician practitioner's findings, Plan of Care, and MDM as documented in the resident's/non-physician practitioner's note, except where noted  All available labs and Radiology studies were reviewed  I was present for key portions of any procedure(s) performed by the resident/non-physician practitioner and I was immediately available to provide assistance  At this point I agree with the current assessment done in the Emergency Department  I have conducted an independent evaluation of this patient a history and physical is as follows:    76 y/o M with fall down 2 stairs after tripping, struck head without LOC  No headache or neuro sx  Is on warfarin  No other pain complaints  Small R frontal hematoma present  No c-spine tenderness  Nonfocal neuro  Abrasions to hands  CT Head negative and patient overall well  No c-spine tenderness, no distracting injury  Will d/c with return precautions and update tetanus         Critical Care Time  Procedures

## 2019-07-19 ENCOUNTER — APPOINTMENT (OUTPATIENT)
Dept: CARDIAC REHAB | Facility: CLINIC | Age: 69
End: 2019-07-19
Payer: COMMERCIAL

## 2019-07-22 ENCOUNTER — APPOINTMENT (OUTPATIENT)
Dept: CARDIAC REHAB | Facility: CLINIC | Age: 69
End: 2019-07-22
Payer: COMMERCIAL

## 2019-07-24 ENCOUNTER — APPOINTMENT (OUTPATIENT)
Dept: CARDIAC REHAB | Facility: CLINIC | Age: 69
End: 2019-07-24
Payer: COMMERCIAL

## 2019-07-26 ENCOUNTER — CLINICAL SUPPORT (OUTPATIENT)
Dept: CARDIAC REHAB | Facility: CLINIC | Age: 69
End: 2019-07-26
Payer: COMMERCIAL

## 2019-07-26 DIAGNOSIS — Z95.2 S/P AVR: ICD-10-CM

## 2019-07-26 PROCEDURE — 93798 PHYS/QHP OP CAR RHAB W/ECG: CPT

## 2019-07-29 ENCOUNTER — CLINICAL SUPPORT (OUTPATIENT)
Dept: CARDIAC REHAB | Facility: CLINIC | Age: 69
End: 2019-07-29
Payer: COMMERCIAL

## 2019-07-29 VITALS — WEIGHT: 167.8 LBS | BODY MASS INDEX: 26.97 KG/M2 | HEIGHT: 66 IN

## 2019-07-29 DIAGNOSIS — Z98.890 S/P MVR (MITRAL VALVE REPAIR): ICD-10-CM

## 2019-07-29 PROCEDURE — 93798 PHYS/QHP OP CAR RHAB W/ECG: CPT

## 2019-07-29 NOTE — PROGRESS NOTES
Cardiac Rehabilitation Plan of Care   120 Days      Today's date: 2019   Visits: 32  Patient name: Christoph Cazares      : 1950  Age: 77 y o  MRN: [id number]  Referring Physician: Neeraj Donahue PA-C  Provider: Avni Marvin  Clinician: Radha Rodriguez Rn    Dx:   Encounter Diagnosis   Name Primary?  S/P MVR (mitral valve repair)      Date of onset: 3/7/2019      SUMMARY OF PROGRESS:  Mr Mayda Smallwood completed 32 sessions of the cardiac rehabilitation program  His telemetry monitor has been NSR with 1AVB PVC at rest and with exercise since his cardioversion on 2019  His exercise MET level increased from 3 8 to 5 1 MET's  He completes 41 minutes of cardiovascular exercise  He stated he noticed an increase in energy since he started the cardiac rehabilitation program  Will continue to monitor  Medication compliance: Yes   Comments: stated compliant with medications  Fall Risk: Moderate   Comments: due to fatigue and elevated heart rate    EKG changes: sinus tachycardia      EXERCISE ASSESSMENT and PLAN    Current Exercise Program in Rehab:       Frequency:  3 days/week        Minutes: 41         METS: 5 1            HR: 90   RPE: 4-5         Modalities: Treadmill, NuStep and Recumbent bike      Exercise Progression 30 Day Goals :    Frequency: 3days/week   Minutes: 50-55   METS: 3 8-5 5   HR:  20-30 bpm above resting   RPE: 4-6   Modalities: Treadmill, Eliptical , NuStep and Recumbent bike    Strength training: Will be added following at least 8 weeks post surgery and 8-10 monitored sessions       Progressing:   In Progress    Home Exercise: none at this time    Goals: increase endurance to return to walking 2 miles a day in 10 to 12 weeks, increase endurance to return to work post cardiac rehabilitation   Education: signs and sxs and RPE scale,staying active in warmer weather tips  Plan:will provide home exercise program  Readiness to change: Preparation      NUTRITION ASSESSMENT AND PLAN    Weight control:    Starting weight: 155 8   Current weight: Weight - Scale: 76 1 kg (167 lb 12 8 oz)   Waist circumference:    Starting:    Current: Waist circumference (inches): 36 5 Inches  Diabetes: Patient reported fasting   Lipid management: Discussed diet and lipid management and Last lipid profile 81  Chol 81  TRG 63  HDL 40  LDL 28  Goals:continue to consume a heart healthy diet score 65 on rate your plate  Education: heart healthy eating, healthy eating during the holidays  Progressing: In Progress  Plan: continue to consume a heart healthy diet   Readiness to change: Action      PSYCHOSOCIAL ASSESSMENT AND PLAN    Emotional:              1-4 = Minimal Depression  Self-reported stress level: 2   Social support: Very Good  Goals:  PHQ-9 - reduced severity by one level  Education: benefits of positive support system and coping mechanisms, stress and relaxation techniques  Progressing: In Progress  Plan: Practice relaxation techniques  Readiness to change: Preparation      OTHER CORE COMPONENTS     Tobacco:   Social History     Tobacco Use   Smoking Status Former Smoker    Last attempt to quit: Antwan Stone Years since quittin 6   Smokeless Tobacco Never Used       Tobacco Use Intervention: Referral to tobacco expert:   N/A    Blood pressure:    Restin/68   Exercise: 142/72  Goals: set a target quit date and continue to take medication as prescibed  Education:  Understanding heart disease, HTN and CAD and low sodium diet and HTN, common heart medications  Progressing: In Progress  Plan: continue to take medication as prescibed and consume a heart healthy diet  Readiness to change: Action

## 2019-07-31 ENCOUNTER — CLINICAL SUPPORT (OUTPATIENT)
Dept: CARDIAC REHAB | Facility: CLINIC | Age: 69
End: 2019-07-31
Payer: COMMERCIAL

## 2019-07-31 DIAGNOSIS — Z95.2 S/P AVR: ICD-10-CM

## 2019-07-31 PROCEDURE — 93798 PHYS/QHP OP CAR RHAB W/ECG: CPT

## 2019-08-02 ENCOUNTER — CLINICAL SUPPORT (OUTPATIENT)
Dept: CARDIAC REHAB | Facility: CLINIC | Age: 69
End: 2019-08-02
Payer: COMMERCIAL

## 2019-08-02 DIAGNOSIS — Z95.2 S/P AVR: ICD-10-CM

## 2019-08-02 PROCEDURE — 93798 PHYS/QHP OP CAR RHAB W/ECG: CPT

## 2019-08-05 ENCOUNTER — TELEPHONE (OUTPATIENT)
Dept: NEPHROLOGY | Facility: CLINIC | Age: 69
End: 2019-08-05

## 2019-08-05 NOTE — TELEPHONE ENCOUNTER
I called and left message for patient to call back and schedule follow up appointment in September with Dr Rox Thomas

## 2019-08-09 ENCOUNTER — CLINICAL SUPPORT (OUTPATIENT)
Dept: CARDIAC REHAB | Facility: CLINIC | Age: 69
End: 2019-08-09
Payer: COMMERCIAL

## 2019-08-09 DIAGNOSIS — Z98.890 S/P MVR (MITRAL VALVE REPAIR): ICD-10-CM

## 2019-08-09 DIAGNOSIS — Z95.2 S/P AVR: ICD-10-CM

## 2019-08-09 PROCEDURE — 93798 PHYS/QHP OP CAR RHAB W/ECG: CPT

## 2019-08-12 ENCOUNTER — CLINICAL SUPPORT (OUTPATIENT)
Dept: CARDIAC REHAB | Facility: CLINIC | Age: 69
End: 2019-08-12
Payer: COMMERCIAL

## 2019-08-12 DIAGNOSIS — Z98.890 S/P MVR (MITRAL VALVE REPAIR): ICD-10-CM

## 2019-08-12 DIAGNOSIS — Z95.2 S/P AVR: ICD-10-CM

## 2019-08-12 PROCEDURE — 93798 PHYS/QHP OP CAR RHAB W/ECG: CPT

## 2019-08-12 NOTE — PROGRESS NOTES
Cardiac Rehabilitation Plan of Care   Discharge      Today's date: 2019   Visits: 36  Patient name: Theadora Oppenheim      : 1950  Age: 77 y o  MRN: [id number]  Referring Physician: Samreen Jerome PA-C  Provider: Jake  Clinician: Leslie Rodriguez Rn    Dx:   Encounter Diagnoses   Name Primary?  S/P AVR     S/P MVR (mitral valve repair)      Date of onset: 3/7/2019      SUMMARY OF PROGRESS:  Mr Rohit Cabrales completed the 36 sessions of the cardiac rehabilitation program  His telemetry monitor has been NSR with 1AVB PVC at rest and with exercise since his cardioversion on 2019  His exercise MET level increased from 3 8 to 5 1 MET's  He completes 41 minutes of cardiovascular exercise  His sub max TM test improved 29 3% increased from 5 8 to 7 5 MET's  Walking distance improved 32 3% and increase from 1270 to 1680 feet in 6 minutes  Upper and lower body strength improved  Increase arm curls from 14 to 19 and chair to stands from 6 to 13 in 30 seconds  [facility] activity score improved from 3 3 to 8 97 MET's  PHQ9 score improved from 4 to 1  [facility] Quality of Life score improved from 22 to 14 score  He will continue to work on his nutritional goals  He stated he noticed an increase in energy since he started the cardiac rehabilitation program       Medication compliance: Yes   Comments: stated compliant with medications  Fall Risk: Moderate   Comments: due to fatigue and elevated heart rate    EKG changes: sinus tachycardia      EXERCISE ASSESSMENT and PLAN    Current Exercise Program in Rehab:       Frequency:  3 days/week        Minutes: 41         METS: 5 1            HR: 98   RPE: 4-5         Modalities: Treadmill, NuStep and Recumbent bike      Strength training: Will be added following at least 8 weeks post surgery and 8-10 monitored sessions       Progressing:   In Progress    Home Exercise: walking, and exercise at this fitness center 3 days a week    Goals: increase endurance to return to walking 2 miles a day in 10 to 12 weeks, increase endurance to return to work post cardiac rehabilitation (met goal)  Education: signs and sxs and RPE scale,staying active in warmer weather tips  Plan:education on home exercise guidelines and home exercise 30+ mins 2 days opposite CR  Readiness to change: Action      NUTRITION ASSESSMENT AND PLAN    Weight control:    Starting weight: 155 8   Current weight:     Waist circumference:    Starting:    Current:    Diabetes: Patient reported fasting BS 97  Lipid management: Discussed diet and lipid management and Last lipid profile 81  Chol 81  TRG 63  HDL 40  LDL 28  Goals:continue to consume a heart healthy diet score 65 on rate your plate(continue to work on goal)  Education: heart healthy eating, healthy eating during the holidays  Progressing: In Progress  Plan: continue to consume a heart healthy diet   Readiness to change: Action      PSYCHOSOCIAL ASSESSMENT AND PLAN    Emotional:              1-4 = Minimal Depression  Self-reported stress level: 2   Social support: Very Good  Goals:  PHQ-9 - reduced severity by one level (met goal)  Education: benefits of positive support system and coping mechanisms, stress and relaxation techniques  Progressing: In Progress  Plan: Practice relaxation techniques  Readiness to change: Action      OTHER CORE COMPONENTS     Tobacco:   Social History     Tobacco Use   Smoking Status Former Smoker    Last attempt to quit: Doug Fallon Years since quittin 6   Smokeless Tobacco Never Used       Tobacco Use Intervention: Referral to tobacco expert:   N/A    Blood pressure:    Restin/66   Exercise: 144/72  Goals: set a target quit date and continue to take medication as prescibed (met goal)  Education:  Understanding heart disease, HTN and CAD and low sodium diet and HTN, common heart medications  Progressing: In Progress  Plan: continue to take medication as prescibed and consume a heart healthy diet  Readiness to change: Action

## 2019-08-12 NOTE — PROGRESS NOTES
Myra Mustafa   1950     Risk: moderate       Pre Post % Change Goal   Date: 4/11/2019 8/12/2019     Physical       Sub Max ETT (mets) 5 8 7 5 29 3% 10% increase   6MWT (feet) 1270 1680 32 3% 10% increase   Arm Curl  14 19     Chair to Stands 6 13     MAYER Al (est peak O2) 3 3 8 97 171 8%    Peak exercise CR/MD (mets) 2 8 5 1 82 1% 40% increase   Emotional       PHQ9 (> 10 refer to MD) 4 1 -75 0% 4 pt decrease   [place] (lower score = improvement)       Total 22 14 -36 4% < 27   Feelings 2 1 -50 0% < 3   Physical Fitness 5 3 -40 0% < 3   Social Support 1 1 0 0% < 3   Daily Activities 2 2 0 0% < 3   Social Activities 2 2 0 0% < 3   Pain 2 1 -50 0% < 3   Overall Health 3 2 -33 3% < 3   Quality of Life 2 1 -50 0% < 3   Change in Health 4 1 -75 0% < 3   Dietary       Rate your plate 65 57 -18 6% > 58   Measurements       Weight 155 5 167 5 7 7% 2 5 - 5%   BMI 25 1 27 1 8 0% 19 - 25   Waist Circ   36 5 2 8% < 40 M / < 35 F   % Body fat 25 7 28 7 11 7% < 25 M / < 33 F   BP left arm               (systolic) 711 753 -2 3% < 978   (diastolic) 74 68 9 5% < 90   Smoking #/day  (if applicable) 0 0 8 2% 0   Lipids/Glucose (Date) 3/11/19      Total cholesterol 81   50 - 200   Triglycerides 63   < 150   HDL 40   40 - 60   LDL 2 8   < 100   A1C 5 9   4 0 - 5 6%   Fasting

## 2019-08-14 ENCOUNTER — APPOINTMENT (OUTPATIENT)
Dept: CARDIAC REHAB | Facility: CLINIC | Age: 69
End: 2019-08-14
Payer: COMMERCIAL

## 2019-08-26 ENCOUNTER — ANTICOAG VISIT (OUTPATIENT)
Dept: CARDIOLOGY CLINIC | Facility: CLINIC | Age: 69
End: 2019-08-26

## 2019-08-26 ENCOUNTER — TRANSCRIBE ORDERS (OUTPATIENT)
Dept: LAB | Facility: CLINIC | Age: 69
End: 2019-08-26

## 2019-08-26 ENCOUNTER — APPOINTMENT (OUTPATIENT)
Dept: LAB | Facility: CLINIC | Age: 69
End: 2019-08-26
Payer: COMMERCIAL

## 2019-09-10 ENCOUNTER — APPOINTMENT (OUTPATIENT)
Dept: LAB | Facility: CLINIC | Age: 69
End: 2019-09-10
Payer: COMMERCIAL

## 2019-09-10 ENCOUNTER — ANTICOAG VISIT (OUTPATIENT)
Dept: CARDIOLOGY CLINIC | Facility: CLINIC | Age: 69
End: 2019-09-10

## 2019-09-10 ENCOUNTER — TRANSCRIBE ORDERS (OUTPATIENT)
Dept: LAB | Facility: CLINIC | Age: 69
End: 2019-09-10

## 2019-09-10 DIAGNOSIS — E03.9 MYXEDEMA HEART DISEASE: ICD-10-CM

## 2019-09-10 DIAGNOSIS — E78.5 HYPERLIPIDEMIA, UNSPECIFIED HYPERLIPIDEMIA TYPE: ICD-10-CM

## 2019-09-10 DIAGNOSIS — I51.9 MYXEDEMA HEART DISEASE: ICD-10-CM

## 2019-09-10 DIAGNOSIS — E11.00 TYPE II DIABETES MELLITUS WITH HYPEROSMOLARITY, UNCONTROLLED (HCC): ICD-10-CM

## 2019-09-10 DIAGNOSIS — E21.3 HYPERPARATHYROIDISM, UNSPECIFIED (HCC): ICD-10-CM

## 2019-09-10 DIAGNOSIS — E11.00 TYPE II DIABETES MELLITUS WITH HYPEROSMOLARITY, UNCONTROLLED (HCC): Primary | ICD-10-CM

## 2019-09-10 DIAGNOSIS — M10.9 GOUT, UNSPECIFIED CAUSE, UNSPECIFIED CHRONICITY, UNSPECIFIED SITE: ICD-10-CM

## 2019-09-10 DIAGNOSIS — I10 ESSENTIAL HYPERTENSION, MALIGNANT: ICD-10-CM

## 2019-09-10 DIAGNOSIS — E11.65 TYPE II DIABETES MELLITUS WITH HYPEROSMOLARITY, UNCONTROLLED (HCC): ICD-10-CM

## 2019-09-10 DIAGNOSIS — E11.65 TYPE II DIABETES MELLITUS WITH HYPEROSMOLARITY, UNCONTROLLED (HCC): Primary | ICD-10-CM

## 2019-09-10 LAB
ANION GAP SERPL CALCULATED.3IONS-SCNC: 7 MMOL/L (ref 4–13)
AST SERPL W P-5'-P-CCNC: 26 U/L (ref 5–45)
BUN SERPL-MCNC: 54 MG/DL (ref 5–25)
CALCIUM SERPL-MCNC: 8.7 MG/DL (ref 8.3–10.1)
CHLORIDE SERPL-SCNC: 108 MMOL/L (ref 100–108)
CHOLEST SERPL-MCNC: 127 MG/DL (ref 50–200)
CK MB SERPL-MCNC: 1 % (ref 0–2.5)
CK MB SERPL-MCNC: 3.2 NG/ML (ref 0–5)
CK SERPL-CCNC: 313 U/L (ref 39–308)
CO2 SERPL-SCNC: 24 MMOL/L (ref 21–32)
CREAT SERPL-MCNC: 3.35 MG/DL (ref 0.6–1.3)
CREAT UR-MCNC: 107 MG/DL
ERYTHROCYTE [DISTWIDTH] IN BLOOD BY AUTOMATED COUNT: 13.6 % (ref 11.6–15.1)
EST. AVERAGE GLUCOSE BLD GHB EST-MCNC: 111 MG/DL
GFR SERPL CREATININE-BSD FRML MDRD: 21 ML/MIN/1.73SQ M
GLUCOSE P FAST SERPL-MCNC: 99 MG/DL (ref 65–99)
HBA1C MFR BLD: 5.5 % (ref 4.2–6.3)
HCT VFR BLD AUTO: 41.2 % (ref 36.5–49.3)
HDLC SERPL-MCNC: 48 MG/DL (ref 40–60)
HGB BLD-MCNC: 13.8 G/DL (ref 12–17)
LDLC SERPL CALC-MCNC: 66 MG/DL (ref 0–100)
MCH RBC QN AUTO: 31.2 PG (ref 26.8–34.3)
MCHC RBC AUTO-ENTMCNC: 33.5 G/DL (ref 31.4–37.4)
MCV RBC AUTO: 93 FL (ref 82–98)
MICROALBUMIN UR-MCNC: 1350 MG/L (ref 0–20)
MICROALBUMIN/CREAT 24H UR: 1262 MG/G CREATININE (ref 0–30)
PHOSPHATE SERPL-MCNC: 4.4 MG/DL (ref 2.3–4.1)
PLATELET # BLD AUTO: 187 THOUSANDS/UL (ref 149–390)
PMV BLD AUTO: 9.4 FL (ref 8.9–12.7)
POTASSIUM SERPL-SCNC: 5.6 MMOL/L (ref 3.5–5.3)
PTH-INTACT SERPL-MCNC: 132.6 PG/ML (ref 18.4–80.1)
RBC # BLD AUTO: 4.43 MILLION/UL (ref 3.88–5.62)
SODIUM SERPL-SCNC: 139 MMOL/L (ref 136–145)
TRIGL SERPL-MCNC: 65 MG/DL
TSH SERPL DL<=0.05 MIU/L-ACNC: 2.86 UIU/ML (ref 0.36–3.74)
URATE SERPL-MCNC: 8.2 MG/DL (ref 4.2–8)
WBC # BLD AUTO: 7.13 THOUSAND/UL (ref 4.31–10.16)

## 2019-09-10 PROCEDURE — 83036 HEMOGLOBIN GLYCOSYLATED A1C: CPT

## 2019-09-10 PROCEDURE — 82550 ASSAY OF CK (CPK): CPT

## 2019-09-10 PROCEDURE — 83970 ASSAY OF PARATHORMONE: CPT

## 2019-09-10 PROCEDURE — 80048 BASIC METABOLIC PNL TOTAL CA: CPT

## 2019-09-10 PROCEDURE — 85027 COMPLETE CBC AUTOMATED: CPT

## 2019-09-10 PROCEDURE — 80061 LIPID PANEL: CPT

## 2019-09-10 PROCEDURE — 84443 ASSAY THYROID STIM HORMONE: CPT

## 2019-09-10 PROCEDURE — 84550 ASSAY OF BLOOD/URIC ACID: CPT

## 2019-09-10 PROCEDURE — 82570 ASSAY OF URINE CREATININE: CPT | Performed by: INTERNAL MEDICINE

## 2019-09-10 PROCEDURE — 82043 UR ALBUMIN QUANTITATIVE: CPT | Performed by: INTERNAL MEDICINE

## 2019-09-10 PROCEDURE — 36415 COLL VENOUS BLD VENIPUNCTURE: CPT

## 2019-09-10 PROCEDURE — 84100 ASSAY OF PHOSPHORUS: CPT

## 2019-09-10 PROCEDURE — 82553 CREATINE MB FRACTION: CPT

## 2019-09-10 PROCEDURE — 84450 TRANSFERASE (AST) (SGOT): CPT

## 2019-09-23 ENCOUNTER — OFFICE VISIT (OUTPATIENT)
Dept: NEPHROLOGY | Facility: CLINIC | Age: 69
End: 2019-09-23
Payer: COMMERCIAL

## 2019-09-23 VITALS — HEIGHT: 66 IN | WEIGHT: 167 LBS | BODY MASS INDEX: 26.84 KG/M2

## 2019-09-23 DIAGNOSIS — N18.30 STAGE 3 CHRONIC KIDNEY DISEASE (HCC): Chronic | ICD-10-CM

## 2019-09-23 DIAGNOSIS — N25.81 SECONDARY HYPERPARATHYROIDISM (HCC): Chronic | ICD-10-CM

## 2019-09-23 DIAGNOSIS — R80.1 PERSISTENT PROTEINURIA: ICD-10-CM

## 2019-09-23 DIAGNOSIS — N17.9 AKI (ACUTE KIDNEY INJURY) (HCC): Primary | ICD-10-CM

## 2019-09-23 DIAGNOSIS — I48.92 ATRIAL FLUTTER, UNSPECIFIED TYPE (HCC): ICD-10-CM

## 2019-09-23 PROCEDURE — 99214 OFFICE O/P EST MOD 30 MIN: CPT | Performed by: INTERNAL MEDICINE

## 2019-09-23 RX ORDER — CALCITRIOL 0.25 UG/1
0.25 CAPSULE, LIQUID FILLED ORAL
Qty: 19 CAPSULE | Refills: 4 | Status: SHIPPED | OUTPATIENT
Start: 2019-09-24 | End: 2019-12-16

## 2019-09-23 NOTE — PROGRESS NOTES
Assessment/Plan:    CARY (acute kidney injury) ([facility] )  He has baseline stage 3 chronic kidney disease secondary to FSGS as above  His baseline creatinine had been anywhere from 1 5-1 8 prior to the development of acute kidney injury  The patient in June had a Cr of 2 5  This is where his Cr had settled out after he recovered from cardiac surgery  The patient had labs done Sep 10 which showed a Cr of 3 35  Repeat labs at this time  I am not sure this is secondary to intravascular volume depletion or not he examines to be euvolemic is not orthostatic today  He only has been taking torsemide on an as-needed basis although he took at approximately 2 weeks ago which may have been around the time he had blood work drawn  Repeat blood work this week  FSGS (focal segmental glomerulosclerosis)  He does have stage 3 chronic kidney disease secondary to biopsy-proven FSGS which is felt to be more a secondary form of FSGS is there was glomerular megaly at only 15% foot process effacement  The degree of fibrosis was only mild on kidney biopsy  From a treatment standpoint, he did lose 35 lb after his initial diagnosis and had been on ARB as well his creatinine was maintained in the mid 1 range this was again all prior to cardiac surgery  He has been off of ARB since his cardiac surgery given his increased creatinine level    Stage 3 chronic kidney disease (HCC)  Baseline Cr was 1 6-1 8 prior to his cardiac surgery    Persistent proteinuria  Alb/cr ratio is 1 3 better than prior  He is off Benicar; continue to monitor    Secondary hyperparathyroidism ([facility] )  Intact PTH is increased to 132, phos is 4 4, and Ca is 8 7  Increase rocaltrol to 4 times a week          Subjective:      Patient ID: Radha Carver is a 77 y o  male  HPI    He is here for follow-up with regards to kidney function and blood pressure management  He states he has been doing pretty well at home  His weight is averaging approximately 170-172 lb    He denies any chest pressure shortness of breath  He is working out at [employer] every other day doing some aerobic work with treadmill and bike as well as weight lifting denies any dizziness lightheadedness exertional dyspnea or chest pressure  He is able to sleep flat for 8-10 hours and feels okay when he wakes up  His blood pressure at home has been running anywhere from 456-578 systolic  His glucose levels have remained stable  He has been modifying his work schedule is 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  Review of Systems  he denies any chest pressure shortness of breath no nausea vomiting diarrhea no urgency no frequency no hematuria no trouble with initiation of urinary flow urinary stream no significant leg edema no frothy bubbly urine no gross hematuria    Objective:      Ht 5' 6" (1 676 m)   Wt 75 8 kg (167 lb)   BMI 26 95 kg/m²     Blood pressure is 120/70 with him sitting after standing for approximately 90 seconds his blood pressure is the same and he is asymptomatic IE no dizziness or lightheadedness  Physical Exam   Constitutional: He appears well-nourished  Eyes: No scleral icterus  Neck: Neck supple  Cardiovascular: Normal rate and regular rhythm  Exam reveals no friction rub  Pulmonary/Chest: Effort normal and breath sounds normal    Abdominal: Soft  Bowel sounds are normal    Musculoskeletal: He exhibits edema  Very trace leg edema if any   Lymphadenopathy:     He has no cervical adenopathy  Neurological:   Nonfocal no asterixis   Skin: Skin is warm and dry     Psychiatric: His behavior is normal

## 2019-09-23 NOTE — PATIENT INSTRUCTIONS
1  Please increase rocaltrol to four times a week, Monday, wed, fri and sat    2  Please obtain repeat blood work this week      3  Thank you for coming in to see me today; it was great seeing you

## 2019-09-23 NOTE — PROGRESS NOTES
Heart failure   Office Visit Note Blair Patricia   77 y o    male   MRN: [id number]  [facility] [place]  [address]  [facility] [address] [address] [phone]    PCP: Kasandra Machado MD  Cardiologist  Dr Kathy Jaime  HF: Dr Moore Gamma        Impression/plan  Chronic systolic heart failure, NYHA II ; ACC/AHA stage B  --wt 170-172 lb at home  In the office 174   His endocrinologist thinks his caloric weight is a little bit higher as he was on insulin  The has been changed to [address], and he may see some wt loss  --Baseline creatinine had been : 1 6-1 8,  Now closer to 2, followed by nephr  Last Cr 3 35  He takes torsemide now p r n   It is not clear when he took torsemide in relation to that lab  He is getting lab work next week for Nephrology  --Beta-Blocker:  Toprol 50 /d  --ACEi, ARB or ARNi:  None given CKD  --Aldosterone Receptor Blocker:None  --Diuretic:  Torsemide 10 mg PRN wt gain, per nephr, based on symptoms  --Educated regarding adherence to a 1 5g-- 2g sodium diet/ 1500 ml fluid restriction, daily weights and to call us if he gains 2-3 lbs in 1 day ir 5 lbs in 1 wk  NICM , EF 45-50%, improved from 25% (3/19)--likely tachycardia induced  Hypertension  /78  --on Toprol 50 mg daily, amlodipine 10 mg daily  --Continue home blood pressure monitoring  systolics between 236 and 140  No need for prn hydralazine  Congential bicuspid aortic valve with severe aortic insufficiency, S/P Bio AVR  Mitral regurgitation, moderate, S/p repair MV clip 3/19  Paroxysmal atrial fibrillation/flutter status post PATIENCE/DCCV 4/12/19-->  NSR   --on Toprol 50/d  --on [address] with warfarin, goal INR 2-3  --EKG today sinus rhythm  --he is on antiarrhythmic therapy with amiodarone 200 mg daily- On since the spring of 2019  The patient questions how long he will need to be on amiodarone, given the long-term side effects  Hyperlipidemia- on atorvastatin 20 mg/d   dLDL 28 3/11/19  CARY on CKD3, due to  FSGS (focal segmental glomerulosclerosis)Baseline Cr now 2 Followed by Dr Joey xavier  Most recent labs, creatinine 3 35  I asked him to write down on a calendar when he takes his torsemide which is p r n  In relation to when he gets the labs  That might help us sort out his labs little bit better  T2DM, was on insulin  He is transitioning to [facility]  Cardiac testing  --TTE 6/19 outside hosp  EF 45-50% Mod LVH ;NATACHA 1 08cm2  Max gradient 28; mean 14  MV ring noted with mild MR  RVSP 26 mm Hg  --PATIENCE 4/19  left ventricle is mildly dilated  Ejection fraction 25%  Severe diffuse hypokinesis  Wall thickness was normal   Right ventricle is normal   Left atrial appendage status post clipping  No thrombus  There was moderate mitral regurgitation after surgical repair  --cardiac catheterization 2/15/19  -- Left main: Normal   --LAD: The LAD was large and extended well beyond the apex  No atherosclerosis was seen  The diagonal branches were also normal   --Circumflex: The vessel was normal sized There was a 70% stenosis in mid vessel  --Ramus intermedius: There was a 70% stenosis in mid vessel  --RCA: The vessel was large sized and dominant, giving rise to the PDA  No atherosclerosis was seen      Summary recommendations  Low Na diet; Educated regarding heart failure  Has repeat labs scheduled next week for Nephrology  In the future, he may benefit from a reassessment of his LV function  We may need to reconsider his antiarrhythmic therapy given its long-term side effects  Follow up will be scheduled with Dr Carissa Sharma 10/29/19          HPI:   Mr Osman Alfredo is a 81 yo gentleman with a bicuspid aortic valve, with severe AI, moderate-severe MR and LV dysfunction ( EF 40%), now S/P bio AVR and MV repair with angioplasty ring and left atrial appendage clipping 3/19  He has has paroxysmal atrial fibrillation/atrial flutter, CKD3, HTN, hyperlipidemia and DM2    He underwent a PATIENCE (3/15/19) in anticipation of cardioversion, but was delayed given a small echodensity on the appendage concerning for thrombus versus clipped atrial tissue  He was started on amiodarone and anticoagulated   April 12th he underwent a PATIENCE guided cardioversion successfully restoring normal sinus rhythm  His ejection fraction was 25-30%  There was moderate mitral regurgitation noted and a normal bioprosthetic aortic valve  No VIRI thrombus  At the end of April he was seen in the office by his cardiologist   His cardiomyopathy was noted to be likely tachy induced  His beta-blocker was up titrated  In the interim, in April he was seen by Dr Devaughn Wooten, Heart failure Service  He  recommended changing his metoprolol tartrate to metoprolol succinate  They advised follow-up with them in about 4-5 months, and his cardiologist about 2 months    6/25/19 presents for follow-up with his cardiologist for his BP  Recently in [place]  In [place], was routinely checking his BP  (not yet validated ), SBP was 169, and "just not feeling right"  Went to the [facility] who referred him to the [facility]  Reports an echo was done and was told it was ok  I have the report: ( will get scanned in)  EF 45-50%   Mod LVH ;NATACHA 1 08cm2  Max gradient 28; mean 14  MV ring noted with mild MR  RVSP 26 mm Hg      They added amlodipine 10 mg/d  And gave hydralazine 25 PRN for SBP > 160  He took hydralazine 2 x in the last 2 weeks  On torsemide PRN  Only taken a couple times over the last 1-2 months    6/13    Cr 2 3  K 4 7  Trop 0 03  TSH 2 48  ALT 48  AST 43    BP better  He also saw his nephrologist last week  He has been checking his blood pressure at home  The monitor has not been validated  I asked him to do so  Continue a low-sodium diet  Continue his current medications with the exception of metoprolol tartrate; I will change it to metoprolol succinate  Interval history  In June his creatinine was 2 5  Lab September 10 showed a creatinine rising at 3 35  He saw his nephrologist   Is not clear if this is due to intravascular volume depletion or not  He was not orthostatic in the office  His taking torsemide on a p r n  Basis lastly took about 2 weeks ago  Repeat blood work this week is pending    9/25/19  His weights are up a little bit  He is taking torsemide 10 mg p r n  For symptoms of lower extremity edema, shortness of breath  He overall has gained a little bit of what we feel to be caloric weight as his therapy for his diabetes had been on insulin which is known to cause some weight gain  This therapy has now to been adjusted to [address] starting today  This should help his weight as he tells me his endocrinologist discussed with him  Overall he feels well, no chest pain no shortness of breath, no lower extremity edema  He will be traveling to [facility] for his daughters destination wedding  I encouraged him to maintain his low-sodium diet  His EKG today shows sinus rhythm similar to prior  His EKG shows a right bundle branch block, left anterior fascicular block, QRS of 166 milliseconds  His ejection fraction is 45-50%, lastly checked in June 2019  The patient is curious how long he may need to be on amiodarone as he does realize there are significant long-term effects  In there future, he may benefit from re-evaluation of his ejection fraction and reconsideration of his antiarrhythmic therapy, now that he has had both valves replaced/repaired and he is in sinus rhythm      Assessment  Diagnoses and all orders for this visit:    Chronic systolic congestive heart failure (HCC)    Nonrheumatic aortic valve insufficiency    Non-rheumatic mitral regurgitation    Dilated cardiomyopathy (HCC)    Coronary artery disease involving native coronary artery of native heart without angina pectoris    Bicuspid aortic valve    Atrial flutter, unspecified type ([facility])    S/P MVR (mitral valve repair)    Essential hypertension        Past Medical History:   Diagnosis Date    Diabetes mellitus ([facility] [address] )     Hyperlipidemia     Hypertension     Proteinuria     Stage 3 chronic kidney disease (HCC)     Vitamin D deficiency        Review of Systems   Constitution: Positive for weight gain  Negative for chills  Cardiovascular: Negative for chest pain, claudication, cyanosis, dyspnea on exertion, irregular heartbeat, leg swelling, near-syncope, orthopnea, palpitations, paroxysmal nocturnal dyspnea and syncope  Respiratory: Negative for cough and shortness of breath  Gastrointestinal: Negative for heartburn and nausea  Neurological: Negative for dizziness, focal weakness, headaches, light-headedness and weakness  All other systems reviewed and are negative  Allergies   Allergen Reactions    Ace Inhibitors Cough           Current Outpatient Medications:     amiodarone 200 mg tablet, Take 1 tablet (200 mg total) by mouth daily, Disp: 30 tablet, Rfl: 5    amLODIPine (NORVASC) 10 mg tablet, Take 1 tablet (10 mg total) by mouth daily, Disp: 30 tablet, Rfl: 11    ascorbic acid (VITAMIN C) 500 mg tablet, Take 500 mg by mouth daily, Disp: , Rfl:     aspirin (ECOTRIN LOW STRENGTH) 81 mg EC tablet, Take 1 tablet (81 mg total) by mouth daily, Disp: 30 tablet, Rfl: 2    atorvastatin (LIPITOR) 20 mg tablet, Take 1 tablet by mouth daily, Disp: , Rfl:     [START ON 9/24/2019] calcitriol (ROCALTROL) 0 25 mcg capsule, Take 1 capsule (0 25 mcg total) by mouth 4 (four) times a week, Disp: 19 capsule, Rfl: 4    Cholecalciferol (VITAMIN D3) 1000 units CAPS, Take 1 capsule by mouth daily, Disp: , Rfl:     COMBIGAN 0 2-0 5 %, instill 1 drop into both eyes twice a day, Disp: , Rfl: 0    hydrALAZINE (APRESOLINE) 10 mg tablet, Take 10 mg by mouth as needed As needed for BP >160/90, Disp: , Rfl:     insulin glargine (LANTUS SOLOSTAR) 100 units/mL injection pen, Inject 16 Units under the skin daily at bedtime for 30 days (Patient taking differently: Inject 15 Units under the skin daily at bedtime ), Disp: 5 pen, Rfl: 2    insulin isophane (HUMULIN N KWIKPEN) 100 units/mL injection pen, Inject 4 Units under the skin 3 (three) times a day before meals (Patient taking differently: Inject 5 Units under the skin 3 (three) times a day before meals ), Disp: 5 pen, Rfl: 2    Insulin Pen Needle 32G X 4 MM MISC, Inject Lantus qHS and Novolog TID with meals, as directed  Dx: DM Ell 9, Disp: 100 each, Rfl: 0    metoprolol succinate (TOPROL-XL) 50 mg 24 hr tablet, Take 1 tablet (50 mg total) by mouth daily, Disp: 180 tablet, Rfl: 3    torsemide (DEMADEX) 10 mg tablet, Take 10 mg by mouth as needed Take one tablet QOD , Disp: , Rfl:     TRAVATAN Z 0 004 % ophthalmic solution, , Disp: , Rfl: 0    warfarin (COUMADIN) 2 mg tablet, TAKE 1 TO 2 TABLETS DAILY BY MOUTH OR AS DIRECTED BY PHYSICIAN   (Patient taking differently: Take 3 mg by mouth daily 3 mg daily), Disp: 60 tablet, Rfl: 11    Social History     Socioeconomic History    Marital status: /Civil Union     Spouse name: Not on file    Number of children: Not on file    Years of education: Not on file    Highest education level: Not on file   Occupational History    Occupation: RETIRED   Social Needs    Financial resource strain: Not on file    Food insecurity:     Worry: Not on file     Inability: Not on file    Transportation needs:     Medical: Not on file     Non-medical: Not on file   Tobacco Use    Smoking status: Former Smoker     Last attempt to quit: 1968     Years since quittin 7    Smokeless tobacco: Never Used   Substance and Sexual Activity    Alcohol use: Yes     Frequency: Monthly or less     Drinks per session: 1 or 2     Binge frequency: Never     Comment: occasionally    Drug use: No    Sexual activity: Not on file   Lifestyle    Physical activity:     Days per week: Not on file     Minutes per session: Not on file    Stress: Not on file Relationships    Social connections:     Talks on phone: Not on file     Gets together: Not on file     Attends Mandaen service: Not on file     Active member of club or organization: Not on file     Attends meetings of clubs or organizations: Not on file     Relationship status: Not on file    Intimate partner violence:     Fear of current or ex partner: Not on file     Emotionally abused: Not on file     Physically abused: Not on file     Forced sexual activity: Not on file   Other Topics Concern    Not on file   Social History Narrative    Not on file       Family History   Problem Relation Age of Onset    Stroke Mother     Hypertension Mother     Blindness Father     Hyperlipidemia Father     Gout Brother     Heart Valve Disease Brother     Cancer Sister     Anuerysm Neg Hx     Heart attack Neg Hx     Arrhythmia Neg Hx     Heart failure Neg Hx     Coronary artery disease Neg Hx     Sudden death Neg Hx         scd       Physical Exam   Constitutional: He is oriented to person, place, and time  No distress  HENT:   Head: Normocephalic and atraumatic  Eyes: Conjunctivae and EOM are normal    Neck: Normal range of motion  Neck supple  Cardiovascular: Normal rate, regular rhythm and intact distal pulses  Murmur heard  Harsh midsystolic murmur is present with a grade of 1/6 at the upper right sternal border radiating to the neck  Pulmonary/Chest: Effort normal and breath sounds normal    Abdominal: Soft  Bowel sounds are normal    Musculoskeletal: Normal range of motion  Neurological: He is alert and oriented to person, place, and time  Skin: Skin is warm and dry  Psychiatric: He has a normal mood and affect  Nursing note and vitals reviewed  Vitals: There were no vitals taken for this visit     Wt Readings from Last 3 Encounters:   09/23/19 75 8 kg (167 lb)   07/29/19 76 1 kg (167 lb 12 8 oz)   06/25/19 74 8 kg (165 lb)         Labs & Results:  Lab Results   Component Value Date    WBC 7 13 09/10/2019    HGB 13 8 09/10/2019    HCT 41 2 09/10/2019    MCV 93 09/10/2019     09/10/2019     No results found for: BNP  No components found for: CHEM    Results for orders placed during the hospital encounter of 19   Echo complete with contrast if indicated    Narrative [address], [facility]  [phone]    Transthoracic Echocardiogram  2D, M-mode, Doppler, and Color Doppler    Study date:  2019    Patient: Natali Wilhelm  MR number: [id number]  Account number: [de-identified]  : 1950  Age: 80 years  Gender: Male  Status: Inpatient  Location: Bedside  Height: 68 in  Weight: 163 lb  BP: 155/ 70 mmHg    Indications: Heart Failure    Diagnoses: I50 9 - Heart failure, unspecified    Sonographer:  TIMMY Vaca  Primary Physician:  Lillian Cruz  Referring Physician:  Amos Booth MD  Group:  [id number]  Interpreting Physician:  Amos Booth MD    SUMMARY    LEFT VENTRICLE:  The ventricle was mildly dilated  Systolic function was mildly to moderately reduced  Ejection fraction was estimated to be 40 %  There was mild diffuse hypokinesis  Wall thickness was mildly increased  RIGHT VENTRICLE:  The ventricle was mildly dilated  Systolic function was normal     LEFT ATRIUM:  The atrium was mildly dilated  MITRAL VALVE:  There was moderate to severe regurgitation  AORTIC VALVE:  The valve was possibly bicuspid  Leaflets exhibited normal thickness, normal cuspal separation, and significant diastolic prolapse  There was moderate to severe regurgitation  TRICUSPID VALVE:  There was mild regurgitation  Pulmonary artery systolic pressure was moderately to markedly increased  The findings suggest moderate to severe pulmonary hypertension  AORTA:  The root exhibited mild dilatation  IVC, HEPATIC VEINS:  The inferior vena cava was mildly dilated    Respirophasic changes were blunted (less than 50% variation)  PERICARDIUM:  There was a left pleural effusion  HISTORY: PRIOR HISTORY: Murmur, HTN, DM2, HLD, CKD3    PROCEDURE: The procedure was performed at the bedside  This was a routine study  The transthoracic approach was used  The study included complete 2D imaging, M-mode, complete spectral Doppler, and color Doppler  The heart rate was 95 bpm,  at the start of the study  Images were obtained from the parasternal, apical, subcostal, and suprasternal notch acoustic windows  Image quality was adequate  LEFT VENTRICLE: The ventricle was mildly dilated  Systolic function was mildly to moderately reduced  Ejection fraction was estimated to be 40 %  There was mild diffuse hypokinesis  Wall thickness was mildly increased  RIGHT VENTRICLE: The ventricle was mildly dilated  Systolic function was normal  Wall thickness was normal     LEFT ATRIUM: The atrium was mildly dilated  RIGHT ATRIUM: Size was normal     MITRAL VALVE: Valve structure was normal  There was normal leaflet separation  DOPPLER: The transmitral velocity was within the normal range  There was no evidence for stenosis  There was moderate to severe regurgitation  AORTIC VALVE: The valve was possibly bicuspid  Leaflets exhibited normal thickness, normal cuspal separation, and significant diastolic prolapse  DOPPLER: Transaortic velocity was within the normal range  There was no evidence for  stenosis  There was moderate to severe regurgitation  The regurgitant jet was eccentric  TRICUSPID VALVE: The valve structure was normal  There was normal leaflet separation  DOPPLER: The transtricuspid velocity was within the normal range  There was no evidence for stenosis  There was mild regurgitation  Pulmonary artery  systolic pressure was moderately to markedly increased  Estimated peak PA pressure was 60 mmHg  The findings suggest moderate to severe pulmonary hypertension      PULMONIC VALVE: Leaflets exhibited normal thickness, no calcification, and normal cuspal separation  DOPPLER: The transpulmonic velocity was within the normal range  There was no regurgitation  PERICARDIUM: There was no pericardial effusion  There was a left pleural effusion  The pericardium was normal in appearance  AORTA: The root exhibited mild dilatation  SYSTEMIC VEINS: IVC: The inferior vena cava was mildly dilated  Respirophasic changes were blunted (less than 50% variation)      MEASUREMENT TABLES    2D MEASUREMENTS  Aorta   (Reference normals)  Root diam   39 mm   (--)    SYSTEM MEASUREMENT TABLES    2D  %FS: 23 42 %  Ao Diam: 3 91 cm  EDV(Teich): 179 53 ml  EF Biplane: 40 37 %  EF(Teich): 46 07 %  ESV(Teich): 96 82 ml  IVSd: 1 02 cm  LA Area: 12 24 cm2  LA Diam: 4 3 cm  LVEDV MOD A2C: 129 37 ml  LVEDV MOD A4C: 151 37 ml  LVEDV MOD BP: 140 9 ml  LVEF MOD A2C: 42 79 %  LVEF MOD A4C: 40 91 %  LVESV MOD A2C: 74 02 ml  LVESV MOD A4C: 89 45 ml  LVESV MOD BP: 84 02 ml  LVIDd: 5 99 cm  LVIDs: 4 59 cm  LVLd A2C: 8 45 cm  LVLd A4C: 8 58 cm  LVLs A2C: 7 63 cm  LVLs A4C: 8 25 cm  LVPWd: 1 21 cm  RA Area: 15 27 cm2  RVIDd: 4 39 cm  SV MOD A2C: 55 35 ml  SV MOD A4C: 61 92 ml  SV(Teich): 82 71 ml    CW  TR MaxP 66 mmHg  TR Vmax: 3 52 m/s    MM  TAPSE: 1 8 cm    [facility] Accredited [facility]chocardi[facility]    Prepared and electronically signed by    Pascual Vincent MD  Signed 2019 16:15:06

## 2019-09-23 NOTE — ASSESSMENT & PLAN NOTE
He has baseline stage 3 chronic kidney disease secondary to FSGS as above  His baseline creatinine had been anywhere from 1 5-1 8 prior to the development of acute kidney injury  The patient in June had a Cr of 2 5  This is where his Cr had settled out after he recovered from cardiac surgery  The patient had labs done Sep 10 which showed a Cr of 3 35  Repeat labs at this time  I am not sure this is secondary to intravascular volume depletion or not he examines to be euvolemic is not orthostatic today  He only has been taking torsemide on an as-needed basis although he took at approximately 2 weeks ago which may have been around the time he had blood work drawn  Repeat blood work this week

## 2019-09-23 NOTE — ASSESSMENT & PLAN NOTE
He does have stage 3 chronic kidney disease secondary to biopsy-proven FSGS which is felt to be more a secondary form of FSGS is there was glomerular megaly at only 15% foot process effacement  The degree of fibrosis was only mild on kidney biopsy  From a treatment standpoint, he did lose 35 lb after his initial diagnosis and had been on ARB as well his creatinine was maintained in the mid 1 range this was again all prior to cardiac surgery   He has been off of ARB since his cardiac surgery given his increased creatinine level

## 2019-09-24 RX ORDER — AMIODARONE HYDROCHLORIDE 200 MG/1
TABLET ORAL
Qty: 30 TABLET | Refills: 5 | Status: SHIPPED | OUTPATIENT
Start: 2019-09-24 | End: 2020-03-30

## 2019-09-25 ENCOUNTER — OFFICE VISIT (OUTPATIENT)
Dept: CARDIOLOGY CLINIC | Facility: CLINIC | Age: 69
End: 2019-09-25
Payer: COMMERCIAL

## 2019-09-25 VITALS
OXYGEN SATURATION: 97 % | SYSTOLIC BLOOD PRESSURE: 124 MMHG | BODY MASS INDEX: 28.03 KG/M2 | HEIGHT: 66 IN | HEART RATE: 84 BPM | DIASTOLIC BLOOD PRESSURE: 78 MMHG | WEIGHT: 174.4 LBS

## 2019-09-25 DIAGNOSIS — I34.0 NON-RHEUMATIC MITRAL REGURGITATION: ICD-10-CM

## 2019-09-25 DIAGNOSIS — I42.0 DILATED CARDIOMYOPATHY (HCC): ICD-10-CM

## 2019-09-25 DIAGNOSIS — Z98.890 S/P MVR (MITRAL VALVE REPAIR): ICD-10-CM

## 2019-09-25 DIAGNOSIS — I35.1 NONRHEUMATIC AORTIC VALVE INSUFFICIENCY: ICD-10-CM

## 2019-09-25 DIAGNOSIS — I10 ESSENTIAL HYPERTENSION: ICD-10-CM

## 2019-09-25 DIAGNOSIS — I48.92 ATRIAL FLUTTER, UNSPECIFIED TYPE (HCC): ICD-10-CM

## 2019-09-25 DIAGNOSIS — Q23.1 BICUSPID AORTIC VALVE: ICD-10-CM

## 2019-09-25 DIAGNOSIS — I50.22 CHRONIC SYSTOLIC CONGESTIVE HEART FAILURE (HCC): Primary | ICD-10-CM

## 2019-09-25 DIAGNOSIS — I25.10 CORONARY ARTERY DISEASE INVOLVING NATIVE CORONARY ARTERY OF NATIVE HEART WITHOUT ANGINA PECTORIS: ICD-10-CM

## 2019-09-25 PROCEDURE — 3074F SYST BP LT 130 MM HG: CPT | Performed by: NURSE PRACTITIONER

## 2019-09-25 PROCEDURE — 3078F DIAST BP <80 MM HG: CPT | Performed by: NURSE PRACTITIONER

## 2019-09-25 PROCEDURE — 93000 ELECTROCARDIOGRAM COMPLETE: CPT | Performed by: NURSE PRACTITIONER

## 2019-09-25 PROCEDURE — 99214 OFFICE O/P EST MOD 30 MIN: CPT | Performed by: NURSE PRACTITIONER

## 2019-09-25 NOTE — PATIENT INSTRUCTIONS
[place] [facility] [place]    Take your medications as directed, and keep your follow up appointments  Adhere to a heart healthy lifestyle, maintaining a low sodium diet  Daily weight and record  If your weight increases 2-3 lbs in one day, or 5 lbs in 2 days, you are short of breath or have lower extremity swelling, please call the heart failure team at  [facility] at [phone]

## 2019-09-25 NOTE — LETTER
September 25, 2019     Rod Velasquez MD  [address]  [address] [address]    Patient: Leeanna Rodriguez   YOB: 1950   Date of Visit: 9/25/2019       Dear Dr Nam Quinonez: Thank you for referring Leeanna Rodriguez to me for evaluation  Below are my notes for this consultation  If you have questions, please do not hesitate to call me  I look forward to following your patient along with you  Sincerely,        JHONATAN Mcgowan        CC: MD Olesya Beebe MD Erman Blue, [address]  9/25/2019 11:53 AM  Sign at close encounter  Heart failure   Office Visit Note -     Leeanna Rodriguez   77 y o    male   MRN: [id number]  [address] [facility] [address] [address]  [phone]  [phone]    PCP: Rod Velasquez MD  Cardiologist  Dr Jordan Goel  HF: Dr Enio Kumar        Impression/plan  Chronic systolic heart failure, NYHA II ; ACC/AHA stage B  --wt 170-172 lb at home  In the office 174   His endocrinologist thinks his caloric weight is a little bit higher as he was on insulin  The has been changed to [address], and he may see some wt loss  --Baseline creatinine had been : 1 6-1 8,  Now closer to 2, followed by nephr  Last Cr 3 35  He takes torsemide now p r n   It is not clear when he took torsemide in relation to that lab  He is getting lab work next week for Nephrology  --Beta-Blocker:  Toprol 50 /d  --ACEi, ARB or ARNi:  None given CKD  --Aldosterone Receptor Blocker:None  --Diuretic:  Torsemide 10 mg PRN wt gain, per nephr, based on symptoms  --Educated regarding adherence to a 1 5g-- 2g sodium diet/ 1500 ml fluid restriction, daily weights and to call us if he gains 2-3 lbs in 1 day ir 5 lbs in 1 wk  NICM , EF 45-50%, improved from 25% (3/19)--likely tachycardia induced  Hypertension  /78  --on Toprol 50 mg daily, amlodipine 10 mg daily  --Continue home blood pressure monitoring   systolics between 539 and 140  No need for prn hydralazine  Congential bicuspid aortic valve with severe aortic insufficiency, S/P Bio AVR  Mitral regurgitation, moderate, S/p repair MV clip 3/19  Paroxysmal atrial fibrillation/flutter status post PATIENCE/DCCV 4/12/19-->  NSR   --on Toprol 50/d  --on [address] with warfarin, goal INR 2-3  --EKG today sinus rhythm  --he is on antiarrhythmic therapy with amiodarone 200 mg daily- On since the spring of 2019  The patient questions how long he will need to be on amiodarone, given the long-term side effects  Hyperlipidemia- on atorvastatin 20 mg/d  dLDL 28   3/11/19  CARY on CKD3, due to  FSGS (focal segmental glomerulosclerosis)Baseline Cr now 2 Followed by Dr Kg Hoyt nephr  Most recent labs, creatinine 3 35  I asked him to write down on a calendar when he takes his torsemide which is p r n  In relation to when he gets the labs  That might help us sort out his labs little bit better  T2DM, was on insulin  He is transitioning to [facility])  Cardiac testing  --TTE 6/19 outside [facility]  EF 45-50% Mod LVH ;NATACHA 1 08cm2  Max gradient 28; mean 14  MV ring noted with mild MR  RVSP 26 mm Hg  --PATIENCE 4/19  left ventricle is mildly dilated  Ejection fraction 25%  Severe diffuse hypokinesis  Wall thickness was normal   Right ventricle is normal   Left atrial appendage status post clipping  No thrombus  There was moderate mitral regurgitation after surgical repair  --cardiac catheterization 2/15/19  -- Left main: Normal   --LAD: The LAD was large and extended well beyond the apex  No atherosclerosis was seen  The diagonal branches were also normal   --Circumflex: The vessel was normal sized There was a 70% stenosis in mid vessel  --Ramus intermedius: There was a 70% stenosis in mid vessel  --RCA: The vessel was large sized and dominant, giving rise to the PDA   No atherosclerosis was seen      Summary recommendations  Low Na diet; Educated regarding heart failure  Has repeat labs scheduled next week for Nephrology  In the future, he may benefit from a reassessment of his LV function  We may need to reconsider his antiarrhythmic therapy given its long-term side effects  Follow up will be scheduled with Dr Fito Smiley 10/29/19          HPI:   Mr Syed Salinas is a 81 yo gentleman with a bicuspid aortic valve, with severe AI, moderate-severe MR and LV dysfunction ( EF 40%), now S/P bio AVR and MV repair with angioplasty ring and left atrial appendage clipping 3/19  He has has paroxysmal atrial fibrillation/atrial flutter, CKD3, HTN, hyperlipidemia and DM2  He underwent a PATIENCE (3/15/19) in anticipation of cardioversion, but was delayed given a small echodensity on the appendage concerning for thrombus versus clipped atrial tissue  He was started on amiodarone and anticoagulated   April 12th he underwent a PATIENCE guided cardioversion successfully restoring normal sinus rhythm  His ejection fraction was 25-30%  There was moderate mitral regurgitation noted and a normal bioprosthetic aortic valve  No VIRI thrombus  At the end of April he was seen in the office by his cardiologist   His cardiomyopathy was noted to be likely tachy induced  His beta-blocker was up titrated  In the interim, in April he was seen by Dr Dayton Flynn, Heart failure Service  He  recommended changing his metoprolol tartrate to metoprolol succinate  They advised follow-up with them in about 4-5 months, and his cardiologist about 2 months    6/25/19 presents for follow-up with his cardiologist for his BP  [place] in [place]  In [place], was routinely checking his BP  (not yet validated ), SBP was 169, and "just not feeling right"  Went to the [facility] who referred him to the [facility]  Reports an echo was done and was told it was ok  I have the report: ( will get scanned in)  EF 45-50%   Mod LVH ;NATACHA 1 08cm2   Max gradient 28; mean 14  MV ring noted with mild MR  RVSP 26 mm Hg      They added amlodipine 10 mg/d  And gave hydralazine 25 PRN for SBP > 160  He took hydralazine 2 x in the last 2 weeks  On torsemide PRN  Only taken a couple times over the last 1-2 months    6/13    Cr 2 3  K 4 7  Trop 0 03  TSH 2 48  ALT 48  AST 43    BP better  He also saw his nephrologist last week  He has been checking his blood pressure at home  The monitor has not been validated  I asked him to do so  Continue a low-sodium diet  Continue his current medications with the exception of metoprolol tartrate; I will change it to metoprolol succinate  Interval history  In June his creatinine was 2 5  Lab September 10 showed a creatinine rising at 3 35  He saw his nephrologist   Is not clear if this is due to intravascular volume depletion or not  He was not orthostatic in the office  His taking torsemide on a p r n  Basis lastly took about 2 weeks ago  Repeat blood work this week is pending    9/25/19  His weights are up a little bit  He is taking torsemide 10 mg p r n  For symptoms of lower extremity edema, shortness of breath  He overall has gained a little bit of what we feel to be caloric weight as his therapy for his diabetes had been on insulin which is known to cause some weight gain  This therapy has now to been adjusted to [address] starting today  This should help his weight as he tells me his endocrinologist discussed with him  Overall he feels well, no chest pain no shortness of breath, no lower extremity edema  He will be traveling to [facility] for his daughters destination wedding  I encouraged him to maintain his low-sodium diet  His EKG today shows sinus rhythm similar to prior  His EKG shows a right bundle branch block, left anterior fascicular block, QRS of 166 milliseconds  His ejection fraction is 45-50%, lastly checked in June 2019  The patient is curious how long he may need to be on amiodarone as he does realize there are significant long-term effects    In there future, he may benefit from re-evaluation of his ejection fraction and reconsideration of his antiarrhythmic therapy, now that he has had both valves replaced/repaired and he is in sinus rhythm  Assessment  Diagnoses and all orders for this visit:    Chronic systolic congestive heart failure (HCC)    Nonrheumatic aortic valve insufficiency    Non-rheumatic mitral regurgitation    Dilated cardiomyopathy (HCC)    Coronary artery disease involving native coronary artery of native heart without angina pectoris    Bicuspid aortic valve    Atrial flutter, unspecified type ([place] [address] )    S/P MVR (mitral valve repair)    Essential hypertension        Past Medical History:   Diagnosis Date    Diabetes mellitus ([email or web address] 75 )     Hyperlipidemia     Hypertension     Proteinuria     Stage 3 chronic kidney disease ([facility])     Vitamin D deficiency        Review of Systems   Constitution: Positive for weight gain  Negative for chills  Cardiovascular: Negative for chest pain, claudication, cyanosis, dyspnea on exertion, irregular heartbeat, leg swelling, near-syncope, orthopnea, palpitations, paroxysmal nocturnal dyspnea and syncope  Respiratory: Negative for cough and shortness of breath  Gastrointestinal: Negative for heartburn and nausea  Neurological: Negative for dizziness, focal weakness, headaches, light-headedness and weakness  All other systems reviewed and are negative  Allergies   Allergen Reactions    Ace Inhibitors Cough           Current Outpatient Medications:     amiodarone 200 mg tablet, Take 1 tablet (200 mg total) by mouth daily, Disp: 30 tablet, Rfl: 5    amLODIPine (NORVASC) 10 mg tablet, Take 1 tablet (10 mg total) by mouth daily, Disp: 30 tablet, Rfl: 11    ascorbic acid (VITAMIN C) 500 mg tablet, Take 500 mg by mouth daily, Disp: , Rfl:     aspirin (ECOTRIN LOW STRENGTH) 81 mg EC tablet, Take 1 tablet (81 mg total) by mouth daily, Disp: 30 tablet, Rfl: 2    atorvastatin (LIPITOR) 20 mg tablet, Take 1 tablet by mouth daily, Disp: , Rfl:   [START ON 9/24/2019] calcitriol (ROCALTROL) 0 25 mcg capsule, Take 1 capsule (0 25 mcg total) by mouth 4 (four) times a week, Disp: 19 capsule, Rfl: 4    Cholecalciferol (VITAMIN D3) 1000 units CAPS, Take 1 capsule by mouth daily, Disp: , Rfl:     COMBIGAN 0 2-0 5 %, instill 1 drop into both eyes twice a day, Disp: , Rfl: 0    hydrALAZINE (APRESOLINE) 10 mg tablet, Take 10 mg by mouth as needed As needed for BP >160/90, Disp: , Rfl:     insulin glargine (LANTUS SOLOSTAR) 100 units/mL injection pen, Inject 16 Units under the skin daily at bedtime for 30 days (Patient taking differently: Inject 15 Units under the skin daily at bedtime ), Disp: 5 pen, Rfl: 2    insulin isophane (HUMULIN N KWIKPEN) 100 units/mL injection pen, Inject 4 Units under the skin 3 (three) times a day before meals (Patient taking differently: Inject 5 Units under the skin 3 (three) times a day before meals ), Disp: 5 pen, Rfl: 2    Insulin Pen Needle 32G X 4 MM MISC, Inject Lantus qHS and Novolog TID with meals, as directed  Dx: DM Ell 9, Disp: 100 each, Rfl: 0    metoprolol succinate (TOPROL-XL) 50 mg 24 hr tablet, Take 1 tablet (50 mg total) by mouth daily, Disp: 180 tablet, Rfl: 3    torsemide (DEMADEX) 10 mg tablet, Take 10 mg by mouth as needed Take one tablet QOD , Disp: , Rfl:     TRAVATAN Z 0 004 % ophthalmic solution, , Disp: , Rfl: 0    warfarin (COUMADIN) 2 mg tablet, TAKE 1 TO 2 TABLETS DAILY BY MOUTH OR AS DIRECTED BY PHYSICIAN   (Patient taking differently: Take 3 mg by mouth daily 3 mg daily), Disp: 60 tablet, Rfl: 11    Social History     Socioeconomic History    Marital status: /Civil Union     Spouse name: Not on file    Number of children: Not on file    Years of education: Not on file    Highest education level: Not on file   Occupational History    Occupation: RETIRED   Social Needs    Financial resource strain: Not on file    Food insecurity:     Worry: Not on file     Inability: Not on file   Gayle Pabon Transportation needs:     Medical: Not on file     Non-medical: Not on file   Tobacco Use    Smoking status: Former Smoker     Last attempt to quit: 1968     Years since quittin 7    Smokeless tobacco: Never Used   Substance and Sexual Activity    Alcohol use: Yes     Frequency: Monthly or less     Drinks per session: 1 or 2     Binge frequency: Never     Comment: occasionally    Drug use: No    Sexual activity: Not on file   Lifestyle    Physical activity:     Days per week: Not on file     Minutes per session: Not on file    Stress: Not on file   Relationships    Social connections:     Talks on phone: Not on file     Gets together: Not on file     Attends Religion service: Not on file     Active member of club or organization: Not on file     Attends meetings of clubs or organizations: Not on file     Relationship status: Not on file    Intimate partner violence:     Fear of current or ex partner: Not on file     Emotionally abused: Not on file     Physically abused: Not on file     Forced sexual activity: Not on file   Other Topics Concern    Not on file   Social History Narrative    Not on file       Family History   Problem Relation Age of Onset    Stroke Mother     Hypertension Mother     Blindness Father     Hyperlipidemia Father     Gout Brother     Heart Valve Disease Brother     Cancer Sister     Anuerysm Neg Hx     Heart attack Neg Hx     Arrhythmia Neg Hx     Heart failure Neg Hx     Coronary artery disease Neg Hx     Sudden death Neg Hx         scd       Physical Exam   Constitutional: He is oriented to person, place, and time  No distress  HENT:   Head: Normocephalic and atraumatic  Eyes: Conjunctivae and EOM are normal    Neck: Normal range of motion  Neck supple  Cardiovascular: Normal rate, regular rhythm and intact distal pulses  Murmur heard     Harsh midsystolic murmur is present with a grade of 1/6 at the upper right sternal border radiating to the neck   Pulmonary/Chest: Effort normal and breath sounds normal    Abdominal: Soft  Bowel sounds are normal    Musculoskeletal: Normal range of motion  Neurological: He is alert and oriented to person, place, and time  Skin: Skin is warm and dry  Psychiatric: He has a normal mood and affect  Nursing note and vitals reviewed  Vitals: There were no vitals taken for this visit  Wt Readings from Last 3 Encounters:   19 75 8 kg (167 lb)   19 76 1 kg (167 lb 12 8 oz)   19 74 8 kg (165 lb)         Labs & Results:  Lab Results   Component Value Date    WBC 7 13 09/10/2019    HGB 13 8 09/10/2019    HCT 41 2 09/10/2019    MCV 93 09/10/2019     09/10/2019     No results found for: BNP  No components found for: CHEM    Results for orders placed during the hospital encounter of 19   Echo complete with contrast if indicated    Narrative [place] [address], [address] [place] [address]  [phone]    Transthoracic Echocardiogram  2D, M-mode, Doppler, and Color Doppler    Study date:  2019    Patient: Tavo Cruz  MR number: [id number]  Account number: [de-identified]  : 1950  Age: 80 years  Gender: Male  Status: Inpatient  Location: Bedside  Height: 68 in  Weight: 163 lb  BP: 155/ 70 mmHg    Indications: Heart Failure    Diagnoses: I50 9 - Heart failure, unspecified    Sonographer:  TIMMY Vaughan  Primary Physician:  Shaji Pineda  Referring Physician:  Lexie Schafer MD  Group:  [facility]  Interpreting Physician:  Lexie Schafer MD    SUMMARY    LEFT VENTRICLE:  The ventricle was mildly dilated  Systolic function was mildly to moderately reduced  Ejection fraction was estimated to be 40 %  There was mild diffuse hypokinesis  Wall thickness was mildly increased  RIGHT VENTRICLE:  The ventricle was mildly dilated  Systolic function was normal     LEFT ATRIUM:  The atrium was mildly dilated      MITRAL VALVE:  There was moderate to severe regurgitation  AORTIC VALVE:  The valve was possibly bicuspid  Leaflets exhibited normal thickness, normal cuspal separation, and significant diastolic prolapse  There was moderate to severe regurgitation  TRICUSPID VALVE:  There was mild regurgitation  Pulmonary artery systolic pressure was moderately to markedly increased  The findings suggest moderate to severe pulmonary hypertension  AORTA:  The root exhibited mild dilatation  IVC, HEPATIC VEINS:  The inferior vena cava was mildly dilated  Respirophasic changes were blunted (less than 50% variation)  PERICARDIUM:  There was a left pleural effusion  HISTORY: PRIOR HISTORY: Murmur, HTN, DM2, HLD, CKD3    PROCEDURE: The procedure was performed at the bedside  This was a routine study  The transthoracic approach was used  The study included complete 2D imaging, M-mode, complete spectral Doppler, and color Doppler  The heart rate was 95 bpm,  at the start of the study  Images were obtained from the parasternal, apical, subcostal, and suprasternal notch acoustic windows  Image quality was adequate  LEFT VENTRICLE: The ventricle was mildly dilated  Systolic function was mildly to moderately reduced  Ejection fraction was estimated to be 40 %  There was mild diffuse hypokinesis  Wall thickness was mildly increased  RIGHT VENTRICLE: The ventricle was mildly dilated  Systolic function was normal  Wall thickness was normal     LEFT ATRIUM: The atrium was mildly dilated  RIGHT ATRIUM: Size was normal     MITRAL VALVE: Valve structure was normal  There was normal leaflet separation  DOPPLER: The transmitral velocity was within the normal range  There was no evidence for stenosis  There was moderate to severe regurgitation  AORTIC VALVE: The valve was possibly bicuspid  Leaflets exhibited normal thickness, normal cuspal separation, and significant diastolic prolapse   DOPPLER: Transaortic velocity was within the normal range  There was no evidence for  stenosis  There was moderate to severe regurgitation  The regurgitant jet was eccentric  TRICUSPID VALVE: The valve structure was normal  There was normal leaflet separation  DOPPLER: The transtricuspid velocity was within the normal range  There was no evidence for stenosis  There was mild regurgitation  Pulmonary artery  systolic pressure was moderately to markedly increased  Estimated peak PA pressure was 60 mmHg  The findings suggest moderate to severe pulmonary hypertension  PULMONIC VALVE: Leaflets exhibited normal thickness, no calcification, and normal cuspal separation  DOPPLER: The transpulmonic velocity was within the normal range  There was no regurgitation  PERICARDIUM: There was no pericardial effusion  There was a left pleural effusion  The pericardium was normal in appearance  AORTA: The root exhibited mild dilatation  SYSTEMIC VEINS: IVC: The inferior vena cava was mildly dilated  Respirophasic changes were blunted (less than 50% variation)      MEASUREMENT TABLES    2D MEASUREMENTS  Aorta   (Reference normals)  Root diam   39 mm   (--)    SYSTEM MEASUREMENT TABLES    2D  %FS: 23 42 %  Ao Diam: 3 91 cm  EDV(Teich): 179 53 ml  EF Biplane: 40 37 %  EF(Teich): 46 07 %  ESV(Teich): 96 82 ml  IVSd: 1 02 cm  LA Area: 12 24 cm2  LA Diam: 4 3 cm  LVEDV MOD A2C: 129 37 ml  LVEDV MOD A4C: 151 37 ml  LVEDV MOD BP: 140 9 ml  LVEF MOD A2C: 42 79 %  LVEF MOD A4C: 40 91 %  LVESV MOD A2C: 74 02 ml  LVESV MOD A4C: 89 45 ml  LVESV MOD BP: 84 02 ml  LVIDd: 5 99 cm  LVIDs: 4 59 cm  LVLd A2C: 8 45 cm  LVLd A4C: 8 58 cm  LVLs A2C: 7 63 cm  LVLs A4C: 8 25 cm  LVPWd: 1 21 cm  RA Area: 15 27 cm2  RVIDd: 4 39 cm  SV MOD A2C: 55 35 ml  SV MOD A4C: 61 92 ml  SV(Teich): 82 71 ml    CW  TR MaxP 66 mmHg  TR Vmax: 3 52 m/s    MM  TAPSE: 1 8 cm    [facility] Accredited Echocardiography Laboratory    Prepared and electronically signed by    embraase, MD  Signed 29-Jan-2019 16:15:06

## 2019-10-02 ENCOUNTER — APPOINTMENT (OUTPATIENT)
Dept: LAB | Facility: CLINIC | Age: 69
End: 2019-10-02
Payer: COMMERCIAL

## 2019-10-02 ENCOUNTER — ANTICOAG VISIT (OUTPATIENT)
Dept: CARDIOLOGY CLINIC | Facility: CLINIC | Age: 69
End: 2019-10-02

## 2019-10-15 ENCOUNTER — ANTICOAG VISIT (OUTPATIENT)
Dept: CARDIOLOGY CLINIC | Facility: CLINIC | Age: 69
End: 2019-10-15

## 2019-10-15 ENCOUNTER — APPOINTMENT (OUTPATIENT)
Dept: LAB | Facility: CLINIC | Age: 69
End: 2019-10-15
Payer: COMMERCIAL

## 2019-10-15 ENCOUNTER — TRANSCRIBE ORDERS (OUTPATIENT)
Dept: LAB | Facility: CLINIC | Age: 69
End: 2019-10-15

## 2019-11-12 ENCOUNTER — ANTICOAG VISIT (OUTPATIENT)
Dept: CARDIOLOGY CLINIC | Facility: CLINIC | Age: 69
End: 2019-11-12

## 2019-11-12 ENCOUNTER — APPOINTMENT (OUTPATIENT)
Dept: LAB | Facility: CLINIC | Age: 69
End: 2019-11-12
Payer: COMMERCIAL

## 2019-11-27 ENCOUNTER — OFFICE VISIT (OUTPATIENT)
Dept: CARDIOLOGY CLINIC | Facility: CLINIC | Age: 69
End: 2019-11-27
Payer: COMMERCIAL

## 2019-11-27 VITALS
OXYGEN SATURATION: 97 % | DIASTOLIC BLOOD PRESSURE: 88 MMHG | HEIGHT: 66 IN | WEIGHT: 177 LBS | HEART RATE: 90 BPM | SYSTOLIC BLOOD PRESSURE: 128 MMHG | BODY MASS INDEX: 28.45 KG/M2

## 2019-11-27 DIAGNOSIS — I50.22 CHRONIC SYSTOLIC HEART FAILURE (HCC): Primary | ICD-10-CM

## 2019-11-27 DIAGNOSIS — I10 ESSENTIAL HYPERTENSION: Chronic | ICD-10-CM

## 2019-11-27 PROCEDURE — 99214 OFFICE O/P EST MOD 30 MIN: CPT | Performed by: INTERNAL MEDICINE

## 2019-11-27 RX ORDER — LINAGLIPTIN 5 MG/1
2.5 TABLET, FILM COATED ORAL DAILY
Refills: 0 | COMMUNITY
Start: 2019-10-08

## 2019-11-27 RX ORDER — ISOSORBIDE DINITRATE 10 MG/1
10 TABLET ORAL 3 TIMES DAILY
Qty: 270 TABLET | Refills: 3 | Status: SHIPPED | OUTPATIENT
Start: 2019-11-27 | End: 2019-12-16 | Stop reason: SDUPTHER

## 2019-11-27 RX ORDER — AMLODIPINE BESYLATE 10 MG/1
5 TABLET ORAL DAILY
Qty: 30 TABLET | Refills: 11 | Status: SHIPPED | COMMUNITY
Start: 2019-11-27 | End: 2019-12-16

## 2019-11-27 RX ORDER — HYDRALAZINE HYDROCHLORIDE 25 MG/1
25 TABLET, FILM COATED ORAL 3 TIMES DAILY
Qty: 270 TABLET | Refills: 3 | Status: SHIPPED | OUTPATIENT
Start: 2019-11-27 | End: 2020-12-22 | Stop reason: SDUPTHER

## 2019-11-27 NOTE — PATIENT INSTRUCTIONS
Decrease amlodipine to 5 mg per day (1/2 tablet)  Start isosorbide 10 mg three times daily  Start hydralazine 25 mg three times daily

## 2019-11-27 NOTE — LETTER
November 27, 2019     Luis Felipe Linn MD  [address] [facility]    Patient: Gustavo Be   YOB: 1950   Date of Visit: 11/27/2019       Dear Dr Miguel Angel Gusman: Thank you for referring Gustavo Be to me for evaluation  Below are my notes for this consultation  If you have questions, please do not hesitate to call me  I look forward to following your patient along with you           Sincerely,        Kerri Ko MD        CC: Marce Dudley MD  [facility], [address] [place], [place] [address]Kadie PA-C Rhona Paradise, MD  11/27/2019  3:55 PM  Sign at close encounter  Advanced Heart Failure/Pulmonary Hypertension Outpatient Progress Note - Gustavo Be 77 y o  male MRN: [id number]    @ Encounter: [id number]  Assessment/Plan:    Patient Active Problem List    Diagnosis Date Noted    Encounter for postoperative care 04/10/2019    Atrial flutter ([facility] ) 03/28/2019    CARY (acute kidney injury) ([facility] ) 03/08/2019    S/P AVR 03/07/2019    S/P MVR (mitral valve repair) 03/07/2019    Dilated cardiomyopathy ([facility] ) 03/07/2019    Diabetes mellitus type 2 in nonobese ([facility] ) 03/07/2019    Bicuspid aortic valve 02/27/2019    Coronary artery disease involving native coronary artery of native heart without angina pectoris 02/27/2019    Nonrheumatic aortic valve insufficiency 02/06/2019    Non-rheumatic mitral regurgitation 02/06/2019    Chronic systolic congestive heart failure ([facility] ) 01/29/2019    Essential hypertension 05/11/2018    FSGS (focal segmental glomerulosclerosis) 05/10/2018    Stage 3 chronic kidney disease ([facility] ) 03/01/2018    Persistent proteinuria 03/01/2018    Diabetic nephropathy associated with type 2 diabetes mellitus ([facility] ) 03/01/2018    Vitamin D deficiency 03/01/2018    Secondary hyperparathyroidism ([facility] ) 03/01/2018     Plan:  --Continue amiodarone  --Continue metoprolol succinate 50 mg PO daily  --Continue coumadin  --Will transition to iso/hydral for HF as amlodipine with no clear benefit in this situation  ---Decrease amlodipine to 5 mg PO daily  ---Start isordil/hydralazine 10/20 mg q8hrs    Assessment:  # Paroxysmal AFib s/p PATIENCE/DCCV on 4/12/19  --Continue amiodarone  --Continue metoprolol succinate 50 mg PO daily  --Continue coumadin    # Chronic partially recovered HFrEF, NICM, LVEF now 45-50%, NYHA II, Stage C: Most likely valvular + AFib  LVEF as low as 25% in 4/2019, then post DCCV in [place] had TTE 6/2019 (report in Epic), LVEF improved to 45-50%  --LHC 2/15/19: LCx 70%  Mid RI 70%  --TTE 6/2019 (per report): LVEF 45-50%  Mod cLVH  Mildly reduced RVSF  BioAVR, mean grad 14 mHg  Mild MR, Mild TR  Therapeutic:  --2g sodium diet  --2000 ml fluid restriction    Neurohormonal Blockade:  --Beta-Blocker: Continue BB  --ACEi, ARB or ARNi: Precluded by renal function  --Aldosterone Receptor Blocker: Precluded by renal function  --Diuretic: Not on diuretics    Sudden Cardiac Death Risk Reduction:  --ICD: LVEF>35%    # CAD  # BioAVR/AV insufficiency/MR: s/p AVR, MV repair with annuloplasty ring, VIRI ligation on 3/7/19  # CKD III    RTC in 6 months; F/U with HF AP in 2 weeks for iso/hydral uptitration and amlodipine decrease  Dr Marylou Malik in 3 months    HPI: Very pleasant 77 y o  w/ "PMH of bicuspid aortic valve, S/P AVR with 23 mm Nettles Inspiris Resilia bovine tissues valve and S/P MVR with 30 mm Medtronic CG Future mitral annuloplasty ring ,on 3/6/19, S/P VIRI ligation with 35 mm AtriClip device, PAF, NICM with LVEF of 40%,HTN, HLD, Type II DM, CKD, CAD per preoperative cath with 70% stenosis of the mid Cx and ramus, presents for heart failure consultation  Briefly, patient was hospitalized from 3/7/19-3/15/19 for the above noted procedure  The post operative period was complicated by low output heart failure requiring milrinone infusion and vasopressors, which were successfully weaned off   He also experienced intermittent tachy arrhythmia including bouts of rapid atrial fib and atrial flutter, which appeared to improve with Metoprolol and Amiodarone  He was tentatively scheduled for cardioversion prior to discharge however this could not be accomplished due to presence of possible thrombus seen on PATIENCE  Patient was therefore continued on oral AC with a plan for outpatient cardioversion in one to two months  He was discharged home on a regimen of Metoprolol tartrate, Torsemide, Coumadin, ASA, and a statin       03/19/2019 - He presents with reports of fatigue which has actually improved a bit from yesterday with reduction of his metoprolol dose  In terms of his post operative recovery, he feels he is doing well  Has minimal incisional pain  Appears euvolemic on exam  Warm, well perfused on exam  Blood pressure stable  Has plans to attend cardiac rehab in the future and has his initial consultation later this week  Will see nephrology and CT surgery for follow up within the next week or two       04/30/2019 - Had successful PATIENCE/DCCV on 04/12/2019  Metoprolol tartrate was increased to 50 mg BID; no new symptoms with medication changes  Currently attending cardiac rehab, going well  Feels well overall  Complains of occasional dizziness with changes in position or when his blood sugar is low  Appears euvolemic on exam  Has been seen by CT surgery and nephrology since surgery  Plans to see endocrinologist next month "    He was seen by Elise Ferreira 9/25/19  He also follows with a cardiologist for his BP  He had a TTE while in [place] (see above)  Amlodipine was added  His EKG shows a right bundle branch block, left anterior fascicular block, QRS of 166 milliseconds  11/27/19: Has completed cardiac rehab  Feels well  Denies GARCIA       Past Medical History:   Diagnosis Date    Diabetes mellitus ([facility] )     Hyperlipidemia     Hypertension     Proteinuria     Stage 3 chronic kidney disease (HCC)     Vitamin D deficiency      Review of Systems - 15 point ROS was done and is negative, except as noted above  Allergies   Allergen Reactions    Ace Inhibitors Cough       Current Outpatient Medications:     amiodarone 200 mg tablet, take 1 tablet by mouth once daily, Disp: 30 tablet, Rfl: 5    amLODIPine (NORVASC) 10 mg tablet, Take 1 tablet (10 mg total) by mouth daily, Disp: 30 tablet, Rfl: 11    ascorbic acid (VITAMIN C) 500 mg tablet, Take 500 mg by mouth daily, Disp: , Rfl:     aspirin (ECOTRIN LOW STRENGTH) 81 mg EC tablet, Take 1 tablet (81 mg total) by mouth daily, Disp: 30 tablet, Rfl: 2    atorvastatin (LIPITOR) 20 mg tablet, Take 1 tablet by mouth daily, Disp: , Rfl:     calcitriol (ROCALTROL) 0 25 mcg capsule, Take 1 capsule (0 25 mcg total) by mouth 4 (four) times a week, Disp: 19 capsule, Rfl: 4    Cholecalciferol (VITAMIN D3) 1000 units CAPS, Take 1 capsule by mouth daily, Disp: , Rfl:     COMBIGAN 0 2-0 5 %, instill 1 drop into both eyes twice a day, Disp: , Rfl: 0    hydrALAZINE (APRESOLINE) 10 mg tablet, Take 10 mg by mouth as needed As needed for BP >160/90, Disp: , Rfl:     metoprolol succinate (TOPROL-XL) 50 mg 24 hr tablet, Take 1 tablet (50 mg total) by mouth daily, Disp: 180 tablet, Rfl: 3    torsemide (DEMADEX) 10 mg tablet, Take 10 mg by mouth as needed Take one tablet QOD , Disp: , Rfl:     TRADJENTA 5 MG TABS, daily , Disp: , Rfl: 0    TRAVATAN Z 0 004 % ophthalmic solution, , Disp: , Rfl: 0    warfarin (COUMADIN) 2 mg tablet, TAKE 1 TO 2 TABLETS DAILY BY MOUTH OR AS DIRECTED BY PHYSICIAN   (Patient taking differently: Take 3 mg by mouth daily 3 mg daily), Disp: 60 tablet, Rfl: 11    insulin glargine (LANTUS SOLOSTAR) 100 units/mL injection pen, Inject 16 Units under the skin daily at bedtime for 30 days (Patient not taking: Reported on 9/25/2019), Disp: 5 pen, Rfl: 2    insulin isophane (HUMULIN N KWIKPEN) 100 units/mL injection pen, Inject 4 Units under the skin 3 (three) times a day before meals (Patient not taking: Reported on 2019), Disp: 5 pen, Rfl: 2    Insulin Pen Needle 32G X 4 MM MISC, Inject Lantus qHS and Novolog TID with meals, as directed   Dx: DM Ell 9 (Patient not taking: Reported on 2019), Disp: 100 each, Rfl: 0    Social History     Socioeconomic History    Marital status: /Civil Union     Spouse name: Not on file    Number of children: Not on file    Years of education: Not on file    Highest education level: Not on file   Occupational History    Occupation: RETIRED   Social Needs    Financial resource strain: Not on file    Food insecurity:     Worry: Not on file     Inability: Not on file    Transportation needs:     Medical: Not on file     Non-medical: Not on file   Tobacco Use    Smoking status: Former Smoker     Last attempt to quit: 1968     Years since quittin 9    Smokeless tobacco: Never Used   Substance and Sexual Activity    Alcohol use: Yes     Frequency: Monthly or less     Drinks per session: 1 or 2     Binge frequency: Never     Comment: occasionally    Drug use: No    Sexual activity: Not on file   Lifestyle    Physical activity:     Days per week: Not on file     Minutes per session: Not on file    Stress: Not on file   Relationships    Social connections:     Talks on phone: Not on file     Gets together: Not on file     Attends Holiness service: Not on file     Active member of club or organization: Not on file     Attends meetings of clubs or organizations: Not on file     Relationship status: Not on file    Intimate partner violence:     Fear of current or ex partner: Not on file     Emotionally abused: Not on file     Physically abused: Not on file     Forced sexual activity: Not on file   Other Topics Concern    Not on file   Social History Narrative    Not on file       Family History   Problem Relation Age of Onset    Stroke Mother     Hypertension Mother     Blindness Father     Hyperlipidemia Father     Gout Brother     Heart Valve Disease Brother     Cancer Sister     Anuerysm Neg Hx     Heart attack Neg Hx     Arrhythmia Neg Hx     Heart failure Neg Hx     Coronary artery disease Neg Hx     Sudden death Neg Hx         scd     Physical Exam:    Vitals: Blood pressure 128/88, pulse 90, height 5' 6" (1 676 m), weight 80 3 kg (177 lb), SpO2 97 %  , Body mass index is 28 57 kg/m² ,   Wt Readings from Last 3 Encounters:   11/27/19 80 3 kg (177 lb)   09/25/19 79 1 kg (174 lb 6 4 oz)   09/23/19 75 8 kg (167 lb)     Vitals:    11/27/19 1524   BP: 128/88   BP Location: Right arm   Patient Position: Sitting   Cuff Size: Large   Pulse: 90   SpO2: 97%   Weight: 80 3 kg (177 lb)   Height: 5' 6" (1 676 m)       GEN: Myra Mustafa appears well, alert and oriented x 3, pleasant and cooperative   HEENT: pupils equal, round, and reactive to light; extraocular muscles intact  NECK: supple, no carotid bruits   HEART: regular rhythm, normal S1 and S2, no murmurs, clicks, gallops or rubs, JVP is    LUNGS: clear to auscultation bilaterally; no wheezes, rales, or rhonchi   ABDOMEN: normal bowel sounds, soft, no tenderness, no distention  EXTREMITIES: peripheral pulses normal; no clubbing, cyanosis, or edema  NEURO: no focal findings   SKIN: normal without suspicious lesions on exposed skin    Labs & Results:  Lab Results   Component Value Date    WBC 7 13 09/10/2019    HGB 13 8 09/10/2019    HCT 41 2 09/10/2019    MCV 93 09/10/2019     09/10/2019       Chemistry        Component Value Date/Time    K 5 6 (H) 09/10/2019 0654     09/10/2019 0654    CO2 24 09/10/2019 0654    CO2 24 03/07/2019 1213    BUN 54 (H) 09/10/2019 0654    CREATININE 3 35 (H) 09/10/2019 0654        Component Value Date/Time    CALCIUM 8 7 09/10/2019 0654    ALKPHOS 85 05/23/2019 0722    AST 26 09/10/2019 0654    ALT 28 05/23/2019 0722        Lab Results   Component Value Date    INR 2 17 (H) 11/12/2019    INR 2 52 (H) 10/15/2019    INR 1 48 (H) 10/02/2019    PROTIME 23 3 (H) 11/12/2019    PROTIME 26 2 (H) 10/15/2019    PROTIME 17 2 (H) 10/02/2019     EKG personally reviewed by Nuzhat Olivas MD      Counseling / Coordination of Care  Total floor / unit time spent today 40 minutes  Greater than 50% of total time was spent with the patient and / or family counseling and / or coordination of care  A description of the counseling / coordination of care: 20 min  Thank you for the opportunity to participate in the care of this patient      Nuzhat Olivas MD, PhD   Simone Garcia

## 2019-11-27 NOTE — PROGRESS NOTES
Advanced Heart Failure/Pulmonary Hypertension Outpatient Progress Note - Shamika Long 77 y o  male MRN: [id number]    @ Encounter: [id number]  Assessment/Plan:    Patient Active Problem List    Diagnosis Date Noted    Encounter for postoperative care 04/10/2019    Atrial flutter ([place] [id number] ) 03/28/2019    CARY (acute kidney injury) ([place] [address] ) 03/08/2019    S/P AVR 03/07/2019    S/P MVR (mitral valve repair) 03/07/2019    Dilated cardiomyopathy ([place] [address] ) 03/07/2019    Diabetes mellitus type 2 in nonobese ([place] [address] ) 03/07/2019    Bicuspid aortic valve 02/27/2019    Coronary artery disease involving native coronary artery of native heart without angina pectoris 02/27/2019    Nonrheumatic aortic valve insufficiency 02/06/2019    Non-rheumatic mitral regurgitation 02/06/2019    Chronic systolic congestive heart failure ([place] [address] ) 01/29/2019    Essential hypertension 05/11/2018    FSGS (focal segmental glomerulosclerosis) 05/10/2018    Stage 3 chronic kidney disease ([place] [address] ) 03/01/2018    Persistent proteinuria 03/01/2018    Diabetic nephropathy associated with type 2 diabetes mellitus ([place] [id number] ) 03/01/2018    Vitamin D deficiency 03/01/2018    Secondary hyperparathyroidism ([place] [address] ) 03/01/2018     Plan:  --Continue amiodarone  --Continue metoprolol succinate 50 mg PO daily  --Continue coumadin  --Will transition to iso/hydral for HF as amlodipine with no clear benefit in this situation  ---Decrease amlodipine to 5 mg PO daily  ---Start isordil/hydralazine 10/20 mg q8hrs    Assessment:  # Paroxysmal AFib s/p PATIENCE/DCCV on 4/12/19  --Continue amiodarone  --Continue metoprolol succinate 50 mg PO daily  --Continue coumadin    # Chronic partially recovered HFrEF, NICM, LVEF now 45-50%, NYHA II, Stage C: Most likely valvular + AFib  LVEF as low as 25% in 4/2019, then post DCCV in [place] had TTE 6/2019 (report in Epic), LVEF improved to 45-50%  --LHC 2/15/19: LCx 70%  Mid RI 70%  --TTE 6/2019 (per report): LVEF 45-50%  Mod cLVH   Mildly reduced RVSF  BioAVR, mean grad 14 mHg  Mild MR, Mild TR  Therapeutic:  --2g sodium diet  --2000 ml fluid restriction    Neurohormonal Blockade:  --Beta-Blocker: Continue BB  --ACEi, ARB or ARNi: Precluded by renal function  --Aldosterone Receptor Blocker: Precluded by renal function  --Diuretic: Not on diuretics    Sudden Cardiac Death Risk Reduction:  --ICD: LVEF>35%    # CAD  # BioAVR/AV insufficiency/MR: s/p AVR, MV repair with annuloplasty ring, VIRI ligation on 3/7/19  # CKD III    RTC in 6 months; F/U with HF AP in 2 weeks for iso/hydral uptitration and amlodipine decrease  Dr Alejandra Love in 3 months    HPI: Very pleasant 77 y o  w/ "PMH of bicuspid aortic valve, S/P AVR with 23 mm Nettles Inspiris Resilia bovine tissues valve and S/P MVR with 30 mm Medtronic CG Future mitral annuloplasty ring ,on 3/6/19, S/P VIRI ligation with 35 mm AtriClip device, PAF, NICM with LVEF of 40%,HTN, HLD, Type II DM, CKD, CAD per preoperative cath with 70% stenosis of the mid Cx and ramus, presents for heart failure consultation  Briefly, patient was hospitalized from 3/7/19-3/15/19 for the above noted procedure  The post operative period was complicated by low output heart failure requiring milrinone infusion and vasopressors, which were successfully weaned off  He also experienced intermittent tachy arrhythmia including bouts of rapid atrial fib and atrial flutter, which appeared to improve with Metoprolol and Amiodarone  He was tentatively scheduled for cardioversion prior to discharge however this could not be accomplished due to presence of possible thrombus seen on PATIENCE  Patient was therefore continued on oral AC with a plan for outpatient cardioversion in one to two months  He was discharged home on a regimen of Metoprolol tartrate, Torsemide, Coumadin, ASA, and a statin       03/19/2019 - He presents with reports of fatigue which has actually improved a bit from yesterday with reduction of his metoprolol dose   In terms of his post operative recovery, he feels he is doing well  Has minimal incisional pain  Appears euvolemic on exam  Warm, well perfused on exam  Blood pressure stable  Has plans to attend cardiac rehab in the future and has his initial consultation later this week  Will see nephrology and CT surgery for follow up within the next week or two       04/30/2019 - Had successful PATIENCE/DCCV on 04/12/2019  Metoprolol tartrate was increased to 50 mg BID; no new symptoms with medication changes  Currently attending cardiac rehab, going well  Feels well overall  Complains of occasional dizziness with changes in position or when his blood sugar is low  Appears euvolemic on exam  Has been seen by CT surgery and nephrology since surgery  Plans to see endocrinologist next month "    He was seen by Viviane Hernandez 9/25/19  He also follows with a cardiologist for his BP  He had a TTE while in [place] (see above)  Amlodipine was added  His EKG shows a right bundle branch block, left anterior fascicular block, QRS of 166 milliseconds  11/27/19: Has completed cardiac rehab  Feels well  Denies GARCIA  Past Medical History:   Diagnosis Date    Diabetes mellitus ([facility] [place] [address] )     Hyperlipidemia     Hypertension     Proteinuria     Stage 3 chronic kidney disease (HCC)     Vitamin D deficiency      Review of Systems - 12 point ROS was done and is negative, except as noted above       Allergies   Allergen Reactions    Ace Inhibitors Cough       Current Outpatient Medications:     amiodarone 200 mg tablet, take 1 tablet by mouth once daily, Disp: 30 tablet, Rfl: 5    amLODIPine (NORVASC) 10 mg tablet, Take 1 tablet (10 mg total) by mouth daily, Disp: 30 tablet, Rfl: 11    ascorbic acid (VITAMIN C) 500 mg tablet, Take 500 mg by mouth daily, Disp: , Rfl:     aspirin (ECOTRIN LOW STRENGTH) 81 mg EC tablet, Take 1 tablet (81 mg total) by mouth daily, Disp: 30 tablet, Rfl: 2    atorvastatin (LIPITOR) 20 mg tablet, Take 1 tablet by mouth daily, Disp: , Rfl:     calcitriol (ROCALTROL) 0 25 mcg capsule, Take 1 capsule (0 25 mcg total) by mouth 4 (four) times a week, Disp: 19 capsule, Rfl: 4    Cholecalciferol (VITAMIN D3) 1000 units CAPS, Take 1 capsule by mouth daily, Disp: , Rfl:     COMBIGAN 0 2-0 5 %, instill 1 drop into both eyes twice a day, Disp: , Rfl: 0    hydrALAZINE (APRESOLINE) 10 mg tablet, Take 10 mg by mouth as needed As needed for BP >160/90, Disp: , Rfl:     metoprolol succinate (TOPROL-XL) 50 mg 24 hr tablet, Take 1 tablet (50 mg total) by mouth daily, Disp: 180 tablet, Rfl: 3    torsemide (DEMADEX) 10 mg tablet, Take 10 mg by mouth as needed Take one tablet QOD , Disp: , Rfl:     TRADJENTA 5 MG TABS, daily , Disp: , Rfl: 0    TRAVATAN Z 0 004 % ophthalmic solution, , Disp: , Rfl: 0    warfarin (COUMADIN) 2 mg tablet, TAKE 1 TO 2 TABLETS DAILY BY MOUTH OR AS DIRECTED BY PHYSICIAN  (Patient taking differently: Take 3 mg by mouth daily 3 mg daily), Disp: 60 tablet, Rfl: 11    insulin glargine (LANTUS SOLOSTAR) 100 units/mL injection pen, Inject 16 Units under the skin daily at bedtime for 30 days (Patient not taking: Reported on 9/25/2019), Disp: 5 pen, Rfl: 2    insulin isophane (HUMULIN N KWIKPEN) 100 units/mL injection pen, Inject 4 Units under the skin 3 (three) times a day before meals (Patient not taking: Reported on 9/25/2019), Disp: 5 pen, Rfl: 2    Insulin Pen Needle 32G X 4 MM MISC, Inject Lantus qHS and Novolog TID with meals, as directed   Dx: DM Ell 9 (Patient not taking: Reported on 11/27/2019), Disp: 100 each, Rfl: 0    Social History     Socioeconomic History    Marital status: /Civil Union     Spouse name: Not on file    Number of children: Not on file    Years of education: Not on file    Highest education level: Not on file   Occupational History    Occupation: RETIRED   Social Needs    Financial resource strain: Not on file    Food insecurity:     Worry: Not on file     Inability: Not on file   [place] [employer] needs:     Medical: Not on file     Non-medical: Not on file   Tobacco Use    Smoking status: Former Smoker     Last attempt to quit: 1968     Years since quittin 9    Smokeless tobacco: Never Used   Substance and Sexual Activity    Alcohol use: Yes     Frequency: Monthly or less     Drinks per session: 1 or 2     Binge frequency: Never     Comment: occasionally    Drug use: No    Sexual activity: Not on file   Lifestyle    Physical activity:     Days per week: Not on file     Minutes per session: Not on file    Stress: Not on file   Relationships    Social connections:     Talks on phone: Not on file     Gets together: Not on file     Attends [employer] service: Not on file     Active member of club or organization: Not on file     Attends meetings of clubs or organizations: Not on file     Relationship status: Not on file    Intimate partner violence:     Fear of current or ex partner: Not on file     Emotionally abused: Not on file     Physically abused: Not on file     Forced sexual activity: Not on file   Other Topics Concern    Not on file   Social History Narrative    Not on file       Family History   Problem Relation Age of Onset    Stroke Mother     Hypertension Mother     Blindness Father     Hyperlipidemia Father     Gout Brother     Heart Valve Disease Brother     Cancer Sister     Anuerysm Neg Hx     Heart attack Neg Hx     Arrhythmia Neg Hx     Heart failure Neg Hx     Coronary artery disease Neg Hx     Sudden death Neg Hx         scd     Physical Exam:    Vitals: Blood pressure 128/88, pulse 90, height 5' 6" (1 676 m), weight 80 3 kg (177 lb), SpO2 97 %  , Body mass index is 28 57 kg/m² ,   Wt Readings from Last 3 Encounters:   19 80 3 kg (177 lb)   19 79 1 kg (174 lb 6 4 oz)   19 75 8 kg (167 lb)     Vitals:    19 1524   BP: 128/88   BP Location: Right arm   Patient Position: Sitting   Cuff Size: Large   Pulse: 90   SpO2: 97% Weight: 80 3 kg (177 lb)   Height: 5' 6" (1 676 m)       GEN: Moy Wright appears well, alert and oriented x 3, pleasant and cooperative   HEENT: pupils equal, round, and reactive to light; extraocular muscles intact  NECK: supple, no carotid bruits   HEART: regular rhythm, normal S1 and S2, no murmurs, clicks, gallops or rubs, JVP is    LUNGS: clear to auscultation bilaterally; no wheezes, rales, or rhonchi   ABDOMEN: normal bowel sounds, soft, no tenderness, no distention  EXTREMITIES: peripheral pulses normal; no clubbing, cyanosis, or edema  NEURO: no focal findings   SKIN: normal without suspicious lesions on exposed skin    Labs & Results:  Lab Results   Component Value Date    WBC 7 13 09/10/2019    HGB 13 8 09/10/2019    HCT 41 2 09/10/2019    MCV 93 09/10/2019     09/10/2019       Chemistry        Component Value Date/Time    K 5 6 (H) 09/10/2019 0654     09/10/2019 0654    CO2 24 09/10/2019 0654    CO2 24 03/07/2019 1213    BUN 54 (H) 09/10/2019 0654    CREATININE 3 35 (H) 09/10/2019 0654        Component Value Date/Time    CALCIUM 8 7 09/10/2019 0654    ALKPHOS 85 05/23/2019 0722    AST 26 09/10/2019 0654    ALT 28 05/23/2019 0722        Lab Results   Component Value Date    INR 2 17 (H) 11/12/2019    INR 2 52 (H) 10/15/2019    INR 1 48 (H) 10/02/2019    PROTIME 23 3 (H) 11/12/2019    PROTIME 26 2 (H) 10/15/2019    PROTIME 17 2 (H) 10/02/2019     EKG personally reviewed by Alvena Osler, MD      Counseling / Coordination of Care  Total floor / unit time spent today 40 minutes  Greater than 50% of total time was spent with the patient and / or family counseling and / or coordination of care  A description of the counseling / coordination of care: 20 min  Thank you for the opportunity to participate in the care of this patient      Alvena Osler MD, PhD   Nafisa Wise

## 2019-12-09 ENCOUNTER — ANTICOAG VISIT (OUTPATIENT)
Dept: CARDIOLOGY CLINIC | Facility: CLINIC | Age: 69
End: 2019-12-09

## 2019-12-09 ENCOUNTER — APPOINTMENT (OUTPATIENT)
Dept: LAB | Facility: CLINIC | Age: 69
End: 2019-12-09
Payer: COMMERCIAL

## 2019-12-09 DIAGNOSIS — N18.30 STAGE 3 CHRONIC KIDNEY DISEASE (HCC): Chronic | ICD-10-CM

## 2019-12-09 DIAGNOSIS — R80.1 PERSISTENT PROTEINURIA: ICD-10-CM

## 2019-12-09 DIAGNOSIS — N25.81 SECONDARY HYPERPARATHYROIDISM (HCC): Chronic | ICD-10-CM

## 2019-12-09 LAB
ALBUMIN SERPL BCP-MCNC: 3.2 G/DL (ref 3.5–5)
ALP SERPL-CCNC: 80 U/L (ref 46–116)
ALT SERPL W P-5'-P-CCNC: 34 U/L (ref 12–78)
ANION GAP SERPL CALCULATED.3IONS-SCNC: 8 MMOL/L (ref 4–13)
AST SERPL W P-5'-P-CCNC: 34 U/L (ref 5–45)
BACTERIA UR QL AUTO: ABNORMAL /HPF
BILIRUB SERPL-MCNC: 0.24 MG/DL (ref 0.2–1)
BILIRUB UR QL STRIP: NEGATIVE
BUN SERPL-MCNC: 39 MG/DL (ref 5–25)
CALCIUM SERPL-MCNC: 8.6 MG/DL (ref 8.3–10.1)
CHLORIDE SERPL-SCNC: 106 MMOL/L (ref 100–108)
CLARITY UR: CLEAR
CO2 SERPL-SCNC: 26 MMOL/L (ref 21–32)
COLOR UR: YELLOW
CREAT SERPL-MCNC: 2.94 MG/DL (ref 0.6–1.3)
CREAT UR-MCNC: 63.7 MG/DL
ERYTHROCYTE [DISTWIDTH] IN BLOOD BY AUTOMATED COUNT: 13 % (ref 11.6–15.1)
GFR SERPL CREATININE-BSD FRML MDRD: 24 ML/MIN/1.73SQ M
GLUCOSE P FAST SERPL-MCNC: 93 MG/DL (ref 65–99)
GLUCOSE UR STRIP-MCNC: NEGATIVE MG/DL
HCT VFR BLD AUTO: 41.9 % (ref 36.5–49.3)
HGB BLD-MCNC: 14.2 G/DL (ref 12–17)
HGB UR QL STRIP.AUTO: NEGATIVE
KETONES UR STRIP-MCNC: NEGATIVE MG/DL
LEUKOCYTE ESTERASE UR QL STRIP: NEGATIVE
MCH RBC QN AUTO: 31.3 PG (ref 26.8–34.3)
MCHC RBC AUTO-ENTMCNC: 33.9 G/DL (ref 31.4–37.4)
MCV RBC AUTO: 92 FL (ref 82–98)
NITRITE UR QL STRIP: NEGATIVE
NON-SQ EPI CELLS URNS QL MICRO: ABNORMAL /HPF
PH UR STRIP.AUTO: 6 [PH]
PHOSPHATE SERPL-MCNC: 4.2 MG/DL (ref 2.3–4.1)
PLATELET # BLD AUTO: 201 THOUSANDS/UL (ref 149–390)
PMV BLD AUTO: 9.5 FL (ref 8.9–12.7)
POTASSIUM SERPL-SCNC: 4.7 MMOL/L (ref 3.5–5.3)
PROT SERPL-MCNC: 7.8 G/DL (ref 6.4–8.2)
PROT UR STRIP-MCNC: ABNORMAL MG/DL
PROT UR-MCNC: 156 MG/DL
PROT/CREAT UR: 2.45 MG/G{CREAT} (ref 0–0.1)
PTH-INTACT SERPL-MCNC: 161 PG/ML (ref 18.4–80.1)
RBC # BLD AUTO: 4.54 MILLION/UL (ref 3.88–5.62)
RBC #/AREA URNS AUTO: ABNORMAL /HPF
SODIUM SERPL-SCNC: 140 MMOL/L (ref 136–145)
SP GR UR STRIP.AUTO: 1.02 (ref 1–1.03)
UROBILINOGEN UR QL STRIP.AUTO: 0.2 E.U./DL
WBC # BLD AUTO: 7.61 THOUSAND/UL (ref 4.31–10.16)
WBC #/AREA URNS AUTO: ABNORMAL /HPF

## 2019-12-09 PROCEDURE — 80053 COMPREHEN METABOLIC PANEL: CPT

## 2019-12-09 PROCEDURE — 82570 ASSAY OF URINE CREATININE: CPT

## 2019-12-09 PROCEDURE — 84156 ASSAY OF PROTEIN URINE: CPT

## 2019-12-09 PROCEDURE — 85027 COMPLETE CBC AUTOMATED: CPT

## 2019-12-09 PROCEDURE — 84100 ASSAY OF PHOSPHORUS: CPT

## 2019-12-09 PROCEDURE — 83970 ASSAY OF PARATHORMONE: CPT

## 2019-12-09 PROCEDURE — 81001 URINALYSIS AUTO W/SCOPE: CPT

## 2019-12-15 PROBLEM — N18.4 CKD (CHRONIC KIDNEY DISEASE), STAGE IV (HCC): Status: ACTIVE | Noted: 2019-12-15

## 2019-12-15 NOTE — PROGRESS NOTES
Advanced Heart Failure / Pulmonary Hypertension Outpatient Progress Note    Brenna Live 77 y o  male   MRN: [id number]  Encounter: [id number]    Assessment / Plan:  Patient Active Problem List    Diagnosis Date Noted    CKD (chronic kidney disease), stage IV ([place] [address] ) 12/15/2019    Encounter for postoperative care 04/10/2019    Atrial flutter ([place] [address] ) 03/28/2019    CARY (acute kidney injury) ([place] [address] ) 03/08/2019    S/P AVR 03/07/2019    S/P MVR (mitral valve repair) 03/07/2019    Dilated cardiomyopathy ([place] [address] ) 03/07/2019    Diabetes mellitus type 2 in nonobese ([place] [address] ) 03/07/2019    Bicuspid aortic valve 02/27/2019    Coronary artery disease involving native coronary artery of native heart without angina pectoris 02/27/2019    Nonrheumatic aortic valve insufficiency 02/06/2019    Non-rheumatic mitral regurgitation 02/06/2019    Chronic systolic congestive heart failure ([place] [address] ) 01/29/2019    Essential hypertension 05/11/2018    FSGS (focal segmental glomerulosclerosis) 05/10/2018    Stage 3 chronic kidney disease ([place] [address] ) 03/01/2018    Persistent proteinuria 03/01/2018    Diabetic nephropathy associated with type 2 diabetes mellitus ([place] [address] ) 03/01/2018    Vitamin D deficiency 03/01/2018    Secondary hyperparathyroidism ([place] [address] ) 03/01/2018     Chronic partially recovered HFrEF; NICM; LVEF 45-50%; LVIDd 4 63 cm; NYHA II; ACC/AHA Stage B              Etiology: likely valvular  Reviewed importance of low sodium diet and fluid restriction  Weight of 177 lbs on 11/27  Today, weighs 176 lbs  PATIENCE from 04/12/2019 with LVEF of 25%  PATIENCE from 03/15/2019 with LVEF of 50%  TTE from 06/13/2019 (per report): LVEF 45-50%  Mod cLVH  Mildly reduced RVSF  BioAVR, mean grad 14 mHg  Mild MR, Mild TR  BMP from 12/09/2019: sodium 140; potassium 4 7; BUN 39; creatinine 2 94; eGFR 24  Patient not taking Isordil, instructed to start this today      Neurohormonal Blockade:  --Beta-Blocker: metoprolol succinate 50 mg daily   --ACEi, ARB or ARNi: CKD precludes  --SVR Reducing Agents: Isordil 10 mg TID and hydralazine 25 mg TID  --Aldosterone Receptor Blocker: CKD precludes  --Diuretic: torsemide 10 mg PRN  Sudden Cardiac Death Risk Reduction:  --LVEF now > 35%  Electrical Resynchronization:  --Candidacy for BiV device: RBBB with QRS of 166 ms  Advanced Therapies: Will continue to monitor  Paroxysmal atrial fibrillation               [id number] = 5 (age, CHF, HTN, CAD, DM)  Anticoagulation on Coumadin  S/p successful PATIENCE/DCCV on 04/12/2019  Continue on amiodarone 200 mg daily and BB as above  Coronary artery disease              Preoperative LHC with 70% stenosis in mid circumflex and ramus  Continue on aspirin, BB, and statin       Bicuspid aortic valve / aortic valve insufficiency / mitral regurgitation              S/p AVR, MV repair with annuloplasty ring, and VIRI ligation on 03/07/2019  Hypertension   BP of 130/70 mmHg in office today  Will decrease amlodipine to 2 5 mg daily  Patient not taking Isordil 10 mg TID; will  from pharmacy today  He wishes to postpone uptitration of hydralazine given SBP at home has been in 120-125 mmHg range  Agreeable to call office with elevated readings  Chronic kidney disease, stage 4              Baseline creatinine of 2 5-2 9  Follows with Dr Danielle José as outpatient  BMP from 12/09/2019: sodium 140; potassium 4 7; BUN 39; creatinine 2 94; eGFR 24  Diabetes mellitus, type 2   Insulin dependent  Most recent HgA1c 09/10/2019 of 5 5  Follows with endocrinology as outpatient      HPI:   Moy Wright is a 52-year-old male with bicuspid aortic valve, S/P AVR with 23 mm Nettles Inspiris Resilia bovine tissues valve and S/P MVR with 30 mm Medtronic CG Future mitral annuloplasty ring ,on 3/6/19, S/P VIRI ligation with 35 mm AtriClip device, PAF, NICM with LVEF of 40%, HTN, HLD, Type II DM, CKD, CAD per preoperative cath with 70% stenosis of the mid Cx and ramus who presents to the office for 2 week follow-up appointment / medication uptitration  "Briefly, patient was hospitalized from 3/7/19-3/15/19 for the above noted procedure  The post operative period was complicated by low output heart failure requiring milrinone infusion and vasopressors, which were successfully weaned off  He also experienced intermittent tachy arrhythmia including bouts of rapid atrial fib and atrial flutter, which appeared to improve with Metoprolol and Amiodarone  He was tentatively scheduled for cardioversion prior to discharge however this could not be accomplished due to presence of possible thrombus seen on PATIENCE  Patient was therefore continued on oral AC with a plan for outpatient cardioversion in one to two months  He was discharged home on a regimen of Metoprolol tartrate, Torsemide, Coumadin, ASA, and a statin       03/19/2019: He presents with reports of fatigue which has actually improved a bit from yesterday with reduction of his metoprolol dose  In terms of his post operative recovery, he feels he is doing well  Has minimal incisional pain  Appears euvolemic on exam  Warm, well perfused on exam  Blood pressure stable  Has plans to attend cardiac rehab in the future and has his initial consultation later this week  Will see nephrology and CT surgery for follow up within the next week or two "      04/30/2019: Had successful PATIENCE/DCCV on 04/12/2019  Metoprolol tartrate was increased to 50 mg BID; no new symptoms with medication changes  Currently attending cardiac rehab, going well  Feels well overall  Complains of occasional dizziness with changes in position or when his blood sugar is low  Appears euvolemic on exam  Has been seen by CT surgery and nephrology since surgery  Plans to see endocrinologist next month       From office visit with JHONATAN Paul on 06/25/19: Presents for follow-up with his cardiologist for his BP  Recently in [place], was routinely checking his BP (cuff not yet validated ), SBP was 169, and "just not feeling right " Went to the [facility] who referred [facility] the [facility]  Reports an echo was done and was told it was ok  Per report: EF 45-50%  Mod LVH ;NATACHA 1 08 cm2  Max gradient 28; mean 14  MV ring noted with mild MR  RVSP 26 mm Hg      They added amlodipine 10 mg daily, and gave hydralazine 25 mg PRN for SBP > 160  He took hydralazine 2 x in the last 2 weeks  On torsemide PRN  Only taken a couple times over the last 1-2 months  BP better  He also saw his nephrologist last week  He has been checking his blood pressure at home  The monitor has not been validated  I asked him to do so  Continue a low-sodium diet  Continue his current medications with the exception of metoprolol tartrate; I will change it to metoprolol succinate "    From office visit with JHONATAN Tejada on 09/25/2019: His weights are up a little bit  He is taking torsemide 10 mg PRN  He overall has gained a little bit of what we feel to be caloric weight as his therapy for his diabetes had been on insulin which is known to cause some weight gain  This therapy has now to been adjusted to [address] starting today  Overall he feels well; no chest pain, no shortness of breath, no lower extremity edema  His EKG today shows sinus rhythm similar to prior with RBBB, LAFB, QRS of 166 milliseconds  His ejection fraction is 45-50%, lastly checked in June 2019  The patient is curious how long he may need to be on amiodarone as he does realize there are significant long-term effects  In there future, he may benefit from re-evaluation of his ejection fraction and reconsideration of his antiarrhythmic therapy, now that he has had both valves replaced/repaired and he is in sinus rhythm "    From office visit with Dr Tereza Paulson on 11/27/2019: "Has completed cardiac rehab  Feels well   Denies GARCIA " Amlodipine dose was decreased to 5 mg daily and Isordil/hydralazine was added  12/16/2019: No complaints today  Continues to go to the gym every other day; uses treadmill and bike and also does some weight lifting  Feels he is getting stronger  Denies GARCIA, PND, and orthopnea  Does not weigh himself daily; patient plans to begin weighing himself on the days he's at the gym  Reports taking torsemide about 2 times in the last 6 months  Has not been taking Isordil at home; patient to  from pharmacy today as well as decreased amlodipine dose  Past Medical History:   Diagnosis Date    Diabetes mellitus ([facility] [address] )     Hyperlipidemia     Hypertension     Proteinuria     Stage 3 chronic kidney disease (HCC)     Vitamin D deficiency      Review of Systems   Constitutional: Negative for activity change, appetite change, fatigue and unexpected weight change  HENT: Negative for congestion, postnasal drip, rhinorrhea, sneezing and sore throat  Eyes: Negative  Respiratory: Negative for cough and shortness of breath  Cardiovascular: Negative for chest pain, palpitations and leg swelling  Gastrointestinal: Negative for abdominal distention, constipation, diarrhea, nausea and vomiting  Endocrine: Negative  Genitourinary: Negative for decreased urine volume, difficulty urinating and dysuria  Musculoskeletal: Negative  Skin: Negative  Allergic/Immunologic: Negative  Neurological: Negative for dizziness, tremors, syncope, weakness and light-headedness  Hematological: Negative  Psychiatric/Behavioral: Negative for agitation and sleep disturbance  The patient is not nervous/anxious  14-point ROS completed and negative except as stated above and/or in the HPI      Allergies   Allergen Reactions    Ace Inhibitors Cough       Current Outpatient Medications:     amiodarone 200 mg tablet, take 1 tablet by mouth once daily, Disp: 30 tablet, Rfl: 5    amLODIPine (NORVASC) 2 5 mg tablet, Take 1 tablet (2 5 mg total) by mouth daily, Disp: 30 tablet, Rfl: 3    ascorbic acid (VITAMIN C) 500 mg tablet, Take 500 mg by mouth daily, Disp: , Rfl:     aspirin (ECOTRIN LOW STRENGTH) 81 mg EC tablet, Take 1 tablet (81 mg total) by mouth daily, Disp: 30 tablet, Rfl: 2    atorvastatin (LIPITOR) 20 mg tablet, Take 1 tablet by mouth daily, Disp: , Rfl:     calcitriol (ROCALTROL) 0 25 mcg capsule, Take 1 capsule (0 25 mcg total) by mouth 4 (four) times a week, Disp: 19 capsule, Rfl: 4    Cholecalciferol (VITAMIN D3) 1000 units CAPS, Take 1 capsule by mouth daily, Disp: , Rfl:     COMBIGAN 0 2-0 5 %, instill 1 drop into both eyes twice a day, Disp: , Rfl: 0    hydrALAZINE (APRESOLINE) 25 mg tablet, Take 1 tablet (25 mg total) by mouth 3 (three) times a day, Disp: 270 tablet, Rfl: 3    metoprolol succinate (TOPROL-XL) 50 mg 24 hr tablet, Take 1 tablet (50 mg total) by mouth daily, Disp: 180 tablet, Rfl: 3    torsemide (DEMADEX) 10 mg tablet, Take 10 mg by mouth as needed Take one tablet QOD , Disp: , Rfl:     TRADJENTA 5 MG TABS, daily , Disp: , Rfl: 0    TRAVATAN Z 0 004 % ophthalmic solution, , Disp: , Rfl: 0    warfarin (COUMADIN) 2 mg tablet, TAKE 1 TO 2 TABLETS DAILY BY MOUTH OR AS DIRECTED BY PHYSICIAN  (Patient taking differently: Take 3 mg by mouth daily 3 mg daily), Disp: 60 tablet, Rfl: 11    insulin glargine (LANTUS SOLOSTAR) 100 units/mL injection pen, Inject 16 Units under the skin daily at bedtime for 30 days (Patient not taking: Reported on 9/25/2019), Disp: 5 pen, Rfl: 2    insulin isophane (HUMULIN N KWIKPEN) 100 units/mL injection pen, Inject 4 Units under the skin 3 (three) times a day before meals (Patient not taking: Reported on 9/25/2019), Disp: 5 pen, Rfl: 2    Insulin Pen Needle 32G X 4 MM MISC, Inject Lantus qHS and Novolog TID with meals, as directed   Dx: DM Ell 9 (Patient not taking: Reported on 11/27/2019), Disp: 100 each, Rfl: 0    isosorbide dinitrate (ISORDIL) 10 mg tablet, Take 1 tablet (10 mg total) by mouth 3 (three) times a day, Disp: 270 tablet, Rfl: 3    Social History     Socioeconomic History    Marital status: /Civil Union     Spouse name: Not on file    Number of children: Not on file    Years of education: Not on file    Highest education level: Not on file   Occupational History    Occupation: RETIRED   Social Needs    Financial resource strain: Not on file    Food insecurity:     Worry: Not on file     Inability: Not on file    Transportation needs:     Medical: Not on file     Non-medical: Not on file   Tobacco Use    Smoking status: Former Smoker     Last attempt to quit:      Years since quittin 9    Smokeless tobacco: Never Used   Substance and Sexual Activity    Alcohol use: Yes     Frequency: Monthly or less     Drinks per session: 1 or 2     Binge frequency: Never     Comment: occasionally    Drug use: No    Sexual activity: Not on file   Lifestyle    Physical activity:     Days per week: Not on file     Minutes per session: Not on file    Stress: Not on file   Relationships    Social connections:     Talks on phone: Not on file     Gets together: Not on file     Attends [employer] service: Not on file     Active member of club or organization: Not on file     Attends meetings of clubs or organizations: Not on file     Relationship status: Not on file    Intimate partner violence:     Fear of current or ex partner: Not on file     Emotionally abused: Not on file     Physically abused: Not on file     Forced sexual activity: Not on file   Other Topics Concern    Not on file   Social History Narrative    Not on file     Family History   Problem Relation Age of Onset    Stroke Mother     Hypertension Mother     Blindness Father     Hyperlipidemia Father     Gout Brother     Heart Valve Disease Brother     Cancer Sister     Anuerysm Neg Hx     Heart attack Neg Hx     Arrhythmia Neg Hx     Heart failure Neg Hx     Coronary artery disease Neg Hx     Sudden death Neg Hx         scd     Vitals:   Blood pressure 130/70, pulse 70, height 5' 6" (1 676 m), weight 80 2 kg (176 lb 14 4 oz), SpO2 98 %  Body mass index is 28 55 kg/m²  Wt Readings from Last 3 Encounters:   12/16/19 80 2 kg (176 lb 14 4 oz)   11/27/19 80 3 kg (177 lb)   09/25/19 79 1 kg (174 lb 6 4 oz)     Vitals:    12/16/19 1006   BP: 130/70   BP Location: Right arm   Patient Position: Sitting   Cuff Size: Standard   Pulse: 70   SpO2: 98%   Weight: 80 2 kg (176 lb 14 4 oz)   Height: 5' 6" (1 676 m)     Physical Exam   Constitutional: He is oriented to person, place, and time  He appears well-developed and well-nourished  HENT:   Head: Normocephalic and atraumatic  Eyes: Pupils are equal, round, and reactive to light  EOM are normal    Neck: Neck supple  No JVD present  No tracheal deviation present  Cardiovascular: Normal rate, regular rhythm and intact distal pulses  Murmur heard  Pulmonary/Chest: Effort normal and breath sounds normal  No respiratory distress  Abdominal: Soft  Bowel sounds are normal  He exhibits no distension  Musculoskeletal: He exhibits no edema or tenderness  Neurological: He is alert and oriented to person, place, and time  Skin: Skin is warm and dry  Psychiatric: He has a normal mood and affect  His behavior is normal      Diagnostic Testing:  Echocardiogram (TTE) from 06/13/2019 (per report):   LVEF 45-50%  Mod cLVH  LVIDd: 4 63 cm  RV: Mildly reduced RVSF  MR: Mild  Aortic: BioAVR, mean grad 14 mHg  Other: Mild TR  Echocardiogram (PATIENCE) from 04/12/2019:  LVEF: 25%; severe diffuse hypokinesis  Mildly dilated LV  RV: Normal size and function  MR: Moderate  Mitral annuloplasty ring present  RVOT: Mild TR  Other: Aortic bioprothesis present with normal function       Echocardiogram (PATIENCE) from 03/15/2019:  LVEF: 50%; NRWA  LA: Possible thrombus at mouth of VIRI  RVOT: Trace TR    Other: Mitral annuloplasty ring and aortic bioprothesis present, both with normal function       [facility] from 02/15/2019:              "Left main: Normal               LAD: Large and extended well beyond the apex  No atherosclerosis was seen  The diagonal branches were also normal               Circumflex: The vessel was normal sized There was a 70% stenosis in mid vessel  Ramus intermedius: There was a 70% stenosis in mid vessel  RCA: The vessel was large sized and dominant, giving rise to the PDA  No atherosclerosis was seen "     Echocardiogram from 01/29/2019:  LVEF: 40%; mildly dilated LV  LVIDd: 5 99 cm  RV: mildly dilated with normal systolic function  MR: Moderate to severe  PASP: 60 mmHg  RVOT:   Other: Mild diffuse hypokinesis  Labs & Results:  Lab Results   Component Value Date    WBC 7 61 12/09/2019    HGB 14 2 12/09/2019    HCT 41 9 12/09/2019    MCV 92 12/09/2019     12/09/2019     Lab Results   Component Value Date    SODIUM 140 12/09/2019    K 4 7 12/09/2019     12/09/2019    CO2 26 12/09/2019    BUN 39 (H) 12/09/2019    CREATININE 2 94 (H) 12/09/2019    GLUC 76 05/10/2019    CALCIUM 8 6 12/09/2019     Lab Results   Component Value Date    INR 2 30 (H) 12/09/2019    INR 2 17 (H) 11/12/2019    INR 2 52 (H) 10/15/2019    PROTIME 24 4 (H) 12/09/2019    PROTIME 23 3 (H) 11/12/2019    PROTIME 26 2 (H) 10/15/2019     Lab Results   Component Value Date    NTBNP 16,175 (H) 01/29/2019      EKG personally reviewed by Oneal Nunez PA-C  Counseling / Coordination of Care: Today, 25 minutes were spent with patient during which greater than 50% of this time was spent in counseling/coordination of care regarding hypertension, low sodium diet, fluid restriction, daily weights, and importance of medication compliance  Thank you for the opportunity to participate in the care of this patient      Tutu Freitas PA-C

## 2019-12-15 NOTE — ASSESSMENT & PLAN NOTE
His protein creatinine ratios 2 4 he seems to be in the mid 2 range  Unable to use ARB or ACE-inhibitor as the patient is currently with advanced CKD given his new baseline with prior AKA I hesitate use at this point continue to monitor

## 2019-12-15 NOTE — ASSESSMENT & PLAN NOTE
He has baseline stage 3 chronic kidney disease secondary to FSGS as above   His baseline creatinine had been anywhere from 1 5-1 8 prior to the development of acute kidney injury  The patient in June had a Cr of 2 5  This is where his Cr had settled out after he recovered from cardiac surgery  The patient had labs done Sep 10 which showed a Cr of 3 35  Labs from December 9 showed a creatinine of 2 9  This may be his new baseline IE 2 5-3 0 range    At this point, only tincture of time will tell whether not this remains new baseline or if his kidney function will improve further; I believe that his new baseline creatinine levels are 2 5-3 0 range

## 2019-12-15 NOTE — PATIENT INSTRUCTIONS
Decrease amlodipine to 2 5 mg daily - pick this up from your pharmacy and start this lower dose tomorrow  Begin taking Isordil (isosorbide dinitrate) 10 mg three times a day  Continue hydralazine 25 mg three times a day  Please weigh yourself every day, and contact the Heart Failure program at [phone] if you gain 3 lbs overnight or 5 lbs in 5-7 days  Limit daily sodium/salt intake to 0675-0919 mg daily to prevent fluid retention  Avoid canned foods, [place] [employer] food, and processed meat (hot dogs, lunch meat, and sausage etc )  Limit daily fluid intake to 2000 mL or 2L (about 60 ounces) daily  Bring complete list of medications to your follow-up appointment

## 2019-12-15 NOTE — ASSESSMENT & PLAN NOTE
Intact PTH is increased to 161, phosphorus is 4 2, calcium is 8 6 and albumin is 3 2  Corrected calcium is 9 4  The patient's Rocaltrol we will increase to 5 times a week

## 2019-12-16 ENCOUNTER — OFFICE VISIT (OUTPATIENT)
Dept: NEPHROLOGY | Facility: CLINIC | Age: 69
End: 2019-12-16
Payer: COMMERCIAL

## 2019-12-16 ENCOUNTER — OFFICE VISIT (OUTPATIENT)
Dept: CARDIOLOGY CLINIC | Facility: CLINIC | Age: 69
End: 2019-12-16
Payer: COMMERCIAL

## 2019-12-16 VITALS
OXYGEN SATURATION: 98 % | SYSTOLIC BLOOD PRESSURE: 130 MMHG | HEART RATE: 70 BPM | BODY MASS INDEX: 28.43 KG/M2 | WEIGHT: 176.9 LBS | DIASTOLIC BLOOD PRESSURE: 70 MMHG | HEIGHT: 66 IN

## 2019-12-16 VITALS — WEIGHT: 176 LBS | HEIGHT: 66 IN | BODY MASS INDEX: 28.28 KG/M2

## 2019-12-16 DIAGNOSIS — I48.0 PAF (PAROXYSMAL ATRIAL FIBRILLATION) (HCC): ICD-10-CM

## 2019-12-16 DIAGNOSIS — N18.4 CKD (CHRONIC KIDNEY DISEASE), STAGE IV (HCC): Primary | ICD-10-CM

## 2019-12-16 DIAGNOSIS — N18.4 CKD (CHRONIC KIDNEY DISEASE), STAGE IV (HCC): ICD-10-CM

## 2019-12-16 DIAGNOSIS — N25.81 SECONDARY HYPERPARATHYROIDISM (HCC): Chronic | ICD-10-CM

## 2019-12-16 DIAGNOSIS — I25.10 CORONARY ARTERY DISEASE INVOLVING NATIVE CORONARY ARTERY OF NATIVE HEART WITHOUT ANGINA PECTORIS: ICD-10-CM

## 2019-12-16 DIAGNOSIS — N05.1 FSGS (FOCAL SEGMENTAL GLOMERULOSCLEROSIS): ICD-10-CM

## 2019-12-16 DIAGNOSIS — I10 ESSENTIAL HYPERTENSION: Chronic | ICD-10-CM

## 2019-12-16 DIAGNOSIS — I50.22 CHRONIC SYSTOLIC HEART FAILURE (HCC): Primary | ICD-10-CM

## 2019-12-16 DIAGNOSIS — R80.1 PERSISTENT PROTEINURIA: ICD-10-CM

## 2019-12-16 DIAGNOSIS — E11.9 DIABETES MELLITUS TYPE 2 IN NONOBESE (HCC): ICD-10-CM

## 2019-12-16 DIAGNOSIS — I50.22 CHRONIC SYSTOLIC HEART FAILURE (HCC): ICD-10-CM

## 2019-12-16 PROCEDURE — 99214 OFFICE O/P EST MOD 30 MIN: CPT | Performed by: PHYSICIAN ASSISTANT

## 2019-12-16 PROCEDURE — 99214 OFFICE O/P EST MOD 30 MIN: CPT | Performed by: INTERNAL MEDICINE

## 2019-12-16 PROCEDURE — 3075F SYST BP GE 130 - 139MM HG: CPT | Performed by: PHYSICIAN ASSISTANT

## 2019-12-16 PROCEDURE — 3078F DIAST BP <80 MM HG: CPT | Performed by: PHYSICIAN ASSISTANT

## 2019-12-16 RX ORDER — AMLODIPINE BESYLATE 2.5 MG/1
2.5 TABLET ORAL DAILY
Qty: 30 TABLET | Refills: 3 | Status: SHIPPED | OUTPATIENT
Start: 2019-12-16 | End: 2020-05-12

## 2019-12-16 RX ORDER — ISOSORBIDE DINITRATE 10 MG/1
10 TABLET ORAL 3 TIMES DAILY
Qty: 270 TABLET | Refills: 3 | Status: SHIPPED | OUTPATIENT
Start: 2019-12-16 | End: 2020-12-22 | Stop reason: SDUPTHER

## 2019-12-16 RX ORDER — CALCITRIOL 0.25 UG/1
0.25 CAPSULE, LIQUID FILLED ORAL
Qty: 19 CAPSULE | Refills: 4 | Status: SHIPPED | OUTPATIENT
Start: 2019-12-17 | End: 2020-05-29 | Stop reason: SDUPTHER

## 2019-12-16 NOTE — PATIENT INSTRUCTIONS
1  I changed your rocaltrol to one capsule  5 times a week: Mon, Tues,Wed,Fri,Sat    2   Thank you for coming in to see me today

## 2019-12-16 NOTE — PROGRESS NOTES
Assessment/Plan:    CKD (chronic kidney disease), stage IV (HCC)  He has baseline stage 3 chronic kidney disease secondary to FSGS as above   His baseline creatinine had been anywhere from 1 5-1 8 prior to the development of acute kidney injury  The patient in June had a Cr of 2 5  This is where his Cr had settled out after he recovered from cardiac surgery  The patient had labs done Sep 10 which showed a Cr of 3 35  Labs from December 9 showed a creatinine of 2 9  This may be his new baseline IE 2 5-3 0 range  At this point, only tincture of time will tell whether not this remains new baseline or if his kidney function will improve further; I believe that his new baseline creatinine levels are 2 5-3 0 range    FSGS (focal segmental glomerulosclerosis)  He does have stage 3 chronic kidney disease secondary to biopsy-proven FSGS which is felt to be more a secondary form of FSGS is there was glomerular megaly at only 15% foot process effacement   The degree of fibrosis was only mild on kidney biopsy   From a treatment standpoint, he did lose 35 lb after his initial diagnosis and had been on ARB as well his creatinine was maintained in the mid 1 range this was again all prior to cardiac surgery  He has been off of ARB since his cardiac surgery given his increased creatinine level    Secondary hyperparathyroidism (HCC)  Intact PTH is increased to 161, phosphorus is 4 2, calcium is 8 6 and albumin is 3 2  Corrected calcium is 9 4  The patient's Rocaltrol we will increase to 5 times a week  Persistent proteinuria  His protein creatinine ratios 2 4 he seems to be in the mid 2 range  Unable to use ARB or ACE-inhibitor as the patient is currently with advanced CKD given his new baseline with prior AKA I hesitate use at this point continue to monitor      Chronic systolic heart failure (HCC)  Wt Readings from Last 3 Encounters:   12/16/19 79 8 kg (176 lb)   12/16/19 80 2 kg (176 lb 14 4 oz)   11/27/19 80 3 kg (177 lb)     His weight at home has been stable between 165-172 pounds  He states he has been this way for a long time he has only needed to use the torsemide once in the past 3 months when he had a little bit of ankle swelling  He just recently saw cardiology as well  Subjective:      Patient ID: Joseph Mcelroy is a 77 y o  male  HPI    Patient seen in follow-up for stage 4 chronic kidney disease and fluid management  He denies any chest pressure shortness of breath  His endurance is improved  He states he just got back from [place] where he did show  His weight has been stable anywhere between 165-171 pounds  He states he has only needed to use his torsemide once in the past 3 months  Medications and labs reviewed during this visit he was recently seen by heart failure services an outpatient, chart notes reviewed    Review of Systems  he denies any chest pressure shortness of breath no nausea vomiting diarrhea no abdominal or back pain no urgency frequency or hematuria  He denies any abdominal or back pain    Objective:      Ht 5' 6" (1 676 m)   Wt 79 8 kg (176 lb)   BMI 28 41 kg/m²     Blood pressure is 118/76 in the right arm     Physical Exam   Constitutional: He appears well-nourished  Eyes: No scleral icterus  Neck: Neck supple  Cardiovascular: Normal rate and regular rhythm  Pulmonary/Chest: Effort normal and breath sounds normal    Abdominal: Soft  Bowel sounds are normal    Musculoskeletal: He exhibits no edema  Lymphadenopathy:     He has no cervical adenopathy  Neurological:   Nonfocal   Skin: Skin is warm and dry

## 2019-12-16 NOTE — ASSESSMENT & PLAN NOTE
Wt Readings from Last 3 Encounters:   12/16/19 79 8 kg (176 lb)   12/16/19 80 2 kg (176 lb 14 4 oz)   11/27/19 80 3 kg (177 lb)     His weight at home has been stable between 165-172 pounds  He states he has been this way for a long time he has only needed to use the torsemide once in the past 3 months when he had a little bit of ankle swelling  He just recently saw cardiology as well

## 2020-01-11 ENCOUNTER — TRANSCRIBE ORDERS (OUTPATIENT)
Dept: LAB | Facility: CLINIC | Age: 70
End: 2020-01-11

## 2020-01-11 ENCOUNTER — APPOINTMENT (OUTPATIENT)
Dept: LAB | Facility: CLINIC | Age: 70
End: 2020-01-11
Payer: COMMERCIAL

## 2020-01-11 DIAGNOSIS — E11.649 UNCONTROLLED TYPE 2 DIABETES MELLITUS WITH HYPOGLYCEMIA, UNSPECIFIED HYPOGLYCEMIA COMA STATUS (HCC): ICD-10-CM

## 2020-01-11 DIAGNOSIS — E10.10 TYPE 1 DIABETES MELLITUS WITH KETOACIDOSIS WITHOUT COMA (HCC): ICD-10-CM

## 2020-01-11 DIAGNOSIS — Z86.79 H/O ATRIAL FLUTTER: ICD-10-CM

## 2020-01-11 DIAGNOSIS — E78.5 HYPERLIPIDEMIA, UNSPECIFIED HYPERLIPIDEMIA TYPE: ICD-10-CM

## 2020-01-11 DIAGNOSIS — E11.649 UNCONTROLLED TYPE 2 DIABETES MELLITUS WITH HYPOGLYCEMIA, UNSPECIFIED HYPOGLYCEMIA COMA STATUS (HCC): Primary | ICD-10-CM

## 2020-01-11 DIAGNOSIS — I48.91 ATRIAL FIBRILLATION, UNSPECIFIED TYPE (HCC): ICD-10-CM

## 2020-01-11 LAB
ANION GAP SERPL CALCULATED.3IONS-SCNC: 10 MMOL/L (ref 4–13)
BUN SERPL-MCNC: 33 MG/DL (ref 5–25)
CALCIUM SERPL-MCNC: 8.7 MG/DL (ref 8.3–10.1)
CHLORIDE SERPL-SCNC: 109 MMOL/L (ref 100–108)
CK SERPL-CCNC: 150 U/L (ref 39–308)
CO2 SERPL-SCNC: 21 MMOL/L (ref 21–32)
CREAT SERPL-MCNC: 2.9 MG/DL (ref 0.6–1.3)
EST. AVERAGE GLUCOSE BLD GHB EST-MCNC: 114 MG/DL
GFR SERPL CREATININE-BSD FRML MDRD: 25 ML/MIN/1.73SQ M
GLUCOSE P FAST SERPL-MCNC: 99 MG/DL (ref 65–99)
HBA1C MFR BLD: 5.6 % (ref 4.2–6.3)
INR PPP: 2.5 (ref 0.84–1.19)
POTASSIUM SERPL-SCNC: 5 MMOL/L (ref 3.5–5.3)
PROTHROMBIN TIME: 26.1 SECONDS (ref 11.6–14.5)
SODIUM SERPL-SCNC: 140 MMOL/L (ref 136–145)

## 2020-01-11 PROCEDURE — 83036 HEMOGLOBIN GLYCOSYLATED A1C: CPT

## 2020-01-11 PROCEDURE — 82550 ASSAY OF CK (CPK): CPT

## 2020-01-11 PROCEDURE — 85610 PROTHROMBIN TIME: CPT

## 2020-01-11 PROCEDURE — 80048 BASIC METABOLIC PNL TOTAL CA: CPT

## 2020-01-11 PROCEDURE — 36415 COLL VENOUS BLD VENIPUNCTURE: CPT

## 2020-01-13 ENCOUNTER — ANTICOAG VISIT (OUTPATIENT)
Dept: CARDIOLOGY CLINIC | Facility: CLINIC | Age: 70
End: 2020-01-13

## 2020-02-27 ENCOUNTER — ANTICOAG VISIT (OUTPATIENT)
Dept: CARDIOLOGY CLINIC | Facility: CLINIC | Age: 70
End: 2020-02-27

## 2020-02-27 ENCOUNTER — TRANSCRIBE ORDERS (OUTPATIENT)
Dept: LAB | Facility: CLINIC | Age: 70
End: 2020-02-27

## 2020-02-27 ENCOUNTER — APPOINTMENT (OUTPATIENT)
Dept: LAB | Facility: CLINIC | Age: 70
End: 2020-02-27
Payer: COMMERCIAL

## 2020-02-27 DIAGNOSIS — N18.4 CKD (CHRONIC KIDNEY DISEASE), STAGE IV (HCC): ICD-10-CM

## 2020-02-27 DIAGNOSIS — N25.81 SECONDARY HYPERPARATHYROIDISM (HCC): Chronic | ICD-10-CM

## 2020-02-27 DIAGNOSIS — R80.1 PERSISTENT PROTEINURIA: ICD-10-CM

## 2020-02-27 DIAGNOSIS — N05.1 FSGS (FOCAL SEGMENTAL GLOMERULOSCLEROSIS): ICD-10-CM

## 2020-02-27 LAB
ALBUMIN SERPL BCP-MCNC: 2.9 G/DL (ref 3.5–5)
ALP SERPL-CCNC: 72 U/L (ref 46–116)
ALT SERPL W P-5'-P-CCNC: 30 U/L (ref 12–78)
ANION GAP SERPL CALCULATED.3IONS-SCNC: 9 MMOL/L (ref 4–13)
AST SERPL W P-5'-P-CCNC: 23 U/L (ref 5–45)
BACTERIA UR QL AUTO: ABNORMAL /HPF
BASOPHILS # BLD AUTO: 0.04 THOUSANDS/ΜL (ref 0–0.1)
BASOPHILS NFR BLD AUTO: 1 % (ref 0–1)
BILIRUB SERPL-MCNC: 0.31 MG/DL (ref 0.2–1)
BILIRUB UR QL STRIP: NEGATIVE
BUN SERPL-MCNC: 27 MG/DL (ref 5–25)
CALCIUM SERPL-MCNC: 8.5 MG/DL (ref 8.3–10.1)
CHLORIDE SERPL-SCNC: 107 MMOL/L (ref 100–108)
CLARITY UR: CLEAR
CO2 SERPL-SCNC: 24 MMOL/L (ref 21–32)
COLOR UR: ABNORMAL
CREAT SERPL-MCNC: 2.66 MG/DL (ref 0.6–1.3)
CREAT UR-MCNC: 123 MG/DL
EOSINOPHIL # BLD AUTO: 0.12 THOUSAND/ΜL (ref 0–0.61)
EOSINOPHIL NFR BLD AUTO: 2 % (ref 0–6)
ERYTHROCYTE [DISTWIDTH] IN BLOOD BY AUTOMATED COUNT: 13.4 % (ref 11.6–15.1)
GFR SERPL CREATININE-BSD FRML MDRD: 27 ML/MIN/1.73SQ M
GLUCOSE P FAST SERPL-MCNC: 89 MG/DL (ref 65–99)
GLUCOSE UR STRIP-MCNC: NEGATIVE MG/DL
HCT VFR BLD AUTO: 44.7 % (ref 36.5–49.3)
HGB BLD-MCNC: 14.7 G/DL (ref 12–17)
HGB UR QL STRIP.AUTO: ABNORMAL
HYALINE CASTS #/AREA URNS LPF: ABNORMAL /LPF
IMM GRANULOCYTES # BLD AUTO: 0.04 THOUSAND/UL (ref 0–0.2)
IMM GRANULOCYTES NFR BLD AUTO: 1 % (ref 0–2)
KETONES UR STRIP-MCNC: NEGATIVE MG/DL
LEUKOCYTE ESTERASE UR QL STRIP: NEGATIVE
LYMPHOCYTES # BLD AUTO: 0.91 THOUSANDS/ΜL (ref 0.6–4.47)
LYMPHOCYTES NFR BLD AUTO: 13 % (ref 14–44)
MAGNESIUM SERPL-MCNC: 1.7 MG/DL (ref 1.6–2.6)
MCH RBC QN AUTO: 30.6 PG (ref 26.8–34.3)
MCHC RBC AUTO-ENTMCNC: 32.9 G/DL (ref 31.4–37.4)
MCV RBC AUTO: 93 FL (ref 82–98)
MONOCYTES # BLD AUTO: 0.74 THOUSAND/ΜL (ref 0.17–1.22)
MONOCYTES NFR BLD AUTO: 10 % (ref 4–12)
MUCOUS THREADS UR QL AUTO: ABNORMAL
NEUTROPHILS # BLD AUTO: 5.3 THOUSANDS/ΜL (ref 1.85–7.62)
NEUTS SEG NFR BLD AUTO: 73 % (ref 43–75)
NITRITE UR QL STRIP: NEGATIVE
NON-SQ EPI CELLS URNS QL MICRO: ABNORMAL /HPF
NRBC BLD AUTO-RTO: 0 /100 WBCS
PH UR STRIP.AUTO: 6 [PH]
PHOSPHATE SERPL-MCNC: 3.9 MG/DL (ref 2.3–4.1)
PLATELET # BLD AUTO: 199 THOUSANDS/UL (ref 149–390)
PMV BLD AUTO: 9.5 FL (ref 8.9–12.7)
POTASSIUM SERPL-SCNC: 4.8 MMOL/L (ref 3.5–5.3)
PROT SERPL-MCNC: 7.5 G/DL (ref 6.4–8.2)
PROT UR STRIP-MCNC: >=300 MG/DL
PROT UR-MCNC: 520 MG/DL
PROT/CREAT UR: 4.23 MG/G{CREAT} (ref 0–0.1)
PTH-INTACT SERPL-MCNC: 139.6 PG/ML (ref 18.4–80.1)
RBC # BLD AUTO: 4.8 MILLION/UL (ref 3.88–5.62)
RBC #/AREA URNS AUTO: ABNORMAL /HPF
SODIUM SERPL-SCNC: 140 MMOL/L (ref 136–145)
SP GR UR STRIP.AUTO: 1.02 (ref 1–1.03)
UROBILINOGEN UR QL STRIP.AUTO: 0.2 E.U./DL
WBC # BLD AUTO: 7.15 THOUSAND/UL (ref 4.31–10.16)
WBC #/AREA URNS AUTO: ABNORMAL /HPF

## 2020-02-27 PROCEDURE — 81001 URINALYSIS AUTO W/SCOPE: CPT

## 2020-02-27 PROCEDURE — 83970 ASSAY OF PARATHORMONE: CPT

## 2020-02-27 PROCEDURE — 80053 COMPREHEN METABOLIC PANEL: CPT

## 2020-02-27 PROCEDURE — 84100 ASSAY OF PHOSPHORUS: CPT

## 2020-02-27 PROCEDURE — 85025 COMPLETE CBC W/AUTO DIFF WBC: CPT

## 2020-02-27 PROCEDURE — 83735 ASSAY OF MAGNESIUM: CPT

## 2020-02-27 PROCEDURE — 84156 ASSAY OF PROTEIN URINE: CPT

## 2020-02-27 PROCEDURE — 82570 ASSAY OF URINE CREATININE: CPT

## 2020-03-02 ENCOUNTER — OFFICE VISIT (OUTPATIENT)
Dept: CARDIOLOGY CLINIC | Facility: CLINIC | Age: 70
End: 2020-03-02
Payer: COMMERCIAL

## 2020-03-02 VITALS
HEIGHT: 66 IN | DIASTOLIC BLOOD PRESSURE: 66 MMHG | WEIGHT: 174 LBS | BODY MASS INDEX: 27.97 KG/M2 | SYSTOLIC BLOOD PRESSURE: 126 MMHG | HEART RATE: 76 BPM

## 2020-03-02 DIAGNOSIS — Z98.890 S/P MVR (MITRAL VALVE REPAIR): ICD-10-CM

## 2020-03-02 DIAGNOSIS — I34.0 NON-RHEUMATIC MITRAL REGURGITATION: Chronic | ICD-10-CM

## 2020-03-02 DIAGNOSIS — I25.10 CORONARY ARTERY DISEASE INVOLVING NATIVE CORONARY ARTERY OF NATIVE HEART WITHOUT ANGINA PECTORIS: Chronic | ICD-10-CM

## 2020-03-02 DIAGNOSIS — I50.22 CHRONIC SYSTOLIC HEART FAILURE (HCC): Chronic | ICD-10-CM

## 2020-03-02 DIAGNOSIS — I35.1 NONRHEUMATIC AORTIC VALVE INSUFFICIENCY: Chronic | ICD-10-CM

## 2020-03-02 DIAGNOSIS — I42.0 DILATED CARDIOMYOPATHY (HCC): Chronic | ICD-10-CM

## 2020-03-02 DIAGNOSIS — Z95.2 S/P AVR: ICD-10-CM

## 2020-03-02 DIAGNOSIS — I48.92 ATRIAL FLUTTER, UNSPECIFIED TYPE (HCC): Primary | ICD-10-CM

## 2020-03-02 DIAGNOSIS — I10 ESSENTIAL HYPERTENSION: Chronic | ICD-10-CM

## 2020-03-02 PROCEDURE — 93000 ELECTROCARDIOGRAM COMPLETE: CPT | Performed by: INTERNAL MEDICINE

## 2020-03-02 PROCEDURE — 99214 OFFICE O/P EST MOD 30 MIN: CPT | Performed by: INTERNAL MEDICINE

## 2020-03-02 NOTE — PROGRESS NOTES
Cardiology   KAYLEN HARRIS [facility] 77 y o  male MRN: [id number]        Impression:  1  S/P AVR/MV repair 3/19 - doing well    2  Atrial flutter - converted to NSR  On warfarin  3  Hypertension - controlled  4  Cardiomyopathy - likely tachy mediated  Resolved       Recommendations:  1  Continue current medications  2  Follow up in 6 months  HPI: [place] [facility] J CARLOS is a 77y o  year old male with mod-severe MR/mod-severe AI and EF 40% s/p bioAVR and MV repair with annuloplasty ring and VIRI clipping 3/19, with paroxysmal atrial fibrillation/atrial flutter who returns for follow up  Jalil hyde PATIENCE/DCCV 4/12/19 - EF 25%, mod MR, no clot  Successfully converted to NSR  Review of Systems   Constitutional: Negative  HENT: Negative  Eyes: Negative  Respiratory: Negative for chest tightness and shortness of breath  Cardiovascular: Negative for chest pain, palpitations and leg swelling  Gastrointestinal: Negative  Endocrine: Negative  Genitourinary: Negative  Musculoskeletal: Negative  Skin: Negative  Allergic/Immunologic: Negative  Neurological: Negative  Hematological: Negative  Psychiatric/Behavioral: Negative  All other systems reviewed and are negative  Past Medical History:   Diagnosis Date    Diabetes mellitus [facility] )     Hyperlipidemia     Hypertension     Proteinuria     Stage 3 chronic kidney disease ([facility] )     Vitamin D deficiency      Past Surgical History:   Procedure Laterality Date    COLONOSCOPY      AL ECHO TRANSESOPHAG R-T 2D W/PRB IMG ACQUISJ I&R N/A 3/7/2019    Procedure: INTRA-OP TRANSESOPHAGEAL ECHOCARDIOGRAM (PATIENCE);   Surgeon: Carloz Barros DO;  [facility];  Service: Cardiac Surgery    AL PERQ CLSR TCAT L ATR APNDGE W/ENDOCARDIAL IMPLNT  3/7/2019    Procedure: LEFT ATRIAL APPENDAGE OCCLUSION CLIPPED with 35mm Jyotsna Crape;  Surgeon: Carloz Barros DO;  [facility];  Service: Cardiac Surgery    AL REPLACEMENT OF MITRAL VALVE N/A 3/7/2019    Procedure: REPLACEMENT VALVE MITRAL (MVR) REPAIR with a 30mm MEDTRONIC CG FUTURE RING;  Surgeon: Irving Ly DO;  Location: [facility];  Service: Cardiac Surgery    UT RPLCMT AORTIC VALVE OPN W/STENTLESS TISSUE VALVE N/A 3/7/2019    Procedure: REPLACEMENT VALVE AORTIC (AVR) with 23mm TAVERA INSPIRIS RESILIA  AORTIC VALVE;  Surgeon: Irving Ly DO;  [facility];  Service: Cardiac Surgery    TOE SURGERY Left      Social History     Substance and Sexual Activity   Alcohol Use Yes    Frequency: Monthly or less    Drinks per session: 1 or 2    Binge frequency: Never    Comment: occasionally     Social History     Substance and Sexual Activity   Drug Use No     Social History     Tobacco Use   Smoking Status Former Smoker    Last attempt to quit: Yoselin Rahman Years since quittin 2   Smokeless Tobacco Never Used     Family History   Problem Relation Age of Onset    Stroke Mother     Hypertension Mother     Blindness Father     Hyperlipidemia Father     Gout Brother     Heart Valve Disease Brother     Cancer Sister     Anuerysm Neg Hx     Heart attack Neg Hx     Arrhythmia Neg Hx     Heart failure Neg Hx     Coronary artery disease Neg Hx     Sudden death Neg Hx         scd       Allergies:   Allergies   Allergen Reactions    Ace Inhibitors Cough       Medications:     Current Outpatient Medications:     amiodarone 200 mg tablet, take 1 tablet by mouth once daily, Disp: 30 tablet, Rfl: 5    amLODIPine (NORVASC) 2 5 mg tablet, Take 1 tablet (2 5 mg total) by mouth daily, Disp: 30 tablet, Rfl: 3    ascorbic acid (VITAMIN C) 500 mg tablet, Take 500 mg by mouth daily, Disp: , Rfl:     aspirin (ECOTRIN LOW STRENGTH) 81 mg EC tablet, Take 1 tablet (81 mg total) by mouth daily, Disp: 30 tablet, Rfl: 2    atorvastatin (LIPITOR) 20 mg tablet, Take 1 tablet by mouth daily, Disp: , Rfl:     calcitriol (ROCALTROL) 0 25 mcg capsule, Take 1 capsule (0 25 mcg total) by mouth 4 (four) times a week Mon,Tues,Wed,Fri,Sat - 5 times a week, Disp: 19 capsule, Rfl: 4    Cholecalciferol (VITAMIN D3) 1000 units CAPS, Take 1 capsule by mouth daily, Disp: , Rfl:     COMBIGAN 0 2-0 5 %, instill 1 drop into both eyes twice a day, Disp: , Rfl: 0    hydrALAZINE (APRESOLINE) 25 mg tablet, Take 1 tablet (25 mg total) by mouth 3 (three) times a day, Disp: 270 tablet, Rfl: 3    metoprolol succinate (TOPROL-XL) 50 mg 24 hr tablet, Take 1 tablet (50 mg total) by mouth daily, Disp: 180 tablet, Rfl: 3    torsemide (DEMADEX) 10 mg tablet, Take 10 mg by mouth as needed Take one tablet QOD , Disp: , Rfl:     TRADJENTA 5 MG TABS, daily , Disp: , Rfl: 0    TRAVATAN Z 0 004 % ophthalmic solution, , Disp: , Rfl: 0    warfarin (COUMADIN) 2 mg tablet, TAKE 1 TO 2 TABLETS DAILY BY MOUTH OR AS DIRECTED BY PHYSICIAN  (Patient taking differently: Take 3 mg by mouth daily 3 mg daily), Disp: 60 tablet, Rfl: 11    insulin glargine (LANTUS SOLOSTAR) 100 units/mL injection pen, Inject 16 Units under the skin daily at bedtime for 30 days (Patient not taking: Reported on 9/25/2019), Disp: 5 pen, Rfl: 2    insulin isophane (HUMULIN N KWIKPEN) 100 units/mL injection pen, Inject 4 Units under the skin 3 (three) times a day before meals (Patient not taking: Reported on 9/25/2019), Disp: 5 pen, Rfl: 2    Insulin Pen Needle 32G X 4 MM MISC, Inject Lantus qHS and Novolog TID with meals, as directed   Dx: DM Ell 9 (Patient not taking: Reported on 11/27/2019), Disp: 100 each, Rfl: 0    isosorbide dinitrate (ISORDIL) 10 mg tablet, Take 1 tablet (10 mg total) by mouth 3 (three) times a day (Patient not taking: Reported on 3/2/2020), Disp: 270 tablet, Rfl: 3      Wt Readings from Last 3 Encounters:   03/02/20 78 9 kg (174 lb)   12/16/19 79 8 kg (176 lb)   12/16/19 80 2 kg (176 lb 14 4 oz)     Temp Readings from Last 3 Encounters:   07/13/19 98 °F (36 7 °C) (Oral)   04/12/19 98 °F (36 7 °C)   04/10/19 (!) 97 1 °F (36 2 °C) (Tympanic)     BP Readings from Last 3 Encounters:   20 126/66   19 130/70   19 128/88     Pulse Readings from Last 3 Encounters:   20 76   19 70   19 90     ECG: NSR 71 LAFB RBBB      Physical Exam   Constitutional: He is oriented to person, place, and time  He appears well-developed  HENT:   Head: Atraumatic  Eyes: EOM are normal    Neck: Normal range of motion  Cardiovascular: Normal rate, regular rhythm and normal heart sounds  Exam reveals no gallop  No murmur heard  Pulmonary/Chest: Effort normal and breath sounds normal    Abdominal: Soft  Musculoskeletal: Normal range of motion  Neurological: He is alert and oriented to person, place, and time  Skin: Skin is warm and dry  Psychiatric: He has a normal mood and affect           Laboratory Studies:  CMP:  Lab Results   Component Value Date    K 4 8 2020     2020    CO2 24 2020    BUN 27 (H) 2020    CREATININE 2 66 (H) 2020    GLUCOSE 110 2019    AST 23 2020    ALT 30 2020    EGFR 27 2020       Lipid Profile:   No results found for: CHOL  Lab Results   Component Value Date    HDL 48 09/10/2019     Lab Results   Component Value Date    LDLCALC 66 09/10/2019     Lab Results   Component Value Date    TRIG 65 09/10/2019       Cardiac testing:     Results for orders placed during the hospital encounter of 19   Echo complete with contrast if indicated    Narrative [facility], [address] [facility]  [phone]    Transthoracic Echocardiogram  2D, M-mode, Doppler, and Color Doppler    Study date:  2019    Patient: Vladislav Rice  MR number: [id number]  Account number: [de-identified]  : 1950  Age: 80 years  Gender: Male  Status: Inpatient  Location: Bedside  Height: 68 in  Weight: 163 lb  BP: 155/ 70 mmHg    Indications: Heart Failure    Diagnoses: I50 9 - Heart failure, unspecified    Sonographer:  Bella Palma Haris Lanza  Primary Physician:  Angélica Morales  Referring Physician:  Judah Fuchs MD  Group:  [facility]  Interpreting Physician:  Judah Fuchs MD    SUMMARY    LEFT VENTRICLE:  The ventricle was mildly dilated  Systolic function was mildly to moderately reduced  Ejection fraction was estimated to be 40 %  There was mild diffuse hypokinesis  Wall thickness was mildly increased  RIGHT VENTRICLE:  The ventricle was mildly dilated  Systolic function was normal     LEFT ATRIUM:  The atrium was mildly dilated  MITRAL VALVE:  There was moderate to severe regurgitation  AORTIC VALVE:  The valve was possibly bicuspid  Leaflets exhibited normal thickness, normal cuspal separation, and significant diastolic prolapse  There was moderate to severe regurgitation  TRICUSPID VALVE:  There was mild regurgitation  Pulmonary artery systolic pressure was moderately to markedly increased  The findings suggest moderate to severe pulmonary hypertension  AORTA:  The root exhibited mild dilatation  IVC, HEPATIC VEINS:  The inferior vena cava was mildly dilated  Respirophasic changes were blunted (less than 50% variation)  PERICARDIUM:  There was a left pleural effusion  HISTORY: PRIOR HISTORY: Murmur, HTN, DM2, HLD, CKD3    PROCEDURE: The procedure was performed at the bedside  This was a routine study  The transthoracic approach was used  The study included complete 2D imaging, M-mode, complete spectral Doppler, and color Doppler  The heart rate was 95 bpm,  at the start of the study  Images were obtained from the parasternal, apical, subcostal, and suprasternal notch acoustic windows  Image quality was adequate  LEFT VENTRICLE: The ventricle was mildly dilated  Systolic function was mildly to moderately reduced  Ejection fraction was estimated to be 40 %  There was mild diffuse hypokinesis  Wall thickness was mildly increased      RIGHT VENTRICLE: The ventricle was mildly dilated  Systolic function was normal  Wall thickness was normal     LEFT ATRIUM: The atrium was mildly dilated  RIGHT ATRIUM: Size was normal     MITRAL VALVE: Valve structure was normal  There was normal leaflet separation  DOPPLER: The transmitral velocity was within the normal range  There was no evidence for stenosis  There was moderate to severe regurgitation  AORTIC VALVE: The valve was possibly bicuspid  Leaflets exhibited normal thickness, normal cuspal separation, and significant diastolic prolapse  DOPPLER: Transaortic velocity was within the normal range  There was no evidence for  stenosis  There was moderate to severe regurgitation  The regurgitant jet was eccentric  TRICUSPID VALVE: The valve structure was normal  There was normal leaflet separation  DOPPLER: The transtricuspid velocity was within the normal range  There was no evidence for stenosis  There was mild regurgitation  Pulmonary artery  systolic pressure was moderately to markedly increased  Estimated peak PA pressure was 60 mmHg  The findings suggest moderate to severe pulmonary hypertension  PULMONIC VALVE: Leaflets exhibited normal thickness, no calcification, and normal cuspal separation  DOPPLER: The transpulmonic velocity was within the normal range  There was no regurgitation  PERICARDIUM: There was no pericardial effusion  There was a left pleural effusion  The pericardium was normal in appearance  AORTA: The root exhibited mild dilatation  SYSTEMIC VEINS: IVC: The inferior vena cava was mildly dilated  Respirophasic changes were blunted (less than 50% variation)      MEASUREMENT TABLES    2D MEASUREMENTS  Aorta   (Reference normals)  Root diam   39 mm   (--)    SYSTEM MEASUREMENT TABLES    2D  %FS: 23 42 %  Ao Diam: 3 91 cm  EDV(Teich): 179 53 ml  EF Biplane: 40 37 %  EF(Teich): 46 07 %  ESV(Teich): 96 82 ml  IVSd: 1 02 cm  LA Area: 12 24 cm2  LA Diam: 4 3 cm  LVEDV MOD A2C: 129 37 ml  LVEDV MOD A4C: 151 37 ml  LVEDV MOD BP: 140 9 ml  LVEF MOD A2C: 42 79 %  LVEF MOD A4C: 40 91 %  LVESV MOD A2C: 74 02 ml  LVESV MOD A4C: 89 45 ml  LVESV MOD BP: 84 02 ml  LVIDd: 5 99 cm  LVIDs: 4 59 cm  LVLd A2C: 8 45 cm  LVLd A4C: 8 58 cm  LVLs A2C: 7 63 cm  LVLs A4C: 8 25 cm  LVPWd: 1 21 cm  RA Area: 15 27 cm2  RVIDd: 4 39 cm  SV MOD A2C: 55 35 ml  SV MOD A4C: 61 92 ml  SV(Teich): 82 71 ml    CW  TR MaxP 66 mmHg  TR Vmax: 3 52 m/s    MM  TAPSE: 1 8 cm    [facility] Accredited Ech[facility] Laboratory    Prepared and electronically signed by    Carrie Green MD  Signed 2019 16:15:06       Results for orders placed during the hospital encounter of 19   PATIENCE    Narrative [place] [address], [address] [facility]  [phone]    Transesophageal Echocardiogram  Limited 2D, Doppler, and Color Doppler    Study date:  2019    Patient: Mayur Arizmendi  MR number: [id number]  Account number: [de-identified]  : 1950  Age: 80 years  Gender: Male  Status: Outpatient  Location: Cath lab  Height: 66 in  Weight: 156 lb  BP: 140/ 100 mmHg    Indications: Atrial flutter w/ cardioversion  Diagnoses: I48 1 - Atrial flutter    Sonographer:  Malick Meredith RDCS  Primary Physician:  Cheryl Lang  Referring Physician:  Carrie Green MD  Group:  [facility]  RN:  Javi Oliva  Interpreting Physician:  Carrie Green MD    SUMMARY    LEFT VENTRICLE:  The ventricle was mildly dilated  Systolic function was severely reduced  Ejection fraction was estimated to be 25 %  There was severe diffuse hypokinesis  Wall thickness was normal     RIGHT VENTRICLE:  The size was normal   Systolic function was mildly reduced  LEFT ATRIAL APPENDAGE:  S/P clipping  No thrombus  MITRAL VALVE:  Prior repair procedures: surgical repair  There was moderate regurgitation  TRICUSPID VALVE:  There was mild regurgitation  HISTORY: PRIOR HISTORY: DM2   Dilated cardiomyopathy  CAD  Bicuspid aortic valve  Hypertension  Systolic congestive heart failure  S/P AVR and MV clip; March 2019  PROCEDURE: The procedure was performed in the catheterization laboratory  This was a routine study  The risks and alternatives of the procedure were explained to the patient and informed consent was obtained  The transesophageal approach  was used  The study included limited 2D imaging, limited spectral Doppler, color Doppler, and probe insertion (without interpretation)  The heart rate was 125 bpm, at the start of the study  An adult omniplane probe was inserted by the  attending cardiologist  Intubated with ease  One intubation attempt(s)  There was no blood detected on the probe  Image quality was adequate  There were no complications during the procedure  MEDICATIONS: Anesthesia administered by  anesthesia team     LEFT VENTRICLE: The ventricle was mildly dilated  Systolic function was severely reduced  Ejection fraction was estimated to be 25 %  There was severe diffuse hypokinesis  Wall thickness was normal     RIGHT VENTRICLE: The size was normal  Systolic function was mildly reduced  Wall thickness was normal     LEFT ATRIUM: Size was normal  No thrombus was identified  APPENDAGE: S/P clipping  No thrombus  ATRIAL SEPTUM: No defect or patent foramen ovale was identified  RIGHT ATRIUM: Size was normal  No thrombus was identified  MITRAL VALVE: Prior repair procedures: surgical repair DOPPLER: There was moderate regurgitation  AORTIC VALVE: A bioprosthesis was present  It exhibited normal function  DOPPLER: There was no regurgitation  TRICUSPID VALVE: The valve structure was normal  There was normal leaflet separation  There was no echocardiographic evidence of vegetation  DOPPLER: There was mild regurgitation  PULMONIC VALVE: Leaflets exhibited normal thickness, no calcification, and normal cuspal separation   There was no echocardiographic evidence of vegetation  PERICARDIUM: There was no pericardial effusion  The pericardium was normal in appearance  AORTA: The root exhibited normal size  There was no atheroma  There was no evidence for dissection  There was no evidence for aneurysm      [address] Accredited Echocardiography Laboratory    Prepared and electronically signed by    Violet Christian MD  Signed 12-Apr-2019 15:55:03

## 2020-03-24 ENCOUNTER — DOCUMENTATION (OUTPATIENT)
Dept: NEPHROLOGY | Facility: CLINIC | Age: 70
End: 2020-03-24

## 2020-03-24 ENCOUNTER — ANTICOAG VISIT (OUTPATIENT)
Dept: CARDIOLOGY CLINIC | Facility: CLINIC | Age: 70
End: 2020-03-24

## 2020-03-24 ENCOUNTER — APPOINTMENT (OUTPATIENT)
Dept: LAB | Facility: CLINIC | Age: 70
End: 2020-03-24
Payer: COMMERCIAL

## 2020-03-29 DIAGNOSIS — I48.92 ATRIAL FLUTTER, UNSPECIFIED TYPE (HCC): ICD-10-CM

## 2020-03-30 ENCOUNTER — TELEMEDICINE (OUTPATIENT)
Dept: NEPHROLOGY | Facility: CLINIC | Age: 70
End: 2020-03-30
Payer: COMMERCIAL

## 2020-03-30 DIAGNOSIS — N18.4 CKD (CHRONIC KIDNEY DISEASE), STAGE IV (HCC): Primary | ICD-10-CM

## 2020-03-30 DIAGNOSIS — N05.1 FSGS (FOCAL SEGMENTAL GLOMERULOSCLEROSIS): ICD-10-CM

## 2020-03-30 PROCEDURE — 99213 OFFICE O/P EST LOW 20 MIN: CPT | Performed by: INTERNAL MEDICINE

## 2020-03-30 RX ORDER — AMIODARONE HYDROCHLORIDE 200 MG/1
TABLET ORAL
Qty: 30 TABLET | Refills: 5 | Status: SHIPPED | OUTPATIENT
Start: 2020-03-30 | End: 2021-01-08

## 2020-03-30 NOTE — PROGRESS NOTES
Virtual Brief Visit    Problem List Items Addressed This Visit     None                Reason for visit is follow-up for stage 4 chronic kidney disease and fluid management  Encounter provider Janneth Garland DO    Provider located at [address] [place] [id number]      Recent Visits  No visits were found meeting these conditions  Showing recent visits within past 7 days and meeting all other requirements     Future Appointments  No visits were found meeting these conditions  Showing future appointments within next 150 days and meeting all other requirements        After connecting through telephone, the patient was identified by name and date of birth  Lorenzo Abrams was informed that this is a telemedicine visit and that the visit is being conducted through telephone  My office door was closed  No one else was in the room  He acknowledged consent and understanding of privacy and security of the video platform  The patient has agreed to participate and understands they can discontinue the visit at any time  Patient is aware this is a billable service  Subjective  Lorenzo Abrams is a 77 y o  male he has a history of stage 4 chronic kidney disease and focal segmental glomerulosclerosis and baseline creatinine has been 2 5-3 0 range  He states his weight has been stable in the low 170 range  He states that he has been doing well otherwise he has only needed to take the dose of diuretics bearing weight he took 1 yesterday when he felt bloated    He states that he is off insulin and is just taking Tradjenta  His baseline blood glucose levels have been 80-90        Past Medical History:   Diagnosis Date    Diabetes mellitus ([address] )     Hyperlipidemia     Hypertension     Proteinuria     Stage 3 chronic kidney disease ([address] )     Vitamin D deficiency        Past Surgical History:   Procedure Laterality Date    COLONOSCOPY      DE ECHO TRANSESOPHAG R-T 2D W/PRB IMG ACQUISJ I&R N/A 3/7/2019    Procedure: INTRA-OP TRANSESOPHAGEAL ECHOCARDIOGRAM (PATIENCE);   Surgeon: Christiano Mari DO;  [facility]: [facility];  Service: Cardiac Surgery    NM PERQ CLSR TCAT L ATR APNDGE W/ENDOCARDIAL IMPLNT  3/7/2019    Procedure: LEFT ATRIAL APPENDAGE OCCLUSION CLIPPED with 35mm Luciano Hurter;  Surgeon: Christiano Mari DO;  Location: [facility];  Service: Cardiac Surgery    NM REPLACEMENT OF MITRAL VALVE N/A 3/7/2019    Procedure: REPLACEMENT VALVE MITRAL (MVR) REPAIR with a 30mm MEDTRONIC CG FUTURE RING;  Surgeon: Christiano Mari DO;  [facility];  Service: Cardiac Surgery    NM RPLCMT AORTIC VALVE OPN W/STENTLESS TISSUE VALVE N/A 3/7/2019    Procedure: REPLACEMENT VALVE AORTIC (AVR) with 23mm TAVERA INSPIRIS RESILIA  AORTIC VALVE;  Surgeon: Christiano Mari DO;  [facility];  Service: Cardiac Surgery    TOE SURGERY Left        Current Outpatient Medications   Medication Sig Dispense Refill    amiodarone 200 mg tablet take 1 tablet by mouth once daily 30 tablet 5    amLODIPine (NORVASC) 2 5 mg tablet Take 1 tablet (2 5 mg total) by mouth daily 30 tablet 3    ascorbic acid (VITAMIN C) 500 mg tablet Take 500 mg by mouth daily      aspirin (ECOTRIN LOW STRENGTH) 81 mg EC tablet Take 1 tablet (81 mg total) by mouth daily 30 tablet 2    atorvastatin (LIPITOR) 20 mg tablet Take 1 tablet by mouth daily      calcitriol (ROCALTROL) 0 25 mcg capsule Take 1 capsule (0 25 mcg total) by mouth 4 (four) times a week Mon,Tues,Wed,Fri,Sat - 5 times a week 19 capsule 4    Cholecalciferol (VITAMIN D3) 1000 units CAPS Take 1 capsule by mouth daily      COMBIGAN 0 2-0 5 % instill 1 drop into both eyes twice a day  0    hydrALAZINE (APRESOLINE) 25 mg tablet Take 1 tablet (25 mg total) by mouth 3 (three) times a day 270 tablet 3    insulin glargine (LANTUS SOLOSTAR) 100 units/mL injection pen Inject 16 Units under the skin daily at bedtime for 30 days (Patient not taking: Reported on 9/25/2019) 5 pen 2    insulin isophane (HUMULIN N KWIKPEN) 100 units/mL injection pen Inject 4 Units under the skin 3 (three) times a day before meals (Patient not taking: Reported on 9/25/2019) 5 pen 2    Insulin Pen Needle 32G X 4 MM MISC Inject Lantus qHS and Novolog TID with meals, as directed  Dx: DM Ell 9 (Patient not taking: Reported on 11/27/2019) 100 each 0    isosorbide dinitrate (ISORDIL) 10 mg tablet Take 1 tablet (10 mg total) by mouth 3 (three) times a day (Patient not taking: Reported on 3/2/2020) 270 tablet 3    metoprolol succinate (TOPROL-XL) 50 mg 24 hr tablet Take 1 tablet (50 mg total) by mouth daily 180 tablet 3    torsemide (DEMADEX) 10 mg tablet Take 10 mg by mouth as needed Take one tablet QOD       TRADJENTA 5 MG TABS daily   0    TRAVATAN Z 0 004 % ophthalmic solution   0    warfarin (COUMADIN) 2 mg tablet TAKE 1 TO 2 TABLETS DAILY BY MOUTH OR AS DIRECTED BY PHYSICIAN  (Patient taking differently: Take 3 mg by mouth daily 3 mg daily) 60 tablet 11     No current facility-administered medications for this visit  Allergies   Allergen Reactions    Ace Inhibitors Cough       Review of Systems no chest pressure or shortness of breath no nausea vomiting diarrhea no abdominal pain no back pain no urgency frequency hematuria intermittent leg edema  Very mild he denies any hematuria or frothy bubbly urine    Assessment done via telephone  Regarding telephone assessment:  1  Stage 4 chronic kidney disease:  Since his hospitalization his creatinine baseline has been 2 5-3 0  Labs last done in February 2020 showed a creatinine of 2 6 which is within his new baseline range  2  Focal segmental glomerular sclerosis:  He has biopsy-proven felt to be secondary focal segmental glomerulosclerosis  Given his advanced chronic kidney disease at this point unable to utilize an ARB given the lability of his creatinine level    Will revisit this again recheck labs before next visit in 3 months  3  Fluid status:  His weight seems to be stable when I saw him in December his weight was 176 lb  He may have lost some muscle mass  That being said his new baseline weight seems to be in the lower 170 lb range  Continue to follow      I spent 15 minutes with the patient during this visit

## 2020-04-16 ENCOUNTER — TRANSCRIBE ORDERS (OUTPATIENT)
Dept: LAB | Facility: CLINIC | Age: 70
End: 2020-04-16

## 2020-04-16 DIAGNOSIS — I48.92 ATRIAL FLUTTER, UNSPECIFIED TYPE (HCC): Primary | ICD-10-CM

## 2020-04-17 ENCOUNTER — ANTICOAG VISIT (OUTPATIENT)
Dept: CARDIOLOGY CLINIC | Facility: CLINIC | Age: 70
End: 2020-04-17

## 2020-04-17 ENCOUNTER — APPOINTMENT (OUTPATIENT)
Dept: LAB | Facility: CLINIC | Age: 70
End: 2020-04-17
Payer: COMMERCIAL

## 2020-04-21 DIAGNOSIS — Z95.2 S/P AVR: ICD-10-CM

## 2020-04-21 DIAGNOSIS — I48.0 PAROXYSMAL ATRIAL FIBRILLATION (HCC): Primary | ICD-10-CM

## 2020-04-21 RX ORDER — WARFARIN SODIUM 2 MG/1
TABLET ORAL
Qty: 60 TABLET | Refills: 11 | Status: SHIPPED | OUTPATIENT
Start: 2020-04-21 | End: 2021-05-16

## 2020-05-08 ENCOUNTER — TRANSCRIBE ORDERS (OUTPATIENT)
Dept: LAB | Facility: CLINIC | Age: 70
End: 2020-05-08

## 2020-05-08 ENCOUNTER — APPOINTMENT (OUTPATIENT)
Dept: LAB | Facility: CLINIC | Age: 70
End: 2020-05-08
Payer: COMMERCIAL

## 2020-05-08 ENCOUNTER — ANTICOAG VISIT (OUTPATIENT)
Dept: CARDIOLOGY CLINIC | Facility: CLINIC | Age: 70
End: 2020-05-08

## 2020-05-08 DIAGNOSIS — E11.649 UNCONTROLLED TYPE 2 DIABETES MELLITUS WITH HYPOGLYCEMIA, UNSPECIFIED HYPOGLYCEMIA COMA STATUS (HCC): ICD-10-CM

## 2020-05-08 DIAGNOSIS — E78.5 HYPERLIPIDEMIA, UNSPECIFIED HYPERLIPIDEMIA TYPE: ICD-10-CM

## 2020-05-08 DIAGNOSIS — I10 ESSENTIAL HYPERTENSION, MALIGNANT: ICD-10-CM

## 2020-05-08 DIAGNOSIS — I51.9 MYXEDEMA HEART DISEASE: ICD-10-CM

## 2020-05-08 DIAGNOSIS — N52.2 DRUG-INDUCED ERECTILE DYSFUNCTION: ICD-10-CM

## 2020-05-08 DIAGNOSIS — M10.9 GOUT, UNSPECIFIED CAUSE, UNSPECIFIED CHRONICITY, UNSPECIFIED SITE: ICD-10-CM

## 2020-05-08 DIAGNOSIS — E03.9 MYXEDEMA HEART DISEASE: ICD-10-CM

## 2020-05-08 DIAGNOSIS — E11.649 UNCONTROLLED TYPE 2 DIABETES MELLITUS WITH HYPOGLYCEMIA, UNSPECIFIED HYPOGLYCEMIA COMA STATUS (HCC): Primary | ICD-10-CM

## 2020-05-08 LAB
ANION GAP SERPL CALCULATED.3IONS-SCNC: 9 MMOL/L (ref 4–13)
BUN SERPL-MCNC: 37 MG/DL (ref 5–25)
CALCIUM SERPL-MCNC: 8.3 MG/DL (ref 8.3–10.1)
CHLORIDE SERPL-SCNC: 107 MMOL/L (ref 100–108)
CHOLEST SERPL-MCNC: 128 MG/DL (ref 50–200)
CK MB SERPL-MCNC: 2 NG/ML (ref 0–5)
CK MB SERPL-MCNC: <1 % (ref 0–2.5)
CK SERPL-CCNC: 222 U/L (ref 39–308)
CO2 SERPL-SCNC: 22 MMOL/L (ref 21–32)
CREAT SERPL-MCNC: 2.98 MG/DL (ref 0.6–1.3)
ERYTHROCYTE [DISTWIDTH] IN BLOOD BY AUTOMATED COUNT: 13.5 % (ref 11.6–15.1)
EST. AVERAGE GLUCOSE BLD GHB EST-MCNC: 108 MG/DL
GFR SERPL CREATININE-BSD FRML MDRD: 24 ML/MIN/1.73SQ M
GLUCOSE P FAST SERPL-MCNC: 97 MG/DL (ref 65–99)
HBA1C MFR BLD: 5.4 %
HCT VFR BLD AUTO: 45.1 % (ref 36.5–49.3)
HDLC SERPL-MCNC: 47 MG/DL
HGB BLD-MCNC: 14.9 G/DL (ref 12–17)
LDLC SERPL CALC-MCNC: 62 MG/DL (ref 0–100)
MCH RBC QN AUTO: 31 PG (ref 26.8–34.3)
MCHC RBC AUTO-ENTMCNC: 33 G/DL (ref 31.4–37.4)
MCV RBC AUTO: 94 FL (ref 82–98)
NONHDLC SERPL-MCNC: 81 MG/DL
PLATELET # BLD AUTO: 177 THOUSANDS/UL (ref 149–390)
PMV BLD AUTO: 9.8 FL (ref 8.9–12.7)
POTASSIUM SERPL-SCNC: 5.1 MMOL/L (ref 3.5–5.3)
PROLACTIN SERPL-MCNC: 13.6 NG/ML (ref 2.5–17.4)
RBC # BLD AUTO: 4.81 MILLION/UL (ref 3.88–5.62)
SODIUM SERPL-SCNC: 138 MMOL/L (ref 136–145)
TESTOST SERPL-MCNC: 535 NG/DL (ref 95–948)
TRIGL SERPL-MCNC: 96 MG/DL
TSH SERPL DL<=0.05 MIU/L-ACNC: 2.63 UIU/ML (ref 0.36–3.74)
URATE SERPL-MCNC: 7.4 MG/DL (ref 4.2–8)
WBC # BLD AUTO: 6.65 THOUSAND/UL (ref 4.31–10.16)

## 2020-05-08 PROCEDURE — 83036 HEMOGLOBIN GLYCOSYLATED A1C: CPT

## 2020-05-08 PROCEDURE — 82553 CREATINE MB FRACTION: CPT

## 2020-05-08 PROCEDURE — 84146 ASSAY OF PROLACTIN: CPT

## 2020-05-08 PROCEDURE — 80048 BASIC METABOLIC PNL TOTAL CA: CPT

## 2020-05-08 PROCEDURE — 84403 ASSAY OF TOTAL TESTOSTERONE: CPT

## 2020-05-08 PROCEDURE — 82550 ASSAY OF CK (CPK): CPT

## 2020-05-08 PROCEDURE — 84550 ASSAY OF BLOOD/URIC ACID: CPT

## 2020-05-08 PROCEDURE — 84443 ASSAY THYROID STIM HORMONE: CPT

## 2020-05-08 PROCEDURE — 80061 LIPID PANEL: CPT

## 2020-05-08 PROCEDURE — 85027 COMPLETE CBC AUTOMATED: CPT

## 2020-05-12 DIAGNOSIS — I10 ESSENTIAL HYPERTENSION: Chronic | ICD-10-CM

## 2020-05-12 RX ORDER — AMLODIPINE BESYLATE 2.5 MG/1
TABLET ORAL
Qty: 30 TABLET | Refills: 3 | Status: SHIPPED | OUTPATIENT
Start: 2020-05-12 | End: 2020-05-13 | Stop reason: SDUPTHER

## 2020-05-13 DIAGNOSIS — I10 ESSENTIAL HYPERTENSION: Chronic | ICD-10-CM

## 2020-05-13 RX ORDER — AMLODIPINE BESYLATE 2.5 MG/1
2.5 TABLET ORAL DAILY
Qty: 90 TABLET | Refills: 3 | Status: SHIPPED | OUTPATIENT
Start: 2020-05-13 | End: 2021-08-17 | Stop reason: SDUPTHER

## 2020-05-26 ENCOUNTER — APPOINTMENT (OUTPATIENT)
Dept: LAB | Facility: CLINIC | Age: 70
End: 2020-05-26
Payer: COMMERCIAL

## 2020-05-26 ENCOUNTER — ANTICOAG VISIT (OUTPATIENT)
Dept: CARDIOLOGY CLINIC | Facility: CLINIC | Age: 70
End: 2020-05-26

## 2020-05-29 DIAGNOSIS — N25.81 SECONDARY HYPERPARATHYROIDISM (HCC): Chronic | ICD-10-CM

## 2020-05-30 RX ORDER — CALCITRIOL 0.25 UG/1
0.25 CAPSULE, LIQUID FILLED ORAL
Qty: 19 CAPSULE | Refills: 4 | Status: SHIPPED | OUTPATIENT
Start: 2020-05-30 | End: 2020-11-15

## 2020-06-20 DIAGNOSIS — Z95.2 S/P AVR: ICD-10-CM

## 2020-06-21 RX ORDER — ACETAMINOPHEN/DIPHENHYDRAMINE 500MG-25MG
TABLET ORAL
Qty: 90 TABLET | Refills: 6 | Status: SHIPPED | OUTPATIENT
Start: 2020-06-21 | End: 2021-06-27

## 2020-06-30 DIAGNOSIS — I42.0 DILATED CARDIOMYOPATHY (HCC): ICD-10-CM

## 2020-06-30 DIAGNOSIS — I10 ESSENTIAL HYPERTENSION: Chronic | ICD-10-CM

## 2020-06-30 RX ORDER — METOPROLOL SUCCINATE 50 MG/1
50 TABLET, EXTENDED RELEASE ORAL DAILY
Qty: 180 TABLET | Refills: 1 | Status: SHIPPED | OUTPATIENT
Start: 2020-06-30 | End: 2021-07-06

## 2020-07-01 ENCOUNTER — ANTICOAG VISIT (OUTPATIENT)
Dept: CARDIOLOGY CLINIC | Facility: CLINIC | Age: 70
End: 2020-07-01

## 2020-07-01 ENCOUNTER — TRANSCRIBE ORDERS (OUTPATIENT)
Dept: LAB | Facility: CLINIC | Age: 70
End: 2020-07-01

## 2020-07-01 ENCOUNTER — APPOINTMENT (OUTPATIENT)
Dept: LAB | Facility: CLINIC | Age: 70
End: 2020-07-01
Payer: COMMERCIAL

## 2020-08-03 ENCOUNTER — ANTICOAG VISIT (OUTPATIENT)
Dept: CARDIOLOGY CLINIC | Facility: CLINIC | Age: 70
End: 2020-08-03

## 2020-08-03 ENCOUNTER — APPOINTMENT (OUTPATIENT)
Dept: LAB | Facility: CLINIC | Age: 70
End: 2020-08-03
Payer: COMMERCIAL

## 2020-08-20 DIAGNOSIS — R80.9 PROTEINURIA, UNSPECIFIED TYPE: ICD-10-CM

## 2020-08-20 DIAGNOSIS — N18.32 CHRONIC KIDNEY DISEASE (CKD) STAGE G3B/A3, MODERATELY DECREASED GLOMERULAR FILTRATION RATE (GFR) BETWEEN 30-44 ML/MIN/1.73 SQUARE METER AND ALBUMINURIA CREATININE RATIO GREATER THAN 300 MG/G (HCC): Primary | ICD-10-CM

## 2020-08-20 DIAGNOSIS — N25.81 SECONDARY HYPERPARATHYROIDISM (HCC): ICD-10-CM

## 2020-08-21 ENCOUNTER — APPOINTMENT (OUTPATIENT)
Dept: LAB | Facility: CLINIC | Age: 70
End: 2020-08-21
Payer: COMMERCIAL

## 2020-08-21 ENCOUNTER — TRANSCRIBE ORDERS (OUTPATIENT)
Dept: LAB | Facility: CLINIC | Age: 70
End: 2020-08-21

## 2020-08-21 DIAGNOSIS — N18.32 CHRONIC KIDNEY DISEASE (CKD) STAGE G3B/A3, MODERATELY DECREASED GLOMERULAR FILTRATION RATE (GFR) BETWEEN 30-44 ML/MIN/1.73 SQUARE METER AND ALBUMINURIA CREATININE RATIO GREATER THAN 300 MG/G (HCC): ICD-10-CM

## 2020-08-21 LAB
ALBUMIN SERPL BCP-MCNC: 3.2 G/DL (ref 3.5–5)
ALP SERPL-CCNC: 65 U/L (ref 46–116)
ALT SERPL W P-5'-P-CCNC: 22 U/L (ref 12–78)
ANION GAP SERPL CALCULATED.3IONS-SCNC: 8 MMOL/L (ref 4–13)
AST SERPL W P-5'-P-CCNC: 19 U/L (ref 5–45)
BACTERIA UR QL AUTO: ABNORMAL /HPF
BILIRUB SERPL-MCNC: 0.46 MG/DL (ref 0.2–1)
BILIRUB UR QL STRIP: NEGATIVE
BUN SERPL-MCNC: 37 MG/DL (ref 5–25)
CALCIUM SERPL-MCNC: 8.4 MG/DL (ref 8.3–10.1)
CHLORIDE SERPL-SCNC: 106 MMOL/L (ref 100–108)
CLARITY UR: CLEAR
CO2 SERPL-SCNC: 23 MMOL/L (ref 21–32)
COLOR UR: YELLOW
CREAT SERPL-MCNC: 3.17 MG/DL (ref 0.6–1.3)
CREAT UR-MCNC: 152 MG/DL
ERYTHROCYTE [DISTWIDTH] IN BLOOD BY AUTOMATED COUNT: 13.2 % (ref 11.6–15.1)
GFR SERPL CREATININE-BSD FRML MDRD: 22 ML/MIN/1.73SQ M
GLUCOSE P FAST SERPL-MCNC: 91 MG/DL (ref 65–99)
GLUCOSE UR STRIP-MCNC: NEGATIVE MG/DL
HCT VFR BLD AUTO: 45.4 % (ref 36.5–49.3)
HGB BLD-MCNC: 15 G/DL (ref 12–17)
HGB UR QL STRIP.AUTO: ABNORMAL
KETONES UR STRIP-MCNC: NEGATIVE MG/DL
LEUKOCYTE ESTERASE UR QL STRIP: NEGATIVE
MAGNESIUM SERPL-MCNC: 1.9 MG/DL (ref 1.6–2.6)
MCH RBC QN AUTO: 31.2 PG (ref 26.8–34.3)
MCHC RBC AUTO-ENTMCNC: 33 G/DL (ref 31.4–37.4)
MCV RBC AUTO: 94 FL (ref 82–98)
NITRITE UR QL STRIP: NEGATIVE
NON-SQ EPI CELLS URNS QL MICRO: ABNORMAL /HPF
PH UR STRIP.AUTO: 6 [PH]
PHOSPHATE SERPL-MCNC: 3.5 MG/DL (ref 2.3–4.1)
PLATELET # BLD AUTO: 173 THOUSANDS/UL (ref 149–390)
PMV BLD AUTO: 9.9 FL (ref 8.9–12.7)
POTASSIUM SERPL-SCNC: 4.6 MMOL/L (ref 3.5–5.3)
PROT SERPL-MCNC: 7.7 G/DL (ref 6.4–8.2)
PROT UR STRIP-MCNC: >=300 MG/DL
PROT UR-MCNC: 374 MG/DL
PROT/CREAT UR: 2.46 MG/G{CREAT} (ref 0–0.1)
PTH-INTACT SERPL-MCNC: 97.2 PG/ML (ref 18.4–80.1)
RBC # BLD AUTO: 4.81 MILLION/UL (ref 3.88–5.62)
RBC #/AREA URNS AUTO: ABNORMAL /HPF
SODIUM SERPL-SCNC: 137 MMOL/L (ref 136–145)
SP GR UR STRIP.AUTO: 1.02 (ref 1–1.03)
UROBILINOGEN UR QL STRIP.AUTO: 0.2 E.U./DL
WBC # BLD AUTO: 7.76 THOUSAND/UL (ref 4.31–10.16)
WBC #/AREA URNS AUTO: ABNORMAL /HPF

## 2020-08-21 PROCEDURE — 81001 URINALYSIS AUTO W/SCOPE: CPT

## 2020-08-21 PROCEDURE — 85027 COMPLETE CBC AUTOMATED: CPT

## 2020-08-21 PROCEDURE — 84100 ASSAY OF PHOSPHORUS: CPT

## 2020-08-21 PROCEDURE — 83970 ASSAY OF PARATHORMONE: CPT

## 2020-08-21 PROCEDURE — 80053 COMPREHEN METABOLIC PANEL: CPT

## 2020-08-21 PROCEDURE — 84156 ASSAY OF PROTEIN URINE: CPT

## 2020-08-21 PROCEDURE — 36415 COLL VENOUS BLD VENIPUNCTURE: CPT

## 2020-08-21 PROCEDURE — 82570 ASSAY OF URINE CREATININE: CPT

## 2020-08-21 PROCEDURE — 83735 ASSAY OF MAGNESIUM: CPT

## 2020-08-23 NOTE — ASSESSMENT & PLAN NOTE
Wt Readings from Last 3 Encounters:   03/02/20 78 9 kg (174 lb)   12/16/19 79 8 kg (176 lb)   12/16/19 80 2 kg (176 lb 14 4 oz)     His weight at home has been between 165-172 pounds  His current weight is 170 pounds  He only has taken one Lasix a week  Continue with current management

## 2020-08-23 NOTE — ASSESSMENT & PLAN NOTE
Intact PTH is 97, phos is in mid 3 0 range and corrected calcium is in the mid 9 0 range   Continue with vitamin D analogues

## 2020-08-23 NOTE — ASSESSMENT & PLAN NOTE
Since his hospitalization his creatinine baseline has been 2 5-3 0  Labs last done in February 2020 showed a creatinine of 2 6 which is within his new baseline range  This is where his Cr had settled out after he recovered from cardiac surgery  The patient had labs done Sep 10 which showed a Cr of 3 35  His most recent Cr is 3 1  Continue to monitor

## 2020-08-23 NOTE — ASSESSMENT & PLAN NOTE
He does have stage 3 chronic kidney disease secondary to biopsy-proven FSGS which is felt to be more a secondary form of FSGS is there was glomerular megaly at only 15% foot process effacement   The degree of fibrosis was only mild on kidney biopsy   This was again all prior to his cardiac surgery

## 2020-08-24 ENCOUNTER — OFFICE VISIT (OUTPATIENT)
Dept: NEPHROLOGY | Facility: CLINIC | Age: 70
End: 2020-08-24
Payer: COMMERCIAL

## 2020-08-24 ENCOUNTER — TELEPHONE (OUTPATIENT)
Dept: NEPHROLOGY | Facility: CLINIC | Age: 70
End: 2020-08-24

## 2020-08-24 VITALS — BODY MASS INDEX: 28.57 KG/M2 | WEIGHT: 177 LBS | TEMPERATURE: 97.6 F

## 2020-08-24 DIAGNOSIS — R80.1 PERSISTENT PROTEINURIA: ICD-10-CM

## 2020-08-24 DIAGNOSIS — N25.81 SECONDARY HYPERPARATHYROIDISM OF RENAL ORIGIN (HCC): ICD-10-CM

## 2020-08-24 DIAGNOSIS — N05.1 FSGS (FOCAL SEGMENTAL GLOMERULOSCLEROSIS): ICD-10-CM

## 2020-08-24 DIAGNOSIS — N18.4 CKD (CHRONIC KIDNEY DISEASE), STAGE IV (HCC): Primary | ICD-10-CM

## 2020-08-24 DIAGNOSIS — I50.22 CHRONIC SYSTOLIC HEART FAILURE (HCC): Chronic | ICD-10-CM

## 2020-08-24 PROCEDURE — 99214 OFFICE O/P EST MOD 30 MIN: CPT | Performed by: INTERNAL MEDICINE

## 2020-08-24 NOTE — PATIENT INSTRUCTIONS
1  Thank you for coming in to see me today  2  Your kidney function has remained stable since your heart surgery      3  Please be well and be safe!! Will follow-up in four months

## 2020-08-24 NOTE — PROGRESS NOTES
Assessment/Plan:    CKD (chronic kidney disease), stage IV ([facility] [address] )  Since his hospitalization his creatinine baseline has been 2 5-3 0  Labs last done in February 2020 showed a creatinine of 2 6 which is within his new baseline range  This is where his Cr had settled out after he recovered from cardiac surgery  The patient had labs done Sep 10 which showed a Cr of 3 35  His most recent Cr is 3 1  Continue to monitor  FSGS (focal segmental glomerulosclerosis)  He does have stage 3 chronic kidney disease secondary to biopsy-proven FSGS which is felt to be more a secondary form of FSGS is there was glomerular megaly at only 15% foot process effacement   The degree of fibrosis was only mild on kidney biopsy  This was again all prior to his cardiac surgery    Persistent proteinuria  His p/c ratio was 2 46  He has not been on ARB due to recent CARY and new set point creatinine level  P/c ratio was 2 46 and UA showed no hematuria on microscopic analysis  Proteinuria could be due to FSGS plus or minus diabetic nephropathy  Secondary hyperparathyroidism of renal origin ([facility] )  Intact PTH is 97, phos is in mid 3 0 range and corrected calcium is in the mid 9 0 range  Continue with vitamin D analogues    Chronic systolic heart failure (HCC)  Wt Readings from Last 3 Encounters:   03/02/20 78 9 kg (174 lb)   12/16/19 79 8 kg (176 lb)   12/16/19 80 2 kg (176 lb 14 4 oz)     His weight at home has been between 165-172 pounds  His current weight is 170 pounds  He only has taken one Lasix a week  Continue with current management  Subjective:      Patient ID: Kimberly Willams is a 72 y o  male  HPI    The patient is here for follow-up of kidney disease and fluid management  He has been feeling well  He has only had to use Lasix once a week  His weight has been stable  Meds and labs reviewed  Review of Systems   Respiratory: Negative for cough  Cardiovascular: Negative for chest pain     Gastrointestinal: Negative for nausea and vomiting  Neurological: Negative for dizziness and tremors  Objective:      Temp 97 6 °F (36 4 °C)   Wt 80 3 kg (177 lb)   BMI 28 57 kg/m²          Physical Exam  Constitutional:       Appearance: Normal appearance  HENT:      Mouth/Throat:      Mouth: Mucous membranes are moist    Eyes:      General: No scleral icterus  Cardiovascular:      Rate and Rhythm: Normal rate and regular rhythm  Pulmonary:      Effort: Pulmonary effort is normal       Breath sounds: Normal breath sounds  Abdominal:      General: Abdomen is flat  Bowel sounds are normal    Musculoskeletal:      Right lower leg: No edema  Left lower leg: No edema  Skin:     General: Skin is warm and dry

## 2020-09-18 ENCOUNTER — ANTICOAG VISIT (OUTPATIENT)
Dept: CARDIOLOGY CLINIC | Facility: CLINIC | Age: 70
End: 2020-09-18

## 2020-09-18 ENCOUNTER — APPOINTMENT (OUTPATIENT)
Dept: LAB | Facility: CLINIC | Age: 70
End: 2020-09-18
Payer: COMMERCIAL

## 2020-09-22 ENCOUNTER — OFFICE VISIT (OUTPATIENT)
Dept: CARDIOLOGY CLINIC | Facility: CLINIC | Age: 70
End: 2020-09-22
Payer: COMMERCIAL

## 2020-09-22 VITALS
HEART RATE: 63 BPM | WEIGHT: 177.4 LBS | SYSTOLIC BLOOD PRESSURE: 120 MMHG | DIASTOLIC BLOOD PRESSURE: 70 MMHG | HEIGHT: 66 IN | BODY MASS INDEX: 28.51 KG/M2 | RESPIRATION RATE: 97 BRPM

## 2020-09-22 DIAGNOSIS — I25.10 CORONARY ARTERY DISEASE INVOLVING NATIVE CORONARY ARTERY OF NATIVE HEART WITHOUT ANGINA PECTORIS: Chronic | ICD-10-CM

## 2020-09-22 DIAGNOSIS — I10 ESSENTIAL HYPERTENSION: Chronic | ICD-10-CM

## 2020-09-22 DIAGNOSIS — I42.0 DILATED CARDIOMYOPATHY (HCC): Chronic | ICD-10-CM

## 2020-09-22 DIAGNOSIS — I48.92 ATRIAL FLUTTER, UNSPECIFIED TYPE (HCC): ICD-10-CM

## 2020-09-22 DIAGNOSIS — Z95.2 S/P AVR: ICD-10-CM

## 2020-09-22 DIAGNOSIS — Z98.890 S/P MVR (MITRAL VALVE REPAIR): ICD-10-CM

## 2020-09-22 DIAGNOSIS — Z12.11 ENCOUNTER FOR SCREENING COLONOSCOPY: Primary | ICD-10-CM

## 2020-09-22 PROCEDURE — 1036F TOBACCO NON-USER: CPT | Performed by: INTERNAL MEDICINE

## 2020-09-22 PROCEDURE — 1160F RVW MEDS BY RX/DR IN RCRD: CPT | Performed by: INTERNAL MEDICINE

## 2020-09-22 PROCEDURE — 99214 OFFICE O/P EST MOD 30 MIN: CPT | Performed by: INTERNAL MEDICINE

## 2020-09-22 PROCEDURE — 3078F DIAST BP <80 MM HG: CPT | Performed by: INTERNAL MEDICINE

## 2020-09-22 NOTE — PROGRESS NOTES
Cardiology   KAYLEN HARRIS [facility] 72 y o  male MRN: [id number]        Impression:  1  S/P AVR/MV repair 3/19 - doing well    2  Atrial flutter - in NSR  On warfarin and amiodarone  3  Hypertension - controlled  4  Cardiomyopathy - likely tachy mediated  Resolved  5  CKD     Recommendations:  1  Discontinue amiodarone  2  Continue remainder of medications  3  Check echo to evaluate AVR/MVR/ LV function  4  Follow up in 6 months  HPI: KAYLEN HARRIS [facility] is a 72y o  year old male with mod-severe MR/mod-severe AI and EF 40% s/p bioAVR and MV repair with annuloplasty ring and VIRI clipping 3/19, with paroxysmal atrial fibrillation/atrial flutter who returns for follow up  Lorin hyde PATIENCE/DCCV 4/12/19 - EF 25%, mod MR, no clot   Successfully converted to NSR  Review of Systems   Constitutional: Negative  HENT: Negative  Eyes: Negative  Respiratory: Negative for chest tightness and shortness of breath  Cardiovascular: Negative for chest pain, palpitations and leg swelling  Gastrointestinal: Negative  Endocrine: Negative  Genitourinary: Negative  Musculoskeletal: Negative  Skin: Negative  Allergic/Immunologic: Negative  Neurological: Negative  Hematological: Negative  Psychiatric/Behavioral: Negative  All other systems reviewed and are negative  Past Medical History:   Diagnosis Date    Diabetes mellitus ([facility] [address] )     Hyperlipidemia     Hypertension     Proteinuria     Stage 3 chronic kidney disease ([facility] [address] )     Vitamin D deficiency      Past Surgical History:   Procedure Laterality Date    COLONOSCOPY      overdue    MO ECHO TRANSESOPHAG R-T 2D W/PRB IMG ACQUISJ I&R N/A 3/7/2019    Procedure: INTRA-OP TRANSESOPHAGEAL ECHOCARDIOGRAM (PATIENCE);   Surgeon: Cesar Rico DO;  [facility]: [facility];  Service: Cardiac Surgery    MO PERQ CLSR TCAT L ATR APNDGE W/ENDOCARDIAL IMPLNT  3/7/2019    Procedure: LEFT ATRIAL APPENDAGE OCCLUSION CLIPPED with 35mm Rosy Joe; Surgeon: Stephie Olson DO;  [facility];  Service: Cardiac Surgery    NY REPLACEMENT OF MITRAL VALVE N/A 3/7/2019    Procedure: REPLACEMENT VALVE MITRAL (MVR) REPAIR with a 30mm MEDTRONIC CG FUTURE RING;  Surgeon: Stephie Olson DO;  [facility];  Service: Cardiac Surgery    NY RPLCMT AORTIC VALVE OPN W/STENTLESS TISSUE VALVE N/A 3/7/2019    Procedure: REPLACEMENT VALVE AORTIC (AVR) with 23mm TAVERA INSPIRIS RESILIA  AORTIC VALVE;  Surgeon: Stephie Olson DO;  [facility];  Service: Cardiac Surgery    RENAL BIOPSY, OPEN      TOE SURGERY Left      Social History     Substance and Sexual Activity   Alcohol Use Yes    Frequency: Monthly or less    Drinks per session: 1 or 2    Binge frequency: Never    Comment: occasionally     Social History     Substance and Sexual Activity   Drug Use No     Social History     Tobacco Use   Smoking Status Former Smoker    Types: Cigarettes    Last attempt to quit: Martinez Thomas Years since quittin 7   Smokeless Tobacco Never Used     Family History   Problem Relation Age of Onset   Susana Garcia Stroke Mother     Hypertension Mother     Blindness Father     Hyperlipidemia Father     Hypertension Father     Glaucoma Father     Gout Brother     Heart Valve Disease Brother     Hypertension Brother     Cancer Sister     Diabetes Sister     Ovarian cancer Sister     Anuerysm Neg Hx     Heart attack Neg Hx     Arrhythmia Neg Hx     Heart failure Neg Hx     Coronary artery disease Neg Hx     Sudden death Neg Hx         scd       Allergies:   Allergies   Allergen Reactions    Ace Inhibitors Cough       Medications:     Current Outpatient Medications:     amiodarone 200 mg tablet, take 1 tablet by mouth once daily, Disp: 30 tablet, Rfl: 5    amLODIPine (NORVASC) 2 5 mg tablet, Take 1 tablet (2 5 mg total) by mouth daily, Disp: 90 tablet, Rfl: 3    ascorbic acid (VITAMIN C) 500 mg tablet, Take 500 mg by mouth daily, Disp: , Rfl:    atorvastatin (LIPITOR) 20 mg tablet, Take 1 tablet by mouth daily, Disp: , Rfl:     calcitriol (ROCALTROL) 0 25 mcg capsule, Take 1 capsule (0 25 mcg total) by mouth 4 (four) times a week Mon,Tues,Wed,Fri,Sat - 5 times a week, Disp: 19 capsule, Rfl: 4    Cholecalciferol (VITAMIN D3) 1000 units CAPS, Take 1 capsule by mouth daily, Disp: , Rfl:     COMBIGAN 0 2-0 5 %, instill 1 drop into both eyes twice a day, Disp: , Rfl: 0    hydrALAZINE (APRESOLINE) 25 mg tablet, Take 1 tablet (25 mg total) by mouth 3 (three) times a day, Disp: 270 tablet, Rfl: 3    isosorbide dinitrate (ISORDIL) 10 mg tablet, Take 1 tablet (10 mg total) by mouth 3 (three) times a day, Disp: 270 tablet, Rfl: 3    metoprolol succinate (TOPROL-XL) 50 mg 24 hr tablet, Take 1 tablet (50 mg total) by mouth daily, Disp: 180 tablet, Rfl: 1    RA ASPIRIN EC 81 MG EC tablet, take 1 tablet by mouth once daily, Disp: 90 tablet, Rfl: 6    torsemide (DEMADEX) 10 mg tablet, Take 10 mg by mouth as needed As needed, Disp: , Rfl:     TRADJENTA 5 MG TABS, 5 mg daily , Disp: , Rfl: 0    TRAVATAN Z 0 004 % ophthalmic solution, , Disp: , Rfl: 0    warfarin (COUMADIN) 2 mg tablet, TAKE 1 TO 2 TABLETS DAILY BY MOUTH OR AS DIRECTED BY PHYSICIAN , Disp: 60 tablet, Rfl: 11    insulin glargine (LANTUS SOLOSTAR) 100 units/mL injection pen, Inject 16 Units under the skin daily at bedtime for 30 days (Patient not taking: Reported on 9/25/2019), Disp: 5 pen, Rfl: 2    insulin isophane (HUMULIN N KWIKPEN) 100 units/mL injection pen, Inject 4 Units under the skin 3 (three) times a day before meals (Patient not taking: Reported on 9/22/2020), Disp: 5 pen, Rfl: 2    Insulin Pen Needle 32G X 4 MM MISC, Inject Lantus qHS and Novolog TID with meals, as directed   Dx: DM Ell 9 (Patient not taking: Reported on 9/22/2020), Disp: 100 each, Rfl: 0      Wt Readings from Last 3 Encounters:   09/22/20 80 5 kg (177 lb 6 4 oz)   08/24/20 80 3 kg (177 lb)   03/02/20 78 9 kg (174 lb) Temp Readings from Last 3 Encounters:   08/24/20 97 6 °F (36 4 °C)   07/13/19 98 °F (36 7 °C) (Oral)   04/12/19 98 °F (36 7 °C)     BP Readings from Last 3 Encounters:   09/22/20 120/70   03/02/20 126/66   12/16/19 130/70     Pulse Readings from Last 3 Encounters:   09/22/20 63   03/02/20 76   12/16/19 70         Physical Exam  HENT:      Head: Atraumatic  Mouth/Throat:      Mouth: Mucous membranes are moist    Eyes:      Extraocular Movements: Extraocular movements intact  Neck:      Musculoskeletal: Normal range of motion  Cardiovascular:      Rate and Rhythm: Normal rate and regular rhythm  Heart sounds: Murmur present  Systolic murmur present with a grade of 2/6  Pulmonary:      Effort: Pulmonary effort is normal       Breath sounds: Normal breath sounds  Abdominal:      General: Abdomen is flat  Musculoskeletal: Normal range of motion  Skin:     General: Skin is warm  Neurological:      General: No focal deficit present  Mental Status: He is alert and oriented to person, place, and time     Psychiatric:         Mood and Affect: Mood normal          Behavior: Behavior normal            Laboratory Studies:  CMP:  Lab Results   Component Value Date    K 4 6 08/21/2020     08/21/2020    CO2 23 08/21/2020    BUN 37 (H) 08/21/2020    CREATININE 3 17 (H) 08/21/2020    GLUCOSE 110 03/07/2019    AST 19 08/21/2020    ALT 22 08/21/2020    EGFR 22 08/21/2020       Lipid Profile:   No results found for: CHOL  Lab Results   Component Value Date    HDL 47 05/08/2020     Lab Results   Component Value Date    LDLCALC 62 05/08/2020     Lab Results   Component Value Date    TRIG 96 05/08/2020       Cardiac testing:     Results for orders placed during the hospital encounter of 01/29/19   Echo complete with contrast if indicated    Narrative [facility], [address] [facility]  [phone]    Transthoracic Echocardiogram  2D, M-mode, Doppler, and Color Doppler    Study date:  2019    Patient: Ayse Boland  MR number: [id number]  Account number: [de-identified]  : 1950  Age: 80 years  Gender: Male  Status: Inpatient  Location: Bedside  Height: 68 in  Weight: 163 lb  BP: 155/ 70 mmHg    Indications: Heart Failure    Diagnoses: I50 9 - Heart failure, unspecified    Sonographer:  TIMMY Pantoja  Primary Physician:  Jose Colvin  Referring Physician:  Rahel Gupta MD  [facility]  [facility]  Interpreting Physician:  Rahel Gupta MD    SUMMARY    LEFT VENTRICLE:  The ventricle was mildly dilated  Systolic function was mildly to moderately reduced  Ejection fraction was estimated to be 40 %  There was mild diffuse hypokinesis  Wall thickness was mildly increased  RIGHT VENTRICLE:  The ventricle was mildly dilated  Systolic function was normal     LEFT ATRIUM:  The atrium was mildly dilated  MITRAL VALVE:  There was moderate to severe regurgitation  AORTIC VALVE:  The valve was possibly bicuspid  Leaflets exhibited normal thickness, normal cuspal separation, and significant diastolic prolapse  There was moderate to severe regurgitation  TRICUSPID VALVE:  There was mild regurgitation  Pulmonary artery systolic pressure was moderately to markedly increased  The findings suggest moderate to severe pulmonary hypertension  AORTA:  The root exhibited mild dilatation  IVC, HEPATIC VEINS:  The inferior vena cava was mildly dilated  Respirophasic changes were blunted (less than 50% variation)  PERICARDIUM:  There was a left pleural effusion  HISTORY: PRIOR HISTORY: Murmur, HTN, DM2, HLD, CKD3    PROCEDURE: The procedure was performed at the bedside  This was a routine study  The transthoracic approach was used  The study included complete 2D imaging, M-mode, complete spectral Doppler, and color Doppler  The heart rate was 95 bpm,  at the start of the study   Images were obtained from the parasternal, apical, subcostal, and suprasternal notch acoustic windows  Image quality was adequate  LEFT VENTRICLE: The ventricle was mildly dilated  Systolic function was mildly to moderately reduced  Ejection fraction was estimated to be 40 %  There was mild diffuse hypokinesis  Wall thickness was mildly increased  RIGHT VENTRICLE: The ventricle was mildly dilated  Systolic function was normal  Wall thickness was normal     LEFT ATRIUM: The atrium was mildly dilated  RIGHT ATRIUM: Size was normal     MITRAL VALVE: Valve structure was normal  There was normal leaflet separation  DOPPLER: The transmitral velocity was within the normal range  There was no evidence for stenosis  There was moderate to severe regurgitation  AORTIC VALVE: The valve was possibly bicuspid  Leaflets exhibited normal thickness, normal cuspal separation, and significant diastolic prolapse  DOPPLER: Transaortic velocity was within the normal range  There was no evidence for  stenosis  There was moderate to severe regurgitation  The regurgitant jet was eccentric  TRICUSPID VALVE: The valve structure was normal  There was normal leaflet separation  DOPPLER: The transtricuspid velocity was within the normal range  There was no evidence for stenosis  There was mild regurgitation  Pulmonary artery  systolic pressure was moderately to markedly increased  Estimated peak PA pressure was 60 mmHg  The findings suggest moderate to severe pulmonary hypertension  PULMONIC VALVE: Leaflets exhibited normal thickness, no calcification, and normal cuspal separation  DOPPLER: The transpulmonic velocity was within the normal range  There was no regurgitation  PERICARDIUM: There was no pericardial effusion  There was a left pleural effusion  The pericardium was normal in appearance  AORTA: The root exhibited mild dilatation  SYSTEMIC VEINS: IVC: The inferior vena cava was mildly dilated   Respirophasic changes were blunted (less than 50% variation)  MEASUREMENT TABLES    2D MEASUREMENTS  Aorta   (Reference normals)  Root diam   39 mm   (--)    SYSTEM MEASUREMENT TABLES    2D  %FS: 23 42 %  Ao Diam: 3 91 cm  EDV(Teich): 179 53 ml  EF Biplane: 40 37 %  EF(Teich): 46 07 %  ESV(Teich): 96 82 ml  IVSd: 1 02 cm  LA Area: 12 24 cm2  LA Diam: 4 3 cm  LVEDV MOD A2C: 129 37 ml  LVEDV MOD A4C: 151 37 ml  LVEDV MOD BP: 140 9 ml  LVEF MOD A2C: 42 79 %  LVEF MOD A4C: 40 91 %  LVESV MOD A2C: 74 02 ml  LVESV MOD A4C: 89 45 ml  LVESV MOD BP: 84 02 ml  LVIDd: 5 99 cm  LVIDs: 4 59 cm  LVLd A2C: 8 45 cm  LVLd A4C: 8 58 cm  LVLs A2C: 7 63 cm  LVLs A4C: 8 25 cm  LVPWd: 1 21 cm  RA Area: 15 27 cm2  RVIDd: 4 39 cm  SV MOD A2C: 55 35 ml  SV MOD A4C: 61 92 ml  SV(Teich): 82 71 ml    CW  TR MaxP 66 mmHg  TR Vmax: 3 52 m/s    MM  TAPSE: 1 8 cm    [facility]    Prepared and electronically signed by    Allegra Chang MD  Signed 2019 16:15:06       Results for orders placed during the hospital encounter of 19   PATIENCE    Narrative [place] [address] [address] [place] [address]  [phone]    Transesophageal Echocardiogram  Limited 2D, Doppler, and Color Doppler    Study date:  2019    Patient: Chadd Yost  MR number: [id number]  Account number: [de-identified]  : 1950  Age: 80 years  Gender: Male  Status: Outpatient  Location: Cath lab  Height: 66 in  Weight: 156 lb  BP: 140/ 100 mmHg    Indications: Atrial flutter w/ cardioversion  Diagnoses: I48 1 - Atrial flutter    Sonographer:  Ankit Newell RDCS  Primary Physician:  Karen Fisher  Referring Physician:  Allegra Chang MD  Group:  [facility]  RN:  Joss Melissa  Interpreting Physician:  Allegra Chang MD    SUMMARY    LEFT VENTRICLE:  The ventricle was mildly dilated  Systolic function was severely reduced  Ejection fraction was estimated to be 25 %    There was severe diffuse hypokinesis  Wall thickness was normal     RIGHT VENTRICLE:  The size was normal   Systolic function was mildly reduced  LEFT ATRIAL APPENDAGE:  S/P clipping  No thrombus  MITRAL VALVE:  Prior repair procedures: surgical repair  There was moderate regurgitation  TRICUSPID VALVE:  There was mild regurgitation  HISTORY: PRIOR HISTORY: DM2  Dilated cardiomyopathy  CAD  Bicuspid aortic valve  Hypertension  Systolic congestive heart failure  S/P AVR and MV clip; March 2019  PROCEDURE: The procedure was performed in the catheterization laboratory  This was a routine study  The risks and alternatives of the procedure were explained to the patient and informed consent was obtained  The transesophageal approach  was used  The study included limited 2D imaging, limited spectral Doppler, color Doppler, and probe insertion (without interpretation)  The heart rate was 125 bpm, at the start of the study  An adult omniplane probe was inserted by the  attending cardiologist  Intubated with ease  One intubation attempt(s)  There was no blood detected on the probe  Image quality was adequate  There were no complications during the procedure  MEDICATIONS: Anesthesia administered by  anesthesia team     LEFT VENTRICLE: The ventricle was mildly dilated  Systolic function was severely reduced  Ejection fraction was estimated to be 25 %  There was severe diffuse hypokinesis  Wall thickness was normal     RIGHT VENTRICLE: The size was normal  Systolic function was mildly reduced  Wall thickness was normal     LEFT ATRIUM: Size was normal  No thrombus was identified  APPENDAGE: S/P clipping  No thrombus  ATRIAL SEPTUM: No defect or patent foramen ovale was identified  RIGHT ATRIUM: Size was normal  No thrombus was identified  MITRAL VALVE: Prior repair procedures: surgical repair DOPPLER: There was moderate regurgitation  AORTIC VALVE: A bioprosthesis was present  It exhibited normal function   DOPPLER: There was no regurgitation  TRICUSPID VALVE: The valve structure was normal  There was normal leaflet separation  There was no echocardiographic evidence of vegetation  DOPPLER: There was mild regurgitation  PULMONIC VALVE: Leaflets exhibited normal thickness, no calcification, and normal cuspal separation  There was no echocardiographic evidence of vegetation  PERICARDIUM: There was no pericardial effusion  The pericardium was normal in appearance  AORTA: The root exhibited normal size  There was no atheroma  There was no evidence for dissection  There was no evidence for aneurysm  [address] Accredited Echocardiography Laboratory    Prepared and electronically signed by    Mattie Power MD  Signed 12-Apr-2019 15:55:03       No results found for this or any previous visit  No results found for this or any previous visit

## 2020-09-22 NOTE — PATIENT INSTRUCTIONS
Recommendations:  1  Discontinue amiodarone  2  Continue remainder of medications  3  Check echo to evaluate AVR/MVR/ LV function  4  Follow up in 6 months

## 2020-10-07 ENCOUNTER — IMMUNIZATIONS (OUTPATIENT)
Dept: FAMILY MEDICINE CLINIC | Facility: CLINIC | Age: 70
End: 2020-10-07
Payer: COMMERCIAL

## 2020-10-07 DIAGNOSIS — Z23 NEEDS FLU SHOT: Primary | ICD-10-CM

## 2020-10-07 PROCEDURE — G0008 ADMIN INFLUENZA VIRUS VAC: HCPCS

## 2020-10-07 PROCEDURE — 90662 IIV NO PRSV INCREASED AG IM: CPT

## 2020-10-12 ENCOUNTER — ANTICOAG VISIT (OUTPATIENT)
Dept: CARDIOLOGY CLINIC | Facility: CLINIC | Age: 70
End: 2020-10-12

## 2020-10-12 ENCOUNTER — LAB (OUTPATIENT)
Dept: LAB | Facility: CLINIC | Age: 70
End: 2020-10-12
Payer: COMMERCIAL

## 2020-10-13 ENCOUNTER — TRANSCRIBE ORDERS (OUTPATIENT)
Dept: LAB | Facility: CLINIC | Age: 70
End: 2020-10-13

## 2020-10-14 ENCOUNTER — TRANSCRIBE ORDERS (OUTPATIENT)
Dept: LAB | Facility: CLINIC | Age: 70
End: 2020-10-14

## 2020-10-14 ENCOUNTER — LAB (OUTPATIENT)
Dept: LAB | Facility: CLINIC | Age: 70
End: 2020-10-14
Payer: COMMERCIAL

## 2020-10-14 DIAGNOSIS — N52.2 DRUG-INDUCED ERECTILE DYSFUNCTION: ICD-10-CM

## 2020-10-14 DIAGNOSIS — I10 ESSENTIAL HYPERTENSION, MALIGNANT: ICD-10-CM

## 2020-10-14 DIAGNOSIS — E78.5 HYPERLIPIDEMIA, UNSPECIFIED HYPERLIPIDEMIA TYPE: ICD-10-CM

## 2020-10-14 DIAGNOSIS — E03.9 HYPOTHYROIDISM, UNSPECIFIED TYPE: ICD-10-CM

## 2020-10-14 DIAGNOSIS — E11.649 UNCONTROLLED TYPE 2 DIABETES MELLITUS WITH HYPOGLYCEMIA, UNSPECIFIED HYPOGLYCEMIA COMA STATUS (HCC): Primary | ICD-10-CM

## 2020-10-14 DIAGNOSIS — E11.649 UNCONTROLLED TYPE 2 DIABETES MELLITUS WITH HYPOGLYCEMIA, UNSPECIFIED HYPOGLYCEMIA COMA STATUS (HCC): ICD-10-CM

## 2020-10-14 DIAGNOSIS — N18.9 CHRONIC KIDNEY DISEASE, UNSPECIFIED CKD STAGE: ICD-10-CM

## 2020-10-14 LAB
ANION GAP SERPL CALCULATED.3IONS-SCNC: 9 MMOL/L (ref 4–13)
AST SERPL W P-5'-P-CCNC: 27 U/L (ref 5–45)
BUN SERPL-MCNC: 32 MG/DL (ref 5–25)
CALCIUM SERPL-MCNC: 8.3 MG/DL (ref 8.3–10.1)
CHLORIDE SERPL-SCNC: 108 MMOL/L (ref 100–108)
CHOLEST SERPL-MCNC: 108 MG/DL (ref 50–200)
CK MB SERPL-MCNC: 1.4 % (ref 0–2.5)
CK MB SERPL-MCNC: 2.8 NG/ML (ref 0–5)
CK SERPL-CCNC: 206 U/L (ref 39–308)
CO2 SERPL-SCNC: 21 MMOL/L (ref 21–32)
CREAT SERPL-MCNC: 3.06 MG/DL (ref 0.6–1.3)
CREAT UR-MCNC: 105 MG/DL
EST. AVERAGE GLUCOSE BLD GHB EST-MCNC: 114 MG/DL
GFR SERPL CREATININE-BSD FRML MDRD: 23 ML/MIN/1.73SQ M
GLUCOSE P FAST SERPL-MCNC: 90 MG/DL (ref 65–99)
HBA1C MFR BLD: 5.6 %
HDLC SERPL-MCNC: 49 MG/DL
LDLC SERPL CALC-MCNC: 44 MG/DL (ref 0–100)
MICROALBUMIN UR-MCNC: 1430 MG/L (ref 0–20)
MICROALBUMIN/CREAT 24H UR: 1362 MG/G CREATININE (ref 0–30)
NONHDLC SERPL-MCNC: 59 MG/DL
POTASSIUM SERPL-SCNC: 5 MMOL/L (ref 3.5–5.3)
PROLACTIN SERPL-MCNC: 15.6 NG/ML (ref 2.5–17.4)
SODIUM SERPL-SCNC: 138 MMOL/L (ref 136–145)
TESTOST SERPL-MCNC: 460 NG/DL (ref 95–948)
TRIGL SERPL-MCNC: 77 MG/DL
TSH SERPL DL<=0.05 MIU/L-ACNC: 3.31 UIU/ML (ref 0.36–3.74)

## 2020-10-14 PROCEDURE — 84146 ASSAY OF PROLACTIN: CPT

## 2020-10-14 PROCEDURE — 84403 ASSAY OF TOTAL TESTOSTERONE: CPT

## 2020-10-14 PROCEDURE — 36415 COLL VENOUS BLD VENIPUNCTURE: CPT

## 2020-10-14 PROCEDURE — 84450 TRANSFERASE (AST) (SGOT): CPT

## 2020-10-14 PROCEDURE — 80048 BASIC METABOLIC PNL TOTAL CA: CPT

## 2020-10-14 PROCEDURE — 84443 ASSAY THYROID STIM HORMONE: CPT

## 2020-10-14 PROCEDURE — 3062F POS MACROALBUMINURIA REV: CPT | Performed by: INTERNAL MEDICINE

## 2020-10-14 PROCEDURE — 83036 HEMOGLOBIN GLYCOSYLATED A1C: CPT

## 2020-10-14 PROCEDURE — 80061 LIPID PANEL: CPT

## 2020-10-14 PROCEDURE — 82553 CREATINE MB FRACTION: CPT

## 2020-10-14 PROCEDURE — 3044F HG A1C LEVEL LT 7.0%: CPT | Performed by: INTERNAL MEDICINE

## 2020-10-14 PROCEDURE — 3066F NEPHROPATHY DOC TX: CPT | Performed by: INTERNAL MEDICINE

## 2020-10-14 PROCEDURE — 82570 ASSAY OF URINE CREATININE: CPT | Performed by: INTERNAL MEDICINE

## 2020-10-14 PROCEDURE — 82043 UR ALBUMIN QUANTITATIVE: CPT | Performed by: INTERNAL MEDICINE

## 2020-10-14 PROCEDURE — 82550 ASSAY OF CK (CPK): CPT

## 2020-10-23 ENCOUNTER — CONSULT (OUTPATIENT)
Dept: GASTROENTEROLOGY | Facility: AMBULARY SURGERY CENTER | Age: 70
End: 2020-10-23
Payer: COMMERCIAL

## 2020-10-23 VITALS — BODY MASS INDEX: 29.09 KG/M2 | HEIGHT: 66 IN | WEIGHT: 181 LBS | TEMPERATURE: 97.8 F

## 2020-10-23 DIAGNOSIS — Z86.010 HISTORY OF COLON POLYPS: Primary | ICD-10-CM

## 2020-10-23 DIAGNOSIS — Z79.01 ANTICOAGULANT LONG-TERM USE: ICD-10-CM

## 2020-10-23 DIAGNOSIS — Z12.11 ENCOUNTER FOR SCREENING COLONOSCOPY: ICD-10-CM

## 2020-10-23 PROBLEM — Z86.0100 HISTORY OF COLON POLYPS: Status: ACTIVE | Noted: 2020-10-23

## 2020-10-23 PROCEDURE — 99204 OFFICE O/P NEW MOD 45 MIN: CPT | Performed by: INTERNAL MEDICINE

## 2020-10-27 ENCOUNTER — LAB (OUTPATIENT)
Dept: LAB | Facility: CLINIC | Age: 70
End: 2020-10-27
Payer: COMMERCIAL

## 2020-10-27 ENCOUNTER — ANTICOAG VISIT (OUTPATIENT)
Dept: CARDIOLOGY CLINIC | Facility: CLINIC | Age: 70
End: 2020-10-27

## 2020-10-27 ENCOUNTER — TELEPHONE (OUTPATIENT)
Dept: CARDIOLOGY CLINIC | Facility: CLINIC | Age: 70
End: 2020-10-27

## 2020-10-31 ENCOUNTER — HOSPITAL ENCOUNTER (EMERGENCY)
Facility: HOSPITAL | Age: 70
Discharge: HOME | End: 2020-10-31
Attending: EMERGENCY MEDICINE | Admitting: EMERGENCY MEDICINE
Payer: COMMERCIAL

## 2020-10-31 ENCOUNTER — APPOINTMENT (EMERGENCY)
Dept: RADIOLOGY | Facility: HOSPITAL | Age: 70
End: 2020-10-31
Payer: COMMERCIAL

## 2020-10-31 VITALS
BODY MASS INDEX: 29.82 KG/M2 | DIASTOLIC BLOOD PRESSURE: 86 MMHG | RESPIRATION RATE: 18 BRPM | OXYGEN SATURATION: 98 % | WEIGHT: 184.75 LBS | HEART RATE: 74 BPM | TEMPERATURE: 98.8 F | SYSTOLIC BLOOD PRESSURE: 148 MMHG

## 2020-10-31 DIAGNOSIS — M25.512 LEFT SHOULDER PAIN: Primary | ICD-10-CM

## 2020-10-31 LAB
ALBUMIN SERPL BCP-MCNC: 3 G/DL (ref 3.5–5)
ALP SERPL-CCNC: 83 U/L (ref 46–116)
ALT SERPL W P-5'-P-CCNC: 21 U/L (ref 12–78)
ANION GAP SERPL CALCULATED.3IONS-SCNC: 9 MMOL/L (ref 4–13)
AST SERPL W P-5'-P-CCNC: 22 U/L (ref 5–45)
BASOPHILS # BLD AUTO: 0.04 THOUSANDS/ΜL (ref 0–0.1)
BASOPHILS NFR BLD AUTO: 1 % (ref 0–1)
BILIRUB SERPL-MCNC: 0.22 MG/DL (ref 0.2–1)
BUN SERPL-MCNC: 36 MG/DL (ref 5–25)
CALCIUM ALBUM COR SERPL-MCNC: 9.2 MG/DL (ref 8.3–10.1)
CALCIUM SERPL-MCNC: 8.4 MG/DL (ref 8.3–10.1)
CHLORIDE SERPL-SCNC: 108 MMOL/L (ref 100–108)
CO2 SERPL-SCNC: 23 MMOL/L (ref 21–32)
CREAT SERPL-MCNC: 3.47 MG/DL (ref 0.6–1.3)
EOSINOPHIL # BLD AUTO: 0.12 THOUSAND/ΜL (ref 0–0.61)
EOSINOPHIL NFR BLD AUTO: 2 % (ref 0–6)
ERYTHROCYTE [DISTWIDTH] IN BLOOD BY AUTOMATED COUNT: 13.3 % (ref 11.6–15.1)
GFR SERPL CREATININE-BSD FRML MDRD: 20 ML/MIN/1.73SQ M
GLUCOSE SERPL-MCNC: 97 MG/DL (ref 65–140)
HCT VFR BLD AUTO: 43.5 % (ref 36.5–49.3)
HGB BLD-MCNC: 14.5 G/DL (ref 12–17)
IMM GRANULOCYTES # BLD AUTO: 0.04 THOUSAND/UL (ref 0–0.2)
IMM GRANULOCYTES NFR BLD AUTO: 1 % (ref 0–2)
LYMPHOCYTES # BLD AUTO: 0.83 THOUSANDS/ΜL (ref 0.6–4.47)
LYMPHOCYTES NFR BLD AUTO: 11 % (ref 14–44)
MCH RBC QN AUTO: 31.4 PG (ref 26.8–34.3)
MCHC RBC AUTO-ENTMCNC: 33.3 G/DL (ref 31.4–37.4)
MCV RBC AUTO: 94 FL (ref 82–98)
MONOCYTES # BLD AUTO: 0.82 THOUSAND/ΜL (ref 0.17–1.22)
MONOCYTES NFR BLD AUTO: 11 % (ref 4–12)
NEUTROPHILS # BLD AUTO: 5.44 THOUSANDS/ΜL (ref 1.85–7.62)
NEUTS SEG NFR BLD AUTO: 74 % (ref 43–75)
NRBC BLD AUTO-RTO: 0 /100 WBCS
PLATELET # BLD AUTO: 176 THOUSANDS/UL (ref 149–390)
PMV BLD AUTO: 9.6 FL (ref 8.9–12.7)
POTASSIUM SERPL-SCNC: 5.3 MMOL/L (ref 3.5–5.3)
PROT SERPL-MCNC: 7.3 G/DL (ref 6.4–8.2)
RBC # BLD AUTO: 4.62 MILLION/UL (ref 3.88–5.62)
SODIUM SERPL-SCNC: 140 MMOL/L (ref 136–145)
TROPONIN I SERPL-MCNC: <0.02 NG/ML
WBC # BLD AUTO: 7.29 THOUSAND/UL (ref 4.31–10.16)

## 2020-10-31 PROCEDURE — 99284 EMERGENCY DEPT VISIT MOD MDM: CPT | Performed by: EMERGENCY MEDICINE

## 2020-10-31 PROCEDURE — 85025 COMPLETE CBC W/AUTO DIFF WBC: CPT | Performed by: EMERGENCY MEDICINE

## 2020-10-31 PROCEDURE — 71045 X-RAY EXAM CHEST 1 VIEW: CPT

## 2020-10-31 PROCEDURE — 36415 COLL VENOUS BLD VENIPUNCTURE: CPT | Performed by: EMERGENCY MEDICINE

## 2020-10-31 PROCEDURE — 80053 COMPREHEN METABOLIC PANEL: CPT | Performed by: EMERGENCY MEDICINE

## 2020-10-31 PROCEDURE — 99284 EMERGENCY DEPT VISIT MOD MDM: CPT

## 2020-10-31 PROCEDURE — 84484 ASSAY OF TROPONIN QUANT: CPT | Performed by: EMERGENCY MEDICINE

## 2020-11-03 LAB
ATRIAL RATE: 84 BPM
P AXIS: 69 DEGREES
PR INTERVAL: 198 MS
QRS AXIS: -76 DEGREES
QRSD INTERVAL: 158 MS
QT INTERVAL: 408 MS
QTC INTERVAL: 482 MS
T WAVE AXIS: 73 DEGREES
VENTRICULAR RATE: 84 BPM

## 2020-11-03 PROCEDURE — 93010 ELECTROCARDIOGRAM REPORT: CPT | Performed by: INTERNAL MEDICINE

## 2020-11-14 DIAGNOSIS — N25.81 SECONDARY HYPERPARATHYROIDISM (HCC): Chronic | ICD-10-CM

## 2020-11-14 PROCEDURE — 3066F NEPHROPATHY DOC TX: CPT | Performed by: INTERNAL MEDICINE

## 2020-11-15 RX ORDER — CALCITRIOL 0.25 UG/1
CAPSULE, LIQUID FILLED ORAL
Qty: 19 CAPSULE | Refills: 4 | Status: SHIPPED | OUTPATIENT
Start: 2020-11-15 | End: 2020-11-19 | Stop reason: SDUPTHER

## 2020-11-17 ENCOUNTER — LAB (OUTPATIENT)
Dept: LAB | Facility: CLINIC | Age: 70
End: 2020-11-17
Payer: COMMERCIAL

## 2020-11-17 ENCOUNTER — ANTICOAG VISIT (OUTPATIENT)
Dept: CARDIOLOGY CLINIC | Facility: CLINIC | Age: 70
End: 2020-11-17

## 2020-11-17 DIAGNOSIS — E78.3 HYPERCHYLOMICRONEMIA: Primary | ICD-10-CM

## 2020-11-17 RX ORDER — ATORVASTATIN CALCIUM 20 MG/1
20 TABLET, FILM COATED ORAL DAILY
Qty: 90 TABLET | Refills: 2 | Status: ON HOLD | OUTPATIENT
Start: 2020-11-17 | End: 2021-01-12 | Stop reason: SDUPTHER

## 2020-11-19 DIAGNOSIS — N25.81 SECONDARY HYPERPARATHYROIDISM (HCC): Chronic | ICD-10-CM

## 2020-11-19 RX ORDER — CALCITRIOL 0.25 UG/1
CAPSULE, LIQUID FILLED ORAL
Qty: 20 CAPSULE | Refills: 6 | Status: SHIPPED | OUTPATIENT
Start: 2020-11-19 | End: 2021-08-27

## 2020-11-20 ENCOUNTER — HOSPITAL ENCOUNTER (OUTPATIENT)
Dept: NON INVASIVE DIAGNOSTICS | Facility: CLINIC | Age: 70
Discharge: HOME | End: 2020-11-20
Payer: COMMERCIAL

## 2020-11-20 DIAGNOSIS — I25.10 CORONARY ARTERY DISEASE INVOLVING NATIVE CORONARY ARTERY OF NATIVE HEART WITHOUT ANGINA PECTORIS: Chronic | ICD-10-CM

## 2020-11-20 DIAGNOSIS — Z98.890 S/P MVR (MITRAL VALVE REPAIR): ICD-10-CM

## 2020-11-20 DIAGNOSIS — Z95.2 S/P AVR: ICD-10-CM

## 2020-11-20 PROCEDURE — 93306 TTE W/DOPPLER COMPLETE: CPT | Performed by: INTERNAL MEDICINE

## 2020-11-20 PROCEDURE — 93306 TTE W/DOPPLER COMPLETE: CPT

## 2020-11-30 ENCOUNTER — TELEPHONE (OUTPATIENT)
Dept: CARDIOLOGY CLINIC | Facility: CLINIC | Age: 70
End: 2020-11-30

## 2020-12-02 ENCOUNTER — TELEPHONE (OUTPATIENT)
Dept: CARDIOLOGY CLINIC | Facility: CLINIC | Age: 70
End: 2020-12-02

## 2020-12-11 ENCOUNTER — TELEPHONE (OUTPATIENT)
Dept: GASTROENTEROLOGY | Facility: AMBULARY SURGERY CENTER | Age: 70
End: 2020-12-11

## 2020-12-16 ENCOUNTER — TRANSCRIBE ORDERS (OUTPATIENT)
Dept: LAB | Facility: CLINIC | Age: 70
End: 2020-12-16

## 2020-12-16 ENCOUNTER — LAB (OUTPATIENT)
Dept: LAB | Facility: CLINIC | Age: 70
End: 2020-12-16
Payer: COMMERCIAL

## 2020-12-16 ENCOUNTER — ANTICOAG VISIT (OUTPATIENT)
Dept: CARDIOLOGY CLINIC | Facility: CLINIC | Age: 70
End: 2020-12-16

## 2020-12-16 DIAGNOSIS — I48.92 ATRIAL FLUTTER, UNSPECIFIED TYPE (HCC): Primary | ICD-10-CM

## 2020-12-22 DIAGNOSIS — I50.22 CHRONIC SYSTOLIC HEART FAILURE (HCC): ICD-10-CM

## 2020-12-22 RX ORDER — HYDRALAZINE HYDROCHLORIDE 25 MG/1
25 TABLET, FILM COATED ORAL 3 TIMES DAILY
Qty: 270 TABLET | Refills: 3 | Status: SHIPPED | OUTPATIENT
Start: 2020-12-22 | End: 2021-12-03 | Stop reason: SDUPTHER

## 2020-12-22 RX ORDER — ISOSORBIDE DINITRATE 10 MG/1
10 TABLET ORAL 3 TIMES DAILY
Qty: 270 TABLET | Refills: 3 | Status: SHIPPED | OUTPATIENT
Start: 2020-12-22 | End: 2021-12-28 | Stop reason: SDUPTHER

## 2021-01-04 ENCOUNTER — TELEMEDICINE (OUTPATIENT)
Dept: NEPHROLOGY | Facility: CLINIC | Age: 71
End: 2021-01-04

## 2021-01-04 ENCOUNTER — TELEMEDICINE (OUTPATIENT)
Dept: FAMILY MEDICINE CLINIC | Facility: CLINIC | Age: 71
End: 2021-01-04
Payer: COMMERCIAL

## 2021-01-04 VITALS — TEMPERATURE: 97.2 F | HEIGHT: 68 IN | BODY MASS INDEX: 25.76 KG/M2 | WEIGHT: 170 LBS

## 2021-01-04 DIAGNOSIS — Z20.822 CLOSE EXPOSURE TO COVID-19 VIRUS: Primary | ICD-10-CM

## 2021-01-04 DIAGNOSIS — N18.4 CKD (CHRONIC KIDNEY DISEASE), STAGE IV (HCC): Primary | ICD-10-CM

## 2021-01-04 DIAGNOSIS — R80.1 PERSISTENT PROTEINURIA: ICD-10-CM

## 2021-01-04 DIAGNOSIS — N05.1 FSGS (FOCAL SEGMENTAL GLOMERULOSCLEROSIS): ICD-10-CM

## 2021-01-04 DIAGNOSIS — I50.22 CHRONIC SYSTOLIC HEART FAILURE (HCC): Chronic | ICD-10-CM

## 2021-01-04 PROCEDURE — 99213 OFFICE O/P EST LOW 20 MIN: CPT | Performed by: FAMILY MEDICINE

## 2021-01-04 PROCEDURE — 3066F NEPHROPATHY DOC TX: CPT | Performed by: FAMILY MEDICINE

## 2021-01-04 PROCEDURE — 1036F TOBACCO NON-USER: CPT | Performed by: FAMILY MEDICINE

## 2021-01-04 PROCEDURE — 99442 PR PHYS/QHP TELEPHONE EVALUATION 11-20 MIN: CPT | Performed by: INTERNAL MEDICINE

## 2021-01-04 PROCEDURE — 1160F RVW MEDS BY RX/DR IN RCRD: CPT | Performed by: FAMILY MEDICINE

## 2021-01-04 PROCEDURE — 3725F SCREEN DEPRESSION PERFORMED: CPT | Performed by: FAMILY MEDICINE

## 2021-01-04 NOTE — PATIENT INSTRUCTIONS
1  Thank you for taking the time to speak with me today on the telephone  I hope all is well with your family  2  Please be well, be safe and be careful  3  Happy new year    4  We will follow-up in 3 months

## 2021-01-04 NOTE — ASSESSMENT & PLAN NOTE
Wt Readings from Last 3 Encounters:   01/04/21 77 1 kg (170 lb)   10/31/20 83 8 kg (184 lb 11 9 oz)   10/23/20 82 1 kg (181 lb)     His weight has been stable  By his most recent echo in Nov 2020 his EF has dramatically improved  EF is 65%, he has G2DD, and PAP are 28 mmHg

## 2021-01-04 NOTE — ASSESSMENT & PLAN NOTE
He does have stage 3 chronic kidney disease secondary to biopsy-proven FSGS which is felt to be more a secondary form of FSGS is there was glomerular megaly at only 15% foot process effacement   This was a biopsy from a few years ago and prior to his cardiac surgery   The degree of fibrosis was only mild on kidney biopsy

## 2021-01-04 NOTE — ASSESSMENT & PLAN NOTE
His most recent alb/cr ratio from Oct 2020 was 1300  This is much better than his prior  p/c ratio of 2 46 from Aug 2020  He has not been on ARB due to recent CARY and new set point creatinine level  UA from Aug 2020 showed no hematuria on microscopic analysis  Proteinuria could be due to FSGS plus or minus diabetic nephropathy  Continue to monitor

## 2021-01-04 NOTE — PROGRESS NOTES
Virtual Brief Visit    Assessment/Plan:    Problem List Items Addressed This Visit        Cardiovascular and Mediastinum    Chronic systolic heart failure (HCC) (Chronic)     Wt Readings from Last 3 Encounters:   01/04/21 77 1 kg (170 lb)   10/31/20 83 8 kg (184 lb 11 9 oz)   10/23/20 82 1 kg (181 lb)     His weight has been stable  By his most recent echo in Nov 2020 his EF has dramatically improved  EF is 65%, he has G2DD, and PAP are 28 mmHg  Genitourinary    FSGS (focal segmental glomerulosclerosis)     He does have stage 3 chronic kidney disease secondary to biopsy-proven FSGS which is felt to be more a secondary form of FSGS is there was glomerular megaly at only 15% foot process effacement  This was a biopsy from a few years ago and prior to his cardiac surgery   The degree of fibrosis was only mild on kidney biopsy           CKD (chronic kidney disease), stage IV [facility]) - Primary     Lab Results   Component Value Date    EGFR 20 10/31/2020    EGFR 23 10/14/2020    EGFR 22 08/21/2020    CREATININE 3 47 (H) 10/31/2020    CREATININE 3 06 (H) 10/14/2020    CREATININE 3 17 (H) 08/21/2020     CKD (chronic kidney disease), stage IV ([facility] [address] )  Since his hospitalization his creatinine baseline has been 2 5-3 0   Labs last done in February 2020 showed a creatinine of 2 6 which is within his new baseline range  This is where his Cr had settled out after he recovered from cardiac surgery  The patient had labs done Sep 10 which showed a Cr of 3 35  His most recent Cr is 3 4  Recheck blood work at this time  Other    Persistent proteinuria     His most recent alb/cr ratio from Oct 2020 was 1300  This is much better than his prior  p/c ratio of 2 46 from Aug 2020  He has not been on ARB due to recent CARY and new set point creatinine level  UA from Aug 2020 showed no hematuria on microscopic analysis  Proteinuria could be due to FSGS plus or minus diabetic nephropathy  Continue to monitor  Reason for visit is No chief complaint on file  Encounter provider Honey Richards DO    Provider located at [address]  [address] [place] [id number]    Recent Visits  No visits were found meeting these conditions  Showing recent visits within past 7 days and meeting all other requirements     Today's Visits  Date Type Provider Dept   01/04/21 Mary Beach DO [facility]   01/04/21 Telemedicine Marimar Ferreira MD [facility] [address] today's visits and meeting all other requirements     Future Appointments  No visits were found meeting these conditions  Showing future appointments within next 150 days and meeting all other requirements        After connecting through telephone, the patient was identified by name and date of birth  Harris Singh was informed that this is a telemedicine visit and that the visit is being conducted through telephone  My office door was closed  No one else was in the room  He acknowledged consent and understanding of privacy and security of the platform  The patient has agreed to participate and understands he can discontinue the visit at any time  Patient is aware this is a billable service  Subjective    Harris Singh is a 72 y o  male     HPI     This is a follow-up for stage 4 chronic kidney disease  His baseline creatinine has been anywhere from 2 83 5  He had stated how significant other had recently been hospitalized secondary to COVID-19  He underwent testing today  He just feels some mild fatigue  Denies any other pulmonary symptoms his weight has been stable denies any chest pressure lower extremity swelling  Medications and labs reviewed during this visit  His last blood work to reviewed was in October 2020      Past Medical History:   Diagnosis Date    Colon polyp     Diabetes mellitus ([facility] [address] )     Hyperlipidemia     Hypertension     Proteinuria     Stage 3 chronic kidney disease     Vitamin D deficiency        Past Surgical History:   Procedure Laterality Date    COLONOSCOPY      overdue    CA ECHO TRANSESOPHAG R-T 2D W/PRB IMG ACQUISJ I&R N/A 3/7/2019    Procedure: INTRA-OP TRANSESOPHAGEAL ECHOCARDIOGRAM (PATIENCE);   Surgeon: Yordan Macias DO;  [facility];  Service: Cardiac Surgery    CA PERQ CLSR TCAT L ATR APNDGE W/ENDOCARDIAL IMPLNT  3/7/2019    Procedure: LEFT ATRIAL APPENDAGE OCCLUSION CLIPPED with 35mm Shukla Marlon;  Surgeon: Yordan Macias DO;  [facility];  Service: Cardiac Surgery    CA REPLACEMENT OF MITRAL VALVE N/A 3/7/2019    Procedure: REPLACEMENT VALVE MITRAL (MVR) REPAIR with a 30mm MEDTRONIC CG FUTURE RING;  Surgeon: Yordan Macias DO;  [facility];  Service: Cardiac Surgery    CA RPLCMT AORTIC VALVE OPN W/STENTLESS TISSUE VALVE N/A 3/7/2019    Procedure: REPLACEMENT VALVE AORTIC (AVR) with 23mm TAVERA INSPIRIS RESILIA  AORTIC VALVE;  Surgeon: Yordan Macias DO;  [facility];  Service: Cardiac Surgery    RENAL BIOPSY, OPEN      TOE SURGERY Left        Current Outpatient Medications   Medication Sig Dispense Refill    amiodarone 200 mg tablet take 1 tablet by mouth once daily (Patient not taking: Reported on 1/4/2021) 30 tablet 5    amLODIPine (NORVASC) 2 5 mg tablet Take 1 tablet (2 5 mg total) by mouth daily 90 tablet 3    ascorbic acid (VITAMIN C) 500 mg tablet Take 500 mg by mouth daily      atorvastatin (LIPITOR) 20 mg tablet Take 1 tablet (20 mg total) by mouth daily 90 tablet 2    calcitriol (ROCALTROL) 0 25 mcg capsule Take one capsule five days a week 20 capsule 6    Cholecalciferol (VITAMIN D3) 1000 units CAPS Take 1 capsule by mouth daily      COMBIGAN 0 2-0 5 % instill 1 drop into both eyes twice a day  0    hydrALAZINE (APRESOLINE) 25 mg tablet Take 1 tablet (25 mg total) by mouth 3 (three) times a day 270 tablet 3    insulin glargine (LANTUS SOLOSTAR) 100 units/mL injection pen Inject 16 Units under the skin daily at bedtime for 30 days (Patient not taking: Reported on 9/25/2019) 5 pen 2    insulin isophane (HUMULIN N KWIKPEN) 100 units/mL injection pen Inject 4 Units under the skin 3 (three) times a day before meals (Patient not taking: Reported on 9/22/2020) 5 pen 2    Insulin Pen Needle 32G X 4 MM MISC Inject Lantus qHS and Novolog TID with meals, as directed  Dx: DM Ell 9 (Patient not taking: Reported on 9/22/2020) 100 each 0    isosorbide dinitrate (ISORDIL) 10 mg tablet Take 1 tablet (10 mg total) by mouth 3 (three) times a day 270 tablet 3    metoprolol succinate (TOPROL-XL) 50 mg 24 hr tablet Take 1 tablet (50 mg total) by mouth daily 180 tablet 1    RA ASPIRIN EC 81 MG EC tablet take 1 tablet by mouth once daily 90 tablet 6    torsemide (DEMADEX) 10 mg tablet Take 10 mg by mouth as needed As needed      TRADJENTA 5 MG TABS 5 mg daily   0    TRAVATAN Z 0 004 % ophthalmic solution   0    warfarin (COUMADIN) 2 mg tablet TAKE 1 TO 2 TABLETS DAILY BY MOUTH OR AS DIRECTED BY PHYSICIAN  60 tablet 11     No current facility-administered medications for this visit  Allergies   Allergen Reactions    Ace Inhibitors Cough       Review of Systems he denies any chest pressure shortness of breath mild fatigue no nausea vomiting diarrhea no abdominal pain no back pain urgency or frequency    There were no vitals filed for this visit  Physical exam:  General:  Patient is not any acute distress  Pulmonary:  No respiratory distress or conversational dyspnea appreciate  Neurologic:  No slurring of speech noted  Psychiatric:  Patient answers questions appropriately    I spent 20 minutes directly with the patient during this visit    VIRTUAL VISIT [address] [facility] acknowledges that he has consented to an online visit or consultation   He understands that the online visit is based solely on information provided by him, and that, in the absence of a face-to-face physical evaluation by the physician, the diagnosis he receives is both limited and provisional in terms of accuracy and completeness  This is not intended to replace a full medical face-to-face evaluation by the physician  [employer] understands and accepts these terms

## 2021-01-04 NOTE — PROGRESS NOTES
Virtual Regular Visit      Assessment/Plan:    Problem List Items Addressed This Visit        Other    Close exposure to COVID-19 virus - Primary     Wife hospitalized with covid         Relevant Orders    Novel Coronavirus (COVID-19), PCR [facility] - Collected at   [address] or [facility]               Reason for visit is  covid exposure  Chief Complaint   Patient presents with    exposure to covid     wife tested positive yesterday/ no symptoms    Virtual Regular Visit        Encounter provider Kaitlyn Chapa MD    Provider located at [address] [place] [address] Road [address] [place] [id number] [phone]      Recent Visits  No visits were found meeting these conditions  Showing recent visits within past 7 days and meeting all other requirements     Today's Visits  Date Type Provider Dept   01/04/21 Telemedicine Kaitlyn Chapa MD  [address] today's visits and meeting all other requirements     Future Appointments  No visits were found meeting these conditions  Showing future appointments within next 150 days and meeting all other requirements        The patient was identified by name and date of birth  Adolfo Downing was informed that this is a telemedicine visit and that the visit is being conducted through [employer] and patient was informed that this is not a secure, HIPAA-compliant platform  He agrees to proceed     My office door was closed  No one else was in the room  He acknowledged consent and understanding of privacy and security of the video platform  The patient has agreed to participate and understands they can discontinue the visit at any time  Patient is aware this is a billable service  Subjective  Adolfo Downing is a 72 y o  male pt exposed to wife with covid    Who was hospitalized last pm pt is not symptomatic      HPI     Past Medical History:   Diagnosis Date    Colon polyp     Diabetes mellitus ([facility] [address] )     Hyperlipidemia     Hypertension     Proteinuria     Stage 3 chronic kidney disease     Vitamin D deficiency        Past Surgical History:   Procedure Laterality Date    COLONOSCOPY      overdue    VA ECHO TRANSESOPHAG R-T 2D W/PRB IMG ACQUISJ I&R N/A 3/7/2019    Procedure: INTRA-OP TRANSESOPHAGEAL ECHOCARDIOGRAM (PATIENCE);   Surgeon: Mekhi Mcdonald DO;  [facility];  Service: Cardiac Surgery    VA PERQ CLSR TCAT L ATR APNDGE W/ENDOCARDIAL IMPLNT  3/7/2019    Procedure: LEFT ATRIAL APPENDAGE OCCLUSION CLIPPED with 35mm Yamini Parks;  Surgeon: Mekhi Mcdonald DO;  [facility];  Service: Cardiac Surgery    VA REPLACEMENT OF MITRAL VALVE N/A 3/7/2019    Procedure: REPLACEMENT VALVE MITRAL (MVR) REPAIR with a 30mm MEDTRONIC CG FUTURE RING;  Surgeon: Mekhi Mcdonald DO;  [facility];  Service: Cardiac Surgery    VA RPLCMT AORTIC VALVE OPN W/STENTLESS TISSUE VALVE N/A 3/7/2019    Procedure: REPLACEMENT VALVE AORTIC (AVR) with 23mm TAVERA INSPIRIS RESILIA  AORTIC VALVE;  Surgeon: Mekhi Mcdonald DO;  [facility];  Service: Cardiac Surgery    RENAL BIOPSY, OPEN      TOE SURGERY Left        Current Outpatient Medications   Medication Sig Dispense Refill    amLODIPine (NORVASC) 2 5 mg tablet Take 1 tablet (2 5 mg total) by mouth daily 90 tablet 3    ascorbic acid (VITAMIN C) 500 mg tablet Take 500 mg by mouth daily      atorvastatin (LIPITOR) 20 mg tablet Take 1 tablet (20 mg total) by mouth daily 90 tablet 2    calcitriol (ROCALTROL) 0 25 mcg capsule Take one capsule five days a week 20 capsule 6    Cholecalciferol (VITAMIN D3) 1000 units CAPS Take 1 capsule by mouth daily      COMBIGAN 0 2-0 5 % instill 1 drop into both eyes twice a day  0    hydrALAZINE (APRESOLINE) 25 mg tablet Take 1 tablet (25 mg total) by mouth 3 (three) times a day 270 tablet 3    isosorbide dinitrate (ISORDIL) 10 mg tablet Take 1 tablet (10 mg total) by mouth 3 (three) times a day 270 tablet 3    metoprolol succinate (TOPROL-XL) 50 mg 24 hr tablet Take 1 tablet (50 mg total) by mouth daily 180 tablet 1    RA ASPIRIN EC 81 MG EC tablet take 1 tablet by mouth once daily 90 tablet 6    torsemide (DEMADEX) 10 mg tablet Take 10 mg by mouth as needed As needed      TRADJENTA 5 MG TABS 5 mg daily   0    TRAVATAN Z 0 004 % ophthalmic solution   0    warfarin (COUMADIN) 2 mg tablet TAKE 1 TO 2 TABLETS DAILY BY MOUTH OR AS DIRECTED BY PHYSICIAN  60 tablet 11    amiodarone 200 mg tablet take 1 tablet by mouth once daily (Patient not taking: Reported on 1/4/2021) 30 tablet 5    insulin glargine (LANTUS SOLOSTAR) 100 units/mL injection pen Inject 16 Units under the skin daily at bedtime for 30 days (Patient not taking: Reported on 9/25/2019) 5 pen 2    insulin isophane (HUMULIN N KWIKPEN) 100 units/mL injection pen Inject 4 Units under the skin 3 (three) times a day before meals (Patient not taking: Reported on 9/22/2020) 5 pen 2    Insulin Pen Needle 32G X 4 MM MISC Inject Lantus qHS and Novolog TID with meals, as directed  Dx: DM Ell 9 (Patient not taking: Reported on 9/22/2020) 100 each 0     No current facility-administered medications for this visit  Allergies   Allergen Reactions    Ace Inhibitors Cough       Review of Systems   Constitutional: Negative for appetite change, chills, fatigue and fever  HENT: Negative for congestion, rhinorrhea, sinus pain and sore throat  Eyes: Negative for discharge  Respiratory: Negative for cough, shortness of breath and wheezing  Cardiovascular: Negative for chest pain and palpitations  Gastrointestinal: Negative for abdominal pain, diarrhea, nausea and vomiting  Musculoskeletal: Negative for myalgias  Neurological: Negative for headaches  Psychiatric/Behavioral: Negative for dysphoric mood  The patient is not nervous/anxious          Video Exam    Vitals:    01/04/21 1102   Temp: (!) 97 2 °F (36 2 °C)   TempSrc: Temporal Weight: 77 1 kg (170 lb)   Height: 5' 8" (1 727 m)       Physical Exam  Constitutional:       General: He is not in acute distress  Appearance: Normal appearance  He is well-developed  He is not ill-appearing  Neck:      Musculoskeletal: Normal range of motion  Thyroid: No thyromegaly  Cardiovascular:      Rate and Rhythm: Normal rate  Pulmonary:      Effort: Pulmonary effort is normal  No respiratory distress  Breath sounds: Normal breath sounds  Neurological:      General: No focal deficit present  Mental Status: He is alert and oriented to person, place, and time  Mental status is at baseline  Psychiatric:         Behavior: Behavior normal          Thought Content: Thought content normal           I spent 15 minutes directly with the patient during this visit      VIRTUAL VISIT DISCLAIMER    Pattie Presumwaylon acknowledges that he has consented to an online visit or consultation  He understands that the online visit is based solely on information provided by him, and that, in the absence of a face-to-face physical evaluation by the physician, the diagnosis he receives is both limited and provisional in terms of accuracy and completeness  This is not intended to replace a full medical face-to-face evaluation by the physician  Pattie Elmore understands and accepts these terms

## 2021-01-04 NOTE — ASSESSMENT & PLAN NOTE
Lab Results   Component Value Date    EGFR 20 10/31/2020    EGFR 23 10/14/2020    EGFR 22 08/21/2020    CREATININE 3 47 (H) 10/31/2020    CREATININE 3 06 (H) 10/14/2020    CREATININE 3 17 (H) 08/21/2020     CKD (chronic kidney disease), stage IV (HCC)  Since his hospitalization his creatinine baseline has been 2 5-3 0   Labs last done in February 2020 showed a creatinine of 2 6 which is within his new baseline range  This is where his Cr had settled out after he recovered from cardiac surgery  The patient had labs done Sep 10 which showed a Cr of 3 35  His most recent Cr is 3 4  Recheck blood work at this time

## 2021-01-05 DIAGNOSIS — Z20.822 CLOSE EXPOSURE TO COVID-19 VIRUS: ICD-10-CM

## 2021-01-05 PROCEDURE — U0003 INFECTIOUS AGENT DETECTION BY NUCLEIC ACID (DNA OR RNA); SEVERE ACUTE RESPIRATORY SYNDROME CORONAVIRUS 2 (SARS-COV-2) (CORONAVIRUS DISEASE [COVID-19]), AMPLIFIED PROBE TECHNIQUE, MAKING USE OF HIGH THROUGHPUT TECHNOLOGIES AS DESCRIBED BY CMS-2020-01-R: HCPCS | Performed by: FAMILY MEDICINE

## 2021-01-06 LAB — SARS-COV-2 RNA SPEC QL NAA+PROBE: DETECTED

## 2021-01-08 ENCOUNTER — HOSPITAL ENCOUNTER (INPATIENT)
Facility: HOSPITAL | Age: 71
LOS: 3 days | Discharge: HOME HEALTH | DRG: 178 | End: 2021-01-12
Attending: EMERGENCY MEDICINE | Admitting: INTERNAL MEDICINE
Payer: COMMERCIAL

## 2021-01-08 ENCOUNTER — APPOINTMENT (EMERGENCY)
Dept: RADIOLOGY | Facility: HOSPITAL | Age: 71
DRG: 178 | End: 2021-01-08
Payer: COMMERCIAL

## 2021-01-08 DIAGNOSIS — U07.1 COVID-19 VIRUS INFECTION: ICD-10-CM

## 2021-01-08 DIAGNOSIS — E78.3 HYPERCHYLOMICRONEMIA: ICD-10-CM

## 2021-01-08 DIAGNOSIS — I48.92 ATRIAL FLUTTER, UNSPECIFIED TYPE (HCC): ICD-10-CM

## 2021-01-08 DIAGNOSIS — R55 NEAR SYNCOPE: ICD-10-CM

## 2021-01-08 DIAGNOSIS — Z79.01 ANTICOAGULANT LONG-TERM USE: ICD-10-CM

## 2021-01-08 DIAGNOSIS — U07.1 COVID-19: Primary | ICD-10-CM

## 2021-01-08 DIAGNOSIS — R77.8 ELEVATED TROPONIN: ICD-10-CM

## 2021-01-08 PROBLEM — E87.5 HYPERKALEMIA: Status: ACTIVE | Noted: 2021-01-08

## 2021-01-08 PROBLEM — N18.4 STAGE 4 CHRONIC KIDNEY DISEASE (HCC): Status: ACTIVE | Noted: 2018-03-01

## 2021-01-08 LAB
ALBUMIN SERPL BCP-MCNC: 2.6 G/DL (ref 3.5–5)
ALP SERPL-CCNC: 48 U/L (ref 46–116)
ALT SERPL W P-5'-P-CCNC: 24 U/L (ref 12–78)
ANION GAP SERPL CALCULATED.3IONS-SCNC: 9 MMOL/L (ref 4–13)
AST SERPL W P-5'-P-CCNC: 55 U/L (ref 5–45)
BACTERIA UR QL AUTO: NORMAL /HPF
BASOPHILS # BLD AUTO: 0.01 THOUSANDS/ΜL (ref 0–0.1)
BASOPHILS NFR BLD AUTO: 0 % (ref 0–1)
BILIRUB SERPL-MCNC: 0.26 MG/DL (ref 0.2–1)
BILIRUB UR QL STRIP: NEGATIVE
BUN SERPL-MCNC: 44 MG/DL (ref 5–25)
CALCIUM ALBUM COR SERPL-MCNC: 9.4 MG/DL (ref 8.3–10.1)
CALCIUM SERPL-MCNC: 8.3 MG/DL (ref 8.3–10.1)
CHLORIDE SERPL-SCNC: 101 MMOL/L (ref 100–108)
CLARITY UR: CLEAR
CO2 SERPL-SCNC: 24 MMOL/L (ref 21–32)
COLOR UR: YELLOW
CREAT SERPL-MCNC: 3.49 MG/DL (ref 0.6–1.3)
D DIMER PPP FEU-MCNC: 0.83 UG/ML FEU
EOSINOPHIL # BLD AUTO: 0 THOUSAND/ΜL (ref 0–0.61)
EOSINOPHIL NFR BLD AUTO: 0 % (ref 0–6)
ERYTHROCYTE [DISTWIDTH] IN BLOOD BY AUTOMATED COUNT: 12.8 % (ref 11.6–15.1)
GFR SERPL CREATININE-BSD FRML MDRD: 20 ML/MIN/1.73SQ M
GLUCOSE SERPL-MCNC: 109 MG/DL (ref 65–140)
GLUCOSE SERPL-MCNC: 109 MG/DL (ref 65–140)
GLUCOSE SERPL-MCNC: 86 MG/DL (ref 65–140)
GLUCOSE UR STRIP-MCNC: NEGATIVE MG/DL
HCT VFR BLD AUTO: 44.8 % (ref 36.5–49.3)
HGB BLD-MCNC: 15 G/DL (ref 12–17)
HGB UR QL STRIP.AUTO: ABNORMAL
IMM GRANULOCYTES # BLD AUTO: 0.03 THOUSAND/UL (ref 0–0.2)
IMM GRANULOCYTES NFR BLD AUTO: 0 % (ref 0–2)
INR PPP: 1.71 (ref 0.84–1.19)
KETONES UR STRIP-MCNC: NEGATIVE MG/DL
LEUKOCYTE ESTERASE UR QL STRIP: NEGATIVE
LYMPHOCYTES # BLD AUTO: 0.57 THOUSANDS/ΜL (ref 0.6–4.47)
LYMPHOCYTES NFR BLD AUTO: 8 % (ref 14–44)
MCH RBC QN AUTO: 31.3 PG (ref 26.8–34.3)
MCHC RBC AUTO-ENTMCNC: 33.5 G/DL (ref 31.4–37.4)
MCV RBC AUTO: 94 FL (ref 82–98)
MONOCYTES # BLD AUTO: 0.56 THOUSAND/ΜL (ref 0.17–1.22)
MONOCYTES NFR BLD AUTO: 8 % (ref 4–12)
NEUTROPHILS # BLD AUTO: 5.72 THOUSANDS/ΜL (ref 1.85–7.62)
NEUTS SEG NFR BLD AUTO: 84 % (ref 43–75)
NITRITE UR QL STRIP: NEGATIVE
NON-SQ EPI CELLS URNS QL MICRO: NORMAL /HPF
NRBC BLD AUTO-RTO: 0 /100 WBCS
NT-PROBNP SERPL-MCNC: 381 PG/ML
PH UR STRIP.AUTO: 6 [PH] (ref 4.5–8)
PLATELET # BLD AUTO: 117 THOUSANDS/UL (ref 149–390)
PMV BLD AUTO: 10.2 FL (ref 8.9–12.7)
POTASSIUM SERPL-SCNC: 5.7 MMOL/L (ref 3.5–5.3)
PROT SERPL-MCNC: 7.3 G/DL (ref 6.4–8.2)
PROT UR STRIP-MCNC: >=300 MG/DL
PROTHROMBIN TIME: 20.1 SECONDS (ref 11.6–14.5)
RBC # BLD AUTO: 4.79 MILLION/UL (ref 3.88–5.62)
RBC #/AREA URNS AUTO: NORMAL /HPF
SODIUM SERPL-SCNC: 134 MMOL/L (ref 136–145)
SP GR UR STRIP.AUTO: 1.02 (ref 1–1.03)
TROPONIN I SERPL-MCNC: 0.06 NG/ML
TSH SERPL DL<=0.05 MIU/L-ACNC: 1.43 UIU/ML (ref 0.36–3.74)
UROBILINOGEN UR QL STRIP.AUTO: 0.2 E.U./DL
WBC # BLD AUTO: 6.89 THOUSAND/UL (ref 4.31–10.16)
WBC #/AREA URNS AUTO: NORMAL /HPF

## 2021-01-08 PROCEDURE — 85025 COMPLETE CBC W/AUTO DIFF WBC: CPT | Performed by: EMERGENCY MEDICINE

## 2021-01-08 PROCEDURE — 86140 C-REACTIVE PROTEIN: CPT | Performed by: PHYSICIAN ASSISTANT

## 2021-01-08 PROCEDURE — 99220 PR INITIAL OBSERVATION CARE/DAY 70 MINUTES: CPT | Performed by: PHYSICIAN ASSISTANT

## 2021-01-08 PROCEDURE — 99285 EMERGENCY DEPT VISIT HI MDM: CPT

## 2021-01-08 PROCEDURE — 81001 URINALYSIS AUTO W/SCOPE: CPT

## 2021-01-08 PROCEDURE — 85610 PROTHROMBIN TIME: CPT | Performed by: PHYSICIAN ASSISTANT

## 2021-01-08 PROCEDURE — 93005 ELECTROCARDIOGRAM TRACING: CPT

## 2021-01-08 PROCEDURE — 84484 ASSAY OF TROPONIN QUANT: CPT | Performed by: EMERGENCY MEDICINE

## 2021-01-08 PROCEDURE — 82728 ASSAY OF FERRITIN: CPT | Performed by: PHYSICIAN ASSISTANT

## 2021-01-08 PROCEDURE — 82948 REAGENT STRIP/BLOOD GLUCOSE: CPT

## 2021-01-08 PROCEDURE — 83880 ASSAY OF NATRIURETIC PEPTIDE: CPT | Performed by: EMERGENCY MEDICINE

## 2021-01-08 PROCEDURE — 84443 ASSAY THYROID STIM HORMONE: CPT | Performed by: EMERGENCY MEDICINE

## 2021-01-08 PROCEDURE — 99285 EMERGENCY DEPT VISIT HI MDM: CPT | Performed by: EMERGENCY MEDICINE

## 2021-01-08 PROCEDURE — 80053 COMPREHEN METABOLIC PANEL: CPT | Performed by: EMERGENCY MEDICINE

## 2021-01-08 PROCEDURE — 85379 FIBRIN DEGRADATION QUANT: CPT | Performed by: EMERGENCY MEDICINE

## 2021-01-08 PROCEDURE — 71045 X-RAY EXAM CHEST 1 VIEW: CPT

## 2021-01-08 PROCEDURE — 36415 COLL VENOUS BLD VENIPUNCTURE: CPT | Performed by: EMERGENCY MEDICINE

## 2021-01-08 RX ORDER — METOPROLOL SUCCINATE 50 MG/1
50 TABLET, EXTENDED RELEASE ORAL DAILY
Status: DISCONTINUED | OUTPATIENT
Start: 2021-01-09 | End: 2021-01-12 | Stop reason: HOSPADM

## 2021-01-08 RX ORDER — CALCITRIOL 0.25 UG/1
0.25 CAPSULE, LIQUID FILLED ORAL
Status: DISCONTINUED | OUTPATIENT
Start: 2021-01-09 | End: 2021-01-12 | Stop reason: HOSPADM

## 2021-01-08 RX ORDER — TRAVOPROST OPHTHALMIC SOLUTION 0.04 MG/ML
1 SOLUTION OPHTHALMIC
Status: DISCONTINUED | OUTPATIENT
Start: 2021-01-08 | End: 2021-01-12 | Stop reason: HOSPADM

## 2021-01-08 RX ORDER — MELATONIN
2000 DAILY
Status: DISCONTINUED | OUTPATIENT
Start: 2021-01-09 | End: 2021-01-12 | Stop reason: HOSPADM

## 2021-01-08 RX ORDER — ONDANSETRON 2 MG/ML
4 INJECTION INTRAMUSCULAR; INTRAVENOUS EVERY 6 HOURS PRN
Status: DISCONTINUED | OUTPATIENT
Start: 2021-01-08 | End: 2021-01-12 | Stop reason: HOSPADM

## 2021-01-08 RX ORDER — ASCORBIC ACID 500 MG
1000 TABLET ORAL EVERY 12 HOURS SCHEDULED
Status: DISCONTINUED | OUTPATIENT
Start: 2021-01-08 | End: 2021-01-12 | Stop reason: HOSPADM

## 2021-01-08 RX ORDER — ZINC SULFATE 50(220)MG
220 CAPSULE ORAL DAILY
Status: DISCONTINUED | OUTPATIENT
Start: 2021-01-09 | End: 2021-01-12 | Stop reason: HOSPADM

## 2021-01-08 RX ORDER — DEXTROSE MONOHYDRATE 25 G/50ML
50 INJECTION, SOLUTION INTRAVENOUS ONCE
Status: COMPLETED | OUTPATIENT
Start: 2021-01-08 | End: 2021-01-08

## 2021-01-08 RX ORDER — CALCIUM GLUCONATE 20 MG/ML
1 INJECTION, SOLUTION INTRAVENOUS ONCE
Status: COMPLETED | OUTPATIENT
Start: 2021-01-08 | End: 2021-01-08

## 2021-01-08 RX ORDER — ASPIRIN 81 MG/1
81 TABLET ORAL DAILY
Status: DISCONTINUED | OUTPATIENT
Start: 2021-01-09 | End: 2021-01-12 | Stop reason: HOSPADM

## 2021-01-08 RX ORDER — ISOSORBIDE DINITRATE 10 MG/1
10 TABLET ORAL 3 TIMES DAILY
Status: DISCONTINUED | OUTPATIENT
Start: 2021-01-08 | End: 2021-01-12 | Stop reason: HOSPADM

## 2021-01-08 RX ORDER — SODIUM CHLORIDE 9 MG/ML
50 INJECTION, SOLUTION INTRAVENOUS CONTINUOUS
Status: DISPENSED | OUTPATIENT
Start: 2021-01-08 | End: 2021-01-09

## 2021-01-08 RX ORDER — HYDRALAZINE HYDROCHLORIDE 25 MG/1
25 TABLET, FILM COATED ORAL 3 TIMES DAILY
Status: DISCONTINUED | OUTPATIENT
Start: 2021-01-08 | End: 2021-01-12 | Stop reason: HOSPADM

## 2021-01-08 RX ORDER — ATORVASTATIN CALCIUM 20 MG/1
20 TABLET, FILM COATED ORAL DAILY
Status: DISCONTINUED | OUTPATIENT
Start: 2021-01-09 | End: 2021-01-12 | Stop reason: HOSPADM

## 2021-01-08 RX ORDER — ACETAMINOPHEN 325 MG/1
650 TABLET ORAL ONCE
Status: DISCONTINUED | OUTPATIENT
Start: 2021-01-08 | End: 2021-01-12 | Stop reason: HOSPADM

## 2021-01-08 RX ORDER — WARFARIN SODIUM 4 MG/1
4 TABLET ORAL
Status: DISCONTINUED | OUTPATIENT
Start: 2021-01-08 | End: 2021-01-09

## 2021-01-08 RX ORDER — MULTIVITAMIN/IRON/FOLIC ACID 18MG-0.4MG
1 TABLET ORAL DAILY
Status: DISCONTINUED | OUTPATIENT
Start: 2021-01-16 | End: 2021-01-12 | Stop reason: HOSPADM

## 2021-01-08 RX ORDER — AMLODIPINE BESYLATE 2.5 MG/1
2.5 TABLET ORAL DAILY
Status: DISCONTINUED | OUTPATIENT
Start: 2021-01-09 | End: 2021-01-12 | Stop reason: HOSPADM

## 2021-01-08 RX ADMIN — SODIUM CHLORIDE 50 ML/HR: 0.9 INJECTION, SOLUTION INTRAVENOUS at 21:52

## 2021-01-08 RX ADMIN — OXYCODONE HYDROCHLORIDE AND ACETAMINOPHEN 1000 MG: 500 TABLET ORAL at 22:18

## 2021-01-08 RX ADMIN — INSULIN HUMAN 10 UNITS: 100 INJECTION, SOLUTION PARENTERAL at 22:18

## 2021-01-08 RX ADMIN — TRAVOPROST 1 DROP: 0.04 SOLUTION/ DROPS OPHTHALMIC at 22:18

## 2021-01-08 RX ADMIN — HYDRALAZINE HYDROCHLORIDE 25 MG: 25 TABLET, FILM COATED ORAL at 22:18

## 2021-01-08 RX ADMIN — CALCIUM GLUCONATE 1 G: 20 INJECTION, SOLUTION INTRAVENOUS at 21:29

## 2021-01-08 RX ADMIN — DEXTROSE MONOHYDRATE 50 ML: 25 INJECTION, SOLUTION INTRAVENOUS at 22:18

## 2021-01-08 RX ADMIN — ISOSORBIDE DINITRATE 10 MG: 10 TABLET ORAL at 22:18

## 2021-01-08 RX ADMIN — WARFARIN SODIUM 4 MG: 4 TABLET ORAL at 22:18

## 2021-01-08 NOTE — ED NOTES
Walking pulse ox completed  Pt tolerated sats stayed 92-93%, felt no worse than when he arrived, no sob       Priya Jean Baptiste RN  01/08/21 0953

## 2021-01-08 NOTE — ED PROVIDER NOTES
History  Chief Complaint   Patient presents with    Weakness - Generalized     Covid positive on 21 c/o generalized weakness fell no head strike,no loc     73 yo male with h/o DM, HTN, HLD, CKD presents with near syncopal episode just PTA  Pt reports dx of COVID three days prior, reports congestion, dry cough, fatigue, myalgia, SOB and dizziness at home  Denies fever  Reports getting up from couch and moving towards kitchen when became acutely dizzy and "fell out"  Denies LOC or head strike/associated injury  Pt reports cold sweat but denies associated chest pain/pressure  History provided by:  Medical records and patient   used: No    Dizziness  Quality:  Lightheadedness  Severity:  Severe  Onset quality:  Sudden  Duration:  1 hour  Timing:  Intermittent  Progression:  Improving  Chronicity:  New  Context: physical activity and standing up    Context: not with bowel movement, not with head movement and not with loss of consciousness    Relieved by:  Being still  Worsened by: Movement and standing up  Ineffective treatments:  Lying down  Associated symptoms: shortness of breath and weakness    Associated symptoms: no blood in stool, no chest pain, no diarrhea, no headaches, no nausea, no syncope and no vision changes    Risk factors: heart disease    Risk factors: no hx of vertigo        Prior to Admission Medications   Prescriptions Last Dose Informant Patient Reported? Taking? COMBIGAN 0 2-0 5 %  Self Yes No   Sig: instill 1 drop into both eyes twice a day   Cholecalciferol (VITAMIN D3) 1000 units CAPS  Self Yes No   Sig: Take 1 capsule by mouth daily   Insulin Pen Needle 32G X 4 MM MISC  Self No No   Sig: Inject Lantus qHS and Novolog TID with meals, as directed    Dx: DM Ell 9   Patient not taking: Reported on 2020   RA ASPIRIN EC 81 MG EC tablet  Self No No   Sig: take 1 tablet by mouth once daily   TRADJENTA 5 MG TABS  Self Yes No   Si mg daily    TRAVATAN Z 0  004 % ophthalmic solution  Self Yes No   amLODIPine (NORVASC) 2 5 mg tablet  Self No No   Sig: Take 1 tablet (2 5 mg total) by mouth daily   amiodarone 200 mg tablet  Self No No   Sig: take 1 tablet by mouth once daily   Patient not taking: Reported on 1/4/2021   ascorbic acid (VITAMIN C) 500 mg tablet  Self Yes No   Sig: Take 500 mg by mouth daily   atorvastatin (LIPITOR) 20 mg tablet   No No   Sig: Take 1 tablet (20 mg total) by mouth daily   calcitriol (ROCALTROL) 0 25 mcg capsule   No No   Sig: Take one capsule five days a week   hydrALAZINE (APRESOLINE) 25 mg tablet   No No   Sig: Take 1 tablet (25 mg total) by mouth 3 (three) times a day   insulin glargine (LANTUS SOLOSTAR) 100 units/mL injection pen  Self No No   Sig: Inject 16 Units under the skin daily at bedtime for 30 days   Patient not taking: Reported on 9/25/2019   insulin isophane (HUMULIN N KWIKPEN) 100 units/mL injection pen  Self No No   Sig: Inject 4 Units under the skin 3 (three) times a day before meals   Patient not taking: Reported on 9/22/2020   isosorbide dinitrate (ISORDIL) 10 mg tablet   No No   Sig: Take 1 tablet (10 mg total) by mouth 3 (three) times a day   metoprolol succinate (TOPROL-XL) 50 mg 24 hr tablet  Self No No   Sig: Take 1 tablet (50 mg total) by mouth daily   torsemide (DEMADEX) 10 mg tablet  Self Yes No   Sig: Take 10 mg by mouth as needed As needed   warfarin (COUMADIN) 2 mg tablet  Self No No   Sig: TAKE 1 TO 2 TABLETS DAILY BY MOUTH OR AS DIRECTED BY PHYSICIAN        Facility-Administered Medications: None       Past Medical History:   Diagnosis Date    Colon polyp     COVID-19     Diabetes mellitus ([address] )     Hyperlipidemia     Hypertension     Proteinuria     Stage 3 chronic kidney disease     Vitamin D deficiency        Past Surgical History:   Procedure Laterality Date    COLONOSCOPY      overdue    LA ECHO TRANSESOPHAG R-T 2D W/PRB IMG ACQUISJ I&R N/A 3/7/2019    Procedure: INTRA-OP TRANSESOPHAGEAL ECHOCARDIOGRAM (PATIENCE); Surgeon: Viv Gustafson DO;  [facility];  Service: Cardiac Surgery    NE PERQ CLSR TCAT L ATR APNDGE [address] IMPLNT  3/7/2019    Procedure: LEFT ATRIAL APPENDAGE OCCLUSION CLIPPED with 35mm Enrigue Dearth;  Surgeon: Viv Gustafson DO;  [facility];  Service: Cardiac Surgery    NE REPLACEMENT OF MITRAL VALVE N/A 3/7/2019    Procedure: REPLACEMENT VALVE MITRAL (MVR) REPAIR with a 30mm MEDTRONIC CG FUTURE RING;  Surgeon: Viv Gustafson DO;  [facility];  Service: Cardiac Surgery    NE RPLCMT AORTIC VALVE OPN W/STENTLESS TISSUE VALVE N/A 3/7/2019    Procedure: REPLACEMENT VALVE AORTIC (AVR) with 23mm TAVERA INSPIRIS RESILIA  AORTIC VALVE;  Surgeon: Viv Gustafson DO;  [facility];  Service: Cardiac Surgery    RENAL BIOPSY, OPEN      TOE SURGERY Left        Family History   Problem Relation Age of Onset   Normie Glory Stroke Mother     Hypertension Mother     Blindness Father     Hyperlipidemia Father     Hypertension Father     Glaucoma Father     Gout Brother     Heart Valve Disease Brother     Hypertension Brother     Cancer Sister     Diabetes Sister     Ovarian cancer Sister     Anuerysm Neg Hx     Heart attack Neg Hx     Arrhythmia Neg Hx     Heart failure Neg Hx     Coronary artery disease Neg Hx     Sudden death Neg Hx         scd     I have reviewed and agree with the history as documented  E-Cigarette/Vaping    E-Cigarette Use Never User      E-Cigarette/Vaping Substances     Social History     Tobacco Use    Smoking status: Former Smoker     Types: Cigarettes     Quit date:      Years since quittin 0    Smokeless tobacco: Never Used   Substance Use Topics    Alcohol use: Yes     Frequency: Monthly or less     Drinks per session: 1 or 2     Binge frequency: Never     Comment: occasionally    Drug use: No       Review of Systems   Constitutional: Positive for chills and fatigue  Negative for fever  HENT: Negative for rhinorrhea and sinus pain  Respiratory: Positive for shortness of breath  Cardiovascular: Negative for chest pain and syncope  Gastrointestinal: Negative for blood in stool, diarrhea and nausea  Musculoskeletal: Positive for myalgias  Neurological: Positive for dizziness and weakness  Negative for headaches  All other systems reviewed and are negative  Physical Exam  Physical Exam  Vitals signs and nursing note reviewed  Constitutional:       Appearance: He is well-developed  He is not diaphoretic  HENT:      Head: Normocephalic and atraumatic  Eyes:      Conjunctiva/sclera: Conjunctivae normal    Neck:      Musculoskeletal: Normal range of motion  Cardiovascular:      Rate and Rhythm: Normal rate and regular rhythm  Heart sounds: Normal heart sounds  No murmur  Pulmonary:      Effort: Pulmonary effort is normal  No respiratory distress  Breath sounds: Normal breath sounds  Abdominal:      Palpations: Abdomen is soft  Musculoskeletal: Normal range of motion  General: No deformity  Skin:     General: Skin is warm and dry  Capillary Refill: Capillary refill takes less than 2 seconds  Neurological:      Mental Status: He is alert and oriented to person, place, and time  Psychiatric:         Behavior: Behavior normal          Thought Content:  Thought content normal          Judgment: Judgment normal          Vital Signs  ED Triage Vitals [01/08/21 1551]   Temperature Pulse Respirations Blood Pressure SpO2   (!) 100 7 °F (38 2 °C) 86 20 108/59 95 %      Temp Source Heart Rate Source Patient Position - Orthostatic VS BP Location FiO2 (%)   Oral Monitor Lying Right arm --      Pain Score       --           Vitals:    01/08/21 1551   BP: 108/59   Pulse: 86   Patient Position - Orthostatic VS: Lying         Visual Acuity      ED Medications  Medications - No data to display    Diagnostic Studies  Results Reviewed     Procedure Component Value Units Date/Time    Fingerstick Glucose (POCT) [[id number]]  (Normal) Collected: 01/08/21 1627    Lab Status: Final result Updated: 01/08/21 1638     POC Glucose 86 mg/dl     D-Dimer [[id number]]     Lab Status: No result Specimen: Blood     NT-BNP PRO [[id number]]     Lab Status: No result Specimen: Blood     TSH [[id number]]     Lab Status: No result Specimen: Blood     CBC and differential [[id number]]     Lab Status: No result Specimen: Blood     Comprehensive metabolic panel [[id number]]     Lab Status: No result Specimen: Blood     Troponin I [[id number]]     Lab Status: No result Specimen: Blood                  No orders to display              Procedures  ECG 12 Lead Documentation Only    Date/Time: 1/8/2021 6:09 PM  Performed by: Robyn Echevarria MD  Authorized by: Robyn Echevarria MD     Indications / Diagnosis:  Near Syncope  ECG reviewed by me, the ED Provider: yes    Patient location:  ED  Previous ECG:     Previous ECG:  Compared to current    Comparison ECG info:  10/31/2020    Similarity:  Changes noted  Interpretation:     Interpretation: abnormal    Rate:     ECG rate:  82 BPM    ECG rate assessment: normal    Rhythm:     Rhythm: sinus rhythm    Ectopy:     Ectopy: none    QRS:     QRS axis:  Normal  Conduction:     Conduction: abnormal      Abnormal conduction: bifascicular block    ST segments:     ST segments:  Non-specific  Comments:      No stemi             ED Course  ED Course as of Jan 08 2056 Fri Jan 08, 2021   1736 elevated   Troponin I(!): 0 06   1736 elevated   D-Dimer, Quant(!): 0 83   1738 Mildly elevated   NT-proBNP(!): 381   1739 Walking pulse ox completed  Pt tolerated sats stayed 92-93%, felt no worse than when he arrived, no sob       Floridalma Zarate RN  01/08/21 1716      1747 Near baseline    Creatinine(!): 3 49   1748 Elevated, not hemolyzed, t waves are not tall or peaked on EKG   Potassium(!): 5 7   1818 wnl   Urine Microscopic   1818 Mild thrombocytopenia   Platelet Count(!): 117                                           [facility]  Number of Diagnoses or Management Options  COVID-19:   Elevated troponin:   Near syncope:      Amount and/or Complexity of Data Reviewed  Clinical lab tests: ordered and reviewed  Tests in the radiology section of CPT®: ordered and reviewed  Tests in the medicine section of CPT®: ordered and reviewed  Review and summarize past medical records: yes  Discuss the patient with other providers: yes  Independent visualization of images, tracings, or specimens: yes    Risk of Complications, Morbidity, and/or Mortality  Presenting problems: moderate  Diagnostic procedures: moderate  Management options: moderate        Disposition  Final diagnoses:   None     ED Disposition     None      Follow-up Information    None         Patient's Medications   Discharge Prescriptions    No medications on file     No discharge procedures on file      PDMP Review     None          ED Provider  Electronically Signed by           Reyna Elmore MD  01/08/21 2100

## 2021-01-09 LAB
ANION GAP SERPL CALCULATED.3IONS-SCNC: 11 MMOL/L (ref 4–13)
APTT PPP: 67 SECONDS (ref 23–37)
BASOPHILS # BLD AUTO: 0.01 THOUSANDS/ΜL (ref 0–0.1)
BASOPHILS NFR BLD AUTO: 0 % (ref 0–1)
BUN SERPL-MCNC: 44 MG/DL (ref 5–25)
CALCIUM SERPL-MCNC: 8.1 MG/DL (ref 8.3–10.1)
CHLORIDE SERPL-SCNC: 102 MMOL/L (ref 100–108)
CO2 SERPL-SCNC: 20 MMOL/L (ref 21–32)
CREAT SERPL-MCNC: 3.26 MG/DL (ref 0.6–1.3)
CRP SERPL QL: 88.4 MG/L
D DIMER PPP FEU-MCNC: 2.21 UG/ML FEU
EOSINOPHIL # BLD AUTO: 0 THOUSAND/ΜL (ref 0–0.61)
EOSINOPHIL NFR BLD AUTO: 0 % (ref 0–6)
ERYTHROCYTE [DISTWIDTH] IN BLOOD BY AUTOMATED COUNT: 12.8 % (ref 11.6–15.1)
ERYTHROCYTE [DISTWIDTH] IN BLOOD BY AUTOMATED COUNT: 12.9 % (ref 11.6–15.1)
FERRITIN SERPL-MCNC: 627 NG/ML (ref 8–388)
GFR SERPL CREATININE-BSD FRML MDRD: 21 ML/MIN/1.73SQ M
GLUCOSE SERPL-MCNC: 141 MG/DL (ref 65–140)
GLUCOSE SERPL-MCNC: 185 MG/DL (ref 65–140)
GLUCOSE SERPL-MCNC: 200 MG/DL (ref 65–140)
GLUCOSE SERPL-MCNC: 95 MG/DL (ref 65–140)
GLUCOSE SERPL-MCNC: 99 MG/DL (ref 65–140)
HCT VFR BLD AUTO: 37 % (ref 36.5–49.3)
HCT VFR BLD AUTO: 45.1 % (ref 36.5–49.3)
HGB BLD-MCNC: 12.6 G/DL (ref 12–17)
HGB BLD-MCNC: 15.2 G/DL (ref 12–17)
IMM GRANULOCYTES # BLD AUTO: 0.03 THOUSAND/UL (ref 0–0.2)
IMM GRANULOCYTES NFR BLD AUTO: 1 % (ref 0–2)
INR PPP: 1.99 (ref 0.84–1.19)
LYMPHOCYTES # BLD AUTO: 0.71 THOUSANDS/ΜL (ref 0.6–4.47)
LYMPHOCYTES NFR BLD AUTO: 11 % (ref 14–44)
MAGNESIUM SERPL-MCNC: 2 MG/DL (ref 1.6–2.6)
MCH RBC QN AUTO: 31.2 PG (ref 26.8–34.3)
MCH RBC QN AUTO: 31.7 PG (ref 26.8–34.3)
MCHC RBC AUTO-ENTMCNC: 33.7 G/DL (ref 31.4–37.4)
MCHC RBC AUTO-ENTMCNC: 34.1 G/DL (ref 31.4–37.4)
MCV RBC AUTO: 93 FL (ref 82–98)
MCV RBC AUTO: 93 FL (ref 82–98)
MONOCYTES # BLD AUTO: 0.48 THOUSAND/ΜL (ref 0.17–1.22)
MONOCYTES NFR BLD AUTO: 7 % (ref 4–12)
NEUTROPHILS # BLD AUTO: 5.25 THOUSANDS/ΜL (ref 1.85–7.62)
NEUTS SEG NFR BLD AUTO: 81 % (ref 43–75)
NRBC BLD AUTO-RTO: 0 /100 WBCS
PLATELET # BLD AUTO: 114 THOUSANDS/UL (ref 149–390)
PLATELET # BLD AUTO: 121 THOUSANDS/UL (ref 149–390)
PMV BLD AUTO: 10.2 FL (ref 8.9–12.7)
PMV BLD AUTO: 9.7 FL (ref 8.9–12.7)
POTASSIUM SERPL-SCNC: 4.9 MMOL/L (ref 3.5–5.3)
PROCALCITONIN SERPL-MCNC: 0.3 NG/ML
PROTHROMBIN TIME: 22.7 SECONDS (ref 11.6–14.5)
RBC # BLD AUTO: 3.97 MILLION/UL (ref 3.88–5.62)
RBC # BLD AUTO: 4.87 MILLION/UL (ref 3.88–5.62)
SODIUM SERPL-SCNC: 133 MMOL/L (ref 136–145)
TROPONIN I SERPL-MCNC: 0.08 NG/ML
WBC # BLD AUTO: 6.48 THOUSAND/UL (ref 4.31–10.16)
WBC # BLD AUTO: 7.88 THOUSAND/UL (ref 4.31–10.16)

## 2021-01-09 PROCEDURE — 85025 COMPLETE CBC W/AUTO DIFF WBC: CPT | Performed by: PHYSICIAN ASSISTANT

## 2021-01-09 PROCEDURE — 80048 BASIC METABOLIC PNL TOTAL CA: CPT | Performed by: PHYSICIAN ASSISTANT

## 2021-01-09 PROCEDURE — 36415 COLL VENOUS BLD VENIPUNCTURE: CPT | Performed by: PHYSICIAN ASSISTANT

## 2021-01-09 PROCEDURE — 85379 FIBRIN DEGRADATION QUANT: CPT | Performed by: PHYSICIAN ASSISTANT

## 2021-01-09 PROCEDURE — 82948 REAGENT STRIP/BLOOD GLUCOSE: CPT

## 2021-01-09 PROCEDURE — 99233 SBSQ HOSP IP/OBS HIGH 50: CPT | Performed by: INTERNAL MEDICINE

## 2021-01-09 PROCEDURE — 85730 THROMBOPLASTIN TIME PARTIAL: CPT | Performed by: INTERNAL MEDICINE

## 2021-01-09 PROCEDURE — XW033E5 INTRODUCTION OF REMDESIVIR ANTI-INFECTIVE INTO PERIPHERAL VEIN, PERCUTANEOUS APPROACH, NEW TECHNOLOGY GROUP 5: ICD-10-PCS | Performed by: INTERNAL MEDICINE

## 2021-01-09 PROCEDURE — 84145 PROCALCITONIN (PCT): CPT | Performed by: PHYSICIAN ASSISTANT

## 2021-01-09 PROCEDURE — 85027 COMPLETE CBC AUTOMATED: CPT | Performed by: PHYSICIAN ASSISTANT

## 2021-01-09 PROCEDURE — 83735 ASSAY OF MAGNESIUM: CPT | Performed by: PHYSICIAN ASSISTANT

## 2021-01-09 PROCEDURE — 85610 PROTHROMBIN TIME: CPT | Performed by: INTERNAL MEDICINE

## 2021-01-09 PROCEDURE — 84484 ASSAY OF TROPONIN QUANT: CPT | Performed by: PHYSICIAN ASSISTANT

## 2021-01-09 RX ORDER — HEPARIN SODIUM 10000 [USP'U]/100ML
3-30 INJECTION, SOLUTION INTRAVENOUS
Status: DISCONTINUED | OUTPATIENT
Start: 2021-01-09 | End: 2021-01-09

## 2021-01-09 RX ORDER — DEXAMETHASONE SODIUM PHOSPHATE 4 MG/ML
6 INJECTION, SOLUTION INTRA-ARTICULAR; INTRALESIONAL; INTRAMUSCULAR; INTRAVENOUS; SOFT TISSUE EVERY 24 HOURS
Status: DISCONTINUED | OUTPATIENT
Start: 2021-01-09 | End: 2021-01-12 | Stop reason: HOSPADM

## 2021-01-09 RX ORDER — WARFARIN SODIUM 2 MG/1
2 TABLET ORAL
Status: DISCONTINUED | OUTPATIENT
Start: 2021-01-11 | End: 2021-01-11

## 2021-01-09 RX ORDER — WARFARIN SODIUM 4 MG/1
4 TABLET ORAL
Status: DISCONTINUED | OUTPATIENT
Start: 2021-01-09 | End: 2021-01-12 | Stop reason: HOSPADM

## 2021-01-09 RX ORDER — FAMOTIDINE 20 MG/1
10 TABLET, FILM COATED ORAL DAILY
Status: DISCONTINUED | OUTPATIENT
Start: 2021-01-09 | End: 2021-01-12 | Stop reason: HOSPADM

## 2021-01-09 RX ADMIN — ZINC SULFATE 220 MG (50 MG) CAPSULE 220 MG: CAPSULE at 08:46

## 2021-01-09 RX ADMIN — WARFARIN SODIUM 4 MG: 4 TABLET ORAL at 19:02

## 2021-01-09 RX ADMIN — OXYCODONE HYDROCHLORIDE AND ACETAMINOPHEN 1000 MG: 500 TABLET ORAL at 21:48

## 2021-01-09 RX ADMIN — DOXYCYCLINE 100 MG: 100 INJECTION, POWDER, LYOPHILIZED, FOR SOLUTION INTRAVENOUS at 12:54

## 2021-01-09 RX ADMIN — AMLODIPINE BESYLATE 2.5 MG: 2.5 TABLET ORAL at 08:46

## 2021-01-09 RX ADMIN — METOPROLOL SUCCINATE 50 MG: 50 TABLET, EXTENDED RELEASE ORAL at 08:46

## 2021-01-09 RX ADMIN — DEXAMETHASONE SODIUM PHOSPHATE 6 MG: 4 INJECTION, SOLUTION INTRA-ARTICULAR; INTRALESIONAL; INTRAMUSCULAR; INTRAVENOUS; SOFT TISSUE at 11:49

## 2021-01-09 RX ADMIN — INSULIN LISPRO 1 UNITS: 100 INJECTION, SOLUTION INTRAVENOUS; SUBCUTANEOUS at 21:59

## 2021-01-09 RX ADMIN — CEFTRIAXONE SODIUM 1000 MG: 10 INJECTION, POWDER, FOR SOLUTION INTRAVENOUS at 12:17

## 2021-01-09 RX ADMIN — ASPIRIN 81 MG: 81 TABLET ORAL at 08:46

## 2021-01-09 RX ADMIN — HYDRALAZINE HYDROCHLORIDE 25 MG: 25 TABLET, FILM COATED ORAL at 16:55

## 2021-01-09 RX ADMIN — TRAVOPROST 1 DROP: 0.04 SOLUTION/ DROPS OPHTHALMIC at 21:47

## 2021-01-09 RX ADMIN — FAMOTIDINE 10 MG: 20 TABLET ORAL at 11:49

## 2021-01-09 RX ADMIN — ATORVASTATIN CALCIUM 20 MG: 40 TABLET, FILM COATED ORAL at 08:48

## 2021-01-09 RX ADMIN — ISOSORBIDE DINITRATE 10 MG: 10 TABLET ORAL at 16:55

## 2021-01-09 RX ADMIN — REMDESIVIR 200 MG: 100 INJECTION, POWDER, LYOPHILIZED, FOR SOLUTION INTRAVENOUS at 13:52

## 2021-01-09 RX ADMIN — Medication 2000 UNITS: at 08:46

## 2021-01-09 RX ADMIN — CALCITRIOL 0.25 MCG: 0.25 CAPSULE, LIQUID FILLED ORAL at 08:47

## 2021-01-09 RX ADMIN — OXYCODONE HYDROCHLORIDE AND ACETAMINOPHEN 1000 MG: 500 TABLET ORAL at 08:46

## 2021-01-09 RX ADMIN — ISOSORBIDE DINITRATE 10 MG: 10 TABLET ORAL at 08:47

## 2021-01-09 RX ADMIN — HYDRALAZINE HYDROCHLORIDE 25 MG: 25 TABLET, FILM COATED ORAL at 08:46

## 2021-01-09 RX ADMIN — HEPARIN SODIUM 18 UNITS/KG/HR: 10000 INJECTION, SOLUTION INTRAVENOUS at 12:38

## 2021-01-09 RX ADMIN — INSULIN LISPRO 1 UNITS: 100 INJECTION, SOLUTION INTRAVENOUS; SUBCUTANEOUS at 16:58

## 2021-01-09 NOTE — QUICK NOTE
Called by nursing for temperature 100 9°  Face of CT scan with evidence of coronary disease etc  Check procalcitonin in a m  Start IV Rocephin and doxycycline to cover for pneumonia  Patient is on remdesivir and dexamethasone for COVID-19 infection    Repeat INR currently pending discussed with nursing being drawn now

## 2021-01-09 NOTE — ASSESSMENT & PLAN NOTE
· Patient was noted to stand up, walk to the kitchen and then developed syncopal episode  He reports no prodromal symptoms (denies dizziness which he reported earlier to the ER)  · Differential diagnosis includes:  · Orthostatic hypotension in the setting of decreased oral intake and COVID-19 infection  · Pulmonary embolism in the setting of COVID-19 and subtherapeutic INR on Coumadin  · ACS in the setting of minimally elevated troponin   · Bradyarrhythmia in the setting of no prodomal symptoms  · Hypoglycemia in the setting of poor oral intake  · Troponin noted to be 0 06 on admission  Trend x3  ·  serial EKGs  · Check orthostatic vital signs  · Gentle IV fluid hydration x 500 mL total avoid volume overload in the setting of COVID-19  · Monitor on telemetry  · Increase Coumadin  Repeat D-dimer and INR in the morning  Currently elevated D-dimer could be related to underlying chronic kidney disease versus COVID-19 infection  The patient does not have hypoxia, tachypnea or tachycardia and therefore will hold off on initiating heparin drip  Will avoid CTA given the patient has chronic kidney disease     · Could consider V/Q & venous duplex pending repeat d-dimer in the AM

## 2021-01-09 NOTE — ASSESSMENT & PLAN NOTE
Wt Readings from Last 3 Encounters:   01/08/21 79 4 kg (175 lb)   01/04/21 77 1 kg (170 lb)   10/31/20 83 8 kg (184 lb 11 9 oz)     · Prior reduced EF secondary to tachycardia mediated etiology has since resolved with normalization in EF  ·  takes diuretics as needed  Has not required any use recently   · Clinically euvolemic monitor volume status carefully patient takes diuretics p r n

## 2021-01-09 NOTE — ASSESSMENT & PLAN NOTE
·  Patient with history of atrial flutter status post PATIENCE and cardioversion April 2019  ·  follows with cardiology  ·  noted to be maintained on Coumadin 57

## 2021-01-09 NOTE — ASSESSMENT & PLAN NOTE
· Noted to test positive on 1/5/2021  · Wife was recently hospitalized with COVID-19 infection  · Not requiring any oxygen  · Patient deemed to be high risk secondary to age  · Initiate patient on zinc, vitamin-D, vitamin-C  · Discussed use of remdesivir with patient  Given patient with very mild symptoms aside from syncopal episode, and CKD4 will hold off on use at this time  If his symptoms worsen, could consider use of remdesivir  Inflammatory Markers (last 3):   Lab Results   Component Value Date    DDIMER 0 83 (H) 01/08/2021       Cardiac Markers:  Lab Results   Component Value Date    TROPONINI 0 06 (H) 01/08/2021    TROPONINI <0 02 10/31/2020    TROPONINI 0 04 01/29/2019    NTBNP 381 (H) 01/08/2021    CKTOTAL 206 10/14/2020

## 2021-01-09 NOTE — ASSESSMENT & PLAN NOTE
Lab Results   Component Value Date    CREATININE 3 49 (H) 01/08/2021    CREATININE 3 47 (H) 10/31/2020    CREATININE 3 06 (H) 10/14/2020    CREATININE 3 17 (H) 08/21/2020     · Follows with renal as outpatient  · Baseline had been  2 5-3 0 however  More recently it appears to be 3 3-3 4  ·  poor oral intake recently  ·  gentle IV fluid hydration

## 2021-01-09 NOTE — ASSESSMENT & PLAN NOTE
·  Patient with history of atrial flutter status post PATIENCE and cardioversion April 2019  ·  follows with cardiology  ·  noted to be maintained on Coumadin

## 2021-01-09 NOTE — ASSESSMENT & PLAN NOTE
· Patient was noted to stand up, walk to the kitchen and then developed syncopal episode  He reports no prodromal symptoms (denies dizziness which he reported earlier to the ER)  · Differential diagnosis includes:  · Orthostatic hypotension in the setting of decreased oral intake and COVID-19 infection  · Pulmonary embolism in the setting of COVID-19 and subtherapeutic INR on Coumadin  · ACS in the setting of minimally elevated troponin   · Bradyarrhythmia in the setting of no prodomal symptoms  · Hypoglycemia in the setting of poor oral intake  · Troponin noted to be 0 06 on admission  Trend x3  ·  serial EKGs  · Check orthostatic vital signs  · Gentle IV fluid hydration x 500 mL total avoid volume overload in the setting of COVID-19  · Monitor on telemetry  · Increase Coumadin  Repeat D-dimer and INR in the morning  Currently elevated D-dimer could be related to underlying chronic kidney disease versus COVID-19 infection  The patient does not have hypoxia, tachypnea or tachycardia and therefore will hold off on initiating heparin drip  Will avoid CTA given the patient has chronic kidney disease     · D-dimer noted to be increased  · Follow-up INR this morning if under 2 0 will start heparin drip  · V/Q scan

## 2021-01-09 NOTE — ASSESSMENT & PLAN NOTE
· Potassium 5 7 on admission  · Not taking any potassium supplementation  Poor oral intake recently  · Not chronically on diuretics  Take these as needed however has not taken them for some time  Not on spironolactone  · Ordered for calcium gluconate in the emergency department  ·  give  Insulin/ dextrose,  Hold on albuterol secondary to COVID-19 infection  Calcium being administered    · Gentle IV fluid hydration appears dry on exam   · Monitor on telemetry

## 2021-01-09 NOTE — UTILIZATION REVIEW
Initial Clinical Review    Admission: Date/Time/Statement:     OBSERVATION 1/8 @ 1825 CHANGED to INPATIENT 11/9 @ 1109 DUE TO TREATMENT OF COVID 19 INFECTION, INCREASING O2 REQUIREMENT, IV ABX,  STEROIDS,  REMDESIVIR  ANTICOAGULATION for SUBTHERAPEUTIC INR with ELEVATED D DIMER     01/09/21 1109  Inpatient Admission Once     Transfer Service: Hospitalist       Question Answer Comment   Admitting Physician Alberto Chun    Level of Care Med Surg    Estimated length of stay More than 2 Midnights    Certification I certify that inpatient services are medically necessary for this patient for a duration of greater than two midnights  See H&P and MD Progress Notes for additional information about the patient's course of treatment  ED Arrival Information     Expected Arrival Acuity Means of Arrival Escorted By Service Admission Type    - 1/8/2021 15:47 Urgent Ambulance [place] [facility] Hospitalist Urgent    Arrival Complaint    [facility]        Chief Complaint   Patient presents with    Weakness - Generalized     Covid positive on 1/5/21 c/o generalized weakness fell no head strike,no loc     Assessment/Plan:  71 yo male presents to ED via EMS from home due to syncope after standing to walk to kitchen  Pt reports next thing he knew he was on the floor  Denies prodromal symptoms  + COVID 19 1/5/2020  Reports poor oral intake and c/o feeling very fatigued  In ED,  labs reveal hyperkalemia K 5 7, treated with ca gluconate, dextrose and insulin in ED  Creatinine 3 49  baseline was 2 5-3 0, now appears to be 3 3-3 4  Troponin elevated 0 06  INR subtherapeutic 1 71, D Dimer elevated 0 83 Pt appears dry on exam Gentle IV fluids initiated  CXR reveals opacities within lung bases  Lungs clear, not requiring supplemental O2, Speech clear, follows commands  Temp 100 7  Admitted as observation to med surg for continued evaluation and treatment of syncope, hyperkalemia in setting of + COVID 19, CKD   Plan Beclarisse Yarsani Monitor telemetry, trend trooponin, increase Coumadin, deemed to be COVID  high risk due to age, will  initiate Vitamin D, Vit C, Zinc, Hold remdesivir at this time due to CKD4  Repeat D Dimer in AM, IV hydration x 500 ml total volume to avoid volume overload in setting of COVID 19, serial EKG's Check orthostatic VS  Monitor BMP      1/9 CHANGED TO INPATIENT  D Dimer noted to be increased  If INR is < 2 will initiate heparin gtt Check VQ lung scan  O 2 sats now 87-88 on RA requiring supplemental O2 3lNC  Given high risk category, will initiate remedesivir and decadron  Temp 100 9, start IV Rocephin and doxycycline Check Procalcitonin in AM    INR 1 99 Heparin gtt initiated at 1238 1/9     ED Triage Vitals   Temperature Pulse Respirations Blood Pressure SpO2   01/08/21 1551 01/08/21 1551 01/08/21 1551 01/08/21 1551 01/08/21 1551   (!) 100 7 °F (38 2 °C) 86 20 108/59 95 %      Temp Source Heart Rate Source Patient Position - Orthostatic VS BP Location FiO2 (%)   01/08/21 1551 01/08/21 1551 01/08/21 1551 01/08/21 1551 --   Oral Monitor Lying Right arm       Pain Score       01/08/21 2242       No Pain          Wt Readings from Last 1 Encounters:   01/08/21 79 4 kg (175 lb)     Additional Vital Signs:      1/9 @ 1058 T 100 9     Date/Time  Temp  Pulse  Resp  BP  MAP (mmHg)  SpO2  O2 Device  Patient Position - Orthostatic VS   01/09/21 0611  --  80  16  143/66  --  96 %  --  Lying   01/09/21 0028  --  94  18  139/64  --  95 %  None (Room air)  Lying   01/08/21 2230  --  88  19  131/70  94  95 %  --  Lying   01/08/21 2220  --  96  --  131/70  --  --  --  Standing for 3 minutes - Orthostatic VS   01/08/21 2219  --  98  --  130/57  --  --  --  Standing - Orthostatic VS   01/08/21 2217  --  91  --  137/80  --  --  --  Sitting - Orthostatic VS   01/08/21 2216  --  94  --  134/77  --  --  --  Lying - Orthostatic VS       Pertinent Labs/Diagnostic Test Results:    EKG 1/8   · EKG Results: NSR  HR 83 bpm  bifasicular block   no st elevation  CXR 1/8  Peripheral opacities within the lung bases   In the setting of clinically suspected/proven COVID-19, this plain film appearance while nonspecific, can be seen in cases of viral pneumonia such as COVID-19      Results from last 7 days   Lab Units 01/05/21  0824   SARS-COV-2  Detected*     Results from last 7 days   Lab Units 01/09/21  0434 01/08/21  1703   WBC Thousand/uL 6 48 6 89   HEMOGLOBIN g/dL 15 2 15 0   HEMATOCRIT % 45 1 44 8   PLATELETS Thousands/uL 121* 117*   NEUTROS ABS Thousands/µL 5 25 5 72         Results from last 7 days   Lab Units 01/09/21  0434 01/08/21  1703   SODIUM mmol/L 133* 134*   POTASSIUM mmol/L 4 9 5 7*   CHLORIDE mmol/L 102 101   CO2 mmol/L 20* 24   ANION GAP mmol/L 11 9   BUN mg/dL 44* 44*   CREATININE mg/dL 3 26* 3 49*   EGFR ml/min/1 73sq m 21 20   CALCIUM mg/dL 8 1* 8 3   MAGNESIUM mg/dL 2 0  --      Results from last 7 days   Lab Units 01/08/21  1703   AST U/L 55*   ALT U/L 24   ALK PHOS U/L 48   TOTAL PROTEIN g/dL 7 3   ALBUMIN g/dL 2 6*   TOTAL BILIRUBIN mg/dL 0 26     Results from last 7 days   Lab Units 01/09/21  0605 01/08/21  2215 01/08/21  1627   POC GLUCOSE mg/dl 99 109 86     Results from last 7 days   Lab Units 01/09/21  0434 01/08/21  1703   GLUCOSE RANDOM mg/dL 95 109             No results found for: BETA-HYDROXYBUTYRATE                   Results from last 7 days   Lab Units 01/09/21  0027 01/08/21  1703   TROPONIN I ng/mL 0 08* 0 06*     Results from last 7 days   Lab Units 01/09/21  0434 01/08/21  1703   D-DIMER QUANTITATIVE ug/ml FEU 2 21* 0 83*     Results from last 7 days   Lab Units 01/08/21  1703   PROTIME seconds 20 1*   INR  1 71*     Results from last 7 days   Lab Units 01/08/21  1703   TSH 3RD GENERATON uIU/mL 1 429                     Results from last 7 days   Lab Units 01/08/21  1703   NT-PRO BNP pg/mL 381*     Results from last 7 days   Lab Units 01/08/21  1703   FERRITIN ng/mL 627*             Results from last 7 days   Lab Units 01/08/21  1703   CRP mg/L 88 4*         Results from last 7 days   Lab Units 01/08/21  1738   CLARITY UA  Clear   COLOR UA  Yellow   SPEC GRAV UA  1 025   PH UA  6 0   GLUCOSE UA mg/dl Negative   KETONES UA mg/dl Negative   BLOOD UA  Small*   PROTEIN UA mg/dl >=300*   NITRITE UA  Negative   BILIRUBIN UA  Negative   UROBILINOGEN UA E U /dl 0 2   LEUKOCYTES UA  Negative   WBC UA /hpf 0-1   RBC UA /hpf 0-1   BACTERIA UA /hpf Occasional   EPITHELIAL CELLS WET PREP /hpf Occasional                                               ED Treatment:   Medication Administration from 01/08/2021 1547 to 01/09/2021 1034       Date/Time Order Dose Route Action Action by Comments     01/08/2021 2037 acetaminophen (TYLENOL) tablet 650 mg 650 mg Oral Not Given [employer], RN      01/08/2021 2211 calcium gluconate 1 g in sodium chloride 0 9% 50 mL (premix) 0 g Intravenous Stopped Rosario Gramajo, FARHAT      01/08/2021 2129 calcium gluconate 1 g in sodium chloride 0 9% 50 mL (premix) 1 g Intravenous Bear Roge, FARHAT      01/08/2021 2218 insulin regular (HumuLIN R,NovoLIN R) injection 10 Units 10 Units Intravenous Given Rosario Gramajo RN      01/08/2021 2218 dextrose 50 % IV solution 50 mL 50 mL Intravenous Given Rosario Gramajo RN      01/09/2021 0803 sodium chloride 0 9 % infusion 0 mL/hr Intravenous Canceled Entry Samuel Torrez, FARHAT      01/08/2021 2152 sodium chloride 0 9 % infusion 50 mL/hr Intravenous [facility], RN      01/09/2021 0846 amLODIPine (NORVASC) tablet 2 5 mg 2 5 mg Oral Given [place] Lore, FARHAT      01/09/2021 0848 atorvastatin (LIPITOR) tablet 20 mg 20 mg Oral Given herve Torrez, RN      01/09/2021 0847 calcitriol (ROCALTROL) capsule 0 25 mcg 0 25 mcg Oral Given herve Torrez, RN      01/09/2021 0846 hydrALAZINE (APRESOLINE) tablet 25 mg 25 mg Oral Given herve Torrez, RN      01/08/2021 2218 hydrALAZINE (APRESOLINE) tablet 25 mg 25 mg Oral Given [facility], [employer], RN      01/09/2021 0847 isosorbide dinitrate (ISORDIL) tablet 10 mg 10 mg Oral Given Ulises Pascual RN      2021 isosorbide dinitrate (ISORDIL) tablet 10 mg 10 mg Oral Given EstelaZaarly [place] and FARHAT Garcia      2021 0846 metoprolol succinate (TOPROL-XL) 24 hr tablet 50 mg 50 mg Oral Given Ulises Pascual RN      2021 8841 aspirin (ECOTRIN LOW STRENGTH) EC tablet 81 mg 81 mg Oral Given Ulises Pascual RN      2021 travoprost (TRAVATAN-Z) 0 004 % ophthalmic solution 1 drop 1 drop Both Eyes Given Rosario Gramajo RN      2021 warfarin (COUMADIN) tablet 4 mg 4 mg Oral Given EstelaZaarly Yuniel and FARHAT Garcia      2021 0606 insulin lispro (HumaLOG) 100 units/mL subcutaneous injection 1-5 Units 0 Units Subcutaneous Not Given Rosario Gramajo RN B     2021 2231 insulin lispro (HumaLOG) 100 units/mL subcutaneous injection 1-5 Units 0 Units Subcutaneous Not Given Rosario Gramajo RN BS: 109     2021 0846 cholecalciferol (VITAMIN D3) tablet 2,000 Units 2,000 Units Oral Given Ulises Pascual RN      2021 0144 ascorbic acid (VITAMIN C) tablet 1,000 mg 1,000 mg Oral Given Ulises Pascual RN      2021 ascorbic acid (VITAMIN C) tablet 1,000 mg 1,000 mg Oral Given EstelaZaarly [place] and FARHAT Garcia      2021 6975 zinc sulfate (ZINCATE) capsule 220 mg 220 mg Oral Given Ulises Pascual RN         Past Medical History:   Diagnosis Date    Colon polyp     COVID-19     Diabetes mellitus ([facility] )     Hyperlipidemia     Hypertension     Proteinuria     Stage 3 chronic kidney disease     Vitamin D deficiency      Present on Admission:   Essential hypertension   Diabetic nephropathy associated with type 2 diabetes mellitus ([facility] )   Chronic systolic heart failure (HCC)   FSGS (focal segmental glomerulosclerosis)   CKD (chronic kidney disease), stage IV (HCC)   Atrial flutter (HCC)      Admitting Diagnosis: Weakness [R53 1]  Age/Sex: 72 y o  male  Admission Orders:  Scheduled Medications:  acetaminophen, 650 mg, Oral, Once  amLODIPine, 2 5 mg, Oral, Daily  ascorbic acid, 1,000 mg, Oral, Q12H [address]  aspirin, 81 mg, Oral, Daily  atorvastatin, 20 mg, Oral, Daily  calcitriol, 0 25 mcg, Oral, Once per day on Mon Wed Fri Sat  cholecalciferol, 2,000 Units, Oral, Daily  hydrALAZINE, 25 mg, Oral, TID  insulin lispro, 1-5 Units, Subcutaneous, TID AC  insulin lispro, 1-5 Units, Subcutaneous, HS  isosorbide dinitrate, 10 mg, Oral, TID  metoprolol succinate, 50 mg, Oral, Daily  zinc sulfate, 220 mg, Oral, Daily    Followed by  Eb Moore ON 1/16/2021] multivitamin-minerals, 1 tablet, Oral, Daily  travoprost, 1 drop, Both Eyes, HS  warfarin, 4 mg, Oral, Daily (warfarin)      Continuous IV Infusions:     PRN Meds:  ondansetron, 4 mg, Intravenous, Q6H PRN    Accucheck glucose qac and HS, insulin sliding scale coverage, incentive spirometry   Activity as tolerated, ambulate   Daily weight   I/O , Orthostatic BP's    Contact and airborne isolation    Telemetry   None    Network Utilization Review Department  ATTENTION: Please call with any questions or concerns to [phone] and carefully listen to the prompts so that you are directed to the right person  All voicemails are confidential   Nicky Muss all requests for admission clinical reviews, approved or denied determinations and any other requests to dedicated fax number below belonging to the campus where the patient is receiving treatment   List of dedicated fax numbers for the Facilities:  [address] DENIALS (Administrative/Medical Necessity) [phone]   [address] (Maternity/NICU/Pediatrics) [phone]   [address] [facility] [address] Kali Cain 3519 (  Rojelio Quintero "Viviana" 103) [address] [address] 85 [facility] Angelia Carver 1481 [address] [place]) [facility] [phone]

## 2021-01-09 NOTE — ASSESSMENT & PLAN NOTE
Lab Results   Component Value Date    INR 1 71 (H) 01/08/2021    INR 2 12 (H) 12/16/2020    INR 1 96 (H) 11/17/2020     ·  subtherapeutic INR on admission  ·  home dose of Coumadin is noted to be 4 mg  Tuesday Thursday Saturday Sunday and 2 mg on Monday Wednesday Friday  ·  increase Coumadin to 4 mg daily at this time  ·  repeat INR in the morning

## 2021-01-09 NOTE — ASSESSMENT & PLAN NOTE
Lab Results   Component Value Date    HGBA1C 5 6 10/14/2020       Recent Labs     01/08/21  1627 01/08/21  2215 01/09/21  0605   POCGLU 86 109 99       Blood Sugar Average: Last 72 hrs:  (P) 98     ·  not on chronic insulin  ·  takes Tradjenta daily  ·  monitor glucose with use of insulin /dextrose  ·  sliding scale insulin ACHS  · Monitor sugars with institution of dexamethasone for COVID-19 infection

## 2021-01-09 NOTE — ASSESSMENT & PLAN NOTE
Wt Readings from Last 3 Encounters:   01/08/21 79 4 kg (175 lb)   01/04/21 77 1 kg (170 lb)   10/31/20 83 8 kg (184 lb 11 9 oz)     · Prior reduced EF secondary to tachycardia mediated etiology has since resolved with normalization in EF  ·  takes diuretics as needed    Has not required any use recently   · appears dry on exam

## 2021-01-09 NOTE — ASSESSMENT & PLAN NOTE
· Noted to test positive on 1/5/2021  · Wife was recently hospitalized with COVID-19 infection  · Patient deemed to be high risk secondary to age  · Initiate patient on zinc, vitamin-D, vitamin-C  · Discussed use of remdesivir with patient  · Rising D-dimer  · At time my evaluation patient's oxygen saturations were 87-88% on room air and improved with supplemental oxygen to 93% with 3 L  · Given high risk category secondary agent worsening oxygen requirements will start IV remdesivir and dexamethasone risk benefits alternatives explained and understood    Inflammatory Markers (last 3):   Lab Results   Component Value Date    DDIMER 2 21 (H) 01/09/2021    DDIMER 0 83 (H) 01/08/2021    CRP 88 4 (H) 01/08/2021    FERRITIN 627 (H) 01/08/2021       Cardiac Markers:  Lab Results   Component Value Date    TROPONINI 0 08 (H) 01/09/2021    TROPONINI 0 06 (H) 01/08/2021    TROPONINI <0 02 10/31/2020    NTBNP 381 (H) 01/08/2021    CKTOTAL 206 10/14/2020

## 2021-01-09 NOTE — H&P
[facility]  H&P- Brayan Perfecto 1950, 72 y o  male MRN: [id number]  Unit/Bed#: ED 27 Encounter: [id number]  Primary Care Provider: Maria Ines Hickey MD   Date and time admitted to hospital: 1/8/2021  3:47 PM    * Syncope  Assessment & Plan  · Patient was noted to stand up, walk to the kitchen and then developed syncopal episode  He reports no prodromal symptoms (denies dizziness which he reported earlier to the ER)  · Differential diagnosis includes:  · Orthostatic hypotension in the setting of decreased oral intake and COVID-19 infection  · Pulmonary embolism in the setting of COVID-19 and subtherapeutic INR on Coumadin  · ACS in the setting of minimally elevated troponin   · Bradyarrhythmia in the setting of no prodomal symptoms  · Hypoglycemia in the setting of poor oral intake  · Troponin noted to be 0 06 on admission  Trend x3  ·  serial EKGs  · Check orthostatic vital signs  · Gentle IV fluid hydration x 500 mL total avoid volume overload in the setting of COVID-19  · Monitor on telemetry  · Increase Coumadin  Repeat D-dimer and INR in the morning  Currently elevated D-dimer could be related to underlying chronic kidney disease versus COVID-19 infection  The patient does not have hypoxia, tachypnea or tachycardia and therefore will hold off on initiating heparin drip  Will avoid CTA given the patient has chronic kidney disease  · Could consider V/Q & venous duplex pending repeat d-dimer in the AM      COVID-19 virus infection  Assessment & Plan  · Noted to test positive on 1/5/2021  · Wife was recently hospitalized with COVID-19 infection  · Not requiring any oxygen  · Patient deemed to be high risk secondary to age  · Initiate patient on zinc, vitamin-D, vitamin-C  · Discussed use of remdesivir with patient  Given patient with very mild symptoms aside from syncopal episode, and CKD4 will hold off on use at this time  If his symptoms worsen, could consider use of remdesivir  Inflammatory Markers (last 3): Lab Results   Component Value Date    DDIMER 0 83 (H) 01/08/2021       Cardiac Markers:  Lab Results   Component Value Date    TROPONINI 0 06 (H) 01/08/2021    TROPONINI <0 02 10/31/2020    TROPONINI 0 04 01/29/2019    NTBNP 381 (H) 01/08/2021    CKTOTAL 206 10/14/2020         CKD (chronic kidney disease), stage IV [facility])  Assessment & Plan  Lab Results   Component Value Date    CREATININE 3 49 (H) 01/08/2021    CREATININE 3 47 (H) 10/31/2020    CREATININE 3 06 (H) 10/14/2020    CREATININE 3 17 (H) 08/21/2020     · Follows with renal as outpatient  · Baseline had been  2 5-3 0 however  More recently it appears to be 3 3-3 4  ·  poor oral intake recently  ·  gentle IV fluid hydration    Hyperkalemia  Assessment & Plan  · Potassium 5 7 on admission  · Not taking any potassium supplementation  Poor oral intake recently  · Not chronically on diuretics  Take these as needed however has not taken them for some time  Not on spironolactone  · Ordered for calcium gluconate in the emergency department  ·  give  Insulin/ dextrose,  Hold on albuterol secondary to COVID-19 infection  Calcium being administered    · Gentle IV fluid hydration appears dry on exam   · Monitor on telemetry    FSGS (focal segmental glomerulosclerosis)  Assessment & Plan  ·   Follows with Nephrology as an outpatient  ·  biopsy-proven    Atrial flutter ([facility] [address] )  Assessment & Plan  ·  Patient with history of atrial flutter status post PATIENCE and cardioversion April 2019  ·  follows with cardiology  ·  noted to be maintained on Coumadin    Anticoagulant long-term use  Assessment & Plan  Lab Results   Component Value Date    INR 1 71 (H) 01/08/2021    INR 2 12 (H) 12/16/2020    INR 1 96 (H) 11/17/2020     ·  subtherapeutic INR on admission  ·  home dose of Coumadin is noted to be 4 mg  Tuesday Thursday Saturday Sunday and 2 mg on Monday Wednesday Friday  ·  increase Coumadin to 4 mg daily at this time  ·  repeat INR in the morning    Chronic systolic heart failure (HCC)  Assessment & Plan  Wt Readings from Last 3 Encounters:   01/08/21 79 4 kg (175 lb)   01/04/21 77 1 kg (170 lb)   10/31/20 83 8 kg (184 lb 11 9 oz)     · Prior reduced EF secondary to tachycardia mediated etiology has since resolved with normalization in EF  ·  takes diuretics as needed  Has not required any use recently   · appears dry on exam    Essential hypertension  Assessment & Plan  ·  Continue metoprolol, hydralazine and amlodipine    Diabetic nephropathy associated with type 2 diabetes mellitus [facility])  Assessment & Plan  Lab Results   Component Value Date    HGBA1C 5 6 10/14/2020       Recent Labs     01/08/21  1627   POCGLU 86       Blood Sugar Average: Last 72 hrs:  (P) 86     ·  not on chronic insulin  ·  takes Tradjenta daily  ·  monitor glucose with use of insulin /dextrose  ·  sliding scale insulin ACHS    VTE Pharmacologic Prophylaxis: VTE Score: 5 High Risk (Score >/= 5) - Pharmacological DVT Prophylaxis Ordered: Warfarin (Coumadin)  Sequential Compression Devices Ordered  Code Status: FULL CODE  Discussion with family: patient    Anticipated Length of Stay: Patient will be admitted on an observation basis with an anticipated length of stay of less than 2 midnights secondary to COVID 19, syncope  Total Time for Visit, including Counseling / Coordination of Care: 60 minutes  Greater than 50% of this total time spent on direct patient counseling and coordination of care      Chief Complaint:    generalized weakness    History of Present Illness:    Joseph Mcelroy is a 72 y o  male with a PMH of   Focal segmental glomerulosclerosis,  Chronic kidney disease stage 4,  Chronic systolic CHF,  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%, now resolved, Status post aortic valve  Replacement with bioprosthetic aortic valve, mitral valve repair with annuloplasty ring and left atrial appendage clipping in March of 2019, paroxysmal atrial fibrillation/flutter status post PATIENCE/ cardioversion April 2019, proteinuria,who presents with  Generalized weakness and syncope and is known COVID +  Patient follows with Nephrology  Was noted to be seen via telemedicine visit on 1/4/2021  Baseline creatinine was noted to be 2 5-3 0  But more recently appears to be approximately 3 3-3 4      the patient initially told emergency department that he was noted to be dizzy, then had syncopal episode associated with diaphoresis however in discussion with myself, he reports that he had stood up to go into the kitchen to get an apple and then suddenly the next thing he remembers is being on the floor and that he had absolutely no prodromal symptoms  He reports that he had felt generalized malaise and fatigue over the last several days but that he had not felt dizzy, short of breath  He reports that he has an occasional cough  He also reports that he is frustrated regarding his overall fatigue  He has not had a good appetite and has had poor oral intake recently  He has also had chills  He denies any chest pain, shortness of breath or dizziness at this time  He has been compliant with his medications  He has not been taking his diuretic recently which she has taken as needed previously  Review of Systems:    Review of Systems   Constitutional: Positive for activity change, appetite change, fatigue and fever  Negative for chills, diaphoresis and unexpected weight change  HENT: Negative for sore throat  Respiratory: Positive for cough  Negative for chest tightness and shortness of breath  Cardiovascular: Negative for chest pain, palpitations and leg swelling  Gastrointestinal: Negative for abdominal pain, diarrhea, nausea and vomiting  Genitourinary: Negative for dysuria  Musculoskeletal: Negative for myalgias  Neurological: Positive for syncope and weakness  Negative for dizziness, numbness and headaches     Psychiatric/Behavioral: Negative for confusion  All other systems reviewed and are negative  Past Medical and Surgical History:     Past Medical History:   Diagnosis Date    Colon polyp     COVID-19     Diabetes mellitus ([address] )     Hyperlipidemia     Hypertension     Proteinuria     Stage 3 chronic kidney disease     Vitamin D deficiency        Past Surgical History:   Procedure Laterality Date    COLONOSCOPY      overdue    CT ECHO TRANSESOPHAG R-T 2D W/PRB IMG ACQUISJ I&R N/A 3/7/2019    Procedure: INTRA-OP TRANSESOPHAGEAL ECHOCARDIOGRAM (PATIENCE); Surgeon: Blessing Sheikh DO;  [facility];  Service: Cardiac Surgery    CT PERQ CLSR TCAT L ATR APNDGE W/ENDOCARDIAL IMPLNT  3/7/2019    Procedure: LEFT ATRIAL APPENDAGE OCCLUSION CLIPPED with 35mm Donneta Kobs;  Surgeon: Blessing Sheikh DO;  [facility];  Service: Cardiac Surgery    CT REPLACEMENT OF MITRAL VALVE N/A 3/7/2019    Procedure: REPLACEMENT VALVE MITRAL (MVR) REPAIR with a 30mm MEDTRONIC CG FUTURE RING;  Surgeon: Blessing Sheikh DO;  [facility];  Service: Cardiac Surgery    CT RPLCMT AORTIC VALVE OPN W/STENTLESS TISSUE VALVE N/A 3/7/2019    Procedure: REPLACEMENT VALVE AORTIC (AVR) with 23mm TAVERA INSPIRIS RESILIA  AORTIC VALVE;  Surgeon: Blessing Sheikh DO;  [facility];  Service: Cardiac Surgery    RENAL BIOPSY, OPEN      TOE SURGERY Left        Meds/Allergies:    Prior to Admission medications    Medication Sig Start Date End Date Taking?  Authorizing Provider   amLODIPine (NORVASC) 2 5 mg tablet Take 1 tablet (2 5 mg total) by mouth daily 5/13/20  Yes Pedro Calix MD   ascorbic acid (VITAMIN C) 500 mg tablet Take 500 mg by mouth daily   Yes Historical Provider, MD   atorvastatin (LIPITOR) 20 mg tablet Take 1 tablet (20 mg total) by mouth daily 11/17/20  Yes Pedro Calix MD   calcitriol (ROCALTROL) 0 25 mcg capsule Take one capsule five days a week 11/19/20  Yes Javier Ledezma DO   Cholecalciferol (VITAMIN D3) 1000 units CAPS Take 1 capsule by mouth daily   Yes Historical Provider, MD   COMBIGAN 0 2-0 5 % instill 1 drop into both eyes twice a day 19  Yes Historical Provider, MD   hydrALAZINE (APRESOLINE) 25 mg tablet Take 1 tablet (25 mg total) by mouth 3 (three) times a day 20  Yes Marce Dudley MD   isosorbide dinitrate (ISORDIL) 10 mg tablet Take 1 tablet (10 mg total) by mouth 3 (three) times a day 20  Yes Marce Dudley MD   metoprolol succinate (TOPROL-XL) 50 mg 24 hr tablet Take 1 tablet (50 mg total) by mouth daily 20  Yes Marce Dudley MD   RA ASPIRIN EC 81 MG EC tablet take 1 tablet by mouth once daily 20  Yes Luis Felipe Linn MD   TRADJENTA 5 MG TABS 5 mg daily  10/8/19  Yes Historical Provider, MD   warfarin (COUMADIN) 2 mg tablet TAKE 1 TO 2 TABLETS DAILY BY MOUTH OR AS DIRECTED BY PHYSICIAN  20  Yes Marce Dudley MD   torsemide (DEMADEX) 10 mg tablet Take 10 mg by mouth as needed As needed    Historical Provider, MD Davina Chavez Z 0 004 % ophthalmic solution  18   Historical Provider, MD Dimitry Cannon, YINA Rivas PA-C     I have reviewed home medications with patient personally  Allergies:    Allergies   Allergen Reactions    Ace Inhibitors Cough       Social History:     Marital Status: /Civil Union   Occupation: retired  Patient Pre-hospital Living Situation: with wife  Patient Pre-hospital Level of Mobility: no restirctions  Patient Pre-hospital Diet Restrictions: none  Substance Use History:   Social History     Substance and Sexual Activity   Alcohol Use Yes    Frequency: Monthly or less    Drinks per session: 1 or 2    Binge frequency: Never    Comment: occasionally     Social History     Tobacco Use   Smoking Status Former Smoker    Types: Cigarettes    Quit date: 26    Years since quittin 0   Smokeless Tobacco Never Used     Social History Substance and Sexual Activity   Drug Use No       Family History:    Family History   Problem Relation Age of Onset   Gentry Spruce Stroke Mother     Hypertension Mother     Blindness Father     Hyperlipidemia Father     Hypertension Father     Glaucoma Father     Gout Brother     Heart Valve Disease Brother     Hypertension Brother     Cancer Sister     Diabetes Sister     Ovarian cancer Sister     Anuerysm Neg Hx     Heart attack Neg Hx     Arrhythmia Neg Hx     Heart failure Neg Hx     Coronary artery disease Neg Hx     Sudden death Neg Hx         scd       Physical Exam:     Vitals:   Blood Pressure: 108/59 (01/08/21 1551)  Pulse: 86 (01/08/21 1551)  Temperature: (!) 100 7 °F (38 2 °C) (01/08/21 1551)  Temp Source: Oral (01/08/21 1551)  Respirations: 20 (01/08/21 1551)  Height: 5' 8" (172 7 cm) (01/08/21 1551)  Weight - Scale: 79 4 kg (175 lb) (01/08/21 1551)  SpO2: 95 % (01/08/21 1551)    Physical Exam  Vitals signs and nursing note reviewed  Constitutional:       General: He is not in acute distress  Appearance: Normal appearance  He is not diaphoretic  HENT:      Head: Normocephalic and atraumatic  Mouth/Throat:      Mouth: Mucous membranes are moist    Cardiovascular:      Rate and Rhythm: Normal rate and regular rhythm  Pulses: Normal pulses  Heart sounds: No murmur  Pulmonary:      Effort: Pulmonary effort is normal       Breath sounds: Normal breath sounds  No wheezing or rales  Comments: No acute distress  No oxygen  Abdominal:      General: Bowel sounds are normal       Palpations: Abdomen is soft  There is no mass  Tenderness: There is no abdominal tenderness  There is no guarding  Musculoskeletal:      Right lower leg: No edema  Left lower leg: No edema  Skin:     General: Skin is warm and dry  Neurological:      Mental Status: He is alert and oriented to person, place, and time  Cranial Nerves: No cranial nerve deficit        Sensory: No sensory deficit  Motor: No weakness  Comments: Speech clear without dysarthria  Follows Commands  Psychiatric:         Mood and Affect: Mood normal          Behavior: Behavior normal           Additional Data:     Lab Results:  Results from last 7 days   Lab Units 01/08/21  1703   WBC Thousand/uL 6 89   HEMOGLOBIN g/dL 15 0   HEMATOCRIT % 44 8   PLATELETS Thousands/uL 117*   NEUTROS PCT % 84*   LYMPHS PCT % 8*   MONOS PCT % 8   EOS PCT % 0     Results from last 7 days   Lab Units 01/08/21  1703   SODIUM mmol/L 134*   POTASSIUM mmol/L 5 7*   CHLORIDE mmol/L 101   CO2 mmol/L 24   BUN mg/dL 44*   CREATININE mg/dL 3 49*   ANION GAP mmol/L 9   CALCIUM mg/dL 8 3   ALBUMIN g/dL 2 6*   TOTAL BILIRUBIN mg/dL 0 26   ALK PHOS U/L 48   ALT U/L 24   AST U/L 55*   GLUCOSE RANDOM mg/dL 109     Results from last 7 days   Lab Units 01/08/21  1703   INR  1 71*     Results from last 7 days   Lab Units 01/08/21  1627   POC GLUCOSE mg/dl 86               Imaging: Personally reviewed the following imaging: chest xray  XR chest 1 view portable    (Results Pending)       EKG and Other Studies Reviewed on Admission:   · EKG: EKG Results: NSR  HR 83 bpm  bifasicular block  no st elevation  ** Please Note: This note has been constructed using a voice recognition system   **

## 2021-01-09 NOTE — PROGRESS NOTES
Progress Note Hever Wheeler 1950, 72 y o  male MRN: [id number]    Unit/Bed#: ED 27 Encounter: [id number]    Primary Care Provider: Iman Case MD   Date and time admitted to hospital: 1/8/2021  3:47 PM        * Syncope  Assessment & Plan  · Patient was noted to stand up, walk to the kitchen and then developed syncopal episode  He reports no prodromal symptoms (denies dizziness which he reported earlier to the ER)  · Differential diagnosis includes:  · Orthostatic hypotension in the setting of decreased oral intake and COVID-19 infection  · Pulmonary embolism in the setting of COVID-19 and subtherapeutic INR on Coumadin  · ACS in the setting of minimally elevated troponin   · Bradyarrhythmia in the setting of no prodomal symptoms  · Hypoglycemia in the setting of poor oral intake  · Troponin noted to be 0 06 on admission  Trend x3  ·  serial EKGs  · Check orthostatic vital signs  · Gentle IV fluid hydration x 500 mL total avoid volume overload in the setting of COVID-19  · Monitor on telemetry  · Increase Coumadin  Repeat D-dimer and INR in the morning  Currently elevated D-dimer could be related to underlying chronic kidney disease versus COVID-19 infection  The patient does not have hypoxia, tachypnea or tachycardia and therefore will hold off on initiating heparin drip  Will avoid CTA given the patient has chronic kidney disease  · D-dimer noted to be increased  · Follow-up INR this morning if under 2 0 will start heparin drip  · V/Q scan    COVID-19 virus infection  Assessment & Plan  · Noted to test positive on 1/5/2021  · Wife was recently hospitalized with COVID-19 infection  · Patient deemed to be high risk secondary to age  · Initiate patient on zinc, vitamin-D, vitamin-C  · Discussed use of remdesivir with patient  · Rising D-dimer    · At time my evaluation patient's oxygen saturations were 87-88% on room air and improved with supplemental oxygen to 93% with 3 L  · Given high risk category secondary agent worsening oxygen requirements will start IV remdesivir and dexamethasone risk benefits alternatives explained and understood    Inflammatory Markers (last 3): Lab Results   Component Value Date    DDIMER 2 21 (H) 01/09/2021    DDIMER 0 83 (H) 01/08/2021    CRP 88 4 (H) 01/08/2021    FERRITIN 627 (H) 01/08/2021       Cardiac Markers:  Lab Results   Component Value Date    TROPONINI 0 08 (H) 01/09/2021    TROPONINI 0 06 (H) 01/08/2021    TROPONINI <0 02 10/31/2020    NTBNP 381 (H) 01/08/2021    CKTOTAL 206 10/14/2020         Diabetic nephropathy associated with type 2 diabetes mellitus [facility])  Assessment & Plan  Lab Results   Component Value Date    HGBA1C 5 6 10/14/2020       Recent Labs     01/08/21  1627 01/08/21  2215 01/09/21  0605   POCGLU 86 109 99       Blood Sugar Average: Last 72 hrs:  (P) 98     ·  not on chronic insulin  ·  takes Tradjenta daily  ·  monitor glucose with use of insulin /dextrose  ·  sliding scale insulin ACHS  · Monitor sugars with institution of dexamethasone for COVID-19 infection    Essential hypertension  Assessment & Plan  ·  Continue metoprolol, hydralazine and amlodipine    Chronic systolic heart failure (HCC)  Assessment & Plan  Wt Readings from Last 3 Encounters:   01/08/21 79 4 kg (175 lb)   01/04/21 77 1 kg (170 lb)   10/31/20 83 8 kg (184 lb 11 9 oz)     · Prior reduced EF secondary to tachycardia mediated etiology has since resolved with normalization in EF  ·  takes diuretics as needed  Has not required any use recently   · Clinically euvolemic monitor volume status carefully patient takes diuretics p r n      FSGS (focal segmental glomerulosclerosis)  Assessment & Plan  ·   Follows with Nephrology as an outpatient  ·  biopsy-proven    Atrial flutter [facility])  Assessment & Plan  ·  Patient with history of atrial flutter status post PATIENCE and cardioversion April 2019  ·  follows with cardiology  ·  noted to be maintained on Coumadin      VTE Pharmacologic Prophylaxis:   Pharmacologic: Warfarin (Coumadin)  Mechanical VTE Prophylaxis in Place: No    Patient Centered Rounds: Case discussed with nursing and hallway    Education and Discussions with Family / Patient:  Offered to call patient's family patient refused    Time Spent for Care: 30 minutes  More than 50% of total time spent on counseling and coordination of care as described above  Current Length of Stay: 0 day(s)    Current Patient Status: Observation   Certification Statement: The patient, admitted on an observation basis, will now require > 2 midnight hospital stay due to Patient with COVID-19 infection with worsening oxygen requirements    Discharge Plan:  Patient stable for discharge I will most likely require 1-2 more days in the hospital    Code Status: Level 1 - Full Code      Subjective:   Patient comfortable no acute distress sometimes feels short of breath    Objective:     Vitals:   Temp (24hrs), Av 8 °F (38 2 °C), Min:100 7 °F (38 2 °C), Max:100 9 °F (38 3 °C)    Temp:  [100 7 °F (38 2 °C)-100 9 °F (38 3 °C)] 100 9 °F (38 3 °C)  HR:  [80-98] 80  Resp:  [16-20] 16  BP: (108-143)/(57-80) 143/66  SpO2:  [95 %-96 %] 96 %  Body mass index is 26 61 kg/m²  Input and Output Summary (last 24 hours): Intake/Output Summary (Last 24 hours) at 2021 1105  Last data filed at 2021 1059  Gross per 24 hour   Intake 725 83 ml   Output --   Net 725 83 ml       Physical Exam:     Physical Exam  Constitutional:       Appearance: He is not toxic-appearing or diaphoretic  Cardiovascular:      Rate and Rhythm: Normal rate and regular rhythm  Heart sounds: No murmur  No gallop  Pulmonary:      Effort: No respiratory distress  Breath sounds: No wheezing, rhonchi or rales  Abdominal:      General: There is no distension  Palpations: Abdomen is soft  Tenderness: There is no abdominal tenderness  There is no guarding or rebound     Neurological:      General: No focal deficit present  Mental Status: He is alert and oriented to person, place, and time  Additional Data:     Labs:    Results from last 7 days   Lab Units 01/09/21  0434   WBC Thousand/uL 6 48   HEMOGLOBIN g/dL 15 2   HEMATOCRIT % 45 1   PLATELETS Thousands/uL 121*   NEUTROS PCT % 81*   LYMPHS PCT % 11*   MONOS PCT % 7   EOS PCT % 0     Results from last 7 days   Lab Units 01/09/21  0434 01/08/21  1703   SODIUM mmol/L 133* 134*   POTASSIUM mmol/L 4 9 5 7*   CHLORIDE mmol/L 102 101   CO2 mmol/L 20* 24   BUN mg/dL 44* 44*   CREATININE mg/dL 3 26* 3 49*   ANION GAP mmol/L 11 9   CALCIUM mg/dL 8 1* 8 3   ALBUMIN g/dL  --  2 6*   TOTAL BILIRUBIN mg/dL  --  0 26   ALK PHOS U/L  --  48   ALT U/L  --  24   AST U/L  --  55*   GLUCOSE RANDOM mg/dL 95 109     Results from last 7 days   Lab Units 01/08/21  1703   INR  1 71*     Results from last 7 days   Lab Units 01/09/21  0605 01/08/21  2215 01/08/21  1627   POC GLUCOSE mg/dl 99 109 86                   * I Have Reviewed All Lab Data Listed Above  * Additional Pertinent Lab Tests Reviewed:  All Labs Within Last 24 Hours Reviewed    Imaging:    Imaging Reports Reviewed Today Include:   Imaging Personally Reviewed by Myself Includes:      Recent Cultures (last 7 days):           Last 24 Hours Medication List:   Current Facility-Administered Medications   Medication Dose Route Frequency Provider Last Rate    acetaminophen  650 mg Oral Once Suad Thmo Graham PA-C      amLODIPine  2 5 mg Oral Daily Denise Graham PA-C      ascorbic acid  1,000 mg Oral Q12H [address] Denise Graham PA-C      aspirin  81 mg Oral Daily Denise Graham PA-C      atorvastatin  20 mg Oral Daily Denise Graham PA-C      calcitriol  0 25 mcg Oral Once per day on Mon Wed Fri Sat Denise Graham PA-C      cholecalciferol  2,000 Units Oral Daily Denise Graham PA-C      hydrALAZINE  25 mg Oral TID Denise Graham PA-C      insulin lispro  1-5 Units Subcutaneous TID AC Denise Graham PA-C      insulin lispro  1-5 Units Subcutaneous HS Denise Graham PA-C      isosorbide dinitrate  10 mg Oral TID Slim Latham PA-C      metoprolol succinate  50 mg Oral Daily Denise Graham PA-C      zinc sulfate  220 mg Oral Daily Denise Graham PA-C      Followed by   Juni Goodson ON 1/16/2021] multivitamin-minerals  1 tablet Oral Daily Denise Graham PA-C      ondansetron  4 mg Intravenous Q6H PRN Denise Graham PA-C      travoprost  1 drop Both Eyes HS Denise Graham PA-C      warfarin  4 mg Oral Daily (warfarin) Slim Latham PA-C          Today, Patient Was Seen By: Cheng Barton MD    ** Please Note: Dictation voice to text software may have been used in the creation of this document   **

## 2021-01-09 NOTE — ASSESSMENT & PLAN NOTE
Lab Results   Component Value Date    HGBA1C 5 6 10/14/2020       Recent Labs     01/08/21  1627   POCGLU 86       Blood Sugar Average: Last 72 hrs:  (P) 86     ·  not on chronic insulin  ·  takes Tradjenta daily  ·  monitor glucose with use of insulin /dextrose  ·  sliding scale insulin ACHS

## 2021-01-10 ENCOUNTER — APPOINTMENT (INPATIENT)
Dept: NUCLEAR MEDICINE | Facility: HOSPITAL | Age: 71
DRG: 178 | End: 2021-01-10
Payer: COMMERCIAL

## 2021-01-10 LAB
GLUCOSE SERPL-MCNC: 141 MG/DL (ref 65–140)
GLUCOSE SERPL-MCNC: 145 MG/DL (ref 65–140)
GLUCOSE SERPL-MCNC: 163 MG/DL (ref 65–140)
GLUCOSE SERPL-MCNC: 177 MG/DL (ref 65–140)

## 2021-01-10 PROCEDURE — G1004 CDSM NDSC: HCPCS

## 2021-01-10 PROCEDURE — 99232 SBSQ HOSP IP/OBS MODERATE 35: CPT | Performed by: PHYSICIAN ASSISTANT

## 2021-01-10 PROCEDURE — 78580 LUNG PERFUSION IMAGING: CPT

## 2021-01-10 PROCEDURE — 82948 REAGENT STRIP/BLOOD GLUCOSE: CPT

## 2021-01-10 PROCEDURE — A9540 TC99M MAA: HCPCS

## 2021-01-10 RX ADMIN — HYDRALAZINE HYDROCHLORIDE 25 MG: 25 TABLET, FILM COATED ORAL at 08:45

## 2021-01-10 RX ADMIN — AMLODIPINE BESYLATE 2.5 MG: 2.5 TABLET ORAL at 08:44

## 2021-01-10 RX ADMIN — WARFARIN SODIUM 4 MG: 4 TABLET ORAL at 17:10

## 2021-01-10 RX ADMIN — INSULIN LISPRO 1 UNITS: 100 INJECTION, SOLUTION INTRAVENOUS; SUBCUTANEOUS at 17:11

## 2021-01-10 RX ADMIN — DOXYCYCLINE 100 MG: 100 INJECTION, POWDER, LYOPHILIZED, FOR SOLUTION INTRAVENOUS at 00:33

## 2021-01-10 RX ADMIN — HYDRALAZINE HYDROCHLORIDE 25 MG: 25 TABLET, FILM COATED ORAL at 21:48

## 2021-01-10 RX ADMIN — OXYCODONE HYDROCHLORIDE AND ACETAMINOPHEN 1000 MG: 500 TABLET ORAL at 21:48

## 2021-01-10 RX ADMIN — ISOSORBIDE DINITRATE 10 MG: 10 TABLET ORAL at 21:48

## 2021-01-10 RX ADMIN — METOPROLOL SUCCINATE 50 MG: 50 TABLET, EXTENDED RELEASE ORAL at 08:44

## 2021-01-10 RX ADMIN — ISOSORBIDE DINITRATE 10 MG: 10 TABLET ORAL at 17:10

## 2021-01-10 RX ADMIN — OXYCODONE HYDROCHLORIDE AND ACETAMINOPHEN 1000 MG: 500 TABLET ORAL at 08:44

## 2021-01-10 RX ADMIN — ZINC SULFATE 220 MG (50 MG) CAPSULE 220 MG: CAPSULE at 08:45

## 2021-01-10 RX ADMIN — DOXYCYCLINE 100 MG: 100 INJECTION, POWDER, LYOPHILIZED, FOR SOLUTION INTRAVENOUS at 15:03

## 2021-01-10 RX ADMIN — FAMOTIDINE 10 MG: 20 TABLET ORAL at 08:44

## 2021-01-10 RX ADMIN — HYDRALAZINE HYDROCHLORIDE 25 MG: 25 TABLET, FILM COATED ORAL at 17:10

## 2021-01-10 RX ADMIN — TRAVOPROST 1 DROP: 0.04 SOLUTION/ DROPS OPHTHALMIC at 21:49

## 2021-01-10 RX ADMIN — INSULIN LISPRO 1 UNITS: 100 INJECTION, SOLUTION INTRAVENOUS; SUBCUTANEOUS at 21:50

## 2021-01-10 RX ADMIN — CEFTRIAXONE SODIUM 1000 MG: 10 INJECTION, POWDER, FOR SOLUTION INTRAVENOUS at 16:42

## 2021-01-10 RX ADMIN — ASPIRIN 81 MG: 81 TABLET ORAL at 08:44

## 2021-01-10 RX ADMIN — ATORVASTATIN CALCIUM 20 MG: 40 TABLET, FILM COATED ORAL at 08:44

## 2021-01-10 RX ADMIN — ISOSORBIDE DINITRATE 10 MG: 10 TABLET ORAL at 08:44

## 2021-01-10 RX ADMIN — Medication 2000 UNITS: at 08:44

## 2021-01-10 RX ADMIN — REMDESIVIR 100 MG: 100 INJECTION, POWDER, LYOPHILIZED, FOR SOLUTION INTRAVENOUS at 13:57

## 2021-01-10 RX ADMIN — DEXAMETHASONE SODIUM PHOSPHATE 6 MG: 4 INJECTION, SOLUTION INTRA-ARTICULAR; INTRALESIONAL; INTRAMUSCULAR; INTRAVENOUS; SOFT TISSUE at 13:57

## 2021-01-10 NOTE — PROGRESS NOTES
Pt currently resting in bed  Vs wnl  No s/s distress noted  Pt denies pain or any other discomfort  Call bell within reach  Will continue to monitor

## 2021-01-10 NOTE — PLAN OF CARE
Problem: Potential for Falls  Goal: Patient will remain free of falls  Description: INTERVENTIONS:  - Assess patient frequently for physical needs  -  Identify cognitive and physical deficits and behaviors that affect risk of falls    -  [place] fall precautions as indicated by assessment   - Educate patient/family on patient safety including physical limitations  - Instruct patient to call for assistance with activity based on assessment  - Modify environment to reduce risk of injury  - Consider OT/PT consult to assist with strengthening/mobility  Outcome: Progressing     Problem: PAIN - ADULT  Goal: Verbalizes/displays adequate comfort level or baseline comfort level  Description: Interventions:  - Encourage patient to monitor pain and request assistance  - Assess pain using appropriate pain scale  - Administer analgesics based on type and severity of pain and evaluate response  - Implement non-pharmacological measures as appropriate and evaluate response  - Consider cultural and social influences on pain and pain management  - Notify physician/advanced practitioner if interventions unsuccessful or patient reports new pain  Outcome: Progressing     Problem: INFECTION - ADULT  Goal: Absence or prevention of progression during hospitalization  Description: INTERVENTIONS:  - Assess and monitor for signs and symptoms of infection  - Monitor lab/diagnostic results  - Monitor all insertion sites, i e  indwelling lines, tubes, and drains  - Monitor endotracheal if appropriate and nasal secretions for changes in amount and color  - [place] appropriate cooling/warming therapies per order  - Administer medications as ordered  - Instruct and encourage patient and family to use good hand hygiene technique  - Identify and instruct in appropriate isolation precautions for identified infection/condition  Outcome: Progressing  Goal: Absence of fever/infection during neutropenic period  Description: INTERVENTIONS:  - Monitor WBC    Outcome: Progressing     Problem: SAFETY ADULT  Goal: Maintain or return to baseline ADL function  Description: INTERVENTIONS:  -  Assess patient's ability to carry out ADLs; assess patient's baseline for ADL function and identify physical deficits which impact ability to perform ADLs (bathing, care of mouth/teeth, toileting, grooming, dressing, etc )  - Assess/evaluate cause of self-care deficits   - Assess range of motion  - Assess patient's mobility; develop plan if impaired  - Assess patient's need for assistive devices and provide as appropriate  - Encourage maximum independence but intervene and supervise when necessary  - Involve family in performance of ADLs  - Assess for home care needs following discharge   - Consider OT consult to assist with ADL evaluation and planning for discharge  - Provide patient education as appropriate  Outcome: Progressing  Goal: Maintain or return mobility status to optimal level  Description: INTERVENTIONS:  - Assess patient's baseline mobility status (ambulation, transfers, stairs, etc )    - Identify cognitive and physical deficits and behaviors that affect mobility  - Identify mobility aids required to assist with transfers and/or ambulation (gait belt, sit-to-stand, lift, walker, cane, etc )  - [place] fall precautions as indicated by assessment  - Record patient progress and toleration of activity level on Mobility SBAR; progress patient to next Phase/Stage  - Instruct patient to call for assistance with activity based on assessment  - Consider rehabilitation consult to assist with strengthening/weightbearing, etc   Outcome: Progressing     Problem: DISCHARGE PLANNING  Goal: Discharge to home or other facility with appropriate resources  Description: INTERVENTIONS:  - Identify barriers to discharge w/patient and caregiver  - Arrange for needed discharge resources and transportation as appropriate  - Identify discharge learning needs (meds, wound care, etc )  - Arrange for interpretive services to assist at discharge as needed  - Refer to Case Management Department for coordinating discharge planning if the patient needs post-hospital services based on physician/advanced practitioner order or complex needs related to functional status, cognitive ability, or social support system  Outcome: Progressing     Problem: Knowledge Deficit  Goal: Patient/family/caregiver demonstrates understanding of disease process, treatment plan, medications, and discharge instructions  Description: Complete learning assessment and assess knowledge base    Interventions:  - Provide teaching at level of understanding  - Provide teaching via preferred learning methods  Outcome: Progressing

## 2021-01-10 NOTE — PROGRESS NOTES
Progress Note Bridget Riddlew 1950, 72 y o  male MRN: [id number]    Unit/Bed#: S -01 Encounter: [id number]    Primary Care Provider: Etelvina Guillermo MD   Date and time admitted to hospital: 1/8/2021  3:47 PM    * COVID-19 virus infection  Assessment & Plan  · Noted to test positive on 1/5/2021  · Wife was recently hospitalized with COVID-19 infection  · Patient deemed to be high risk secondary to age  · Continue zinc, vitamin-D, vitamin-C  · remdesivir day #2  · Risk/benefits of remdesivir were discussed w/ patient by prior provider   · Decadron day #2  · Rocephin + doxy was started yesterday as patient PCT was noted to be elevated-- continue to monitor   · D dimer did rise after first 24 hours-- noted to have therapeutic INR-- VQ scan ordered and pending  · Repeat inflammatory markers tomorrow AM    Inflammatory Markers (last 3): Lab Results   Component Value Date    DDIMER 2 21 (H) 01/09/2021    DDIMER 0 83 (H) 01/08/2021    CRP 88 4 (H) 01/08/2021    FERRITIN 627 (H) 01/08/2021       Cardiac Markers:  Lab Results   Component Value Date    TROPONINI 0 08 (H) 01/09/2021    TROPONINI 0 06 (H) 01/08/2021    TROPONINI <0 02 10/31/2020    NTBNP 381 (H) 01/08/2021    CKTOTAL 206 10/14/2020         Syncope  Assessment & Plan  · Patient was noted to stand up, walk to the kitchen and then developed syncopal episode  He reports no prodromal symptoms (denies dizziness which he reported earlier to the ER)  · Patient has not had any further events   · Troponin flat and on signs/sx of ischemia on initial EKG  · Orthostatic VS negative  · S/P IVF  · No events on telemetry since arrival to [facility] [address]  · Did have jump in INR, noted to have therapeutic INR  Plan for continuation of current coumadin regimen; VQ scan to r/o PE is pending    Hyperkalemia  Assessment & Plan  · Potassium 5 7 on admission   [place] to be 2/2 poor PO intake as patient does not consistently use diuretics  · S/P calcium, insulin, glucose and gentle IVF in ED w/ improvement to 4 9  Anticoagulant long-term use  Assessment & Plan  Lab Results   Component Value Date    INR 1 99 (H) 01/09/2021    INR 1 71 (H) 01/08/2021    INR 2 12 (H) 12/16/2020     · subtherapeutic INR on admission, now therapeutic  · home dose of Coumadin is noted to be 4 mg  Tuesday Thursday Saturday Sunday and 2 mg on Monday Wednesday Friday  · Daily INR    CKD (chronic kidney disease), stage IV [facility])  Assessment & Plan  Lab Results   Component Value Date    CREATININE 3 26 (H) 01/09/2021    CREATININE 3 49 (H) 01/08/2021    CREATININE 3 47 (H) 10/31/2020    CREATININE 3 06 (H) 10/14/2020     · Follows with renal as outpatient  · Baseline had been  2 5-3 0 however  More recently it appears to be 3 3-3 4  · poor oral intake recently  · gentle IV fluid hydration    Atrial flutter ([address] )  Assessment & Plan  · Patient with history of atrial flutter status post PATIENCE and cardioversion April 2019  · follows with cardiology  · noted to be maintained on Coumadin    Chronic systolic heart failure ([address] )  Assessment & Plan  Wt Readings from Last 3 Encounters:   01/08/21 79 4 kg (175 lb)   01/04/21 77 1 kg (170 lb)   10/31/20 83 8 kg (184 lb 11 9 oz)     · Prior reduced EF secondary to tachycardia mediated etiology has since resolved with normalization in EF  · takes diuretics as needed  Has not required any use recently   · Clinically euvolemic monitor volume status carefully patient takes diuretics p r n      Essential hypertension  Assessment & Plan  ·  Continue metoprolol, hydralazine and amlodipine    FSGS (focal segmental glomerulosclerosis)  Assessment & Plan  · Follows with Nephrology as an outpatient  · biopsy-proven    Diabetic nephropathy associated with type 2 diabetes mellitus [facility])  Assessment & Plan  Lab Results   Component Value Date    HGBA1C 5 6 10/14/2020       Recent Labs     01/09/21  1130 01/09/21  1650 01/09/21  2158 01/10/21  0711   POCGLU 141* 185* 200* 141*       Blood Sugar Average: Last 72 hrs:  (P) 081 [id number]   · Tradjenta on hold  · BG well controlled at this time  · sliding scale insulin ACHS  · Monitor sugars with institution of dexamethasone for COVID-19 infection      VTE Pharmacologic Prophylaxis:   Pharmacologic: Warfarin (Coumadin)  Mechanical VTE Prophylaxis in Place: Yes    Patient Centered Rounds: I have performed bedside rounds with nursing staff today  Discussions with Specialists or Other Care Team Provider: CM,RN     Education and Discussions with Family / Patient: patient; declined family update    Time Spent for Care: 30 minutes  More than 50% of total time spent on counseling and coordination of care as described above  Current Length of Stay: 1 day(s)    Current Patient Status: Inpatient   Certification Statement: The patient will continue to require additional inpatient hospital stay due to ongoing tx of COVID 19    Discharge Plan: d/c pending clinical improvement    Code Status: Level 1 - Full Code      Subjective:   Patient feels about the same today  Still fatigued/weak  Feels somewhat unsteady on his feet  No significant change in breathing  Objective:     Vitals:   Temp (24hrs), Av 8 °F (37 1 °C), Min:97 6 °F (36 4 °C), Max:100 9 °F (38 3 °C)    Temp:  [97 6 °F (36 4 °C)-100 9 °F (38 3 °C)] 97 6 °F (36 4 °C)  HR:  [69-95] 69  Resp:  [18-22] 20  BP: (110-116)/(60-72) 116/70  SpO2:  [93 %-95 %] 94 %  Body mass index is 26 61 kg/m²  Input and Output Summary (last 24 hours): Intake/Output Summary (Last 24 hours) at 1/10/2021 1019  Last data filed at 2021 1422  Gross per 24 hour   Intake 925 41 ml   Output --   Net 925 41 ml       Physical Exam:     Physical Exam  Constitutional:       Appearance: Normal appearance  HENT:      Head: Normocephalic and atraumatic  Mouth/Throat:      Mouth: Mucous membranes are moist    Eyes:      Pupils: Pupils are equal, round, and reactive to light     Cardiovascular:      Rate and Rhythm: Normal rate and regular rhythm  Heart sounds: No murmur  No friction rub  No gallop  Pulmonary:      Effort: Pulmonary effort is normal       Breath sounds: No wheezing  Abdominal:      General: Abdomen is flat  Palpations: Abdomen is soft  Tenderness: There is no abdominal tenderness  Musculoskeletal: Normal range of motion  General: No swelling  Skin:     General: Skin is warm and dry  Neurological:      General: No focal deficit present  Mental Status: He is alert and oriented to person, place, and time  Additional Data:     Labs:    Results from last 7 days   Lab Units 01/09/21  1252 01/09/21  0434   WBC Thousand/uL 7 88 6 48   HEMOGLOBIN g/dL 12 6 15 2   HEMATOCRIT % 37 0 45 1   PLATELETS Thousands/uL 114* 121*   NEUTROS PCT %  --  81*   LYMPHS PCT %  --  11*   MONOS PCT %  --  7   EOS PCT %  --  0     Results from last 7 days   Lab Units 01/09/21  0434 01/08/21  1703   SODIUM mmol/L 133* 134*   POTASSIUM mmol/L 4 9 5 7*   CHLORIDE mmol/L 102 101   CO2 mmol/L 20* 24   BUN mg/dL 44* 44*   CREATININE mg/dL 3 26* 3 49*   ANION GAP mmol/L 11 9   CALCIUM mg/dL 8 1* 8 3   ALBUMIN g/dL  --  2 6*   TOTAL BILIRUBIN mg/dL  --  0 26   ALK PHOS U/L  --  48   ALT U/L  --  24   AST U/L  --  55*   GLUCOSE RANDOM mg/dL 95 109     Results from last 7 days   Lab Units 01/09/21  1148   INR  1 99*     Results from last 7 days   Lab Units 01/10/21  0711 01/09/21  2158 01/09/21  1650 01/09/21  1130 01/09/21  0605 01/08/21  2215 01/08/21  1627   POC GLUCOSE mg/dl 141* 200* 185* 141* 99 109 86         Results from last 7 days   Lab Units 01/09/21  1148   PROCALCITONIN ng/ml 0 30*           * I Have Reviewed All Lab Data Listed Above  * Additional Pertinent Lab Tests Reviewed:  All [place] Admission Reviewed    Imaging:    Imaging Reports Reviewed Today Include: CXR  Imaging Personally Reviewed by Myself Includes:  CXR    Recent Cultures (last 7 days):           Last 24 Hours Medication List:   Current Facility-Administered Medications   Medication Dose Route Frequency Provider Last Rate    acetaminophen  650 mg Oral Once Kira Graham PA-C      amLODIPine  2 5 mg Oral Daily Denise Graham PA-C      ascorbic acid  1,000 mg Oral Q12H [address] Denise Graham PA-C      aspirin  81 mg Oral Daily Denise Graham PA-C      atorvastatin  20 mg Oral Daily Leda Hernandez PA-C      calcitriol  0 25 mcg Oral Once per day on Mon Wed Fri Sat Denise Graham PA-C      cefTRIAXone  1,000 mg Intravenous Q24H Latesha Cortes MD 1,000 mg (01/09/21 1217)    cholecalciferol  2,000 Units Oral Daily Denise Graham PA-C      dexamethasone  6 mg Intravenous Q24H Latesha Cortes MD      doxycycline  100 mg Intravenous Q12H Latesha Cortes MD Stopped (01/10/21 0145)    famotidine  10 mg Oral Daily Latesha Cortes MD      hydrALAZINE  25 mg Oral TID Kira Graham PA-C      insulin lispro  1-5 Units Subcutaneous TID AC Denise Graham PA-C      insulin lispro  1-5 Units Subcutaneous HS Denise Graham PA-C      isosorbide dinitrate  10 mg Oral TID Kira Graham PA-C      metoprolol succinate  50 mg Oral Daily Denise Graham PA-C      zinc sulfate  220 mg Oral Daily Denise Graham PA-C      Followed by   Azra Díaz ON 1/16/2021] multivitamin-minerals  1 tablet Oral Daily Denise Graham PA-C      ondansetron  4 mg Intravenous Q6H PRN Leda Hernandez PA-C      remdesivir  100 mg Intravenous Q24H Latesha Cortes MD      travoprost  1 drop Both Eyes HS Denise Graham PA-C      [START ON 1/11/2021] warfarin  2 mg Oral Once per day on Mon Wed Fri Mena Garcia PA-C      warfarin  4 mg Oral Once per day on Sun Tue Thu Sat Mena Garcia PA-C          Today, Patient Was Seen By: Vania Calhoun PA-C    ** Please Note: Dictation voice to text software may have been used in the creation of this document   **

## 2021-01-10 NOTE — PLAN OF CARE
Problem: Potential for Falls  Goal: Patient will remain free of falls  Description: INTERVENTIONS:  - Assess patient frequently for physical needs  -  Identify cognitive and physical deficits and behaviors that affect risk of falls    -  [place] fall precautions as indicated by assessment   - Educate patient/family on patient safety including physical limitations  - Instruct patient to call for assistance with activity based on assessment  - Modify environment to reduce risk of injury  - Consider OT/PT consult to assist with strengthening/mobility  Outcome: Progressing     Problem: PAIN - ADULT  Goal: Verbalizes/displays adequate comfort level or baseline comfort level  Description: Interventions:  - Encourage patient to monitor pain and request assistance  - Assess pain using appropriate pain scale  - Administer analgesics based on type and severity of pain and evaluate response  - Implement non-pharmacological measures as appropriate and evaluate response  - Consider cultural and social influences on pain and pain management  - Notify physician/advanced practitioner if interventions unsuccessful or patient reports new pain  Outcome: Progressing     Problem: INFECTION - ADULT  Goal: Absence or prevention of progression during hospitalization  Description: INTERVENTIONS:  - Assess and monitor for signs and symptoms of infection  - Monitor lab/diagnostic results  - Monitor all insertion sites, i e  indwelling lines, tubes, and drains  - Monitor endotracheal if appropriate and nasal secretions for changes in amount and color  - [place] appropriate cooling/warming therapies per order  - Administer medications as ordered  - Instruct and encourage patient and family to use good hand hygiene technique  - Identify and instruct in appropriate isolation precautions for identified infection/condition  Outcome: Progressing  Goal: Absence of fever/infection during neutropenic period  Description: INTERVENTIONS:  - Monitor WBC    Outcome: Progressing     Problem: SAFETY ADULT  Goal: Maintain or return to baseline ADL function  Description: INTERVENTIONS:  -  Assess patient's ability to carry out ADLs; assess patient's baseline for ADL function and identify physical deficits which impact ability to perform ADLs (bathing, care of mouth/teeth, toileting, grooming, dressing, etc )  - Assess/evaluate cause of self-care deficits   - Assess range of motion  - Assess patient's mobility; develop plan if impaired  - Assess patient's need for assistive devices and provide as appropriate  - Encourage maximum independence but intervene and supervise when necessary  - Involve family in performance of ADLs  - Assess for home care needs following discharge   - Consider OT consult to assist with ADL evaluation and planning for discharge  - Provide patient education as appropriate  Outcome: Progressing  Goal: Maintain or return mobility status to optimal level  Description: INTERVENTIONS:  - Assess patient's baseline mobility status (ambulation, transfers, stairs, etc )    - Identify cognitive and physical deficits and behaviors that affect mobility  - Identify mobility aids required to assist with transfers and/or ambulation (gait belt, sit-to-stand, lift, walker, cane, etc )  - [place] fall precautions as indicated by assessment  - Record patient progress and toleration of activity level on Mobility SBAR; progress patient to next Phase/Stage  - Instruct patient to call for assistance with activity based on assessment  - Consider rehabilitation consult to assist with strengthening/weightbearing, etc   Outcome: Progressing     Problem: DISCHARGE PLANNING  Goal: Discharge to home or other facility with appropriate resources  Description: INTERVENTIONS:  - Identify barriers to discharge w/patient and caregiver  - Arrange for needed discharge resources and transportation as appropriate  - Identify discharge learning needs (meds, wound care, etc )  - Arrange for interpretive services to assist at discharge as needed  - Refer to Case Management Department for coordinating discharge planning if the patient needs post-hospital services based on physician/advanced practitioner order or complex needs related to functional status, cognitive ability, or social support system  Outcome: Progressing     Problem: Knowledge Deficit  Goal: Patient/family/caregiver demonstrates understanding of disease process, treatment plan, medications, and discharge instructions  Description: Complete learning assessment and assess knowledge base    Interventions:  - Provide teaching at level of understanding  - Provide teaching via preferred learning methods  Outcome: Progressing

## 2021-01-10 NOTE — ASSESSMENT & PLAN NOTE
· Patient was noted to stand up, walk to the kitchen and then developed syncopal episode  He reports no prodromal symptoms (denies dizziness which he reported earlier to the ER)  · Patient has not had any further events   · Troponin flat and on signs/sx of ischemia on initial EKG  · Orthostatic VS negative  · S/P IVF  · No events on telemetry since arrival to [facility] [address]  · Did have jump in INR, noted to have therapeutic INR   Plan for continuation of current coumadin regimen; VQ scan to r/o PE is pending

## 2021-01-10 NOTE — ASSESSMENT & PLAN NOTE
· Noted to test positive on 1/5/2021  · Wife was recently hospitalized with COVID-19 infection  · Patient deemed to be high risk secondary to age  · Continue zinc, vitamin-D, vitamin-C  · remdesivir day #2  · Risk/benefits of remdesivir were discussed w/ patient by prior provider   · Decadron day #2  · Rocephin + doxy was started yesterday as patient PCT was noted to be elevated-- continue to monitor   · D dimer did rise after first 24 hours-- noted to have therapeutic INR-- VQ scan ordered and pending  · Repeat inflammatory markers tomorrow AM    Inflammatory Markers (last 3):   Lab Results   Component Value Date    DDIMER 2 21 (H) 01/09/2021    DDIMER 0 83 (H) 01/08/2021    CRP 88 4 (H) 01/08/2021    FERRITIN 627 (H) 01/08/2021       Cardiac Markers:  Lab Results   Component Value Date    TROPONINI 0 08 (H) 01/09/2021    TROPONINI 0 06 (H) 01/08/2021    TROPONINI <0 02 10/31/2020    NTBNP 381 (H) 01/08/2021    CKTOTAL 206 10/14/2020

## 2021-01-10 NOTE — ASSESSMENT & PLAN NOTE
Lab Results   Component Value Date    CREATININE 3 26 (H) 01/09/2021    CREATININE 3 49 (H) 01/08/2021    CREATININE 3 47 (H) 10/31/2020    CREATININE 3 06 (H) 10/14/2020     · Follows with renal as outpatient  · Baseline had been  2 5-3 0 however  More recently it appears to be 3 3-3 4  · poor oral intake recently  · gentle IV fluid hydration

## 2021-01-10 NOTE — ASSESSMENT & PLAN NOTE
Lab Results   Component Value Date    INR 1 99 (H) 01/09/2021    INR 1 71 (H) 01/08/2021    INR 2 12 (H) 12/16/2020     · subtherapeutic INR on admission, now therapeutic  · home dose of Coumadin is noted to be 4 mg  Tuesday Thursday Saturday Sunday and 2 mg on Monday Wednesday Friday  · Daily INR

## 2021-01-10 NOTE — ASSESSMENT & PLAN NOTE
Lab Results   Component Value Date    HGBA1C 5 6 10/14/2020       Recent Labs     01/09/21  1130 01/09/21  1650 01/09/21  2158 01/10/21  0711   POCGLU 141* 185* 200* 141*       Blood Sugar Average: Last 72 hrs:  (P) 137 [id number]   · Tradjenta on hold  · BG well controlled at this time  · sliding scale insulin ACHS  · Monitor sugars with institution of dexamethasone for COVID-19 infection

## 2021-01-10 NOTE — ASSESSMENT & PLAN NOTE
· Potassium 5 7 on admission  [place] to be 2/2 poor PO intake as patient does not consistently use diuretics  · S/P calcium, insulin, glucose and gentle IVF in ED w/ improvement to 4 9

## 2021-01-11 PROBLEM — E87.5 HYPERKALEMIA: Status: RESOLVED | Noted: 2021-01-08 | Resolved: 2021-01-11

## 2021-01-11 PROBLEM — R55 SYNCOPE: Status: RESOLVED | Noted: 2021-01-08 | Resolved: 2021-01-11

## 2021-01-11 LAB
ALBUMIN SERPL BCP-MCNC: 2.2 G/DL (ref 3.5–5)
ALP SERPL-CCNC: 43 U/L (ref 46–116)
ALT SERPL W P-5'-P-CCNC: 28 U/L (ref 12–78)
ANION GAP SERPL CALCULATED.3IONS-SCNC: 11 MMOL/L (ref 4–13)
AST SERPL W P-5'-P-CCNC: 43 U/L (ref 5–45)
ATRIAL RATE: 82 BPM
BASOPHILS # BLD AUTO: 0.02 THOUSANDS/ΜL (ref 0–0.1)
BASOPHILS NFR BLD AUTO: 0 % (ref 0–1)
BILIRUB SERPL-MCNC: 0.25 MG/DL (ref 0.2–1)
BUN SERPL-MCNC: 67 MG/DL (ref 5–25)
CALCIUM ALBUM COR SERPL-MCNC: 9.3 MG/DL (ref 8.3–10.1)
CALCIUM SERPL-MCNC: 7.9 MG/DL (ref 8.3–10.1)
CHLORIDE SERPL-SCNC: 105 MMOL/L (ref 100–108)
CO2 SERPL-SCNC: 18 MMOL/L (ref 21–32)
CREAT SERPL-MCNC: 3.26 MG/DL (ref 0.6–1.3)
CRP SERPL QL: 65.7 MG/L
D DIMER PPP FEU-MCNC: 1.2 UG/ML FEU
EOSINOPHIL # BLD AUTO: 0 THOUSAND/ΜL (ref 0–0.61)
EOSINOPHIL NFR BLD AUTO: 0 % (ref 0–6)
ERYTHROCYTE [DISTWIDTH] IN BLOOD BY AUTOMATED COUNT: 13.1 % (ref 11.6–15.1)
FERRITIN SERPL-MCNC: 772 NG/ML (ref 8–388)
GFR SERPL CREATININE-BSD FRML MDRD: 21 ML/MIN/1.73SQ M
GLUCOSE SERPL-MCNC: 148 MG/DL (ref 65–140)
GLUCOSE SERPL-MCNC: 149 MG/DL (ref 65–140)
GLUCOSE SERPL-MCNC: 166 MG/DL (ref 65–140)
GLUCOSE SERPL-MCNC: 199 MG/DL (ref 65–140)
GLUCOSE SERPL-MCNC: 213 MG/DL (ref 65–140)
HCT VFR BLD AUTO: 45.5 % (ref 36.5–49.3)
HGB BLD-MCNC: 14.8 G/DL (ref 12–17)
IMM GRANULOCYTES # BLD AUTO: 0.08 THOUSAND/UL (ref 0–0.2)
IMM GRANULOCYTES NFR BLD AUTO: 1 % (ref 0–2)
INR PPP: 3.24 (ref 0.84–1.19)
LYMPHOCYTES # BLD AUTO: 0.94 THOUSANDS/ΜL (ref 0.6–4.47)
LYMPHOCYTES NFR BLD AUTO: 8 % (ref 14–44)
MCH RBC QN AUTO: 31 PG (ref 26.8–34.3)
MCHC RBC AUTO-ENTMCNC: 32.5 G/DL (ref 31.4–37.4)
MCV RBC AUTO: 95 FL (ref 82–98)
MONOCYTES # BLD AUTO: 0.57 THOUSAND/ΜL (ref 0.17–1.22)
MONOCYTES NFR BLD AUTO: 5 % (ref 4–12)
NEUTROPHILS # BLD AUTO: 10.1 THOUSANDS/ΜL (ref 1.85–7.62)
NEUTS SEG NFR BLD AUTO: 86 % (ref 43–75)
NRBC BLD AUTO-RTO: 0 /100 WBCS
P AXIS: 63 DEGREES
PLATELET # BLD AUTO: 220 THOUSANDS/UL (ref 149–390)
PMV BLD AUTO: 9.7 FL (ref 8.9–12.7)
POTASSIUM SERPL-SCNC: 5.1 MMOL/L (ref 3.5–5.3)
PR INTERVAL: 178 MS
PROT SERPL-MCNC: 6.9 G/DL (ref 6.4–8.2)
PROTHROMBIN TIME: 33.1 SECONDS (ref 11.6–14.5)
QRS AXIS: -87 DEGREES
QRSD INTERVAL: 140 MS
QT INTERVAL: 378 MS
QTC INTERVAL: 441 MS
RBC # BLD AUTO: 4.78 MILLION/UL (ref 3.88–5.62)
SODIUM SERPL-SCNC: 134 MMOL/L (ref 136–145)
T WAVE AXIS: 42 DEGREES
VENTRICULAR RATE: 82 BPM
WBC # BLD AUTO: 11.71 THOUSAND/UL (ref 4.31–10.16)

## 2021-01-11 PROCEDURE — 82948 REAGENT STRIP/BLOOD GLUCOSE: CPT

## 2021-01-11 PROCEDURE — 93010 ELECTROCARDIOGRAM REPORT: CPT | Performed by: INTERNAL MEDICINE

## 2021-01-11 PROCEDURE — 85025 COMPLETE CBC W/AUTO DIFF WBC: CPT | Performed by: PHYSICIAN ASSISTANT

## 2021-01-11 PROCEDURE — 86140 C-REACTIVE PROTEIN: CPT | Performed by: PHYSICIAN ASSISTANT

## 2021-01-11 PROCEDURE — 97163 PT EVAL HIGH COMPLEX 45 MIN: CPT

## 2021-01-11 PROCEDURE — 85379 FIBRIN DEGRADATION QUANT: CPT | Performed by: PHYSICIAN ASSISTANT

## 2021-01-11 PROCEDURE — 80053 COMPREHEN METABOLIC PANEL: CPT | Performed by: PHYSICIAN ASSISTANT

## 2021-01-11 PROCEDURE — 99232 SBSQ HOSP IP/OBS MODERATE 35: CPT | Performed by: PHYSICIAN ASSISTANT

## 2021-01-11 PROCEDURE — 85610 PROTHROMBIN TIME: CPT | Performed by: PHYSICIAN ASSISTANT

## 2021-01-11 PROCEDURE — 82728 ASSAY OF FERRITIN: CPT | Performed by: PHYSICIAN ASSISTANT

## 2021-01-11 RX ORDER — WARFARIN SODIUM 2 MG/1
2 TABLET ORAL
Status: DISCONTINUED | OUTPATIENT
Start: 2021-01-13 | End: 2021-01-12 | Stop reason: HOSPADM

## 2021-01-11 RX ORDER — DOXYCYCLINE HYCLATE 100 MG/1
100 CAPSULE ORAL EVERY 12 HOURS SCHEDULED
Status: DISCONTINUED | OUTPATIENT
Start: 2021-01-11 | End: 2021-01-11

## 2021-01-11 RX ADMIN — INSULIN LISPRO 1 UNITS: 100 INJECTION, SOLUTION INTRAVENOUS; SUBCUTANEOUS at 21:51

## 2021-01-11 RX ADMIN — REMDESIVIR 100 MG: 100 INJECTION, POWDER, LYOPHILIZED, FOR SOLUTION INTRAVENOUS at 10:32

## 2021-01-11 RX ADMIN — CALCITRIOL 0.25 MCG: 0.25 CAPSULE, LIQUID FILLED ORAL at 09:01

## 2021-01-11 RX ADMIN — TRAVOPROST 1 DROP: 0.04 SOLUTION/ DROPS OPHTHALMIC at 21:51

## 2021-01-11 RX ADMIN — OXYCODONE HYDROCHLORIDE AND ACETAMINOPHEN 1000 MG: 500 TABLET ORAL at 08:59

## 2021-01-11 RX ADMIN — ASPIRIN 81 MG: 81 TABLET ORAL at 09:00

## 2021-01-11 RX ADMIN — HYDRALAZINE HYDROCHLORIDE 25 MG: 25 TABLET, FILM COATED ORAL at 21:48

## 2021-01-11 RX ADMIN — ISOSORBIDE DINITRATE 10 MG: 10 TABLET ORAL at 16:11

## 2021-01-11 RX ADMIN — FAMOTIDINE 10 MG: 20 TABLET ORAL at 09:00

## 2021-01-11 RX ADMIN — OXYCODONE HYDROCHLORIDE AND ACETAMINOPHEN 1000 MG: 500 TABLET ORAL at 21:48

## 2021-01-11 RX ADMIN — INSULIN LISPRO 1 UNITS: 100 INJECTION, SOLUTION INTRAVENOUS; SUBCUTANEOUS at 16:12

## 2021-01-11 RX ADMIN — ATORVASTATIN CALCIUM 20 MG: 40 TABLET, FILM COATED ORAL at 09:00

## 2021-01-11 RX ADMIN — DOXYCYCLINE 100 MG: 100 CAPSULE ORAL at 09:22

## 2021-01-11 RX ADMIN — ZINC SULFATE 220 MG (50 MG) CAPSULE 220 MG: CAPSULE at 09:00

## 2021-01-11 RX ADMIN — DEXAMETHASONE SODIUM PHOSPHATE 6 MG: 4 INJECTION, SOLUTION INTRA-ARTICULAR; INTRALESIONAL; INTRAMUSCULAR; INTRAVENOUS; SOFT TISSUE at 10:32

## 2021-01-11 RX ADMIN — Medication 2000 UNITS: at 09:22

## 2021-01-11 RX ADMIN — HYDRALAZINE HYDROCHLORIDE 25 MG: 25 TABLET, FILM COATED ORAL at 16:12

## 2021-01-11 RX ADMIN — DOXYCYCLINE 100 MG: 100 INJECTION, POWDER, LYOPHILIZED, FOR SOLUTION INTRAVENOUS at 00:02

## 2021-01-11 RX ADMIN — ISOSORBIDE DINITRATE 10 MG: 10 TABLET ORAL at 21:48

## 2021-01-11 RX ADMIN — INSULIN LISPRO 1 UNITS: 100 INJECTION, SOLUTION INTRAVENOUS; SUBCUTANEOUS at 11:40

## 2021-01-11 NOTE — ASSESSMENT & PLAN NOTE
Wt Readings from Last 3 Encounters:   01/11/21 78 4 kg (172 lb 13 5 oz)   01/04/21 77 1 kg (170 lb)   10/31/20 83 8 kg (184 lb 11 9 oz)     · Prior reduced EF secondary to tachycardia mediated etiology has since resolved with normalization in EF  Takes diuretics as needed    Has not required any use recently   · Clinically euvolemic   · Monitor volume status  · Fluids have been stopped as his appetite has improved

## 2021-01-11 NOTE — PROGRESS NOTES
[facility] IV-to-PO Auto-Conversion Protocol for Adults     The doxycycline has / have been converted to Oral per [facility] IV-to-PO Auto-Conversion Protocol for Adults as approved by the Pharmacy and Therapeutics Committee  The patient met all eligible criteria:  3 Age = 25years old   2) Received at least one dose of the IV form   3) Receiving at least one other scheduled oral/enteral medication   4) Tolerating an oral/enteral diet   and did not have any exclusions:   1) Critical care patient   2) Active GI bleed (IF assessing H2RAs or PPIs)   3) Continuous tube feeding (IF assessing cipro, doxycycline, levofloxacin, minocycline, rifampin, or voriconazole)   4) Receiving PO vancomycin (IF assessing metronidazole)   5) Persistent nausea and/or vomiting   6) Ileus or gastrointestinal obstruction   7) [place]/nasogastric tube set for continuous suction   8) Specific order not to automatically convert to PO (in the order's comments or if discussed in the most recent Infectious Disease or primary team's progress notes)       Ema Bautista, PharmD  Pharmacist

## 2021-01-11 NOTE — ASSESSMENT & PLAN NOTE
· Potassium 5 7 on admission  [place] to be 2/2 poor PO intake as patient does not consistently use diuretics   Now 5 1, resolved   · Monitor BMP

## 2021-01-11 NOTE — PLAN OF CARE
Problem: Potential for Falls  Goal: Patient will remain free of falls  Description: INTERVENTIONS:  - Assess patient frequently for physical needs  -  Identify cognitive and physical deficits and behaviors that affect risk of falls    -  [place] fall precautions as indicated by assessment   - Educate patient/family on patient safety including physical limitations  - Instruct patient to call for assistance with activity based on assessment  - Modify environment to reduce risk of injury  - Consider OT/PT consult to assist with strengthening/mobility  Outcome: Progressing     Problem: PAIN - ADULT  Goal: Verbalizes/displays adequate comfort level or baseline comfort level  Description: Interventions:  - Encourage patient to monitor pain and request assistance  - Assess pain using appropriate pain scale  - Administer analgesics based on type and severity of pain and evaluate response  - Implement non-pharmacological measures as appropriate and evaluate response  - Consider cultural and social influences on pain and pain management  - Notify physician/advanced practitioner if interventions unsuccessful or patient reports new pain  Outcome: Progressing     Problem: INFECTION - ADULT  Goal: Absence or prevention of progression during hospitalization  Description: INTERVENTIONS:  - Assess and monitor for signs and symptoms of infection  - Monitor lab/diagnostic results  - Monitor all insertion sites, i e  indwelling lines, tubes, and drains  - Monitor endotracheal if appropriate and nasal secretions for changes in amount and color  - [place] appropriate cooling/warming therapies per order  - Administer medications as ordered  - Instruct and encourage patient and family to use good hand hygiene technique  - Identify and instruct in appropriate isolation precautions for identified infection/condition  Outcome: Progressing

## 2021-01-11 NOTE — ASSESSMENT & PLAN NOTE
Lab Results   Component Value Date    CREATININE 3 26 (H) 01/11/2021    CREATININE 3 26 (H) 01/09/2021    CREATININE 3 49 (H) 01/08/2021    CREATININE 3 47 (H) 10/31/2020     · Follows with renal as outpatient   Baseline appears to be 3 3-3 4  · Stop fluids, appetite improved   · Continue BMP

## 2021-01-11 NOTE — PHYSICAL THERAPY NOTE
PHYSICAL THERAPY EVALUATION NOTE    Patient Name: Cielo CHAMORRO Date: 2021     AGE:   72 y o   Mrn:   [id number]  ADMIT DX:  Weakness [R53 1]  Elevated troponin [R77 8]  Near syncope [R55]  COVID-19 [U07 1]    Past Medical History:   Diagnosis Date    Colon polyp     COVID-19     Diabetes mellitus ([facility] [address] )     Hyperlipidemia     Hypertension     Proteinuria     Stage 3 chronic kidney disease     Vitamin D deficiency      Length Of Stay: 2  PHYSICAL THERAPY EVALUATION :   Patient's identity confirmed via 2 patient identifiers (full name and ) at start of session       21 1050   PT Last Visit   PT Visit Date 21   Note Type   Note type Evaluation   Pain Assessment   Pain Assessment Tool Pain Assessment not indicated - pt denies pain   Pain Score No Pain   Home Living   Type of [address] [place] [address] One level;Stairs to enter without rails  (1 MICHELLE)   Home Equipment   (Pt reports no AD use PTA)   Prior Function   Level of [place] Independent with ADLs and functional mobility   Lives With Spouse   ADL Assistance Independent   IADLs Independent   Falls in the last 6 months 1 to 4  (1; syncopal episode leading to admission)   Vocational Works at home  (Pt works part time as a university consultant)   Comments Pt reports living home w/ his spouse (who is at home recovering from [place] herself) in a 1 SH w/ MICHELLE  He reports being I PTA, no using AD for mobility, and working part time from home  Restrictions/Precautions   Weight Bearing Precautions Per Order No   Other Precautions Telemetry  (Masimo)   General   Additional Pertinent History RN in room upon arrival, she takes pt off 1L NC O2 and he is on RA for session   SpO2 briefly drops at 88% when pt is in bathroom and using UEs, however during ambulation it does not drop below 90%   Cognition   Overall Cognitive Status [facility]   Arousal/Participation Alert   Orientation Level Oriented X4  (Pt identified by full name and )   Memory Within functional limits   Following Commands Follows all commands and directions without difficulty   Comments Pt in bathroom upon arrival w/ RN present in room  Pt reports being concerned about mobility upon return home due to recent decline in mobility since richard COVID   RLE Assessment   RLE Assessment WFL   Strength RLE   RLE Overall Strength 4+/5   LLE Assessment   LLE Assessment WFL   Strength LLE   LLE Overall Strength 4+/5   Coordination   Movements are Fluid and Coordinated 1   Sensation WFL   Light Touch   RLE Light Touch Grossly intact   LLE Light Touch Grossly intact   Bed Mobility   Supine to Sit Unable to assess   Sit to Supine 6  Modified independent   Additional items HOB elevated   Transfers   Sit to Stand 6  Modified independent   Stand to Sit 6  Modified independent   Toilet transfer 6  Modified independent   Ambulation/Elevation   Gait pattern Decreased foot clearance; Short stride   Gait Assistance   (Mod I w/ RW, S w/  no AD)   Assistive Device Rolling walker; Other (Comment)  (No AD)   Distance 95 ft  (RW for 15 ft, no AD 40 ft, RW 40 ft)   Balance   Static Sitting Normal   Static Standing Normal   Ambulatory Good   Endurance Deficit   Endurance Deficit Yes   Endurance Deficit Description Self-reported fatigue, impacts mobility tolerance and need for use of AD   Activity Tolerance   Activity Tolerance Patient limited by fatigue   Medical Staff Made Aware Spoke to [facility] Pattie   Nurse Made Aware Spoke to RN [facility]   Assessment   Prognosis Good   Problem List Decreased endurance; Impaired balance;Decreased mobility   Assessment Myra Stokes is a 72 y o  Male who [facility] 2021 from home w/ c/o generalized weakness w/ near syncope and subsequent fall  and diagnosis of COVID-19  Orders for PT eval and treat received, w/ activity orders of up and OOB as tolerated and fall precautions   Pt presents w/ comorbidities of DMII, HTN, HLD, CKD, CHF and personal factors including: stair(s) to enter home and positive fall history  At baseline, pt mobilizes independently without need for AD, and reports his only fall is what brought him to the hospital when he passed out  Upon evaluation, pt presents w/ the following deficits: weakness and decreased endurance  Pt is mod I for bed mobility, mod I for transfers, and mod I w/ RW, S w/ no AD for gait  Pt's clinical presentation is unstable/unpredictable due to need for assist w/ all phases of mobility when usually mobilizing independently, need for supplemental oxygen in order to maintain oxygen saturation, need for input for mobility technique, recent drastic decline in mobility compared to baseline and recent history of falls}  Given the above findings, discharge recommendation is for home w/ family support and HHPT  During this admission, pt would benefit from skilled acute inpatient PT in order to address the abovementioned deficits to maximize function and mobility before DC from acute care  Goals   Patient Goals to get stronger and return to previous level of independence   STG Expiration Date 01/21/21   Short Term Goal #1 Patient will:  Increase bilateral LE strength 1/2 grade to facilitate independent mobility, Perform all bed mobility tasks independently to decrease fall risk factors, Perform all transfers independently to improve independence, Ambulate at least 200 ft  without assistive device independently w/o LOB, Navigate 1 stairs modified independent without handrail to facilitate return to previous living environment, Increase all balance 1/2 grade to decrease risk for falls, Complete exercise program independently and Improve Barthel Index score to 100 or greater to facilitate independence, perform 2 min standing march test, and 30 s STS test to further assess functional mobility and fall risk   PT Treatment Day 0   Plan   Treatment/Interventions Functional transfer training;LE strengthening/ROM; Elevations; Therapeutic exercise; Endurance training;Patient/family training;Equipment eval/education; Bed mobility;Gait training   PT Frequency 2-3x/wk   Recommendation   PT Discharge Recommendation Home with skilled therapy  (HHPT)   Equipment Recommended Walker  (may trial SPC in upcoming session)   AM-PAC Basic Mobility Inpatient   Turning in Bed Without Bedrails 4   Lying on Back to Sitting on Edge of Flat Bed 4   Moving Bed to Chair 4   Standing Up From Chair 4   Walk in Room 3   Climb 3-5 Stairs 3   Basic Mobility Inpatient Raw Score 22   Basic Mobility Standardized Score 47 4   Barthel Index   Feeding 10   Bathing 5   Grooming Score 5   Dressing Score 10   Bladder Score 10   Bowels Score 10   Toilet Use Score 10   Transfers (Bed/Chair) Score 15   Mobility (Level Surface) Score 0   Stairs Score 0   Barthel Index Score 75     Skilled inpatient PT is recommended during this admission in order to progress patient towards goals upon [address], PT

## 2021-01-11 NOTE — ASSESSMENT & PLAN NOTE
Lab Results   Component Value Date    INR 3 24 (H) 01/11/2021    INR 1 99 (H) 01/09/2021    INR 1 71 (H) 01/08/2021     · INR above goal at 3 24 today   · Home dose of Coumadin is noted to be 4 mg  Tuesday Thursday Saturday Sunday and 2 mg on Monday Wednesday Friday  · Daily INR  · Hold 1/11 dose

## 2021-01-11 NOTE — PROGRESS NOTES
[facility]  Progress Note Zhou Gómez 1950, 72 y o  male MRN: [id number]    Unit/Bed#: S -01 Encounter: [id number]    Primary Care Provider: Onofre Roberts MD   Date and time admitted to hospital: 1/8/2021  3:47 PM        * COVID-19 virus infection  Assessment & Plan  · Noted to test positive on 1/5/2021  Wife was recently hospitalized with COVID-19 infection  Patient deemed to be high risk secondary to age, kidney disease, and heart failure   · Continue zinc, vitamin-D, vitamin-C  · Remdesivir day #3 - complete on 11/13  · Risk/benefits of remdesivir were discussed w/ patient by prior provider   · Decadron day #3  · Procalcitonin negative when accounting for renal function - antibiotics stopped   · Dimer rising, but V/Q negative   · Continue with Coumadin, but holding today due to elevated INR   · Repeat inflammatory markers QOD    Inflammatory Markers (last 3): Lab Results   Component Value Date    DDIMER 1 20 (H) 01/11/2021    DDIMER 2 21 (H) 01/09/2021    DDIMER 0 83 (H) 01/08/2021    CRP 65 7 (H) 01/11/2021    CRP 88 4 (H) 01/08/2021    FERRITIN 772 (H) 01/11/2021    FERRITIN 627 (H) 01/08/2021       Cardiac Markers:  Lab Results   Component Value Date    TROPONINI 0 08 (H) 01/09/2021    TROPONINI 0 06 (H) 01/08/2021    TROPONINI <0 02 10/31/2020    NTBNP 381 (H) 01/08/2021    CKTOTAL 206 10/14/2020         CKD (chronic kidney disease), stage IV [facility])  Assessment & Plan  Lab Results   Component Value Date    CREATININE 3 26 (H) 01/11/2021    CREATININE 3 26 (H) 01/09/2021    CREATININE 3 49 (H) 01/08/2021    CREATININE 3 47 (H) 10/31/2020     · Follows with renal as outpatient   Baseline appears to be 3 3-3 4  · Stop fluids, appetite improved   · Continue BMP    Chronic systolic heart failure [facility])  Assessment & Plan  Wt Readings from Last 3 Encounters:   01/11/21 78 4 kg (172 lb 13 5 oz)   01/04/21 77 1 kg (170 lb)   10/31/20 83 8 kg (184 lb 11 9 oz)     · Prior reduced EF secondary to tachycardia mediated etiology has since resolved with normalization in EF  Takes diuretics as needed  Has not required any use recently   · Clinically euvolemic   · Monitor volume status  · Fluids have been stopped as his appetite has improved     Atrial flutter [facility])  Assessment & Plan  · Patient with history of atrial flutter status post PATIENCE and cardioversion April 2019  He is currently in NSR   · Outpatient follow up with cardiology  · Continue Coumadin   · His INR goal is 2-3  · He has bioprosthetic valve     FSGS (focal segmental glomerulosclerosis)  Assessment & Plan  · Follows with Nephrology as an outpatient  Bx proven   · Monitor creatinine as above     Diabetic nephropathy associated with type 2 diabetes mellitus [facility])  Assessment & Plan  Lab Results   Component Value Date    HGBA1C 5 6 10/14/2020       Recent Labs     01/10/21  1614 01/10/21  2149 01/11/21  0759 01/11/21  1111   POCGLU 177* 163* 148* 166*       Blood Sugar Average: Last 72 hrs:  (P) 962 [id number]   · BG and A1c acceptable   · Tradjenta on hold  · Continue SSI with meals and bedtime   · QID accuchecks     Anticoagulant long-term use  Assessment & Plan  Lab Results   Component Value Date    INR 3 24 (H) 01/11/2021    INR 1 99 (H) 01/09/2021    INR 1 71 (H) 01/08/2021     · INR above goal at 3 24 today   · Home dose of Coumadin is noted to be 4 mg  Tuesday Thursday Saturday Sunday and 2 mg on Monday Wednesday Friday  · Daily INR  · Hold 1/11 dose     Syncope-resolved as of 1/11/2021  Assessment & Plan  · Patient was noted to stand up, walk to the kitchen and then developed syncopal episode  He reports no prodromal symptoms (denies dizziness which he reported earlier to the ER)  Patient has not had any further events  Cardiac work up negative  V/Q negative    · Stop telemetry   · No further work up needed     Hyperkalemia-resolved as of 1/11/2021  Assessment & Plan  · Potassium 5 7 on admission   [place] to be 2/2 poor PO intake as patient does not consistently use diuretics  Now 5 1, resolved   · Monitor BMP    Essential hypertension  Assessment & Plan  · BP acceptable  · Continue metoprolol, hydralazine and amlodipine      VTE Pharmacologic Prophylaxis:   Pharmacologic: Warfarin (Coumadin)  Mechanical VTE Prophylaxis in Place: No    Patient Centered Rounds: I have performed bedside rounds with nursing staff today  Discussions with Specialists or Other Care Team Provider: Discussed with RN, EMILEE    Education and Discussions with Family / Patient: Discussed with patient, called spouse      Time Spent for Care: 30 minutes  More than 50% of total time spent on counseling and coordination of care as described above  Current Length of Stay: 2 day(s)    Current Patient Status: Inpatient   Certification Statement: The patient will continue to require additional inpatient hospital stay due to on going treatment for COVID-19, monitoring O2 status     Discharge Plan: 24-48 hours, however would like patient to complete Remdesivir given comorbid conditions     Code Status: Level 1 - Full Code      Subjective:   Patient reports that he is feeling better today  Denies SOB, coughing  Reports improved appetite  Denies fevers or chills  Objective:     Vitals:   Temp (24hrs), Av 7 °F (36 5 °C), Min:97 5 °F (36 4 °C), Max:97 9 °F (36 6 °C)    Temp:  [97 5 °F (36 4 °C)-97 9 °F (36 6 °C)] 97 5 °F (36 4 °C)  HR:  [74-75] 74  Resp:  [18-20] 18  BP: (100-116)/(57-72) 100/57  SpO2:  [90 %-93 %] 93 %  Body mass index is 26 28 kg/m²  Input and Output Summary (last 24 hours): Intake/Output Summary (Last 24 hours) at 2021 1504  Last data filed at 2021 0558  Gross per 24 hour   Intake 0 ml   Output 0 ml   Net 0 ml       Physical Exam:     Physical Exam  Constitutional:       General: He is not in acute distress  Appearance: He is well-developed  He is not ill-appearing or diaphoretic     HENT:      Head: Normocephalic and atraumatic  Mouth/Throat:      Mouth: Mucous membranes are moist    Eyes:      General: No scleral icterus  Conjunctiva/sclera: Conjunctivae normal       Pupils: Pupils are equal, round, and reactive to light  Neck:      Musculoskeletal: Neck supple  Cardiovascular:      Rate and Rhythm: Normal rate and regular rhythm  Heart sounds: Normal heart sounds, S1 normal and S2 normal  No murmur  No S3 or S4 sounds  Pulmonary:      Effort: Pulmonary effort is normal  No accessory muscle usage or respiratory distress  Breath sounds: Normal breath sounds  No decreased breath sounds, wheezing, rhonchi or rales  Chest:      Chest wall: No tenderness  Abdominal:      General: Bowel sounds are normal  There is no distension  Palpations: Abdomen is soft  There is no mass  Tenderness: There is no abdominal tenderness  There is no guarding or rebound  Musculoskeletal:      Right lower leg: No edema  Left lower leg: No edema  Skin:     General: Skin is warm and dry  Neurological:      General: No focal deficit present  Mental Status: He is alert and oriented to person, place, and time  Motor: No tremor or seizure activity  Additional Data:     Labs:    Results from last 7 days   Lab Units 01/11/21  0509   WBC Thousand/uL 11 71*   HEMOGLOBIN g/dL 14 8   HEMATOCRIT % 45 5   PLATELETS Thousands/uL 220   NEUTROS PCT % 86*   LYMPHS PCT % 8*   MONOS PCT % 5   EOS PCT % 0     Results from last 7 days   Lab Units 01/11/21  0509   POTASSIUM mmol/L 5 1   CHLORIDE mmol/L 105   CO2 mmol/L 18*   BUN mg/dL 67*   CREATININE mg/dL 3 26*   CALCIUM mg/dL 7 9*   ALK PHOS U/L 43*   ALT U/L 28   AST U/L 43     Results from last 7 days   Lab Units 01/11/21  0509   INR  3 24*       * I Have Reviewed All Lab Data Listed Above  * Additional Pertinent Lab Tests Reviewed:  All Labs For Current Hospital Admission Reviewed    Imaging:    Imaging Reports Reviewed Today Include: None  Imaging Personally Reviewed by Myself Includes:  None    Recent Cultures (last 7 days):           Last 24 Hours Medication List:   Current Facility-Administered Medications   Medication Dose Route Frequency Provider Last Rate    acetaminophen  650 mg Oral Once Denise Graham PA-C      amLODIPine  2 5 mg Oral Daily Denise Graham PA-C      ascorbic acid  1,000 mg Oral Q12H [address] Denise Graham PA-C      aspirin  81 mg Oral Daily Denise Graham PA-C      atorvastatin  20 mg Oral Daily Denise Graham PA-C      calcitriol  0 25 mcg Oral Once per day on Mon Wed Fri Sat Beatriz Graham PA-C      cholecalciferol  2,000 Units Oral Daily Denise Graham PA-C      dexamethasone  6 mg Intravenous Q24H Leopold Pellegrini, MD      famotidine  10 mg Oral Daily Leopold Pellegrini, MD      hydrALAZINE  25 mg Oral TID Beatriz Graham PA-C      insulin lispro  1-5 Units Subcutaneous TID AC Denise Graham PA-C      insulin lispro  1-5 Units Subcutaneous HS Denise Graham PA-C      isosorbide dinitrate  10 mg Oral TID Beatriz Graham PA-C      metoprolol succinate  50 mg Oral Daily Denise Graham PA-C      zinc sulfate  220 mg Oral Daily Denise Graham PA-C      Followed by   Shagufta Agosto ON 1/16/2021] multivitamin-minerals  1 tablet Oral Daily Denise Graham PA-C      ondansetron  4 mg Intravenous Q6H PRN Tang Bone PA-C      remdesivir  100 mg Intravenous Q24H Leopold Pellegrini,  mg (01/11/21 1032)    travoprost  1 drop Both Eyes HS Denise Graham PA-C      [START ON 1/13/2021] warfarin  2 mg Oral Once per day on Mon Wed Fri Antonio Vaughn PA-C      warfarin  4 mg Oral Once per day on Sun Tue Thu Sat Mena Garcia PA-C          Today, Patient Was Seen By: Kate Mcmanus PA-C    ** Please Note: Dictation voice to text software may have been used in the creation of this document   **

## 2021-01-11 NOTE — ASSESSMENT & PLAN NOTE
· Patient with history of atrial flutter status post PATIENCE and cardioversion April 2019   He is currently in NSR   · Outpatient follow up with cardiology  · Continue Coumadin   · His INR goal is 2-3  · He has bioprosthetic valve

## 2021-01-11 NOTE — ASSESSMENT & PLAN NOTE
· Patient was noted to stand up, walk to the kitchen and then developed syncopal episode  He reports no prodromal symptoms (denies dizziness which he reported earlier to the ER)  Patient has not had any further events  Cardiac work up negative   V/Q negative    · Stop telemetry   · No further work up needed

## 2021-01-11 NOTE — PLAN OF CARE
Problem: PHYSICAL THERAPY ADULT  Goal: Performs mobility at highest level of function for planned discharge setting  See evaluation for individualized goals  Description: Treatment/Interventions: Functional transfer training, LE strengthening/ROM, Elevations, Therapeutic exercise, Endurance training, Patient/family training, Equipment eval/education, Bed mobility, Gait training  Equipment Recommended: Walker(may trial SPC in upcoming session)       See flowsheet documentation for full assessment, interventions and recommendations  Note: Prognosis: Good  Problem List: Decreased endurance, Impaired balance, Decreased mobility  Assessment: Boy Mallory is a 72 y o  Male who [facility] on 1/8/2021 from home w/ c/o generalized weakness w/ near syncope and subsequent fall  and diagnosis of COVID-19  Orders for PT eval and treat received, w/ activity orders of up and OOB as tolerated and fall precautions  Pt presents w/ comorbidities of DMII, HTN, HLD, CKD, CHF and personal factors including: stair(s) to enter home and positive fall history  At baseline, pt mobilizes independently without need for AD, and reports his only fall is what brought him to the hospital when he passed out  Upon evaluation, pt presents w/ the following deficits: weakness and decreased endurance  Pt is mod I for bed mobility, mod I for transfers, and mod I w/ RW, S w/ no AD for gait  Pt's clinical presentation is unstable/unpredictable due to need for assist w/ all phases of mobility when usually mobilizing independently, need for supplemental oxygen in order to maintain oxygen saturation, need for input for mobility technique, recent drastic decline in mobility compared to baseline and recent history of falls}  Given the above findings, discharge recommendation is for home w/ family support and HHPT   During this admission, pt would benefit from skilled acute inpatient PT in order to address the abovementioned deficits to maximize function and mobility before DC from acute care  PT Discharge Recommendation: Home with skilled therapy(HHPT)          See flowsheet documentation for full assessment

## 2021-01-11 NOTE — ASSESSMENT & PLAN NOTE
· Noted to test positive on 1/5/2021  Wife was recently hospitalized with COVID-19 infection  Patient deemed to be high risk secondary to age, kidney disease, and heart failure   · Continue zinc, vitamin-D, vitamin-C  · Remdesivir day #3 - complete on 11/13  · Risk/benefits of remdesivir were discussed w/ patient by prior provider   · Decadron day #3  · Procalcitonin negative when accounting for renal function - antibiotics stopped   · Dimer rising, but V/Q negative   · Continue with Coumadin, but holding today due to elevated INR   · Repeat inflammatory markers QOD    Inflammatory Markers (last 3):   Lab Results   Component Value Date    DDIMER 1 20 (H) 01/11/2021    DDIMER 2 21 (H) 01/09/2021    DDIMER 0 83 (H) 01/08/2021    CRP 65 7 (H) 01/11/2021    CRP 88 4 (H) 01/08/2021    FERRITIN 772 (H) 01/11/2021    FERRITIN 627 (H) 01/08/2021       Cardiac Markers:  Lab Results   Component Value Date    TROPONINI 0 08 (H) 01/09/2021    TROPONINI 0 06 (H) 01/08/2021    TROPONINI <0 02 10/31/2020    NTBNP 381 (H) 01/08/2021    CKTOTAL 206 10/14/2020

## 2021-01-11 NOTE — PLAN OF CARE
Problem: Potential for Falls  Goal: Patient will remain free of falls  Description: INTERVENTIONS:  - Assess patient frequently for physical needs  -  Identify cognitive and physical deficits and behaviors that affect risk of falls    -  [place] fall precautions as indicated by assessment   - Educate patient/family on patient safety including physical limitations  - Instruct patient to call for assistance with activity based on assessment  - Modify environment to reduce risk of injury  - Consider OT/PT consult to assist with strengthening/mobility  Outcome: Progressing     Problem: PAIN - ADULT  Goal: Verbalizes/displays adequate comfort level or baseline comfort level  Description: Interventions:  - Encourage patient to monitor pain and request assistance  - Assess pain using appropriate pain scale  - Administer analgesics based on type and severity of pain and evaluate response  - Implement non-pharmacological measures as appropriate and evaluate response  - Consider cultural and social influences on pain and pain management  - Notify physician/advanced practitioner if interventions unsuccessful or patient reports new pain  Outcome: Progressing     Problem: INFECTION - ADULT  Goal: Absence or prevention of progression during hospitalization  Description: INTERVENTIONS:  - Assess and monitor for signs and symptoms of infection  - Monitor lab/diagnostic results  - Monitor all insertion sites, i e  indwelling lines, tubes, and drains  - Monitor endotracheal if appropriate and nasal secretions for changes in amount and color  - [place] appropriate cooling/warming therapies per order  - Administer medications as ordered  - Instruct and encourage patient and family to use good hand hygiene technique  - Identify and instruct in appropriate isolation precautions for identified infection/condition  Outcome: Progressing  Goal: Absence of fever/infection during neutropenic period  Description: INTERVENTIONS:  - Monitor WBC    Outcome: Progressing     Problem: SAFETY ADULT  Goal: Maintain or return to baseline ADL function  Description: INTERVENTIONS:  -  Assess patient's ability to carry out ADLs; assess patient's baseline for ADL function and identify physical deficits which impact ability to perform ADLs (bathing, care of mouth/teeth, toileting, grooming, dressing, etc )  - Assess/evaluate cause of self-care deficits   - Assess range of motion  - Assess patient's mobility; develop plan if impaired  - Assess patient's need for assistive devices and provide as appropriate  - Encourage maximum independence but intervene and supervise when necessary  - Involve family in performance of ADLs  - Assess for home care needs following discharge   - Consider OT consult to assist with ADL evaluation and planning for discharge  - Provide patient education as appropriate  Outcome: Progressing  Goal: Maintain or return mobility status to optimal level  Description: INTERVENTIONS:  - Assess patient's baseline mobility status (ambulation, transfers, stairs, etc )    - Identify cognitive and physical deficits and behaviors that affect mobility  - Identify mobility aids required to assist with transfers and/or ambulation (gait belt, sit-to-stand, lift, walker, cane, etc )  - [place] fall precautions as indicated by assessment  - Record patient progress and toleration of activity level on Mobility SBAR; progress patient to next Phase/Stage  - Instruct patient to call for assistance with activity based on assessment  - Consider rehabilitation consult to assist with strengthening/weightbearing, etc   Outcome: Progressing     Problem: DISCHARGE PLANNING  Goal: Discharge to home or other facility with appropriate resources  Description: INTERVENTIONS:  - Identify barriers to discharge w/patient and caregiver  - Arrange for needed discharge resources and transportation as appropriate  - Identify discharge learning needs (meds, wound care, etc )  - Arrange for interpretive services to assist at discharge as needed  - Refer to Case Management Department for coordinating discharge planning if the patient needs post-hospital services based on physician/advanced practitioner order or complex needs related to functional status, cognitive ability, or social support system  Outcome: Progressing     Problem: Knowledge Deficit  Goal: Patient/family/caregiver demonstrates understanding of disease process, treatment plan, medications, and discharge instructions  Description: Complete learning assessment and assess knowledge base    Interventions:  - Provide teaching at level of understanding  - Provide teaching via preferred learning methods  Outcome: Progressing

## 2021-01-12 VITALS
SYSTOLIC BLOOD PRESSURE: 111 MMHG | HEART RATE: 79 BPM | TEMPERATURE: 97.5 F | OXYGEN SATURATION: 92 % | DIASTOLIC BLOOD PRESSURE: 70 MMHG | HEIGHT: 68 IN | BODY MASS INDEX: 26.06 KG/M2 | WEIGHT: 171.96 LBS | RESPIRATION RATE: 18 BRPM

## 2021-01-12 LAB
ALBUMIN SERPL BCP-MCNC: 2.2 G/DL (ref 3.5–5)
ALP SERPL-CCNC: 42 U/L (ref 46–116)
ALT SERPL W P-5'-P-CCNC: 31 U/L (ref 12–78)
ANION GAP SERPL CALCULATED.3IONS-SCNC: 11 MMOL/L (ref 4–13)
AST SERPL W P-5'-P-CCNC: 32 U/L (ref 5–45)
BASOPHILS # BLD AUTO: 0.01 THOUSANDS/ΜL (ref 0–0.1)
BASOPHILS NFR BLD AUTO: 0 % (ref 0–1)
BILIRUB SERPL-MCNC: 0.25 MG/DL (ref 0.2–1)
BUN SERPL-MCNC: 76 MG/DL (ref 5–25)
CALCIUM ALBUM COR SERPL-MCNC: 9.4 MG/DL (ref 8.3–10.1)
CALCIUM SERPL-MCNC: 8 MG/DL (ref 8.3–10.1)
CHLORIDE SERPL-SCNC: 108 MMOL/L (ref 100–108)
CO2 SERPL-SCNC: 16 MMOL/L (ref 21–32)
CREAT SERPL-MCNC: 2.99 MG/DL (ref 0.6–1.3)
EOSINOPHIL # BLD AUTO: 0 THOUSAND/ΜL (ref 0–0.61)
EOSINOPHIL NFR BLD AUTO: 0 % (ref 0–6)
ERYTHROCYTE [DISTWIDTH] IN BLOOD BY AUTOMATED COUNT: 13 % (ref 11.6–15.1)
GFR SERPL CREATININE-BSD FRML MDRD: 24 ML/MIN/1.73SQ M
GLUCOSE SERPL-MCNC: 152 MG/DL (ref 65–140)
GLUCOSE SERPL-MCNC: 160 MG/DL (ref 65–140)
GLUCOSE SERPL-MCNC: 197 MG/DL (ref 65–140)
HCT VFR BLD AUTO: 45.7 % (ref 36.5–49.3)
HGB BLD-MCNC: 14.9 G/DL (ref 12–17)
IMM GRANULOCYTES # BLD AUTO: 0.14 THOUSAND/UL (ref 0–0.2)
IMM GRANULOCYTES NFR BLD AUTO: 1 % (ref 0–2)
INR PPP: 3.93 (ref 0.84–1.19)
LYMPHOCYTES # BLD AUTO: 0.74 THOUSANDS/ΜL (ref 0.6–4.47)
LYMPHOCYTES NFR BLD AUTO: 6 % (ref 14–44)
MCH RBC QN AUTO: 30.5 PG (ref 26.8–34.3)
MCHC RBC AUTO-ENTMCNC: 32.6 G/DL (ref 31.4–37.4)
MCV RBC AUTO: 94 FL (ref 82–98)
MONOCYTES # BLD AUTO: 0.59 THOUSAND/ΜL (ref 0.17–1.22)
MONOCYTES NFR BLD AUTO: 5 % (ref 4–12)
NEUTROPHILS # BLD AUTO: 10.17 THOUSANDS/ΜL (ref 1.85–7.62)
NEUTS SEG NFR BLD AUTO: 88 % (ref 43–75)
NRBC BLD AUTO-RTO: 0 /100 WBCS
PLATELET # BLD AUTO: 251 THOUSANDS/UL (ref 149–390)
PMV BLD AUTO: 9.5 FL (ref 8.9–12.7)
POTASSIUM SERPL-SCNC: 5.3 MMOL/L (ref 3.5–5.3)
PROT SERPL-MCNC: 6.6 G/DL (ref 6.4–8.2)
PROTHROMBIN TIME: 38.4 SECONDS (ref 11.6–14.5)
RBC # BLD AUTO: 4.88 MILLION/UL (ref 3.88–5.62)
SODIUM SERPL-SCNC: 135 MMOL/L (ref 136–145)
WBC # BLD AUTO: 11.65 THOUSAND/UL (ref 4.31–10.16)

## 2021-01-12 PROCEDURE — 99239 HOSP IP/OBS DSCHRG MGMT >30: CPT | Performed by: PHYSICIAN ASSISTANT

## 2021-01-12 PROCEDURE — 85025 COMPLETE CBC W/AUTO DIFF WBC: CPT | Performed by: PHYSICIAN ASSISTANT

## 2021-01-12 PROCEDURE — 82948 REAGENT STRIP/BLOOD GLUCOSE: CPT

## 2021-01-12 PROCEDURE — 85610 PROTHROMBIN TIME: CPT | Performed by: PHYSICIAN ASSISTANT

## 2021-01-12 PROCEDURE — 80053 COMPREHEN METABOLIC PANEL: CPT | Performed by: PHYSICIAN ASSISTANT

## 2021-01-12 RX ORDER — ATORVASTATIN CALCIUM 20 MG/1
20 TABLET, FILM COATED ORAL DAILY
Qty: 90 TABLET | Refills: 0 | Status: SHIPPED | OUTPATIENT
Start: 2021-01-27 | End: 2021-01-18 | Stop reason: DRUGHIGH

## 2021-01-12 RX ORDER — ZINC SULFATE 50(220)MG
220 CAPSULE ORAL DAILY
Qty: 4 CAPSULE | Refills: 0 | Status: SHIPPED | OUTPATIENT
Start: 2021-01-12 | End: 2022-04-26

## 2021-01-12 RX ORDER — ATORVASTATIN CALCIUM 40 MG/1
40 TABLET, FILM COATED ORAL DAILY
Qty: 14 TABLET | Refills: 0 | Status: SHIPPED | OUTPATIENT
Start: 2021-01-12 | End: 2021-05-03

## 2021-01-12 RX ORDER — PREDNISONE 20 MG/1
40 TABLET ORAL DAILY
Qty: 12 TABLET | Refills: 0 | Status: SHIPPED | OUTPATIENT
Start: 2021-01-13 | End: 2021-01-19

## 2021-01-12 RX ADMIN — METOPROLOL SUCCINATE 50 MG: 50 TABLET, EXTENDED RELEASE ORAL at 09:43

## 2021-01-12 RX ADMIN — Medication 2000 UNITS: at 09:43

## 2021-01-12 RX ADMIN — ATORVASTATIN CALCIUM 20 MG: 40 TABLET, FILM COATED ORAL at 09:42

## 2021-01-12 RX ADMIN — FAMOTIDINE 10 MG: 20 TABLET ORAL at 09:42

## 2021-01-12 RX ADMIN — HYDRALAZINE HYDROCHLORIDE 25 MG: 25 TABLET, FILM COATED ORAL at 09:42

## 2021-01-12 RX ADMIN — DEXAMETHASONE SODIUM PHOSPHATE 6 MG: 4 INJECTION, SOLUTION INTRA-ARTICULAR; INTRALESIONAL; INTRAMUSCULAR; INTRAVENOUS; SOFT TISSUE at 11:24

## 2021-01-12 RX ADMIN — ISOSORBIDE DINITRATE 10 MG: 10 TABLET ORAL at 09:42

## 2021-01-12 RX ADMIN — REMDESIVIR 100 MG: 100 INJECTION, POWDER, LYOPHILIZED, FOR SOLUTION INTRAVENOUS at 11:24

## 2021-01-12 RX ADMIN — INSULIN LISPRO 1 UNITS: 100 INJECTION, SOLUTION INTRAVENOUS; SUBCUTANEOUS at 12:04

## 2021-01-12 RX ADMIN — ZINC SULFATE 220 MG (50 MG) CAPSULE 220 MG: CAPSULE at 09:42

## 2021-01-12 RX ADMIN — ASPIRIN 81 MG: 81 TABLET ORAL at 09:42

## 2021-01-12 RX ADMIN — AMLODIPINE BESYLATE 2.5 MG: 2.5 TABLET ORAL at 09:42

## 2021-01-12 RX ADMIN — INSULIN LISPRO 1 UNITS: 100 INJECTION, SOLUTION INTRAVENOUS; SUBCUTANEOUS at 09:43

## 2021-01-12 RX ADMIN — OXYCODONE HYDROCHLORIDE AND ACETAMINOPHEN 1000 MG: 500 TABLET ORAL at 09:43

## 2021-01-12 NOTE — ASSESSMENT & PLAN NOTE
Wt Readings from Last 3 Encounters:   01/12/21 78 kg (171 lb 15 3 oz)   01/04/21 77 1 kg (170 lb)   10/31/20 83 8 kg (184 lb 11 9 oz)     · Prior reduced EF secondary to tachycardia mediated etiology has since resolved with normalization in EF  Takes diuretics as needed    Has not required any use recently   · Clinically euvolemic   · Continue home regimen

## 2021-01-12 NOTE — ASSESSMENT & PLAN NOTE
Lab Results   Component Value Date    CREATININE 2 99 (H) 01/12/2021    CREATININE 3 26 (H) 01/11/2021    CREATININE 3 26 (H) 01/09/2021    CREATININE 3 49 (H) 01/08/2021     · Follows with renal as outpatient  Baseline appears to be 3 3-3 4   Below baseline today   · Follow up outpatient Nephrology

## 2021-01-12 NOTE — ASSESSMENT & PLAN NOTE
Lab Results   Component Value Date    INR 3 93 (H) 01/12/2021    INR 3 24 (H) 01/11/2021    INR 1 99 (H) 01/09/2021     · INR above goal at 3 93 today   · Home dose of Coumadin is noted to be 4 mg  Tuesday Thursday Saturday Sunday and 2 mg on Monday Wednesday Friday  · Continue to hold Coumadin   · Check INR at home   · Family Doctor aware to monitor

## 2021-01-12 NOTE — DISCHARGE INSTR - AVS FIRST PAGE
Dear Shante Camacho,     It was our pleasure to care for you here at [facility]  It is our hope that we were always able to exceed the expected standards for your care during your stay  You were hospitalized due to COVID-19  You were cared for on the 3rd floor by Delvis Navarro PA-C under the service of Gayla Cooper MD with the [facility] who covers for your primary care physician (PCP), Abner Viramontes MD, while you were hospitalized  If you have any questions or concerns related to this hospitalization, you may contact us at [phone]  For follow up as well as any medication refills, we recommend that you follow up with your primary care physician  A registered nurse will reach out to you by phone within a few days after your discharge to answer any additional questions that you may have after going home  However, at this time we provide for you here, the most important instructions / recommendations at discharge:     · Notable Medication Adjustments -   · START the following medications:  · Prednisone 20 mg Tablets - Take 2 tablets by mouth once daily starting tomorrow until gone   · Zinc 220 mg Tabs - Take 1 tablet by mouth once daily until gone  · CHANGE how you take the following medications   · Atorvastatin (Lipitor) - You will take 40 mg tablets once daily for 2 weeks then restart your home dose of 20 mg  A script for 40 mg was sent to your pharmacy   · DO NOT TAKE the following medication until your family doctor tells you to:  · Coumadin   · Continue the rest of your home medications as you had been taking them   · Testing Required after Discharge -   · INR  - this checks your Coumadin level  Restart Coumadin when your level is between 2-3   · Important follow up information -   · Follow up with Dr Blanca Kaplan in 1-2 weeks   · Other Instructions -   · Continue to isolate for a total of 20 days until 1/26/2021   Follow the rest of instructions below   · Please review this entire after visit summary as additional general instructions including medication list, appointments, activity, diet, any pertinent wound care, and other additional recommendations from your care team that may be provided for you  Sincerely,     Mark Alston PA-C    COVID-19 Home Care Guidelines    Your healthcare provider and/or public health staff have evaluated you and have determined that you do not need to remain in the hospital at this time  At this time you can be isolated at home where you will be monitored by staff from your local or state health department  You should carefully follow the prevention and isolation steps below until a healthcare provider or local or state health department says that you can return to your normal activities  Stay home except to get medical care    People who are mildly ill with COVID-19 are able to isolate at home during their illness  You should restrict activities outside your home, except for getting medical care  Do not go to work, school, or public areas  Avoid using public transportation, ride-sharing, or taxis  Separate yourself from other people and animals in your home    People: As much as possible, you should stay in a specific room and away from other people in your home  Also, you should use a separate bathroom, if available  Animals: You should restrict contact with pets and other animals while you are sick with COVID-19, just like you would around other people  Although there have not been reports of pets or other animals becoming sick with COVID-19, it is still recommended that people sick with COVID-19 limit contact with animals until more information is known about the virus  When possible, have another member of your household care for your animals while you are sick  If you are sick with COVID-19, avoid contact with your pet, including petting, snuggling, being kissed or licked, and sharing food  If you must care for your pet or be around animals while you are sick, wash your hands before and after you interact with pets and wear a facemask  See COVID-19 and Animals for more information  Call ahead before visiting your doctor    If you have a medical appointment, call the healthcare provider and tell them that you have or may have COVID-19  This will help the healthcare providers office take steps to keep other people from getting infected or exposed  Wear a facemask    You should wear a facemask when you are around other people (e g , sharing a room or vehicle) or pets and before you enter a healthcare providers office  If you are not able to wear a facemask (for example, because it causes trouble breathing), then people who live with you should not stay in the same room with you, or they should wear a facemask if they enter your room  Cover your coughs and sneezes    Cover your mouth and nose with a tissue when you cough or sneeze  Throw used tissues in a lined trash can  Immediately wash your hands with soap and water for at least 20 seconds or, if soap and water are not available, clean your hands with an alcohol-based hand  that contains at least 60% alcohol  Clean your hands often    Wash your hands often with soap and water for at least 20 seconds, especially after blowing your nose, coughing, or sneezing; going to the bathroom; and before eating or preparing food  If soap and water are not readily available, use an alcohol-based hand  with at least 60% alcohol, covering all surfaces of your hands and rubbing them together until they feel dry  Soap and water are the best option if hands are visibly dirty  Avoid touching your eyes, nose, and mouth with unwashed hands  Avoid sharing personal household items    You should not share dishes, drinking glasses, cups, eating utensils, towels, or bedding with other people or pets in your home   After using these items, they should be washed thoroughly with soap and water  Clean all high-touch surfaces everyday    High touch surfaces include counters, tabletops, doorknobs, bathroom fixtures, toilets, phones, keyboards, tablets, and bedside tables  Also, clean any surfaces that may have blood, stool, or body fluids on them  Use a household cleaning spray or wipe, according to the label instructions  Labels contain instructions for safe and effective use of the cleaning product including precautions you should take when applying the product, such as wearing gloves and making sure you have good ventilation during use of the product  Monitor your symptoms    Seek prompt medical attention if your illness is worsening (e g , difficulty breathing)  Before seeking care, call your healthcare provider and tell them that you have, or are being evaluated for, COVID-19  Put on a facemask before you enter the facility  These steps will help the healthcare providers office to keep other people in the office or waiting room from getting infected or exposed  Ask your healthcare provider to call the local or [facility] health department  Persons who are placed under active monitoring or facilitated self-monitoring should follow instructions provided by their local health department or occupational health professionals, as appropriate  If you have a medical emergency and need to call 911, notify the dispatch personnel that you have, or are being evaluated for COVID-19  If possible, put on a facemask before emergency medical services arrive      Discontinuing home isolation    Patients with confirmed COVID-19 should remain under home isolation precautions until the following conditions are met:   - They have had no fever for at least 24 hours (that is one full day of no fever without the use medicine that reduces fevers)  AND  - other symptoms have improved (for example, when their cough or shortness of breath have improved)  AND  - If had mild or moderate illness, at least 10 days have passed since their symptoms first appeared or if severe illness (needed oxygen) or immunosuppressed, at least 20 days have passed since symptoms first appeared  Patients with confirmed COVID-19 should also notify close contacts (including their workplace) and ask that they self-quarantine  Currently, close contact is defined as being within 6 feet for 15 minutes or more from the period 24 hours starting 48 hours before symptom onset to the time at which the patient went into isolation  Close contacts of patients diagnosed with COVID-19 should be instructed by the patient to self-quarantine for 14 days from the last time of their last contact with the patient       Source: [employer]

## 2021-01-12 NOTE — ASSESSMENT & PLAN NOTE
· Noted to test positive on 1/5/2021  Wife was recently hospitalized with COVID-19 infection  Patient deemed to be high risk secondary to age, kidney disease, and heart failure  He is improved today and is off O2    · Stable for discharge    · Continue zinc, vitamin-D, vitamin-C  · Remdesivir day #3 - complete on 11/13  · Risk/benefits of remdesivir were discussed w/ patient by prior provider   · Give day 4 and then stop early given stability   · Decadron day #4  · Continue Prednisone for 6 more days starting tomorrow for 10 days total  · Procalcitonin negative when accounting for renal function - antibiotics stopped   · Dimer rising, but V/Q negative   · Continue with Coumadin, but holding today due to elevated INR  · He will follow up with outpatient INR and continue to hold Coumadin    · Lipitor 40 mg QD for 14 days then restart 20 mg QD is home dose     Inflammatory Markers (last 3):   Lab Results   Component Value Date    DDIMER 1 20 (H) 01/11/2021    DDIMER 2 21 (H) 01/09/2021    DDIMER 0 83 (H) 01/08/2021    CRP 65 7 (H) 01/11/2021    CRP 88 4 (H) 01/08/2021    FERRITIN 772 (H) 01/11/2021    FERRITIN 627 (H) 01/08/2021       Cardiac Markers:  Lab Results   Component Value Date    TROPONINI 0 08 (H) 01/09/2021    TROPONINI 0 06 (H) 01/08/2021    TROPONINI <0 02 10/31/2020    NTBNP 381 (H) 01/08/2021    CKTOTAL 206 10/14/2020

## 2021-01-12 NOTE — ASSESSMENT & PLAN NOTE
Lab Results   Component Value Date    HGBA1C 5 6 10/14/2020       Recent Labs     01/11/21  1111 01/11/21  1537 01/11/21  2100 01/12/21  0714   POCGLU 166* 199* 213* 160*       Blood Sugar Average: Last 72 hrs:  (P) 024 [id number]   · BG and A1c acceptable   · Tradjenta on hold  · Continue SSI with meals and bedtime   · QID accuchecks

## 2021-01-12 NOTE — DISCHARGE SUMMARY
[facility]  Discharge- Eliezer Bears 1950, 72 y o  male MRN: [id number]    Unit/Bed#: S -Jens Encounter: [id number]    Primary Care Provider: Terrence More MD   Date and time admitted to hospital: 1/8/2021  3:47 PM        * COVID-19 virus infection  Assessment & Plan  · Noted to test positive on 1/5/2021  Wife was recently hospitalized with COVID-19 infection  Patient deemed to be high risk secondary to age, kidney disease, and heart failure  He is improved today and is off O2    · Stable for discharge    · Continue zinc, vitamin-D, vitamin-C  · Remdesivir day #3 - complete on 11/13  · Risk/benefits of remdesivir were discussed w/ patient by prior provider   · Give day 4 and then stop early given stability   · Decadron day #4  · Continue Prednisone for 6 more days starting tomorrow for 10 days total  · Procalcitonin negative when accounting for renal function - antibiotics stopped   · Dimer rising, but V/Q negative   · Continue with Coumadin, but holding today due to elevated INR  · He will follow up with outpatient INR and continue to hold Coumadin    · Lipitor 40 mg QD for 14 days then restart 20 mg QD is home dose     Inflammatory Markers (last 3): Lab Results   Component Value Date    DDIMER 1 20 (H) 01/11/2021    DDIMER 2 21 (H) 01/09/2021    DDIMER 0 83 (H) 01/08/2021    CRP 65 7 (H) 01/11/2021    CRP 88 4 (H) 01/08/2021    FERRITIN 772 (H) 01/11/2021    FERRITIN 627 (H) 01/08/2021       Cardiac Markers:  Lab Results   Component Value Date    TROPONINI 0 08 (H) 01/09/2021    TROPONINI 0 06 (H) 01/08/2021    TROPONINI <0 02 10/31/2020    NTBNP 381 (H) 01/08/2021    CKTOTAL 206 10/14/2020         CKD (chronic kidney disease), stage IV [facility])  Assessment & Plan  Lab Results   Component Value Date    CREATININE 2 99 (H) 01/12/2021    CREATININE 3 26 (H) 01/11/2021    CREATININE 3 26 (H) 01/09/2021    CREATININE 3 49 (H) 01/08/2021     · Follows with renal as outpatient   Baseline appears to be 3 3-3 4  Below baseline today   · Follow up outpatient Nephrology     Chronic systolic heart failure ([facility] [address] )  Assessment & Plan  Wt Readings from Last 3 Encounters:   01/12/21 78 kg (171 lb 15 3 oz)   01/04/21 77 1 kg (170 lb)   10/31/20 83 8 kg (184 lb 11 9 oz)     · Prior reduced EF secondary to tachycardia mediated etiology has since resolved with normalization in EF  Takes diuretics as needed  Has not required any use recently   · Clinically euvolemic   · Continue home regimen     Atrial flutter [facility])  Assessment & Plan  · Patient with history of atrial flutter status post PATIENCE and cardioversion April 2019  He is currently in NSR   · Outpatient follow up with cardiology  · Continue Coumadin   · His INR goal is 2-3  · He has bioprosthetic valve     FSGS (focal segmental glomerulosclerosis)  Assessment & Plan  · Follows with Nephrology as an outpatient  Bx proven   · Outpatient follow up     Diabetic nephropathy associated with type 2 diabetes mellitus [facility])  Assessment & Plan  Lab Results   Component Value Date    HGBA1C 5 6 10/14/2020       Recent Labs     01/11/21  1111 01/11/21  1537 01/11/21  2100 01/12/21  0714   POCGLU 166* 199* 213* 160*       Blood Sugar Average: Last 72 hrs:  (P) 078 [id number]   · BG and A1c acceptable   · Tradjenta on hold  · Continue SSI with meals and bedtime   · QID accuchecks     Anticoagulant long-term use  Assessment & Plan  Lab Results   Component Value Date    INR 3 93 (H) 01/12/2021    INR 3 24 (H) 01/11/2021    INR 1 99 (H) 01/09/2021     · INR above goal at 3 93 today   · Home dose of Coumadin is noted to be 4 mg  Tuesday Thursday Saturday Sunday and 2 mg on Monday Wednesday Friday  · Continue to hold Coumadin   · Check INR at home   · Family Doctor aware to monitor      Essential hypertension  Assessment & Plan  · BP acceptable  · Continue metoprolol, hydralazine and amlodipine      Hyponatremia: POA, stable, somewhat corrects due to hyperglycemia   No further work up needed  Elevated troponin is non-MI troponin in the setting of infection and CKD  No chest pain  No further work up needed  Discharging Physician / Practitioner: Daryl Rodriguez PA-C  PCP: Juana Moon MD  Admission Date:   Admission Orders (From admission, onward)     Ordered        01/09/21 1108  Inpatient Admission  Once         01/08/21 1825  Place in Observation  Once                   Discharge Date: 01/12/21    Resolved Problems  Date Reviewed: 1/12/2021          Resolved    Syncope 1/11/2021     Resolved by  Daryl Rodriguez PA-C    Hyperkalemia 1/11/2021     Resolved by  Daryl Rodriguez PA-C          Consultations During Hospital Stay:  · None    Procedures Performed:   · CXR   · V/Q Scan     Significant Findings / Test Results:   · CXR: Peripheral opacities within the lung bases  · V/Q Scan: Negative for PE    Incidental Findings:   · None     Test Results Pending at Discharge (will require follow up): · None     Outpatient Tests Requested:  · INR     Complications:  None    Reason for Admission: COVID-19     Hospital Course:     Shamika Long is a 72 y o  male patient who originally presented to the hospital on 1/8/2021 due to generalized weakness  The patient was tested for COVID-19 and was positive  He was started on Remdesivir as he was high risk  He was found to briefly need oxygen so he was started on Decadron as well  He was able to be weaned off O2 quickly and continued to improve nicely  He was discharged home on moderate protocol with steroids, vitamins, and increased Lipitor  He is already on Coumadin, but this is being held due to elevated INR  He was set up with VNA to work with him to regain strength and to facilitate lab draws given he is isolating  Please see above list of diagnoses and related plan for additional information       Condition at Discharge: stable     Discharge Day Visit / Exam:     Subjective:  Patient states that he is doing well and is ready to go home  Reports that his breathing continues to improve as well as his cough  Denies fevers or chills  Vitals: Blood Pressure: 111/70 (01/12/21 0940)  Pulse: 79 (01/12/21 0700)  Temperature: 97 5 °F (36 4 °C) (01/12/21 0700)  Temp Source: Oral (01/12/21 0700)  Respirations: 18 (01/12/21 0700)  Height: 5' 8" (172 7 cm) (01/08/21 1551)  Weight - Scale: 78 kg (171 lb 15 3 oz) (01/12/21 0550)  SpO2: 92 % (01/12/21 0700)  Exam:   Physical Exam  Constitutional:       General: He is not in acute distress  Appearance: Normal appearance  He is normal weight  He is not ill-appearing or diaphoretic  HENT:      Head: Normocephalic and atraumatic  Mouth/Throat:      Mouth: Mucous membranes are moist    Eyes:      General: No scleral icterus  Pupils: Pupils are equal, round, and reactive to light  Cardiovascular:      Rate and Rhythm: Normal rate and regular rhythm  Pulses: Normal pulses  Heart sounds: Normal heart sounds, S1 normal and S2 normal  No murmur  No systolic murmur  No diastolic murmur  No gallop  No S3 or S4 sounds  Pulmonary:      Effort: Pulmonary effort is normal  No accessory muscle usage or respiratory distress  Breath sounds: Normal breath sounds  No stridor  No decreased breath sounds, wheezing, rhonchi or rales  Chest:      Chest wall: No tenderness  Abdominal:      General: Bowel sounds are normal  There is no distension  Palpations: Abdomen is soft  Tenderness: There is no abdominal tenderness  There is no guarding  Musculoskeletal:      Right lower leg: No edema  Left lower leg: No edema  Skin:     General: Skin is warm and dry  Coloration: Skin is not jaundiced  Neurological:      General: No focal deficit present  Mental Status: He is alert  Mental status is at baseline  Motor: No tremor or seizure activity  Psychiatric:         Behavior: Behavior is cooperative            Discussion with Family: Called wife Discharge instructions/Information to patient and family:   See after visit summary for information provided to patient and family  Provisions for Follow-Up Care:  See after visit summary for information related to follow-up care and any pertinent home health orders  Disposition:     Home with VNA Services (Reminder: Complete face to face encounter)    For Discharges to [facility] SNF:   · Not Applicable to this Patient - Not Applicable to this Patient    Planned Readmission: None     Discharge Statement:  I spent 45 minutes discharging the patient  This time was spent on the day of discharge  I had direct contact with the patient on the day of discharge  Greater than 50% of the total time was spent examining patient, answering all patient questions, arranging and discussing plan of care with patient as well as directly providing post-discharge instructions  Additional time then spent on discharge activities  Discharge Medications:  See after visit summary for reconciled discharge medications provided to patient and family        ** Please Note: This note has been constructed using a voice recognition system **

## 2021-01-12 NOTE — PLAN OF CARE
Problem: Potential for Falls  Goal: Patient will remain free of falls  Description: INTERVENTIONS:  - Assess patient frequently for physical needs  -  Identify cognitive and physical deficits and behaviors that affect risk of falls    -  [place] fall precautions as indicated by assessment   - Educate patient/family on patient safety including physical limitations  - Instruct patient to call for assistance with activity based on assessment  - Modify environment to reduce risk of injury  - Consider OT/PT consult to assist with strengthening/mobility  Outcome: Progressing     Problem: PAIN - ADULT  Goal: Verbalizes/displays adequate comfort level or baseline comfort level  Description: Interventions:  - Encourage patient to monitor pain and request assistance  - Assess pain using appropriate pain scale  - Administer analgesics based on type and severity of pain and evaluate response  - Implement non-pharmacological measures as appropriate and evaluate response  - Consider cultural and social influences on pain and pain management  - Notify physician/advanced practitioner if interventions unsuccessful or patient reports new pain  Outcome: Progressing     Problem: INFECTION - ADULT  Goal: Absence or prevention of progression during hospitalization  Description: INTERVENTIONS:  - Assess and monitor for signs and symptoms of infection  - Monitor lab/diagnostic results  - Monitor all insertion sites, i e  indwelling lines, tubes, and drains  - Monitor endotracheal if appropriate and nasal secretions for changes in amount and color  - [place] appropriate cooling/warming therapies per order  - Administer medications as ordered  - Instruct and encourage patient and family to use good hand hygiene technique  - Identify and instruct in appropriate isolation precautions for identified infection/condition  Outcome: Progressing  Goal: Absence of fever/infection during neutropenic period  Description: INTERVENTIONS:  - Monitor WBC    Outcome: Progressing     Problem: SAFETY ADULT  Goal: Maintain or return to baseline ADL function  Description: INTERVENTIONS:  -  Assess patient's ability to carry out ADLs; assess patient's baseline for ADL function and identify physical deficits which impact ability to perform ADLs (bathing, care of mouth/teeth, toileting, grooming, dressing, etc )  - Assess/evaluate cause of self-care deficits   - Assess range of motion  - Assess patient's mobility; develop plan if impaired  - Assess patient's need for assistive devices and provide as appropriate  - Encourage maximum independence but intervene and supervise when necessary  - Involve family in performance of ADLs  - Assess for home care needs following discharge   - Consider OT consult to assist with ADL evaluation and planning for discharge  - Provide patient education as appropriate  Outcome: Progressing  Goal: Maintain or return mobility status to optimal level  Description: INTERVENTIONS:  - Assess patient's baseline mobility status (ambulation, transfers, stairs, etc )    - Identify cognitive and physical deficits and behaviors that affect mobility  - Identify mobility aids required to assist with transfers and/or ambulation (gait belt, sit-to-stand, lift, walker, cane, etc )  - [place] fall precautions as indicated by assessment  - Record patient progress and toleration of activity level on Mobility SBAR; progress patient to next Phase/Stage  - Instruct patient to call for assistance with activity based on assessment  - Consider rehabilitation consult to assist with strengthening/weightbearing, etc   Outcome: Progressing     Problem: DISCHARGE PLANNING  Goal: Discharge to home or other facility with appropriate resources  Description: INTERVENTIONS:  - Identify barriers to discharge w/patient and caregiver  - Arrange for needed discharge resources and transportation as appropriate  - Identify discharge learning needs (meds, wound care, etc )  - Arrange for interpretive services to assist at discharge as needed  - Refer to Case Management Department for coordinating discharge planning if the patient needs post-hospital services based on physician/advanced practitioner order or complex needs related to functional status, cognitive ability, or social support system  Outcome: Progressing     Problem: Knowledge Deficit  Goal: Patient/family/caregiver demonstrates understanding of disease process, treatment plan, medications, and discharge instructions  Description: Complete learning assessment and assess knowledge base    Interventions:  - Provide teaching at level of understanding  - Provide teaching via preferred learning methods  Outcome: Progressing

## 2021-01-12 NOTE — CASE MANAGEMENT
LOS: 3 DAYS  PATIENT IS NOT A BUNDLE  PATIENT IS NOT A READMISSION  UNPLANNED RISK OF READMISSION: 21    CM called and spoke to patient via room phone to introduce self, explain role, complete CM assessment, and discuss DC planning as he is COVID +  Patient resides with his spouse Tristen Morales in a ranch home in [place]  His son also lives locally and is a support at home  There is 1 MICHELLE patient's home which he is able to navigate without concern  Patient does not have any DME at home and is normally independent with all ADLs  Patient reports history of [facility] following open heart surgery but cannot recall the agency name; no history of STR  Patient utilizes [employer] on [address], has Rx plan and is able to afford all copays  Patient does not have history of MH/substance use  Patient identifies his spouse Nan as his POA; does not have AD  Patient delayed his SSI income until he turns 80 as 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 [employer]  Patient does drive but his son will provide transport at discharge  IMM reviewed with patient  patient agrees with discharge determination  SLIM mentioned to CM that he would like for patient's INR to be checked at home within the next few days  CM reviewed PT evaluation and recommendation for [facility] PT services  Patient is interested in [facility] including SN and PT and referral was sent to [facility] as patient did not express a preference  [facility] able to accept and contact information placed on AVS; patient sent home with INR lab Rx  CM also expressed interested in RW and CM sent a referral to [facility] to obtain same  [facility] responded and stated patient obtained RW in March of 2019 and is not eligible through his insurance for a new one  CM informed patient of [facility] arrangements and [facility] response re: RW  Patient stated he did get a RW in March 2019 but doesn't know if he still has it; he'd like to pay OOP to get one through [employer]   He is aware that the cost is around $99  CM informed [facility] of same and they informed CM to deliver from [facility] and they'll reach out to patient on his cell phone for copay information  RW delivered to  [employer] for room delivery  CM reviewed discharge planning process including the following: identifying caregivers at home, preference for d/c planning needs, availability of treatment team to discuss questions or concerns patient and/or family may have regarding diagnosis, plan of care, old or new medications and discharge planning   CM will continue to follow for care coordination and update assessment as appropriate

## 2021-01-13 ENCOUNTER — LAB REQUISITION (OUTPATIENT)
Dept: LAB | Facility: HOSPITAL | Age: 71
End: 2021-01-13
Payer: COMMERCIAL

## 2021-01-13 ENCOUNTER — TELEPHONE (OUTPATIENT)
Dept: FAMILY MEDICINE CLINIC | Facility: CLINIC | Age: 71
End: 2021-01-13

## 2021-01-13 ENCOUNTER — TRANSITIONAL CARE MANAGEMENT (OUTPATIENT)
Dept: FAMILY MEDICINE CLINIC | Facility: CLINIC | Age: 71
End: 2021-01-13

## 2021-01-13 DIAGNOSIS — Z79.01 LONG TERM (CURRENT) USE OF ANTICOAGULANTS: ICD-10-CM

## 2021-01-13 DIAGNOSIS — I48.92 UNSPECIFIED ATRIAL FLUTTER (HCC): ICD-10-CM

## 2021-01-13 LAB
INR PPP: 4.42 (ref 0.84–1.19)
PROTHROMBIN TIME: 41.8 SECONDS (ref 11.6–14.5)

## 2021-01-13 PROCEDURE — 85610 PROTHROMBIN TIME: CPT | Performed by: PHYSICIAN ASSISTANT

## 2021-01-14 ENCOUNTER — ANTICOAG VISIT (OUTPATIENT)
Dept: CARDIOLOGY CLINIC | Facility: CLINIC | Age: 71
End: 2021-01-14

## 2021-01-15 ENCOUNTER — ANTICOAG VISIT (OUTPATIENT)
Dept: CARDIOLOGY CLINIC | Facility: CLINIC | Age: 71
End: 2021-01-15

## 2021-01-15 LAB — INR PPP: 7.5 (ref 0.84–1.19)

## 2021-01-18 ENCOUNTER — TELEPHONE (OUTPATIENT)
Dept: CARDIOLOGY CLINIC | Facility: CLINIC | Age: 71
End: 2021-01-18

## 2021-01-18 ENCOUNTER — TELEPHONE (OUTPATIENT)
Dept: FAMILY MEDICINE CLINIC | Facility: CLINIC | Age: 71
End: 2021-01-18

## 2021-01-18 ENCOUNTER — TELEMEDICINE (OUTPATIENT)
Dept: FAMILY MEDICINE CLINIC | Facility: CLINIC | Age: 71
End: 2021-01-18
Payer: COMMERCIAL

## 2021-01-18 ENCOUNTER — ANTICOAG VISIT (OUTPATIENT)
Dept: CARDIOLOGY CLINIC | Facility: CLINIC | Age: 71
End: 2021-01-18

## 2021-01-18 ENCOUNTER — HOSPITAL ENCOUNTER (EMERGENCY)
Facility: HOSPITAL | Age: 71
Discharge: HOME | End: 2021-01-18
Attending: EMERGENCY MEDICINE | Admitting: EMERGENCY MEDICINE
Payer: COMMERCIAL

## 2021-01-18 VITALS
OXYGEN SATURATION: 96 % | RESPIRATION RATE: 16 BRPM | DIASTOLIC BLOOD PRESSURE: 79 MMHG | TEMPERATURE: 98.4 F | HEART RATE: 93 BPM | SYSTOLIC BLOOD PRESSURE: 144 MMHG

## 2021-01-18 VITALS
RESPIRATION RATE: 16 BRPM | DIASTOLIC BLOOD PRESSURE: 92 MMHG | TEMPERATURE: 97.6 F | WEIGHT: 170.99 LBS | OXYGEN SATURATION: 97 % | SYSTOLIC BLOOD PRESSURE: 161 MMHG | BODY MASS INDEX: 26 KG/M2 | HEART RATE: 99 BPM

## 2021-01-18 VITALS — BODY MASS INDEX: 25.91 KG/M2 | HEIGHT: 68 IN | WEIGHT: 171 LBS

## 2021-01-18 DIAGNOSIS — U07.1 COVID-19 VIRUS INFECTION: Primary | ICD-10-CM

## 2021-01-18 DIAGNOSIS — Z95.2 S/P AVR: ICD-10-CM

## 2021-01-18 DIAGNOSIS — N18.4 CKD (CHRONIC KIDNEY DISEASE), STAGE IV (HCC): ICD-10-CM

## 2021-01-18 DIAGNOSIS — R04.0 RIGHT-SIDED EPISTAXIS: Primary | ICD-10-CM

## 2021-01-18 DIAGNOSIS — I10 ESSENTIAL HYPERTENSION: Chronic | ICD-10-CM

## 2021-01-18 DIAGNOSIS — R04.0 ACUTE ANTERIOR EPISTAXIS: Primary | ICD-10-CM

## 2021-01-18 DIAGNOSIS — R79.1 SUPRATHERAPEUTIC INR: ICD-10-CM

## 2021-01-18 DIAGNOSIS — E11.9 DIABETES MELLITUS TYPE 2 IN NONOBESE (HCC): Chronic | ICD-10-CM

## 2021-01-18 PROBLEM — E78.5 DYSLIPIDEMIA: Status: ACTIVE | Noted: 2021-01-18

## 2021-01-18 LAB — INR PPP: 5.4 (ref 0.84–1.19)

## 2021-01-18 PROCEDURE — 99283 EMERGENCY DEPT VISIT LOW MDM: CPT

## 2021-01-18 PROCEDURE — 30901 CONTROL OF NOSEBLEED: CPT | Performed by: EMERGENCY MEDICINE

## 2021-01-18 PROCEDURE — 99496 TRANSJ CARE MGMT HIGH F2F 7D: CPT | Performed by: FAMILY MEDICINE

## 2021-01-18 PROCEDURE — 99282 EMERGENCY DEPT VISIT SF MDM: CPT | Performed by: EMERGENCY MEDICINE

## 2021-01-18 PROCEDURE — 3008F BODY MASS INDEX DOCD: CPT | Performed by: FAMILY MEDICINE

## 2021-01-18 PROCEDURE — 99284 EMERGENCY DEPT VISIT MOD MDM: CPT | Performed by: EMERGENCY MEDICINE

## 2021-01-18 RX ORDER — CEPHALEXIN 250 MG/1
500 CAPSULE ORAL ONCE
Status: COMPLETED | OUTPATIENT
Start: 2021-01-18 | End: 2021-01-18

## 2021-01-18 RX ORDER — OXYMETAZOLINE HYDROCHLORIDE 0.05 G/100ML
2 SPRAY NASAL ONCE
Status: COMPLETED | OUTPATIENT
Start: 2021-01-18 | End: 2021-01-18

## 2021-01-18 RX ORDER — GINSENG 100 MG
1 CAPSULE ORAL ONCE
Status: COMPLETED | OUTPATIENT
Start: 2021-01-18 | End: 2021-01-18

## 2021-01-18 RX ORDER — CEPHALEXIN 250 MG/1
250 CAPSULE ORAL EVERY 8 HOURS SCHEDULED
Qty: 21 CAPSULE | Refills: 0 | Status: SHIPPED | OUTPATIENT
Start: 2021-01-19 | End: 2021-01-26

## 2021-01-18 RX ADMIN — BACITRACIN ZINC 1 SMALL APPLICATION: 500 OINTMENT TOPICAL at 21:06

## 2021-01-18 RX ADMIN — CEPHALEXIN 500 MG: 250 CAPSULE ORAL at 21:07

## 2021-01-18 RX ADMIN — OXYMETAZOLINE HYDROCHLORIDE 2 SPRAY: 0.05 SPRAY NASAL at 21:06

## 2021-01-18 RX ADMIN — OXYMETAZOLINE HYDROCHLORIDE 2 SPRAY: 0.05 SPRAY NASAL at 16:03

## 2021-01-18 NOTE — DISCHARGE INSTRUCTIONS
As we discussed, you are not having active nose bleeding right now, this is in the setting of your INR being elevated and you having an active COVID-19 infection  if the bleeding happens again I want to spray Afrin in the right side of your nose, and hold pressure for 5 minutes  You can repeat this up to 3 times if the bleeding continues, if it continues to bleed despite this please return to the emergency department for evaluation  Please hold her Coumadin as recommended by your  family doctor

## 2021-01-18 NOTE — TELEPHONE ENCOUNTER
Myra called to let you know that he is currently in the [facility] ER with a nosebleed  INR was 7 5 on Friday  Today 5 4       FYI

## 2021-01-18 NOTE — ASSESSMENT & PLAN NOTE
Lab Results   Component Value Date    EGFR 24 01/12/2021    EGFR 21 01/11/2021    EGFR 21 01/09/2021    CREATININE 2 99 (H) 01/12/2021    CREATININE 3 26 (H) 01/11/2021    CREATININE 3 26 (H) 01/09/2021   monitored by nephrology creatinine slowly improving post covid

## 2021-01-18 NOTE — TELEPHONE ENCOUNTER
Patient states is at the hospital with nose bleed did have a virtual visit today with Dr Blanca Kaplan is requesting a call back and did not relate any other information beside wants to speak with PCP   ND

## 2021-01-18 NOTE — PROGRESS NOTES
Virtual Regular Visit      Assessment/Plan:    Problem List Items Addressed This Visit        Endocrine    Diabetes mellitus type 2 in nonobese [facility]) (Chronic)       Lab Results   Component Value Date    HGBA1C 5 6 10/14/2020   pt seeing endocrinologist            Cardiovascular and Mediastinum    Essential hypertension (Chronic)     Cont current meds            Genitourinary    CKD (chronic kidney disease), stage IV ([facility] [address] )     Lab Results   Component Value Date    EGFR 24 01/12/2021    EGFR 21 01/11/2021    EGFR 21 01/09/2021    CREATININE 2 99 (H) 01/12/2021    CREATININE 3 26 (H) 01/11/2021    CREATININE 3 26 (H) 01/09/2021   monitored by nephrology creatinine slowly improving post covid            Other    S/P AVR     On coumadin INR monitored by cardiology         COVID-19 virus infection - Primary     Pt has completed his quarantine time  Feels better only fatigue                    Reason for visit is  TCM  Chief Complaint   Patient presents with    Transition of Care Management     covid positive    Virtual Regular Visit        Encounter provider Ruth Ch MD    Provider located at [address] [place] [id number] [phone]      Recent Visits  Date Type Provider Dept   01/13/21 Telephone Ruth Ch MD [facility] [address] recent visits within past 7 days and meeting all other requirements     Today's Visits  Date Type Provider Dept   01/18/21 Telemedicine Ruth Ch MD  [address] today's visits and meeting all other requirements     Future Appointments  No visits were found meeting these conditions  Showing future appointments within next 150 days and meeting all other requirements        The patient was identified by name and date of birth   Moy Wright was informed that this is a telemedicine visit and that the visit is being conducted through z[employer] and patient was informed that this is not a secure, HIPAA-compliant platform  He agrees to proceed     My office door was closed  No one else was in the room  He acknowledged consent and understanding of privacy and security of the video platform  The patient has agreed to participate and understands they can discontinue the visit at any time  Patient is aware this is a billable service  Subjective  Mikki Locke is a 72 y o  male    TCM post hospitalization for covid pt admitted 1/8 and discharged 1/12 pt woth  DM HTN HL  recent  Mitral valve and aortic valve replacment on coumadin on hold since INR was 5 monitored by cardiology pt with chronic kidney disease recent visit with nephrology and does see endocrinology symptoms of covid improved only has fatigue   HPI     Past Medical History:   Diagnosis Date    Colon polyp     COVID-19     Diabetes mellitus ([address] )     Hyperlipidemia     Hypertension     Proteinuria     Stage 3 chronic kidney disease     Vitamin D deficiency        Past Surgical History:   Procedure Laterality Date    COLONOSCOPY      overdue    TN ECHO TRANSESOPHAG R-T 2D W/PRB IMG ACQUISJ I&R N/A 3/7/2019    Procedure: INTRA-OP TRANSESOPHAGEAL ECHOCARDIOGRAM (PATIENCE);   Surgeon: Tina Weldon DO;  [facility];  Service: Cardiac Surgery    TN PERQ CLSR TCAT L ATR APNDGE W/ENDOCARDIAL IMPLNT  3/7/2019    Procedure: LEFT ATRIAL APPENDAGE OCCLUSION CLIPPED with 35mm Obdulio Slay;  Surgeon: Tina Weldon DO;  [facility];  Service: Cardiac Surgery    TN REPLACEMENT OF MITRAL VALVE N/A 3/7/2019    Procedure: REPLACEMENT VALVE MITRAL (MVR) REPAIR with a 30mm MEDTRONIC CG FUTURE RING;  Surgeon: Tina Weldon DO;  [facility];  Service: Cardiac Surgery    TN RPLCMT AORTIC VALVE OPN W/STENTLESS TISSUE VALVE N/A 3/7/2019    Procedure: REPLACEMENT VALVE AORTIC (AVR) with 23mm TAVERA INSPIRIS RESILIA  AORTIC VALVE;  Surgeon: Tina Weldon DO;  [facility];  Service: Cardiac Surgery    RENAL BIOPSY, OPEN      TOE SURGERY Left        Current Outpatient Medications   Medication Sig Dispense Refill    amLODIPine (NORVASC) 2 5 mg tablet Take 1 tablet (2 5 mg total) by mouth daily 90 tablet 3    ascorbic acid (VITAMIN C) 500 mg tablet Take 500 mg by mouth daily      calcitriol (ROCALTROL) 0 25 mcg capsule Take one capsule five days a week (Patient taking differently: 0 25 mcg Take one capsule 5 days a week - [facility]) 20 capsule 6    Cholecalciferol (VITAMIN D3) 1000 units CAPS Take 1 capsule by mouth daily      COMBIGAN 0 2-0 5 % instill 1 drop into both eyes twice a day  0    hydrALAZINE (APRESOLINE) 25 mg tablet Take 1 tablet (25 mg total) by mouth 3 (three) times a day 270 tablet 3    isosorbide dinitrate (ISORDIL) 10 mg tablet Take 1 tablet (10 mg total) by mouth 3 (three) times a day 270 tablet 3    metoprolol succinate (TOPROL-XL) 50 mg 24 hr tablet Take 1 tablet (50 mg total) by mouth daily 180 tablet 1    RA ASPIRIN EC 81 MG EC tablet take 1 tablet by mouth once daily 90 tablet 6    torsemide (DEMADEX) 10 mg tablet Take 10 mg by mouth as needed As needed      TRADJENTA 5 MG TABS 2 5 mg daily   0    TRAVATAN Z 0 004 % ophthalmic solution Administer 1 drop to both eyes daily at bedtime   0    atorvastatin (LIPITOR) 40 mg tablet Take 1 tablet (40 mg total) by mouth daily for 14 days (Patient not taking: Reported on 1/18/2021) 14 tablet 0    predniSONE 20 mg tablet Take 2 tablets (40 mg total) by mouth daily for 6 days (Patient not taking: Reported on 1/18/2021) 12 tablet 0    warfarin (COUMADIN) 2 mg tablet TAKE 1 TO 2 TABLETS DAILY BY MOUTH OR AS DIRECTED BY PHYSICIAN  (Patient not taking: Reported on 1/18/2021) 60 tablet 11    zinc sulfate (ZINCATE) 220 mg capsule Take 1 capsule (220 mg total) by mouth daily for 4 doses 4 capsule 0     No current facility-administered medications for this visit           Allergies   Allergen Reactions    Ace Inhibitors Cough Review of Systems   Constitutional: Positive for fatigue  Negative for appetite change, chills and fever  HENT: Negative for congestion  Respiratory: Negative for cough, chest tightness and shortness of breath  Cardiovascular: Negative for chest pain, palpitations and leg swelling  Gastrointestinal: Negative for abdominal pain, constipation, diarrhea, nausea and vomiting  Genitourinary: Negative for difficulty urinating and frequency  Musculoskeletal: Negative for arthralgias, back pain and neck pain  Skin: Negative for rash  Neurological: Negative for dizziness, weakness, light-headedness, numbness and headaches  Hematological: Does not bruise/bleed easily  Psychiatric/Behavioral: Negative for dysphoric mood and sleep disturbance  The patient is not nervous/anxious  Video Exam    Vitals:    01/18/21 1316   Weight: 77 6 kg (171 lb)   Height: 5' 8" (1 727 m)       Physical Exam  Constitutional:       General: He is not in acute distress  Appearance: Normal appearance  He is well-developed  He is not ill-appearing  Neck:      Musculoskeletal: Normal range of motion  Thyroid: No thyromegaly  Cardiovascular:      Rate and Rhythm: Normal rate  Pulmonary:      Effort: Pulmonary effort is normal  No respiratory distress  Breath sounds: Normal breath sounds  Neurological:      General: No focal deficit present  Mental Status: He is alert and oriented to person, place, and time  Mental status is at baseline  Psychiatric:         Mood and Affect: Mood normal          Behavior: Behavior normal          Thought Content: Thought content normal       reviewed hospital  Records  meds and labs    I spent 25 minutes directly with the patient during this visit      VIRTUAL VISIT Lam Heard acknowledges that he has consented to an online visit or consultation   He understands that the online visit is based solely on information provided by him, and that, in the absence of a face-to-face physical evaluation by the physician, the diagnosis he receives is both limited and provisional in terms of accuracy and completeness  This is not intended to replace a full medical face-to-face evaluation by the physician  [employer] understands and accepts these terms

## 2021-01-18 NOTE — ED PROVIDER NOTES
History  Chief Complaint   Patient presents with    Nose Bleed     Pt presents to the ED for eval of nosebleed of right nares  COVID +  Had pt/inr checked this morning  81-year-old male presents with right-sided epistaxis  , says he blew his nose earlier, after he blew his nose he started having some bleeding  , describes it is very minor, stopped after about minute her to with direct pressure  Says since he is on Coumadin and just had a INR checked and was 5, he wanted to come here for evaluation  , bleeding occurred about an hour and half ago, prior to this he is not having regular nose bleeds  , otherwise feels well  No dizziness no lightheadedness  History provided by:  Patient  Nose Bleed  Location:  R nare  Severity:  Mild  Duration:  2 minutes  Timing:  Constant  Progression:  Resolved  Chronicity:  New  Context: anticoagulants    Relieved by:  Applying pressure  Worsened by:  Nothing  Ineffective treatments:  None tried  Associated symptoms: no blood in oropharynx, no congestion, no cough, no dizziness, no fever, no headaches and no sore throat    Risk factors comment:  Active COVID-19 infection, diagnosed 6 days ago      Prior to Admission Medications   Prescriptions Last Dose Informant Patient Reported? Taking?    COMBIGAN 0 2-0 5 %  Self Yes No   Sig: instill 1 drop into both eyes twice a day   Cholecalciferol (VITAMIN D3) 1000 units CAPS  Self Yes No   Sig: Take 1 capsule by mouth daily   RA ASPIRIN EC 81 MG EC tablet  Self No No   Sig: take 1 tablet by mouth once daily   TRADJENTA 5 MG TABS  Self Yes No   Si 5 mg daily    TRAVATAN Z 0 004 % ophthalmic solution  Self Yes No   Sig: Administer 1 drop to both eyes daily at bedtime    amLODIPine (NORVASC) 2 5 mg tablet  Self No No   Sig: Take 1 tablet (2 5 mg total) by mouth daily   ascorbic acid (VITAMIN C) 500 mg tablet  Self Yes No   Sig: Take 500 mg by mouth daily   atorvastatin (LIPITOR) 40 mg tablet   No No   Sig: Take 1 tablet (40 mg total) by mouth daily for 14 days   Patient not taking: Reported on 1/18/2021   calcitriol (ROCALTROL) 0 25 mcg capsule   No No   Sig: Take one capsule five days a week   Patient taking differently: 0 25 mcg Take one capsule 5 days a week - [facility]   hydrALAZINE (APRESOLINE) 25 mg tablet   No No   Sig: Take 1 tablet (25 mg total) by mouth 3 (three) times a day   isosorbide dinitrate (ISORDIL) 10 mg tablet   No No   Sig: Take 1 tablet (10 mg total) by mouth 3 (three) times a day   metoprolol succinate (TOPROL-XL) 50 mg 24 hr tablet  Self No No   Sig: Take 1 tablet (50 mg total) by mouth daily   predniSONE 20 mg tablet   No No   Sig: Take 2 tablets (40 mg total) by mouth daily for 6 days   Patient not taking: Reported on 1/18/2021   torsemide (DEMADEX) 10 mg tablet  Self Yes No   Sig: Take 10 mg by mouth as needed As needed   warfarin (COUMADIN) 2 mg tablet  Self No No   Sig: TAKE 1 TO 2 TABLETS DAILY BY MOUTH OR AS DIRECTED BY PHYSICIAN  Patient not taking: Reported on 1/18/2021   zinc sulfate (ZINCATE) 220 mg capsule   No No   Sig: Take 1 capsule (220 mg total) by mouth daily for 4 doses      Facility-Administered Medications: None       Past Medical History:   Diagnosis Date    Colon polyp     COVID-19     Diabetes mellitus ([address] )     Hyperlipidemia     Hypertension     Proteinuria     Stage 3 chronic kidney disease     Vitamin D deficiency        Past Surgical History:   Procedure Laterality Date    COLONOSCOPY      overdue    DE ECHO TRANSESOPHAG R-T 2D W/PRB IMG ACQUISJ I&R N/A 3/7/2019    Procedure: INTRA-OP TRANSESOPHAGEAL ECHOCARDIOGRAM (PATIENCE);   Surgeon: Anahi Clinton DO;  [facility];  Service: Cardiac Surgery    DE PERQ CLSR TCAT L ATR APNDGE W/ENDOCARDIAL IMPLNT  3/7/2019    Procedure: LEFT ATRIAL APPENDAGE OCCLUSION CLIPPED with 35mm Jose Annas;  Surgeon: Anahi Clinton DO;  [facility];  Service: Cardiac Surgery    DE REPLACEMENT OF MITRAL VALVE N/A 3/7/2019 Procedure: REPLACEMENT VALVE MITRAL (MVR) REPAIR with a 30mm MEDTRONIC CG FUTURE RING;  Surgeon: Markie Hurd DO;  [facility];  Service: Cardiac Surgery    WI RPLCMT AORTIC VALVE OPN W/STENTLESS TISSUE VALVE N/A 3/7/2019    Procedure: REPLACEMENT VALVE AORTIC (AVR) with 23mm TAVERA INSPIRIS RESILIA  AORTIC VALVE;  Surgeon: Markie Hurd DO;  [facility];  Service: Cardiac Surgery    RENAL BIOPSY, OPEN      TOE SURGERY Left        Family History   Problem Relation Age of Onset   Ethelyn [place] Stroke Mother     Hypertension Mother     Blindness Father     Hyperlipidemia Father    Ethelyn [place] Hypertension Father     Glaucoma Father     Gout Brother     Heart Valve Disease Brother     Hypertension Brother     Cancer Sister     Diabetes Sister     Ovarian cancer Sister     Anuerysm Neg Hx     Heart attack Neg Hx     Arrhythmia Neg Hx     Heart failure Neg Hx     Coronary artery disease Neg Hx     Sudden death Neg Hx         scd     I have reviewed and agree with the history as documented  E-Cigarette/Vaping    E-Cigarette Use Never User      E-Cigarette/Vaping Substances     Social History     Tobacco Use    Smoking status: Former Smoker     Types: Cigarettes     Quit date:      Years since quittin 0    Smokeless tobacco: Never Used   Substance Use Topics    Alcohol use: Not Currently     Frequency: Monthly or less     Drinks per session: 1 or 2     Binge frequency: Never     Comment: occasionally    Drug use: No       Review of Systems   Constitutional: Negative for activity change, chills, diaphoresis and fever  HENT: Positive for nosebleeds  Negative for congestion, sinus pressure and sore throat  Eyes: Negative for pain and visual disturbance  Respiratory: Negative for cough, chest tightness, shortness of breath, wheezing and stridor  Cardiovascular: Negative for chest pain and palpitations     Gastrointestinal: Negative for abdominal distention, abdominal pain, constipation, diarrhea, nausea and vomiting  Genitourinary: Negative for dysuria and frequency  Musculoskeletal: Negative for neck pain and neck stiffness  Skin: Negative for rash  Neurological: Negative for dizziness, speech difficulty, light-headedness, numbness and headaches  Physical Exam  Physical Exam  Vitals signs reviewed  Constitutional:       Appearance: He is well-developed  HENT:      Head: Atraumatic  Nose:      Comments: Small amount of blood right naris, well formed blood clot on right nasal septum this likely accounts for the area patient's bleeding  Eyes:      General: No scleral icterus  Right eye: No discharge  Left eye: No discharge  Conjunctiva/sclera: Conjunctivae normal    Neck:      Musculoskeletal: Neck supple  Trachea: No tracheal deviation  Pulmonary:      Effort: Pulmonary effort is normal  No respiratory distress  Breath sounds: No stridor  Musculoskeletal:         General: No deformity  Skin:     General: Skin is warm and dry  Coloration: Skin is not pale  Findings: No erythema or rash  Neurological:      Mental Status: He is alert  Motor: No abnormal muscle tone        Coordination: Coordination normal          Vital Signs  ED Triage Vitals [01/18/21 1539]   Temperature Pulse Respirations Blood Pressure SpO2   98 4 °F (36 9 °C) 93 16 144/79 96 %      Temp Source Heart Rate Source Patient Position - Orthostatic VS BP Location FiO2 (%)   Temporal Monitor Sitting Right arm --      Pain Score       --           Vitals:    01/18/21 1539   BP: 144/79   Pulse: 93   Patient Position - Orthostatic VS: Sitting         Visual Acuity      ED Medications  Medications   oxymetazoline (AFRIN) 0 05 % nasal spray 2 spray (2 sprays Each Nare Given 1/18/21 1603)       Diagnostic Studies  Results Reviewed     None                 No orders to display              Procedures  Procedures         ED Course MDM  Number of Diagnoses or Management Options  Acute anterior epistaxis: new and requires workup  Supratherapeutic INR: new and requires workup  Diagnosis management comments: 81-year-old male supratherapeutic INR, had a right-sided epistaxis that was resolved with minimal effort, direct pressure, no bleeding since  , has an active COVID-19 infection  , discussed risks benefits alternatives, at this time I do feel cauterizing his nose would be to unnecessary exposures of COVID-19 especially considering he has a well-formed blood clot over his nasal septum at this time active bleeding  , will give him bottle of Afrin to take home work bleeding continues I recommend spring in his nose and holding pressure, we discussed this at length , as per his supratherapeutic INR who was are instructed to hold his Coumadin for 2 days  Amount and/or Complexity of Data Reviewed  Clinical lab tests: reviewed  Review and summarize past medical records: yes        Disposition  Final diagnoses:   Acute anterior epistaxis   Supratherapeutic INR     Time reflects when diagnosis was documented in both MDM as applicable and the Disposition within this note     Time User Action Codes Description Comment    1/18/2021  3:57 PM Hershall Oz Add [R04 0] Acute anterior epistaxis     1/18/2021  3:57 PM Hershall Oz Add [R79 1] Supratherapeutic INR       ED Disposition     ED Disposition Condition Date/Time Comment    Discharge Stable Mon Jan 18, 2021  3:56 PM Myra Mustafa discharge to home/self care              Follow-up Information    None         Discharge Medication List as of 1/18/2021  3:59 PM      CONTINUE these medications which have NOT CHANGED    Details   amLODIPine (NORVASC) 2 5 mg tablet Take 1 tablet (2 5 mg total) by mouth daily, Starting Wed 5/13/2020, Normal      ascorbic acid (VITAMIN C) 500 mg tablet Take 500 mg by mouth daily, Historical Med      atorvastatin (LIPITOR) 40 mg tablet Take 1 tablet (40 mg total) by mouth daily for 14 days, Starting Tue 1/12/2021, Until Tue 1/26/2021, Normal      calcitriol (ROCALTROL) 0 25 mcg capsule Take one capsule five days a week, Normal      Cholecalciferol (VITAMIN D3) 1000 units CAPS Take 1 capsule by mouth daily, Historical Med      COMBIGAN 0 2-0 5 % instill 1 drop into both eyes twice a day, Historical Med      hydrALAZINE (APRESOLINE) 25 mg tablet Take 1 tablet (25 mg total) by mouth 3 (three) times a day, Starting Tue 12/22/2020, Normal      isosorbide dinitrate (ISORDIL) 10 mg tablet Take 1 tablet (10 mg total) by mouth 3 (three) times a day, Starting Tue 12/22/2020, Normal      metoprolol succinate (TOPROL-XL) 50 mg 24 hr tablet Take 1 tablet (50 mg total) by mouth daily, Starting Tue 6/30/2020, Normal      predniSONE 20 mg tablet Take 2 tablets (40 mg total) by mouth daily for 6 days, Starting Wed 1/13/2021, Until Tue 1/19/2021, Normal      RA ASPIRIN EC 81 MG EC tablet take 1 tablet by mouth once daily, Normal      torsemide (DEMADEX) 10 mg tablet Take 10 mg by mouth as needed As needed, Historical Med      TRADJENTA 5 MG TABS 2 5 mg daily , Starting Tue 10/8/2019, Historical Med      TRAVATAN Z 0 004 % ophthalmic solution Administer 1 drop to both eyes daily at bedtime , Starting Mon 2/12/2018, Historical Med      warfarin (COUMADIN) 2 mg tablet TAKE 1 TO 2 TABLETS DAILY BY MOUTH OR AS DIRECTED BY PHYSICIAN , Normal      zinc sulfate (ZINCATE) 220 mg capsule Take 1 capsule (220 mg total) by mouth daily for 4 doses, Starting Tue 1/12/2021, Until Sat 1/16/2021, Normal           No discharge procedures on file      PDMP Review     None          ED Provider  Electronically Signed by           Lb Holguin DO  01/18/21 3346

## 2021-01-19 NOTE — ED PROVIDER NOTES
History  Chief Complaint   Patient presents with    Nose Bleed     pt was just evaluated earlier today for a nose bleed around 4pm today  Pt still bleeding from nose  58-year-old male presents today for repeat evaluation of a nosebleed  He was seen here earlier for nose bleed that started around 4:00 p m  Today  States it stopped and then restarted  He is on Coumadin and had his INR checked and it was 5  On his visit earlier he was given Afrin and pressure was held  There was no further bleeding in the patient was discharged  He states he went home and it started bleeding again  He states he is worried about going to sleep and having it continued to bleed  History provided by:  Patient  Nose Bleed  Location:  R nare  Timing:  Intermittent  Chronicity:  New  Context: anticoagulants    Relieved by:  Nothing  Worsened by:  Nothing  Ineffective treatments:  Applying pressure  Associated symptoms: no blood in oropharynx, no dizziness, no fever, no headaches and no sore throat    Risk factors: no change in medication, no intranasal steroids and no recent nasal surgery        Prior to Admission Medications   Prescriptions Last Dose Informant Patient Reported? Taking?    COMBIGAN 0 2-0 5 %  Self Yes No   Sig: instill 1 drop into both eyes twice a day   Cholecalciferol (VITAMIN D3) 1000 units CAPS  Self Yes No   Sig: Take 1 capsule by mouth daily   RA ASPIRIN EC 81 MG EC tablet  Self No No   Sig: take 1 tablet by mouth once daily   TRADJENTA 5 MG TABS  Self Yes No   Si 5 mg daily    TRAVATAN Z 0 004 % ophthalmic solution  Self Yes No   Sig: Administer 1 drop to both eyes daily at bedtime    amLODIPine (NORVASC) 2 5 mg tablet  Self No No   Sig: Take 1 tablet (2 5 mg total) by mouth daily   ascorbic acid (VITAMIN C) 500 mg tablet  Self Yes No   Sig: Take 500 mg by mouth daily   atorvastatin (LIPITOR) 40 mg tablet   No No   Sig: Take 1 tablet (40 mg total) by mouth daily for 14 days   Patient not taking: Reported on 1/18/2021   calcitriol (ROCALTROL) 0 25 mcg capsule   No No   Sig: Take one capsule five days a week   Patient taking differently: 0 25 mcg Take one capsule 5 days a week - [facility] [place]   hydrALAZINE (APRESOLINE) 25 mg tablet   No No   Sig: Take 1 tablet (25 mg total) by mouth 3 (three) times a day   isosorbide dinitrate (ISORDIL) 10 mg tablet   No No   Sig: Take 1 tablet (10 mg total) by mouth 3 (three) times a day   metoprolol succinate (TOPROL-XL) 50 mg 24 hr tablet  Self No No   Sig: Take 1 tablet (50 mg total) by mouth daily   predniSONE 20 mg tablet   No No   Sig: Take 2 tablets (40 mg total) by mouth daily for 6 days   Patient not taking: Reported on 1/18/2021   torsemide (DEMADEX) 10 mg tablet  Self Yes No   Sig: Take 10 mg by mouth as needed As needed   warfarin (COUMADIN) 2 mg tablet  Self No No   Sig: TAKE 1 TO 2 TABLETS DAILY BY MOUTH OR AS DIRECTED BY PHYSICIAN  Patient not taking: Reported on 1/18/2021   zinc sulfate (ZINCATE) 220 mg capsule   No No   Sig: Take 1 capsule (220 mg total) by mouth daily for 4 doses      Facility-Administered Medications: None       Past Medical History:   Diagnosis Date    Colon polyp     COVID-19     Diabetes mellitus ([facility] [address] )     Hyperlipidemia     Hypertension     Proteinuria     Stage 3 chronic kidney disease     Vitamin D deficiency        Past Surgical History:   Procedure Laterality Date    COLONOSCOPY      overdue    GA ECHO TRANSESOPHAG R-T 2D W/PRB IMG ACQUISJ I&R N/A 3/7/2019    Procedure: INTRA-OP TRANSESOPHAGEAL ECHOCARDIOGRAM (PATIENCE);   Surgeon: Carloz Barros DO;  [facility];  Service: Cardiac Surgery    GA PERQ CLSR TCAT L ATR APNDGE W/ENDOCARDIAL IMPLNT  3/7/2019    Procedure: LEFT ATRIAL APPENDAGE OCCLUSION CLIPPED with 35mm Jyotsna Crape;  Surgeon: Carloz Barros DO;  [facility];  Service: Cardiac Surgery    GA REPLACEMENT OF MITRAL VALVE N/A 3/7/2019    Procedure: REPLACEMENT VALVE MITRAL (MVR) REPAIR with a 30mm MEDTRONIC CG FUTURE RING;  Surgeon: Christiano Mari DO;  [facility];  Service: Cardiac Surgery    NE RPLCMT AORTIC VALVE OPN W/STENTLESS TISSUE VALVE N/A 3/7/2019    Procedure: REPLACEMENT VALVE AORTIC (AVR) with 23mm TAVERA INSPIRIS RESILIA  AORTIC VALVE;  Surgeon: Christiano Mari DO;  [facility];  Service: Cardiac Surgery    RENAL BIOPSY, OPEN      TOE SURGERY Left        Family History   Problem Relation Age of Onset   Cathlene Salon Stroke Mother     Hypertension Mother     Blindness Father     Hyperlipidemia Father    Cathlene Salon Hypertension Father     Glaucoma Father     Gout Brother     Heart Valve Disease Brother     Hypertension Brother     Cancer Sister     Diabetes Sister     Ovarian cancer Sister     Anuerysm Neg Hx     Heart attack Neg Hx     Arrhythmia Neg Hx     Heart failure Neg Hx     Coronary artery disease Neg Hx     Sudden death Neg Hx         scd     I have reviewed and agree with the history as documented  E-Cigarette/Vaping    E-Cigarette Use Never User      E-Cigarette/Vaping Substances     Social History     Tobacco Use    Smoking status: Former Smoker     Types: Cigarettes     Quit date:      Years since quittin 0    Smokeless tobacco: Never Used   Substance Use Topics    Alcohol use: Not Currently     Frequency: Monthly or less     Drinks per session: 1 or 2     Binge frequency: Never     Comment: occasionally    Drug use: No       Review of Systems   Constitutional: Negative for chills, fatigue and fever  HENT: Positive for nosebleeds  Negative for postnasal drip, sore throat and trouble swallowing  Respiratory: Negative for chest tightness and shortness of breath  Cardiovascular: Negative for leg swelling  Gastrointestinal: Negative for abdominal pain  Genitourinary: Negative for dysuria  Musculoskeletal: Negative for back pain  Skin: Negative for rash  Allergic/Immunologic: Negative for immunocompromised state  Neurological: Negative for dizziness, light-headedness and headaches  Psychiatric/Behavioral: Negative for confusion  Physical Exam  Physical Exam  Vitals signs and nursing note reviewed  Constitutional:       Appearance: He is well-developed  HENT:      Head: Normocephalic and atraumatic  Nose:      Comments: Blood present in right [place]  No blood in the oropharynx  Mouth/Throat:      Mouth: Mucous membranes are moist       Pharynx: Uvula midline  Tonsils: No tonsillar exudate  Eyes:      Pupils: Pupils are equal, round, and reactive to light  Neck:      Musculoskeletal: Normal range of motion and neck supple  Cardiovascular:      Rate and Rhythm: Normal rate and regular rhythm  Pulmonary:      Effort: Pulmonary effort is normal       Breath sounds: Normal breath sounds  Abdominal:      General: Bowel sounds are normal       Palpations: Abdomen is soft  Tenderness: There is no abdominal tenderness  There is no guarding or rebound  Musculoskeletal:         General: No tenderness or deformity  Skin:     General: Skin is warm and dry  Capillary Refill: Capillary refill takes less than 2 seconds  Neurological:      General: No focal deficit present  Mental Status: He is alert and oriented to person, place, and time  Comments: Patient moving all extremities equally, no focal neuro deficits noted         Psychiatric:         Mood and Affect: Mood normal          Behavior: Behavior normal          Vital Signs  ED Triage Vitals [01/18/21 2005]   Temperature Pulse Respirations Blood Pressure SpO2   97 6 °F (36 4 °C) 99 16 161/92 97 %      Temp Source Heart Rate Source Patient Position - Orthostatic VS BP Location FiO2 (%)   Oral Monitor Sitting Left arm --      Pain Score       --           Vitals:    01/18/21 2005   BP: 161/92   Pulse: 99   Patient Position - Orthostatic VS: Sitting         Visual Acuity      ED Medications  Medications   bacitracin topical ointment 1 small application (1 small application Topical Given 1/18/21 2106)   oxymetazoline (AFRIN) 0 05 % nasal spray 2 spray (2 sprays Each Nare Given 1/18/21 2106)   cephalexin (KEFLEX) capsule 500 mg (500 mg Oral Given 1/18/21 2107)       Diagnostic Studies  Results Reviewed     None                 No orders to display              Procedures  Epistaxis management    Date/Time: 1/18/2021 8:56 PM  Performed by: Christoph Navarro DO  Authorized by: Jenn Torres DO   Universal Protocol:  Consent: Verbal consent obtained  Consent given by: patient  Patient understanding: patient states understanding of the procedure being performed  Patient identity confirmed: verbally with patient      Patient location:  ED  Procedure details:     Treatment site:  R anterior    Hemostasis method:  Merocel sponge    Approach:  External    Spec Headlamp used: No      Treatment episode: initial    Post-procedure details:     Assessment:  Bleeding stopped    Patient tolerance of procedure: Tolerated well, no immediate complications  Comments:      Nares were cleared bilaterally by patient blowing nose  There is no bleeding visualized at that time with nasal speculum and head lamp  Afrin was instilled bilateral nares without difficulty  Pressure was applied to the nose for approximately 10 minutes with a nasal clip  Upon my return there was no active bleeding in the nose  Merocel packing was placed by me in the right nares without difficulty  Will place on abx and have f/u with ENT as outpatient  ED Course                                           MDM  Number of Diagnoses or Management Options  Right-sided epistaxis: new and requires workup  Diagnosis management comments: 9:59 PM  Patient INR was 5 this AM   Patient will hold INR until Wednesday when his visiting nurse comes  She rechecks his INR and will provide further instruction on dosing after that    Patient states he has an ENT physician to follow up with for packing removal   I provided him with the name of our on call physician just in case for some reason he is unable to see that provider  Patient able to ambulate without assistance out of the department  Amount and/or Complexity of Data Reviewed  Clinical lab tests: reviewed    Risk of Complications, Morbidity, and/or Mortality  Presenting problems: high  Diagnostic procedures: high  Management options: high    Patient Progress  Patient progress: improved      Disposition  Final diagnoses:   Right-sided epistaxis     Time reflects when diagnosis was documented in both MDM as applicable and the Disposition within this note     Time User Action Codes Description Comment    1/18/2021  9:44 PM Ranjan Benitez Add [R04 0] Right-sided epistaxis       ED Disposition     ED Disposition Condition Date/Time Comment    Discharge Stable Mon Jan 18, 2021  9:44 PM Myra Mustafa discharge to home/self care  Follow-up Information     Follow up With Specialties Details Why Contact Info Additional Information    Nova Lacy MD Otolaryngology Schedule an appointment as soon as possible for a visit in 1 day for packing removal [address] [facility] [address]  [address] [facility] Emergency Department Emergency Medicine  If symptoms worsen [address] Emergency Department,  [address], [place], [address]          Patient's Medications   Discharge Prescriptions    CEPHALEXIN (KEFLEX) 250 MG CAPSULE    Take 1 capsule (250 mg total) by mouth every 8 (eight) hours for 7 days       Start Date: 1/19/2021 End Date: 1/26/2021       Order Dose: 250 mg       Quantity: 21 capsule    Refills: 0     No discharge procedures on file      PDMP Review     None          ED Provider  Electronically Signed by           Alysa Tamez DO  01/18/21 4309

## 2021-01-21 ENCOUNTER — ANTICOAG VISIT (OUTPATIENT)
Dept: CARDIOLOGY CLINIC | Facility: CLINIC | Age: 71
End: 2021-01-21

## 2021-01-21 LAB — INR PPP: 1.8 (ref 0.84–1.19)

## 2021-01-25 ENCOUNTER — ANTICOAG VISIT (OUTPATIENT)
Dept: CARDIOLOGY CLINIC | Facility: CLINIC | Age: 71
End: 2021-01-25

## 2021-01-25 LAB — INR PPP: 1.9 (ref 0.84–1.19)

## 2021-01-28 ENCOUNTER — ANTICOAG VISIT (OUTPATIENT)
Dept: CARDIOLOGY CLINIC | Facility: CLINIC | Age: 71
End: 2021-01-28

## 2021-01-28 LAB — INR PPP: 2.2 (ref 0.84–1.19)

## 2021-01-29 NOTE — UTILIZATION REVIEW
Notification of Discharge  This is a Notification of Discharge from our facility [address]  Please be advised that this patient has been discharge from our facility  Below you will find the admission and discharge date and time including the patients disposition  PRESENTATION DATE: 1/8/2021  3:47 PM  OBS ADMISSION DATE:   IP ADMISSION DATE: 1/9/21 1108   DISCHARGE DATE: 1/12/2021 12:55 PM  DISPOSITION: Home/Self Care Home/Self Care   Admission Orders listed below:  Admission Orders (From admission, onward)     Ordered        01/09/21 1108  Inpatient Admission  Once         01/08/21 1825  Place in Observation  Once                   Please contact the UR Department if additional information is required to close this patient's authorization/case  [address] [facility] Utilization Review Department  Main: [phone] x carefully listen to the prompts  All voicemails are confidential   [email or web address]  Send all requests for admission clinical reviews, approved or denied determinations and any other requests to dedicated fax number below belonging to the campus where the patient is receiving treatment   List of dedicated fax numbers:  [address] DENIALS (Administrative/Medical Necessity) [phone]   [address] (Maternity/NICU/Pediatrics) [phone]   Beverley Plummer [phone]   Jazmin Vigil [phone]   Jim Del Cid [phone]   Alfredito Bryant[address] [phone]   [facility]  [phone]   [address] [facility] [address]  [address] [facility] [address] [facility] [phone]

## 2021-02-03 ENCOUNTER — ANTICOAG VISIT (OUTPATIENT)
Dept: CARDIOLOGY CLINIC | Facility: CLINIC | Age: 71
End: 2021-02-03

## 2021-02-03 DIAGNOSIS — I48.91 ATRIAL FIBRILLATION, UNSPECIFIED TYPE (HCC): Primary | ICD-10-CM

## 2021-02-03 LAB — INR PPP: 1.9 (ref 0.84–1.19)

## 2021-02-17 ENCOUNTER — ANTICOAG VISIT (OUTPATIENT)
Dept: CARDIOLOGY CLINIC | Facility: CLINIC | Age: 71
End: 2021-02-17

## 2021-02-17 ENCOUNTER — APPOINTMENT (OUTPATIENT)
Dept: LAB | Facility: CLINIC | Age: 71
End: 2021-02-17
Payer: COMMERCIAL

## 2021-02-17 ENCOUNTER — TRANSCRIBE ORDERS (OUTPATIENT)
Dept: LAB | Facility: CLINIC | Age: 71
End: 2021-02-17

## 2021-03-29 ENCOUNTER — APPOINTMENT (OUTPATIENT)
Dept: LAB | Facility: CLINIC | Age: 71
End: 2021-03-29
Payer: COMMERCIAL

## 2021-03-29 ENCOUNTER — TRANSCRIBE ORDERS (OUTPATIENT)
Dept: LAB | Facility: CLINIC | Age: 71
End: 2021-03-29

## 2021-03-29 ENCOUNTER — ANTICOAG VISIT (OUTPATIENT)
Dept: CARDIOLOGY CLINIC | Facility: CLINIC | Age: 71
End: 2021-03-29

## 2021-04-13 ENCOUNTER — IMMUNIZATIONS (OUTPATIENT)
Dept: FAMILY MEDICINE CLINIC | Facility: HOSPITAL | Age: 71
End: 2021-04-13

## 2021-04-13 DIAGNOSIS — Z23 ENCOUNTER FOR IMMUNIZATION: Primary | ICD-10-CM

## 2021-04-13 PROCEDURE — 91301 SARS-COV-2 / COVID-19 MRNA VACCINE (MODERNA) 100 MCG: CPT

## 2021-04-13 PROCEDURE — 0011A SARS-COV-2 / COVID-19 MRNA VACCINE (MODERNA) 100 MCG: CPT

## 2021-04-14 LAB — HBA1C MFR BLD HPLC: 6.1 %

## 2021-04-15 ENCOUNTER — ANTICOAG VISIT (OUTPATIENT)
Dept: CARDIOLOGY CLINIC | Facility: CLINIC | Age: 71
End: 2021-04-15

## 2021-04-15 ENCOUNTER — APPOINTMENT (OUTPATIENT)
Dept: LAB | Facility: CLINIC | Age: 71
End: 2021-04-15
Payer: COMMERCIAL

## 2021-04-26 ENCOUNTER — APPOINTMENT (OUTPATIENT)
Dept: LAB | Facility: CLINIC | Age: 71
End: 2021-04-26
Payer: COMMERCIAL

## 2021-04-26 ENCOUNTER — ANTICOAG VISIT (OUTPATIENT)
Dept: CARDIOLOGY CLINIC | Facility: CLINIC | Age: 71
End: 2021-04-26

## 2021-04-30 ENCOUNTER — TRANSCRIBE ORDERS (OUTPATIENT)
Dept: LAB | Facility: CLINIC | Age: 71
End: 2021-04-30

## 2021-05-03 ENCOUNTER — OFFICE VISIT (OUTPATIENT)
Dept: NEPHROLOGY | Facility: CLINIC | Age: 71
End: 2021-05-03
Payer: COMMERCIAL

## 2021-05-03 VITALS — HEIGHT: 68 IN | BODY MASS INDEX: 26.22 KG/M2 | WEIGHT: 173 LBS

## 2021-05-03 DIAGNOSIS — I10 ESSENTIAL HYPERTENSION: Chronic | ICD-10-CM

## 2021-05-03 DIAGNOSIS — N05.1 FSGS (FOCAL SEGMENTAL GLOMERULOSCLEROSIS): ICD-10-CM

## 2021-05-03 DIAGNOSIS — N25.81 SECONDARY HYPERPARATHYROIDISM OF RENAL ORIGIN (HCC): ICD-10-CM

## 2021-05-03 DIAGNOSIS — N18.4 CKD (CHRONIC KIDNEY DISEASE), STAGE IV (HCC): Primary | ICD-10-CM

## 2021-05-03 PROCEDURE — 99214 OFFICE O/P EST MOD 30 MIN: CPT | Performed by: INTERNAL MEDICINE

## 2021-05-03 NOTE — ASSESSMENT & PLAN NOTE
Lab Results   Component Value Date    EGFR 24 01/12/2021    EGFR 21 01/11/2021    EGFR 21 01/09/2021    CREATININE 2 99 (H) 01/12/2021    CREATININE 3 26 (H) 01/11/2021    CREATININE 3 26 (H) 01/09/2021     Since his hospitalization his creatinine baseline has been 2 7-3 4  Recheck labs at this time

## 2021-05-03 NOTE — PATIENT INSTRUCTIONS
1  Thank you for coming in to see me today  It was very nice visiting with you  2  Please be safe and be well       3  I will follow-up on the lab work and will be in touch with you when it comes back

## 2021-05-03 NOTE — PROGRESS NOTES
Assessment/Plan:    CKD (chronic kidney disease), stage IV ([facility])  Lab Results   Component Value Date    EGFR 24 01/12/2021    EGFR 21 01/11/2021    EGFR 21 01/09/2021    CREATININE 2 99 (H) 01/12/2021    CREATININE 3 26 (H) 01/11/2021    CREATININE 3 26 (H) 01/09/2021     Since his hospitalization his creatinine baseline has been 2 7-3 4  Recheck labs at this time  Secondary hyperparathyroidism of renal origin ([address] )  Recheck phos and intact PTH  Essential hypertension  His blood pressure has been controlled on his current medications  FSGS (focal segmental glomerulosclerosis)  He does have stage 3 chronic kidney disease secondary to biopsy-proven FSGS which is felt to be more a secondary form of FSGS is there was glomerular megaly at only 15% foot process effacement  This was a biopsy from a few years ago and prior to his cardiac surgery   The degree of fibrosis was only mild on kidney biopsy             Subjective:      Patient ID: Hannah Jordan is a 72 y o  male  HPI    He is here in follow-up for CKD and blood pressure management  He has been feeling great  His weight has been stable at 173 pounds at home, he is walking a mile every morning  No exertional dyspnea  He gets 7-8 hours a sleep a night and awakens restful  Review of Systems  no chest pain no sob no leg edema no dizziness no  Lightheadedness no weight gain no orthopnea no PND no n/v/d no nocturia  Objective:      Ht 5' 8" (1 727 m)   Wt 78 5 kg (173 lb)   BMI 26 30 kg/m²     BP is 124/76 in the right arm with patient sitting in a chair  Physical Exam  Constitutional:       Appearance: Normal appearance  He is not toxic-appearing  HENT:      Head: Normocephalic and atraumatic  Eyes:      General: No scleral icterus  Neck:      Musculoskeletal: Neck supple  Cardiovascular:      Rate and Rhythm: Normal rate and regular rhythm     Pulmonary:      Effort: Pulmonary effort is normal       Breath sounds: Normal breath sounds  Musculoskeletal:      Right lower leg: No edema  Left lower leg: No edema  Skin:     General: Skin is warm and dry  Neurological:      General: No focal deficit present  Psychiatric:         Thought Content:  Thought content normal

## 2021-05-12 ENCOUNTER — APPOINTMENT (OUTPATIENT)
Dept: LAB | Facility: CLINIC | Age: 71
End: 2021-05-12
Payer: COMMERCIAL

## 2021-05-12 ENCOUNTER — ANTICOAG VISIT (OUTPATIENT)
Dept: CARDIOLOGY CLINIC | Facility: CLINIC | Age: 71
End: 2021-05-12

## 2021-05-13 ENCOUNTER — IMMUNIZATIONS (OUTPATIENT)
Dept: FAMILY MEDICINE CLINIC | Facility: HOSPITAL | Age: 71
End: 2021-05-13

## 2021-05-13 DIAGNOSIS — Z23 ENCOUNTER FOR IMMUNIZATION: Primary | ICD-10-CM

## 2021-05-13 PROCEDURE — 91301 SARS-COV-2 / COVID-19 MRNA VACCINE (MODERNA) 100 MCG: CPT

## 2021-05-13 PROCEDURE — 0012A SARS-COV-2 / COVID-19 MRNA VACCINE (MODERNA) 100 MCG: CPT

## 2021-05-15 DIAGNOSIS — Z95.2 S/P AVR: ICD-10-CM

## 2021-05-15 DIAGNOSIS — I48.0 PAROXYSMAL ATRIAL FIBRILLATION (HCC): ICD-10-CM

## 2021-05-16 RX ORDER — WARFARIN SODIUM 2 MG/1
TABLET ORAL
Qty: 60 TABLET | Refills: 11 | Status: SHIPPED | OUTPATIENT
Start: 2021-05-16 | End: 2022-04-25

## 2021-06-01 ENCOUNTER — APPOINTMENT (OUTPATIENT)
Dept: LAB | Facility: CLINIC | Age: 71
End: 2021-06-01
Payer: COMMERCIAL

## 2021-06-01 ENCOUNTER — TRANSCRIBE ORDERS (OUTPATIENT)
Dept: LAB | Facility: CLINIC | Age: 71
End: 2021-06-01

## 2021-06-01 ENCOUNTER — ANTICOAG VISIT (OUTPATIENT)
Dept: CARDIOLOGY CLINIC | Facility: CLINIC | Age: 71
End: 2021-06-01

## 2021-06-08 ENCOUNTER — ANTICOAG VISIT (OUTPATIENT)
Dept: CARDIOLOGY CLINIC | Facility: CLINIC | Age: 71
End: 2021-06-08

## 2021-06-08 ENCOUNTER — APPOINTMENT (OUTPATIENT)
Dept: LAB | Facility: CLINIC | Age: 71
End: 2021-06-08
Payer: COMMERCIAL

## 2021-06-08 DIAGNOSIS — E11.65 UNCONTROLLED TYPE 2 DIABETES MELLITUS WITH HYPERGLYCEMIA (HCC): ICD-10-CM

## 2021-06-08 DIAGNOSIS — E78.9 LIPID DISORDER: ICD-10-CM

## 2021-06-08 DIAGNOSIS — E78.5 HYPERLIPIDEMIA, UNSPECIFIED HYPERLIPIDEMIA TYPE: ICD-10-CM

## 2021-06-08 LAB
ALBUMIN SERPL BCP-MCNC: 3.3 G/DL (ref 3.5–5)
ALP SERPL-CCNC: 52 U/L (ref 46–116)
ALT SERPL W P-5'-P-CCNC: 15 U/L (ref 12–78)
ANION GAP SERPL CALCULATED.3IONS-SCNC: 11 MMOL/L (ref 4–13)
AST SERPL W P-5'-P-CCNC: 14 U/L (ref 5–45)
BILIRUB SERPL-MCNC: 0.26 MG/DL (ref 0.2–1)
BUN SERPL-MCNC: 44 MG/DL (ref 5–25)
CALCIUM ALBUM COR SERPL-MCNC: 9.6 MG/DL (ref 8.3–10.1)
CALCIUM SERPL-MCNC: 9 MG/DL (ref 8.3–10.1)
CHLORIDE SERPL-SCNC: 109 MMOL/L (ref 100–108)
CHOLEST SERPL-MCNC: 105 MG/DL (ref 50–200)
CO2 SERPL-SCNC: 22 MMOL/L (ref 21–32)
CREAT SERPL-MCNC: 3.33 MG/DL (ref 0.6–1.3)
EST. AVERAGE GLUCOSE BLD GHB EST-MCNC: 120 MG/DL
GFR SERPL CREATININE-BSD FRML MDRD: 21 ML/MIN/1.73SQ M
GLUCOSE P FAST SERPL-MCNC: 106 MG/DL (ref 65–99)
HBA1C MFR BLD: 5.8 %
HDLC SERPL-MCNC: 37 MG/DL
LDLC SERPL CALC-MCNC: 42 MG/DL (ref 0–100)
NONHDLC SERPL-MCNC: 68 MG/DL
POTASSIUM SERPL-SCNC: 5.5 MMOL/L (ref 3.5–5.3)
PROT SERPL-MCNC: 7.2 G/DL (ref 6.4–8.2)
SODIUM SERPL-SCNC: 142 MMOL/L (ref 136–145)
TRIGL SERPL-MCNC: 128 MG/DL

## 2021-06-08 PROCEDURE — 80053 COMPREHEN METABOLIC PANEL: CPT

## 2021-06-08 PROCEDURE — 83036 HEMOGLOBIN GLYCOSYLATED A1C: CPT

## 2021-06-08 PROCEDURE — 3044F HG A1C LEVEL LT 7.0%: CPT | Performed by: INTERNAL MEDICINE

## 2021-06-08 PROCEDURE — 80061 LIPID PANEL: CPT

## 2021-06-15 ENCOUNTER — TELEPHONE (OUTPATIENT)
Dept: CARDIOLOGY CLINIC | Facility: CLINIC | Age: 71
End: 2021-06-15

## 2021-06-15 NOTE — TELEPHONE ENCOUNTER
Patient had a physical exam for his health insurance  They asked him to contact his doctor to see if he needs to discontinue isosorbide dinitrate 10mg for a period of time so he does not build up a resistance to it   Please advise

## 2021-06-22 ENCOUNTER — ANTICOAG VISIT (OUTPATIENT)
Dept: CARDIOLOGY CLINIC | Facility: CLINIC | Age: 71
End: 2021-06-22

## 2021-06-22 ENCOUNTER — APPOINTMENT (OUTPATIENT)
Dept: LAB | Facility: CLINIC | Age: 71
End: 2021-06-22
Payer: COMMERCIAL

## 2021-06-26 DIAGNOSIS — Z95.2 S/P AVR: ICD-10-CM

## 2021-06-27 RX ORDER — ACETAMINOPHEN/DIPHENHYDRAMINE 500MG-25MG
TABLET ORAL
Qty: 90 TABLET | Refills: 6 | Status: SHIPPED | OUTPATIENT
Start: 2021-06-27

## 2021-07-01 ENCOUNTER — TELEPHONE (OUTPATIENT)
Dept: NEPHROLOGY | Facility: CLINIC | Age: 71
End: 2021-07-01

## 2021-07-01 NOTE — TELEPHONE ENCOUNTER
7/1 LM for patient to call and reschedule 8/23 appointment with new provider, Previous Krupa Coles patient

## 2021-07-04 DIAGNOSIS — I10 ESSENTIAL HYPERTENSION: Chronic | ICD-10-CM

## 2021-07-04 DIAGNOSIS — I42.0 DILATED CARDIOMYOPATHY (HCC): ICD-10-CM

## 2021-07-06 RX ORDER — METOPROLOL SUCCINATE 50 MG/1
TABLET, EXTENDED RELEASE ORAL
Qty: 180 TABLET | Refills: 1 | Status: SHIPPED | OUTPATIENT
Start: 2021-07-06 | End: 2022-07-05

## 2021-07-07 ENCOUNTER — APPOINTMENT (OUTPATIENT)
Dept: LAB | Facility: CLINIC | Age: 71
End: 2021-07-07
Payer: COMMERCIAL

## 2021-07-07 ENCOUNTER — ANTICOAG VISIT (OUTPATIENT)
Dept: CARDIOLOGY CLINIC | Facility: CLINIC | Age: 71
End: 2021-07-07

## 2021-07-19 ENCOUNTER — ANTICOAG VISIT (OUTPATIENT)
Dept: CARDIOLOGY CLINIC | Facility: CLINIC | Age: 71
End: 2021-07-19

## 2021-07-19 ENCOUNTER — APPOINTMENT (OUTPATIENT)
Dept: LAB | Facility: CLINIC | Age: 71
End: 2021-07-19
Payer: COMMERCIAL

## 2021-08-09 ENCOUNTER — APPOINTMENT (OUTPATIENT)
Dept: LAB | Facility: CLINIC | Age: 71
End: 2021-08-09
Payer: COMMERCIAL

## 2021-08-09 ENCOUNTER — ANTICOAG VISIT (OUTPATIENT)
Dept: CARDIOLOGY CLINIC | Facility: CLINIC | Age: 71
End: 2021-08-09

## 2021-08-17 DIAGNOSIS — I10 ESSENTIAL HYPERTENSION: Chronic | ICD-10-CM

## 2021-08-17 RX ORDER — AMLODIPINE BESYLATE 2.5 MG/1
2.5 TABLET ORAL DAILY
Qty: 90 TABLET | Refills: 3 | Status: SHIPPED | OUTPATIENT
Start: 2021-08-17

## 2021-08-19 ENCOUNTER — TELEPHONE (OUTPATIENT)
Dept: NEPHROLOGY | Facility: CLINIC | Age: 71
End: 2021-08-19

## 2021-08-19 NOTE — TELEPHONE ENCOUNTER
I left message for patient to call the office to reschedule his appointment on 9/29 due to Dr Mathew Bess being out of the office that time of day

## 2021-08-27 ENCOUNTER — APPOINTMENT (OUTPATIENT)
Dept: LAB | Facility: CLINIC | Age: 71
End: 2021-08-27
Payer: COMMERCIAL

## 2021-08-27 ENCOUNTER — ANTICOAG VISIT (OUTPATIENT)
Dept: CARDIOLOGY CLINIC | Facility: CLINIC | Age: 71
End: 2021-08-27

## 2021-08-27 DIAGNOSIS — N18.4 CKD (CHRONIC KIDNEY DISEASE), STAGE IV (HCC): ICD-10-CM

## 2021-08-27 DIAGNOSIS — N25.81 SECONDARY HYPERPARATHYROIDISM OF RENAL ORIGIN (HCC): ICD-10-CM

## 2021-08-27 DIAGNOSIS — N25.81 SECONDARY HYPERPARATHYROIDISM (HCC): Chronic | ICD-10-CM

## 2021-08-27 DIAGNOSIS — N05.1 FSGS (FOCAL SEGMENTAL GLOMERULOSCLEROSIS): ICD-10-CM

## 2021-08-27 LAB
ALBUMIN SERPL BCP-MCNC: 3.1 G/DL (ref 3.5–5)
ALP SERPL-CCNC: 63 U/L (ref 46–116)
ALT SERPL W P-5'-P-CCNC: 18 U/L (ref 12–78)
ANION GAP SERPL CALCULATED.3IONS-SCNC: 11 MMOL/L (ref 4–13)
AST SERPL W P-5'-P-CCNC: 17 U/L (ref 5–45)
BACTERIA UR QL AUTO: ABNORMAL /HPF
BASOPHILS # BLD AUTO: 0.02 THOUSANDS/ΜL (ref 0–0.1)
BASOPHILS NFR BLD AUTO: 0 % (ref 0–1)
BILIRUB SERPL-MCNC: 0.25 MG/DL (ref 0.2–1)
BILIRUB UR QL STRIP: NEGATIVE
BUN SERPL-MCNC: 36 MG/DL (ref 5–25)
CALCIUM ALBUM COR SERPL-MCNC: 9.1 MG/DL (ref 8.3–10.1)
CALCIUM SERPL-MCNC: 8.4 MG/DL (ref 8.3–10.1)
CHLORIDE SERPL-SCNC: 110 MMOL/L (ref 100–108)
CLARITY UR: CLEAR
CO2 SERPL-SCNC: 20 MMOL/L (ref 21–32)
COLOR UR: ABNORMAL
CREAT SERPL-MCNC: 2.74 MG/DL (ref 0.6–1.3)
CREAT UR-MCNC: 167 MG/DL
EOSINOPHIL # BLD AUTO: 0.2 THOUSAND/ΜL (ref 0–0.61)
EOSINOPHIL NFR BLD AUTO: 3 % (ref 0–6)
ERYTHROCYTE [DISTWIDTH] IN BLOOD BY AUTOMATED COUNT: 13.2 % (ref 11.6–15.1)
GFR SERPL CREATININE-BSD FRML MDRD: 26 ML/MIN/1.73SQ M
GLUCOSE P FAST SERPL-MCNC: 105 MG/DL (ref 65–99)
GLUCOSE UR STRIP-MCNC: NEGATIVE MG/DL
HCT VFR BLD AUTO: 40.3 % (ref 36.5–49.3)
HGB BLD-MCNC: 13.5 G/DL (ref 12–17)
HGB UR QL STRIP.AUTO: NEGATIVE
HYALINE CASTS #/AREA URNS LPF: ABNORMAL /LPF
IMM GRANULOCYTES # BLD AUTO: 0.04 THOUSAND/UL (ref 0–0.2)
IMM GRANULOCYTES NFR BLD AUTO: 1 % (ref 0–2)
KETONES UR STRIP-MCNC: NEGATIVE MG/DL
LEUKOCYTE ESTERASE UR QL STRIP: NEGATIVE
LYMPHOCYTES # BLD AUTO: 1.35 THOUSANDS/ΜL (ref 0.6–4.47)
LYMPHOCYTES NFR BLD AUTO: 19 % (ref 14–44)
MCH RBC QN AUTO: 30.7 PG (ref 26.8–34.3)
MCHC RBC AUTO-ENTMCNC: 33.5 G/DL (ref 31.4–37.4)
MCV RBC AUTO: 92 FL (ref 82–98)
MONOCYTES # BLD AUTO: 0.78 THOUSAND/ΜL (ref 0.17–1.22)
MONOCYTES NFR BLD AUTO: 11 % (ref 4–12)
MUCOUS THREADS UR QL AUTO: ABNORMAL
NEUTROPHILS # BLD AUTO: 4.59 THOUSANDS/ΜL (ref 1.85–7.62)
NEUTS SEG NFR BLD AUTO: 66 % (ref 43–75)
NITRITE UR QL STRIP: NEGATIVE
NON-SQ EPI CELLS URNS QL MICRO: ABNORMAL /HPF
NRBC BLD AUTO-RTO: 0 /100 WBCS
OTHER CASTS: ABNORMAL
PH UR STRIP.AUTO: 5.5 [PH]
PHOSPHATE SERPL-MCNC: 3.9 MG/DL (ref 2.3–4.1)
PLATELET # BLD AUTO: 174 THOUSANDS/UL (ref 149–390)
PMV BLD AUTO: 9.3 FL (ref 8.9–12.7)
POTASSIUM SERPL-SCNC: 4.6 MMOL/L (ref 3.5–5.3)
PROT SERPL-MCNC: 6.8 G/DL (ref 6.4–8.2)
PROT UR STRIP-MCNC: >=300 MG/DL
PROT UR-MCNC: 257 MG/DL
PROT/CREAT UR: 1.54 MG/G{CREAT} (ref 0–0.1)
PTH-INTACT SERPL-MCNC: 110.2 PG/ML (ref 18.4–80.1)
RBC # BLD AUTO: 4.4 MILLION/UL (ref 3.88–5.62)
RBC #/AREA URNS AUTO: ABNORMAL /HPF
SODIUM SERPL-SCNC: 141 MMOL/L (ref 136–145)
SP GR UR STRIP.AUTO: 1.02 (ref 1–1.03)
UROBILINOGEN UR QL STRIP.AUTO: 0.2 E.U./DL
WBC # BLD AUTO: 6.98 THOUSAND/UL (ref 4.31–10.16)
WBC #/AREA URNS AUTO: ABNORMAL /HPF

## 2021-08-27 PROCEDURE — 85025 COMPLETE CBC W/AUTO DIFF WBC: CPT

## 2021-08-27 PROCEDURE — 84100 ASSAY OF PHOSPHORUS: CPT

## 2021-08-27 PROCEDURE — 81001 URINALYSIS AUTO W/SCOPE: CPT | Performed by: INTERNAL MEDICINE

## 2021-08-27 PROCEDURE — 83970 ASSAY OF PARATHORMONE: CPT

## 2021-08-27 PROCEDURE — 84156 ASSAY OF PROTEIN URINE: CPT | Performed by: INTERNAL MEDICINE

## 2021-08-27 PROCEDURE — 80053 COMPREHEN METABOLIC PANEL: CPT

## 2021-08-27 PROCEDURE — 82570 ASSAY OF URINE CREATININE: CPT | Performed by: INTERNAL MEDICINE

## 2021-08-27 RX ORDER — CALCITRIOL 0.25 UG/1
CAPSULE, LIQUID FILLED ORAL
Qty: 20 CAPSULE | Refills: 6 | Status: SHIPPED | OUTPATIENT
Start: 2021-08-27 | End: 2022-04-18 | Stop reason: SDUPTHER

## 2021-09-02 DIAGNOSIS — E78.3 HYPERCHYLOMICRONEMIA: ICD-10-CM

## 2021-09-03 RX ORDER — ATORVASTATIN CALCIUM 20 MG/1
TABLET, FILM COATED ORAL
Qty: 90 TABLET | Refills: 2 | Status: SHIPPED | OUTPATIENT
Start: 2021-09-03

## 2021-09-13 ENCOUNTER — APPOINTMENT (OUTPATIENT)
Dept: LAB | Facility: CLINIC | Age: 71
End: 2021-09-13
Payer: COMMERCIAL

## 2021-09-13 ENCOUNTER — ANTICOAG VISIT (OUTPATIENT)
Dept: CARDIOLOGY CLINIC | Facility: CLINIC | Age: 71
End: 2021-09-13

## 2021-09-14 ENCOUNTER — TELEPHONE (OUTPATIENT)
Dept: NEPHROLOGY | Facility: CLINIC | Age: 71
End: 2021-09-14

## 2021-09-14 NOTE — TELEPHONE ENCOUNTER
Lm for the patient advising blood work was stable      - Advised to call back and request a follow up appointment with Dr Reyes per wolf's request

## 2021-09-14 NOTE — TELEPHONE ENCOUNTER
----- Message from [address] [place] [address] sent at 9/14/2021 12:44 PM EDT -----  Labs reviewed  Please let Curlee know that at this time renal function is stable  I noted on the chart that he was scheduled for follow-up with Dr Abbe Johnson but appointment was canceled  He will need to be rescheduled    He has a glomerular disease therefore follow-up with Dr Gerry Hale may be beneficial

## 2021-09-15 DIAGNOSIS — Z23 ENCOUNTER FOR IMMUNIZATION: Primary | ICD-10-CM

## 2021-09-15 PROCEDURE — G0008 ADMIN INFLUENZA VIRUS VAC: HCPCS

## 2021-09-15 PROCEDURE — 90662 IIV NO PRSV INCREASED AG IM: CPT

## 2021-09-15 NOTE — TELEPHONE ENCOUNTER
Pt is aware of message     - Ana Anderson or Beto Sierra can you please get this patient in with Dr Reyes as requested by Lina FRANCO  - pt is upset as his initial appt with Dr Doyle was canceled due to not being in office for 9/29/2021 and no one called him back to reschedule

## 2021-09-15 NOTE — TELEPHONE ENCOUNTER
I left message for patient to call the office to schedule a follow up appointment with Dr Dre Hassan

## 2021-09-20 NOTE — PROGRESS NOTES
GLOMERULAR DISEASE OUTPATIENT PROGRESS NOTE   Myra Mustafa 80 y o  male MRN: [id number]  DATE: 9/21/2021    Reason for visit:   Chief Complaint   Patient presents with    Follow-up     CKD 4       ASSESSMENT and PLAN:  81 yo man with PMH DM, FSGS (biopsy proven 2018), CKD G4A3 (bl creat 3-3 5mg/dL, eGFR 21-24),     Diagnoses and all orders for this visit:    FSGS (focal segmental glomerulosclerosis)  -     Basic metabolic panel; Future  -     Phosphorus; Future  -     Vitamin D 25 hydroxy; Future  -     Protein, Total w/Creat, Random Urine  -     UA (URINE) with reflex to Scope          PLAN:    #FSGS   Time of diagnosis:4/18/2018   Biopsy date/brief summary:  o FSGS with glomerulomegaly with secondary pattern with 15% of podocyte effacement  o Diffuse diabetic glomerulosclerosis (mild)  o 25% IFTA  o Arterio-arteriolosclerosis with hyalinosis, moderate-severe    Genetic Testing:none   Lost ~15lbs (asper patient, previous notes mentioned 35lbs) after diagnosis (posible cause of FSGS)   Baseline creatinine:3-3 5mg/dL    Current creatinine:2 47mg/dL better than baseline   UPCr 4>>2 82>>2 46>>1 54 gm/gm last in 8/2021   Serum albumin 3 5   Initially on ACE/ARB but patient presented with CARY and hyperkalemia, now advanced CKD    We briefly discussed the possibility of adding SGLT2 inhibitor for kidney protection and kidney survival (DAPA-CKD)  I will discuss it further with endocrinologist   Robyn Betts Will discuss kidney transplant eval  referral on next appointment       #Immunosuppressive Medications  · No IS indicated     #CKD G4A3      Baseline creatinine:3-3 5mg/dL   Current creatinine:2 47mg/dL   Etiology:  Secondary to FSGS and Diabetic  · Avoid nonsteroidal anti-inflammatory drugs such as Naprosyn, ibuprofen, Aleve, Advil, Celebrex, Meloxicam (Mobic) etc   You can use Tylenol as needed if you do not have any liver condition to be concerned about    #Volume/Hypertension:   Volume:Euvolemic   Blood pressure:normotensive 125/75  Home BP ~120   Low sodium diet    Torsemide 20mg as needed    Metoprolol 25mg    Hydralazin 25mg tid    Amlodipine 2 5mg daily     #Hyperkalemia   · tendency hyperkalemia  · Had reduced tomatoes/bananas from his diet  · Better controlled now his potassium is 4 6    #Acid base disorder   Mildly elevated AGMA    Secondary to CKD    AG 13 5 corrected by albumin    #CKD-MBD   Calcium 8 4 (8*/2021)   Phosphorus 3 9  (goal <5 5)    #Secondary Hyperparathyroidism    iPTH  110, guidelines levels KDIGO 2017   We will check for low levels of 25OH vit D as a potential driven of hyperparathyroidism     Calcitriol 0 25mcg daily    #Anemia:  · Current hemoglobin 13 5  At goal   · No indication of MERRILL at this time     #MVR, AVR  · On coumadin   · INR 2 16    PREVIOUS BIOPSIES    4/18/2018  [facility], read by Dr Mona Stern    21 gloms, 7 globally sclerosed  Glomerulomegaly, mild mesangial hypercellularity  3/14 gloms focal segmental sclerosis, adhesions to [place] Capsule  25%IFTA  Arteriosclerosis with hyalinosis  And moderate to severe arteriolosclerosis  IF: IgG  EM:Segmental thickening of GBM, 15% foot process effacement, no deposits  Dx:  FSGS with glomerulomegaly  Mild diffuse diabetic glomerulosclerosis  25% IFTA  Arterio-arterioloscleoris with hyalinosis moderate to severe         SUBJECTIVE / HPI:  Patient is here for follow up  Last visit with Dr Dimas Good in May  Patient had a few episodes epistaxis after covid19 infection, f/'u with ENT  Had poison Tilda Pontiff a few days ago affecting arms and face now resolving  Denies SOB, no CP, no hematuria, sometime he sees foamy urine  REVIEW OF SYSTEMS:  More than 10 point review of systems were obtained and discussed in length with the patient  Complete review of systems were negative / unremarkable except mentioned above       Review of Systems - General ROS: negative for - chills, fatigue or fever  Psychological ROS: negative for - behavioral disorder  ENT ROS: positive for - epistaxis  Allergy and Immunology ROS: positive for - hives and itchy/watery eyes  Hematological and Lymphatic ROS: negative for - blood clots or blood transfusions  Endocrine ROS: negative for - malaise/lethargy  Respiratory ROS: no cough, shortness of breath, or wheezing  Cardiovascular ROS: no chest pain or dyspnea on exertion  Gastrointestinal ROS: no abdominal pain, change in bowel habits, or black or bloody stools  Genito-Urinary ROS: no dysuria, trouble voiding, or hematuria  Musculoskeletal ROS: negative  Neurological ROS: no TIA or stroke symptoms  Dermatological ROS: positive for rash       PHYSICAL EXAM:  Vitals:    09/21/21 0949   BP: 125/75   BP Location: Left arm   Patient Position: Sitting   Weight: 77 1 kg (170 lb)   Height: 5' 8" (1 727 m)     Body mass index is 25 85 kg/m²  Physical Exam    General: no acute distress at this time  Skin:  No acute rash  Eyes:  /bilateral eye injection  ENT:  Normocephalic, atraumatic  Neck:  Supple, no jugular venous distention, no carotid bruits  Back:  No CVA tenderness  Chest:  Clear to auscultation percussion, good respiratory effort, no use of accessory respiratory muscles  CVS:  Regular rate and rhythm very soft murmur II/VI  Abdomen:  soft and nontender normal bowel sounds   Extremities:  Very mild LE edema   Neuro:  No gross focality, A&O x 3   Psych: Very cooperative     PAST MEDICAL HISTORY:  Past Medical History:   Diagnosis Date    Colon polyp     COVID-19     Diabetes mellitus ([address] )     Hyperlipidemia     Hypertension     Proteinuria     Stage 3 chronic kidney disease (HCC)     Vitamin D deficiency        PAST SURGICAL HISTORY:  Past Surgical History:   Procedure Laterality Date    COLONOSCOPY      overdue    TX ECHO TRANSESOPHAG R-T 2D W/PRB IMG ACQUISJ I&R N/A 3/7/2019    Procedure: INTRA-OP TRANSESOPHAGEAL ECHOCARDIOGRAM (PATIENCE);   Surgeon: Kandace Prader, DO;  [facility]; Service: Cardiac Surgery    WY PERQ CLSR TCAT L ATR APNDGE W/ENDOCARDIAL IMPLNT  3/7/2019    Procedure: LEFT ATRIAL APPENDAGE OCCLUSION CLIPPED with 35mm Dennis Reamer;  Surgeon: Aracely Ortiz DO;  [facility];  Service: Cardiac Surgery    WY REPLACEMENT OF MITRAL VALVE N/A 3/7/2019    Procedure: REPLACEMENT VALVE MITRAL (MVR) REPAIR with a 30mm MEDTRONIC CG FUTURE RING;  Surgeon: Aracely Ortiz DO;  [facility];  Service: Cardiac Surgery    WY RPLCMT AORTIC VALVE OPN W/STENTLESS TISSUE VALVE N/A 3/7/2019    Procedure: REPLACEMENT VALVE AORTIC (AVR) with 23mm TAVERA INSPIRIS RESILIA  AORTIC VALVE;  Surgeon: Aracely Ortiz DO;  [facility];  Service: Cardiac Surgery    RENAL BIOPSY, OPEN      TOE SURGERY Left        SOCIAL HISTORY:  Social History     Substance and Sexual Activity   Alcohol Use Not Currently    Comment: occasionally     Social History     Substance and Sexual Activity   Drug Use No     Social History     Tobacco Use   Smoking Status Former Smoker    Types: Cigarettes    Quit date: [facility]  Years since quittin 7   Smokeless Tobacco Never Used       FAMILY HISTORY:  Family History   Problem Relation Age of Onset   Kirsten Skinner Stroke Mother     Hypertension Mother     Blindness Father     Hyperlipidemia Father     Hypertension Father     Glaucoma Father     Gout Brother     Heart Valve Disease Brother     Hypertension Brother     Cancer Sister     Diabetes Sister     Ovarian cancer Sister     Anuerysm Neg Hx     Heart attack Neg Hx     Arrhythmia Neg Hx     Heart failure Neg Hx     Coronary artery disease Neg Hx     Sudden death Neg Hx         scd       MEDICATIONS:    Current Outpatient Medications:     amLODIPine (NORVASC) 2 5 mg tablet, Take 1 tablet (2 5 mg total) by mouth daily, Disp: 90 tablet, Rfl: 3    ascorbic acid (VITAMIN C) 500 mg tablet, Take 500 mg by mouth daily, Disp: , Rfl:     atorvastatin (LIPITOR) 20 mg tablet, take 1 tablet by mouth once daily, Disp: 90 tablet, Rfl: 2    calcitriol (ROCALTROL) 0 25 mcg capsule, TAKE 1 CAPSULE 5 DAYS A WEEK AS DIRECTED, Disp: 20 capsule, Rfl: 6    Cholecalciferol (VITAMIN D3) 1000 units CAPS, Take 1 capsule by mouth daily, Disp: , Rfl:     COMBIGAN 0 2-0 5 %, instill 1 drop into both eyes twice a day, Disp: , Rfl: 0    hydrALAZINE (APRESOLINE) 25 mg tablet, Take 1 tablet (25 mg total) by mouth 3 (three) times a day, Disp: 270 tablet, Rfl: 3    isosorbide dinitrate (ISORDIL) 10 mg tablet, Take 1 tablet (10 mg total) by mouth 3 (three) times a day, Disp: 270 tablet, Rfl: 3    metoprolol succinate (TOPROL-XL) 50 mg 24 hr tablet, take 1 tablet by mouth once daily, Disp: 180 tablet, Rfl: 1    torsemide (DEMADEX) 10 mg tablet, Take 10 mg by mouth as needed As needed, Disp: , Rfl:     TRADJENTA 5 MG TABS, 2 5 mg daily , Disp: , Rfl: 0    TRAVATAN Z 0 004 % ophthalmic solution, Administer 1 drop to both eyes daily at bedtime , Disp: , Rfl: 0    warfarin (COUMADIN) 2 mg tablet, TAKE 1-2 TABLETS BY MOUTH DAILY OR AS DIRECTED BY PHYSICIAN, Disp: 60 tablet, Rfl: 11    atorvastatin (LIPITOR) 40 mg tablet, Take 1 tablet (40 mg total) by mouth daily for 14 days (Patient not taking: Reported on 9/21/2021), Disp: 14 tablet, Rfl: 0    RA Aspirin EC 81 MG EC tablet, take 1 tablet by mouth once daily, Disp: 90 tablet, Rfl: 6    zinc sulfate (ZINCATE) 220 mg capsule, Take 1 capsule (220 mg total) by mouth daily for 4 doses (Patient not taking: Reported on 9/21/2021), Disp: 4 capsule, Rfl: 0    Lab Results:   Results for orders placed or performed in visit on 09/03/21   Protime-INR   Result Value Ref Range    Protime 23 8 (H) 11 6 - 14 5 seconds    INR 2 16 (H) 0 84 - 1 19

## 2021-09-21 ENCOUNTER — OFFICE VISIT (OUTPATIENT)
Dept: NEPHROLOGY | Facility: CLINIC | Age: 71
End: 2021-09-21
Payer: COMMERCIAL

## 2021-09-21 VITALS
DIASTOLIC BLOOD PRESSURE: 75 MMHG | WEIGHT: 170 LBS | SYSTOLIC BLOOD PRESSURE: 125 MMHG | BODY MASS INDEX: 25.76 KG/M2 | HEIGHT: 68 IN

## 2021-09-21 DIAGNOSIS — N05.1 FSGS (FOCAL SEGMENTAL GLOMERULOSCLEROSIS): Primary | ICD-10-CM

## 2021-09-21 PROCEDURE — 3008F BODY MASS INDEX DOCD: CPT | Performed by: STUDENT IN AN ORGANIZED HEALTH CARE EDUCATION/TRAINING PROGRAM

## 2021-09-21 PROCEDURE — 1036F TOBACCO NON-USER: CPT | Performed by: STUDENT IN AN ORGANIZED HEALTH CARE EDUCATION/TRAINING PROGRAM

## 2021-09-21 PROCEDURE — 99214 OFFICE O/P EST MOD 30 MIN: CPT | Performed by: STUDENT IN AN ORGANIZED HEALTH CARE EDUCATION/TRAINING PROGRAM

## 2021-09-21 PROCEDURE — 3066F NEPHROPATHY DOC TX: CPT | Performed by: STUDENT IN AN ORGANIZED HEALTH CARE EDUCATION/TRAINING PROGRAM

## 2021-09-21 PROCEDURE — 1160F RVW MEDS BY RX/DR IN RCRD: CPT | Performed by: STUDENT IN AN ORGANIZED HEALTH CARE EDUCATION/TRAINING PROGRAM

## 2021-09-21 NOTE — PATIENT INSTRUCTIONS
Thank you for coming to your visit today  As we discussed you kidney function is stable , your creatinine is 2 74mg/dL   Your electrolytes are normal  Please follow the recommendations below        Recommend low sodium (salt) food     Avoid nonsteroidal anti-inflammatory drugs such as Naprosyn, ibuprofen, Aleve, Advil, Celebrex, Meloxicam (Mobic) etc   You can use Tylenol as needed if you do not have any liver condition to be concerned about     Try to avoid medications such as pantoprazole or  Protonix/Nexium or Esomeprazole)/Prilosec or omeprazole/Prevacid or lansoprazole/AcipHex or Rabeprazole  If you are able to, use Pepcid as this is safer for your kidneys   Try to exercise at least 30 minutes 3 days a week to begin with with an ultimate goal of 5 days a week for at least 30 minutes       Please do not drink more than 2 glasses of alcohol/wine on a daily basis as this may contribute to your high blood pressure

## 2021-10-07 ENCOUNTER — VBI (OUTPATIENT)
Dept: ADMINISTRATIVE | Facility: OTHER | Age: 71
End: 2021-10-07

## 2021-10-11 ENCOUNTER — ANTICOAG VISIT (OUTPATIENT)
Dept: CARDIOLOGY CLINIC | Facility: CLINIC | Age: 71
End: 2021-10-11

## 2021-10-11 ENCOUNTER — APPOINTMENT (OUTPATIENT)
Dept: LAB | Facility: CLINIC | Age: 71
End: 2021-10-11
Payer: COMMERCIAL

## 2021-10-27 ENCOUNTER — ANTICOAG VISIT (OUTPATIENT)
Dept: CARDIOLOGY CLINIC | Facility: CLINIC | Age: 71
End: 2021-10-27

## 2021-10-27 ENCOUNTER — APPOINTMENT (OUTPATIENT)
Dept: LAB | Facility: CLINIC | Age: 71
End: 2021-10-27
Payer: COMMERCIAL

## 2021-11-20 ENCOUNTER — APPOINTMENT (OUTPATIENT)
Dept: LAB | Facility: CLINIC | Age: 71
End: 2021-11-20
Payer: COMMERCIAL

## 2021-11-20 DIAGNOSIS — N05.1 FSGS (FOCAL SEGMENTAL GLOMERULOSCLEROSIS): ICD-10-CM

## 2021-11-20 LAB
25(OH)D3 SERPL-MCNC: 35.6 NG/ML (ref 30–100)
ANION GAP SERPL CALCULATED.3IONS-SCNC: 11 MMOL/L (ref 4–13)
BACTERIA UR QL AUTO: NORMAL /HPF
BILIRUB UR QL STRIP: NEGATIVE
BUN SERPL-MCNC: 33 MG/DL (ref 5–25)
CALCIUM SERPL-MCNC: 8.3 MG/DL (ref 8.3–10.1)
CHLORIDE SERPL-SCNC: 108 MMOL/L (ref 100–108)
CLARITY UR: CLEAR
CO2 SERPL-SCNC: 21 MMOL/L (ref 21–32)
COLOR UR: YELLOW
CREAT SERPL-MCNC: 2.62 MG/DL (ref 0.6–1.3)
CREAT UR-MCNC: 147 MG/DL
GFR SERPL CREATININE-BSD FRML MDRD: 27 ML/MIN/1.73SQ M
GLUCOSE P FAST SERPL-MCNC: 99 MG/DL (ref 65–99)
GLUCOSE UR STRIP-MCNC: NEGATIVE MG/DL
HGB UR QL STRIP.AUTO: NEGATIVE
KETONES UR STRIP-MCNC: NEGATIVE MG/DL
LEUKOCYTE ESTERASE UR QL STRIP: NEGATIVE
NITRITE UR QL STRIP: NEGATIVE
NON-SQ EPI CELLS URNS QL MICRO: NORMAL /HPF
PH UR STRIP.AUTO: 6 [PH]
PHOSPHATE SERPL-MCNC: 3.7 MG/DL (ref 2.3–4.1)
POTASSIUM SERPL-SCNC: 5.1 MMOL/L (ref 3.5–5.3)
PROT UR STRIP-MCNC: ABNORMAL MG/DL
PROT UR-MCNC: 335 MG/DL
PROT/CREAT UR: 2.28 MG/G{CREAT} (ref 0–0.1)
RBC #/AREA URNS AUTO: NORMAL /HPF
SODIUM SERPL-SCNC: 140 MMOL/L (ref 136–145)
SP GR UR STRIP.AUTO: 1.02 (ref 1–1.03)
UROBILINOGEN UR QL STRIP.AUTO: 0.2 E.U./DL
WBC #/AREA URNS AUTO: NORMAL /HPF

## 2021-11-20 PROCEDURE — 80048 BASIC METABOLIC PNL TOTAL CA: CPT

## 2021-11-20 PROCEDURE — 84100 ASSAY OF PHOSPHORUS: CPT

## 2021-11-20 PROCEDURE — 81001 URINALYSIS AUTO W/SCOPE: CPT | Performed by: STUDENT IN AN ORGANIZED HEALTH CARE EDUCATION/TRAINING PROGRAM

## 2021-11-20 PROCEDURE — 82306 VITAMIN D 25 HYDROXY: CPT

## 2021-11-20 PROCEDURE — 84156 ASSAY OF PROTEIN URINE: CPT

## 2021-11-20 PROCEDURE — 82570 ASSAY OF URINE CREATININE: CPT

## 2021-11-22 ENCOUNTER — ANTICOAG VISIT (OUTPATIENT)
Dept: CARDIOLOGY CLINIC | Facility: CLINIC | Age: 71
End: 2021-11-22

## 2021-12-03 ENCOUNTER — OFFICE VISIT (OUTPATIENT)
Dept: NEPHROLOGY | Facility: CLINIC | Age: 71
End: 2021-12-03
Payer: COMMERCIAL

## 2021-12-03 VITALS
DIASTOLIC BLOOD PRESSURE: 84 MMHG | HEART RATE: 98 BPM | SYSTOLIC BLOOD PRESSURE: 122 MMHG | BODY MASS INDEX: 26.22 KG/M2 | HEIGHT: 68 IN | WEIGHT: 173 LBS

## 2021-12-03 DIAGNOSIS — I50.22 CHRONIC SYSTOLIC HEART FAILURE (HCC): ICD-10-CM

## 2021-12-03 DIAGNOSIS — N18.4 STAGE 4 CHRONIC KIDNEY DISEASE (HCC): Primary | ICD-10-CM

## 2021-12-03 PROCEDURE — 1036F TOBACCO NON-USER: CPT | Performed by: STUDENT IN AN ORGANIZED HEALTH CARE EDUCATION/TRAINING PROGRAM

## 2021-12-03 PROCEDURE — 3008F BODY MASS INDEX DOCD: CPT | Performed by: STUDENT IN AN ORGANIZED HEALTH CARE EDUCATION/TRAINING PROGRAM

## 2021-12-03 PROCEDURE — 3066F NEPHROPATHY DOC TX: CPT | Performed by: STUDENT IN AN ORGANIZED HEALTH CARE EDUCATION/TRAINING PROGRAM

## 2021-12-03 PROCEDURE — 1160F RVW MEDS BY RX/DR IN RCRD: CPT | Performed by: STUDENT IN AN ORGANIZED HEALTH CARE EDUCATION/TRAINING PROGRAM

## 2021-12-03 PROCEDURE — 99213 OFFICE O/P EST LOW 20 MIN: CPT | Performed by: STUDENT IN AN ORGANIZED HEALTH CARE EDUCATION/TRAINING PROGRAM

## 2021-12-03 RX ORDER — HYDRALAZINE HYDROCHLORIDE 25 MG/1
25 TABLET, FILM COATED ORAL 3 TIMES DAILY
Qty: 270 TABLET | Refills: 3 | Status: SHIPPED | OUTPATIENT
Start: 2021-12-03

## 2021-12-27 ENCOUNTER — ANTICOAG VISIT (OUTPATIENT)
Dept: CARDIOLOGY CLINIC | Facility: CLINIC | Age: 71
End: 2021-12-27

## 2021-12-27 ENCOUNTER — APPOINTMENT (OUTPATIENT)
Dept: LAB | Facility: CLINIC | Age: 71
End: 2021-12-27
Payer: COMMERCIAL

## 2021-12-28 DIAGNOSIS — I50.22 CHRONIC SYSTOLIC HEART FAILURE (HCC): ICD-10-CM

## 2021-12-28 RX ORDER — ISOSORBIDE DINITRATE 10 MG/1
10 TABLET ORAL 3 TIMES DAILY
Qty: 270 TABLET | Refills: 3 | Status: SHIPPED | OUTPATIENT
Start: 2021-12-28

## 2022-01-26 ENCOUNTER — OFFICE VISIT (OUTPATIENT)
Dept: CARDIOLOGY CLINIC | Facility: CLINIC | Age: 72
End: 2022-01-26
Payer: COMMERCIAL

## 2022-01-26 VITALS
DIASTOLIC BLOOD PRESSURE: 80 MMHG | HEIGHT: 68 IN | BODY MASS INDEX: 25.63 KG/M2 | SYSTOLIC BLOOD PRESSURE: 132 MMHG | WEIGHT: 169.1 LBS | HEART RATE: 70 BPM

## 2022-01-26 DIAGNOSIS — Z95.2 S/P AVR: ICD-10-CM

## 2022-01-26 DIAGNOSIS — I10 ESSENTIAL HYPERTENSION: Chronic | ICD-10-CM

## 2022-01-26 DIAGNOSIS — I48.91 ATRIAL FIBRILLATION, UNSPECIFIED TYPE (HCC): Primary | ICD-10-CM

## 2022-01-26 DIAGNOSIS — Z98.890 S/P MVR (MITRAL VALVE REPAIR): ICD-10-CM

## 2022-01-26 DIAGNOSIS — I48.92 ATRIAL FLUTTER, UNSPECIFIED TYPE (HCC): ICD-10-CM

## 2022-01-26 DIAGNOSIS — Q23.1 BICUSPID AORTIC VALVE: Chronic | ICD-10-CM

## 2022-01-26 PROCEDURE — 1160F RVW MEDS BY RX/DR IN RCRD: CPT | Performed by: INTERNAL MEDICINE

## 2022-01-26 PROCEDURE — 3008F BODY MASS INDEX DOCD: CPT | Performed by: INTERNAL MEDICINE

## 2022-01-26 PROCEDURE — 99214 OFFICE O/P EST MOD 30 MIN: CPT | Performed by: INTERNAL MEDICINE

## 2022-01-26 PROCEDURE — 93000 ELECTROCARDIOGRAM COMPLETE: CPT | Performed by: INTERNAL MEDICINE

## 2022-01-26 PROCEDURE — 1036F TOBACCO NON-USER: CPT | Performed by: INTERNAL MEDICINE

## 2022-01-26 NOTE — PROGRESS NOTES
Cardiology   KAYLEN HARRIS [facility] 80 y o  male MRN: [id number]        Impression:  1  S/P AVR/MV repair 3/19 - doing well    2  Atrial flutter - in NSR  On warfarin and amiodarone  3  Hypertension - controlled  4  Cardiomyopathy - likely tachy mediated  Resolved    5  CKD - stable  Cr 2 6     Recommendations:  1  Continue current medications  2  Follow up in 6 months  HPI: KAYLEN HARRIS [facility] is a 80y o  year old male with mod-severe MR/mod-severe AI and EF 40% s/p bioAVR and MV repair with annuloplasty ring and VIRI clipping 3/19, with paroxysmal atrial fibrillation/atrial flutter who returns for follow up  Leonila hyde PATIENCE/DCCV 4/12/19 - EF 25%, mod MR, no clot   Successfully converted to NSR  Echo 11/20 - EF 65%, normal bio AVR and MV repair  Review of Systems   Constitutional: Negative  HENT: Negative  Eyes: Negative  Respiratory: Negative for chest tightness and shortness of breath  Cardiovascular: Negative for chest pain, palpitations and leg swelling  Gastrointestinal: Negative  Endocrine: Negative  Genitourinary: Negative  Musculoskeletal: Negative  Skin: Negative  Allergic/Immunologic: Negative  Neurological: Negative  Hematological: Negative  Psychiatric/Behavioral: Negative  All other systems reviewed and are negative  Past Medical History:   Diagnosis Date    Colon polyp     COVID-19     Diabetes mellitus ([facility] [address] )     Hyperlipidemia     Hypertension     Proteinuria     Stage 3 chronic kidney disease ([facility] [address] )     Vitamin D deficiency      Past Surgical History:   Procedure Laterality Date    COLONOSCOPY      overdue    MN ECHO TRANSESOPHAG R-T 2D W/PRB IMG ACQUISJ I&R N/A 3/7/2019    Procedure: INTRA-OP TRANSESOPHAGEAL ECHOCARDIOGRAM (PATIENCE);   Surgeon: Sen Contreras DO;  [facility];  Service: Cardiac Surgery    MN PERQ CLSR TCAT L ATR APNDGE W/ENDOCARDIAL IMPLNT  3/7/2019    Procedure: LEFT ATRIAL APPENDAGE OCCLUSION CLIPPED with 35mm Jo Ann Prieto;  Surgeon: Susannah Mccord DO;  [facility];  Service: Cardiac Surgery    PA REPLACEMENT OF MITRAL VALVE N/A 3/7/2019    Procedure: REPLACEMENT VALVE MITRAL (MVR) REPAIR with a 30mm MEDTRONIC CG FUTURE RING;  Surgeon: Susannah Mccord DO;  [facility];  Service: Cardiac Surgery    PA RPLCMT AORTIC VALVE OPN W/STENTLESS TISSUE VALVE N/A 3/7/2019    Procedure: REPLACEMENT VALVE AORTIC (AVR) with 23mm TAVERA INSPIRIS RESILIA  AORTIC VALVE;  Surgeon: Susannah Mccord DO;  [facility];  Service: Cardiac Surgery    RENAL BIOPSY, OPEN      TOE SURGERY Left      Social History     Substance and Sexual Activity   Alcohol Use Not Currently    Comment: occasionally     Social History     Substance and Sexual Activity   Drug Use No     Social History     Tobacco Use   Smoking Status Former Smoker    Types: Cigarettes    Quit date: 26    Years since quittin 4   Smokeless Tobacco Never Used     Family History   Problem Relation Age of Onset   Michela Lee Stroke Mother     Hypertension Mother     Blindness Father     Hyperlipidemia Father    Michela Lee Hypertension Father     Glaucoma Father     Gout Brother     Heart Valve Disease Brother     Hypertension Brother    Michela Lee Cancer Sister     Diabetes Sister     Ovarian cancer Sister     Anuerysm Neg Hx     Heart attack Neg Hx     Arrhythmia Neg Hx     Heart failure Neg Hx     Coronary artery disease Neg Hx     Sudden death Neg Hx         scd       Allergies:   Allergies   Allergen Reactions    Ace Inhibitors Cough    Keflex [Cephalexin] Rash       Medications:     Current Outpatient Medications:     amLODIPine (NORVASC) 2 5 mg tablet, Take 1 tablet (2 5 mg total) by mouth daily, Disp: 90 tablet, Rfl: 3    ascorbic acid (VITAMIN C) 500 mg tablet, Take 500 mg by mouth daily, Disp: , Rfl:     atorvastatin (LIPITOR) 20 mg tablet, take 1 tablet by mouth once daily, Disp: 90 tablet, Rfl: 2    calcitriol (ROCALTROL) 0 25 mcg capsule, TAKE 1 CAPSULE 5 DAYS A WEEK AS DIRECTED, Disp: 20 capsule, Rfl: 6    Cholecalciferol (VITAMIN D3) 1000 units CAPS, Take 1 capsule by mouth daily, Disp: , Rfl:     COMBIGAN 0 2-0 5 %, instill 1 drop into both eyes twice a day, Disp: , Rfl: 0    hydrALAZINE (APRESOLINE) 25 mg tablet, Take 1 tablet (25 mg total) by mouth 3 (three) times a day, Disp: 270 tablet, Rfl: 3    isosorbide dinitrate (ISORDIL) 10 mg tablet, Take 1 tablet (10 mg total) by mouth 3 (three) times a day, Disp: 270 tablet, Rfl: 3    metoprolol succinate (TOPROL-XL) 50 mg 24 hr tablet, take 1 tablet by mouth once daily, Disp: 180 tablet, Rfl: 1    RA Aspirin EC 81 MG EC tablet, take 1 tablet by mouth once daily, Disp: 90 tablet, Rfl: 6    torsemide (DEMADEX) 10 mg tablet, Take 10 mg by mouth as needed As needed, Disp: , Rfl:     TRADJENTA 5 MG TABS, 2 5 mg daily , Disp: , Rfl: 0    TRAVATAN Z 0 004 % ophthalmic solution, Administer 1 drop to both eyes daily at bedtime , Disp: , Rfl: 0    warfarin (COUMADIN) 2 mg tablet, TAKE 1-2 TABLETS BY MOUTH DAILY OR AS DIRECTED BY PHYSICIAN, Disp: 60 tablet, Rfl: 11    atorvastatin (LIPITOR) 40 mg tablet, Take 1 tablet (40 mg total) by mouth daily for 14 days (Patient not taking: Reported on 9/21/2021), Disp: 14 tablet, Rfl: 0    zinc sulfate (ZINCATE) 220 mg capsule, Take 1 capsule (220 mg total) by mouth daily for 4 doses (Patient not taking: Reported on 9/21/2021), Disp: 4 capsule, Rfl: 0      Wt Readings from Last 3 Encounters:   01/26/22 76 7 kg (169 lb 1 6 oz)   12/03/21 78 5 kg (173 lb)   09/21/21 77 1 kg (170 lb)     Temp Readings from Last 3 Encounters:   02/12/21 97 9 °F (36 6 °C)   01/29/21 (!) 97 2 °F (36 2 °C)   01/22/21 (!) 97 4 °F (36 3 °C)     BP Readings from Last 3 Encounters:   01/26/22 132/80   12/03/21 122/84   09/21/21 125/75     Pulse Readings from Last 3 Encounters:   01/26/22 70   12/03/21 98   01/18/21 99         Physical Exam  HENT:      Head: Atraumatic     Eyes: Extraocular Movements: Extraocular movements intact  Cardiovascular:      Rate and Rhythm: Normal rate and regular rhythm  Heart sounds: Normal heart sounds  Pulmonary:      Effort: Pulmonary effort is normal       Breath sounds: Normal breath sounds  Abdominal:      General: Abdomen is flat  Musculoskeletal:         General: Normal range of motion  Cervical back: Normal range of motion  Skin:     General: Skin is warm  Neurological:      General: No focal deficit present  Mental Status: He is alert and oriented to person, place, and time     Psychiatric:         Mood and Affect: Mood normal          Behavior: Behavior normal            Laboratory Studies:  CMP:  Lab Results   Component Value Date    K 5 1 2021     2021    CO2 21 2021    BUN 33 (H) 2021    CREATININE 2 62 (H) 2021    GLUCOSE 110 2019    AST 17 2021    ALT 18 2021    EGFR 27 2021       Lipid Profile:   No results found for: CHOL  Lab Results   Component Value Date    HDL 37 (L) 2021     Lab Results   Component Value Date    LDLCALC 42 2021     Lab Results   Component Value Date    TRIG 128 2021       Cardiac testing:   EKG reviewed personally: NSR 70 RBBB LAFB  Results for orders placed during the hospital encounter of 20    Echo complete with contrast if indicated    Narrative  [place] [address], [facility]  [phone]    Transthoracic Echocardiogram  2D, M-mode, Doppler, and Color Doppler    Study date:  2020    Patient: Gm Pruett  MR number: [id number]  Account number: [de-identified]  : 1950  Age: 72 years  Gender: Male  Status: Outpatient  Location: [address] [place] [address] and Vascular Center  Height: 66 in  Weight: 183 7 lb  BP: 148/ 86 mmHg    Indications: CAD, AVR, MV Repair    Diagnoses: I25 83 - Coronary atherosclerosis due to lipid rich plaque    Sonographer:  Twan Brown [facility]  Primary Physician:  Marlene Huizar MD  Referring Physician:  Deejay Elizalde MD  Group:  [facility]  Interpreting Physician:  Lolita Owen MD    SUMMARY    LEFT VENTRICLE:  Systolic function was normal  Ejection fraction was estimated to be 65 %  There were no regional wall motion abnormalities  Features were consistent with a pseudonormal left ventricular filling pattern, with concomitant abnormal relaxation and increased filling pressure (grade 2 diastolic dysfunction)  MITRAL VALVE:  There was mild regurgitation  AORTIC VALVE:  A bioprosthesis was present  It exhibited normal function  The mean gradient was 13mmHg  TRICUSPID VALVE:  There was mild to moderate regurgitation  Estimated peak PA pressure was 28 mmHg  PULMONIC VALVE:  There was mild to moderate regurgitation  HISTORY: PRIOR HISTORY: heart failure, AI, MR, bicuspid AV, CAD, CM, aflutter    PROCEDURE: The study was performed in the [facility]  This was a routine study  The transthoracic approach was used  The study included complete 2D imaging, M-mode, complete spectral Doppler, and color Doppler  The  heart rate was 74 bpm, at the start of the study  Images were obtained from the parasternal, apical, subcostal, and suprasternal notch acoustic windows  Image quality was adequate  LEFT VENTRICLE: Size was normal  Systolic function was normal  Ejection fraction was estimated to be 65 %  There were no regional wall motion abnormalities  Wall thickness was normal  DOPPLER: Features were consistent with a pseudonormal  left ventricular filling pattern, with concomitant abnormal relaxation and increased filling pressure (grade 2 diastolic dysfunction)  RIGHT VENTRICLE: The size was normal  Systolic function was normal  Wall thickness was normal     LEFT ATRIUM: Size was normal     RIGHT ATRIUM: Size was normal     MITRAL VALVE: There was normal leaflet separation   There was a prior surgical repair  The mean gradient across the ring was 2mmHg  DOPPLER: The transmitral velocity was within the normal range  There was mild regurgitation  AORTIC VALVE: A bioprosthesis was present  It exhibited normal function  The mean gradient was 13mmHg  TRICUSPID VALVE: The valve structure was normal  There was normal leaflet separation  DOPPLER: The transtricuspid velocity was within the normal range  There was no evidence for stenosis  There was mild to moderate regurgitation  Estimated  peak PA pressure was 28 mmHg  PULMONIC VALVE: Leaflets exhibited normal thickness, no calcification, and normal cuspal separation  DOPPLER: The transpulmonic velocity was within the normal range  There was mild to moderate regurgitation  PERICARDIUM: There was no pericardial effusion  AORTA: The root exhibited normal size  SYSTEMIC VEINS: IVC: The inferior vena cava was not well visualized  SYSTEM MEASUREMENT TABLES    2D  %FS: 37 23 %  Ao asc: 3 41 cm  EDV(Teich): 86 36 ml  EF(Teich): 67 44 %  ESV(Teich): 28 12 ml  IVSd: 0 88 cm  LA Area: 11 95 cm2  LA Diam: 3 74 cm  LVEDV MOD A4C: 56 04 ml  LVEF MOD A4C: 60 86 %  LVESV MOD A4C: 21 94 ml  LVIDd: 4 37 cm  LVIDs: 2 74 cm  LVLd A4C: 8 16 cm  LVLs A4C: 7 31 cm  LVOT Diam: 1 9 cm  LVPWd: 0 92 cm  RA Area: 10 72 cm2  RVIDd: 3 71 cm  SV MOD A4C: 34 11 ml  SV(Teich): 58 25 ml    CW  AV Env  Ti: 312 08 ms  AV MaxP 7 mmHg  AV VTI: 52 95 cm  AV Vmax: 2 43 m/s  AV Vmean: 1 7 m/s  AV meanP 43 mmHg  TR MaxP 49 mmHg  TR Vmax: 2 32 m/s    MM  TAPSE: 1 46 cm    PW  E' Sept: 0 06 m/s  E/E' Sept: 18 99  MV A Jeferson: 1 05 m/s  MV Dec Louisa: 4 15 m/s2  MV DecT: 275 38 ms  MV E Jeferson: 1 13 m/s  MV E/A Ratio: 1 08  MV PHT: 79 86 ms  MVA By PHT: 2 75 cm2    [facility] Accredited Echocardiography Laboratory    Prepared and electronically signed by    Royer Gracia MD  Signed [id number] 12:07:16    Results for orders placed during the hospital encounter of 19    PATIENCE    Narrative  [address], [address] [facility]  [phone]    Transesophageal Echocardiogram  Limited 2D, Doppler, and Color Doppler    Study date:  2019    Patient: Roddy Hall  MR number: [id number]  Account number: [de-identified]  : 1950  Age: 80 years  Gender: Male  Status: Outpatient  Location: Cath lab  Height: 66 in  Weight: 156 lb  BP: 140/ 100 mmHg    Indications: Atrial flutter w/ cardioversion  Diagnoses: I48 1 - Atrial flutter    Sonographer:  Lucia Sarmiento RDCS  Primary Physician:  Carolyne Champagne  Referring Physician:  Hudson Roger MD  Group:  [facility]  RN:  Salud Perez  Interpreting Physician:  Hudson Roger MD    SUMMARY    LEFT VENTRICLE:  The ventricle was mildly dilated  Systolic function was severely reduced  Ejection fraction was estimated to be 25 %  There was severe diffuse hypokinesis  Wall thickness was normal     RIGHT VENTRICLE:  The size was normal   Systolic function was mildly reduced  LEFT ATRIAL APPENDAGE:  S/P clipping  No thrombus  MITRAL VALVE:  Prior repair procedures: surgical repair  There was moderate regurgitation  TRICUSPID VALVE:  There was mild regurgitation  HISTORY: PRIOR HISTORY: DM2  Dilated cardiomyopathy  CAD  Bicuspid aortic valve  Hypertension  Systolic congestive heart failure  S/P AVR and MV clip; 2019  PROCEDURE: The procedure was performed in the catheterization laboratory  This was a routine study  The risks and alternatives of the procedure were explained to the patient and informed consent was obtained  The transesophageal approach  was used  The study included limited 2D imaging, limited spectral Doppler, color Doppler, and probe insertion (without interpretation)  The heart rate was 125 bpm, at the start of the study  An adult omniplane probe was inserted by the  attending cardiologist  Intubated with ease   One intubation attempt(s)  There was no blood detected on the probe  Image quality was adequate  There were no complications during the procedure  MEDICATIONS: Anesthesia administered by  anesthesia team     LEFT VENTRICLE: The ventricle was mildly dilated  Systolic function was severely reduced  Ejection fraction was estimated to be 25 %  There was severe diffuse hypokinesis  Wall thickness was normal     RIGHT VENTRICLE: The size was normal  Systolic function was mildly reduced  Wall thickness was normal     LEFT ATRIUM: Size was normal  No thrombus was identified  APPENDAGE: S/P clipping  No thrombus  ATRIAL SEPTUM: No defect or patent foramen ovale was identified  RIGHT ATRIUM: Size was normal  No thrombus was identified  MITRAL VALVE: Prior repair procedures: surgical repair DOPPLER: There was moderate regurgitation  AORTIC VALVE: A bioprosthesis was present  It exhibited normal function  DOPPLER: There was no regurgitation  TRICUSPID VALVE: The valve structure was normal  There was normal leaflet separation  There was no echocardiographic evidence of vegetation  DOPPLER: There was mild regurgitation  PULMONIC VALVE: Leaflets exhibited normal thickness, no calcification, and normal cuspal separation  There was no echocardiographic evidence of vegetation  PERICARDIUM: There was no pericardial effusion  The pericardium was normal in appearance  AORTA: The root exhibited normal size  There was no atheroma  There was no evidence for dissection  There was no evidence for aneurysm  [address] Accredited Echocardiography Laboratory    Prepared and electronically signed by    Makenna Haynes MD  Signed 12-Apr-2019 15:55:03    No results found for this or any previous visit  No results found for this or any previous visit

## 2022-01-28 ENCOUNTER — ANTICOAG VISIT (OUTPATIENT)
Dept: CARDIOLOGY CLINIC | Facility: CLINIC | Age: 72
End: 2022-01-28

## 2022-01-28 ENCOUNTER — APPOINTMENT (OUTPATIENT)
Dept: LAB | Facility: CLINIC | Age: 72
End: 2022-01-28
Payer: COMMERCIAL

## 2022-02-21 ENCOUNTER — TELEPHONE (OUTPATIENT)
Dept: FAMILY MEDICINE CLINIC | Facility: CLINIC | Age: 72
End: 2022-02-21

## 2022-02-21 DIAGNOSIS — I48.20 CHRONIC ATRIAL FIBRILLATION (HCC): Primary | ICD-10-CM

## 2022-02-21 DIAGNOSIS — F41.9 ANXIETY: Primary | ICD-10-CM

## 2022-02-21 RX ORDER — ALPRAZOLAM 0.25 MG/1
TABLET ORAL
COMMUNITY
End: 2022-02-21 | Stop reason: SDUPTHER

## 2022-02-21 RX ORDER — ALPRAZOLAM 0.25 MG/1
TABLET ORAL
Qty: 4 TABLET | Refills: 0 | Status: SHIPPED | OUTPATIENT
Start: 2022-02-21

## 2022-02-21 NOTE — TELEPHONE ENCOUNTER
Patient called asking for alprazolam for flying  He has gotten it in the past   0 25mg I believe he said    [facility] [address]  Please call when completed Mastoid Interpolation Flap Text: A decision was made to reconstruct the defect utilizing an interpolation axial flap and a staged reconstruction.  A telfa template was made of the defect.  This telfa template was then used to outline the mastoid interpolation flap.  The donor area for the pedicle flap was then injected with anesthesia.  The flap was excised through the skin and subcutaneous tissue down to the layer of the underlying musculature.  The pedicle flap was carefully excised within this deep plane to maintain its blood supply.  The edges of the donor site were undermined.   The donor site was closed in a primary fashion.  The pedicle was then rotated into position and sutured.  Once the tube was sutured into place, adequate blood supply was confirmed with blanching and refill.  The pedicle was then wrapped with xeroform gauze and dressed appropriately with a telfa and gauze bandage to ensure continued blood supply and protect the attached pedicle.

## 2022-02-23 ENCOUNTER — TELEPHONE (OUTPATIENT)
Dept: NEPHROLOGY | Facility: CLINIC | Age: 72
End: 2022-02-23

## 2022-02-23 NOTE — TELEPHONE ENCOUNTER
Left voice mail to schedule appointment  This is the first attempt 
EOAE (evoked otoacoustic emission)

## 2022-02-23 NOTE — TELEPHONE ENCOUNTER
Transplant Evaluation Intake   Patient Details   Name Ya Bender     1950   MRN [id number]   Patient Care Team   Referring Physician Dr Margaret Ayers     Nephrologist Dr Margaret Ayers    PCP & Phone Number Dr Enoc Cartagena [facility] Dr Monica Alexander Phone Number Otolaryngology  , [place]  [phone]   Specialty Dr Monica Alexander Phone Number Cardiology - Dr Estrada Alexander Phone Number Marlene Rae  - Dr Brennan Alexander Phone Number     Specialty Dr Monica Alexander Phone Number     Specialty Dr Monica Alexander Phone Number     Last Pap Smear  Date & Where     Last Mammogram  Date & Where     Last Colonoscopy  Date & Where Patient stated he needs to schedule one    Needs GI Referral?     Has the patient had a prior Transplant Eval? No     If Yes, Name of  [facility]  Phone Number  Date Evaluated     Is the patient currently on Dialysis? No    If Yes, Name of Center  How many days a week?   Phone number

## 2022-02-24 ENCOUNTER — ANTICOAG VISIT (OUTPATIENT)
Dept: CARDIOLOGY CLINIC | Facility: CLINIC | Age: 72
End: 2022-02-24

## 2022-02-24 ENCOUNTER — APPOINTMENT (OUTPATIENT)
Dept: LAB | Facility: CLINIC | Age: 72
End: 2022-02-24
Payer: COMMERCIAL

## 2022-02-24 LAB
INR PPP: 2.29 (ref 0.84–1.19)
PROTHROMBIN TIME: 24.8 SECONDS (ref 11.6–14.5)

## 2022-02-24 PROCEDURE — 85610 PROTHROMBIN TIME: CPT

## 2022-02-24 PROCEDURE — 36415 COLL VENOUS BLD VENIPUNCTURE: CPT

## 2022-03-03 ENCOUNTER — TELEPHONE (OUTPATIENT)
Dept: NEPHROLOGY | Facility: CLINIC | Age: 72
End: 2022-03-03

## 2022-03-28 ENCOUNTER — APPOINTMENT (OUTPATIENT)
Dept: LAB | Facility: CLINIC | Age: 72
End: 2022-03-28
Payer: COMMERCIAL

## 2022-03-28 ENCOUNTER — ANTICOAG VISIT (OUTPATIENT)
Dept: CARDIOLOGY CLINIC | Facility: CLINIC | Age: 72
End: 2022-03-28

## 2022-04-11 ENCOUNTER — APPOINTMENT (OUTPATIENT)
Dept: LAB | Facility: CLINIC | Age: 72
End: 2022-04-11
Payer: COMMERCIAL

## 2022-04-11 ENCOUNTER — ANTICOAG VISIT (OUTPATIENT)
Dept: CARDIOLOGY CLINIC | Facility: CLINIC | Age: 72
End: 2022-04-11

## 2022-04-11 DIAGNOSIS — I48.20 CHRONIC ATRIAL FIBRILLATION (HCC): ICD-10-CM

## 2022-04-11 DIAGNOSIS — N18.4 STAGE 4 CHRONIC KIDNEY DISEASE (HCC): ICD-10-CM

## 2022-04-11 LAB
ANION GAP SERPL CALCULATED.3IONS-SCNC: 10 MMOL/L (ref 4–13)
BACTERIA UR QL AUTO: ABNORMAL /HPF
BILIRUB UR QL STRIP: NEGATIVE
BUN SERPL-MCNC: 37 MG/DL (ref 5–25)
CALCIUM SERPL-MCNC: 8.3 MG/DL (ref 8.3–10.1)
CHLORIDE SERPL-SCNC: 109 MMOL/L (ref 100–108)
CLARITY UR: CLEAR
CO2 SERPL-SCNC: 20 MMOL/L (ref 21–32)
COLOR UR: ABNORMAL
CREAT SERPL-MCNC: 2.69 MG/DL (ref 0.6–1.3)
CREAT UR-MCNC: 153 MG/DL
GFR SERPL CREATININE-BSD FRML MDRD: 22 ML/MIN/1.73SQ M
GLUCOSE P FAST SERPL-MCNC: 104 MG/DL (ref 65–99)
GLUCOSE UR STRIP-MCNC: NEGATIVE MG/DL
HGB UR QL STRIP.AUTO: ABNORMAL
INR PPP: 2.24 (ref 0.84–1.19)
KETONES UR STRIP-MCNC: NEGATIVE MG/DL
LEUKOCYTE ESTERASE UR QL STRIP: NEGATIVE
MUCOUS THREADS UR QL AUTO: ABNORMAL
NITRITE UR QL STRIP: NEGATIVE
NON-SQ EPI CELLS URNS QL MICRO: ABNORMAL /HPF
PH UR STRIP.AUTO: 5.5 [PH]
POTASSIUM SERPL-SCNC: 5.2 MMOL/L (ref 3.5–5.3)
PROT UR STRIP-MCNC: >=300 MG/DL
PROT UR-MCNC: 354 MG/DL
PROT/CREAT UR: 2.31 MG/G{CREAT} (ref 0–0.1)
PROTHROMBIN TIME: 24.4 SECONDS (ref 11.6–14.5)
RBC #/AREA URNS AUTO: ABNORMAL /HPF
SODIUM SERPL-SCNC: 139 MMOL/L (ref 136–145)
SP GR UR STRIP.AUTO: >=1.03 (ref 1–1.03)
UROBILINOGEN UR QL STRIP.AUTO: 0.2 E.U./DL
WBC #/AREA URNS AUTO: ABNORMAL /HPF

## 2022-04-11 PROCEDURE — 3061F NEG MICROALBUMINURIA REV: CPT | Performed by: INTERNAL MEDICINE

## 2022-04-11 PROCEDURE — 81001 URINALYSIS AUTO W/SCOPE: CPT | Performed by: STUDENT IN AN ORGANIZED HEALTH CARE EDUCATION/TRAINING PROGRAM

## 2022-04-11 PROCEDURE — 84156 ASSAY OF PROTEIN URINE: CPT

## 2022-04-11 PROCEDURE — 36415 COLL VENOUS BLD VENIPUNCTURE: CPT

## 2022-04-11 PROCEDURE — 3066F NEPHROPATHY DOC TX: CPT | Performed by: INTERNAL MEDICINE

## 2022-04-11 PROCEDURE — 85610 PROTHROMBIN TIME: CPT

## 2022-04-11 PROCEDURE — 80048 BASIC METABOLIC PNL TOTAL CA: CPT

## 2022-04-11 PROCEDURE — 82570 ASSAY OF URINE CREATININE: CPT

## 2022-04-12 ENCOUNTER — OFFICE VISIT (OUTPATIENT)
Dept: NEPHROLOGY | Facility: CLINIC | Age: 72
End: 2022-04-12
Payer: COMMERCIAL

## 2022-04-12 VITALS
SYSTOLIC BLOOD PRESSURE: 120 MMHG | BODY MASS INDEX: 25.88 KG/M2 | DIASTOLIC BLOOD PRESSURE: 84 MMHG | WEIGHT: 170.8 LBS | HEIGHT: 68 IN

## 2022-04-12 DIAGNOSIS — Z01.818 PRE-TRANSPLANT EVALUATION FOR CKD (CHRONIC KIDNEY DISEASE): Primary | ICD-10-CM

## 2022-04-12 PROCEDURE — 1036F TOBACCO NON-USER: CPT | Performed by: INTERNAL MEDICINE

## 2022-04-12 PROCEDURE — 1160F RVW MEDS BY RX/DR IN RCRD: CPT | Performed by: INTERNAL MEDICINE

## 2022-04-12 PROCEDURE — 99215 OFFICE O/P EST HI 40 MIN: CPT | Performed by: INTERNAL MEDICINE

## 2022-04-12 NOTE — PROGRESS NOTES
Marlis Mcburney is a 80 y o  male //Ind[place]ian referred by Dr Maura Tony, CKD stage 4 with most recent creat 2 69 mg/dL (4/2022 with eGFR 22), FSGS/diabetes biopsy-proven 2018, diabetes (diagnosed 15 years ago), hypertension (diagnosed 15 years ago), bicuspid aortic valve replacement with bio AVR and mitral valve repair in 2019, atrial flutter on anticoagulation and amiodarone, cardiomyopathy, glaucoma, COVID infection in January 2021, felt to be tachy mediated, working at [employer] as ; and publisher and retired professor, former smoker 3 years when teenager, social ETOH, no drugs, seen in the Nephrology Clinic for pre-transplant kidney evaluation  Patient had renal biopsy in April 2018-FSGS with glomerular megaly with 15% foot process effacement, diffuse diabetic glomerulosclerosis, arterial sclerosis with hyalinosis  -FSGS may have been related to elevated BMI  -also diabetic disease on biopsy    1/2019 - pt was first diagnosed with SOB and was noted to have CHF, valvular disease  3/2019 - s/p valvular replacement    Echocardiogram November 2020-EF 78%, grade 2 diastolic dysfunction, mitral regurgitation mild, bioprosthetic AV valve, mild-to-moderate pulmonic regurgitation    Carotid artery stenosis-less than 50% stenosis bilaterally  Plan   1  Overall, patient did have GFR as low as 13 around the time of heart surgery in 2019 and then GFR had stabilized to approximately 25-28 and has been steadily decreasing since 2020 and is currently ranging from 21-26  Patient does have potential living donor  Patient does have prior cardiac surgery due to need for valve replacement  Ejection fraction has normalized since surgery  A1c is well controlled  Can likely move forward with transplant evaluation testing but will review with the committee  2  The need to avoid blood transfusion to decrease allosensitization was explained to the patient    3  The advantages of a living donor over a  donor was explained to the patient and family  I strongly encouraged the patient and family to pursue a living donor  4  Cardiomyopathy-follows with Cardiology team   History of valve replacement with aortic valve and also mitral valve repair  Ejection fraction as low as 25% in 2019 and improved to 65% in 2020  Has bio AVR  5  PVD-less than 50% stenosis carotid arteries bilaterally  6  Pt advised low potassium diet  7  Epistaxis - post COVID  Was on anticoagulation at time also  Sees ENT  8  A flutter - on coumadin  9  Colonoscopy - pt is due to be updated (had to be postponed recently); last colonoscopy 15 years ago  I have messaged GI team to make them aware pt has been attempting to reach them  10  Pt does have potential living donor - daughter ([place]), Son Pernell ryan), daughter Lou [place], [place])  6  Social - children would help; and Assistant would help pt    I have discussed with [place] Drain and family at length about the risk and benefits of kidney transplantation  I have strongly encouraged him to pursue living donation, and explained to him the benefits of living donation over  donor transplantation  We have briefly discussed the surgical procedure  We have discussed about the need for life long immunosuppression and the importance of compliance  We have discussed the side effects of immunosuppression including but not limited to infection, malignancies and developing/worsening diabetes control  Myra Mustafa verbalized understanding and is interested in pursuing transplant  During this visit, I spent 60 minutes with the patient; more than 50% of the time I counseled the patient regarding the risks and benefits of kidney transplantation, the immediate and long-term complications of kidney transplantation, and the advantage of receiving a living donor kidney transplant  It was a pleasure evaluating your patient in the office today   Thank you for allowing our team to participate in the care of Mr Josh Patel  Please do not hesitate to contact our team if further issues/questions shall arise in the interim  HISTORY OF PRESENT ILLNESS    Josh Patel is a 80 y o  male seen in the Nephrology Clinic for evaluation for kidney transplant  CKD IV due to FSGS/DM, not on dialysis, S Cr 2 69 mg/dl    Renal disease is not biopsy proven  Primary Nephrologist is Stephany Castañeda Urine Output: yes  Hx of voiding difficulty: no  Hx of hematuria: no  Hx of proteinuria: yes  Hx of nephrolithiasis: no  Hx of recurrent urinary tract infection: no    HTN: onset 15 years ago,  hx of malignant HTN: no, admission for HTN emergencies: no    DM type 2: onset 15 years ago    Total insulin requirement/day none  DM retinopathy: no  DM neuropathy: no  DM gastroparesis: no  DKA: none  Hypoglycemic awareness: none  Hx of amputation: no     PAD/PVD: no, Claudication: no  Cardiac history - CAD: no, MI: no; aortic valve - bicuspid valve  EF 25%    Primary Cardiologist: Dr Alice Benton    Exercise tolerance: daily, walks, some lifting weights    Hx of CVA: no    Hx of DVT/PE: no    Viral Infection:    HIV: no  Hep B: no  Hep C: no    Cancer: History of cancer: no for patient; sister with ovarian ca    Health maintenance and other pertinent history:    If AA, history of sickle cell: no    Male:    Colonoscopy: needs to be updated  Prostate: no    Blood transfusion: no    Last admission Jan 2021    Review Of Systems:     Constitutional: nl appetite  no fevers, + intermittent chills, no involuntary weight gain or weight loss  Eyes: no retinopathy  ENT: no ear disease, epistaxis or oral ulcers  Respiratory: no SOB, cough, hemoptysis, GARCIA  Cardiovascular: no CP, palpitations, claudication, edema  GI: no N/V/D/C, abdominal pain, melena, BRBPR    : see HPI  Endocrine: no thyroid disease  Heme: no bleeding or clotting disorders or swollen lymph nodes (did have epistaxis couple months ago)  MS: no joint effusions or deformities  Skin: no skin disease  Neuro: no HA, seizures, numbness, tingling, focal weakness  Psych: no depression, anxiety (does need xanax before flights)    Lives with wife and grandson (15 yo)  - dog     Employment history: currently working    HD Access: n/a  Abdominal Surgeries: none  - prior heart surgery as noted above    Smoke: former smoker (smoked for 3 years)  ETOH: social  Drugs: none    Past Medical History:   Diagnosis Date    Colon polyp     COVID-19     [facility]     Hyperlipidemia     Hypertension     Proteinuria     Stage 3 chronic kidney disease ([facility] )     Vitamin D deficiency      Past Surgical History:   Procedure Laterality Date    COLONOSCOPY      overdue    CT ECHO TRANSESOPHAG R-T 2D W/PRB IMG ACQUISJ I&R N/A 3/7/2019    Procedure: INTRA-OP TRANSESOPHAGEAL ECHOCARDIOGRAM (PATIENCE);   Surgeon: Samuel Mercer DO;  [facility];  Service: Cardiac Surgery    CT PERQ CLSR TCAT L ATR APNDGE W/ENDOCARDIAL IMPLNT  3/7/2019    Procedure: LEFT ATRIAL APPENDAGE OCCLUSION CLIPPED with 35mm Silvia Victoria;  Surgeon: Samuel Mercer DO;  [facility];  Service: Cardiac Surgery    CT REPLACEMENT OF MITRAL VALVE N/A 3/7/2019    Procedure: REPLACEMENT VALVE MITRAL (MVR) REPAIR with a 30mm MEDTRONIC CG FUTURE RING;  Surgeon: Samuel Mercer DO;  [facility];  Service: Cardiac Surgery    CT RPLCMT AORTIC VALVE OPN W/STENTLESS TISSUE VALVE N/A 3/7/2019    Procedure: REPLACEMENT VALVE AORTIC (AVR) with 23mm TAVERA INSPIRIS RESILIA  AORTIC VALVE;  Surgeon: Samuel Mercer DO;  [facility];  Service: Cardiac Surgery    RENAL BIOPSY, OPEN      TOE SURGERY Left        Family History   Problem Relation Age of Onset   Kimberly Olivera Stroke Mother     Hypertension Mother     Blindness Father     Hyperlipidemia Father     Hypertension Father     Glaucoma Father     Gout Brother     Heart Valve Disease Brother  Hypertension Brother    [place] Cancer Sister     Diabetes Sister     Ovarian cancer Sister     Anuerysm Neg Hx     Heart attack Neg Hx     Arrhythmia Neg Hx     Heart failure Neg Hx     Coronary artery disease Neg Hx     Sudden death Neg Hx         scd     Social History     Socioeconomic History    Marital status: /Civil Union     Spouse name: None    Number of children: None    Years of education: None    Highest education level: None   Occupational History    Occupation: RETIRED   Tobacco Use    Smoking status: Former Smoker     Types: Cigarettes     Quit date:      Years since quittin 4    Smokeless tobacco: Never Used   Vaping Use    Vaping Use: Never used   Substance and Sexual Activity    Alcohol use: Yes     Comment: occasionally    Drug use: No    Sexual activity: None   Other Topics Concern    None   Social History Narrative    None     Social Determinants of Health     Financial Resource Strain: Not on file   Food Insecurity: Not on file   Transportation Needs: Not on file   Physical Activity: Not on file   Stress: Not on file   Social Connections: Not on file   Intimate Partner Violence: Not on file   Housing Stability: Not on file         Living donors: daughter possible interested    Current Outpatient Medications   Medication Sig Dispense Refill    ALPRAZolam (XANAX) 0 25 mg tablet Take  1 tablets 30 minutes before flight   4 tablet 0    amLODIPine (NORVASC) 2 5 mg tablet Take 1 tablet (2 5 mg total) by mouth daily 90 tablet 3    ascorbic acid (VITAMIN C) 500 mg tablet Take 500 mg by mouth daily      atorvastatin (LIPITOR) 20 mg tablet take 1 tablet by mouth once daily 90 tablet 2    calcitriol (ROCALTROL) 0 25 mcg capsule TAKE 1 CAPSULE 5 DAYS A WEEK AS DIRECTED 20 capsule 6    Cholecalciferol (VITAMIN D3) 1000 units CAPS Take 1 capsule by mouth daily      COMBIGAN 0 2-0 5 % instill 1 drop into both eyes twice a day  0    hydrALAZINE (APRESOLINE) 25 mg tablet Take 1 tablet (25 mg total) by mouth 3 (three) times a day 270 tablet 3    isosorbide dinitrate (ISORDIL) 10 mg tablet Take 1 tablet (10 mg total) by mouth 3 (three) times a day 270 tablet 3    metoprolol succinate (TOPROL-XL) 50 mg 24 hr tablet take 1 tablet by mouth once daily 180 tablet 1    RA Aspirin EC 81 MG EC tablet take 1 tablet by mouth once daily 90 tablet 6    torsemide (DEMADEX) 10 mg tablet Take 10 mg by mouth as needed As needed      TRADJENTA 5 MG TABS 2 5 mg daily   0    TRAVATAN Z 0 004 % ophthalmic solution Administer 1 drop to both eyes daily at bedtime   0    warfarin (COUMADIN) 2 mg tablet TAKE 1-2 TABLETS BY MOUTH DAILY OR AS DIRECTED BY PHYSICIAN 60 tablet 11    atorvastatin (LIPITOR) 40 mg tablet Take 1 tablet (40 mg total) by mouth daily for 14 days (Patient not taking: Reported on 9/21/2021) 14 tablet 0    zinc sulfate (ZINCATE) 220 mg capsule Take 1 capsule (220 mg total) by mouth daily for 4 doses (Patient not taking: Reported on 9/21/2021) 4 capsule 0     No current facility-administered medications for this visit       Ace inhibitors and Keflex [cephalexin]    Physical Exam:    /84 (BP Location: Left arm, Patient Position: Sitting, Cuff Size: Adult)   Ht 5' 8" (1 727 m)   Wt 77 5 kg (170 lb 12 8 oz)   BMI 25 97 kg/m²     General : NAD  HEENT: anicteric, no thrush, dentition appropriate   Cardiac:  RRR, S1, S2 normal,  soft murmur    Lung:  CTAB   Abdomen:  soft, nontender, no ascites   HD access: n/a   femoral pulses (2+ and soft, no bruit)  no edema   Neuro:no focal neuro deficits   Skin: tattoo no  Carotid bruit: no  Lymph: no LN- cervical, axillary, inguinal

## 2022-04-12 NOTE — LETTER
April 12, 2022     Sapna Fuller MD  [address]  [address] [place] [address]    Patient: Jose Velez   YOB: 1950   Date of Visit: 4/12/2022       Dear Dr Aleman Falling: Thank you for referring Jose Velez to me for evaluation  Below are my notes for this consultation  If you have questions, please do not hesitate to call me  I look forward to following your patient along with you  Sincerely,        Radha Hull MD        CC: MD Radha Hawkins MD  4/12/2022 10:55 AM  Sign when Signing Visit  Yoana Palomo is a 80 y o  male //Palest[place] referred by Dr Gail Estrada, CKD stage 4 with most recent creat 2 69 mg/dL (4/2022 with eGFR 22), FSGS/diabetes biopsy-proven 2018, diabetes (diagnosed 15 years ago), hypertension (diagnosed 15 years ago), bicuspid aortic valve replacement with bio AVR and mitral valve repair in 2019, atrial flutter on anticoagulation and amiodarone, cardiomyopathy, glaucoma, COVID infection in January 2021, felt to be tachy mediated, working at [employer] as ; and publisher and retired professor, former smoker 3 years when teenager, social ETOH, no drugs, seen in the Nephrology Clinic for pre-transplant kidney evaluation  Patient had renal biopsy in April 2018-FSGS with glomerular megaly with 15% foot process effacement, diffuse diabetic glomerulosclerosis, arterial sclerosis with hyalinosis  -FSGS may have been related to elevated BMI  -also diabetic disease on biopsy    1/2019 - pt was first diagnosed with SOB and was noted to have CHF, valvular disease  3/2019 - s/p valvular replacement    Echocardiogram November 2020-EF 71%, grade 2 diastolic dysfunction, mitral regurgitation mild, bioprosthetic AV valve, mild-to-moderate pulmonic regurgitation    Carotid artery stenosis-less than 50% stenosis bilaterally  Plan   1   Overall, patient did have GFR as low as 13 around the time of heart surgery in 2019 and then GFR had stabilized to approximately 25-28 and has been steadily decreasing since 2020 and is currently ranging from 21-26  Patient does have potential living donor  Patient does have prior cardiac surgery due to need for valve replacement  Ejection fraction has normalized since surgery  A1c is well controlled  Can likely move forward with transplant evaluation testing but will review with the committee  2  The need to avoid blood transfusion to decrease allosensitization was explained to the patient  3  The advantages of a living donor over a  donor was explained to the patient and family  I strongly encouraged the patient and family to pursue a living donor  4  Cardiomyopathy-follows with Cardiology team   History of valve replacement with aortic valve and also mitral valve repair  Ejection fraction as low as 25% in 2019 and improved to 65% in 2020  Has bio AVR  5  PVD-less than 50% stenosis carotid arteries bilaterally  6  Pt advised low potassium diet  7  Epistaxis - post COVID  Was on anticoagulation at time also  Sees ENT  8  A flutter - on coumadin  9  Colonoscopy - pt is due to be updated (had to be postponed recently); last colonoscopy 15 years ago  I have messaged GI team to make them aware pt has been attempting to reach them  10  Pt does have potential living donor - daughter ([place]), Son Pernell ryan), daughter Morteza [place], [place])  6  Social - children would help; and Assistant would help pt    I have discussed with Adolfo Downing and family at length about the risk and benefits of kidney transplantation  I have strongly encouraged him to pursue living donation, and explained to him the benefits of living donation over  donor transplantation  We have briefly discussed the surgical procedure  We have discussed about the need for life long immunosuppression and the importance of compliance   We have discussed the side effects of immunosuppression including but not limited to infection, malignancies and developing/worsening diabetes control  Lancealverto Mustafa verbalized understanding and is interested in pursuing transplant  During this visit, I spent 60 minutes with the patient; more than 50% of the time I counseled the patient regarding the risks and benefits of kidney transplantation, the immediate and long-term complications of kidney transplantation, and the advantage of receiving a living donor kidney transplant  It was a pleasure evaluating your patient in the office today  Thank you for allowing our team to participate in the care of Mr Boy Mallory  Please do not hesitate to contact our team if further issues/questions shall arise in the interim  HISTORY OF PRESENT ILLNESS    Boy Mallory is a 80 y o  male seen in the Nephrology Clinic for evaluation for kidney transplant  CKD IV due to FSGS/DM, not on dialysis, S Cr 2 69 mg/dl    Renal disease is not biopsy proven  Primary Nephrologist is Skye Younger  Native Urine Output: yes  Hx of voiding difficulty: no  Hx of hematuria: no  Hx of proteinuria: yes  Hx of nephrolithiasis: no  Hx of recurrent urinary tract infection: no    HTN: onset 15 years ago,  hx of malignant HTN: no, admission for HTN emergencies: no    DM type 2: onset 15 years ago    Total insulin requirement/day none  DM retinopathy: no  DM neuropathy: no  DM gastroparesis: no  DKA: none  Hypoglycemic awareness: none  Hx of amputation: no     PAD/PVD: no, Claudication: no  Cardiac history - CAD: no, MI: no; aortic valve - bicuspid valve   EF 25%    Primary Cardiologist: Dr Tala Reza    Exercise tolerance: daily, walks, some lifting weights    Hx of CVA: no    Hx of DVT/PE: no    Viral Infection:    HIV: no  Hep B: no  Hep C: no    Cancer: History of cancer: no for patient; sister with ovarian ca    Health maintenance and other pertinent history:    If AA, history of sickle cell: no    Male:    Colonoscopy: needs to be updated  Prostate: no    Blood transfusion: no    Last admission Jan 2021    Review Of Systems:     Constitutional: nl appetite  no fevers, + intermittent chills, no involuntary weight gain or weight loss  Eyes: no retinopathy  ENT: no ear disease, epistaxis or oral ulcers  Respiratory: no SOB, cough, hemoptysis, GARCIA  Cardiovascular: no CP, palpitations, claudication, edema  GI: no N/V/D/C, abdominal pain, melena, BRBPR  : see HPI  Endocrine: no thyroid disease  Heme: no bleeding or clotting disorders or swollen lymph nodes (did have epistaxis couple months ago)  MS: no joint effusions or deformities  Skin: no skin disease  Neuro: no HA, seizures, numbness, tingling, focal weakness  Psych: no depression, anxiety (does need xanax before flights)    Lives with wife and grandson (15 yo)  - dog     Employment history: currently working    HD Access: n/a  Abdominal Surgeries: none  - prior heart surgery as noted above    Smoke: former smoker (smoked for 3 years)  ETOH: social  Drugs: none    Past Medical History:   Diagnosis Date    Colon polyp     COVID-19     [facility]     Hyperlipidemia     Hypertension     Proteinuria     Stage 3 chronic kidney disease ([facility]     Vitamin D deficiency      Past Surgical History:   Procedure Laterality Date    COLONOSCOPY      overdue    CO ECHO TRANSESOPHAG R-T 2D W/PRB IMG ACQUISJ I&R N/A 3/7/2019    Procedure: INTRA-OP TRANSESOPHAGEAL ECHOCARDIOGRAM (PATIENCE);   Surgeon: Claudia Grider DO;  [facility];  Service: Cardiac Surgery    CO PERQ CLSR TCAT L ATR APNDGE W/ENDOCARDIAL IMPLNT  3/7/2019    Procedure: LEFT ATRIAL APPENDAGE OCCLUSION CLIPPED with 35mm Huntley Arash;  Surgeon: Claudia Grider DO;  [facility];  Service: Cardiac Surgery    CO REPLACEMENT OF MITRAL VALVE N/A 3/7/2019    Procedure: REPLACEMENT VALVE MITRAL (MVR) REPAIR with a 30mm MEDTRONIC CG FUTURE RING;  Surgeon: Marti Roberts DO;  [facility];  Service: Cardiac Surgery    SD RPLCMT AORTIC VALVE OPN W/STENTLESS TISSUE VALVE N/A 3/7/2019    Procedure: REPLACEMENT VALVE AORTIC (AVR) with 23mm TAVERA INSPIRIS RESILIA  AORTIC VALVE;  Surgeon: Marti Roberts DO;  [facility];  Service: Cardiac Surgery    RENAL BIOPSY, OPEN      TOE SURGERY Left        Family History   Problem Relation Age of Onset   [place] Stroke Mother     Hypertension Mother     Blindness Father     Hyperlipidemia Father    [place] Hypertension Father     Glaucoma Father     Gout Brother     Heart Valve Disease Brother     Hypertension Brother     Cancer Sister     Diabetes Sister     Ovarian cancer Sister     Anuerysm Neg Hx     Heart attack Neg Hx     Arrhythmia Neg Hx     Heart failure Neg Hx     Coronary artery disease Neg Hx     Sudden death Neg Hx         scd     Social History     Socioeconomic History    Marital status: /Civil Union     Spouse name: None    Number of children: None    Years of education: None    Highest education level: None   Occupational History    Occupation: RETIRED   Tobacco Use    Smoking status: Former Smoker     Types: Cigarettes     Quit date:      Years since quittin 4    Smokeless tobacco: Never Used   Vaping Use    Vaping Use: Never used   Substance and Sexual Activity    Alcohol use: Yes     Comment: occasionally    Drug use: No    Sexual activity: None   Other Topics Concern    None   Social History Narrative    None     Social Determinants of Health     Financial Resource Strain: Not on file   Food Insecurity: Not on file   Transportation Needs: Not on file   Physical Activity: Not on file   Stress: Not on file   Social Connections: Not on file   Intimate Partner Violence: Not on file   Housing Stability: Not on file         Living donors: daughter possible interested    Current Outpatient Medications   Medication Sig Dispense Refill    ALPRAZolam Emily Casillas 0 25 mg tablet Take  1 tablets 30 minutes before flight  4 tablet 0    amLODIPine (NORVASC) 2 5 mg tablet Take 1 tablet (2 5 mg total) by mouth daily 90 tablet 3    ascorbic acid (VITAMIN C) 500 mg tablet Take 500 mg by mouth daily      atorvastatin (LIPITOR) 20 mg tablet take 1 tablet by mouth once daily 90 tablet 2    calcitriol (ROCALTROL) 0 25 mcg capsule TAKE 1 CAPSULE 5 DAYS A WEEK AS DIRECTED 20 capsule 6    Cholecalciferol (VITAMIN D3) 1000 units CAPS Take 1 capsule by mouth daily      COMBIGAN 0 2-0 5 % instill 1 drop into both eyes twice a day  0    hydrALAZINE (APRESOLINE) 25 mg tablet Take 1 tablet (25 mg total) by mouth 3 (three) times a day 270 tablet 3    isosorbide dinitrate (ISORDIL) 10 mg tablet Take 1 tablet (10 mg total) by mouth 3 (three) times a day 270 tablet 3    metoprolol succinate (TOPROL-XL) 50 mg 24 hr tablet take 1 tablet by mouth once daily 180 tablet 1    RA Aspirin EC 81 MG EC tablet take 1 tablet by mouth once daily 90 tablet 6    torsemide (DEMADEX) 10 mg tablet Take 10 mg by mouth as needed As needed      TRADJENTA 5 MG TABS 2 5 mg daily   0    TRAVATAN Z 0 004 % ophthalmic solution Administer 1 drop to both eyes daily at bedtime   0    warfarin (COUMADIN) 2 mg tablet TAKE 1-2 TABLETS BY MOUTH DAILY OR AS DIRECTED BY PHYSICIAN 60 tablet 11    atorvastatin (LIPITOR) 40 mg tablet Take 1 tablet (40 mg total) by mouth daily for 14 days (Patient not taking: Reported on 9/21/2021) 14 tablet 0    zinc sulfate (ZINCATE) 220 mg capsule Take 1 capsule (220 mg total) by mouth daily for 4 doses (Patient not taking: Reported on 9/21/2021) 4 capsule 0     No current facility-administered medications for this visit       Ace inhibitors and Keflex [cephalexin]    Physical Exam:    /84 (BP Location: Left arm, Patient Position: Sitting, Cuff Size: Adult)   Ht 5' 8" (1 727 m)   Wt 77 5 kg (170 lb 12 8 oz)   BMI 25 97 kg/m²     General : NAD  HEENT: anicteric, no thrush, dentition appropriate   Cardiac:  RRR, S1, S2 normal,  soft murmur    Lung:  CTAB   Abdomen:  soft, nontender, no ascites   HD access: n/a   femoral pulses (2+ and soft, no bruit)  no edema   Neuro:no focal neuro deficits   Skin: tattoo no  Carotid bruit: no  Lymph: no LN- cervical, axillary, inguinal

## 2022-04-12 NOTE — PATIENT INSTRUCTIONS
1) We will review your case at the transplant committee meeting and will review our decision with you in approximately 4 weeks over the phone  2) If you do not receive a call from [facility] within 4 weeks, please call our local Nephrology office  3) From a renal transplant evaluation purpose, we will see you once a year in our local office for medical follow-up  4) Once we call you with our decision regarding further evaluation, you will receive further instruction over the phone and in the mail regarding the next steps to complete the transplant evaluation

## 2022-04-13 ENCOUNTER — TELEPHONE (OUTPATIENT)
Dept: GASTROENTEROLOGY | Facility: AMBULARY SURGERY CENTER | Age: 72
End: 2022-04-13

## 2022-04-13 NOTE — TELEPHONE ENCOUNTER
----- Message from Vinton Skiff sent at 4/12/2022  4:17 PM EDT -----  Please schedule for office appt w/ PA  ----- Message -----  From: Joelle Weller MD  Sent: 4/12/2022   4:12 PM EDT  To: Christina Bales MD, Vinton Skiff    Thank you, Patient was not seen since October 2020 and he has a significant cardiac history including being on anticoagulation therapy  Patient to be seen in the office prior to scheduling the procedures    Mag Steward- please schedule patient for follow-up office visit with PA  ----- Message -----  From: Christina Bales MD  Sent: 4/12/2022  10:34 AM EDT  To: Joelle Weller MD, Vinton Skiff [facility] you are well    Just FYI - pt had to postpone colonoscopy in December   He said he has tried to schedule again but has not heard back    Could you please review and call pt accordingly    Thank you    np

## 2022-04-18 DIAGNOSIS — N25.81 SECONDARY HYPERPARATHYROIDISM (HCC): Chronic | ICD-10-CM

## 2022-04-18 RX ORDER — CALCITRIOL 0.25 UG/1
CAPSULE, LIQUID FILLED ORAL
Qty: 60 CAPSULE | Refills: 3 | Status: SHIPPED | OUTPATIENT
Start: 2022-04-18

## 2022-04-21 NOTE — ASSESSMENT & PLAN NOTE
Lab Results   Component Value Date    HGBA1C 5 6 10/14/2020       Recent Labs     01/10/21  1614 01/10/21  2149 01/11/21  0759 01/11/21  1111   POCGLU 177* 163* 148* 166*       Blood Sugar Average: Last 72 hrs:  (P) 638 [id number]   · BG and A1c acceptable   · Tradjenta on hold  · Continue SSI with meals and bedtime   · QID accuchecks Statement Selected

## 2022-04-26 ENCOUNTER — OFFICE VISIT (OUTPATIENT)
Dept: NEPHROLOGY | Facility: CLINIC | Age: 72
End: 2022-04-26
Payer: COMMERCIAL

## 2022-04-26 VITALS
WEIGHT: 167 LBS | SYSTOLIC BLOOD PRESSURE: 130 MMHG | DIASTOLIC BLOOD PRESSURE: 80 MMHG | HEIGHT: 68 IN | BODY MASS INDEX: 25.31 KG/M2

## 2022-04-26 DIAGNOSIS — E11.22 TYPE 2 DIABETES MELLITUS WITH STAGE 4 CHRONIC KIDNEY DISEASE, WITHOUT LONG-TERM CURRENT USE OF INSULIN (HCC): ICD-10-CM

## 2022-04-26 DIAGNOSIS — N05.1 FSGS (FOCAL SEGMENTAL GLOMERULOSCLEROSIS): Primary | ICD-10-CM

## 2022-04-26 DIAGNOSIS — N25.81 SECONDARY HYPERPARATHYROIDISM OF RENAL ORIGIN (HCC): ICD-10-CM

## 2022-04-26 DIAGNOSIS — N18.4 TYPE 2 DIABETES MELLITUS WITH STAGE 4 CHRONIC KIDNEY DISEASE, WITHOUT LONG-TERM CURRENT USE OF INSULIN (HCC): ICD-10-CM

## 2022-04-26 DIAGNOSIS — N18.4 STAGE 4 CHRONIC KIDNEY DISEASE (HCC): ICD-10-CM

## 2022-04-26 PROCEDURE — 3008F BODY MASS INDEX DOCD: CPT | Performed by: INTERNAL MEDICINE

## 2022-04-26 PROCEDURE — 99214 OFFICE O/P EST MOD 30 MIN: CPT | Performed by: STUDENT IN AN ORGANIZED HEALTH CARE EDUCATION/TRAINING PROGRAM

## 2022-04-26 NOTE — PROGRESS NOTES
GLOMERULAR DISEASE OUTPATIENT PROGRESS NOTE   Myra Mustafa 80 y o  male MRN: [id number]  DATE: 5/1/2022    Reason for visit:   Chief Complaint   Patient presents with    Follow-up     CKD 4       ASSESSMENT and PLAN:  81 yo man with PMH DM, FSGS (biopsy proven 2018), CKD G4A3 (bl creat 3-3 5mg/dL, eGFR 21-24),      PLAN:     #Secondary FSGS  · Time of diagnosis:4/18/2018  · Biopsy date/brief summary:  ? FSGS with glomerulomegaly with secondary pattern with 15% of podocyte effacement  ? Diffuse diabetic glomerulosclerosis (mild)  ? 25% IFTA  ? Arterio-arteriolosclerosis with hyalinosis, moderate-severe   · Genetic Testing:none  · Lost ~15lbs (asper patient, previous notes mentioned 35lbs) after diagnosis (posible cause of FSGS)  · Baseline creatinine:3-3 5mg/dL   · Current creatinine:2 69mg/dL remains below baseline  · UPCr 4>>2 82>>2 46>>1 54>>2 28>>2 31 g/g (fluctuates)  ? Unable to take ACE or ARB due to hyperkalemia   ?  eGFR low for DAPA   · Serum albumin 3 1  · Initially on ACE/ARB but patient presented with CARY and hyperkalemia, now advanced CKD    · Referred and seen for transplant eval and education   · We also discussed about dialysis modalities        #Immunosuppressive Medications  · No IS indicated      #CKD G4A3     · Baseline creatinine:3-3 5mg/dL  · Current creatinine:2 69mg/dL, below baseline  · Etiology:  Secondary to FSGS and Diabetic glomerulopathy (biopsy as above)   · Avoid nonsteroidal anti-inflammatory drugs such as Naprosyn, ibuprofen, Aleve, Advil, Celebrex, Meloxicam (Mobic) etc   You can use Tylenol as needed if you do not have any liver condition to be concerned about     #Volume/Hypertension:  · Volume:euvolemic   · Blood pressure:130/80mmhg normotensive  · Low sodium diet   · Torsemide 20mg as needed   · Metoprolol 25mg   · Hydralazin 25mg tid   · Amlodipine 2 5mg daily      #Hyperkalemia (resolved)  · tendency to hyperkalemia  · Follow low potassium diet      #Acid base disorder  · Serum bicarb 20mmol/L  · Non-AGMA   · Secondary to CKD   · If bicarb remains low  Will start sod bicarb      #CKD-MBD  · Calcium 8 3  · Phosphorus 3 7mg/dL  (goal <5 5)     #Secondary Hyperparathyroidism   · iPTH  110 2, guidelines levels KDIGO 2017  · 25OH vit D35 2 at goal , goal >30     · Calcitriol 0 25mcg daily     #Anemia:  · Current hemoglobin 13 5 , at goal   · No indication of MERRILL at this time      #MVR, AVR  · On coumadin   · INR 2     PREVIOUS BIOPSIES     4/18/2018  [facility], read by Dr Papi Crooks     21 gloms, 7 globally sclerosed  Glomerulomegaly, mild mesangial hypercellularity  3/14 gloms focal segmental sclerosis, adhesions to [place] Capsule  25%IFTA  Arteriosclerosis with hyalinosis  And moderate to severe arteriolosclerosis  IF: IgG  EM:Segmental thickening of GBM, 15% foot process effacement, no deposits  Dx:  FSGS with glomerulomegaly  Mild diffuse diabetic glomerulosclerosis  25% IFTA  Arterio-arterioloscleoris with hyalinosis moderate to severe          SUBJECTIVE / HPI:  Patient is here for secondary FSGS follow up  Patient was seen by Dr Francoise Garcia for transplant eduction and eval  Feel well, no SOB, no CP, no LE edema  No recent hospitalization, no recent events     REVIEW OF SYSTEMS:  More than 10 point review of systems were obtained and discussed in length with the patient  Complete review of systems were negative / unremarkable except mentioned above       Review of Systems - Psychological ROS: negative  Ophthalmic ROS: negative  ENT ROS: negative  Hematological and Lymphatic ROS: negative  Endocrine ROS: negative  Respiratory ROS: no cough, shortness of breath, or wheezing  Cardiovascular ROS: no chest pain or dyspnea on exertion  Gastrointestinal ROS: no abdominal pain, change in bowel habits, or black or bloody stools  Genito-Urinary ROS: no dysuria, trouble voiding, or hematuria  Musculoskeletal ROS: negative  Neurological ROS: no TIA or stroke symptoms  Dermatological ROS: negative       PHYSICAL EXAM:  Vitals:    04/26/22 1340   BP: 130/80   Weight: 75 8 kg (167 lb)   Height: 5' 8" (1 727 m)     Body mass index is 25 39 kg/m²  Physical Exam  General:   no acute distress at this time  Skin:  No acute rash  Eyes:  No scleral icterus and noninjected  ENT:  mucous membranes moist  Neck:  no carotid bruits  Chest:  Clear to auscultation percussion, good respiratory effort, no use of accessory respiratory muscles  CVS:  Regular rate and rhythm without a murmur rub , no jugular venous distention,  Abdomen:  soft and nontender   Extremities:  No clubbing, no cyanosis, no significant lower extremity edema  Neuro:  No gross focality  Psych:  Alert , cooperative     PAST MEDICAL HISTORY:  Past Medical History:   Diagnosis Date    Colon polyp     COVID-19     Diabetes mellitus ([address] )     Hyperlipidemia     Hypertension     Proteinuria     Stage 3 chronic kidney disease (HCC)     Vitamin D deficiency        PAST SURGICAL HISTORY:  Past Surgical History:   Procedure Laterality Date    COLONOSCOPY      overdue    ME ECHO TRANSESOPHAG R-T 2D W/PRB IMG ACQUISJ I&R N/A 3/7/2019    Procedure: INTRA-OP TRANSESOPHAGEAL ECHOCARDIOGRAM (PATIENCE);   Surgeon: Blessing Sheikh DO;  [facility];  Service: Cardiac Surgery    ME PERQ CLSR TCAT L ATR APNDGE W/ENDOCARDIAL IMPLNT  3/7/2019    Procedure: LEFT ATRIAL APPENDAGE OCCLUSION CLIPPED with 35mm Donneta Kobs;  Surgeon: Blessing Sheikh DO;  [facility];  Service: Cardiac Surgery    ME REPLACEMENT OF MITRAL VALVE N/A 3/7/2019    Procedure: REPLACEMENT VALVE MITRAL (MVR) REPAIR with a 30mm MEDTRONIC CG FUTURE RING;  Surgeon: Blessing Sheikh DO;  [facility];  Service: Cardiac Surgery    ME RPLCMT AORTIC VALVE OPN W/STENTLESS TISSUE VALVE N/A 3/7/2019    Procedure: REPLACEMENT VALVE AORTIC (AVR) with 23mm TAVERA INSPIRIS RESILIA  AORTIC VALVE;  Surgeon: Blessing Sheikh DO; [facility];  Service: Cardiac Surgery    RENAL BIOPSY, OPEN      TOE SURGERY Left        SOCIAL HISTORY:  Social History     Substance and Sexual Activity   Alcohol Use Yes    Comment: occasionally     Social History     Substance and Sexual Activity   Drug Use No     Social History     Tobacco Use   Smoking Status Former Smoker    Types: Cigarettes    Quit date:     Years since quittin 3   Smokeless Tobacco Never Used       FAMILY HISTORY:  Family History   Problem Relation Age of Onset    Stroke Mother     Hypertension Mother     Blindness Father     Hyperlipidemia Father    Judithe Spruce Hypertension Father     Glaucoma Father     Gout Brother     Heart Valve Disease Brother     Hypertension Brother    Judithe Spruce Cancer Sister     Diabetes Sister     Ovarian cancer Sister     Anuerysm Neg Hx     Heart attack Neg Hx     Arrhythmia Neg Hx     Heart failure Neg Hx     Coronary artery disease Neg Hx     Sudden death Neg Hx         scd       MEDICATIONS:    Current Outpatient Medications:     ALPRAZolam (XANAX) 0 25 mg tablet, Take  1 tablets 30 minutes before flight , Disp: 4 tablet, Rfl: 0    amLODIPine (NORVASC) 2 5 mg tablet, Take 1 tablet (2 5 mg total) by mouth daily, Disp: 90 tablet, Rfl: 3    ascorbic acid (VITAMIN C) 500 mg tablet, Take 500 mg by mouth daily, Disp: , Rfl:     atorvastatin (LIPITOR) 20 mg tablet, take 1 tablet by mouth once daily, Disp: 90 tablet, Rfl: 2    calcitriol (ROCALTROL) 0 25 mcg capsule, TAKE 1 CAPSULE 5 DAYS A WEEK AS DIRECTED, Disp: 60 capsule, Rfl: 3    Cholecalciferol (VITAMIN D3) 1000 units CAPS, Take 1 capsule by mouth daily, Disp: , Rfl:     COMBIGAN 0 2-0 5 %, instill 1 drop into both eyes twice a day, Disp: , Rfl: 0    hydrALAZINE (APRESOLINE) 25 mg tablet, Take 1 tablet (25 mg total) by mouth 3 (three) times a day, Disp: 270 tablet, Rfl: 3    isosorbide dinitrate (ISORDIL) 10 mg tablet, Take 1 tablet (10 mg total) by mouth 3 (three) times a day, Disp: 270 tablet, Rfl: 3    metoprolol succinate (TOPROL-XL) 50 mg 24 hr tablet, take 1 tablet by mouth once daily, Disp: 180 tablet, Rfl: 1    RA Aspirin EC 81 MG EC tablet, take 1 tablet by mouth once daily, Disp: 90 tablet, Rfl: 6    TRADJENTA 5 MG TABS, 2 5 mg daily , Disp: , Rfl: 0    TRAVATAN Z 0 004 % ophthalmic solution, Administer 1 drop to both eyes daily at bedtime , Disp: , Rfl: 0    warfarin (COUMADIN) 2 mg tablet, TAKE 1-2 TABLETS BY MOUTH DAILY OR AS DIRECTED BY PHYSICIAN, Disp: 75 tablet, Rfl: 11    atorvastatin (LIPITOR) 40 mg tablet, Take 1 tablet (40 mg total) by mouth daily for 14 days (Patient not taking: Reported on 9/21/2021), Disp: 14 tablet, Rfl: 0    Lab Results:   Results for orders placed or performed in visit on 48/02/97   Basic metabolic panel   Result Value Ref Range    Sodium 139 136 - 145 mmol/L    Potassium 5 2 3 5 - 5 3 mmol/L    Chloride 109 (H) 100 - 108 mmol/L    CO2 20 (L) 21 - 32 mmol/L    ANION GAP 10 4 - 13 mmol/L    BUN 37 (H) 5 - 25 mg/dL    Creatinine 2 69 (H) 0 60 - 1 30 mg/dL    Glucose, Fasting 104 (H) 65 - 99 mg/dL    Calcium 8 3 8 3 - 10 1 mg/dL    eGFR 22 ml/min/1 73sq m   Protein / creatinine ratio, urine   Result Value Ref Range    Creatinine, Ur 153 0 mg/dL    Protein Urine Random 354 mg/dL    Prot/Creat Ratio, Ur 2 31 (H) 0 00 - 0 10   Protime-INR   Result Value Ref Range    Protime 24 4 (H) 11 6 - 14 5 seconds    INR 2 24 (H) 0 84 - 1 19

## 2022-04-26 NOTE — PATIENT INSTRUCTIONS
Thank you for coming to your visit today  As we discussed you kidney function is abnormal but stable at your baseline, your creatinine is 2 6mg/dL  Your electrolytes are normal  Please follow the recommendations below        Recommend low sodium (salt) food     Avoid nonsteroidal anti-inflammatory drugs such as Naprosyn, ibuprofen, Aleve, Advil, Celebrex, Meloxicam (Mobic) etc   You can use Tylenol as needed if you do not have any liver condition to be concerned about     Try to avoid medications such as pantoprazole or  Protonix/Nexium or Esomeprazole)/Prilosec or omeprazole/Prevacid or lansoprazole/AcipHex or Rabeprazole  If you are able to, use Pepcid as this is safer for your kidneys       Try to exercise at least 30 minutes 3 days a week to begin with with an ultimate goal of 5 days a week for at least 30 minutes    Next Visit in 5 months with results   If you need to see us earlier we can change the appointment for you      Minor Milligan MD  Nephrology Attending

## 2022-05-23 ENCOUNTER — TELEPHONE (OUTPATIENT)
Dept: NEPHROLOGY | Facility: CLINIC | Age: 72
End: 2022-05-23

## 2022-05-25 ENCOUNTER — APPOINTMENT (OUTPATIENT)
Dept: LAB | Facility: CLINIC | Age: 72
End: 2022-05-25
Payer: COMMERCIAL

## 2022-05-25 ENCOUNTER — ANTICOAG VISIT (OUTPATIENT)
Dept: CARDIOLOGY CLINIC | Facility: CLINIC | Age: 72
End: 2022-05-25

## 2022-06-01 ENCOUNTER — TELEPHONE (OUTPATIENT)
Dept: CARDIOLOGY CLINIC | Facility: CLINIC | Age: 72
End: 2022-06-01

## 2022-06-01 DIAGNOSIS — R00.2 PALPITATIONS: ICD-10-CM

## 2022-06-01 DIAGNOSIS — I48.92 ATRIAL FLUTTER, UNSPECIFIED TYPE (HCC): Primary | ICD-10-CM

## 2022-06-01 NOTE — TELEPHONE ENCOUNTER
Patient called stating he was prescribed Dorzolamide eye drops 3 weeks ago  Since then, he occasional feels his heart racing and palpitations  Could the eye drops cause this? Please advise

## 2022-06-01 NOTE — TELEPHONE ENCOUNTER
Theoretically it could  Continue the eye drops  Is this a long standing or temporary medication - if it is long standing, I'd like to check a 24hr holter monitor  Thanks

## 2022-06-02 ENCOUNTER — PROCEDURE VISIT (OUTPATIENT)
Dept: CARDIOLOGY CLINIC | Facility: CLINIC | Age: 72
End: 2022-06-02

## 2022-06-02 DIAGNOSIS — R00.2 PALPITATIONS: Primary | ICD-10-CM

## 2022-06-02 PROCEDURE — RECHECK: Performed by: INTERNAL MEDICINE

## 2022-06-07 ENCOUNTER — HOSPITAL ENCOUNTER (OUTPATIENT)
Dept: NON INVASIVE DIAGNOSTICS | Facility: HOSPITAL | Age: 72
Discharge: HOME | End: 2022-06-07
Payer: COMMERCIAL

## 2022-06-07 DIAGNOSIS — R00.2 PALPITATIONS: ICD-10-CM

## 2022-06-07 DIAGNOSIS — I48.92 ATRIAL FLUTTER, UNSPECIFIED TYPE (HCC): ICD-10-CM

## 2022-06-07 PROCEDURE — 93225 XTRNL ECG REC<48 HRS REC: CPT

## 2022-06-07 PROCEDURE — 93226 XTRNL ECG REC<48 HR SCAN A/R: CPT

## 2022-06-08 ENCOUNTER — TELEPHONE (OUTPATIENT)
Dept: CARDIOLOGY CLINIC | Facility: CLINIC | Age: 72
End: 2022-06-08

## 2022-06-08 ENCOUNTER — APPOINTMENT (OUTPATIENT)
Dept: LAB | Facility: CLINIC | Age: 72
End: 2022-06-08
Payer: COMMERCIAL

## 2022-06-08 ENCOUNTER — ANTICOAG VISIT (OUTPATIENT)
Dept: CARDIOLOGY CLINIC | Facility: CLINIC | Age: 72
End: 2022-06-08

## 2022-06-08 DIAGNOSIS — N05.1 FSGS (FOCAL SEGMENTAL GLOMERULOSCLEROSIS): ICD-10-CM

## 2022-06-08 LAB
ANION GAP SERPL CALCULATED.3IONS-SCNC: 6 MMOL/L (ref 4–13)
BACTERIA UR QL AUTO: ABNORMAL /HPF
BILIRUB UR QL STRIP: NEGATIVE
BUN SERPL-MCNC: 37 MG/DL (ref 5–25)
CALCIUM SERPL-MCNC: 8.6 MG/DL (ref 8.4–10.2)
CHLORIDE SERPL-SCNC: 109 MMOL/L (ref 96–108)
CLARITY UR: CLEAR
CO2 SERPL-SCNC: 22 MMOL/L (ref 21–32)
COLOR UR: YELLOW
CREAT SERPL-MCNC: 2.66 MG/DL (ref 0.6–1.3)
CREAT UR-MCNC: 105.6 MG/DL
GFR SERPL CREATININE-BSD FRML MDRD: 23 ML/MIN/1.73SQ M
GLUCOSE SERPL-MCNC: 89 MG/DL (ref 65–140)
GLUCOSE UR STRIP-MCNC: NEGATIVE MG/DL
HGB UR QL STRIP.AUTO: NEGATIVE
KETONES UR STRIP-MCNC: NEGATIVE MG/DL
LEUKOCYTE ESTERASE UR QL STRIP: NEGATIVE
MUCOUS THREADS UR QL AUTO: ABNORMAL
NITRITE UR QL STRIP: NEGATIVE
NON-SQ EPI CELLS URNS QL MICRO: ABNORMAL /HPF
PH UR STRIP.AUTO: 6 [PH]
PHOSPHATE SERPL-MCNC: 3.5 MG/DL (ref 2.3–4.1)
POTASSIUM SERPL-SCNC: 5 MMOL/L (ref 3.5–5.3)
PROT UR STRIP-MCNC: ABNORMAL MG/DL
PROT UR-MCNC: 223 MG/DL
PTH-INTACT SERPL-MCNC: 104.6 PG/ML (ref 18.4–80.1)
RBC #/AREA URNS AUTO: ABNORMAL /HPF
SODIUM SERPL-SCNC: 137 MMOL/L (ref 135–147)
SP GR UR STRIP.AUTO: 1.02 (ref 1–1.03)
UROBILINOGEN UR QL STRIP.AUTO: 0.2 E.U./DL
WBC #/AREA URNS AUTO: ABNORMAL /HPF

## 2022-06-08 PROCEDURE — 84100 ASSAY OF PHOSPHORUS: CPT

## 2022-06-08 PROCEDURE — 81001 URINALYSIS AUTO W/SCOPE: CPT | Performed by: STUDENT IN AN ORGANIZED HEALTH CARE EDUCATION/TRAINING PROGRAM

## 2022-06-08 PROCEDURE — 84156 ASSAY OF PROTEIN URINE: CPT

## 2022-06-08 PROCEDURE — 83970 ASSAY OF PARATHORMONE: CPT

## 2022-06-08 PROCEDURE — 3060F POS MICROALBUMINURIA REV: CPT | Performed by: PHYSICIAN ASSISTANT

## 2022-06-08 PROCEDURE — 82570 ASSAY OF URINE CREATININE: CPT

## 2022-06-08 PROCEDURE — 3066F NEPHROPATHY DOC TX: CPT | Performed by: PHYSICIAN ASSISTANT

## 2022-06-08 PROCEDURE — 80048 BASIC METABOLIC PNL TOTAL CA: CPT

## 2022-06-09 PROCEDURE — 93227 XTRNL ECG REC<48 HR R&I: CPT | Performed by: INTERNAL MEDICINE

## 2022-06-10 ENCOUNTER — TELEPHONE (OUTPATIENT)
Dept: GASTROENTEROLOGY | Facility: AMBULARY SURGERY CENTER | Age: 72
End: 2022-06-10

## 2022-06-10 ENCOUNTER — TELEPHONE (OUTPATIENT)
Dept: CARDIOLOGY CLINIC | Facility: CLINIC | Age: 72
End: 2022-06-10

## 2022-06-10 ENCOUNTER — OFFICE VISIT (OUTPATIENT)
Dept: GASTROENTEROLOGY | Facility: AMBULARY SURGERY CENTER | Age: 72
End: 2022-06-10
Payer: COMMERCIAL

## 2022-06-10 VITALS
WEIGHT: 163.4 LBS | HEART RATE: 78 BPM | SYSTOLIC BLOOD PRESSURE: 112 MMHG | DIASTOLIC BLOOD PRESSURE: 76 MMHG | OXYGEN SATURATION: 97 % | BODY MASS INDEX: 24.77 KG/M2 | HEIGHT: 68 IN

## 2022-06-10 DIAGNOSIS — Z86.010 HISTORY OF COLON POLYPS: Primary | ICD-10-CM

## 2022-06-10 PROCEDURE — 3008F BODY MASS INDEX DOCD: CPT | Performed by: PHYSICIAN ASSISTANT

## 2022-06-10 PROCEDURE — 99213 OFFICE O/P EST LOW 20 MIN: CPT | Performed by: PHYSICIAN ASSISTANT

## 2022-06-10 PROCEDURE — 1036F TOBACCO NON-USER: CPT | Performed by: PHYSICIAN ASSISTANT

## 2022-06-10 PROCEDURE — 1160F RVW MEDS BY RX/DR IN RCRD: CPT | Performed by: PHYSICIAN ASSISTANT

## 2022-06-10 NOTE — PATIENT INSTRUCTIONS
Scheduled date of colonoscopy (as of today):8/25/2022  Physician performing colonoscopy:DR BORREGO  [facility]  Bowel prep reviewed with patient:GOLYTELY/SHER PER BRIELLE  Instructions reviewed with patient by:GIBLERTO KWON  Clearances:  COUMADIN/SENT TO  [facility]

## 2022-06-10 NOTE — ASSESSMENT & PLAN NOTE
No alarm symptoms at this time but patient has not had colonoscopy for approximately 7 years, rule out underlying adenomatous polyps or malignancy     -will plan for colonoscopy, schedule to be done in the hospital given patient's age/comorbidities and anticoagulation     -patient was advised regarding risks and benefits of the procedures, risks including but not limited to infection, perforation bleeding     -prescription and instructions provided for colonoscopy prep, will utilize GoLYTELY/Dulcolax prep in view of patient's significant kidney disease    - also advised patient to obtain clearance from his cardiologist to hold Coumadin for for 5 days prior to procedure, patient expressed he will do this

## 2022-06-10 NOTE — TELEPHONE ENCOUNTER
Spoke w/ pt   Pt aware-ok to hold coumadin 5 days prior to colonoscopy 8/25/2022 per dr Azucena Delgado

## 2022-06-10 NOTE — PROGRESS NOTES
Follow-up Note -  [facility]  Mike Emilie 1950 80 y o  male         Reason:  Follow-up; history of colon polyps    HPI:  64-year-old male with history of chronic kidney disease with diabetes and FSGS, coronary artery disease, bioprosthetic aortic valve replacement and atrial flutter anticoagulated with Coumadin who presents for follow-up; patient reports he has not had colonoscopy for a while (since approximately 2015), and his last colonoscopy saw the removal of some polyps  He denies any disturbances to his bowel habits, any unintended weight loss, any loss of appetite, any difficulty or discomfort with swallowing, any nausea or vomiting, any abdominal pain, any blood or mucus in his stools  No known family history of colon cancer  Denies any supplemental oxygen dependence or CPAP/BiPAP dependence  REVIEW OF SYSTEMS:      CONSTITUTIONAL: Denies any fever, chills, or rigors  Good appetite, and no recent weight loss  HEENT: No earache or tinnitus  Denies hearing loss or visual disturbances  CARDIOVASCULAR: No chest pain or palpitations  RESPIRATORY: Denies any cough, hemoptysis, shortness of breath or dyspnea on exertion  GASTROINTESTINAL: As noted in the History of Present Illness  GENITOURINARY: No problems with urination  Denies any hematuria or dysuria  NEUROLOGIC: No dizziness or vertigo, denies headaches  MUSCULOSKELETAL: Denies any muscle or joint pain  SKIN: Denies skin rashes or itching  ENDOCRINE: Denies excessive thirst  Denies intolerance to heat or cold  PSYCHOSOCIAL: Denies depression or anxiety  Denies any recent memory loss       Past Medical History:   Diagnosis Date    Colon polyp     COVID-19     Diabetes mellitus ([address] )     Hyperlipidemia     Hypertension     Proteinuria     Stage 3 chronic kidney disease (HCC)     Vitamin D deficiency       Past Surgical History:   Procedure Laterality Date    COLONOSCOPY      overdue    [place] ECHO TRANSESOPHAG R-T 2D W/PRB IMG ACQUISJ I&R N/A 3/7/2019    Procedure: INTRA-OP TRANSESOPHAGEAL ECHOCARDIOGRAM (PATIENCE);   Surgeon: Wilner Garcia DO;  [facility];  Service: Cardiac Surgery    WI PERQ CLSR TCAT L ATR APNDGE W/ENDOCARDIAL IMPLNT  3/7/2019    Procedure: LEFT ATRIAL APPENDAGE OCCLUSION CLIPPED with 35mm Toshia Ross;  Surgeon: Wilner Garcia DO;  [facility];  Service: Cardiac Surgery    [place] REPLACEMENT OF MITRAL VALVE N/A 3/7/2019    Procedure: REPLACEMENT VALVE MITRAL (MVR) REPAIR with a 30mm MEDTRONIC CG FUTURE RING;  Surgeon: Wilner Garcia DO;  [facility];  Service: Cardiac Surgery    WI RPLCMT AORTIC VALVE OPN W/STENTLESS TISSUE VALVE N/A 3/7/2019    Procedure: REPLACEMENT VALVE AORTIC (AVR) with 23mm TAVERA INSPIRIS RESILIA  AORTIC VALVE;  Surgeon: Wilner Garcia DO;  [facility];  Service: Cardiac Surgery    RENAL BIOPSY, OPEN      TOE SURGERY Left      Social History     Socioeconomic History    Marital status: /Civil Union     Spouse name: Not on file    Number of children: Not on file    Years of education: Not on file    Highest education level: Not on file   Occupational History    Occupation: RETIRED   Tobacco Use    Smoking status: Former Smoker     Types: Cigarettes     Quit date:      Years since quittin 4    Smokeless tobacco: Never Used   Vaping Use    Vaping Use: Never used   Substance and Sexual Activity    Alcohol use: Yes     Comment: occasionally    Drug use: No    Sexual activity: Not on file   Other Topics Concern    Not on file   Social History Narrative    Not on file     Social Determinants of Health     Financial Resource Strain: Not on file   Food Insecurity: Not on file   Transportation Needs: Not on file   Physical Activity: Not on file   Stress: Not on file   Social Connections: Not on file   Intimate Partner Violence: Not on file   Housing Stability: Not on file     Family History Problem Relation Age of Onset   [employer] Stroke Mother     Hypertension Mother     Blindness Father     Hyperlipidemia Father     Hypertension Father     Glaucoma Father     Gout Brother     Heart Valve Disease Brother     Hypertension Brother     Cancer Sister     Diabetes Sister     Ovarian cancer Sister     Anuerysm Neg Hx     Heart attack Neg Hx     Arrhythmia Neg Hx     Heart failure Neg Hx     Coronary artery disease Neg Hx     Sudden death Neg Hx         scd     Ace inhibitors and Keflex [cephalexin]  Current Outpatient Medications   Medication Sig Dispense Refill    ALPRAZolam (XANAX) 0 25 mg tablet Take  1 tablets 30 minutes before flight   4 tablet 0    amLODIPine (NORVASC) 2 5 mg tablet Take 1 tablet (2 5 mg total) by mouth daily 90 tablet 3    ascorbic acid (VITAMIN C) 500 mg tablet Take 500 mg by mouth daily      atorvastatin (LIPITOR) 20 mg tablet take 1 tablet by mouth once daily 90 tablet 2    calcitriol (ROCALTROL) 0 25 mcg capsule TAKE 1 CAPSULE 5 DAYS A WEEK AS DIRECTED 60 capsule 3    Cholecalciferol (VITAMIN D3) 1000 units CAPS Take 1 capsule by mouth daily      hydrALAZINE (APRESOLINE) 25 mg tablet Take 1 tablet (25 mg total) by mouth 3 (three) times a day 270 tablet 3    isosorbide dinitrate (ISORDIL) 10 mg tablet Take 1 tablet (10 mg total) by mouth 3 (three) times a day 270 tablet 3    metoprolol succinate (TOPROL-XL) 50 mg 24 hr tablet take 1 tablet by mouth once daily 180 tablet 1    RA Aspirin EC 81 MG EC tablet take 1 tablet by mouth once daily 90 tablet 6    TRADJENTA 5 MG TABS 2 5 mg daily   0    TRAVATAN Z 0 004 % ophthalmic solution Administer 1 drop to both eyes daily at bedtime   0    warfarin (COUMADIN) 2 mg tablet TAKE 1-2 TABLETS BY MOUTH DAILY OR AS DIRECTED BY PHYSICIAN 75 tablet 11    COMBIGAN 0 2-0 5 % instill 1 drop into both eyes twice a day (Patient not taking: Reported on 6/10/2022)  0     No current facility-administered medications for this visit  Blood pressure 112/76, pulse 78, height 5' 8" (1 727 m), weight 74 1 kg (163 lb 6 4 oz), SpO2 97 %  PHYSICAL EXAM:      General Appearance:   Alert, cooperative, no distress, appears stated age    HEENT:   Normocephalic, atraumatic, anicteric      Neck:  Supple, symmetrical, trachea midline, no adenopathy;    thyroid: no enlargement/tenderness/nodules; no carotid  bruit or JVD    Lungs:   Clear to auscultation bilaterally; no rales, rhonchi or wheezing; respirations unlabored    Heart[de-identified]   S1 and S2 normal; regular rate and rhythm; no murmur, rub, or gallop  Abdomen:   Soft, non-tender, non-distended; normal bowel sounds; no masses, no organomegaly    Extremities: No edema, erythema, wounds, rashes   Rectal:   Deferred                      Lab Results   Component Value Date    WBC 6 98 08/27/2021    HGB 13 5 08/27/2021    HCT 40 3 08/27/2021    MCV 92 08/27/2021     08/27/2021     Lab Results   Component Value Date    GLUCOSE 110 03/07/2019    CALCIUM 8 6 06/08/2022    K 5 0 06/08/2022    CO2 22 06/08/2022     (H) 06/08/2022    BUN 37 (H) 06/08/2022    CREATININE 2 66 (H) 06/08/2022     Lab Results   Component Value Date    ALT 18 08/27/2021    AST 17 08/27/2021    ALKPHOS 63 08/27/2021     Lab Results   Component Value Date    INR 2 84 (H) 06/08/2022    INR 1 99 (H) 05/25/2022    INR 2 24 (H) 04/11/2022    PROTIME 29 3 (H) 06/08/2022    PROTIME 22 3 (H) 05/25/2022    PROTIME 24 4 (H) 04/11/2022       No results found      ASSESSMENT & PLAN:    History of colon polyps  No alarm symptoms at this time but patient has not had colonoscopy for approximately 7 years, rule out underlying adenomatous polyps or malignancy     -will plan for colonoscopy, schedule to be done in the hospital given patient's age/comorbidities and anticoagulation     -patient was advised regarding risks and benefits of the procedures, risks including but not limited to infection, perforation bleeding     -prescription and instructions provided for colonoscopy prep, will utilize GoLYTELY/Dulcolax prep in view of patient's significant kidney disease    - also advised patient to obtain clearance from his cardiologist to hold Coumadin for for 5 days prior to procedure, patient expressed he will do this

## 2022-06-10 NOTE — TELEPHONE ENCOUNTER
Our mutual patient is scheduled for procedure: COLONOSCOPY     On: __8__/_25    _/_  2022  _       With:   __ELMO_______     He/She is taking the following blood thinner:       COUMADIN                                 Can this be stopped ___5___ days prior to the procedure?

## 2022-06-10 NOTE — TELEPHONE ENCOUNTER
Our mutual patient is scheduled for procedure: COLONOSCOPY    On: __8__/_25    _/_  2022  _      With:   __ELMO_______    He/She is taking the following blood thinner:   COUMADIN       Can this be stopped ___5___ days prior to the procedure?       Physician Approving clearance: ________________________ none

## 2022-06-24 ENCOUNTER — TELEPHONE (OUTPATIENT)
Dept: CARDIOLOGY CLINIC | Facility: CLINIC | Age: 72
End: 2022-06-24

## 2022-06-27 NOTE — TELEPHONE ENCOUNTER
Please let patient know holter monitor demonstrated no atrial fibrillation or atrial flutter, but frequent premature ventricular contractions, which were benign  Nothing to be concerned about  Thanks

## 2022-07-05 ENCOUNTER — ANTICOAG VISIT (OUTPATIENT)
Dept: CARDIOLOGY CLINIC | Facility: CLINIC | Age: 72
End: 2022-07-05

## 2022-07-05 ENCOUNTER — APPOINTMENT (OUTPATIENT)
Dept: LAB | Facility: CLINIC | Age: 72
End: 2022-07-05
Payer: COMMERCIAL

## 2022-07-05 DIAGNOSIS — I42.0 DILATED CARDIOMYOPATHY (HCC): ICD-10-CM

## 2022-07-05 DIAGNOSIS — I10 ESSENTIAL HYPERTENSION: Chronic | ICD-10-CM

## 2022-07-05 RX ORDER — METOPROLOL SUCCINATE 50 MG/1
TABLET, EXTENDED RELEASE ORAL
Qty: 180 TABLET | Refills: 1 | Status: SHIPPED | OUTPATIENT
Start: 2022-07-05

## 2022-07-15 ENCOUNTER — VBI (OUTPATIENT)
Dept: ADMINISTRATIVE | Facility: OTHER | Age: 72
End: 2022-07-15

## 2022-08-05 ENCOUNTER — APPOINTMENT (OUTPATIENT)
Dept: LAB | Facility: CLINIC | Age: 72
End: 2022-08-05
Payer: COMMERCIAL

## 2022-08-05 ENCOUNTER — ANTICOAG VISIT (OUTPATIENT)
Dept: CARDIOLOGY CLINIC | Facility: CLINIC | Age: 72
End: 2022-08-05

## 2022-08-17 ENCOUNTER — TELEPHONE (OUTPATIENT)
Dept: NEPHROLOGY | Facility: CLINIC | Age: 72
End: 2022-08-17

## 2022-08-17 DIAGNOSIS — R80.1 PERSISTENT PROTEINURIA: ICD-10-CM

## 2022-08-17 DIAGNOSIS — N18.4 CHRONIC KIDNEY DISEASE (CKD) STAGE G4/A3, SEVERELY DECREASED GLOMERULAR FILTRATION RATE (GFR) BETWEEN 15-29 ML/MIN/1.73 SQUARE METER AND ALBUMINURIA CREATININE RATIO GREATER THAN 300 MG/G (HCC): Primary | ICD-10-CM

## 2022-08-17 NOTE — TELEPHONE ENCOUNTER
Patient stopped by the [facility] office to reschedule his 9/7 follow up appt with Dr Otis Reynolds for 9/12  Patient was confused as he did not recall being ordered any lab work prior to his September appt  Upon investigation I did not find any active lab work in the system for him  Please advise if any lab work is necessary for the patient prior to 9/12 appt

## 2022-08-22 DIAGNOSIS — Z86.010 HISTORY OF COLON POLYPS: Primary | ICD-10-CM

## 2022-08-22 RX ORDER — BISACODYL 5 MG/1
TABLET, DELAYED RELEASE ORAL
Qty: 2 TABLET | Refills: 0 | Status: SHIPPED | OUTPATIENT
Start: 2022-08-22 | End: 2022-08-25

## 2022-08-25 ENCOUNTER — ANESTHESIA EVENT (OUTPATIENT)
Dept: GASTROENTEROLOGY | Facility: HOSPITAL | Age: 72
End: 2022-08-25

## 2022-08-25 ENCOUNTER — ANESTHESIA (OUTPATIENT)
Dept: GASTROENTEROLOGY | Facility: HOSPITAL | Age: 72
End: 2022-08-25

## 2022-08-25 ENCOUNTER — HOSPITAL ENCOUNTER (OUTPATIENT)
Dept: GASTROENTEROLOGY | Facility: HOSPITAL | Age: 72
Setting detail: OUTPATIENT SURGERY
End: 2022-08-25
Attending: INTERNAL MEDICINE
Payer: COMMERCIAL

## 2022-08-25 VITALS
WEIGHT: 157 LBS | HEIGHT: 68 IN | BODY MASS INDEX: 23.79 KG/M2 | HEART RATE: 93 BPM | RESPIRATION RATE: 18 BRPM | OXYGEN SATURATION: 98 % | TEMPERATURE: 98.3 F | DIASTOLIC BLOOD PRESSURE: 82 MMHG | SYSTOLIC BLOOD PRESSURE: 125 MMHG

## 2022-08-25 DIAGNOSIS — Z86.010 HISTORY OF COLON POLYPS: ICD-10-CM

## 2022-08-25 LAB — GLUCOSE SERPL-MCNC: 78 MG/DL (ref 65–140)

## 2022-08-25 PROCEDURE — 82948 REAGENT STRIP/BLOOD GLUCOSE: CPT

## 2022-08-25 PROCEDURE — 45385 COLONOSCOPY W/LESION REMOVAL: CPT | Performed by: INTERNAL MEDICINE

## 2022-08-25 PROCEDURE — 88305 TISSUE EXAM BY PATHOLOGIST: CPT | Performed by: STUDENT IN AN ORGANIZED HEALTH CARE EDUCATION/TRAINING PROGRAM

## 2022-08-25 RX ORDER — SODIUM CHLORIDE, SODIUM LACTATE, POTASSIUM CHLORIDE, CALCIUM CHLORIDE 600; 310; 30; 20 MG/100ML; MG/100ML; MG/100ML; MG/100ML
INJECTION, SOLUTION INTRAVENOUS CONTINUOUS PRN
Status: DISCONTINUED | OUTPATIENT
Start: 2022-08-25 | End: 2022-08-25

## 2022-08-25 RX ORDER — PROPOFOL 10 MG/ML
INJECTION, EMULSION INTRAVENOUS AS NEEDED
Status: DISCONTINUED | OUTPATIENT
Start: 2022-08-25 | End: 2022-08-25

## 2022-08-25 RX ADMIN — SODIUM CHLORIDE, SODIUM LACTATE, POTASSIUM CHLORIDE, AND CALCIUM CHLORIDE: .6; .31; .03; .02 INJECTION, SOLUTION INTRAVENOUS at 14:30

## 2022-08-25 RX ADMIN — PROPOFOL 30 MG: 10 INJECTION, EMULSION INTRAVENOUS at 14:58

## 2022-08-25 RX ADMIN — PROPOFOL 30 MG: 10 INJECTION, EMULSION INTRAVENOUS at 14:55

## 2022-08-25 RX ADMIN — PROPOFOL 20 MG: 10 INJECTION, EMULSION INTRAVENOUS at 15:02

## 2022-08-25 RX ADMIN — PROPOFOL 100 MG: 10 INJECTION, EMULSION INTRAVENOUS at 14:54

## 2022-08-25 NOTE — ANESTHESIA PREPROCEDURE EVALUATION
Procedure:  COLONOSCOPY    Relevant Problems   CARDIO   (+) Atrial flutter (HCC)   (+) Coronary artery disease involving native coronary artery of native heart without angina pectoris   (+) Essential hypertension   (+) Non-rheumatic mitral regurgitation   (+) Nonrheumatic aortic valve insufficiency   (+) S/P AVR   (+) S/P MVR (mitral valve repair)      ENDO   (+) Diabetes mellitus type 2 in nonobese (HCC)   (+) Secondary hyperparathyroidism of renal origin ([address] )      /RENAL   (+) CKD (chronic kidney disease), stage IV (HCC)   (+) Diabetic nephropathy associated with type 2 diabetes mellitus (HCC)   (+) FSGS (focal segmental glomerulosclerosis)      NEURO/PSYCH   (+) History of colon polyps      Echo 11/20/20 LEFT VENTRICLE: Size was normal  Systolic function was normal  Ejection fraction was estimated to be 65 %  There were no regional wall motion abnormalities  Wall thickness was normal  DOPPLER: Features were consistent with a pseudonormal  left ventricular filling pattern, with concomitant abnormal relaxation and increased filling pressure (grade 2 diastolic dysfunction)      RIGHT VENTRICLE: The size was normal  Systolic function was normal  Wall thickness was normal      LEFT ATRIUM: Size was normal      RIGHT ATRIUM: Size was normal      MITRAL VALVE: There was normal leaflet separation  There was a prior surgical repair  The mean gradient across the ring was 2mmHg  DOPPLER: The transmitral velocity was within the normal range  There was mild regurgitation      AORTIC VALVE: A bioprosthesis was present  It exhibited normal function  The mean gradient was 13mmHg      TRICUSPID VALVE: The valve structure was normal  There was normal leaflet separation  DOPPLER: The transtricuspid velocity was within the normal range  There was no evidence for stenosis  There was mild to moderate regurgitation   Estimated  peak PA pressure was 28 mmHg      PULMONIC VALVE: Leaflets exhibited normal thickness, no calcification, and normal cuspal separation  DOPPLER: The transpulmonic velocity was within the normal range  There was mild to moderate regurgitation  Physical Exam    Airway    Mallampati score: II  TM Distance: >3 FB  Neck ROM: full     Dental   No notable dental hx     Cardiovascular  Cardiovascular exam normal    Pulmonary  Pulmonary exam normal     Other Findings      EKG 1/2022 NSR  Anesthesia Plan  ASA Score- 3     Anesthesia Type- IV sedation with anesthesia with ASA Monitors  Additional Monitors:   Airway Plan:           Plan Factors-Exercise tolerance (METS): >4 METS  Chart reviewed  EKG reviewed  Imaging results reviewed  Existing labs reviewed  Patient summary reviewed  Patient is not a current smoker  Patient not instructed to abstain from smoking on day of procedure  Patient did not smoke on day of surgery  Induction-     Postoperative Plan-     Informed Consent- Anesthetic plan and risks discussed with patient  I personally reviewed this patient with the CRNA  Discussed and agreed on the Anesthesia Plan with the CRNA  Judy Betancur

## 2022-08-25 NOTE — H&P
History and Physical - [facility]  Luis Felipe Lyman 71 y o  male MRN: [id number]        HPI: 67-year-old male with history of chronic kidney disease with diabetes, coronary artery disease, bioprosthetic aortic valve replacement and atrial flutter anticoagulated with Coumadin was referred for colonoscopy because of history of colon polyps  Regular bowel movements  Historical Information   Past Medical History:   Diagnosis Date    Chronic kidney disease     Colon polyp     COVID-19     Diabetes mellitus ([facility] )     Hyperlipidemia     Hypertension     Proteinuria     Stage 3 chronic kidney disease ([facility] )     Vitamin D deficiency      Past Surgical History:   Procedure Laterality Date    COLONOSCOPY      overdue    NM ECHO TRANSESOPHAG R-T 2D W/PRB IMG ACQUISJ I&R N/A 3/7/2019    Procedure: INTRA-OP TRANSESOPHAGEAL ECHOCARDIOGRAM (PATIENCE);   Surgeon: Gladys Lewis DO;  [facility];  Service: Cardiac Surgery    NM PERQ CLSR TCAT L ATR APNDGE W/ENDOCARDIAL IMPLNT  3/7/2019    Procedure: LEFT ATRIAL APPENDAGE OCCLUSION CLIPPED with 35mm Daphne Canard;  Surgeon: Gladys Lewis DO;  [facility];  Service: Cardiac Surgery    NM REPLACEMENT OF MITRAL VALVE N/A 3/7/2019    Procedure: REPLACEMENT VALVE MITRAL (MVR) REPAIR with a 30mm MEDTRONIC CG FUTURE RING;  Surgeon: Gladys Lewis DO;  [facility];  Service: Cardiac Surgery    NM RPLCMT AORTIC VALVE OPN W/STENTLESS TISSUE VALVE N/A 3/7/2019    Procedure: REPLACEMENT VALVE AORTIC (AVR) with 23mm TAVERA INSPIRIS RESILIA  AORTIC VALVE;  Surgeon: Gladys Lewis DO;  [facility];  Service: Cardiac Surgery    RENAL BIOPSY, OPEN      TOE SURGERY Left      Social History   Social History     Substance and Sexual Activity   Alcohol Use Yes    Comment: occasionally     Social History     Substance and Sexual Activity   Drug Use No     Social History     Tobacco Use   Smoking Status Former Smoker    Types: Cigarettes    Quit date: Randall Vega Years since quittin 5   Smokeless Tobacco Never Used     Family History   Problem Relation Age of Onset   Keisha Courser Stroke Mother     Hypertension Mother     Blindness Father     Hyperlipidemia Father     Hypertension Father     Glaucoma Father     Cancer Sister     Diabetes Sister     Ovarian cancer Sister     Gout Brother     Heart Valve Disease Brother     Hypertension Brother     Anuerysm Neg Hx     Heart attack Neg Hx     Arrhythmia Neg Hx     Heart failure Neg Hx     Coronary artery disease Neg Hx     Sudden death Neg Hx         scd       Meds/Allergies     (Not in a hospital admission)      Allergies   Allergen Reactions    Ace Inhibitors Cough    Keflex [Cephalexin] Rash       Objective     There were no vitals taken for this visit      PHYSICAL EXAM:    Gen: NAD  CV: S1 & S2 normal, RRR  CHEST: Clear to auscultate  ABD: soft, NT/ND, good bowel sounds  EXT: no edema    ASSESSMENT:     History of colon polyps    PLAN:    Colonoscopy

## 2022-08-25 NOTE — ANESTHESIA POSTPROCEDURE EVALUATION
Post-Op Assessment Note    CV Status:  Stable    Pain management: adequate     Mental Status:  Alert and awake   Hydration Status:  Stable   PONV Controlled:  None   Airway Patency:  Patent      Post Op Vitals Reviewed: Yes      Staff: CRNA         No complications documented      /67 (08/25/22 1506)    Temp 98 3 °F (36 8 °C) (08/25/22 1506)    Pulse 98 (08/25/22 1506)   Resp 18 (08/25/22 1506)    SpO2 99 % (08/25/22 1506)    Post procedure VS noted above, SV non obstructed

## 2022-08-30 PROCEDURE — 88305 TISSUE EXAM BY PATHOLOGIST: CPT | Performed by: STUDENT IN AN ORGANIZED HEALTH CARE EDUCATION/TRAINING PROGRAM

## 2022-09-02 ENCOUNTER — TELEPHONE (OUTPATIENT)
Dept: NEPHROLOGY | Facility: CLINIC | Age: 72
End: 2022-09-02

## 2022-09-02 ENCOUNTER — APPOINTMENT (OUTPATIENT)
Dept: LAB | Facility: CLINIC | Age: 72
End: 2022-09-02
Payer: COMMERCIAL

## 2022-09-02 DIAGNOSIS — N18.4 CHRONIC KIDNEY DISEASE (CKD) STAGE G4/A3, SEVERELY DECREASED GLOMERULAR FILTRATION RATE (GFR) BETWEEN 15-29 ML/MIN/1.73 SQUARE METER AND ALBUMINURIA CREATININE RATIO GREATER THAN 300 MG/G (HCC): ICD-10-CM

## 2022-09-02 DIAGNOSIS — R80.1 PERSISTENT PROTEINURIA: ICD-10-CM

## 2022-09-02 LAB
ANION GAP SERPL CALCULATED.3IONS-SCNC: 5 MMOL/L (ref 4–13)
BUN SERPL-MCNC: 41 MG/DL (ref 5–25)
CALCIUM SERPL-MCNC: 8.9 MG/DL (ref 8.4–10.2)
CHLORIDE SERPL-SCNC: 112 MMOL/L (ref 96–108)
CO2 SERPL-SCNC: 22 MMOL/L (ref 21–32)
CREAT SERPL-MCNC: 2.84 MG/DL (ref 0.6–1.3)
CREAT UR-MCNC: 121.3 MG/DL
GFR SERPL CREATININE-BSD FRML MDRD: 21 ML/MIN/1.73SQ M
GLUCOSE P FAST SERPL-MCNC: 95 MG/DL (ref 65–99)
POTASSIUM SERPL-SCNC: 5.3 MMOL/L (ref 3.5–5.3)
PROT UR-MCNC: 313 MG/DL
PROT/CREAT UR: 2.58 MG/G{CREAT} (ref 0–0.1)
SODIUM SERPL-SCNC: 139 MMOL/L (ref 135–147)

## 2022-09-02 PROCEDURE — 3066F NEPHROPATHY DOC TX: CPT | Performed by: INTERNAL MEDICINE

## 2022-09-02 PROCEDURE — 36415 COLL VENOUS BLD VENIPUNCTURE: CPT

## 2022-09-02 PROCEDURE — 82570 ASSAY OF URINE CREATININE: CPT

## 2022-09-02 PROCEDURE — 84156 ASSAY OF PROTEIN URINE: CPT

## 2022-09-02 PROCEDURE — 3062F POS MACROALBUMINURIA REV: CPT | Performed by: INTERNAL MEDICINE

## 2022-09-02 PROCEDURE — 80048 BASIC METABOLIC PNL TOTAL CA: CPT

## 2022-09-12 ENCOUNTER — OFFICE VISIT (OUTPATIENT)
Dept: NEPHROLOGY | Facility: CLINIC | Age: 72
End: 2022-09-12
Payer: COMMERCIAL

## 2022-09-12 VITALS
BODY MASS INDEX: 24.4 KG/M2 | SYSTOLIC BLOOD PRESSURE: 130 MMHG | DIASTOLIC BLOOD PRESSURE: 60 MMHG | HEIGHT: 68 IN | WEIGHT: 161 LBS

## 2022-09-12 DIAGNOSIS — E78.3 HYPERCHYLOMICRONEMIA: ICD-10-CM

## 2022-09-12 DIAGNOSIS — N18.4 CHRONIC KIDNEY DISEASE (CKD) STAGE G4/A3, SEVERELY DECREASED GLOMERULAR FILTRATION RATE (GFR) BETWEEN 15-29 ML/MIN/1.73 SQUARE METER AND ALBUMINURIA CREATININE RATIO GREATER THAN 300 MG/G (HCC): Primary | ICD-10-CM

## 2022-09-12 DIAGNOSIS — E11.9 TYPE 2 DIABETES MELLITUS WITHOUT COMPLICATION, WITHOUT LONG-TERM CURRENT USE OF INSULIN (HCC): Primary | ICD-10-CM

## 2022-09-12 PROCEDURE — 99213 OFFICE O/P EST LOW 20 MIN: CPT | Performed by: STUDENT IN AN ORGANIZED HEALTH CARE EDUCATION/TRAINING PROGRAM

## 2022-09-12 RX ORDER — LINAGLIPTIN 5 MG/1
2.5 TABLET, FILM COATED ORAL DAILY
Qty: 30 TABLET | Refills: 0 | Status: SHIPPED | OUTPATIENT
Start: 2022-09-12

## 2022-09-12 RX ORDER — ATORVASTATIN CALCIUM 20 MG/1
20 TABLET, FILM COATED ORAL DAILY
Qty: 90 TABLET | Refills: 2 | Status: SHIPPED | OUTPATIENT
Start: 2022-09-12

## 2022-09-12 NOTE — TELEPHONE ENCOUNTER
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

## 2022-09-12 NOTE — PATIENT INSTRUCTIONS
Thank you for coming to your visit today  As we discussed you kidney function is abnormal but stable, your creatinine is 2 8mg/dL   Your electrolytes are normal  Please follow the recommendations below       Recommend low sodium (salt) food    Avoid nonsteroidal anti-inflammatory drugs such as Naprosyn, ibuprofen, Aleve, Advil, Celebrex, Meloxicam (Mobic) etc   You can use Tylenol as needed if you do not have any liver condition to be concerned about    Try to exercise at least 30 minutes 3 days a week to begin with with an ultimate goal of 5 days a week for at least 30 minutes    Next Visit in 4-5 months with results   If you need to see us earlier we can change the appointment for you      Lj Garza MD  Nephrology Attending

## 2022-09-16 NOTE — PROGRESS NOTES
GLOMERULAR DISEASE OUTPATIENT PROGRESS NOTE   Isela Solis 71 y o  male MRN: [id number]  DATE: 9/16/2022    Reason for visit:   Chief Complaint   Patient presents with    Follow-up    CKD IV       ASSESSMENT and PLAN:  79 yo man with PMH DM, FSGS (biopsy proven 2018), CKD G4A3 (bl creat 3-3 5mg/dL, eGFR 21-24),      PLAN:     #Secondary FSGS  · Time of diagnosis:4/18/2018  · Biopsy date/brief summary:  ? FSGS with glomerulomegaly with secondary pattern with 15% of podocyte effacement  ? Diffuse diabetic glomerulosclerosis (mild)  ? 25% IFTA  ? Arterio-arteriolosclerosis with hyalinosis, moderate-severe   · Genetic Testing:none  · Lost ~15lbs (asper patient, previous notes mentioned 35lbs) after diagnosis (posible cause of FSGS)  · Baseline creatinine:3-3 5mg/dL   · Current creatinine:2 69mg/dL remains below baseline  · UPCr 4>>2 82>>2 46>>1 54>>2 28>>2 31 g/g (fluctuates)  ?  Unable to take ACE or ARB due to hyperkalemia   ? eGFR low for DAPA   · Serum albumin 3 1  · Initially on ACE/ARB but patient presented with CARY and hyperkalemia, now advanced CKD    · Referred and seen for transplant eval and education   · We also discussed about dialysis modalities        #Immunosuppressive Medications  · No IS indicated      #CKD G4A3     · Baseline creatinine:3-3 5mg/dL  · Current creatinine:2 84mg/dL, remains stable  · Etiology:  Secondary to FSGS and Diabetic glomerulopathy  · UPCr:2 58g/g  · Not on Ace/ ARB due to previous hyperkalemia   · Avoid nonsteroidal anti-inflammatory drugs such as Naprosyn, ibuprofen, Aleve, Advil, Celebrex, Meloxicam (Mobic) etc   You can use Tylenol as needed if you do not have any liver condition to be concerned about  · Referred to transplant and CKD class      #Volume/Hypertension:  · Volume:euvolemic   · Blood pressure:normotensive /60mmhg , goal <140/90mmHg   · Low sodium diet   · Torsemide 20mg as needed   · Metoprolol 25mg   · Hydralazin 25mg tid   · Amlodipine 2 5mg daily    #Hyperkalemia (resolved)  · tendency to hyperkalemia  · Follow low potassium diet   · Not on ACE/ARB     #Acid base disorder  · Serum bicarb 22mmol/L  · At goal      #CKD-MBD  · Calcium 8 9  · Phosphorus 3 7mg/dL  (goal <5 5)     #Secondary Hyperparathyroidism   · iPTH  104 6, guidelines levels KDIGO 2017  · 25OH vit D35 6 at goal , goal >30     · Calcitriol 0 25mcg daily     #Anemia:  · Current hemoglobin 13 5 , at goal   · No indication of MERRILL at this time      #MVR, AVR  · On coumadin   · [address]     4/18/2018  [facility], read by Dr Avila Captain     21 gloms, 7 globally sclerosed  Glomerulomegaly, mild mesangial hypercellularity  3/14 gloms focal segmental sclerosis, adhesions to [place] Capsule  25%IFTA  Arteriosclerosis with hyalinosis  And moderate to severe arteriolosclerosis  IF: IgG  EM:Segmental thickening of GBM, 15% foot process effacement, no deposits  Dx:  FSGS with glomerulomegaly  Mild diffuse diabetic glomerulosclerosis  25% IFTA  Arterio-arterioloscleoris with hyalinosis moderate to severe      SUBJECTIVE / HPI:  Feels well, no SOB, no CP  Patient had a visit with Dr Mago Ruiz, he is not sure he wants to pursue transplant due to his age     REVIEW OF SYSTEMS:  More than 10 point review of systems were obtained and discussed in length with the patient  Complete review of systems were negative / unremarkable except mentioned above       Review of Systems - Psychological ROS: negative  Ophthalmic ROS: negative  ENT ROS: negative  Hematological and Lymphatic ROS: negative  Endocrine ROS: negative  Respiratory ROS: no cough, shortness of breath, or wheezing  Cardiovascular ROS: no chest pain or dyspnea on exertion  Gastrointestinal ROS: no abdominal pain, change in bowel habits, or black or bloody stools  Genito-Urinary ROS: no dysuria, trouble voiding, or hematuria  Musculoskeletal ROS: negative  Neurological ROS: no TIA or stroke symptoms  Dermatological ROS: negative PHYSICAL EXAM:  Vitals:    09/12/22 1457   BP: 130/60   Weight: 73 kg (161 lb)   Height: 5' 8" (1 727 m)     Body mass index is 24 48 kg/m²  Physical Exam   General:, no acute distress at this time  Skin:  No acute rash  Eyes:  No scleral icterus and noninjected  ENT:  mucous membranes moist  Neck:  no carotid bruits  Chest:  Clear to auscultation percussion, good respiratory effort, no use of accessory respiratory muscles  CVS:  Regular rate and rhythm without a murmur rub   Abdomen:  soft and nontender   Extremities:   no significant lower extremity edema  Neuro:  No gross focality  Psych:  Alert , cooperative       PAST MEDICAL HISTORY:  Past Medical History:   Diagnosis Date    Chronic kidney disease     Colon polyp     COVID-19     Diabetes mellitus ([address] )     Hyperlipidemia     Hypertension     Proteinuria     Stage 3 chronic kidney disease (HCC)     Vitamin D deficiency        PAST SURGICAL HISTORY:  Past Surgical History:   Procedure Laterality Date    COLONOSCOPY      overdue    TN ECHO TRANSESOPHAG R-T 2D W/PRB IMG ACQUISJ I&R N/A 3/7/2019    Procedure: INTRA-OP TRANSESOPHAGEAL ECHOCARDIOGRAM (PATIENCE);   Surgeon: Ozzy Buchanan DO;  [facility];  Service: Cardiac Surgery    TN PERQ CLSR TCAT L ATR APNDGE W/ENDOCARDIAL IMPLNT  3/7/2019    Procedure: LEFT ATRIAL APPENDAGE OCCLUSION CLIPPED with 35mm Marthena Manzanilla;  Surgeon: Ozzy Buchanan DO;  [facility];  Service: Cardiac Surgery    TN REPLACEMENT OF MITRAL VALVE N/A 3/7/2019    Procedure: REPLACEMENT VALVE MITRAL (MVR) REPAIR with a 30mm MEDTRONIC CG FUTURE RING;  Surgeon: Ozzy Buchanan DO;  [facility];  Service: Cardiac Surgery    TN RPLCMT AORTIC VALVE OPN W/STENTLESS TISSUE VALVE N/A 3/7/2019    Procedure: REPLACEMENT VALVE AORTIC (AVR) with 23mm TAVERA INSPIRIS RESILIA  AORTIC VALVE;  Surgeon: Ozzy Buchanan DO;  [facility];  Service: Cardiac Surgery    RENAL BIOPSY, OPEN  TOE SURGERY Left        SOCIAL HISTORY:  Social History     Substance and Sexual Activity   Alcohol Use Yes    Comment: occasionally     Social History     Substance and Sexual Activity   Drug Use No     Social History     Tobacco Use   Smoking Status Former Smoker    Types: Cigarettes    Quit date:     Years since quittin 7   Smokeless Tobacco Never Used       FAMILY HISTORY:  Family History   Problem Relation Age of Onset    Stroke Mother     Hypertension Mother     Blindness Father     Hyperlipidemia Father     Hypertension Father     Glaucoma Father    Christine Mcdonald Cancer Sister     Diabetes Sister     Ovarian cancer Sister     Gout Brother     Heart Valve Disease Brother     Hypertension Brother     Anuerysm Neg Hx     Heart attack Neg Hx     Arrhythmia Neg Hx     Heart failure Neg Hx     Coronary artery disease Neg Hx     Sudden death Neg Hx         scd       MEDICATIONS:    Current Outpatient Medications:     ALPRAZolam (XANAX) 0 25 mg tablet, Take  1 tablets 30 minutes before flight , Disp: 4 tablet, Rfl: 0    amLODIPine (NORVASC) 2 5 mg tablet, Take 1 tablet (2 5 mg total) by mouth daily, Disp: 90 tablet, Rfl: 3    ascorbic acid (VITAMIN C) 500 mg tablet, Take 500 mg by mouth daily, Disp: , Rfl:     atorvastatin (LIPITOR) 20 mg tablet, Take 1 tablet (20 mg total) by mouth daily, Disp: 90 tablet, Rfl: 2    calcitriol (ROCALTROL) 0 25 mcg capsule, TAKE 1 CAPSULE 5 DAYS A WEEK AS DIRECTED, Disp: 60 capsule, Rfl: 3    Cholecalciferol (VITAMIN D3) 1000 units CAPS, Take 1 capsule by mouth daily, Disp: , Rfl:     COMBIGAN 0 2-0 5 %, , Disp: , Rfl: 0    hydrALAZINE (APRESOLINE) 25 mg tablet, Take 1 tablet (25 mg total) by mouth 3 (three) times a day, Disp: 270 tablet, Rfl: 3    isosorbide dinitrate (ISORDIL) 10 mg tablet, Take 1 tablet (10 mg total) by mouth 3 (three) times a day, Disp: 270 tablet, Rfl: 3    metoprolol succinate (TOPROL-XL) 50 mg 24 hr tablet, take 1 tablet by mouth once daily, Disp: 180 tablet, Rfl: 1    RA Aspirin EC 81 MG EC tablet, take 1 tablet by mouth once daily, Disp: 90 tablet, Rfl: 6    Tradjenta 5 MG TABS, Take 2 5 mg by mouth daily, Disp: 30 tablet, Rfl: 0    TRAVATAN Z 0 004 % ophthalmic solution, Administer 1 drop to both eyes daily at bedtime , Disp: , Rfl: 0    warfarin (COUMADIN) 2 mg tablet, TAKE 1-2 TABLETS BY MOUTH DAILY OR AS DIRECTED BY PHYSICIAN, Disp: 75 tablet, Rfl: 11    Lab Results:   Results for orders placed or performed in visit on 08/95/92   Basic metabolic panel   Result Value Ref Range    Sodium 139 135 - 147 mmol/L    Potassium 5 3 3 5 - 5 3 mmol/L    Chloride 112 (H) 96 - 108 mmol/L    CO2 22 21 - 32 mmol/L    ANION GAP 5 4 - 13 mmol/L    BUN 41 (H) 5 - 25 mg/dL    Creatinine 2 84 (H) 0 60 - 1 30 mg/dL    Glucose, Fasting 95 65 - 99 mg/dL    Calcium 8 9 8 4 - 10 2 mg/dL    eGFR 21 ml/min/1 73sq m

## 2022-09-23 ENCOUNTER — OFFICE VISIT (OUTPATIENT)
Dept: CARDIOLOGY CLINIC | Facility: CLINIC | Age: 72
End: 2022-09-23
Payer: COMMERCIAL

## 2022-09-23 VITALS
SYSTOLIC BLOOD PRESSURE: 110 MMHG | BODY MASS INDEX: 24.19 KG/M2 | WEIGHT: 159.1 LBS | DIASTOLIC BLOOD PRESSURE: 60 MMHG | HEART RATE: 88 BPM

## 2022-09-23 DIAGNOSIS — N18.4 CKD (CHRONIC KIDNEY DISEASE), STAGE IV (HCC): ICD-10-CM

## 2022-09-23 DIAGNOSIS — I48.92 ATRIAL FLUTTER, UNSPECIFIED TYPE (HCC): ICD-10-CM

## 2022-09-23 DIAGNOSIS — I35.1 NONRHEUMATIC AORTIC VALVE INSUFFICIENCY: Chronic | ICD-10-CM

## 2022-09-23 DIAGNOSIS — I34.0 NON-RHEUMATIC MITRAL REGURGITATION: Primary | Chronic | ICD-10-CM

## 2022-09-23 DIAGNOSIS — E78.5 DYSLIPIDEMIA: ICD-10-CM

## 2022-09-23 DIAGNOSIS — Z98.890 S/P MVR (MITRAL VALVE REPAIR): ICD-10-CM

## 2022-09-23 DIAGNOSIS — I50.22 CHRONIC SYSTOLIC HEART FAILURE (HCC): Chronic | ICD-10-CM

## 2022-09-23 DIAGNOSIS — Z95.2 S/P AVR: ICD-10-CM

## 2022-09-23 PROCEDURE — 93000 ELECTROCARDIOGRAM COMPLETE: CPT | Performed by: INTERNAL MEDICINE

## 2022-09-23 PROCEDURE — 99214 OFFICE O/P EST MOD 30 MIN: CPT | Performed by: INTERNAL MEDICINE

## 2022-09-23 PROCEDURE — 1160F RVW MEDS BY RX/DR IN RCRD: CPT | Performed by: INTERNAL MEDICINE

## 2022-09-23 NOTE — PROGRESS NOTES
Cardiology   KAYLEN HARRIS [facility] 71 y o  male MRN: [id number]          Impression:  1  S/P AVR/MV repair 3/19 - doing well    2  Atrial flutter - in NSR  On warfarin  3  Hypertension - controlled  4  Cardiomyopathy - likely tachy mediated  Resolved    5  CKD - stable  Cr 2 8     Recommendations:  1  Continue current medications  2  Check echo to evaluate valvular heart disease  3  Check fasting lipid panel and complete metabolic profile  4  Follow up in 6 months  HPI: KAYLEN HARRIS [facility] is a 71y o  year old male with mod-severe MR/mod-severe AI and EF 40% s/p bioAVR and MV repair with annuloplasty ring and VIRI clipping 3/19, with paroxysmal atrial fibrillation/atrial flutter who returns for follow up  Adeola hyde PATIENCE/DCCV 4/12/19 - EF 25%, mod MR, no clot   Successfully converted to NSR  Echo 11/20 - EF 65%, normal bio AVR and MV repair  Holter monitor 6/22 demonstrated no atrial flutter  Does have palpitations, but no chest pain or shortness of breath  Review of Systems   Constitutional: Negative  HENT: Negative  Eyes: Negative  Respiratory: Negative for chest tightness and shortness of breath  Cardiovascular: Positive for palpitations  Negative for chest pain and leg swelling  Gastrointestinal: Negative  Endocrine: Negative  Genitourinary: Negative  Musculoskeletal: Negative  Skin: Negative  Allergic/Immunologic: Negative  Neurological: Negative  Hematological: Negative  Psychiatric/Behavioral: Negative  All other systems reviewed and are negative          Past Medical History:   Diagnosis Date    Chronic kidney disease     Colon polyp     COVID-19     Diabetes mellitus ([address] )     Hyperlipidemia     Hypertension     Proteinuria     Stage 3 chronic kidney disease (HCC)     Vitamin D deficiency      Past Surgical History:   Procedure Laterality Date    COLONOSCOPY      overdue    NV ECHO TRANSESOPHAG R-T 2D W/PRB IMG ACQUISJ I&R N/A 3/7/2019    Procedure: INTRA-OP TRANSESOPHAGEAL ECHOCARDIOGRAM (PATIENCE); Surgeon: Maryan Johnson DO;  [facility];  Service: Cardiac Surgery    ND PERQ CLSR TCAT L ATR APNDGE W/ENDOCARDIAL IMPLNT  3/7/2019    Procedure: LEFT ATRIAL APPENDAGE OCCLUSION CLIPPED with 35mm ATRICURE ATRICLIP;  Surgeon: Maryan Johnson DO;  [facility];  Service: Cardiac Surgery    ND REPLACEMENT OF MITRAL VALVE N/A 3/7/2019    Procedure: REPLACEMENT VALVE MITRAL (MVR) REPAIR with a 30mm MEDTRONIC CG FUTURE RING;  Surgeon: Maryan Johnson DO;  [facility];  Service: Cardiac Surgery    ND RPLCMT AORTIC VALVE OPN W/STENTLESS TISSUE VALVE N/A 3/7/2019    Procedure: REPLACEMENT VALVE AORTIC (AVR) with 23mm TAVERA INSPIRIS RESILIA  AORTIC VALVE;  Surgeon: Maryan Johnson DO;  [facility];  Service: Cardiac Surgery    RENAL BIOPSY, OPEN      TOE SURGERY Left      Social History     Substance and Sexual Activity   Alcohol Use Yes    Comment: occasionally     Social History     Substance and Sexual Activity   Drug Use No     Social History     Tobacco Use   Smoking Status Former Smoker    Types: Cigarettes    Quit date: 26    Years since quittin 7   Smokeless Tobacco Never Used     Family History   Problem Relation Age of Onset    Stroke Mother     Hypertension Mother     Blindness Father     Hyperlipidemia Father     Hypertension Father     Glaucoma Father     Cancer Sister     Diabetes Sister     Ovarian cancer Sister     Gout Brother     Heart Valve Disease Brother     Hypertension Brother     Anuerysm Neg Hx     Heart attack Neg Hx     Arrhythmia Neg Hx     Heart failure Neg Hx     Coronary artery disease Neg Hx     Sudden death Neg Hx         scd       Allergies:   Allergies   Allergen Reactions    Ace Inhibitors Cough    Keflex [Cephalexin] Rash       Medications:     Current Outpatient Medications:     ALPRAZolam (XANAX) 0 25 mg tablet, Take  1 tablets 30 minutes before flight , Disp: 4 tablet, Rfl: 0    amLODIPine (NORVASC) 2 5 mg tablet, Take 1 tablet (2 5 mg total) by mouth daily, Disp: 90 tablet, Rfl: 3    ascorbic acid (VITAMIN C) 500 mg tablet, Take 500 mg by mouth daily, Disp: , Rfl:     atorvastatin (LIPITOR) 20 mg tablet, Take 1 tablet (20 mg total) by mouth daily, Disp: 90 tablet, Rfl: 2    calcitriol (ROCALTROL) 0 25 mcg capsule, TAKE 1 CAPSULE 5 DAYS A WEEK AS DIRECTED, Disp: 60 capsule, Rfl: 3    Cholecalciferol (VITAMIN D3) 1000 units CAPS, Take 1 capsule by mouth daily, Disp: , Rfl:     COMBIGAN 0 2-0 5 %, , Disp: , Rfl: 0    hydrALAZINE (APRESOLINE) 25 mg tablet, Take 1 tablet (25 mg total) by mouth 3 (three) times a day, Disp: 270 tablet, Rfl: 3    isosorbide dinitrate (ISORDIL) 10 mg tablet, Take 1 tablet (10 mg total) by mouth 3 (three) times a day, Disp: 270 tablet, Rfl: 3    metoprolol succinate (TOPROL-XL) 50 mg 24 hr tablet, take 1 tablet by mouth once daily, Disp: 180 tablet, Rfl: 1    RA Aspirin EC 81 MG EC tablet, take 1 tablet by mouth once daily, Disp: 90 tablet, Rfl: 6    Tradjenta 5 MG TABS, Take 2 5 mg by mouth daily, Disp: 30 tablet, Rfl: 0    TRAVATAN Z 0 004 % ophthalmic solution, Administer 1 drop to both eyes daily at bedtime , Disp: , Rfl: 0    warfarin (COUMADIN) 2 mg tablet, TAKE 1-2 TABLETS BY MOUTH DAILY OR AS DIRECTED BY PHYSICIAN, Disp: 75 tablet, Rfl: 11      Wt Readings from Last 3 Encounters:   09/23/22 72 2 kg (159 lb 1 6 oz)   09/12/22 73 kg (161 lb)   08/25/22 71 2 kg (157 lb)     Temp Readings from Last 3 Encounters:   08/25/22 98 3 °F (36 8 °C) (Temporal)   02/12/21 97 9 °F (36 6 °C)   01/29/21 (!) 97 2 °F (36 2 °C)     BP Readings from Last 3 Encounters:   09/23/22 110/60   09/12/22 130/60   08/25/22 125/82     Pulse Readings from Last 3 Encounters:   09/23/22 88   08/25/22 93   06/10/22 78         Physical Exam  HENT:      Head: Atraumatic        Mouth/Throat:      Mouth: Mucous membranes are moist    Eyes:      Extraocular Movements: Extraocular movements intact  Cardiovascular:      Rate and Rhythm: Normal rate and regular rhythm  Heart sounds: Normal heart sounds  Pulmonary:      Effort: Pulmonary effort is normal       Breath sounds: Normal breath sounds  Abdominal:      General: Abdomen is flat  Musculoskeletal:         General: Normal range of motion  Cervical back: Normal range of motion  Skin:     General: Skin is warm  Neurological:      General: No focal deficit present  Mental Status: He is alert and oriented to person, place, and time     Psychiatric:         Mood and Affect: Mood normal          Behavior: Behavior normal            Laboratory Studies:  CMP:  Lab Results   Component Value Date    K 5 3 2022     (H) 2022    CO2 22 2022    BUN 41 (H) 2022    CREATININE 2 84 (H) 2022    GLUCOSE 110 2019    AST 17 2021    ALT 18 2021    EGFR 21 2022       Lipid Profile:   No results found for: CHOL  Lab Results   Component Value Date    HDL 37 (L) 2021     Lab Results   Component Value Date    LDLCALC 42 2021     Lab Results   Component Value Date    TRIG 128 2021       Cardiac testing:   EKG reviewed personally: NSR 81 RBBB LAFB   Results for orders placed during the hospital encounter of 20    Echo complete with contrast if indicated    Narrative  [place] [address], [address] [place] [address]  [phone]    Transthoracic Echocardiogram  2D, M-mode, Doppler, and Color Doppler    Study date:  2020    Patient: Ankita Nesbitt  MR number: [id number]  Account number: [de-identified]  : 1950  Age: 72 years  Gender: Male  Status: Outpatient  Location: [address] [place] [address] [facility]  Height: 66 in  Weight: 183 7 lb  BP: 148/ 86 mmHg    Indications: CAD, AVR, MV Repair    Diagnoses: I25 83 - Coronary atherosclerosis due to lipid rich plaque    Sonographer:  TIMMY Recinos  Primary Physician:  Leonides Zhang MD  Referring Physician:  Abigail Adams MD  Group:  [facility]  Interpreting Physician:  Kaleb Moon MD    SUMMARY    LEFT VENTRICLE:  Systolic function was normal  Ejection fraction was estimated to be 65 %  There were no regional wall motion abnormalities  Features were consistent with a pseudonormal left ventricular filling pattern, with concomitant abnormal relaxation and increased filling pressure (grade 2 diastolic dysfunction)  MITRAL VALVE:  There was mild regurgitation  AORTIC VALVE:  A bioprosthesis was present  It exhibited normal function  The mean gradient was 13mmHg  TRICUSPID VALVE:  There was mild to moderate regurgitation  Estimated peak PA pressure was 28 mmHg  PULMONIC VALVE:  There was mild to moderate regurgitation  HISTORY: PRIOR HISTORY: heart failure, AI, MR, bicuspid AV, CAD, CM, aflutter    PROCEDURE: The study was performed in the [facility]  This was a routine study  The transthoracic approach was used  The study included complete 2D imaging, M-mode, complete spectral Doppler, and color Doppler  The  heart rate was 74 bpm, at the start of the study  Images were obtained from the parasternal, apical, subcostal, and suprasternal notch acoustic windows  Image quality was adequate  LEFT VENTRICLE: Size was normal  Systolic function was normal  Ejection fraction was estimated to be 65 %  There were no regional wall motion abnormalities  Wall thickness was normal  DOPPLER: Features were consistent with a pseudonormal  left ventricular filling pattern, with concomitant abnormal relaxation and increased filling pressure (grade 2 diastolic dysfunction)  RIGHT VENTRICLE: The size was normal  Systolic function was normal  Wall thickness was normal     LEFT ATRIUM: Size was normal     RIGHT ATRIUM: Size was normal     MITRAL VALVE: There was normal leaflet separation   There was a prior surgical repair  The mean gradient across the ring was 2mmHg  DOPPLER: The transmitral velocity was within the normal range  There was mild regurgitation  AORTIC VALVE: A bioprosthesis was present  It exhibited normal function  The mean gradient was 13mmHg  TRICUSPID VALVE: The valve structure was normal  There was normal leaflet separation  DOPPLER: The transtricuspid velocity was within the normal range  There was no evidence for stenosis  There was mild to moderate regurgitation  Estimated  peak PA pressure was 28 mmHg  PULMONIC VALVE: Leaflets exhibited normal thickness, no calcification, and normal cuspal separation  DOPPLER: The transpulmonic velocity was within the normal range  There was mild to moderate regurgitation  PERICARDIUM: There was no pericardial effusion  AORTA: The root exhibited normal size  SYSTEMIC VEINS: IVC: The inferior vena cava was not well visualized  SYSTEM MEASUREMENT TABLES    2D  %FS: 37 23 %  Ao asc: 3 41 cm  EDV(Teich): 86 36 ml  EF(Teich): 67 44 %  ESV(Teich): 28 12 ml  IVSd: 0 88 cm  LA Area: 11 95 cm2  LA Diam: 3 74 cm  LVEDV MOD A4C: 56 04 ml  LVEF MOD A4C: 60 86 %  LVESV MOD A4C: 21 94 ml  LVIDd: 4 37 cm  LVIDs: 2 74 cm  LVLd A4C: 8 16 cm  LVLs A4C: 7 31 cm  LVOT Diam: 1 9 cm  LVPWd: 0 92 cm  RA Area: 10 72 cm2  RVIDd: 3 71 cm  SV MOD A4C: 34 11 ml  SV(Teich): 58 25 ml    CW  AV Env  Ti: 312 08 ms  AV MaxP 7 mmHg  AV VTI: 52 95 cm  AV Vmax: 2 43 m/s  AV Vmean: 1 7 m/s  AV meanP 43 mmHg  TR MaxP 49 mmHg  TR Vmax: 2 32 m/s    MM  TAPSE: 1 46 cm    PW  E' Sept: 0 06 m/s  E/E' Sept: 18 99  MV A Jeferson: 1 05 m/s  MV Dec Brown: 4 15 m/s2  MV DecT: 275 38 ms  MV E Jeferson: 1 13 m/s  MV E/A Ratio: 1 08  MV PHT: 79 86 ms  MVA By PHT: 2 75 cm2    [facility] Accredited [facility]chocardi[facility]    Prepared and electronically signed by    Jeremiah Malone MD  Signed [id number] 12:07:16    Results for orders placed during the hospital encounter of 19    PATIENCE    Narrative  [address], [address] [facility]  [phone]    Transesophageal Echocardiogram  Limited 2D, Doppler, and Color Doppler    Study date:  2019    Patient: Rhea Rowley  MR number: [id number]  Account number: [de-identified]  : 1950  Age: 80 years  Gender: Male  Status: Outpatient  Location: Cath lab  Height: 66 in  Weight: 156 lb  BP: 140/ 100 mmHg    Indications: Atrial flutter w/ cardioversion  Diagnoses: I48 1 - Atrial flutter    Sonographer:  Cullen Vences RDCS  Primary Physician:  Liv Madden  Referring Physician:  Nayeli Valdez MD  Group:  [facility]  RN:  Rae Barron  Interpreting Physician:  Nayeli Valdez MD    SUMMARY    LEFT VENTRICLE:  The ventricle was mildly dilated  Systolic function was severely reduced  Ejection fraction was estimated to be 25 %  There was severe diffuse hypokinesis  Wall thickness was normal     RIGHT VENTRICLE:  The size was normal   Systolic function was mildly reduced  LEFT ATRIAL APPENDAGE:  S/P clipping  No thrombus  MITRAL VALVE:  Prior repair procedures: surgical repair  There was moderate regurgitation  TRICUSPID VALVE:  There was mild regurgitation  HISTORY: PRIOR HISTORY: DM2  Dilated cardiomyopathy  CAD  Bicuspid aortic valve  Hypertension  Systolic congestive heart failure  S/P AVR and MV clip; 2019  PROCEDURE: The procedure was performed in the catheterization laboratory  This was a routine study  The risks and alternatives of the procedure were explained to the patient and informed consent was obtained  The transesophageal approach  was used  The study included limited 2D imaging, limited spectral Doppler, color Doppler, and probe insertion (without interpretation)  The heart rate was 125 bpm, at the start of the study  An adult omniplane probe was inserted by the  attending cardiologist  Intubated with ease   One intubation attempt(s)  There was no blood detected on the probe  Image quality was adequate  There were no complications during the procedure  MEDICATIONS: Anesthesia administered by  anesthesia team     LEFT VENTRICLE: The ventricle was mildly dilated  Systolic function was severely reduced  Ejection fraction was estimated to be 25 %  There was severe diffuse hypokinesis  Wall thickness was normal     RIGHT VENTRICLE: The size was normal  Systolic function was mildly reduced  Wall thickness was normal     LEFT ATRIUM: Size was normal  No thrombus was identified  APPENDAGE: S/P clipping  No thrombus  ATRIAL SEPTUM: No defect or patent foramen ovale was identified  RIGHT ATRIUM: Size was normal  No thrombus was identified  MITRAL VALVE: Prior repair procedures: surgical repair DOPPLER: There was moderate regurgitation  AORTIC VALVE: A bioprosthesis was present  It exhibited normal function  DOPPLER: There was no regurgitation  TRICUSPID VALVE: The valve structure was normal  There was normal leaflet separation  There was no echocardiographic evidence of vegetation  DOPPLER: There was mild regurgitation  PULMONIC VALVE: Leaflets exhibited normal thickness, no calcification, and normal cuspal separation  There was no echocardiographic evidence of vegetation  PERICARDIUM: There was no pericardial effusion  The pericardium was normal in appearance  AORTA: The root exhibited normal size  There was no atheroma  There was no evidence for dissection  There was no evidence for aneurysm  [address] Accredited Echocardiography Laboratory    Prepared and electronically signed by    Misa Lowry MD  Signed 12-Apr-2019 15:55:03    No results found for this or any previous visit  No results found for this or any previous visit

## 2022-09-23 NOTE — PATIENT INSTRUCTIONS
Recommendations:  1  Continue current medications  2  Check echo to evaluate valvular heart disease  3  Check fasting lipid panel and complete metabolic profile  4  Follow up in 6 months

## 2022-09-24 DIAGNOSIS — Z95.2 S/P AVR: ICD-10-CM

## 2022-09-26 RX ORDER — ACETAMINOPHEN/DIPHENHYDRAMINE 500MG-25MG
TABLET ORAL
Qty: 90 TABLET | Refills: 6 | Status: SHIPPED | OUTPATIENT
Start: 2022-09-26

## 2022-10-07 ENCOUNTER — ANTICOAG VISIT (OUTPATIENT)
Dept: CARDIOLOGY CLINIC | Facility: CLINIC | Age: 72
End: 2022-10-07

## 2022-10-07 ENCOUNTER — APPOINTMENT (OUTPATIENT)
Dept: LAB | Facility: CLINIC | Age: 72
End: 2022-10-07
Payer: COMMERCIAL

## 2022-10-07 DIAGNOSIS — I10 ESSENTIAL HYPERTENSION: Chronic | ICD-10-CM

## 2022-10-07 RX ORDER — AMLODIPINE BESYLATE 2.5 MG/1
TABLET ORAL
Qty: 90 TABLET | Refills: 3 | Status: SHIPPED | OUTPATIENT
Start: 2022-10-07

## 2022-10-20 ENCOUNTER — APPOINTMENT (OUTPATIENT)
Dept: LAB | Facility: CLINIC | Age: 72
End: 2022-10-20
Payer: COMMERCIAL

## 2022-10-20 DIAGNOSIS — E78.5 DYSLIPIDEMIA: ICD-10-CM

## 2022-10-20 DIAGNOSIS — N18.4 CHRONIC KIDNEY DISEASE (CKD) STAGE G4/A3, SEVERELY DECREASED GLOMERULAR FILTRATION RATE (GFR) BETWEEN 15-29 ML/MIN/1.73 SQUARE METER AND ALBUMINURIA CREATININE RATIO GREATER THAN 300 MG/G (HCC): ICD-10-CM

## 2022-10-20 LAB
ALBUMIN SERPL BCP-MCNC: 3.4 G/DL (ref 3.5–5)
ALP SERPL-CCNC: 60 U/L (ref 34–104)
ALT SERPL W P-5'-P-CCNC: 10 U/L (ref 7–52)
ANION GAP SERPL CALCULATED.3IONS-SCNC: 4 MMOL/L (ref 4–13)
AST SERPL W P-5'-P-CCNC: 14 U/L (ref 13–39)
BACTERIA UR QL AUTO: ABNORMAL /HPF
BILIRUB SERPL-MCNC: 0.38 MG/DL (ref 0.2–1)
BILIRUB UR QL STRIP: NEGATIVE
BUN SERPL-MCNC: 32 MG/DL (ref 5–25)
CALCIUM ALBUM COR SERPL-MCNC: 9.2 MG/DL (ref 8.3–10.1)
CALCIUM SERPL-MCNC: 8.7 MG/DL (ref 8.4–10.2)
CHLORIDE SERPL-SCNC: 112 MMOL/L (ref 96–108)
CHOLEST SERPL-MCNC: 117 MG/DL
CLARITY UR: CLEAR
CO2 SERPL-SCNC: 24 MMOL/L (ref 21–32)
COLOR UR: ABNORMAL
CREAT SERPL-MCNC: 2.6 MG/DL (ref 0.6–1.3)
GFR SERPL CREATININE-BSD FRML MDRD: 23 ML/MIN/1.73SQ M
GLUCOSE P FAST SERPL-MCNC: 93 MG/DL (ref 65–99)
GLUCOSE UR STRIP-MCNC: NEGATIVE MG/DL
HDLC SERPL-MCNC: 48 MG/DL
HGB UR QL STRIP.AUTO: ABNORMAL
KETONES UR STRIP-MCNC: NEGATIVE MG/DL
LDLC SERPL CALC-MCNC: 54 MG/DL (ref 0–100)
LEUKOCYTE ESTERASE UR QL STRIP: NEGATIVE
NITRITE UR QL STRIP: NEGATIVE
NON-SQ EPI CELLS URNS QL MICRO: ABNORMAL /HPF
PH UR STRIP.AUTO: 6 [PH]
POTASSIUM SERPL-SCNC: 4.9 MMOL/L (ref 3.5–5.3)
PROT SERPL-MCNC: 6.4 G/DL (ref 6.4–8.4)
PROT UR STRIP-MCNC: ABNORMAL MG/DL
RBC #/AREA URNS AUTO: ABNORMAL /HPF
SODIUM SERPL-SCNC: 140 MMOL/L (ref 135–147)
SP GR UR STRIP.AUTO: 1.02 (ref 1–1.03)
TRIGL SERPL-MCNC: 77 MG/DL
UROBILINOGEN UR STRIP-ACNC: <2 MG/DL
WBC #/AREA URNS AUTO: ABNORMAL /HPF

## 2022-10-20 PROCEDURE — 80053 COMPREHEN METABOLIC PANEL: CPT | Performed by: INTERNAL MEDICINE

## 2022-10-20 PROCEDURE — 36415 COLL VENOUS BLD VENIPUNCTURE: CPT | Performed by: INTERNAL MEDICINE

## 2022-10-20 PROCEDURE — 81001 URINALYSIS AUTO W/SCOPE: CPT | Performed by: STUDENT IN AN ORGANIZED HEALTH CARE EDUCATION/TRAINING PROGRAM

## 2022-10-20 PROCEDURE — 80061 LIPID PANEL: CPT

## 2022-10-27 ENCOUNTER — HOSPITAL ENCOUNTER (OUTPATIENT)
Dept: NON INVASIVE DIAGNOSTICS | Facility: CLINIC | Age: 72
Discharge: HOME | End: 2022-10-27
Payer: COMMERCIAL

## 2022-10-27 VITALS
DIASTOLIC BLOOD PRESSURE: 60 MMHG | SYSTOLIC BLOOD PRESSURE: 110 MMHG | HEART RATE: 76 BPM | WEIGHT: 159 LBS | HEIGHT: 68 IN | BODY MASS INDEX: 24.1 KG/M2

## 2022-10-27 DIAGNOSIS — Z98.890 S/P MVR (MITRAL VALVE REPAIR): ICD-10-CM

## 2022-10-27 DIAGNOSIS — Z95.2 S/P AVR: ICD-10-CM

## 2022-10-27 DIAGNOSIS — I50.22 CHRONIC SYSTOLIC HEART FAILURE (HCC): Chronic | ICD-10-CM

## 2022-10-27 LAB
AORTIC ROOT: 3.4 CM
AORTIC VALVE MEAN VELOCITY: 15.4 M/S
APICAL FOUR CHAMBER EJECTION FRACTION: 71 %
ASCENDING AORTA: 3.3 CM
AV LVOT MEAN GRADIENT: 3 MMHG
AV LVOT PEAK GRADIENT: 5 MMHG
AV MEAN GRADIENT: 10 MMHG
AV PEAK GRADIENT: 15 MMHG
AV VELOCITY RATIO: 0.56
DOP CALC AO PEAK VEL: 1.9 M/S
DOP CALC AO VTI: 43.48 CM
DOP CALC LVOT PEAK VEL VTI: 24.53 CM
DOP CALC LVOT PEAK VEL: 1.07 M/S
E WAVE DECELERATION TIME: 195 MS
FRACTIONAL SHORTENING: 36 % (ref 28–44)
INTERVENTRICULAR SEPTUM IN DIASTOLE (PARASTERNAL SHORT AXIS VIEW): 1 CM
INTERVENTRICULAR SEPTUM: 1 CM (ref 0.6–1.1)
LAAS-AP2: 15.4 CM2
LAAS-AP4: 16.7 CM2
LEFT ATRIUM AREA SYSTOLE SINGLE PLANE A4C: 17.7 CM2
LEFT ATRIUM SIZE: 3.4 CM
LEFT INTERNAL DIMENSION IN SYSTOLE: 2.7 CM (ref 2.1–4)
LEFT VENTRICULAR INTERNAL DIMENSION IN DIASTOLE: 4.2 CM (ref 3.5–6)
LEFT VENTRICULAR POSTERIOR WALL IN END DIASTOLE: 1 CM
LEFT VENTRICULAR STROKE VOLUME: 53 ML
LVSV (TEICH): 53 ML
MV E'TISSUE VEL-SEP: 8 CM/S
MV PEAK A VEL: 1.18 M/S
MV PEAK E VEL: 138 CM/S
MV STENOSIS PRESSURE HALF TIME: 57 MS
MV VALVE AREA P 1/2 METHOD: 3.86 CM2
RA PRESSURE ESTIMATED: 5 MMHG
RIGHT ATRIUM AREA SYSTOLE A4C: 11.4 CM2
RIGHT VENTRICLE ID DIMENSION: 3.4 CM
RV PSP: 34 MMHG
SL CV LEFT ATRIUM LENGTH A2C: 3.9 CM
SL CV LV EF: 65
SL CV PED ECHO LEFT VENTRICLE DIASTOLIC VOLUME (MOD BIPLANE) 2D: 79 ML
SL CV PED ECHO LEFT VENTRICLE SYSTOLIC VOLUME (MOD BIPLANE) 2D: 26 ML
TR MAX PG: 29 MMHG
TR PEAK VELOCITY: 2.7 M/S
TRICUSPID VALVE PEAK REGURGITATION VELOCITY: 2.67 M/S

## 2022-10-27 PROCEDURE — 93306 TTE W/DOPPLER COMPLETE: CPT | Performed by: INTERNAL MEDICINE

## 2022-10-27 PROCEDURE — 93306 TTE W/DOPPLER COMPLETE: CPT

## 2022-11-04 ENCOUNTER — TELEPHONE (OUTPATIENT)
Dept: CARDIOLOGY CLINIC | Facility: CLINIC | Age: 72
End: 2022-11-04

## 2022-11-04 NOTE — TELEPHONE ENCOUNTER
Called patient and gave results  ----- Message from Joan Schmidt MD sent at 10/31/2022 11:43 AM EDT -----  Please call patient and let him know his echo looks good  Normal cardiac function with normal AVR/MV repair  Thanks

## 2022-11-11 DIAGNOSIS — E11.9 TYPE 2 DIABETES MELLITUS WITHOUT COMPLICATION, WITHOUT LONG-TERM CURRENT USE OF INSULIN (HCC): ICD-10-CM

## 2022-11-11 RX ORDER — LINAGLIPTIN 5 MG/1
TABLET, FILM COATED ORAL
Qty: 30 TABLET | Refills: 0 | Status: SHIPPED | OUTPATIENT
Start: 2022-11-11

## 2022-11-18 ENCOUNTER — ANTICOAG VISIT (OUTPATIENT)
Dept: CARDIOLOGY CLINIC | Facility: CLINIC | Age: 72
End: 2022-11-18

## 2022-11-18 ENCOUNTER — APPOINTMENT (OUTPATIENT)
Dept: LAB | Facility: CLINIC | Age: 72
End: 2022-11-18

## 2022-12-02 ENCOUNTER — HOSPITAL ENCOUNTER (EMERGENCY)
Facility: HOSPITAL | Age: 72
Discharge: HOME | End: 2022-12-02
Attending: EMERGENCY MEDICINE

## 2022-12-02 ENCOUNTER — OFFICE VISIT (OUTPATIENT)
Dept: FAMILY MEDICINE CLINIC | Facility: CLINIC | Age: 72
End: 2022-12-02

## 2022-12-02 VITALS
HEIGHT: 68 IN | BODY MASS INDEX: 24.86 KG/M2 | RESPIRATION RATE: 16 BRPM | TEMPERATURE: 97.3 F | HEART RATE: 38 BPM | WEIGHT: 164 LBS | OXYGEN SATURATION: 98 % | DIASTOLIC BLOOD PRESSURE: 64 MMHG | SYSTOLIC BLOOD PRESSURE: 130 MMHG

## 2022-12-02 VITALS
SYSTOLIC BLOOD PRESSURE: 157 MMHG | DIASTOLIC BLOOD PRESSURE: 68 MMHG | RESPIRATION RATE: 18 BRPM | TEMPERATURE: 98.3 F | HEART RATE: 66 BPM | OXYGEN SATURATION: 99 %

## 2022-12-02 DIAGNOSIS — I49.3 FREQUENT PVCS: Primary | ICD-10-CM

## 2022-12-02 DIAGNOSIS — Z11.59 NEED FOR HEPATITIS C SCREENING TEST: ICD-10-CM

## 2022-12-02 DIAGNOSIS — R00.1 BRADYCARDIA: ICD-10-CM

## 2022-12-02 DIAGNOSIS — I10 ESSENTIAL HYPERTENSION: Chronic | ICD-10-CM

## 2022-12-02 DIAGNOSIS — E78.5 DYSLIPIDEMIA: ICD-10-CM

## 2022-12-02 DIAGNOSIS — I48.92 ATRIAL FLUTTER, UNSPECIFIED TYPE (HCC): ICD-10-CM

## 2022-12-02 DIAGNOSIS — Z79.01 ANTICOAGULANT LONG-TERM USE: ICD-10-CM

## 2022-12-02 DIAGNOSIS — I42.0 DILATED CARDIOMYOPATHY (HCC): Chronic | ICD-10-CM

## 2022-12-02 DIAGNOSIS — E11.9 DIABETES MELLITUS TYPE 2 IN NONOBESE (HCC): Chronic | ICD-10-CM

## 2022-12-02 DIAGNOSIS — Z12.5 PROSTATE CANCER SCREENING: ICD-10-CM

## 2022-12-02 DIAGNOSIS — I25.10 CORONARY ARTERY DISEASE INVOLVING NATIVE CORONARY ARTERY OF NATIVE HEART WITHOUT ANGINA PECTORIS: Chronic | ICD-10-CM

## 2022-12-02 DIAGNOSIS — I50.22 CHRONIC SYSTOLIC HEART FAILURE (HCC): Chronic | ICD-10-CM

## 2022-12-02 DIAGNOSIS — N18.4 CKD (CHRONIC KIDNEY DISEASE), STAGE IV (HCC): ICD-10-CM

## 2022-12-02 DIAGNOSIS — Z23 NEED FOR INFLUENZA VACCINATION: Primary | ICD-10-CM

## 2022-12-02 LAB
ANION GAP SERPL CALCULATED.3IONS-SCNC: 6 MMOL/L (ref 4–13)
ATRIAL RATE: 44 BPM
BASOPHILS # BLD AUTO: 0.04 THOUSANDS/ÂΜL (ref 0–0.1)
BASOPHILS NFR BLD AUTO: 1 % (ref 0–1)
BUN SERPL-MCNC: 39 MG/DL (ref 5–25)
CALCIUM SERPL-MCNC: 8 MG/DL (ref 8.4–10.2)
CHLORIDE SERPL-SCNC: 111 MMOL/L (ref 96–108)
CO2 SERPL-SCNC: 20 MMOL/L (ref 21–32)
CREAT SERPL-MCNC: 2.63 MG/DL (ref 0.6–1.3)
EOSINOPHIL # BLD AUTO: 0.11 THOUSAND/ÂΜL (ref 0–0.61)
EOSINOPHIL NFR BLD AUTO: 1 % (ref 0–6)
ERYTHROCYTE [DISTWIDTH] IN BLOOD BY AUTOMATED COUNT: 13 % (ref 11.6–15.1)
GFR SERPL CREATININE-BSD FRML MDRD: 23 ML/MIN/1.73SQ M
GLUCOSE SERPL-MCNC: 92 MG/DL (ref 65–140)
HCT VFR BLD AUTO: 42.6 % (ref 36.5–49.3)
HGB BLD-MCNC: 14.1 G/DL (ref 12–17)
IMM GRANULOCYTES # BLD AUTO: 0.05 THOUSAND/UL (ref 0–0.2)
IMM GRANULOCYTES NFR BLD AUTO: 1 % (ref 0–2)
LYMPHOCYTES # BLD AUTO: 1.25 THOUSANDS/ÂΜL (ref 0.6–4.47)
LYMPHOCYTES NFR BLD AUTO: 14 % (ref 14–44)
MAGNESIUM SERPL-MCNC: 1.9 MG/DL (ref 1.9–2.7)
MCH RBC QN AUTO: 30.9 PG (ref 26.8–34.3)
MCHC RBC AUTO-ENTMCNC: 33.1 G/DL (ref 31.4–37.4)
MCV RBC AUTO: 93 FL (ref 82–98)
MONOCYTES # BLD AUTO: 0.99 THOUSAND/ÂΜL (ref 0.17–1.22)
MONOCYTES NFR BLD AUTO: 11 % (ref 4–12)
NEUTROPHILS # BLD AUTO: 6.38 THOUSANDS/ÂΜL (ref 1.85–7.62)
NEUTS SEG NFR BLD AUTO: 72 % (ref 43–75)
NRBC BLD AUTO-RTO: 0 /100 WBCS
P AXIS: 72 DEGREES
PLATELET # BLD AUTO: 176 THOUSANDS/UL (ref 149–390)
PMV BLD AUTO: 10.2 FL (ref 8.9–12.7)
POTASSIUM SERPL-SCNC: 5.2 MMOL/L (ref 3.5–5.3)
PR INTERVAL: 136 MS
QRS AXIS: -22 DEGREES
QRSD INTERVAL: 118 MS
QT INTERVAL: 424 MS
QTC INTERVAL: 470 MS
RBC # BLD AUTO: 4.57 MILLION/UL (ref 3.88–5.62)
SODIUM SERPL-SCNC: 137 MMOL/L (ref 135–147)
T WAVE AXIS: 222 DEGREES
VENTRICULAR RATE: 74 BPM
WBC # BLD AUTO: 8.82 THOUSAND/UL (ref 4.31–10.16)

## 2022-12-02 RX ORDER — METOPROLOL SUCCINATE 25 MG/1
25 TABLET, EXTENDED RELEASE ORAL DAILY
Status: DISCONTINUED | OUTPATIENT
Start: 2022-12-02 | End: 2022-12-02 | Stop reason: HOSPADM

## 2022-12-02 RX ORDER — METOPROLOL SUCCINATE 25 MG/1
75 TABLET, EXTENDED RELEASE ORAL DAILY
Qty: 20 TABLET | Refills: 0 | Status: SHIPPED | OUTPATIENT
Start: 2022-12-02

## 2022-12-02 RX ADMIN — METOPROLOL SUCCINATE 25 MG: 25 TABLET, EXTENDED RELEASE ORAL at 13:30

## 2022-12-02 NOTE — ED PROVIDER NOTES
History  Chief Complaint   Patient presents with   • Slow Heart Rate     Pt states was just at pcp and they noticed his HR was 38  Pt has no complaints pcp sent it to be checked d/t hx of open heart surgery approx 3 yrs ago      Patient is a 81-year-old male with PMH of hypertension and aortic valve replacement 3 years ago that presents to the emergency department after being found to have a heart rate of 38 at his primary care provider office this morning  He reports that he went to his PCP for routine checkup this morning and was found to have low heart rate  He reports being completely asymptomatic otherwise and denies chest pain, shortness of breath, lightheadedness, dizziness and has been able to walk without any difficulty  He does report feeling palpitations, however has had these feelings for several months  He reports that he discussed his palpitations with his cardiologist is and that he wore a Holter monitor for a day as well as had an echo performed 2 weeks ago both of which were within normal limits  The patient has never been bradycardic in the past that he is aware of  He also reports that during his open heart surgery for aortic valve replacement of the placed a clip on his mitral valve but he is not exactly sure why that was required at that time  The patient denies any chronic lung diseases and otherwise feels well  He denies any recent illnesses or injuries  Prior to Admission Medications   Prescriptions Last Dose Informant Patient Reported? Taking? ALPRAZolam (XANAX) 0 25 mg tablet   No No   Sig: Take  1 tablets 30 minutes before flight     COMBIGAN 0 2-0 5 %   Yes No   Cholecalciferol (VITAMIN D3) 1000 units CAPS   Yes No   Sig: Take 1 capsule by mouth daily   RA Aspirin EC 81 MG EC tablet   No No   Sig: take 1 tablet by mouth once daily   TRAVATAN Z 0 004 % ophthalmic solution   Yes No   Sig: Administer 1 drop to both eyes daily at bedtime    Tradjenta 5 MG TABS   No No   Sig: TAKE 1/2 TABLET BY MOUTH DAILY   amLODIPine (NORVASC) 2 5 mg tablet   No No   Sig: take 1 tablet by mouth once daily   ascorbic acid (VITAMIN C) 500 mg tablet   Yes No   Sig: Take 500 mg by mouth daily   atorvastatin (LIPITOR) 20 mg tablet   No No   Sig: Take 1 tablet (20 mg total) by mouth daily   calcitriol (ROCALTROL) 0 25 mcg capsule   No No   Sig: TAKE 1 CAPSULE 5 DAYS A WEEK AS DIRECTED   hydrALAZINE (APRESOLINE) 25 mg tablet   No No   Sig: Take 1 tablet (25 mg total) by mouth 3 (three) times a day   isosorbide dinitrate (ISORDIL) 10 mg tablet   No No   Sig: Take 1 tablet (10 mg total) by mouth 3 (three) times a day   metoprolol succinate (TOPROL-XL) 50 mg 24 hr tablet   No No   Sig: take 1 tablet by mouth once daily   warfarin (COUMADIN) 2 mg tablet   No No   Sig: TAKE 1-2 TABLETS BY MOUTH DAILY OR AS DIRECTED BY PHYSICIAN      Facility-Administered Medications: None       Past Medical History:   Diagnosis Date   • Chronic kidney disease    • Colon polyp    • COVID-19    • Diabetes mellitus (HCC)    • Hyperlipidemia    • Hypertension    • Proteinuria    • Stage 3 chronic kidney disease (HCC)    • Vitamin D deficiency        Past Surgical History:   Procedure Laterality Date   • COLONOSCOPY      overdue   • AK ECHO TRANSESOPHAG R-T 2D W/PRB IMG ACQUISJ I&R N/A 3/7/2019    Procedure: INTRA-OP TRANSESOPHAGEAL ECHOCARDIOGRAM (PATIENCE);   Surgeon: Viv Gustafson DO;  [facility];  Service: Cardiac Surgery   • AK PERQ CLSR TCAT L ATR APNDGE W/ENDOCARDIAL IMPLNT  3/7/2019    Procedure: LEFT ATRIAL APPENDAGE OCCLUSION CLIPPED with 35mm Enrigue Dearth;  Surgeon: Viv Gustafson DO;  [facility];  Service: Cardiac Surgery   • AK REPLACEMENT OF MITRAL VALVE N/A 3/7/2019    Procedure: REPLACEMENT VALVE MITRAL (MVR) REPAIR with a 30mm MEDTRONIC CG FUTURE RING;  Surgeon: Viv Gustafson DO;  [facility];  Service: Cardiac Surgery   • AK RPLCMT AORTIC VALVE OPN W/STENTLESS TISSUE VALVE N/A 3/7/2019    Procedure: REPLACEMENT VALVE AORTIC (AVR) with 23mm TAVERA INSPIRIS RESILIA  AORTIC VALVE;  Surgeon: Johny Grider DO;  Location[facility];  Service: Cardiac Surgery   • RENAL BIOPSY, OPEN     • TOE SURGERY Left        Family History   Problem Relation Age of Onset   • Stroke Mother    • Hypertension Mother    • Blindness Father    • Hyperlipidemia Father    • Hypertension Father    • Glaucoma Father    • Cancer Sister    • Diabetes Sister    • Ovarian cancer Sister    • Gout Brother    • Heart Valve Disease Brother    • Hypertension Brother    • Anuerysm Neg Hx    • Heart attack Neg Hx    • Arrhythmia Neg Hx    • Heart failure Neg Hx    • Coronary artery disease Neg Hx    • Sudden death Neg Hx         scd     I have reviewed and agree with the history as documented  E-Cigarette/Vaping   • E-Cigarette Use Never User      E-Cigarette/Vaping Substances     Social History     Tobacco Use   • Smoking status: Former     Years: 2 00     Types: Cigarettes     Quit date:      Years since quittin 9   • Smokeless tobacco: Never   Vaping Use   • Vaping Use: Never used   Substance Use Topics   • Alcohol use: Yes     Comment: occasionally   • Drug use: No        Review of Systems   Constitutional: Negative for chills and fever  HENT: Negative for ear pain and sore throat  Eyes: Negative for pain and visual disturbance  Respiratory: Negative for cough and shortness of breath  Cardiovascular: Positive for palpitations  Negative for chest pain  Gastrointestinal: Negative for abdominal pain, diarrhea, nausea and vomiting  Genitourinary: Negative for dysuria and hematuria  Musculoskeletal: Negative for arthralgias and back pain  Skin: Negative for color change and rash  Neurological: Negative for dizziness, seizures, syncope, light-headedness and headaches  All other systems reviewed and are negative        Physical Exam  ED Triage Vitals   Temperature Pulse Respirations Blood Pressure SpO2   12/02/22 1031 12/02/22 1030 12/02/22 1030 12/02/22 1032 12/02/22 1030   98 3 °F (36 8 °C) (!) 43 18 166/97 97 %      Temp Source Heart Rate Source Patient Position - Orthostatic VS BP Location FiO2 (%)   12/02/22 1031 12/02/22 1030 -- 12/02/22 1332 --   Oral Monitor  Left arm       Pain Score       12/02/22 1030       No Pain             Orthostatic Vital Signs  Vitals:    12/02/22 1032 12/02/22 1130 12/02/22 1330 12/02/22 1332   BP: 166/97  157/68 157/68   Pulse:  66 67 66       Physical Exam  Vitals and nursing note reviewed  Constitutional:       General: He is not in acute distress  Appearance: He is well-developed  He is not ill-appearing  HENT:      Head: Normocephalic and atraumatic  Right Ear: External ear normal       Left Ear: External ear normal       Mouth/Throat:      Mouth: Mucous membranes are moist       Pharynx: Oropharynx is clear  Eyes:      Extraocular Movements: Extraocular movements intact  Conjunctiva/sclera: Conjunctivae normal    Cardiovascular:      Rate and Rhythm: Normal rate and regular rhythm  Pulses: Normal pulses  Heart sounds: Normal heart sounds  No murmur heard  Pulmonary:      Effort: Pulmonary effort is normal  No respiratory distress  Breath sounds: Normal breath sounds  No wheezing, rhonchi or rales  Abdominal:      General: Abdomen is flat  Palpations: Abdomen is soft  Tenderness: There is no abdominal tenderness  There is no guarding or rebound  Musculoskeletal:         General: No swelling, tenderness or deformity  Normal range of motion  Cervical back: Normal range of motion and neck supple  Right lower leg: No edema  Left lower leg: No edema  Skin:     General: Skin is warm and dry  Capillary Refill: Capillary refill takes less than 2 seconds  Neurological:      Mental Status: He is alert and oriented to person, place, and time           ED Medications  Medications - No data to display    Diagnostic Studies  Results Reviewed     Procedure Component Value Units Date/Time    Basic metabolic panel [[id number]]  (Abnormal) Collected: 12/02/22 1118    Lab Status: Final result Specimen: Blood from Arm, Right Updated: 12/02/22 1138     Sodium 137 mmol/L      Potassium 5 2 mmol/L      Chloride 111 mmol/L      CO2 20 mmol/L      ANION GAP 6 mmol/L      BUN 39 mg/dL      Creatinine 2 63 mg/dL      Glucose 92 mg/dL      Calcium 8 0 mg/dL      eGFR 23 ml/min/1 73sq m     Narrative:      [place] guidelines for Chronic Kidney Disease (CKD):   •  Stage 1 with normal or high GFR (GFR > 90 mL/min/1 73 square meters)  •  Stage 2 Mild CKD (GFR = 60-89 mL/min/1 73 square meters)  •  Stage 3A Moderate CKD (GFR = 45-59 mL/min/1 73 square meters)  •  Stage 3B Moderate CKD (GFR = 30-44 mL/min/1 73 square meters)  •  Stage 4 Severe CKD (GFR = 15-29 mL/min/1 73 square meters)  •  Stage 5 End Stage CKD (GFR <15 mL/min/1 73 square meters)  Note: GFR calculation is accurate only with a steady state creatinine    Magnesium [[id number]]  (Normal) Collected: 12/02/22 1118    Lab Status: Final result Specimen: Blood from Arm, Right Updated: 12/02/22 1138     Magnesium 1 9 mg/dL     CBC and differential [[id number]] Collected: 12/02/22 1118    Lab Status: Final result Specimen: Blood from Arm, Right Updated: 12/02/22 1127     WBC 8 82 Thousand/uL      RBC 4 57 Million/uL      Hemoglobin 14 1 g/dL      Hematocrit 42 6 %      MCV 93 fL      MCH 30 9 pg      MCHC 33 1 g/dL      RDW 13 0 %      MPV 10 2 fL      Platelets 268 Thousands/uL      nRBC 0 /100 WBCs      Neutrophils Relative 72 %      Immat GRANS % 1 %      Lymphocytes Relative 14 %      Monocytes Relative 11 %      Eosinophils Relative 1 %      Basophils Relative 1 %      Neutrophils Absolute 6 38 Thousands/µL      Immature Grans Absolute 0 05 Thousand/uL      Lymphocytes Absolute 1 25 Thousands/µL      Monocytes Absolute 0 99 Thousand/µL Eosinophils Absolute 0 11 Thousand/µL      Basophils Absolute 0 04 Thousands/µL                  No orders to display         Procedures  ECG 12 Lead Documentation Only    Date/Time: 12/2/2022 10:45 AM  Performed by: Char Bland DO  Authorized by: Char Bland DO     Indications / Diagnosis:  Palpitations  ECG reviewed by me, the ED Provider: yes    Patient location:  ED  Previous ECG:     Previous ECG:  Compared to current    Comparison ECG info:  EKG now with several premature ventricular complexes    Similarity:  Changes noted  Interpretation:     Interpretation: abnormal    Rate:     ECG rate:  74    ECG rate assessment: normal    Rhythm:     Rhythm: sinus rhythm    Ectopy:     Ectopy: PVCs      PVCs:  Frequent  QRS:     QRS axis:  Left    QRS intervals:  Normal  Conduction:     Conduction: abnormal      Abnormal conduction: complete RBBB    ST segments:     ST segments:  Normal  T waves:     T waves: normal            ED Course                                       MDM  Number of Diagnoses or Management Options  Frequent PVCs  Diagnosis management comments: 72M with PMH of aortic valve replacement 3 years ago presents the emergency department after concern for bradycardia while at his primary care provider office this morning  The patient reports a several month history of palpitations, however has had recent echo and Holter monitor evaluation both which were normal   On presentation today the patient is asymptomatic and has no difficulty with ambulation and blood pressure is good  EKG evaluation reveals frequent PVCs, however heart rate is artificially low as motor does not  PVCs  EKG reveals a rate of 74 beats per minute  Laboratory evaluation including CBC, BMP, magnesium are all within normal limits other than the patient's elevated creatinine of 2 6 which is his baseline    The patient's case is discussed with cardiologist on-call who feels that the patient could use an increase in home Toprol XL from 50 mg daily to 75 mg daily to treat frequent PVCs  Discussed with the patient the importance of return to the emergency department if he becomes symptomatic including lightheadedness, nausea, dizziness, loss of consciousness  Administered additional 25 mg dose Toprol-XL to be given on top of his 50 mg dose here to receive this morning  Discussed with the patient increasing dose to 75 mg daily and he demonstrates understanding of this  He is also advised to call his cardiologist upon leaving the emergency department to schedule an appointment for further evaluation and treatment  Return precautions are discussed with the patient and they demonstrate understanding of the plan  The patient's questions are all answered to the their satisfaction and the patient is discharged home  Disposition  Final diagnoses:   Frequent PVCs     Time reflects when diagnosis was documented in both MDM as applicable and the Disposition within this note     Time User Action Codes Description Comment    12/2/2022  1:20 PM Cleone Krabbe Add [I49 3] Frequent PVCs       ED Disposition     ED Disposition   Discharge    Condition   Stable    Date/Time   Fri Dec 2, 2022  1:20 PM    Comment   Myra Mustafa discharge to home/self care                 Follow-up Information     Follow up With Specialties Details Why Contact Info Additional Information    [facility] Call  Or call your cardiologist to schedule an appointment for further evaluation and treatment of abnormal heart rhythm [address] [facility] [phone] [id number] Cardiology [address] [facility], [address] [place] [address]          Discharge Medication List as of 12/2/2022  1:26 PM      START taking these medications    Details   !! metoprolol succinate (TOPROL-XL) 25 mg 24 hr tablet Take 3 tablets (75 mg total) by mouth daily, Starting Fri 12/2/2022, Normal       !! - Potential duplicate medications found  Please discuss with provider  CONTINUE these medications which have NOT CHANGED    Details   ALPRAZolam (XANAX) 0 25 mg tablet Take  1 tablets 30 minutes before flight , Normal      amLODIPine (NORVASC) 2 5 mg tablet take 1 tablet by mouth once daily, Normal      ascorbic acid (VITAMIN C) 500 mg tablet Take 500 mg by mouth daily, Historical Med      atorvastatin (LIPITOR) 20 mg tablet Take 1 tablet (20 mg total) by mouth daily, Starting Mon 9/12/2022, Normal      calcitriol (ROCALTROL) 0 25 mcg capsule TAKE 1 CAPSULE 5 DAYS A WEEK AS DIRECTED, Normal      Cholecalciferol (VITAMIN D3) 1000 units CAPS Take 1 capsule by mouth daily, Historical Med      COMBIGAN 0 2-0 5 % Historical Med      hydrALAZINE (APRESOLINE) 25 mg tablet Take 1 tablet (25 mg total) by mouth 3 (three) times a day, Starting Fri 12/3/2021, Normal      isosorbide dinitrate (ISORDIL) 10 mg tablet Take 1 tablet (10 mg total) by mouth 3 (three) times a day, Starting Tue 12/28/2021, Normal      !! metoprolol succinate (TOPROL-XL) 50 mg 24 hr tablet take 1 tablet by mouth once daily, Normal      RA Aspirin EC 81 MG EC tablet take 1 tablet by mouth once daily, Normal      Tradjenta 5 MG TABS TAKE 1/2 TABLET BY MOUTH DAILY, Normal      TRAVATAN Z 0 004 % ophthalmic solution Administer 1 drop to both eyes daily at bedtime , Starting Mon 2/12/2018, Historical Med      warfarin (COUMADIN) 2 mg tablet TAKE 1-2 TABLETS BY MOUTH DAILY OR AS DIRECTED BY PHYSICIAN, Normal       !! - Potential duplicate medications found  Please discuss with provider  No discharge procedures on file  PDMP Review       Value Time User    PDMP Reviewed  Yes 2/21/2022  2:01 PM Angélica Morales MD           ED Provider  Attending physically available and evaluated [facility]  I managed the patient along with the ED Attending      Electronically Signed by         Lenka Weathers DO  12/02/22 1932 [facility]

## 2022-12-02 NOTE — ASSESSMENT & PLAN NOTE
Lab Results   Component Value Date    EGFR 23 10/20/2022    EGFR 21 09/02/2022    EGFR 23 06/08/2022    CREATININE 2 60 (H) 10/20/2022    CREATININE 2 84 (H) 09/02/2022    CREATININE 2 66 (H) 06/08/2022   seeing nephrology reviewed note  labs stable

## 2022-12-02 NOTE — PROGRESS NOTES
Assessment and Plan:     Problem List Items Addressed This Visit        Endocrine    Diabetes mellitus type 2 in nonobese (HCC) (Chronic)       Lab Results   Component Value Date    HGBA1C 5 8 (H) 06/08/2021   pt had HG1c 6 3 by home nurse from insurance company          Relevant Orders    Ambulatory Referral to Endocrinology       Cardiovascular and Mediastinum    Essential hypertension (Chronic)     Well controlled         Chronic systolic heart failure ([address] ) (Chronic)     Wt Readings from Last 3 Encounters:   12/02/22 74 4 kg (164 lb)   10/27/22 72 1 kg (159 lb)   09/23/22 72 2 kg (159 lb 1 6 oz)       Sees cardiologist note reviewed            Coronary artery disease involving native coronary artery of native heart without angina pectoris (Chronic)     Followed uy cardiology note reviewed no chest pain         Dilated cardiomyopathy ([facility] ) (Chronic)     Recent echo done cardiology note reviewed          Atrial flutter ([facility] [address] )     On coumadin HR today irregularly irregular and bradycardic to ER            Genitourinary    CKD (chronic kidney disease), stage IV ([address] )     Lab Results   Component Value Date    EGFR 23 10/20/2022    EGFR 21 09/02/2022    EGFR 23 06/08/2022    CREATININE 2 60 (H) 10/20/2022    CREATININE 2 84 (H) 09/02/2022    CREATININE 2 66 (H) 06/08/2022   seeing nephrology reviewed note  labs stable             Other    Anticoagulant long-term use     Coumadin monitored cardiology clinic         Dyslipidemia     Labs done and ok         Bradycardia     HR 38 pt asymptomatic sent to ER driven by son for evaluation        Other Visit Diagnoses     Need for influenza vaccination    -  Primary    Need for hepatitis C screening test        Relevant Orders    Hepatitis C Antibody ([facility])    Prostate cancer screening        Relevant Orders    PSA, Total Screen           Preventive health issues were discussed with patient, and age appropriate screening tests were ordered as noted in patient's After Visit Summary  Personalized health advice and appropriate referrals for health education or preventive services given if needed, as noted in patient's After Visit Summary  History of Present Illness:     Patient presents for a Medicare Wellness Visit    HPI   Patient Care Team:  Ann Marie Ruth MD as PCP - General (Family Medicine)     Review of Systems:     Review of Systems     Problem List:     Patient Active Problem List   Diagnosis   • Persistent proteinuria   • Diabetic nephropathy associated with type 2 diabetes mellitus ([place] [address] )   • Vitamin D deficiency   • Secondary hyperparathyroidism of renal origin ([place] [address] )   • FSGS (focal segmental glomerulosclerosis)   • Essential hypertension   • Chronic systolic heart failure ([place] [address] )   • Nonrheumatic aortic valve insufficiency   • Non-rheumatic mitral regurgitation   • Bicuspid aortic valve   • Coronary artery disease involving native coronary artery of native heart without angina pectoris   • S/P AVR   • S/P MVR (mitral valve repair)   • Dilated cardiomyopathy (HCC)   • Diabetes mellitus type 2 in nonobese ([facility])   • Atrial flutter ([place] [address] )   • Encounter for postoperative care   • CKD (chronic kidney disease), stage IV ([place] [address] )   • History of colon polyps   • Anticoagulant long-term use   • Close exposure to COVID-19 virus   • COVID-19 virus infection   • Dyslipidemia   • Bradycardia      Past Medical and Surgical History:     Past Medical History:   Diagnosis Date   • Chronic kidney disease    • Colon polyp    • COVID-19    • Diabetes mellitus ([place] [address] )    • Hyperlipidemia    • Hypertension    • Proteinuria    • Stage 3 chronic kidney disease ([place] [address] )    • Vitamin D deficiency      Past Surgical History:   Procedure Laterality Date   • COLONOSCOPY      overdue   • SD ECHO TRANSESOPHAG R-T 2D W/PRB IMG ACQUISJ I&R N/A 3/7/2019    Procedure: INTRA-OP TRANSESOPHAGEAL ECHOCARDIOGRAM (PATIENCE);   Surgeon: Tina Weldon DO;  [facility];  Service: Cardiac Surgery   • SD PERQ CLSR TCAT L ATR APNDGE W/ENDOCARDIAL IMPLNT  3/7/2019    Procedure: LEFT ATRIAL APPENDAGE OCCLUSION CLIPPED with 35mm ATRICURE ATRICLIP;  Surgeon: Carloz Barros DO;  [facility];  Service: Cardiac Surgery   • FL REPLACEMENT OF MITRAL VALVE N/A 3/7/2019    Procedure: REPLACEMENT VALVE MITRAL (MVR) REPAIR with a 30mm MEDTRONIC CG FUTURE RING;  Surgeon: Carloz Barros DO;  [facility];  Service: Cardiac Surgery   • FL RPLCMT AORTIC VALVE OPN W/STENTLESS TISSUE VALVE N/A 3/7/2019    Procedure: REPLACEMENT VALVE AORTIC (AVR) with 23mm TAVERA INSPIRIS RESILIA  AORTIC VALVE;  Surgeon: Carloz Barros DO;  [facility];  Service: Cardiac Surgery   • RENAL BIOPSY, OPEN     • TOE SURGERY Left       Family History:     Family History   Problem Relation Age of Onset   • Stroke Mother    • Hypertension Mother    • Blindness Father    • Hyperlipidemia Father    • Hypertension Father    • Glaucoma Father    • Cancer Sister    • Diabetes Sister    • Ovarian cancer Sister    • Gout Brother    • Heart Valve Disease Brother    • Hypertension Brother    • Anuerysm Neg Hx    • Heart attack Neg Hx    • Arrhythmia Neg Hx    • Heart failure Neg Hx    • Coronary artery disease Neg Hx    • Sudden death Neg Hx         scd      Social History:     Social History     Socioeconomic History   • Marital status: /Civil Union     Spouse name: None   • Number of children: None   • Years of education: None   • Highest education level: None   Occupational History   • Occupation: RETIRED   Tobacco Use   • Smoking status: Former     Years: 2 00     Types: Cigarettes     Quit date:      Years since quittin 9   • Smokeless tobacco: Never   Vaping Use   • Vaping Use: Never used   Substance and Sexual Activity   • Alcohol use: Yes     Comment: occasionally   • Drug use: No   • Sexual activity: Yes     Partners: Female     Birth control/protection: None   Other Topics Concern   • None   Social History Narrative   • None     Social Determinants of Health     Financial Resource Strain: Low Risk    • Difficulty of Paying Living Expenses: Not hard at all   Food Insecurity: Not on file   Transportation Needs: No Transportation Needs   • Lack of Transportation (Medical): No   • Lack of Transportation (Non-Medical): No   Physical Activity: Not on file   Stress: Not on file   Social Connections: Not on file   Intimate Partner Violence: Not on file   Housing Stability: Not on file      Medications and Allergies:     Current Outpatient Medications   Medication Sig Dispense Refill   • ALPRAZolam (XANAX) 0 25 mg tablet Take  1 tablets 30 minutes before flight  4 tablet 0   • amLODIPine (NORVASC) 2 5 mg tablet take 1 tablet by mouth once daily 90 tablet 3   • ascorbic acid (VITAMIN C) 500 mg tablet Take 500 mg by mouth daily     • atorvastatin (LIPITOR) 20 mg tablet Take 1 tablet (20 mg total) by mouth daily 90 tablet 2   • calcitriol (ROCALTROL) 0 25 mcg capsule TAKE 1 CAPSULE 5 DAYS A WEEK AS DIRECTED 60 capsule 3   • Cholecalciferol (VITAMIN D3) 1000 units CAPS Take 1 capsule by mouth daily     • COMBIGAN 0 2-0 5 %   0   • hydrALAZINE (APRESOLINE) 25 mg tablet Take 1 tablet (25 mg total) by mouth 3 (three) times a day 270 tablet 3   • isosorbide dinitrate (ISORDIL) 10 mg tablet Take 1 tablet (10 mg total) by mouth 3 (three) times a day 270 tablet 3   • metoprolol succinate (TOPROL-XL) 50 mg 24 hr tablet take 1 tablet by mouth once daily 180 tablet 1   • RA Aspirin EC 81 MG EC tablet take 1 tablet by mouth once daily 90 tablet 6   • Tradjenta 5 MG TABS TAKE 1/2 TABLET BY MOUTH DAILY 30 tablet 0   • TRAVATAN Z 0 004 % ophthalmic solution Administer 1 drop to both eyes daily at bedtime   0   • warfarin (COUMADIN) 2 mg tablet TAKE 1-2 TABLETS BY MOUTH DAILY OR AS DIRECTED BY PHYSICIAN 75 tablet 11     No current facility-administered medications for this visit       Allergies   Allergen Reactions   • Ace Inhibitors Cough   • Keflex [Cephalexin] Rash      Immunizations:     Immunization History   Administered Date(s) Administered   • COVID-19 MODERNA VACC 0 5 ML IM 04/13/2021, 05/13/2021   • INFLUENZA 11/01/2010, 10/09/2013, 11/03/2015, 12/13/2018   • Influenza, high dose seasonal 0 7 mL 10/07/2020, 09/15/2021   • Pneumococcal Conjugate 13-Valent 07/13/2018   • Tdap 07/13/2019   • Zoster 09/02/2014      Health Maintenance:         Topic Date Due   • Hepatitis C Screening  Never done   • Colorectal Cancer Screening  08/24/2027         Topic Date Due   • Hepatitis B Vaccine (1 of 3 - 3-dose series) Never done   • Pneumococcal Vaccine: 65+ Years (2 - PPSV23 if available, else PCV20) 07/13/2019   • COVID-19 Vaccine (3 - Booster for Moderna series) 10/13/2021   • Influenza Vaccine (1) 09/01/2022      Medicare Screening Tests and Risk Assessments:     Myra is here for his Subsequent Wellness visit  Health Risk Assessment:   Patient rates overall health as very good  Patient feels that their physical health rating is slightly better  Patient is very satisfied with their life  Eyesight was rated as same  Hearing was rated as same  Patient feels that their emotional and mental health rating is same  Patients states they are sometimes angry  Patient states they are never, rarely unusually tired/fatigued  Pain experienced in the last 7 days has been none  Patient states that he has experienced no weight loss or gain in last 6 months  Depression Screening:   PHQ-2 Score: 0      Fall Risk Screening: In the past year, patient has experienced: no history of falling in past year      Home Safety:  Patient does not have trouble with stairs inside or outside of their home  Patient has working smoke alarms and has no working carbon monoxide detector  Home safety hazards include: none  Nutrition:   Current diet is Regular  Medications:   Patient is currently taking over-the-counter supplements   OTC medications include: see medication list  Patient is able to manage medications  Activities of Daily Living (ADLs)/Instrumental Activities of Daily Living (IADLs):   Walk and transfer into and out of bed and chair?: Yes  Dress and groom yourself?: Yes    Bathe or shower yourself?: Yes    Do your laundry/housekeeping?: Yes  Manage your money, pay your bills and track your expenses?: Yes  Make your own meals?: Yes    Do your own shopping?: Yes    Previous Hospitalizations:   Any hospitalizations or ED visits within the last 12 months?: No      Advance Care Planning:   Living will: No      Cognitive Screening:   Provider or family/friend/caregiver concerned regarding cognition?: No    PREVENTIVE SCREENINGS      Cardiovascular Screening:    General: Screening Not Indicated and History Lipid Disorder      Diabetes Screening:     General: Screening Not Indicated and History Diabetes      Colorectal Cancer Screening:     General: Screening Current      Prostate Cancer Screening:    General: Risks and Benefits Discussed    Due for: PSA      Osteoporosis Screening:    General: Screening Not Indicated      Abdominal Aortic Aneurysm (AAA) Screening:    Risk factors include: age between 73-67 yo and tobacco use        Lung Cancer Screening:     General: Screening Not Indicated      Hepatitis C Screening:      Hep C Screening Accepted: Yes      Screening, Brief Intervention, and Referral to Treatment (SBIRT)    Screening  Typical number of drinks in a day: 0  Typical number of drinks in a week: 3  Interpretation: Low risk drinking behavior  Single Item Drug Screening:  How often have you used an illegal drug (including marijuana) or a prescription medication for non-medical reasons in the past year? never    Single Item Drug Screen Score: 0  Interpretation: Negative screen for possible drug use disorder    No results found       Physical Exam:     /64 (BP Location: Left arm, Patient Position: Sitting, Cuff Size: Standard)   Pulse (!) 38 Temp (!) 97 3 °F (36 3 °C) (Temporal)   Resp 16   Ht 5' 8" (1 727 m)   Wt 74 4 kg (164 lb)   SpO2 98%   BMI 24 94 kg/m²     Physical Exam     Etelvina Guillermo MD

## 2022-12-02 NOTE — ASSESSMENT & PLAN NOTE
Wt Readings from Last 3 Encounters:   12/02/22 74 4 kg (164 lb)   10/27/22 72 1 kg (159 lb)   09/23/22 72 2 kg (159 lb 1 6 oz)       Sees cardiologist note reviewed

## 2022-12-02 NOTE — DISCHARGE INSTRUCTIONS
- begin taking 1 and 1/2 tabs (75mg) of Toprol XL daily  - call your cardiologist to schedule an appointment for further evaluation and treatment of abnormal heart rhythm  - return to the emergency department if you develop chest pain, lightheadedness, shortness of breath, or dizziness

## 2022-12-02 NOTE — ASSESSMENT & PLAN NOTE
Lab Results   Component Value Date    HGBA1C 5 8 (H) 06/08/2021   pt had HG1c 6 3 by home nurse from insurance company

## 2022-12-02 NOTE — ED ATTENDING ATTESTATION
12/2/2022  I, Lakia Mtz MD, saw and evaluated the patient  I have discussed the patient with the resident/non-physician practitioner and agree with the resident's/non-physician practitioner's findings, Plan of Care, and MDM as documented in the resident's/non-physician practitioner's note, except where noted  All available labs and Radiology studies were reviewed  I was present for key portions of any procedure(s) performed by the resident/non-physician practitioner and I was immediately available to provide assistance  At this point I agree with the current assessment done in the Emergency Department  I have conducted an independent evaluation of this patient a history and physical is as follows: This is a 71 y o  old male who presents to the ED for evaluation of bradycardia  Noted to have HR in the 30s as an OP  EKG shows bigemeny  Is on beta blocker  Noted to have bigemeny  No symptoms  VS and nursing notes reviewed  General: Appears in NAD, awake, alert, speaking normally in full sentences  Well-nourished, well-developed  Appears stated age  Head: Normocephalic, atraumatic  Eyes: EOMI  Vision grossly normal  No subconjunctival hemorrhages or occular discharge noted  Symmetrical lids  ENT: Atraumatic external nose and ears  No stridor  Normal phonation  No drooling  Normal swallowing  Neck: No JVD  FROM  No goiter  CV: No pallor  Normal rate  Lungs: No tachypnea  No respiratory distress  MSK: Moving all extremities equally, no peripheral edema  Skin: Dry, intact  No cyanosis  Neuro: Awake, alert, GCS15  CN II-XII grossly intact  Grossly normal gait  Psychiatric/Behavioral: Appropriate mood and affect  A/P: This is a 71 y o  male who presents to the ED for evaluation of bradycardia  Will get labs, EKG  Discuss with cardiology      ED Course       Critical Care Time  Procedures

## 2022-12-02 NOTE — PATIENT INSTRUCTIONS
Medicare Preventive Visit Patient Instructions  Thank you for completing your Welcome to Medicare Visit or Medicare Annual Wellness Visit today  Your next wellness visit will be due in one year (12/3/2023)  The screening/preventive services that you may require over the next 5-10 years are detailed below  Some tests may not apply to you based off risk factors and/or age  Screening tests ordered at today's visit but not completed yet may show as past due  Also, please note that scanned in results may not display below  Preventive Screenings:  Service Recommendations Previous Testing/Comments   Colorectal Cancer Screening  · Colonoscopy    · Fecal Occult Blood Test (FOBT)/Fecal Immunochemical Test (FIT)  · Fecal DNA/Cologuard Test  · Flexible Sigmoidoscopy Age: 39-70 years old   Colonoscopy: every 10 years (May be performed more frequently if at higher risk)  OR  FOBT/FIT: every 1 year  OR  Cologuard: every 3 years  OR  Sigmoidoscopy: every 5 years  Screening may be recommended earlier than age 39 if at higher risk for colorectal cancer  Also, an individualized decision between you and your healthcare provider will decide whether screening between the ages of 74-80 would be appropriate   Colonoscopy: 08/25/2022  FOBT/FIT: Not on file  Cologuard: Not on file  Sigmoidoscopy: Not on file    Screening Current     Prostate Cancer Screening Individualized decision between patient and health care provider in men between ages of 53-78   Medicare will cover every 12 months beginning on the day after your 50th birthday PSA: No results in last 5 years     Risks and Benefits Discussed  Due for PSA     Hepatitis C Screening Once for adults born between 1945 and 1965  More frequently in patients at high risk for Hepatitis C Hep C Antibody: Not on file        Diabetes Screening 1-2 times per year if you're at risk for diabetes or have pre-diabetes Fasting glucose: 93 mg/dL (10/20/2022)  A1C: 5 8 % (6/8/2021)  Screening Not Indicated  History Diabetes   Cholesterol Screening Once every 5 years if you don't have a lipid disorder  May order more often based on risk factors  Lipid panel: 10/20/2022  Screening Not Indicated  History Lipid Disorder      Other Preventive Screenings Covered by Medicare:  1  Abdominal Aortic Aneurysm (AAA) Screening: covered once if your at risk  You're considered to be at risk if you have a family history of AAA or a male between the age of 73-68 who smoking at least 100 cigarettes in your lifetime  2  Lung Cancer Screening: covers low dose CT scan once per year if you meet all of the following conditions: (1) Age 50-69; (2) No signs or symptoms of lung cancer; (3) Current smoker or have quit smoking within the last 15 years; (4) You have a tobacco smoking history of at least 20 pack years (packs per day x number of years you smoked); (5) You get a written order from a healthcare provider  3  Glaucoma Screening: covered annually if you're considered high risk: (1) You have diabetes OR (2) Family history of glaucoma OR (3)  aged 48 and older OR (3)  American aged 72 and older  3  Osteoporosis Screening: covered every 2 years if you meet one of the following conditions: (1) Have a vertebral abnormality; (2) On glucocorticoid therapy for more than 3 months; (3) Have primary hyperparathyroidism; (4) On osteoporosis medications and need to assess response to drug therapy  5  HIV Screening: covered annually if you're between the age of 12-76  Also covered annually if you are younger than 13 and older than 72 with risk factors for HIV infection  For pregnant patients, it is covered up to 3 times per pregnancy      Immunizations:  Immunization Recommendations   Influenza Vaccine Annual influenza vaccination during flu season is recommended for all persons aged >= 6 months who do not have contraindications   Pneumococcal Vaccine   * Pneumococcal conjugate vaccine = PCV13 (Prevnar 13), PCV15 (Vaxneuvance), PCV20 (Prevnar 20)  * Pneumococcal polysaccharide vaccine = PPSV23 (Pneumovax) Adults 2364 years old: 1-3 doses may be recommended based on certain risk factors  Adults 72 years old: 1-2 doses may be recommended based off what pneumonia vaccine you previously received   Hepatitis B Vaccine 3 dose series if at intermediate or high risk (ex: diabetes, end stage renal disease, liver disease)   Tetanus (Td) Vaccine - COST NOT COVERED BY MEDICARE PART B Following completion of primary series, a booster dose should be given every 10 years to maintain immunity against tetanus  Td may also be given as tetanus wound prophylaxis  Tdap Vaccine - COST NOT COVERED BY MEDICARE PART B Recommended at least once for all adults  For pregnant patients, recommended with each pregnancy  Shingles Vaccine (Shingrix) - COST NOT COVERED BY MEDICARE PART B  2 shot series recommended in those aged 48 and above     Health Maintenance Due:      Topic Date Due   • Hepatitis C Screening  Never done   • Colorectal Cancer Screening  08/24/2027     Immunizations Due:      Topic Date Due   • Hepatitis B Vaccine (1 of 3 - 3-dose series) Never done   • Pneumococcal Vaccine: 65+ Years (2 - PPSV23 if available, else PCV20) 07/13/2019   • COVID-19 Vaccine (3 - Booster for Moderna series) 10/13/2021   • Influenza Vaccine (1) 09/01/2022     Advance Directives   What are advance directives? Advance directives are legal documents that state your wishes and plans for medical care  These plans are made ahead of time in case you lose your ability to make decisions for yourself  Advance directives can apply to any medical decision, such as the treatments you want, and if you want to donate organs  What are the types of advance directives? There are many types of advance directives, and each state has rules about how to use them  You may choose a combination of any of the following:  · Living will:   This is a written record of the treatment you want  You can also choose which treatments you do not want, which to limit, and which to stop at a certain time  This includes surgery, medicine, IV fluid, and tube feedings  · Durable power of  for healthcare [facility]): This is a written record that states who you want to make healthcare choices for you when you are unable to make them for yourself  This person, called a proxy, is usually a family member or a friend  You may choose more than 1 proxy  · Do not resuscitate (DNR) order:  A DNR order is used in case your heart stops beating or you stop breathing  It is a request not to have certain forms of treatment, such as CPR  A DNR order may be included in other types of advance directives  · Medical directive: This covers the care that you want if you are in a coma, near death, or unable to make decisions for yourself  You can list the treatments you want for each condition  Treatment may include pain medicine, surgery, blood transfusions, dialysis, IV or tube feedings, and a ventilator (breathing machine)  · Values history: This document has questions about your views, beliefs, and how you feel and think about life  This information can help others choose the care that you would choose  Why are advance directives important? An advance directive helps you control your care  Although spoken wishes may be used, it is better to have your wishes written down  Spoken wishes can be misunderstood, or not followed  Treatments may be given even if you do not want them  An advance directive may make it easier for your family to make difficult choices about your care  © Copyright [employer] 2018 Information is for End User's use only and may not be sold, redistributed or otherwise used for commercial purposes   All illustrations and images included in CareNotes® are the copyrighted property of [employer]  or Exclusively[employer]

## 2022-12-02 NOTE — PROGRESS NOTES
Name: Moy Wright      : 1950      MRN: [id number]  Encounter Provider: Ruth Ch MD  Encounter Date: 2022   Encounter department: [address] [place] [address] MEDICINE    Assessment & Plan     1  Need for influenza vaccination  -     influenza vaccine, high-dose, PF 0 7 mL (FLUZONE HIGH-DOSE)    2  Diabetes mellitus type 2 in nonobese [facility])  Assessment & Plan:    Lab Results   Component Value Date    HGBA1C 5 8 (H) 2021   pt had HG1c 6 3 by home nurse from insurance company     Orders:  -     Ambulatory Referral to Endocrinology; Future    3  Chronic systolic heart failure (HCC)  Assessment & Plan:  Wt Readings from Last 3 Encounters:   22 74 4 kg (164 lb)   10/27/22 72 1 kg (159 lb)   22 72 2 kg (159 lb 1 6 oz)       Sees cardiologist note reviewed         4  Coronary artery disease involving native coronary artery of native heart without angina pectoris  Assessment & Plan:  Followed uy cardiology note reviewed no chest pain      5  CKD (chronic kidney disease), stage IV [facility])  Assessment & Plan:  Lab Results   Component Value Date    EGFR 23 10/20/2022    EGFR 21 2022    EGFR 23 2022    CREATININE 2 60 (H) 10/20/2022    CREATININE 2 84 (H) 2022    CREATININE 2 66 (H) 2022   seeing nephrology reviewed note  labs stable       6  Need for hepatitis C screening test  -     Hepatitis C Antibody ([facility]); Future    7  Prostate cancer screening  -     PSA, Total Screen; Future    8  Bradycardia  Assessment & Plan:  HR 38 pt asymptomatic sent to ER driven by son for evaluation      9  Essential hypertension  Assessment & Plan:  Well controlled      10  Dilated cardiomyopathy ([address] )  Assessment & Plan:  Recent echo done cardiology note reviewed       11  Atrial flutter, unspecified type [facility])  Assessment & Plan: On coumadin HR today irregularly irregular and bradycardic to ER      12   Anticoagulant long-term use  Assessment & Plan:  Coumadin monitored cardiology clinic      13  Dyslipidemia  Assessment & Plan:  Labs done and ok           Subjective      HPI Pt is here for interval visit and evaluation of multiple medical problems, review of medications, labs, Health Maintenance and any recent specialty consults cardiology ophthalmology nephrology  endocrinology having atrial flutter had echo  which was ok     Review of Systems   Constitutional: Negative for appetite change, chills, fatigue and fever  Respiratory: Negative for cough, chest tightness and shortness of breath  Cardiovascular: Negative for chest pain, palpitations and leg swelling  Gastrointestinal: Negative for abdominal pain, constipation, diarrhea, nausea and vomiting  Genitourinary: Negative for difficulty urinating and frequency  Musculoskeletal: Negative for arthralgias, back pain and neck pain  Skin: Negative for rash  Neurological: Negative for dizziness, weakness, light-headedness, numbness and headaches  Hematological: Does not bruise/bleed easily  Psychiatric/Behavioral: Negative for dysphoric mood and sleep disturbance  The patient is not nervous/anxious  Current Outpatient Medications on File Prior to Visit   Medication Sig   • ALPRAZolam (XANAX) 0 25 mg tablet Take  1 tablets 30 minutes before flight     • amLODIPine (NORVASC) 2 5 mg tablet take 1 tablet by mouth once daily   • ascorbic acid (VITAMIN C) 500 mg tablet Take 500 mg by mouth daily   • atorvastatin (LIPITOR) 20 mg tablet Take 1 tablet (20 mg total) by mouth daily   • calcitriol (ROCALTROL) 0 25 mcg capsule TAKE 1 CAPSULE 5 DAYS A WEEK AS DIRECTED   • Cholecalciferol (VITAMIN D3) 1000 units CAPS Take 1 capsule by mouth daily   • COMBIGAN 0 2-0 5 %    • hydrALAZINE (APRESOLINE) 25 mg tablet Take 1 tablet (25 mg total) by mouth 3 (three) times a day   • isosorbide dinitrate (ISORDIL) 10 mg tablet Take 1 tablet (10 mg total) by mouth 3 (three) times a day   • metoprolol succinate (TOPROL-XL) 50 mg 24 hr tablet take 1 tablet by mouth once daily   • RA Aspirin EC 81 MG EC tablet take 1 tablet by mouth once daily   • Tradjenta 5 MG TABS TAKE 1/2 TABLET BY MOUTH DAILY   • TRAVATAN Z 0 004 % ophthalmic solution Administer 1 drop to both eyes daily at bedtime    • warfarin (COUMADIN) 2 mg tablet TAKE 1-2 TABLETS BY MOUTH DAILY OR AS DIRECTED BY PHYSICIAN       Objective     /64 (BP Location: Left arm, Patient Position: Sitting, Cuff Size: Standard)   Pulse (!) 38   Temp (!) 97 3 °F (36 3 °C) (Temporal)   Resp 16   Ht 5' 8" (1 727 m)   Wt 74 4 kg (164 lb)   SpO2 98%   BMI 24 94 kg/m²     Physical Exam  Constitutional:       General: He is not in acute distress  Appearance: Normal appearance  He is well-developed  He is not ill-appearing  Eyes:      Extraocular Movements: Extraocular movements intact  Pupils: Pupils are equal, round, and reactive to light  Cardiovascular:      Rate and Rhythm: Bradycardia present  Rhythm irregular  Pulses: Normal pulses  no weak pulses          Dorsalis pedis pulses are 2+ on the right side and 2+ on the left side  Heart sounds: Murmur (3/6) heard  Pulmonary:      Effort: Pulmonary effort is normal       Breath sounds: Normal breath sounds  Musculoskeletal:      Cervical back: Normal range of motion  Feet:      Right foot:      Skin integrity: Dry skin present  No ulcer, skin breakdown, erythema, warmth or callus  Left foot:      Skin integrity: Dry skin present  No ulcer, skin breakdown, erythema, warmth or callus  Neurological:      General: No focal deficit present  Mental Status: He is alert and oriented to person, place, and time  Mental status is at baseline  Psychiatric:         Mood and Affect: Mood normal      Patient's shoes and socks removed  Right Foot/Ankle   Right Foot Inspection  Skin Exam: skin normal, skin intact and dry skin   No warmth, no callus, no erythema, no maceration, no abnormal color, no pre-ulcer, no ulcer and no callus  Toe Exam: No swelling, no tenderness, erythema and  no right toe deformity    Sensory   Vibration: intact  Proprioception: intact  Monofilament testing: intact    Vascular  The right DP pulse is 2+  Right Toe  - Comprehensive Exam  Ecchymosis: none  Swelling: none   Tenderness: none         Left Foot/Ankle  Left Foot Inspection  Skin Exam: skin normal, skin intact and dry skin  No warmth, no erythema, no maceration, normal color, no pre-ulcer, no ulcer and no callus  Sensory   Vibration: intact  Proprioception: intact  Monofilament testing: intact    Vascular  The left DP pulse is 2+       Left Toe  - Comprehensive Exam  Ecchymosis: none  Swelling: none   Tenderness: none           Assign Risk Category  No deformity present  No loss of protective sensation  No weak pulses  Risk: 0      Landon Young MD

## 2022-12-03 DIAGNOSIS — I50.22 CHRONIC SYSTOLIC HEART FAILURE (HCC): ICD-10-CM

## 2022-12-05 ENCOUNTER — TELEPHONE (OUTPATIENT)
Dept: CARDIOLOGY CLINIC | Facility: CLINIC | Age: 72
End: 2022-12-05

## 2022-12-05 ENCOUNTER — TELEPHONE (OUTPATIENT)
Dept: ADMINISTRATIVE | Facility: OTHER | Age: 72
End: 2022-12-05

## 2022-12-05 RX ORDER — HYDRALAZINE HYDROCHLORIDE 25 MG/1
TABLET, FILM COATED ORAL
Qty: 270 TABLET | Refills: 3 | Status: SHIPPED | OUTPATIENT
Start: 2022-12-05

## 2022-12-05 NOTE — TELEPHONE ENCOUNTER
----- Message from Keira Wheeler sent at 12/2/2022  9:45 AM EST -----  Regarding: care gap request  12/02/22 9:45 AM    Hello, our patient attached above has had Diabetic Eye Exam completed/performed  Please assist in updating the patient chart by making an External outreach to [place] [address] facility located in [facility]  The date of service is November 2022      Thank you,  Keira Wheeler  [facility]

## 2022-12-05 NOTE — TELEPHONE ENCOUNTER
Upon review of the In Basket request we have found that we are unable to obtain a copy of the exclusion documentation requested because more information needed for outreach  There are numerous [facility] care offices on [address] [place] [address]     Any additional questions or concerns should be emailed to [facility] via the appropriate education email address, please do not reply via In Basket      Thank you  Kevon Sanabria MA

## 2022-12-05 NOTE — TELEPHONE ENCOUNTER
----- Message from Marcel Cochran sent at 12/2/2022  9:45 AM EST -----  Regarding: care gap request  12/02/22 9:45 AM    Hello, our patient attached above has had Diabetic Eye Exam completed/performed  Please assist in updating the patient chart by making an External outreach to [place] [address] facility located in [facility]  The date of service is November 2022      Thank you,  Marcel Cochran  [facility]

## 2022-12-05 NOTE — TELEPHONE ENCOUNTER
P/c'd, he was at the PCP office on Friday and his HR was 38  Asymptomatic  PCP sent him to the ER  No CHF symptoms  EKG showed frequent PVC's, the increased his metoprolol from 50 mg to 75 mg qd  He is checking HR at home and yesterday it was 39  Again asymptomatic  He states that he has irrg Heart rhythm because he feels it when checking pulse and the VN heard it  Did have 24 hr Holter in June 2022  He is not due for F/u until March 2023  Do you want him to wear a zio? Sooner  Appt?     Taking : hydralazine 25 mg tid                Toprol-xl 75 mg qd                 Norvasc 2 5 mg qd                 Isordil 10 mg tid      Please advise

## 2022-12-14 NOTE — TELEPHONE ENCOUNTER
Upon review of the In Basket request we have found that we are unable to obtain a copy of the exclusion documentation requested because **not enough information as to which location to contact*    Any additional questions or concerns should be emailed to the Practice Liaisons via the appropriate education email address, please do not reply via In Basket      Thank you  Kari Ly MA

## 2023-01-02 DIAGNOSIS — I50.22 CHRONIC SYSTOLIC HEART FAILURE (HCC): ICD-10-CM

## 2023-01-03 RX ORDER — ISOSORBIDE DINITRATE 10 MG/1
TABLET ORAL
Qty: 270 TABLET | Refills: 3 | Status: SHIPPED | OUTPATIENT
Start: 2023-01-03

## 2023-01-09 ENCOUNTER — ANTICOAG VISIT (OUTPATIENT)
Dept: CARDIOLOGY CLINIC | Facility: CLINIC | Age: 73
End: 2023-01-09

## 2023-01-09 ENCOUNTER — APPOINTMENT (OUTPATIENT)
Dept: LAB | Facility: CLINIC | Age: 73
End: 2023-01-09

## 2023-01-09 DIAGNOSIS — I48.92 ATRIAL FLUTTER, UNSPECIFIED TYPE (HCC): Primary | ICD-10-CM

## 2023-01-09 DIAGNOSIS — I50.22 CHRONIC SYSTOLIC HEART FAILURE (HCC): Primary | ICD-10-CM

## 2023-01-09 DIAGNOSIS — E11.9 TYPE 2 DIABETES MELLITUS WITHOUT COMPLICATION, WITHOUT LONG-TERM CURRENT USE OF INSULIN (HCC): ICD-10-CM

## 2023-01-09 RX ORDER — LINAGLIPTIN 5 MG/1
TABLET, FILM COATED ORAL
Qty: 30 TABLET | Refills: 0 | Status: SHIPPED | OUTPATIENT
Start: 2023-01-09

## 2023-01-09 NOTE — TELEPHONE ENCOUNTER
Called and spoke to pt and advised, pt was good with this  Advised once it is approved for the insurance w/have it sent to his home  Pt verbally understood

## 2023-01-13 ENCOUNTER — APPOINTMENT (EMERGENCY)
Dept: RADIOLOGY | Facility: HOSPITAL | Age: 73
End: 2023-01-13

## 2023-01-13 ENCOUNTER — APPOINTMENT (EMERGENCY)
Dept: CT IMAGING | Facility: HOSPITAL | Age: 73
End: 2023-01-13

## 2023-01-13 ENCOUNTER — HOSPITAL ENCOUNTER (OUTPATIENT)
Facility: HOSPITAL | Age: 73
Setting detail: OBSERVATION
Discharge: HOME | End: 2023-01-14
Attending: SURGERY | Admitting: INTERNAL MEDICINE
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
## 2023-01-13 DIAGNOSIS — R00.1 BRADYCARDIA: ICD-10-CM

## 2023-01-13 DIAGNOSIS — I10 ESSENTIAL HYPERTENSION: Chronic | ICD-10-CM

## 2023-01-13 DIAGNOSIS — N18.4 CKD (CHRONIC KIDNEY DISEASE), STAGE IV (HCC): ICD-10-CM

## 2023-01-13 DIAGNOSIS — S01.21XA LACERATION OF NOSE, INITIAL ENCOUNTER: ICD-10-CM

## 2023-01-13 DIAGNOSIS — I42.0 DILATED CARDIOMYOPATHY (HCC): ICD-10-CM

## 2023-01-13 DIAGNOSIS — S02.2XXB OPEN FRACTURE OF NASAL BONE, INITIAL ENCOUNTER: ICD-10-CM

## 2023-01-13 PROBLEM — E87.20 METABOLIC ACIDOSIS: Status: ACTIVE | Noted: 2023-01-13

## 2023-01-13 PROBLEM — S02.2XXA NASAL FRACTURE: Status: ACTIVE | Noted: 2023-01-13

## 2023-01-13 LAB
ALBUMIN SERPL BCP-MCNC: 3.3 G/DL (ref 3.5–5)
ALP SERPL-CCNC: 69 U/L (ref 34–104)
ALT SERPL W P-5'-P-CCNC: 13 U/L (ref 7–52)
ANION GAP SERPL CALCULATED.3IONS-SCNC: 7 MMOL/L (ref 4–13)
APTT PPP: 40 SECONDS (ref 23–37)
AST SERPL W P-5'-P-CCNC: 21 U/L (ref 13–39)
ATRIAL RATE: 78 BPM
BASE EX.OXY STD BLDV CALC-SCNC: 96.7 % (ref 60–80)
BASE EXCESS BLDV CALC-SCNC: -13.7 MMOL/L
BASOPHILS # BLD AUTO: 0.04 THOUSANDS/ÂΜL (ref 0–0.1)
BASOPHILS NFR BLD AUTO: 0 % (ref 0–1)
BILIRUB SERPL-MCNC: 0.27 MG/DL (ref 0.2–1)
BNP SERPL-MCNC: 113 PG/ML (ref 0–100)
BUN SERPL-MCNC: 44 MG/DL (ref 5–25)
CALCIUM ALBUM COR SERPL-MCNC: 8.9 MG/DL (ref 8.3–10.1)
CALCIUM SERPL-MCNC: 8.3 MG/DL (ref 8.4–10.2)
CARDIAC TROPONIN I PNL SERPL HS: 7 NG/L (ref 8–18)
CHLORIDE SERPL-SCNC: 113 MMOL/L (ref 96–108)
CO2 SERPL-SCNC: 16 MMOL/L (ref 21–32)
CREAT SERPL-MCNC: 2.96 MG/DL (ref 0.6–1.3)
EOSINOPHIL # BLD AUTO: 0.13 THOUSAND/ÂΜL (ref 0–0.61)
EOSINOPHIL NFR BLD AUTO: 1 % (ref 0–6)
ERYTHROCYTE [DISTWIDTH] IN BLOOD BY AUTOMATED COUNT: 12.7 % (ref 11.6–15.1)
GFR SERPL CREATININE-BSD FRML MDRD: 20 ML/MIN/1.73SQ M
GLUCOSE SERPL-MCNC: 221 MG/DL (ref 65–140)
GLUCOSE SERPL-MCNC: 88 MG/DL (ref 65–140)
GLUCOSE SERPL-MCNC: 98 MG/DL (ref 65–140)
HCO3 BLDV-SCNC: 8.2 MMOL/L (ref 24–30)
HCT VFR BLD AUTO: 42.3 % (ref 36.5–49.3)
HGB BLD-MCNC: 13.9 G/DL (ref 12–17)
IMM GRANULOCYTES # BLD AUTO: 0.04 THOUSAND/UL (ref 0–0.2)
IMM GRANULOCYTES NFR BLD AUTO: 0 % (ref 0–2)
INR PPP: 2.22 (ref 0.84–1.19)
INR PPP: 2.31 (ref 0.84–1.19)
LACTATE SERPL-SCNC: 0.6 MMOL/L (ref 0.5–2)
LYMPHOCYTES # BLD AUTO: 1.3 THOUSANDS/ÂΜL (ref 0.6–4.47)
LYMPHOCYTES NFR BLD AUTO: 15 % (ref 14–44)
MAGNESIUM SERPL-MCNC: 2 MG/DL (ref 1.9–2.7)
MCH RBC QN AUTO: 30.5 PG (ref 26.8–34.3)
MCHC RBC AUTO-ENTMCNC: 32.9 G/DL (ref 31.4–37.4)
MCV RBC AUTO: 93 FL (ref 82–98)
MONOCYTES # BLD AUTO: 0.92 THOUSAND/ÂΜL (ref 0.17–1.22)
MONOCYTES NFR BLD AUTO: 10 % (ref 4–12)
NEUTROPHILS # BLD AUTO: 6.56 THOUSANDS/ÂΜL (ref 1.85–7.62)
NEUTS SEG NFR BLD AUTO: 74 % (ref 43–75)
NRBC BLD AUTO-RTO: 0 /100 WBCS
O2 CT BLDV-SCNC: 10.3 ML/DL
P AXIS: 72 DEGREES
PCO2 BLDV: 11.6 MM HG (ref 42–50)
PH BLDV: 7.47 [PH] (ref 7.3–7.4)
PHOSPHATE SERPL-MCNC: 3.9 MG/DL (ref 2.3–4.1)
PLATELET # BLD AUTO: 201 THOUSANDS/UL (ref 149–390)
PMV BLD AUTO: 10.6 FL (ref 8.9–12.7)
PO2 BLDV: 108.3 MM HG (ref 35–45)
POTASSIUM SERPL-SCNC: 5.3 MMOL/L (ref 3.5–5.3)
PR INTERVAL: 178 MS
PROT SERPL-MCNC: 6.7 G/DL (ref 6.4–8.4)
PROTHROMBIN TIME: 24.9 SECONDS (ref 11.6–14.5)
PROTHROMBIN TIME: 25.6 SECONDS (ref 11.6–14.5)
QRS AXIS: -76 DEGREES
QRSD INTERVAL: 146 MS
QT INTERVAL: 412 MS
QTC INTERVAL: 469 MS
RBC # BLD AUTO: 4.55 MILLION/UL (ref 3.88–5.62)
SODIUM SERPL-SCNC: 136 MMOL/L (ref 135–147)
T WAVE AXIS: 78 DEGREES
VENTRICULAR RATE: 78 BPM
WBC # BLD AUTO: 8.99 THOUSAND/UL (ref 4.31–10.16)

## 2023-01-13 RX ORDER — AMLODIPINE BESYLATE 2.5 MG/1
2.5 TABLET ORAL DAILY
Status: DISCONTINUED | OUTPATIENT
Start: 2023-01-14 | End: 2023-01-14 | Stop reason: HOSPADM

## 2023-01-13 RX ORDER — SODIUM CHLORIDE, SODIUM GLUCONATE, SODIUM ACETATE, POTASSIUM CHLORIDE, MAGNESIUM CHLORIDE, SODIUM PHOSPHATE, DIBASIC, AND POTASSIUM PHOSPHATE .53; .5; .37; .037; .03; .012; .00082 G/100ML; G/100ML; G/100ML; G/100ML; G/100ML; G/100ML; G/100ML
75 INJECTION, SOLUTION INTRAVENOUS CONTINUOUS
Status: DISCONTINUED | OUTPATIENT
Start: 2023-01-13 | End: 2023-01-13

## 2023-01-13 RX ORDER — ISOSORBIDE DINITRATE 10 MG/1
10 TABLET ORAL
Status: DISCONTINUED | OUTPATIENT
Start: 2023-01-13 | End: 2023-01-14 | Stop reason: HOSPADM

## 2023-01-13 RX ORDER — ISOSORBIDE DINITRATE 10 MG/1
10 TABLET ORAL 3 TIMES DAILY
Status: DISCONTINUED | OUTPATIENT
Start: 2023-01-13 | End: 2023-01-13

## 2023-01-13 RX ORDER — SODIUM BICARBONATE 650 MG/1
1300 TABLET ORAL
Status: DISCONTINUED | OUTPATIENT
Start: 2023-01-13 | End: 2023-01-14

## 2023-01-13 RX ORDER — WARFARIN SODIUM 4 MG/1
4 TABLET ORAL
Status: DISCONTINUED | OUTPATIENT
Start: 2023-01-13 | End: 2023-01-14 | Stop reason: HOSPADM

## 2023-01-13 RX ORDER — LIDOCAINE HYDROCHLORIDE AND EPINEPHRINE 10; 10 MG/ML; UG/ML
5 INJECTION, SOLUTION INFILTRATION; PERINEURAL ONCE
Status: COMPLETED | OUTPATIENT
Start: 2023-01-13 | End: 2023-01-13

## 2023-01-13 RX ORDER — METOPROLOL SUCCINATE 50 MG/1
50 TABLET, EXTENDED RELEASE ORAL 2 TIMES DAILY
Status: DISCONTINUED | OUTPATIENT
Start: 2023-01-13 | End: 2023-01-14 | Stop reason: HOSPADM

## 2023-01-13 RX ORDER — ASPIRIN 81 MG/1
81 TABLET ORAL DAILY
Status: DISCONTINUED | OUTPATIENT
Start: 2023-01-13 | End: 2023-01-14 | Stop reason: HOSPADM

## 2023-01-13 RX ORDER — HYDRALAZINE HYDROCHLORIDE 25 MG/1
25 TABLET, FILM COATED ORAL 3 TIMES DAILY
Status: DISCONTINUED | OUTPATIENT
Start: 2023-01-13 | End: 2023-01-13

## 2023-01-13 RX ORDER — HYDRALAZINE HYDROCHLORIDE 25 MG/1
25 TABLET, FILM COATED ORAL EVERY 8 HOURS SCHEDULED
Status: DISCONTINUED | OUTPATIENT
Start: 2023-01-13 | End: 2023-01-14 | Stop reason: HOSPADM

## 2023-01-13 RX ORDER — TRAVOPROST OPHTHALMIC SOLUTION 0.04 MG/ML
1 SOLUTION OPHTHALMIC
Status: DISCONTINUED | OUTPATIENT
Start: 2023-01-13 | End: 2023-01-14 | Stop reason: HOSPADM

## 2023-01-13 RX ORDER — INSULIN LISPRO 100 [IU]/ML
1-5 INJECTION, SOLUTION INTRAVENOUS; SUBCUTANEOUS
Status: DISCONTINUED | OUTPATIENT
Start: 2023-01-13 | End: 2023-01-14 | Stop reason: HOSPADM

## 2023-01-13 RX ORDER — ATORVASTATIN CALCIUM 20 MG/1
20 TABLET, FILM COATED ORAL DAILY
Status: DISCONTINUED | OUTPATIENT
Start: 2023-01-13 | End: 2023-01-14 | Stop reason: HOSPADM

## 2023-01-13 RX ORDER — ACETAMINOPHEN 325 MG/1
975 TABLET ORAL ONCE
Status: COMPLETED | OUTPATIENT
Start: 2023-01-13 | End: 2023-01-13

## 2023-01-13 RX ORDER — AMLODIPINE BESYLATE 2.5 MG/1
2.5 TABLET ORAL DAILY
Status: DISCONTINUED | OUTPATIENT
Start: 2023-01-14 | End: 2023-01-13

## 2023-01-13 RX ORDER — BACITRACIN, NEOMYCIN, POLYMYXIN B 400; 3.5; 5 [USP'U]/G; MG/G; [USP'U]/G
1 OINTMENT TOPICAL ONCE
Status: COMPLETED | OUTPATIENT
Start: 2023-01-13 | End: 2023-01-13

## 2023-01-13 RX ORDER — SODIUM CHLORIDE, SODIUM GLUCONATE, SODIUM ACETATE, POTASSIUM CHLORIDE, MAGNESIUM CHLORIDE, SODIUM PHOSPHATE, DIBASIC, AND POTASSIUM PHOSPHATE .53; .5; .37; .037; .03; .012; .00082 G/100ML; G/100ML; G/100ML; G/100ML; G/100ML; G/100ML; G/100ML
500 INJECTION, SOLUTION INTRAVENOUS ONCE
Status: COMPLETED | OUTPATIENT
Start: 2023-01-13 | End: 2023-01-13

## 2023-01-13 RX ADMIN — SODIUM CHLORIDE, SODIUM GLUCONATE, SODIUM ACETATE, POTASSIUM CHLORIDE, MAGNESIUM CHLORIDE, SODIUM PHOSPHATE, DIBASIC, AND POTASSIUM PHOSPHATE 75 ML/HR: .53; .5; .37; .037; .03; .012; .00082 INJECTION, SOLUTION INTRAVENOUS at 11:59

## 2023-01-13 RX ADMIN — SODIUM CHLORIDE, SODIUM GLUCONATE, SODIUM ACETATE, POTASSIUM CHLORIDE, MAGNESIUM CHLORIDE, SODIUM PHOSPHATE, DIBASIC, AND POTASSIUM PHOSPHATE 500 ML: .53; .5; .37; .037; .03; .012; .00082 INJECTION, SOLUTION INTRAVENOUS at 11:52

## 2023-01-13 RX ADMIN — INSULIN LISPRO 1 UNITS: 100 INJECTION, SOLUTION INTRAVENOUS; SUBCUTANEOUS at 18:43

## 2023-01-13 RX ADMIN — HYDRALAZINE HYDROCHLORIDE 25 MG: 25 TABLET ORAL at 14:33

## 2023-01-13 RX ADMIN — METOPROLOL SUCCINATE 50 MG: 50 TABLET, EXTENDED RELEASE ORAL at 14:33

## 2023-01-13 RX ADMIN — BACITRACIN ZINC, NEOMYCIN, POLYMYXIN B 1 SMALL APPLICATION: 400; 3.5; 5 OINTMENT TOPICAL at 13:13

## 2023-01-13 RX ADMIN — ACETAMINOPHEN 975 MG: 325 TABLET, FILM COATED ORAL at 13:13

## 2023-01-13 RX ADMIN — SODIUM BICARBONATE: 84 INJECTION, SOLUTION INTRAVENOUS at 16:52

## 2023-01-13 RX ADMIN — SODIUM CHLORIDE 3 G: 9 INJECTION, SOLUTION INTRAVENOUS at 11:55

## 2023-01-13 RX ADMIN — TRAVOPROST 1 DROP: 0.04 SOLUTION OPHTHALMIC at 22:18

## 2023-01-13 RX ADMIN — HYDRALAZINE HYDROCHLORIDE 25 MG: 25 TABLET ORAL at 22:21

## 2023-01-13 RX ADMIN — WARFARIN SODIUM 4 MG: 4 TABLET ORAL at 17:39

## 2023-01-13 RX ADMIN — LIDOCAINE HYDROCHLORIDE,EPINEPHRINE BITARTRATE 5 ML: 10; .01 INJECTION, SOLUTION INFILTRATION; PERINEURAL at 13:12

## 2023-01-13 RX ADMIN — ATORVASTATIN CALCIUM 20 MG: 20 TABLET, FILM COATED ORAL at 17:39

## 2023-01-13 RX ADMIN — ISOSORBIDE DINITRATE 10 MG: 10 TABLET ORAL at 17:39

## 2023-01-13 RX ADMIN — IOHEXOL 85 ML: 350 INJECTION, SOLUTION INTRAVENOUS at 10:30

## 2023-01-13 RX ADMIN — METOPROLOL SUCCINATE 50 MG: 50 TABLET, EXTENDED RELEASE ORAL at 22:19

## 2023-01-13 RX ADMIN — SODIUM BICARBONATE: 84 INJECTION, SOLUTION INTRAVENOUS at 15:10

## 2023-01-13 RX ADMIN — SODIUM BICARBONATE 1300 MG: 650 TABLET ORAL at 17:40

## 2023-01-13 RX ADMIN — TETANUS TOXOID, REDUCED DIPHTHERIA TOXOID AND ACELLULAR PERTUSSIS VACCINE, ADSORBED 0.5 ML: 5; 2.5; 8; 8; 2.5 SUSPENSION INTRAMUSCULAR at 13:12

## 2023-01-13 NOTE — QUICK NOTE
Cervical Collar Clearance: The patient had a CT scan of the cervical spine demonstrating no acute injury  On exam, the patient had no midline point tenderness or paresthesias/numbness/weakness in the extremities  The patient had full range of motion (was then able to flex, extend, and rotate head laterally) without pain  There were no distracting injuries and the patient was not intoxicated  The patient's cervical spine was cleared radiologically and clinically  Cervical collar removed at this time       Era Franco PA-C  1/13/2023 12:34 PM

## 2023-01-13 NOTE — PROCEDURES
POC FAST US    Date/Time: 1/13/2023 10:10 AM  Performed by: Vitaliy Naqvi PA-C  Authorized by: Vitaliy Naqvi PA-C     Patient location:  ED and Trauma  Other Assisting Provider: Yes (comment) (Dr Espitia Organ present)    Procedure details:     Exam Type:  Diagnostic    Indications: blunt abdominal trauma and blunt chest trauma      Assess for:  Hemothorax, intra-abdominal fluid, pericardial effusion and pneumothorax    Technique: extended FAST      Views obtained:  RUQ - Schwartz's Pouch, LUQ - Splenorenal space, Suprapubic - Pouch of Ruy, Heart - Pericardial sac, Left thorax and Right thorax    Image quality: diagnostic      Image availability:  Images available in PACS  FAST Findings:     RUQ (Hepatorenal) free fluid: absent      LUQ (Splenorenal) free fluid: absent      Suprapubic free fluid: absent      Cardiac wall motion: identified      Pericardial effusion: absent    extended FAST (Pulmonary) findings:     Left lung sliding: Present      Right lung sliding: Present      Left pleural effusion: Absent      Right pleural effusion: Absent    Interpretation:     Impressions: negative

## 2023-01-13 NOTE — H&P
H&P - Trauma   Myra Mustafa 71 y o  male MRN: [id number]  Unit/Bed#: ED-35 Encounter: [id number]    Trauma Alert: Level B   Model of Arrival: Self    Trauma Team: Attending Bebeto Freeman and REMA McGonigal  Consultants:     Oral Maxillofacial: open nasal fx - STAT consult; notified at [phone] via text, returned call/text no; Other: {cardiology - STAT consult; notified at [phone] via text, returned call/text yes 1054; Assessment/Plan   Active Problems / Assessment:     -Open nasal fracture with 2 cm laceration  -CKD  -Bradycardia, asymptomatic  -Left hip pain     Plan:     -Nephrology consult, recommendations appreciated  -OMFS consult, recommendations appreciated  -Cardiology consult, recommendations appreciated  -Laceration repair  -IV antibiotics with Unasyn  -P o  antibiotics as an outpatient with Augmentin  -CT head, C-spine, chest abdomen pelvis negative for acute traumatic injuries  -CT facial bones reveal nasal fracture  -E FAST exam negative  -Primary and secondary survey negative for acute traumatic injuries except as stated above  -Disposition (home versus admit) pending evaluation by the consultative services above  History of Present Illness     Chief Complaint: Fall from height of 8 feet  Mechanism:Fall     HPI:    Carrie Leija is a 71 y o  male with past medical history of hypertension, valvular heart disease, CKD, bradycardia who presents today as a level B trauma alert after falling from height  He takes warfarin secondary to valve replacements  Patient states he was on a ladder doing housework when the ladder fell out from behind him and he fell forward landing on his front/left sides  He did strike his head and has a 2 cm laceration to his nose as well as a nasal fracture  He states he was not dizzy and did not lose consciousness  He also admits to left hip pain    He denies dizziness or lightheadedness, nausea or vomiting, chest pain, palpitations, shortness of breath, abdominal pain, any other pain or paresthesia  He denies any other concerns except as stated above  Review of Systems   HENT:        Nose pain   Musculoskeletal:        Left hip pain   All other systems reviewed and are negative  12-point, complete review of systems was reviewed and negative except as stated above  Historical Information     Past Medical History:   Diagnosis Date   • Chronic kidney disease    • Colon polyp    • COVID-19    • Diabetes mellitus ([facility] )    • Hyperlipidemia    • Hypertension    • Proteinuria    • Stage 3 chronic [facility] disease ([facility] )    • Vitamin D deficiency      Past Surgical History:   Procedure Laterality Date   • COLONOSCOPY      overdue   • AL ECHO TRANSESOPHAG R-T 2D W/PRB IMG ACQUISJ I&R N/A 3/7/2019    Procedure: INTRA-OP TRANSESOPHAGEAL ECHOCARDIOGRAM (PATIENCE);   Surgeon: Derrick Ang DO;  [facility];  Service: Cardiac Surgery   • AL PERQ CLSR TCAT L ATR APNDGE W/ENDOCARDIAL IMPLNT  3/7/2019    Procedure: LEFT ATRIAL APPENDAGE OCCLUSION CLIPPED with 35mm Brandi Lasso;  Surgeon: Derrick Ang DO;  [facility];  Service: Cardiac Surgery   • AL REPLACEMENT MITRAL VALVE W/CARDIOPULMONARY BYP N/A 3/7/2019    Procedure: REPLACEMENT VALVE MITRAL (MVR) REPAIR with a 30mm MEDTRONIC CG FUTURE RING;  Surgeon: Derrick Ang DO;  Location[facility];  Service: Cardiac Surgery   • AL RPLCMT AORTIC VALVE OPN W/STENTLESS TISSUE VALVE N/A 3/7/2019    Procedure: REPLACEMENT VALVE AORTIC (AVR) with 23mm TAVERA INSPIRIS RESILIA  AORTIC VALVE;  Surgeon: Derrick Ang DO;  [facility];  Service: Cardiac Surgery   • RENAL BIOPSY, OPEN     • TOE SURGERY Left         Social History     Tobacco Use   • Smoking status: Former     Years: 2 00     Types: Cigarettes     Quit date:      Years since quittin 0   • Smokeless tobacco: Never   Vaping Use   • Vaping Use: Never used   Substance Use Topics   • Alcohol use: Yes     Comment: occasionally   • Drug use: No     Immunization History   Administered Date(s) Administered   • COVID-19 MODERNA VACC 0 5 ML IM 04/13/2021, 05/13/2021   • INFLUENZA 11/01/2010, 10/09/2013, 11/03/2015, 12/13/2018   • Influenza, high dose seasonal 0 7 mL 10/07/2020, 09/15/2021, 12/02/2022   • Pneumococcal Conjugate 13-Valent 07/13/2018   • Tdap 07/13/2019   • Zoster 09/02/2014     Last Tetanus: We will give 1 today given laceration  Family History: Non-contributory    1  Before the illness or injury that brought you to the Emergency, did you need someone to help you on a regular basis? 0=No   2  Since the illness or injury that brought you to the Emergency, have you needed more help than usual to take care of yourself? 0=No   3  Have you been hospitalized for one or more nights during the past 6 months (excluding a stay in the Emergency Department)? 0=No   4  In general, do you see well? 0=Yes   5  In general, do you have serious problems with your memory? 0=No   6  Do you take more than three different medications everyday? 1=Yes   TOTAL   1     Did you order a geriatric consult if the score was 2 or greater?:  N/A     Meds/Allergies   all current active meds have been reviewed  Allergies have not been reviewed;     Allergies   Allergen Reactions   • Ace Inhibitors Cough   • Keflex [Cephalexin] Rash       Objective   Initial Vitals:   Temperature: (!) 97 2 °F (36 2 °C) (01/13/23 1004)  Pulse: 77 (01/13/23 1004)  Respirations: 16 (01/13/23 1004)  Blood Pressure: 156/94 (01/13/23 1004)    Primary Survey:   Airway:        Status: patent;        Pre-hospital Interventions: none        Hospital Interventions: none  Breathing:        Pre-hospital Interventions: none       Effort: normal       Right breath sounds: normal       Left breath sounds: normal  Circulation:        Rhythm: regular       Rate: regular   Right Pulses Left Pulses    R radial: 2+  R femoral: 2+  R pedal: 2+     L radial: 2+  L femoral: 2+  L pedal: 2+       Disability:        GCS: Eye: 4; Verbal: 5 Motor: 6 Total: 15       Right Pupil: 4 mm;  round;  reactive         Left Pupil:  4 mm;  round;  reactive      R Motor Strength L Motor Strength    R : 5/5  R dorsiflex: 5/5  R plantarflex: 5/5 L : 5/5  L dorsiflex: 5/5  L plantarflex: 5/5        Sensory:  No sensory deficit  Exposure:       Completed: Yes      Secondary Survey:  Physical Exam  Vitals reviewed  Constitutional:       Appearance: He is well-developed and well-groomed  Interventions: Cervical collar in place  HENT:      Head: Normocephalic  Jaw: There is normal jaw occlusion  Comments: Right frontal cephalohematoma 1 5 cm in diameter just superior to medial edge of right eyebrow  Ears:      Comments: No hemotympanum or kingsley sign     Nose:        Comments: 2 cm laceration to nose which follows course documented above in the picture  It is oozing blood continuously and is not well approximated  It will need repair  There is an underlying nasal fracture as shown on CT scan  He also has dried blood present in the naris  States he did have a bloody nose as well  Mouth/Throat:      Lips: Pink  Mouth: Mucous membranes are moist       Pharynx: Oropharynx is clear  Eyes:      Extraocular Movements: Extraocular movements intact  Pupils: Pupils are equal, round, and reactive to light  Visual Fields: Right eye visual fields normal and left eye visual fields normal    Cardiovascular:      Rate and Rhythm: Normal rate and regular rhythm  Pulses: Normal pulses  Heart sounds: Normal heart sounds  Pulmonary:      Effort: Pulmonary effort is normal       Breath sounds: Normal breath sounds and air entry  Comments: No tenderness to palpation of chest wall bilaterally  Abdominal:      Palpations: Abdomen is soft  Comments: Nontender nondistended   Genitourinary:     Comments: Deferred  Musculoskeletal:      Cervical back: Normal range of motion        Right lower leg: No edema  Left lower leg: No edema  Comments: Pelvis stable and nontender  No tenderness to palpation over any long bones bilaterally  C/T/L-spine without step-offs or tenderness  No lesions or signs of trauma on his back  Skin:     General: Skin is warm and dry  Capillary Refill: Capillary refill takes less than 2 seconds  Comments: 3 cm abrasion to left shin  No bleeding  Neurological:      General: No focal deficit present  Mental Status: He is alert  GCS: GCS eye subscore is 4  GCS verbal subscore is 5  GCS motor subscore is 6  Sensory: Sensation is intact  Motor: Motor function is intact  Coordination: Coordination is intact  Psychiatric:         Behavior: Behavior is cooperative  Invasive Devices     Peripheral Intravenous Line  Duration           Peripheral IV 01/13/23 Right Antecubital <1 day    Peripheral IV 01/13/23 Right;Ventral (anterior) Wrist <1 day              Lab Results: Results: I have personally reviewed all pertinent laboratory/tests results    Imaging Results: I have personally reviewed pertinent reports  and I have personally reviewed pertinent films in PACS  Chest Xray(s): negative for acute findings   FAST exam(s): negative for acute findings   CT Scan(s): positive for acute findings: Nasal fracture   Additional Xray(s): N/A     Other Studies: N/A    Code Status: Prior  Advance Directive and Living Will:      Power of :    POLST:    I have spent 30 minutes with Patient and family today in which greater than 50% of this time was spent in counseling/coordination of care regarding Diagnostic results, Prognosis, Intructions for management, Patient and family education, Importance of tx compliance and Impressions

## 2023-01-13 NOTE — CASE MANAGEMENT
CM responded to trauma alert  Patient was brought into ED by his wife for evaluation and brought to trauma bay for further testing  Patient is responsive to medical team's questions and instructions  CM met with patients wife in ED waiting room and notified team will provide updates when able  Trauma AP aware of above  No current identified CM needs  CM will follow and update screening, assessment, and discharge planning as appropriate 
negative...

## 2023-01-13 NOTE — ASSESSMENT & PLAN NOTE
Lab Results   Component Value Date    EGFR 20 01/13/2023    EGFR 23 12/02/2022    EGFR 23 10/20/2022    CREATININE 2 96 (H) 01/13/2023    CREATININE 2 63 (H) 12/02/2022    CREATININE 2 60 (H) 10/20/2022     Creatinine slightly elevated than baseline    Continue to monitor with IV fluids

## 2023-01-13 NOTE — CONSULTS
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  -Status post kidney biopsy in 2018  -Admission creatinine slightly elevated at 2 9 milligrams per deciliter  Blood pressure slightly on higher side   -patient received CT with IV contrast on admission for trauma evaluation which would increase the risk of contrast nephropathy  He did receive IV fluid  Discussed with patient about risk of worsening renal function  In the setting of metabolic acidosis, would change IV fluid to half NS with sodium bicarbonate  Would also start on oral sodium bicarbonate which will be continued at the time of discharge   -Continue to monitor renal function stable  discharge tomorrow if renal function stable and if acidosis improving  -Avoid Nephrotoxins and Dose all medications per eGFR  -Will need outpatient follow up after discharge  BP/primary hypertension  -Home Medication:   amlodipine, hydralazine, metoprolol   -Currently BP slightly on the higher side in the setting of pain  -Plan: Continue treatment with home medication-amlodipine, hydralazine, Isordil  Hold metoprolol in the setting of bradycardia as recommended by cardiology  Avoid hypotension    Metabolic acidosis  -Non-anion gap with bicarb level of 16    Did have bicarb level of 20 in December 2nd labs  -Likely due to chronic kidney disease  -Changing IV fluid to bicarbonate based IV fluid and also starting on oral sodium bicarbonate tablet, would continue sodium bicarbonate at the time of discharge  -Lactic acid was at normal range    Secondary hyperparathyroidism of renal origin: Monitor PTH level as outpatient, continue calcitriol at home dose    Bradycardia due to frequent PVC: Was seen by cardiology  Recommended to hold beta-blocker   History of paroxysmal atrial fibrillation/a flutter- management per cardiology    Diabetes mellitus type 2: Management per primary team    Discussed with trauma advanced practitioner    HISTORY OF PRESENT ILLNESS:  Requesting Physician: Franca Molina DO  Reason for Consult: Chronic kidney disease stage IV/acidosis    Taunya Sever is a 71 y o  male with history of chronic kidney disease stage IV baseline creatinine as per outpatient note around 3 0-3 5 but recently around 2 8 mg/dL, CKD suspected to be secondary to FSGS and diabetic glomerulopathy who was [facility] after presenting as a trauma alert after falling from ladder while doing housework with patient landing on his front  He was found to have 2 cm laceration on the nose as well as nasal fracture  Nephrology was consulted for chronic kidney disease stage IV as well as finding of acidosis  Also found to have bradycardia  Underwent CT scan imaging for trauma evaluation received IV Unasyn    PAST MEDICAL HISTORY:  Past Medical History:   Diagnosis Date   • Chronic kidney disease    • Colon polyp    • COVID-19    • Diabetes mellitus ([facility] [address] )    • Hyperlipidemia    • Hypertension    • Proteinuria    • Stage 3 chronic kidney disease ([facility] [address] )    • Vitamin D deficiency        PAST SURGICAL HISTORY:  Past Surgical History:   Procedure Laterality Date   • COLONOSCOPY      overdue   • MI ECHO TRANSESOPHAG R-T 2D W/PRB IMG ACQUISJ I&R N/A 3/7/2019    Procedure: INTRA-OP TRANSESOPHAGEAL ECHOCARDIOGRAM (PATIENCE);   Surgeon: Kalyani Romero DO;  [facility]: [facility];  Service: Cardiac Surgery   • MI PERQ CLSR TCAT L ATR APNDGE W/ENDOCARDIAL IMPLNT  3/7/2019    Procedure: LEFT ATRIAL APPENDAGE OCCLUSION CLIPPED with 35mm ATRICURE ATRICLIP;  Surgeon: Kalyani Romero DO;  [facility];  Service: Cardiac Surgery   • MI REPLACEMENT MITRAL VALVE W/CARDIOPULMONARY BYP N/A 3/7/2019    Procedure: REPLACEMENT VALVE MITRAL (MVR) REPAIR with a 30mm MEDTRONIC CG FUTURE RING;  Surgeon: Michael Ayala DO;  [facility];  Service: Cardiac Surgery   • KS RPLCMT AORTIC VALVE OPN W/STENTLESS TISSUE VALVE N/A 3/7/2019    Procedure: REPLACEMENT VALVE AORTIC (AVR) with 23mm TAVERA INSPIRIS RESILIA  AORTIC VALVE;  Surgeon: Michael Ayala DO;  [facility];  Service: Cardiac Surgery   • RENAL BIOPSY, OPEN     • TOE SURGERY Left        SOCIAL HISTORY:  Social History     Substance and Sexual Activity   Alcohol Use Yes    Comment: occasionally     Social History     Substance and Sexual Activity   Drug Use No     Social History     Tobacco Use   Smoking Status Former   • Years: 2 00   • Types: Cigarettes   • Quit date:    • Years since quittin 0   Smokeless Tobacco Never       FAMILY HISTORY:  Family History   Problem Relation Age of Onset   • Stroke Mother    • Hypertension Mother    • Blindness Father    • Hyperlipidemia Father    • Hypertension Father    • Glaucoma Father    • Cancer Sister    • Diabetes Sister    • Ovarian cancer Sister    • Gout Brother    • Heart Valve Disease Brother    • Hypertension Brother    • Anuerysm Neg Hx    • Heart attack Neg Hx    • Arrhythmia Neg Hx    • Heart failure Neg Hx    • Coronary artery disease Neg Hx    • Sudden death Neg Hx         scd       ALLERGIES:  Allergies   Allergen Reactions   • Ace Inhibitors Cough   • Keflex [Cephalexin] Rash       MEDICATIONS:    Current Facility-Administered Medications:   •  ampicillin-sulbactam (UNASYN) 3 g in sodium chloride 0 9 % 100 mL IVPB, 3 g, Intravenous, Q12H, Raymond Mendez PA-C, Last Rate: 200 mL/hr at 23 1155, 3 g at 23 1155  •  multi-electrolyte (PLASMALYTE-A/ISOLYTE-S PH 7 4) IV solution, 75 mL/hr, Intravenous, Continuous, [place] Taisha Mendez PA-C, Last Rate: 75 mL/hr at 23 1159, 75 mL/hr at 23 1159    Current Outpatient Medications:   •  ALPRAZolam (XANAX) 0 25 mg tablet, Take  1 tablets 30 minutes before flight , Disp: 4 tablet, Rfl: 0  •  amLODIPine (NORVASC) 2 5 mg tablet, take 1 tablet by mouth once daily, Disp: 90 tablet, Rfl: 3  •  ascorbic acid (VITAMIN C) 500 mg tablet, Take 500 mg by mouth daily, Disp: , Rfl:   •  atorvastatin (LIPITOR) 20 mg tablet, Take 1 tablet (20 mg total) by mouth daily, Disp: 90 tablet, Rfl: 2  •  calcitriol (ROCALTROL) 0 25 mcg capsule, TAKE 1 CAPSULE 5 DAYS A WEEK AS DIRECTED, Disp: 60 capsule, Rfl: 3  •  Cholecalciferol (VITAMIN D3) 1000 units CAPS, Take 1 capsule by mouth daily, Disp: , Rfl:   •  COMBIGAN 0 2-0 5 %, , Disp: , Rfl: 0  •  hydrALAZINE (APRESOLINE) 25 mg tablet, take 1 tablet by mouth three times a day, Disp: 270 tablet, Rfl: 3  •  isosorbide dinitrate (ISORDIL) 10 mg tablet, take 1 tablet by mouth three times a day, Disp: 270 tablet, Rfl: 3  •  metoprolol succinate (TOPROL-XL) 25 mg 24 hr tablet, Take 3 tablets (75 mg total) by mouth daily, Disp: 20 tablet, Rfl: 0  •  metoprolol succinate (TOPROL-XL) 50 mg 24 hr tablet, take 1 tablet by mouth once daily, Disp: 180 tablet, Rfl: 1  •  RA Aspirin EC 81 MG EC tablet, take 1 tablet by mouth once daily, Disp: 90 tablet, Rfl: 6  •  Tradjenta 5 MG TABS, TAKE 1/2 TABLET BY MOUTH DAILY, Disp: 30 tablet, Rfl: 0  •  TRAVATAN Z 0 004 % ophthalmic solution, Administer 1 drop to both eyes daily at bedtime , Disp: , Rfl: 0  •  warfarin (COUMADIN) 2 mg tablet, TAKE 1-2 TABLETS BY MOUTH DAILY OR AS DIRECTED BY PHYSICIAN, Disp: 75 tablet, Rfl: 11    REVIEW OF SYSTEMS:   Review of Systems   Constitutional: Negative for chills and fever  HENT: Positive for nosebleeds  Negative for ear pain and sore throat  Trauma on the face and nose   Eyes: Negative for pain and visual disturbance  Respiratory: Negative for cough and shortness of breath  Cardiovascular: Negative for chest pain and palpitations  Gastrointestinal: Negative for abdominal pain and vomiting  Genitourinary: Negative for dysuria and hematuria  Musculoskeletal: Negative for arthralgias and back pain  Skin: Negative for color change and rash  Neurological: Negative for seizures and syncope  All other systems reviewed and are negative  PHYSICAL EXAM:  Current Weight: Weight - Scale: 74 5 kg (164 lb 3 9 oz)  First Weight: Weight - Scale: 74 5 kg (164 lb 3 9 oz)  Vitals:    01/13/23 1230 01/13/23 1245 01/13/23 1300 01/13/23 1315   BP: 148/86 156/76 (!) 178/91 161/74   BP Location: Left arm Left arm Left arm Left arm   Pulse: 97 73 72 78   Resp: 16 16 16 16   Temp:       TempSrc:       SpO2: 96% 94% 90% 97%   Weight:         No intake or output data in the 24 hours ending 01/13/23 1331  Physical Exam  Constitutional:       General: He is not in acute distress  Appearance: He is well-developed  He is not diaphoretic  HENT:      Head: Normocephalic and atraumatic  Nose:      Comments: Wound over the nose being sutured      Mouth/Throat:      Mouth: Mucous membranes are moist    Eyes:      General: No scleral icterus  Conjunctiva/sclera: Conjunctivae normal       Pupils: Pupils are equal, round, and reactive to light  Neck:      Thyroid: No thyromegaly  Cardiovascular:      Rate and Rhythm: Normal rate and regular rhythm  Heart sounds: Normal heart sounds  No murmur heard  No friction rub  Pulmonary:      Effort: Pulmonary effort is normal  No respiratory distress  Breath sounds: Normal breath sounds  No wheezing or rales  Abdominal:      General: Bowel sounds are normal  There is no distension  Palpations: Abdomen is soft  Tenderness: There is no abdominal tenderness  Musculoskeletal:         General: No deformity  Cervical back: Neck supple  Right lower leg: No edema  Left lower leg: No edema  Lymphadenopathy:      Cervical: No cervical adenopathy     Skin: Coloration: Skin is not pale  Nails: There is no clubbing  Neurological:      Mental Status: He is alert and oriented to person, place, and time  He is not disoriented  Psychiatric:         Mood and Affect: Mood normal  Mood is not anxious  Affect is not inappropriate  Behavior: Behavior normal          Thought Content: Thought content normal            Invasive Devices:        Lab Results:   Results from last 7 days   Lab Units 01/13/23  1045   WBC Thousand/uL 8 99   HEMOGLOBIN g/dL 13 9   HEMATOCRIT % 42 3   PLATELETS Thousands/uL 201   POTASSIUM mmol/L 5 3   CHLORIDE mmol/L 113*   CO2 mmol/L 16*   BUN mg/dL 44*   CREATININE mg/dL 2 96*   CALCIUM mg/dL 8 3*   MAGNESIUM mg/dL 2 0   PHOSPHORUS mg/dL 3 9   ALK PHOS U/L 69   ALT U/L 13   AST U/L 21       Other Studies:   CT with iv contrast  IMPRESSION:     No acute traumatic CT findings      Postsurgical cardiac changes as above      Tiny thyroid nodule      Cluster of tiny cystic foci in the head of the pancreas  A follow-up in 2 years will be helpful to assess for interval stability      Right renal cysts      Enlarged prostate  Mild bladder wall thickening may be due to chronic bladder outlet obstruction or cystitis      Portions of the record may have been created with voice recognition software  Occasional wrong word or "sound a like" substitutions may have occurred due to the inherent limitations of voice recognition software  Read the chart carefully and recognize, using context, where substitutions have occurred  If you have any questions, please contact the dictating provider

## 2023-01-13 NOTE — ASSESSMENT & PLAN NOTE
Patient noted to have bicarb of 16 on CMP  Suspect most likely due to underlying CKD  Patient received CT contrast for trauma imaging  Evaluated by nephrology in ER  Patient started on bicarb drip  Also started on oral bicarb  Repeat BMP tomorrow  Discussed with nephrology personally

## 2023-01-13 NOTE — CONSULTS
Cardiology   [facility] 71 y o  male MRN: [id number]  Unit/Bed#: ED-35 Encounter: [id number]      Reason for Consult / Principal Problem: Bradycardia    Physician Requesting Consult:  Silvia Garner DO    Outpatient Cardiologist:     Assessment  1  Bradycardia - low HR readings likely 2/2 to frequent PVC burden  2  Chronic RBBB/LAFB - bifasicular block  3  Frequent PVC's - bigeminy pattern  -Occasionally feels 'skipped heart beats'  -BP stable    -48 hr Holter 6/2022 - 11% PVC burden    -Previously on metoprolol succinate 75 mg daily (on hold)  -Electrolytes stable  -TSH 1 4 (1/2021)  4  Mod-severe MR/mod-severe AI s/p AVR/MV repair VIRI clipping (3/2019), 5 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) EF as low as 25% (in 4/2019) - improved to (65% in 11/2020)   -Appears compensated  -TTE 10/2022 - LVEF 65%, wall motion normal, AV well seated, functioning normally, MV repair w/mild MR, mild TR   -Outpatient GDMT - amlodipine 2 5 mg daily, metoprolol succinate 75 mg daily, isordil 10 mg TID and hydralazine 25 mg TID  -Not previously on oral diuretics  6  CAD  -No prior intervention  -No recent anginal symptoms  -[facility] 2/2019 - 70% mLCX stenosis, 70% mRI stenosis  -Previously on asa 81 mg daily + warfarin (on hold)  -Previously on atorvastatin 20 mg daily   -Previously on metoprolol succinate 75 mg daily  7  Paroxysmal afib/aflutter   -S/p prior DCCV (4/2019)  -Maintaining NSR  -Previously on warfarin (on hold), INR 2 31 on admission   -Previously onToprol 75 mg daily (on hold)  8  CKD stage IV  -Baseline creat 2 6-2 9  -Creat 2 96 this a m    Plan  -No evidence of any significant bradycardia on telemetry or ECG  Certain HR monitors (i e Dynamap, Balwinder, or POX devices) - may detect low HR levels due to inability to detect PVC's  He does have a high PVC burden on telemetry review and occasionally will feel 'skipped heart beats', otherwise asymptomatic   Electrolytes and most recent TSH level were normal  TTE in 10/2022 - demonstrated normal EF and valvular status was stable  He does have underlying CAD but has not been experiencing any recent anginal symptoms and otherwise appears well compensated from a cardiac perspective  Can consider adjusting Toprol dose to BID rather than daily for better overlapping coverage (can try 50 mg BID to see if this helps reduce his overall burden)  -Resume antiplatelet/anticoagulation once deemed stable by trauma/OMF  -Continue amlodipine, isordil, and hydralazine as previously taking at home    -Consider outpatient stress test for further ischemic evaluation  -TSH level  -Monitor on telemetry      HPI: Myra Mustafa 71y o  year old male with a medical hx of mod-severe MR/mod-severe AI s/p AVR/MV repair VIRI clipping (3/2019), recovered cardiomyopathy EF as low as 25% (in 4/2019 - improved to 65% in 11/2020) (suspected to be tachy mediated), paroxysmal afib/aflutter s/p DCCV (4/2019), chronic RBBB/LAFB, PVCs, HTN, CKD  Former smoker, denies excessive alcohol or illicit drug use  Follows w/Dr Conde as an outpatient  Earlier presentation to the ED on 12/2/2021 after having been sent over by his PCP for reported bradycardia (HR 38)- not confirmed by ECG - appears to have been obtained by dynamap monitor  ECG in the ED demonstrated NSR w/frequent PVCs  Electrolytes normal at that time  Case was discussed w/on call Cardiology whom recommended increasing his Toprol from 50 mg to 75 mg daily for further suppression of PVC's  His was sent home on this regimen and recommended to follow up with his PCP  He called in to his PCP on 12/14 and stated that he checked his HR at home and the reading was 39 bpm  He was asymptomatic at that time  His PCP ordered an outpatient zio patch monitor for further surveillance     He presented to the [address] [place] [address] on 1/13/2023 as a level B trauma after unfortunately falling off a ladder and was subsequently found to have a nasal bone fracture  Prior to his fall he denied any concerning symptoms of dizziness, lightheadedness, CP or palpitations  He did not lose consciousness  In the ED an ECG was obtained which demonstrated NSR, RBBB/LAFB w/frequenet PVCs  Cardiology was consulted for further treatment recommendations/management  Family History:   Family History   Problem Relation Age of Onset   • Stroke Mother    • Hypertension Mother    • Blindness Father    • Hyperlipidemia Father    • Hypertension Father    • Glaucoma Father    • Cancer Sister    • Diabetes Sister    • Ovarian cancer Sister    • Gout Brother    • Heart Valve Disease Brother    • Hypertension Brother    • Anuerysm Neg Hx    • Heart attack Neg Hx    • Arrhythmia Neg Hx    • Heart failure Neg Hx    • Coronary artery disease Neg Hx    • Sudden death Neg Hx         scd     Historical Information   Past Medical History:   Diagnosis Date   • Chronic kidney disease    • Colon polyp    • COVID-19    • [facility] )    • Hyperlipidemia    • Hypertension    • Proteinuria    • Stage 3 chronic kidney disease ([facility] )    • Vitamin D deficiency      Past Surgical History:   Procedure Laterality Date   • COLONOSCOPY      overdue   • NJ ECHO TRANSESOPHAG R-T 2D W/PRB IMG ACQUISJ I&R N/A 3/7/2019    Procedure: INTRA-OP TRANSESOPHAGEAL ECHOCARDIOGRAM (PATIENCE);   Surgeon: Mary Musa DO;  Location: [facility];  Service: Cardiac Surgery   • NJ PERQ CLSR TCAT L ATR APNDGE W/ENDOCARDIAL IMPLNT  3/7/2019    Procedure: LEFT ATRIAL APPENDAGE OCCLUSION CLIPPED with 35mm Donold Fujita;  Surgeon: Mary Musa DO;  Location: [facility];  Service: Cardiac Surgery   • NJ REPLACEMENT MITRAL VALVE W/CARDIOPULMONARY BYP N/A 3/7/2019    Procedure: REPLACEMENT VALVE MITRAL (MVR) REPAIR with a 30mm MEDTRONIC CG FUTURE RING;  Surgeon: Mary Musa DO;  Location: [facility];  Service: Cardiac Surgery   • NJ RPLCMT AORTIC VALVE OPN W/STENTLESS TISSUE VALVE N/A 3/7/2019    Procedure: REPLACEMENT VALVE AORTIC (AVR) with 23mm TAVERA INSPIRIS RESILIA  AORTIC VALVE;  Surgeon: Carla Cardozo DO;  Location: [facility];  Service: Cardiac Surgery   • RENAL BIOPSY, OPEN     • TOE SURGERY Left      Social History   Social History     Substance and Sexual Activity   Alcohol Use Yes    Comment: occasionally     Social History     Substance and Sexual Activity   Drug Use No     Social History     Tobacco Use   Smoking Status Former   • Years: 2 00   • Types: Cigarettes   • Quit date:    • Years since quittin 0   Smokeless Tobacco Never     Family History:   Family History   Problem Relation Age of Onset   • Stroke Mother    • Hypertension Mother    • Blindness Father    • Hyperlipidemia Father    • Hypertension Father    • Glaucoma Father    • Cancer Sister    • Diabetes Sister    • Ovarian cancer Sister    • Gout Brother    • Heart Valve Disease Brother    • Hypertension Brother    • Anuerysm Neg Hx    • Heart attack Neg Hx    • Arrhythmia Neg Hx    • Heart failure Neg Hx    • Coronary artery disease Neg Hx    • Sudden death Neg Hx         scd       Review of Systems:  Review of Systems   Constitutional: Positive for fatigue  Negative for chills, diaphoresis and fever  HENT:        +nasal tenderness  Respiratory: Negative for cough, chest tightness and shortness of breath  Cardiovascular: Negative for chest pain, palpitations and leg swelling  Gastrointestinal: Negative for abdominal pain  Neurological: Negative for dizziness, light-headedness and headaches  All other systems reviewed and are negative            Scheduled Meds:  Current Facility-Administered Medications   Medication Dose Route Frequency Provider Last Rate   • ampicillin-sulbactam  3 g Intravenous Q12H Raymond Mendez PA-C     • multi-electrolyte  500 mL Intravenous Once Jocelyne Carter PA-C     • multi-electrolyte  75 mL/hr Intravenous Continuous Jocelyne Carter PA-C       Continuous Infusions:multi-electrolyte, 75 mL/hr      PRN Meds:   all current active meds have been reviewed and current meds:   Current Facility-Administered Medications   Medication Dose Route Frequency   • ampicillin-sulbactam (UNASYN) 3 g in sodium chloride 0 9 % 100 mL IVPB  3 g Intravenous Q12H   • multi-electrolyte (ISOLYTE-S PH 7 4) bolus 500 mL  500 mL Intravenous Once   • multi-electrolyte (PLASMALYTE-A/ISOLYTE-S PH 7 4) IV solution  75 mL/hr Intravenous Continuous       Allergies   Allergen Reactions   • Ace Inhibitors Cough   • Keflex [Cephalexin] Rash       Objective   Vitals: Blood pressure (!) 171/80, pulse 76, temperature (!) 97 2 °F (36 2 °C), temperature source Oral, resp  rate 16, weight 74 5 kg (164 lb 3 9 oz), SpO2 97 %  , Body mass index is 24 97 kg/m² ,   Orthostatic Blood Pressures    Flowsheet Row Most Recent Value   Blood Pressure 171/80 filed at 01/13/2023 1130          No intake or output data in the 24 hours ending 01/13/23 1139    Invasive Devices     Peripheral Intravenous Line  Duration           Peripheral IV 01/13/23 Right Antecubital <1 day    Peripheral IV 01/13/23 Right;Ventral (anterior) Wrist <1 day                Physical Exam:  Physical Exam  Vitals and nursing note reviewed  Constitutional:       General: He is not in acute distress  Appearance: He is obese  He is not diaphoretic  HENT:      Nose:      Comments: Dressing on nose - saturated w/bloody drainage  Eyes:      General: No scleral icterus  Cardiovascular:      Rate and Rhythm: Normal rate and regular rhythm  Pulses: Normal pulses  Heart sounds: Normal heart sounds  No murmur heard  Comments: Frequent pvcs  Pulmonary:      Effort: Pulmonary effort is normal       Breath sounds: Normal breath sounds  No wheezing or rales  Abdominal:      Palpations: Abdomen is soft  Musculoskeletal:      Right lower leg: No edema  Left lower leg: No edema  Skin:     General: Skin is warm and dry        Capillary Refill: Capillary refill takes less than 2 seconds  Neurological:      General: No focal deficit present  Mental Status: He is alert and oriented to person, place, and time     Psychiatric:         Mood and Affect: Mood normal          Lab Results:   Recent Results (from the past 24 hour(s))   Protime-INR    Collection Time: 01/13/23 12:00 AM   Result Value Ref Range    Protime 25 6 (H) 11 6 - 14 5 seconds    INR 2 31 (H) 0 84 - 1 19   APTT    Collection Time: 01/13/23 12:00 AM   Result Value Ref Range    PTT 40 (H) 23 - 37 seconds   CBC and differential    Collection Time: 01/13/23 10:45 AM   Result Value Ref Range    WBC 8 99 4 31 - 10 16 Thousand/uL    RBC 4 55 3 88 - 5 62 Million/uL    Hemoglobin 13 9 12 0 - 17 0 g/dL    Hematocrit 42 3 36 5 - 49 3 %    MCV 93 82 - 98 fL    MCH 30 5 26 8 - 34 3 pg    MCHC 32 9 31 4 - 37 4 g/dL    RDW 12 7 11 6 - 15 1 %    MPV 10 6 8 9 - 12 7 fL    Platelets 453 215 - 461 Thousands/uL    nRBC 0 /100 WBCs    Neutrophils Relative 74 43 - 75 %    Immat GRANS % 0 0 - 2 %    Lymphocytes Relative 15 14 - 44 %    Monocytes Relative 10 4 - 12 %    Eosinophils Relative 1 0 - 6 %    Basophils Relative 0 0 - 1 %    Neutrophils Absolute 6 56 1 85 - 7 62 Thousands/µL    Immature Grans Absolute 0 04 0 00 - 0 20 Thousand/uL    Lymphocytes Absolute 1 30 0 60 - 4 47 Thousands/µL    Monocytes Absolute 0 92 0 17 - 1 22 Thousand/µL    Eosinophils Absolute 0 13 0 00 - 0 61 Thousand/µL    Basophils Absolute 0 04 0 00 - 0 10 Thousands/µL   Comprehensive metabolic panel    Collection Time: 01/13/23 10:45 AM   Result Value Ref Range    Sodium 136 135 - 147 mmol/L    Potassium 5 3 3 5 - 5 3 mmol/L    Chloride 113 (H) 96 - 108 mmol/L    CO2 16 (L) 21 - 32 mmol/L    ANION GAP 7 4 - 13 mmol/L    BUN 44 (H) 5 - 25 mg/dL    Creatinine 2 96 (H) 0 60 - 1 30 mg/dL    Glucose 98 65 - 140 mg/dL    Calcium 8 3 (L) 8 4 - 10 2 mg/dL    Corrected Calcium 8 9 8 3 - 10 1 mg/dL    AST 21 13 - 39 U/L    ALT 13 7 - 52 U/L    Alkaline Phosphatase 69 34 - 104 U/L    Total Protein 6 7 6 4 - 8 4 g/dL    Albumin 3 3 (L) 3 5 - 5 0 g/dL    Total Bilirubin 0 27 0 20 - 1 00 mg/dL    eGFR 20 ml/min/1 73sq m   Lactic acid, plasma    Collection Time: 01/13/23 10:45 AM   Result Value Ref Range    LACTIC ACID 0 6 0 5 - 2 0 mmol/L   Phosphorus    Collection Time: 01/13/23 10:45 AM   Result Value Ref Range    Phosphorus 3 9 2 3 - 4 1 mg/dL   Magnesium    Collection Time: 01/13/23 10:45 AM   Result Value Ref Range    Magnesium 2 0 1 9 - 2 7 mg/dL   High Sensitivity Troponin I Random    Collection Time: 01/13/23 10:45 AM   Result Value Ref Range    HS TnI random 7 (L) 8 - 18 ng/L     Imaging: I have personally reviewed pertinent reports  and I have personally reviewed pertinent films in PACS  Code Status: Prior  Epic/ Allscripts/Care Everywhere records reviewed: Yes    * Please Note: Fluency DirectDictation voice to text software may have been used in the creation of this document   **

## 2023-01-13 NOTE — H&P
[address] 1950, 71 y o  male MRN: [id number]  Unit/Bed#: ED-35 Encounter: [id number]  Primary Care Provider: Petra Osborne MD   Date and time admitted to hospital: 1/13/2023 10:00 AM    * Fall from height of greater than 3 feet  Assessment & Plan  Mechanical fall  No syncope or LOC  Management per trauma team    Nasal fracture  Assessment & Plan  Secondary to fall  Management per trauma and OMFS  Bradycardia  Assessment & Plan  Patient has history of bradycardia recorded on monitors  Otherwise asymptomatic  Has bigeminy on telemetry currently  Patient had a zio monitor placed few days ago by his cardiologist   Cardiology was consulted in ED  Metoprolol dose decreased to 50 twice daily for now  Will wait for final cardiology recommendations  Continue to monitor on telemetry    Metabolic acidosis  Assessment & Plan  Patient noted to have bicarb of 16 on CMP  Suspect most likely due to underlying CKD  Patient received CT contrast for trauma imaging  Evaluated by nephrology in ER  Patient started on bicarb drip  Also started on oral bicarb  Repeat BMP tomorrow  Discussed with nephrology personally  CKD (chronic kidney disease), stage IV [facility])  Assessment & Plan  Lab Results   Component Value Date    EGFR 20 01/13/2023    EGFR 23 12/02/2022    EGFR 23 10/20/2022    CREATININE 2 96 (H) 01/13/2023    CREATININE 2 63 (H) 12/02/2022    CREATININE 2 60 (H) 10/20/2022     Creatinine slightly elevated than baseline  Continue to monitor with IV fluids    Diabetes mellitus type 2 in nonobese [facility])  Assessment & Plan  Lab Results   Component Value Date    HGBA1C 5 8 (H) 06/08/2021       No results for input(s): POCGLU in the last 72 hours  Blood Sugar Average: Last 72 hrs: On Tradjenta  Hold  Start sliding scale insulin for now      Chronic systolic heart failure (HCC)  Assessment & Plan  Wt Readings from Last 3 Encounters:   01/13/23 74 5 kg (164 lb 3 9 oz)   12/02/22 74 4 kg (164 lb)   10/27/22 72 1 kg (159 lb)     Patient appears euvolemic on exam   Continue Toprol-XL  Monitor volume status closely with IV fluids        Essential hypertension  Assessment & Plan  Stable  Continue home amlodipine, hydralazine Imdur      VTE Prophylaxis: Warfarin (Coumadin)  / sequential compression device   Code Status: full  POLST: POLST form is not discussed and not completed at this time  Discussion with family: pt    Anticipated Length of Stay:  Patient will be admitted on an Observation basis with an anticipated length of stay of  < 2 midnights  Justification for Hospital Stay: above    Total Time for Visit, including Counseling / Coordination of Care: 60 minutes  Greater than 50% of this total time spent on direct patient counseling and coordination of care  Chief Complaint:   fall    History of Present Illness:    Bill Guidry is a 71 y o  male who presents after a fall  Was brought in as trauma alert  Was noted to be bradycardic and also CMP showed low bicarb so a medical admission was requested by trauma team     Evaluation, patient was comfortable  Denied any specific complaints  Has extensive past medical history of coronary artery disease, systolic CHF, paroxysmal A  fib, CKD stage IV  Has been noted to be bradycardic recently on some monitors  Patient has been otherwise asymptomatic  Patient has significant PVC burden  Patient is currently following closely with PCP and cardiology  Had ZIO monitor placed recently  Denies any prodromal symptoms before the falls  No loss of consciousness  Review of Systems:    Review of Systems   Constitutional: Negative for chills and fever  HENT: Negative for congestion and sore throat  Respiratory: Negative for cough and shortness of breath  Cardiovascular: Negative for palpitations  Gastrointestinal: Negative for abdominal pain, diarrhea, nausea and vomiting     Genitourinary: Negative for difficulty urinating, flank pain, frequency and urgency  Musculoskeletal: Negative for arthralgias and myalgias  Skin: Negative for color change and rash  Neurological: Negative for dizziness and light-headedness  Psychiatric/Behavioral: Negative for agitation, behavioral problems and confusion  Past Medical and Surgical History:     Past Medical History:   Diagnosis Date   • Chronic kidney disease    • Colon polyp    • COVID-19    • Diabetes mellitus ([facility] )    • Hyperlipidemia    • Hypertension    • Proteinuria    • Stage 3 chronic kidney disease ([facility] )    • Vitamin D deficiency        Past Surgical History:   Procedure Laterality Date   • COLONOSCOPY      overdue   • WA ECHO TRANSESOPHAG R-T 2D W/PRB IMG ACQUISJ I&R N/A 3/7/2019    Procedure: INTRA-OP TRANSESOPHAGEAL ECHOCARDIOGRAM (PATIENCE); Surgeon: Kalyani Romero DO;  [facility];  Service: Cardiac Surgery   • WA PERQ CLSR TCAT L ATR APNDGE W/ENDOCARDIAL IMPLNT  3/7/2019    Procedure: LEFT ATRIAL APPENDAGE OCCLUSION CLIPPED with 35mm Coye Reji;  Surgeon: Kalyani Romero DO;  [facility];  Service: Cardiac Surgery   • WA REPLACEMENT MITRAL VALVE W/CARDIOPULMONARY BYP N/A 3/7/2019    Procedure: REPLACEMENT VALVE MITRAL (MVR) REPAIR with a 30mm MEDTRONIC CG FUTURE RING;  Surgeon: Kalyani Romero DO;  [facility];  Service: Cardiac Surgery   • WA RPLCMT AORTIC VALVE OPN W/STENTLESS TISSUE VALVE N/A 3/7/2019    Procedure: REPLACEMENT VALVE AORTIC (AVR) with 23mm TAVERA INSPIRIS RESILIA  AORTIC VALVE;  Surgeon: Kalyani Romero DO;  [facility];  Service: Cardiac Surgery   • RENAL BIOPSY, OPEN     • TOE SURGERY Left        Meds/Allergies:    Prior to Admission medications    Medication Sig Start Date End Date Taking?  Authorizing Provider   ALPRAZolam Rolo Minor) 0 25 mg tablet Take  1 tablets 30 minutes before flight  2/21/22   Hobson Aase, MD   amLODIPine (NORVASC) 2 5 mg tablet take 1 tablet by mouth once daily 10/7/22   Gerson Peterson MD   ascorbic acid (VITAMIN C) 500 mg tablet Take 500 mg by mouth daily    Historical Provider, MD   atorvastatin (LIPITOR) 20 mg tablet Take 1 tablet (20 mg total) by mouth daily 9/12/22   Katherine King MD   calcitriol (ROCALTROL) 0 25 mcg capsule TAKE 1 CAPSULE 5 DAYS A WEEK AS DIRECTED 4/18/22   Mark Sanchez MD   Cholecalciferol (VITAMIN D3) 1000 units CAPS Take 1 capsule by mouth daily    Historical Provider, MD   COMBIGAN 0 2-0 5 %  4/26/19   Historical Provider, MD   hydrALAZINE (APRESOLINE) 25 mg tablet take 1 tablet by mouth three times a day 12/5/22   Gerson Peterson MD   isosorbide dinitrate (ISORDIL) 10 mg tablet take 1 tablet by mouth three times a day 1/3/23   Barbara Munoz MD   metoprolol succinate (TOPROL-XL) 25 mg 24 hr tablet Take 3 tablets (75 mg total) by mouth daily 12/2/22   Melinda Iniguez DO   metoprolol succinate (TOPROL-XL) 50 mg 24 hr tablet take 1 tablet by mouth once daily 7/5/22   Gerson Peterson MD   RA Aspirin EC 81 MG EC tablet take 1 tablet by mouth once daily 9/26/22   Katherine King MD   Tradjenta 5 MG TABS TAKE 1/2 TABLET BY MOUTH DAILY 1/9/23   Katherine King MD   TRAVATAN Z 0 004 % ophthalmic solution Administer 1 drop to both eyes daily at bedtime  2/12/18   Historical Provider, MD   warfarin (COUMADIN) 2 mg tablet TAKE 1-2 TABLETS BY MOUTH DAILY OR AS DIRECTED BY PHYSICIAN 4/25/22   Gerson Peterson MD         Allergies:    Allergies   Allergen Reactions   • Ace Inhibitors Cough   • Keflex [Cephalexin] Rash       Social History:     Marital Status: /Civil Union   Occupation:   Patient Pre-hospital Living Situation:   Patient Pre-hospital Level of Mobility:   Patient Pre-hospital Diet Restrictions:   Substance Use History:   Social History     Substance and Sexual Activity   Alcohol Use Yes    Comment: occasionally     Social History     Tobacco Use   Smoking Status Former   • Years: 2 00   • Types: Cigarettes   • Quit date: 26   • Years since quittin 0   Smokeless Tobacco Never     Social History     Substance and Sexual Activity   Drug Use No       Family History:    Family History   Problem Relation Age of Onset   • Stroke Mother    • Hypertension Mother    • Blindness Father    • Hyperlipidemia Father    • Hypertension Father    • Glaucoma Father    • Cancer Sister    • Diabetes Sister    • Ovarian cancer Sister    • Gout Brother    • Heart Valve Disease Brother    • Hypertension Brother    • Anuerysm Neg Hx    • Heart attack Neg Hx    • Arrhythmia Neg Hx    • Heart failure Neg Hx    • Coronary artery disease Neg Hx    • Sudden death Neg Hx         scd       Physical Exam:     Vitals:   Blood Pressure: (!) 172/72 (23 1400)  Pulse: 76 (23 1400)  Temperature: (!) 97 2 °F (36 2 °C) (23 1004)  Temp Source: Oral (23 1004)  Respirations: 16 (23 1400)  Weight - Scale: 74 5 kg (164 lb 3 9 oz) (23 1004)  SpO2: 98 % (23 1400)    Physical Exam    Constitutional: Pt appears comfortable  Not in any acute distress  HENT:   Head: Normocephalic  Nasal area dressed   Eyes: EOM are normal    Neck: Neck supple  Cardiovascular: Normal rate, irregular rhythm, normal heart sounds  No murmur heard  Pulmonary/Chest: Effort normal, air entry b/l equal  No respiratory distress  Pt has no wheezes or crackles  Abdominal: Soft  Non-distended, Non-tender  Bowel sounds are normal    Musculoskeletal: Normal range of motion  Neurological: awake, alert  Moving all extremities spontaneously  Psychiatric: normal mood and affect  Additional Data:     Lab Results: I have personally reviewed pertinent reports        Results from last 7 days   Lab Units 23  1045   WBC Thousand/uL 8 99   HEMOGLOBIN g/dL 13 9   HEMATOCRIT % 42 3   PLATELETS Thousands/uL 201   NEUTROS PCT % 74   LYMPHS PCT % 15   MONOS PCT % 10   EOS PCT % 1     Results from last 7 days   Lab Units 23  1045   SODIUM mmol/L 136   POTASSIUM mmol/L 5 3   CHLORIDE mmol/L 113*   CO2 mmol/L 16*   BUN mg/dL 44*   CREATININE mg/dL 2 96*   ANION GAP mmol/L 7   CALCIUM mg/dL 8 3*   ALBUMIN g/dL 3 3*   TOTAL BILIRUBIN mg/dL 0 27   ALK PHOS U/L 69   ALT U/L 13   AST U/L 21   GLUCOSE RANDOM mg/dL 98     Results from last 7 days   Lab Units 01/13/23  0000   INR  2 31*             Results from last 7 days   Lab Units 01/13/23  1045   LACTIC ACID mmol/L 0 6       Imaging: I have personally reviewed pertinent reports  TRAUMA - CT spine cervical wo contrast   Final Result by Denis Levine MD (01/13 1048)      No acute fracture  Moderate cervical spondylosis  I personally discussed this study with Juan Carlos Chen on 1/13/2023 at 10:46 AM              Workstation performed: [id number]         CT head wo contrast   Final Result by Denis Levine MD (01/13 1046)      No acute intracranial abnormality  Persistent complete opacification of the right maxillary sinus  I personally discussed this study with Juan Carlos Chen on 1/13/2023 at 10:46 AM                            Workstation performed: [id number]         CT facial bones wo contrast   Final Result by Denis Levine MD (01/13 1051)      Fracture through the tip of the nasal bone as well as the distal left nasal bone with overlying soft tissue swelling  Stable complete opacification of the right maxilla sinus with widening of the right maxillary ostium  Underlying sinonasal polyp not excluded  I personally discussed this study with Juan Carlos Chen on 1/13/2023 at 10:46 AM                Workstation performed: [id number]         CT chest abdomen pelvis w contrast   Final Result by Denis Levine MD (01/13 1058)      No acute traumatic CT findings  Postsurgical cardiac changes as above  Tiny thyroid nodule  Cluster of tiny cystic foci in the head of the pancreas  A follow-up in 2 years will be helpful to assess for interval stability  Right renal cysts        Enlarged prostate  Mild bladder wall thickening may be due to chronic bladder outlet obstruction or cystitis  I personally discussed this study with Renate Duron on 1/13/2023 at 10:46 AM       Workstation performed: [id number]         XR Trauma multiple (SLB/SLRA trauma bay ONLY)   Final Result by Tisha Ryan MD (01/13 1115)      No active pulmonary disease  Workstation performed: [id number]         XR chest 1 view   Final Result by Tisha Ryan MD (01/13 6734)      No active pulmonary disease  Workstation performed: [id number]             EKG, Pathology, and Other Studies Reviewed on Admission:   · EKG: NSR normal rate PVCs bigeminy, no acute ST T chnages    Allscripts / Epic Records Reviewed: Yes     ** Please Note: This note has been constructed using a voice recognition system   **

## 2023-01-13 NOTE — ASSESSMENT & PLAN NOTE
Wt Readings from Last 3 Encounters:   01/13/23 74 5 kg (164 lb 3 9 oz)   12/02/22 74 4 kg (164 lb)   10/27/22 72 1 kg (159 lb)     Patient appears euvolemic on exam   Continue Toprol-XL    Monitor volume status closely with IV fluids

## 2023-01-13 NOTE — LETTER
[address] [facility] MED SURG UNIT  [address] [facility] [address]  Dept: [phone]    January 14, 2023     Patient: Natali Chowdhury   YOB: 1950   Date of Visit: 1/13/2023       To Whom it May Concern:    Natali Chowdhury is under my professional care  He was seen in the hospital from 1/13/2023 to 01/14/23  He has been recommended not to work for 1 - 2 weeks to allow for proper healing of facial injuries       If you have any questions or concerns, please don't hesitate to call           Sincerely,          JHONATAN Barone

## 2023-01-13 NOTE — ASSESSMENT & PLAN NOTE
- Fall from 8 feet  - Patient was on ladder, angle was too steep, and subsequently he fell off the ladder landing on his left side and his face     - Below noted injuries  - No additional injuries found on tertiary trauma exam - trauma team will sign off

## 2023-01-13 NOTE — ED PROVIDER NOTES
Emergency Department Airway Evaluation and Management Form    History  Obtained from: patient  Ace inhibitors and Keflex [cephalexin]  No chief complaint on file  HPI    Past Medical History:   Diagnosis Date   • Chronic kidney disease    • Colon polyp    • COVID-19    • Diabetes mellitus ([facility] )    • Hyperlipidemia    • Hypertension    • Proteinuria    • Stage 3 chronic kidney disease ([facility] )    • Vitamin D deficiency      Past Surgical History:   Procedure Laterality Date   • COLONOSCOPY      overdue   • IN ECHO TRANSESOPHAG R-T 2D W/PRB IMG ACQUISJ I&R N/A 3/7/2019    Procedure: INTRA-OP TRANSESOPHAGEAL ECHOCARDIOGRAM (PATIENCE);   Surgeon: Alyce Wu DO;  [facility];  Service: Cardiac Surgery   • IN PERQ CLSR TCAT L ATR APNDGE W/ENDOCARDIAL IMPLNT  3/7/2019    Procedure: LEFT ATRIAL APPENDAGE OCCLUSION CLIPPED with 35mm Jeffrey Meline;  Surgeon: Alyce Wu DO;  [facility];  Service: Cardiac Surgery   • IN REPLACEMENT OF MITRAL VALVE N/A 3/7/2019    Procedure: REPLACEMENT VALVE MITRAL (MVR) REPAIR with a 30mm MEDTRONIC CG FUTURE RING;  Surgeon: Alyce Wu DO;  [facility];  Service: Cardiac Surgery   • IN RPLCMT AORTIC VALVE OPN W/STENTLESS TISSUE VALVE N/A 3/7/2019    Procedure: REPLACEMENT VALVE AORTIC (AVR) with 23mm TAVERA INSPIRIS RESILIA  AORTIC VALVE;  Surgeon: Alyce Wu DO;  [facility];  Service: Cardiac Surgery   • RENAL BIOPSY, OPEN     • TOE SURGERY Left      Family History   Problem Relation Age of Onset   • Stroke Mother    • Hypertension Mother    • Blindness Father    • Hyperlipidemia Father    • Hypertension Father    • Glaucoma Father    • Cancer Sister    • Diabetes Sister    • Ovarian cancer Sister    • Gout Brother    • Heart Valve Disease Brother    • Hypertension Brother    • Anuerysm Neg Hx    • Heart attack Neg Hx    • Arrhythmia Neg Hx    • Heart failure Neg Hx    • Coronary artery disease Neg Hx    • Sudden death Neg Hx scd     Social History     Tobacco Use   • Smoking status: Former     Years: 2 00     Types: Cigarettes     Quit date:      Years since quittin 0   • Smokeless tobacco: Never   Vaping Use   • Vaping Use: Never used   Substance Use Topics   • Alcohol use: Yes     Comment: occasionally   • Drug use: No     I have reviewed and agree with the history as documented  Review of Systems    Physical Exam  /94   Pulse 77   Temp (!) 97 2 °F (36 2 °C) (Oral)   Resp 16   Wt 74 5 kg (164 lb 3 9 oz)   SpO2 97%   BMI 24 97 kg/m²     Physical Exam    ED Medications  Medications - No data to display    Intubation  Procedures    Notes  I was present in trauma bay for airway evaluation  Airway is patent and protected  No acute airway intervention required at this time  Further management of this patient by trauma attending and staff  I am available for airway management should condition change       Final Diagnosis  Final diagnoses:   None       ED Provider  Electronically Signed by     Celia Macias DO  23 1022

## 2023-01-13 NOTE — ASSESSMENT & PLAN NOTE
Lab Results   Component Value Date    HGBA1C 5 8 (H) 06/08/2021       No results for input(s): POCGLU in the last 72 hours  Blood Sugar Average: Last 72 hrs: On Tradjenta  Hold  Start sliding scale insulin for now

## 2023-01-13 NOTE — ASSESSMENT & PLAN NOTE
Patient has history of bradycardia recorded on monitors  Otherwise asymptomatic  Has bigeminy on telemetry currently  Patient had a zio monitor placed few days ago by his cardiologist   Cardiology was consulted in ED  Metoprolol dose decreased to 50 twice daily for now  Will wait for final cardiology recommendations    Continue to monitor on telemetry

## 2023-01-13 NOTE — ASSESSMENT & PLAN NOTE
Secondary to fall  Management per trauma and OMFS

## 2023-01-13 NOTE — PROCEDURES
Laceration repair    Date/Time: 1/13/2023 2:37 PM  Performed by: Carmen Wiggins PA-C  Authorized by: Carmen Wiggins PA-C   Consent: Verbal consent obtained  Risks and benefits: risks, benefits and alternatives were discussed  Consent given by: patient  Patient understanding: patient states understanding of the procedure being performed  Relevant documents: relevant documents present and verified  Test results: test results available and properly labeled  Site marked: the operative site was marked  Radiology Images displayed and confirmed  If images not available, report reviewed: imaging studies available  Required items: required blood products, implants, devices, and special equipment available  Patient identity confirmed: verbally with patient and provided demographic data  Time out: Immediately prior to procedure a "time out" was called to verify the correct patient, procedure, equipment, support staff and site/side marked as required  Body area: head/neck  Location details: nose  Laceration length: 2 cm  Foreign bodies: no foreign bodies  Tendon involvement: none  Nerve involvement: none  Vascular damage: no    Anesthesia:  Local Anesthetic: lidocaine 1% with epinephrine    Sedation:  Patient sedated: no      Wound Dehiscence:  Superficial Wound Dehiscence: simple closure      Procedure Details:  Preparation: Patient was prepped and draped in the usual sterile fashion  Irrigation solution: saline  Irrigation method: syringe  Amount of cleaning: standard  Debridement: none  Degree of undermining: none  Wound skin closure material used: chromic 3-0  Number of sutures: 4  Technique: simple  Approximation: close  Approximation difficulty: simple  Dressing: band-aid and triple antibiotic  Patient tolerance: patient tolerated the procedure well with no immediate complications  Foreign body: clot

## 2023-01-14 VITALS
WEIGHT: 164.24 LBS | DIASTOLIC BLOOD PRESSURE: 82 MMHG | HEART RATE: 69 BPM | OXYGEN SATURATION: 98 % | BODY MASS INDEX: 24.97 KG/M2 | RESPIRATION RATE: 16 BRPM | SYSTOLIC BLOOD PRESSURE: 138 MMHG | TEMPERATURE: 97.9 F

## 2023-01-14 LAB
ANION GAP SERPL CALCULATED.3IONS-SCNC: 7 MMOL/L (ref 4–13)
BASOPHILS # BLD AUTO: 0.05 THOUSANDS/ÂΜL (ref 0–0.1)
BASOPHILS NFR BLD AUTO: 1 % (ref 0–1)
BUN SERPL-MCNC: 40 MG/DL (ref 5–25)
CALCIUM SERPL-MCNC: 7.7 MG/DL (ref 8.4–10.2)
CHLORIDE SERPL-SCNC: 110 MMOL/L (ref 96–108)
CO2 SERPL-SCNC: 18 MMOL/L (ref 21–32)
CREAT SERPL-MCNC: 2.65 MG/DL (ref 0.6–1.3)
EOSINOPHIL # BLD AUTO: 0.12 THOUSAND/ÂΜL (ref 0–0.61)
EOSINOPHIL NFR BLD AUTO: 1 % (ref 0–6)
ERYTHROCYTE [DISTWIDTH] IN BLOOD BY AUTOMATED COUNT: 12.7 % (ref 11.6–15.1)
GFR SERPL CREATININE-BSD FRML MDRD: 23 ML/MIN/1.73SQ M
GLUCOSE P FAST SERPL-MCNC: 93 MG/DL (ref 65–99)
GLUCOSE SERPL-MCNC: 101 MG/DL (ref 65–140)
GLUCOSE SERPL-MCNC: 93 MG/DL (ref 65–140)
GLUCOSE SERPL-MCNC: 94 MG/DL (ref 65–140)
HCT VFR BLD AUTO: 37.8 % (ref 36.5–49.3)
HGB BLD-MCNC: 12.5 G/DL (ref 12–17)
IMM GRANULOCYTES # BLD AUTO: 0.05 THOUSAND/UL (ref 0–0.2)
IMM GRANULOCYTES NFR BLD AUTO: 1 % (ref 0–2)
INR PPP: 2.7 (ref 0.84–1.19)
LYMPHOCYTES # BLD AUTO: 1.22 THOUSANDS/ÂΜL (ref 0.6–4.47)
LYMPHOCYTES NFR BLD AUTO: 13 % (ref 14–44)
MCH RBC QN AUTO: 30.8 PG (ref 26.8–34.3)
MCHC RBC AUTO-ENTMCNC: 33.1 G/DL (ref 31.4–37.4)
MCV RBC AUTO: 93 FL (ref 82–98)
MONOCYTES # BLD AUTO: 1.2 THOUSAND/ÂΜL (ref 0.17–1.22)
MONOCYTES NFR BLD AUTO: 13 % (ref 4–12)
NEUTROPHILS # BLD AUTO: 6.84 THOUSANDS/ÂΜL (ref 1.85–7.62)
NEUTS SEG NFR BLD AUTO: 71 % (ref 43–75)
NRBC BLD AUTO-RTO: 0 /100 WBCS
PLATELET # BLD AUTO: 167 THOUSANDS/UL (ref 149–390)
PMV BLD AUTO: 10.6 FL (ref 8.9–12.7)
POTASSIUM SERPL-SCNC: 4.8 MMOL/L (ref 3.5–5.3)
PROTHROMBIN TIME: 28.9 SECONDS (ref 11.6–14.5)
RBC # BLD AUTO: 4.06 MILLION/UL (ref 3.88–5.62)
SODIUM SERPL-SCNC: 135 MMOL/L (ref 135–147)
WBC # BLD AUTO: 9.48 THOUSAND/UL (ref 4.31–10.16)

## 2023-01-14 RX ORDER — AMOXICILLIN 875 MG/1
875 TABLET, COATED ORAL EVERY 12 HOURS SCHEDULED
Status: DISCONTINUED | OUTPATIENT
Start: 2023-01-14 | End: 2023-01-14 | Stop reason: HOSPADM

## 2023-01-14 RX ORDER — METOPROLOL SUCCINATE 50 MG/1
50 TABLET, EXTENDED RELEASE ORAL 2 TIMES DAILY
Refills: 0
Start: 2023-01-14 | End: 2023-01-23 | Stop reason: SDUPTHER

## 2023-01-14 RX ORDER — SODIUM BICARBONATE 650 MG/1
1300 TABLET ORAL
Qty: 180 TABLET | Refills: 0 | Status: SHIPPED | OUTPATIENT
Start: 2023-01-14 | End: 2023-02-13

## 2023-01-14 RX ORDER — SODIUM BICARBONATE 650 MG/1
1300 TABLET ORAL
Status: DISCONTINUED | OUTPATIENT
Start: 2023-01-14 | End: 2023-01-14 | Stop reason: HOSPADM

## 2023-01-14 RX ORDER — AMOXICILLIN 875 MG/1
875 TABLET, COATED ORAL EVERY 12 HOURS SCHEDULED
Qty: 12 TABLET | Refills: 0 | Status: SHIPPED | OUTPATIENT
Start: 2023-01-14 | End: 2023-01-17

## 2023-01-14 RX ADMIN — ATORVASTATIN CALCIUM 20 MG: 20 TABLET, FILM COATED ORAL at 08:32

## 2023-01-14 RX ADMIN — HYDRALAZINE HYDROCHLORIDE 25 MG: 25 TABLET ORAL at 05:34

## 2023-01-14 RX ADMIN — AMLODIPINE BESYLATE 2.5 MG: 2.5 TABLET ORAL at 08:31

## 2023-01-14 RX ADMIN — SODIUM BICARBONATE: 84 INJECTION, SOLUTION INTRAVENOUS at 05:32

## 2023-01-14 RX ADMIN — METOPROLOL SUCCINATE 50 MG: 50 TABLET, EXTENDED RELEASE ORAL at 08:32

## 2023-01-14 RX ADMIN — SODIUM CHLORIDE 3 G: 9 INJECTION, SOLUTION INTRAVENOUS at 00:13

## 2023-01-14 RX ADMIN — SODIUM BICARBONATE 1300 MG: 650 TABLET ORAL at 08:31

## 2023-01-14 RX ADMIN — ISOSORBIDE DINITRATE 10 MG: 10 TABLET ORAL at 08:31

## 2023-01-14 RX ADMIN — ASPIRIN 81 MG: 81 TABLET, COATED ORAL at 08:31

## 2023-01-14 NOTE — UTILIZATION REVIEW
Initial Clinical Review    Admission: Date/Time/Statement:   Admission Orders (From admission, onward)     Ordered        01/13/23 1434  Place in Observation  Once                      Orders Placed This Encounter   Procedures   • Place in Observation     Standing Status:   Standing     Number of Occurrences:   1     Order Specific Question:   Level of Care     Answer:   Med Surg [16]     ED Arrival Information     Expected   -    Arrival   1/13/2023 09:56    Acuity   Emergent            Means of arrival   Walk-In    Escorted by   Family Member    Service   Hospitalist    Admission type   Emergency            Arrival complaint   +thinners/facila injury/fall/Eliquis           Chief Complaint   Patient presents with   • Trauma       Initial Presentation: 71 y o  male with hx HTN,valvular heart disease on warfarin,(had AVR and mitral valve repair) CKD, systolic CHF,bradycardia had ZIO monitor recently placed who presents today as a  trauma alert after falling from height on ladder ,mechanical fell onto front/left sides with head strike, bloody nose  NO LOC, no dizziness  Pt has 2 cm laceration on nose oozing blood continuously- not well approximated  ,sutured by trauma service in ED  Imaging shows nasal fx  Pt has L hip pain   GCS 15, 5/5 motor strength, 2+distal pulses   Right frontal cephalohematoma 1 5 cm in diameter just superior to medial edge of right eyebrow  No tenderness to palpation of chest wall bilaterally  Euvolemic on exam  Abdomen WNL   Monitor shows bigeminy Labs - bicarb 16, creat 2 96, slightly above baseline    Pt given IVF, IV abx, started on bicarb drip  in ED  Pt admitted as OBS to med surg with nasal fx s/p fall,  metabolic acidosis, hx bradycardia    Plan- telemetry, nephrology consult, OMFS consult, Cardiology consult,change metoprolol to 50 mg BID,  IV abx- Unasyn,continue bicarb drip, start po bicarb, BMP in am, monitor creat   cardiology consult- Frequent PVCs  on telemetry, have been noted in past Currently  has bigeminy  Increase metoprolol to 50 mg of succinate twice daily (increased from 75 mg total daily dose currently)  No additional cardiac testing   Nephrology consult- CKD4 -creatinine slightly elevated at 2 9   Blood pressure slightly on higher side   Pt received CT with IV contrast on admission   In the setting of metabolic acidosis, would change IV fluid to half NS with sodium bicarbonate  Would also start on oral sodium bicarbonate which will be continued at the time of discharge  Continue to monitor renal function   OMFS consult- reports he has some congestion, pain on palpation to the bridge of his nose , drsg intact to nose s/p repair laceration by trauma service  EOMI w/ no signs of muscle entrapment   Nose: No signficant nasal dorsum deviation  No septal hematoma  Mild mid facial swelling associated with fractured nasal bones and consistent with the injury  ++ ecchymosis  No bony step-off palpated  No acute intervention from OMFS perspective at this time  Antibiotics (unasyn 3g IV q6h then transition to augmentin 875-125mg BID PO when able for 10 days total  Sinus precautions  OTC decongestant, antihistamine as needed   Date:1/14  Cardiology - asymptomatic from PVC's   Continue Toprol XL 50 mg BID, is wearing ZIO patch  BP well controlled         ED Triage Vitals   Temperature Pulse Respirations Blood Pressure SpO2   01/13/23 1004 01/13/23 1004 01/13/23 1004 01/13/23 1004 01/13/23 1004   (!) 97 2 °F (36 2 °C) 77 16 156/94 97 %      Temp Source Heart Rate Source Patient Position - Orthostatic VS BP Location FiO2 (%)   01/13/23 1004 01/13/23 1004 01/13/23 1200 01/13/23 1200 --   Oral Monitor Lying Left arm       Pain Score       01/13/23 1630       No Pain          Wt Readings from Last 1 Encounters:   01/13/23 74 5 kg (164 lb 3 9 oz)     Additional Vital Signs:   Date/Time Temp Pulse Resp BP MAP (mmHg) SpO2   01/14/23 05:31:35 -- 69 -- 138/82 101 98 %   01/13/23 22:17:06 -- 68 -- 137/82 100 97 %   01/13/23 19:25:39 -- 81 -- 132/61 85 98 %   01/13/23 15:40:28 97 9 °F (36 6 °C) 79 -- 136/83 101 98 %   01/13/23 1400 -- 76 16 172/72 Abnormal  103 98 %   01/13/23 1315 -- 78 16 161/74 106 97 %   01/13/23 1300 -- 72 16 178/91 Abnormal  124 90 %   01/13/23 1245 -- 73 16 156/76 109 94 %   01/13/23 1230 -- 97 16 148/86 109 96 %   01/13/23 1215 -- 74 16 159/77 111 94 %   01/13/23 1200 -- 77 16 184/81 Abnormal  116 96 %   01/13/23 1130 -- 76 16 171/80 Abnormal  -- 97 %   01/13/23 1115 -- 75 16 150/72 -- 96 %   01/13/23 1100 -- 76 16 150/72 -- 96 %   01/13/23 1045 -- 76 16 180/81 Abnormal  116 96 %   01/13/23 1030 -- 75 16 154/82 -- 97 %   01/13/23 1015 -- 69 16 163/92 122 97 %     Date and Time Eye Opening Best Verbal Response Best Motor Response [place] Coma Scale Score   01/13/23 1900 4 5 6 15   01/13/23 1630 4 5 6 15   01/13/23 1130 4 5 6 15   01/13/23 1115 4 5 6 15   01/13/23 1100 4 5 6 15   01/13/23 1045 4 5 6 15   01/13/23 1030 4 5 6 15   01/13/23 1015 4 5 6 15   01/13/23 1004 4 5 6 15     Pertinent Labs/Diagnostic Test Results:   1/13 ECG-Sinus rhythm with frequent Premature ventricular complexes in a pattern of bigeminy  Right bundle branch block  Left anterior fascicular block   Bifascicular block   Minimal voltage criteria for LVH, may be normal variant  Septal infarct , age undetermined      TRAUMA - CT spine cervical wo contrast   Final Result by Francisco Phillips MD (01/13 1048)      No acute fracture  Moderate cervical spondylosis  I personally discussed this study with Rob Thomas on 1/13/2023 at 10:46 AM              Workstation performed: [id number]         CT head wo contrast   Final Result by Francisco Phillips MD (01/13 1046)      No acute intracranial abnormality  Persistent complete opacification of the right maxillary sinus               I personally discussed this study with Rob Thomas on 1/13/2023 at 10:46 AM                            Workstation performed: [id number]         CT facial bones wo contrast   Final Result by Denis Levine MD (01/13 1051)      Fracture through the tip of the nasal bone as well as the distal left nasal bone with overlying soft tissue swelling  Stable complete opacification of the right maxilla sinus with widening of the right maxillary ostium  Underlying sinonasal polyp not excluded  I personally discussed this study with Juan Carlos Chen on 1/13/2023 at 10:46 AM                Workstation performed: [id number]         CT chest abdomen pelvis w contrast   Final Result by Denis Levine MD (01/13 1058)      No acute traumatic CT findings  Postsurgical cardiac changes as above  Tiny thyroid nodule  Cluster of tiny cystic foci in the head of the pancreas  A follow-up in 2 years will be helpful to assess for interval stability  Right renal cysts  Enlarged prostate  Mild bladder wall thickening may be due to chronic bladder outlet obstruction or cystitis  I personally discussed this study with Juan Carlos Chen on 1/13/2023 at 10:46 AM       Workstation performed: [id number]         XR Trauma multiple (SLB/[facility] trauma bay ONLY)   Final Result by Romeo Lopez MD (01/13 1115)      No active pulmonary disease  Workstation performed: [id number]         XR chest 1 view   Final Result by Romeo Lopez MD (01/13 1142)      No active pulmonary disease                 Workstation performed: [id number]               Results from last 7 days   Lab Units 01/14/23  0507 01/13/23  1045   WBC Thousand/uL 9 48 8 99   HEMOGLOBIN g/dL 12 5 13 9   HEMATOCRIT % 37 8 42 3   PLATELETS Thousands/uL 167 201   NEUTROS ABS Thousands/µL 6 84 6 56         Results from last 7 days   Lab Units 01/14/23  0507 01/13/23  1045   SODIUM mmol/L 135 136   POTASSIUM mmol/L 4 8 5 3   CHLORIDE mmol/L 110* 113*   CO2 mmol/L 18* 16*   ANION GAP mmol/L 7 7   BUN mg/dL 40* 44*   CREATININE mg/dL 2 65* 2 96*   EGFR ml/min/1 73sq m 23 20   CALCIUM mg/dL 7 7* 8 3* MAGNESIUM mg/dL  --  2 0   PHOSPHORUS mg/dL  --  3 9     Results from last 7 days   Lab Units 01/13/23  1045   AST U/L 21   ALT U/L 13   ALK PHOS U/L 69   TOTAL PROTEIN g/dL 6 7   ALBUMIN g/dL 3 3*   TOTAL BILIRUBIN mg/dL 0 27     Results from last 7 days   Lab Units 01/14/23  0738 01/13/23  2139 01/13/23  1545   POC GLUCOSE mg/dl 101 88 221*     Results from last 7 days   Lab Units 01/14/23  0507 01/13/23  1045   GLUCOSE RANDOM mg/dL 93 98             No results found for: BETA-HYDROXYBUTYRATE       Results from last 7 days   Lab Units 01/13/23  1206   PH EMELY  7 469*   PCO2 EMELY mm Hg 11 6*   PO2 EMELY mm Hg 108 3*   HCO3 EMELY mmol/L 8 2*   BASE EXC EMELY mmol/L -13 7   O2 CONTENT EMELY ml/dL 10 3   O2 HGB, VENOUS % 96 7*                     Results from last 7 days   Lab Units 01/14/23  0507 01/13/23  1706 01/13/23  0000   PROTIME seconds 28 9* 24 9* 25 6*   INR  2 70* 2 22* 2 31*   PTT seconds  --   --  40*             Results from last 7 days   Lab Units 01/13/23  1045   LACTIC ACID mmol/L 0 6             Results from last 7 days   Lab Units 01/13/23  1045   BNP pg/mL 113*                         ED Treatment:   Medication Administration from 01/13/2023 0956 to 01/13/2023 1540       Date/Time Order Dose Route Action     01/13/2023 1030 EST iohexol (OMNIPAQUE) 350 MG/ML injection (SINGLE-DOSE) 85 mL 85 mL Intravenous Given     01/13/2023 1155 EST ampicillin-sulbactam (UNASYN) 3 g in sodium chloride 0 9 % 100 mL IVPB 3 g Intravenous New Bag     01/13/2023 1152 EST multi-electrolyte (ISOLYTE-S PH 7 4) bolus 500 mL 500 mL Intravenous New Bag     01/13/2023 1159 EST multi-electrolyte (PLASMALYTE-A/ISOLYTE-S PH 7 4) IV solution 75 mL/hr Intravenous New Bag     01/13/2023 1312 EST tetanus-diphtheria-acellular pertussis (BOOSTRIX) IM injection 0 5 mL 0 5 mL Intramuscular Given     01/13/2023 1312 EST lidocaine-epinephrine (XYLOCAINE/EPINEPHRINE) 1 %-1:100,000 injection 5 mL 5 mL Infiltration Given     01/13/2023 1313 EST neomycin-bacitracin-polymyxin b (NEOSPORIN) ointment 1 small application 1 small application Topical Given     01/13/2023 1313 EST acetaminophen (TYLENOL) tablet 975 mg 975 mg Oral Given     01/13/2023 1510 EST sodium bicarbonate 75 mEq in sodium chloride 0 45 % 1,000 mL infusion -- Intravenous New Bag     01/13/2023 1433 EST metoprolol succinate (TOPROL-XL) 24 hr tablet 50 mg 50 mg Oral Given     01/13/2023 1433 EST hydrALAZINE (APRESOLINE) tablet 25 mg 25 mg Oral Given        Past Medical History:   Diagnosis Date   • Chronic kidney disease    • Colon polyp    • COVID-19    • Diabetes mellitus ([facility] [address] )    • Hyperlipidemia    • Hypertension    • Proteinuria    • Stage 3 chronic kidney disease (HCC)    • Vitamin D deficiency      Present on Admission:  • Diabetes mellitus type 2 in nonobese (HCC)  • Chronic systolic heart failure (HCC)  • Essential hypertension  • CKD (chronic kidney disease), stage IV (HCC)  • Bradycardia      Admitting Diagnosis: Bradycardia [R00 1]  CKD (chronic kidney disease), stage IV (HCC) [N18 4]  Facial laceration [S01 81XA]  Open fracture of nasal bone, initial encounter [S02  2XXB]  Laceration of nose, initial encounter [S01 21XA]  Fall from height of greater than 3 feet [W17 89XA]  Age/Sex: 71 y o  male  Admission Orders:  Scheduled Medications:  amLODIPine, 2 5 mg, Oral, Daily  ampicillin-sulbactam, 3 g, Intravenous, Q12H  aspirin, 81 mg, Oral, Daily  atorvastatin, 20 mg, Oral, Daily  hydrALAZINE, 25 mg, Oral, Q8H SHOSHANA  insulin lispro, 1-5 Units, Subcutaneous, TID AC  isosorbide dinitrate, 10 mg, Oral, TID after meals  metoprolol succinate, 50 mg, Oral, BID  sodium bicarbonate, 1,300 mg, Oral, BID after meals  travoprost, 1 drop, Both Eyes, HS  warfarin, 4 mg, Oral, Daily (warfarin)      Continuous IV Infusions:  sodium bicarbonate 75 mEq in sodium chloride 0 45 % 1,000 mL infusion  Rate: 75 mL/hr Freq: Continuous Route: IV  Start: 01/13/23 1400   PRN Meds:     telemetry   OOB as naila SCD        IP CONSULT TO CARDIOLOGY  IP CONSULT TO ORAL AND MAXILLOFACIAL SURGERY  IP CONSULT TO NEPHROLOGY    Network Utilization Review Department  ATTENTION: Please call with any questions or concerns to [phone] and carefully listen to the prompts so that you are directed to the right person  All voicemails are confidential   Michael Cruz all requests for admission clinical reviews, approved or denied determinations and any other requests to dedicated fax number below belonging to the campus where the patient is receiving treatment   List of dedicated fax numbers for the Facilities:  [address] DENIALS (Administrative/Medical Necessity) [phone]   [address] (Maternity/NICU/Pediatrics) [phone]   917 Veronica Trotter [phone]   Estef[address] [phone]   [address] [place] [address] [facility] [place] [facility] [phone]   1556 [facility] Jackson Pilar [facility] 134 815 [facility] [phone]

## 2023-01-14 NOTE — ASSESSMENT & PLAN NOTE
· Patient has history of bradycardia recorded on monitors  Otherwise asymptomatic  · Has bigeminy on telemetry currently  · Patient had a zio monitor placed few days ago by his cardiologist   · Cardiology was consulted in ED  Metoprolol dose decreased to 50 twice daily for now    · Cards signed off

## 2023-01-14 NOTE — ASSESSMENT & PLAN NOTE
Lab Results   Component Value Date    EGFR 23 01/14/2023    EGFR 20 01/13/2023    EGFR 23 12/02/2022    CREATININE 2 65 (H) 01/14/2023    CREATININE 2 96 (H) 01/13/2023    CREATININE 2 63 (H) 12/02/2022     · Creatinine slightly elevated than baseline    · Continue to monitor with IV fluids

## 2023-01-14 NOTE — TREATMENT PLAN
Renal function improved with hydration to creatinine 2 65 mg/dL which is recent baseline  No evidence of contrast nephropathy, bicarb level improving to 18  Recommended to continue sodium bicarbonate tablet 1300 mg 3 times daily at the time of discharge    Office informed to arrange for BMP in a week and outpatient follow-up with advanced practitioner

## 2023-01-14 NOTE — CONSULTS
Oral and Maxillofacial Surgery Consult    Pt seen 01/13/23 7:04 PM     HPI: 71 y o  male currently admitted for a fall, pt sustained nasal bone fractures but upon arrival to ED pt was Bradycardic and low bicarb, pt was then admitted by trauma team  Consult requested by trauma team for nasal bone fracture evaluation  Patient reports he has some congestion, pain on palpation to the bridge of his nose and a cut on his nose  Pt had nasal laceration repaired by ED - dressing in place  PMH:   Past Medical History:   Diagnosis Date   • Chronic kidney disease    • Colon polyp    • COVID-19    • Diabetes mellitus ([address] )    • Hyperlipidemia    • Hypertension    • Proteinuria    • Stage 3 chronic kidney disease (HCC)    • Vitamin D deficiency         Allergies:    Allergies   Allergen Reactions   • Ace Inhibitors Cough   • Keflex [Cephalexin] Rash       Meds:     Current Facility-Administered Medications:   •  [START ON 1/14/2023] amLODIPine (NORVASC) tablet 2 5 mg, 2 5 mg, Oral, Daily, KYLE AcostaNP  •  ampicillin-sulbactam (UNASYN) 3 g in sodium chloride 0 9 % 100 mL IVPB, 3 g, Intravenous, Q12H, Raymond Mendez PA-C, Last Rate: 200 mL/hr at 01/13/23 1155, 3 g at 01/13/23 1155  •  aspirin (ECOTRIN LOW STRENGTH) EC tablet 81 mg, 81 mg, Oral, Daily, Cosmo Stevens MD  •  atorvastatin (LIPITOR) tablet 20 mg, 20 mg, Oral, Daily, Cosmo Stevens MD, 20 mg at 01/13/23 1739  •  hydrALAZINE (APRESOLINE) tablet 25 mg, 25 mg, Oral, Q8H [address], Retia KYLE LittlejohnNP, 25 mg at 01/13/23 1433  •  insulin lispro (HumaLOG) 100 units/mL subcutaneous injection 1-5 Units, 1-5 Units, Subcutaneous, TID AC, 1 Units at 01/13/23 1843 **AND** Fingerstick Glucose (POCT), , , TID AC, Cosmo Stevens MD  •  isosorbide dinitrate (ISORDIL) tablet 10 mg, 10 mg, Oral, TID after meals, KYLE AcostaNP, 10 mg at 01/13/23 1739  •  metoprolol succinate (TOPROL-XL) 24 hr tablet 50 mg, 50 mg, Oral, BID, Kimberly Villagomez, CRNP, 50 mg at 01/13/23 1433  •  sodium bicarbonate 75 mEq in sodium chloride 0 45 % 1,000 mL infusion, , Intravenous, Continuous, Thom Carrera MD, Last Rate: 75 mL/hr at 01/13/23 1652, New Bag at 01/13/23 1652  •  sodium bicarbonate tablet 1,300 mg, 1,300 mg, Oral, BID after meals, Thom Carrera MD, 1,300 mg at 01/13/23 1740  •  travoprost (TRAVATAN-Z) 0 004 % ophthalmic solution 1 drop, 1 drop, Both Eyes, HS, Sarahi Deluca MD  •  warfarin (COUMADIN) tablet 4 mg, 4 mg, Oral, Daily (warfarin), Sarahi Deluca MD, 4 mg at 01/13/23 1739    PSH:   Past Surgical History:   Procedure Laterality Date   • COLONOSCOPY      overdue   • CO ECHO TRANSESOPHAG R-T 2D W/PRB IMG ACQUISJ I&R N/A 3/7/2019    Procedure: INTRA-OP TRANSESOPHAGEAL ECHOCARDIOGRAM (PATIENCE);   Surgeon: Allegra Sy DO;  Location: [facility];  Service: Cardiac Surgery   • CO PERQ CLSR TCAT L ATR APNDGE W/ENDOCARDIAL IMPLNT  3/7/2019    Procedure: LEFT ATRIAL APPENDAGE OCCLUSION CLIPPED with 35mm Evy Nephew;  Surgeon: Allegra Sy DO;  Location: [facility];  Service: Cardiac Surgery   • CO REPLACEMENT MITRAL VALVE W/CARDIOPULMONARY BYP N/A 3/7/2019    Procedure: REPLACEMENT VALVE MITRAL (MVR) REPAIR with a 30mm MEDTRONIC CG FUTURE RING;  Surgeon: Allegra Sy DO;  [facility];  Service: Cardiac Surgery   • CO RPLCMT AORTIC VALVE OPN W/STENTLESS TISSUE VALVE N/A 3/7/2019    Procedure: REPLACEMENT VALVE AORTIC (AVR) with 23mm TAVERA INSPIRIS RESILIA  AORTIC VALVE;  Surgeon: Allegra Sy DO;  [facility];  Service: Cardiac Surgery   • RENAL BIOPSY, OPEN     • TOE SURGERY Left       Family History   Problem Relation Age of Onset   • Stroke Mother    • Hypertension Mother    • Blindness Father    • Hyperlipidemia Father    • Hypertension Father    • Glaucoma Father    • Cancer Sister    • Diabetes Sister    • Ovarian cancer Sister    • Gout Brother    • Heart Valve Disease Brother    • Hypertension Brother    • Anuerysm Neg Hx    • Heart attack Neg Hx    • Arrhythmia Neg Hx    • Heart failure Neg Hx    • Coronary artery disease Neg Hx    • Sudden death Neg Hx         scd        Review of Systems   Constitutional: Positive for activity change  HENT: Positive for congestion, facial swelling, rhinorrhea and sinus pressure  Eyes: Negative  Respiratory: Negative  Cardiovascular: Negative  Gastrointestinal: Negative  Endocrine: Negative  Musculoskeletal: Negative  Skin: Negative  Allergic/Immunologic: Negative  Neurological: Negative  Hematological: Negative  Psychiatric/Behavioral: Negative  Temp:  [97 2 °F (36 2 °C)-97 9 °F (36 6 °C)] 97 9 °F (36 6 °C)  HR:  [69-97] 79  Resp:  [16] 16  BP: (136-184)/(72-94) 136/83  SpO2:  [90 %-98 %] 98 %     No intake or output data in the 24 hours ending 01/13/23 1904     Physical Exam:  Gen: AAOx3  NAD  Resp: Unlabored on RA  Neuro:  bilateral CN V2-V3 intact  Bilateral CN VII grossly intact  HEENT:   Eye: EOMI w/ no signs of muscle entrapment  PERRLA  No subconjunctival hemorrhage  No periorbital ecchymosis/edema  No changes to vision, diplopia, exophthalmos, enophthalmos  Ears: No blood visualized in the EAC  No changes in hearing  Nose: No signficant nasal dorsum deviation  No septal hematoma  Dressing in place over nose, laceration repaired by ED providers  Extraoral:  Mild mid facial swelling associated with fractured nasal bones and consistent with the injury  ++ ecchymosis  No bony step-off palpated  laceration present but repaired  TTP over bridge of nose  No LAD  No Trismus  No Guarding  Inferior border of the mandible is palpable  Intraoral: DENICE ~ 40 mm  Occlusion stable and reproducible  No mobile teeth  No  gingival laceration present  FOM soft, non-elevated, non-tender  Uvula midline  Imaging: I have personally reviewed pertinent reports        Assessment  71 y o  male  evaluated for nasal bone fractures and nasal laceration that was repaired in the ED  At this time pt is being monitored for elevated bicarb and bradycardia  He will not need acute intervention from OMFS perspective at this time  Plan:  - No acute OMFS intervention at this time - signing off  - Follow up in 1 week to evaluate healing - [facility] office of of [address] [place] [address]  - Antibiotics (unasyn 3g IV q6h then transition to augmentin 875-125mg BID PO when able for 10 days total  - Analgesia as per primary team  - Soft, mild diet  - Salt water rinses after meals  - Encourage good oral hygiene  - Head of bed elevated  - Ice to face as needed  - sinus precautions: 4 weeks: no nose blowing, avoid putting pressure on sinus area, avoid strenuous activity/straining, try to sneeze with mouth open  Use OTC Afrin BID 2 sprays/nostril 3 days maximum as needed, OTC decongestant (e g  Sudafed) or Antihistamine (e g  Claritin-D) as needed, and saline nasal spray as needed  - 2 weeks: no straws, spitting, smoking  -D/w OMFS attg on call    Inpatient consult to Oral and Maxillofacial Surgery  Consult performed by: Isabella Franco  Consult ordered by: Lidia Damico PA-C           Counseling / Coordination of Care  Total floor / unit time spent today 30 minutes  Greater than 50% of total time was spent with the patient and / or family counseling and / or coordination of care  A description of the counseling / coordination of care: description of injury with proposed plan of care  Discussed risks, benefits, and alternatives to treatment plan  All questions were answered  Patient in agreement with plan

## 2023-01-14 NOTE — DISCHARGE INSTR - AVS FIRST PAGE
Follow up in 1 week to evaluate healing - [facility] office of [address]  - Antibiotics (unasyn 3g IV q6h then transition to augmentin 875-125mg BID PO when able for 10 days total  - Analgesia as per primary team  - Soft, mild diet  - Salt water rinses after meals  - Encourage good oral hygiene  - Head of bed elevated  - Ice to face as needed  - sinus precautions: 4 weeks: no nose blowing, avoid putting pressure on sinus area, avoid strenuous activity/straining, try to sneeze with mouth open  Use OTC Afrin BID 2 sprays/nostril 3 days maximum as needed, OTC decongestant (e g  Sudafed) or Antihistamine (e g  Claritin-D) as needed, and saline nasal spray as needed  - 2 weeks: no straws, spitting, smoking    -D/w OMFS attg on call

## 2023-01-14 NOTE — DISCHARGE SUMMARY
[facility]  Discharge- Macie Imus 1950, 71 y o  male MRN: [id number]  Unit/Bed#: S -01 Encounter: [id number]  Primary Care Provider: Giovanna Singleton MD   Date and time admitted to hospital: 1/13/2023 10:00 AM    * Fall from height of greater than 3 feet  Assessment & Plan  · Mechanical fall  No syncope or LOC  · Management per trauma team    Metabolic acidosis  Assessment & Plan  · Patient noted to have bicarb of 16 on CMP  Suspect most likely due to underlying CKD  · Patient received CT contrast for trauma imaging  · Evaluated by nephrology in ER  · dc bicarb drip  · Also started on oral bicarb  · Repeat BMP 1 week    Nasal fracture  Assessment & Plan  · Secondary to fall  · Management per trauma and OMFS  · Outpatient appt in 1 week for suture removal     Bradycardia  Assessment & Plan  · Patient has history of bradycardia recorded on monitors  Otherwise asymptomatic  · Has bigeminy on telemetry currently  · Patient had a zio monitor placed few days ago by his cardiologist   · Cardiology was consulted in ED  Metoprolol dose decreased to 50 twice daily for now  · Cards signed off    CKD (chronic kidney disease), stage IV [facility])  Assessment & Plan  Lab Results   Component Value Date    EGFR 23 01/14/2023    EGFR 20 01/13/2023    EGFR 23 12/02/2022    CREATININE 2 65 (H) 01/14/2023    CREATININE 2 96 (H) 01/13/2023    CREATININE 2 63 (H) 12/02/2022     · Creatinine slightly elevated than baseline    · Continue to monitor with IV fluids    Diabetes mellitus type 2 in nonobese [facility])  Assessment & Plan  Lab Results   Component Value Date    HGBA1C 5 8 (H) 06/08/2021       Recent Labs     01/13/23  1545 01/13/23  2139 01/14/23  0738   POCGLU 221* 88 101       Blood Sugar Average: Last 72 hrs:  (P) 217 [id number]   · Resume tradjenta    Chronic systolic heart failure (HCC)  Assessment & Plan  Wt Readings from Last 3 Encounters:   01/13/23 74 5 kg (164 lb 3 9 oz) 12/02/22 74 4 kg (164 lb)   10/27/22 72 1 kg (159 lb)     · Patient appears euvolemic on exam   · Continue Toprol-XL  · Monitor volume status closely with IV fluids        Essential hypertension  Assessment & Plan  · Stable  · Continue home amlodipine, hydralazine Imdur      Medical Problems     Resolved Problems  Date Reviewed: 1/14/2023   None       Discharging Physician / Practitioner: Lizet Rick PA-C  PCP: Gwendalyn Hatchet, MD  Admission Date:   Admission Orders (From admission, onward)     Ordered        01/13/23 1434  Place in Observation  Once                      Discharge Date: 01/14/23    Consultations During Hospital Stay:  · omfs  · trauma    Procedures Performed:   · none    Significant Findings / Test Results:   · As above    Incidental Findings:   · none     Test Results Pending at Discharge (will require follow up):   · none     Outpatient Tests Requested:  ·  bmp 1 week    Complications:  none    Reason for Admission: fall    Hospital Course:   Nima Naranjo is a 71 y o  male patient who originally presented to the hospital on 1/13/2023 due to fall at home  Patient had a mechanical fall and fell onto his nose sustaining an open nasal bone fracture  This was repaired in the OR  OMFS will evaluate the patient in 1 week for suture removal with no surgical interventions  Was admitted to the medicine service due to low bicarb secondary to CKD  Was seen by nephrology and placed on a sodium bicarb drip  Renal function stable and bicarb improving, has been ambulating and is okay for discharge today to home  Please see above list of diagnoses and related plan for additional information  Condition at Discharge: stable    Discharge Day Visit / Exam:   Subjective:  Feels well, is ambulating  Wants to go home    Vitals: Blood Pressure: 138/82 (01/14/23 0531)  Pulse: 69 (01/14/23 0531)  Temperature: 97 9 °F (36 6 °C) (01/13/23 1540)  Temp Source: Oral (01/13/23 1004)  Respirations: 16 (01/13/23 1400)  Weight - Scale: 74 5 kg (164 lb 3 9 oz) (01/13/23 1004)  SpO2: 98 % (01/14/23 0531)  Exam:   Physical Exam  Vitals and nursing note reviewed  Constitutional:       General: He is not in acute distress  Appearance: Normal appearance  HENT:      Head: Normocephalic  Laceration present  Nose: Nasal deformity and signs of injury present  Mouth/Throat:      Mouth: Mucous membranes are moist    Eyes:      Pupils: Pupils are equal, round, and reactive to light  Cardiovascular:      Rate and Rhythm: Normal rate and regular rhythm  Heart sounds: No murmur heard  Pulmonary:      Effort: Pulmonary effort is normal  No respiratory distress  Breath sounds: Normal breath sounds  No wheezing, rhonchi or rales  Abdominal:      General: Bowel sounds are normal  There is no distension  Palpations: Abdomen is soft  Tenderness: There is no abdominal tenderness  There is no guarding  Musculoskeletal:         General: No deformity  Cervical back: Normal range of motion  Right lower leg: No edema  Left lower leg: No edema  Skin:     Capillary Refill: Capillary refill takes less than 2 seconds  Neurological:      General: No focal deficit present  Mental Status: He is alert and oriented to person, place, and time  Mental status is at baseline  Discussion with Family: Patient declined call to   Discharge instructions/Information to patient and family:   See after visit summary for information provided to patient and family  Provisions for Follow-Up Care:  See after visit summary for information related to follow-up care and any pertinent home health orders  Disposition:   Home    Planned Readmission: no     Discharge Statement:  I spent 45 minutes discharging the patient  This time was spent on the day of discharge  I had direct contact with the patient on the day of discharge   Greater than 50% of the total time was spent examining patient, answering all patient questions, arranging and discussing plan of care with patient as well as directly providing post-discharge instructions  Additional time then spent on discharge activities  Discharge Medications:  See after visit summary for reconciled discharge medications provided to patient and/or family        **Please Note: This note may have been constructed using a voice recognition system**

## 2023-01-14 NOTE — ASSESSMENT & PLAN NOTE
· Patient noted to have bicarb of 16 on CMP  Suspect most likely due to underlying CKD  · Patient received CT contrast for trauma imaging  · Evaluated by nephrology in ER  · dc bicarb drip  · Also started on oral bicarb    · Repeat BMP 1 week

## 2023-01-14 NOTE — PROGRESS NOTES
[facility]  Progress Note Cassandra Godinez 1950, 71 y o  male MRN: [id number]  Unit/Bed#: S -01 Encounter: [id number]  Primary Care Provider: Scotty Jovel MD   Date and time admitted to hospital: 1/13/2023 10:00 AM    * Fall from height of greater than 3 feet  Assessment & Plan  - Fall from 8 feet  - Patient was on ladder, angle was too steep, and subsequently he fell off the ladder landing on his left side and his face  - Below noted injuries  - No additional injuries found on tertiary trauma exam - trauma team will sign off    Nasal fracture  Assessment & Plan  - Open nasal bone fracture  - Discussed with OMFS, non-operative management okay to close wound  - Nasal lac repair - see separate note  - Unasyn while admitted transitioned to augmentin at discharge for total time of treatment 7 days        TRAUMA TERTIARY SURVEY NOTE    VTE Prophylaxis:Warfarin (Coumadin)     Disposition: Trauma team sign off    Code status:  Level 1 - Full Code    Consultants: IP CONSULT TO CARDIOLOGY  IP CONSULT TO ORAL AND MAXILLOFACIAL SURGERY  IP CONSULT TO NEPHROLOGY    Subjective   Transfer from: Not a transfer    Mechanism of Injury:Fall     Chief Complaint: facial swelling    HPI/Last 24 hour events: LEvel B trauma admitted to [facility] secondary to possible syncope and collapse  Seen by Cardiology and OMS, recommendations appreciated  No new traumatic injuries from Trauma standpoint  Medicine to decide on discharge       Objective   Vitals:   Temp:  [97 2 °F (36 2 °C)-97 9 °F (36 6 °C)] 97 9 °F (36 6 °C)  HR:  [68-97] 68  Resp:  [16] 16  BP: (132-184)/(61-94) 137/82    I/O     None           Physical Exam:   GENERAL APPEARANCE: No acute distress resting supine in bed  NEURO: AAOX3 ,  GCS 15 no focal neuro deficit   HEENT: PERRL EOMI mucus membranes moist , mild lower lip swelling  CV: RRR S1 S2 without murmur, rub, or gallop  LUNGS: Clear to ausc bilaterally without wheezes, crackles, or rhonchi  GI: Soft, nontender nondistended  : Voiding independently  MSK: extremities nontender without deformity  SKIN: warm, dry, intact     Invasive Devices     Peripheral Intravenous Line  Duration           Peripheral IV 01/13/23 Right Antecubital <1 day    Peripheral IV 01/13/23 Right;Ventral (anterior) Wrist <1 day                   1  Before the illness or injury that brought you to the Emergency, did you need someone to help you on a regular basis? 0=No   2  Since the illness or injury that brought you to the Emergency, have you needed more help than usual to take care of yourself? 0=No   3  Have you been hospitalized for one or more nights during the past 6 months (excluding a stay in the Emergency Department)? 1=Yes   4  In general, do you see well? 0=Yes   5  In general, do you have serious problems with your memory? 0=No   6  Do you take more than three different medications everyday? 1=Yes   TOTAL   2     Did you order a geriatric consult if the score was 2 or greater?: Recommend but defer to primary team         Lab Results:   BMP/CMP:   Lab Results   Component Value Date    SODIUM 136 01/13/2023    K 5 3 01/13/2023     (H) 01/13/2023    CO2 16 (L) 01/13/2023    BUN 44 (H) 01/13/2023    CREATININE 2 96 (H) 01/13/2023    CALCIUM 8 3 (L) 01/13/2023    AST 21 01/13/2023    ALT 13 01/13/2023    ALKPHOS 69 01/13/2023    EGFR 20 01/13/2023    and CBC:   Lab Results   Component Value Date    WBC 8 99 01/13/2023    HGB 13 9 01/13/2023    HCT 42 3 01/13/2023    MCV 93 01/13/2023     01/13/2023    MCH 30 5 01/13/2023    MCHC 32 9 01/13/2023    RDW 12 7 01/13/2023    MPV 10 6 01/13/2023    NRBC 0 01/13/2023       Imaging Results: I have personally reviewed pertinent reports      Chest Xray(s): negative for acute findings   FAST exam(s): N/A   CT Scan(s): positive for acute findings: nasal bone fracture   Additional Xray(s): negative for acute findings     Other Studies: none

## 2023-01-14 NOTE — PROGRESS NOTES
Progress Note - Cardiology   [facility] CTR 71 y o  male MRN: [id number]  Unit/Bed#: S -01 Encounter: [id number]    Assessment:  Mechanical fall  No syncope  History of aortic valve replacement and mitral valve repair  Coronary disease without revascularization at the time of his surgery  PVCs which are asymptomatic  Most recent echo done in October showed preserved LV function and normal bioprosthetic valve and ring function  Plan:    His beta-blocker was increased  He is asymptomatic  Continue Toprol-XL 50 mg twice a day  He is wearing a Zio patch currently  No additional testing from a cardiac standpoint is needed  Hypertension controlled with his current regimen  Okay for discharge from cardiac standpoint  Cardiology will sign off  Please call with any questions or changes  Subjective/Objective     Subjective:  Reported some difficulty sleeping because of location because of some of the discomfort after his fracture  Denies any palpitations, dizziness, lightheadedness  PVCs are less frequent on telemetry  He is not in bigeminy currently  Objective:    Vitals: /82   Pulse 69   Temp 97 9 °F (36 6 °C)   Resp 16   Wt 74 5 kg (164 lb 3 9 oz)   SpO2 98%   BMI 24 97 kg/m²   Vitals:    01/13/23 1004   Weight: 74 5 kg (164 lb 3 9 oz)     Orthostatic Blood Pressures    Flowsheet Row Most Recent Value   Blood Pressure 138/82 filed at 01/14/2023 0531   Patient Position - Orthostatic VS Lying filed at 01/13/2023 1400          No intake or output data in the 24 hours ending 01/14/23 6829    Physical Exam:   General appearance: sitting in recliner, NAD  Head: facial trauma  Neck: no carotid bruit, no JVD and supple, symmetrical, trachea midline  Lungs: clear to auscultation bilaterally  Normal air entry  Normal effort  Heart: S1, S2 regular  PVCs   Systolic murmur  Abdomen: soft, non-tender; bowel sounds normal; no masses,  no organomegaly  Extremities: extremities normal, atraumatic, no cyanosis or edema  Pulses: 2+ and symmetric bilaterally  Skin: Skin color, texture, turgor normal  No rashes or lesions  Neurologic: Grossly normal  Alert and oriented      Medications:    Current Facility-Administered Medications:   •  amLODIPine (NORVASC) tablet 2 5 mg, 2 5 mg, Oral, Daily, JHONATAN Richards, 2 5 mg at 01/14/23 0831  •  ampicillin-sulbactam (UNASYN) 3 g in sodium chloride 0 9 % 100 mL IVPB, 3 g, Intravenous, Q12H, Mariellen Boxer McGonigal, PA-C, Stopped at 01/14/23 0120  •  aspirin (ECOTRIN LOW STRENGTH) EC tablet 81 mg, 81 mg, Oral, Daily, Nyasia Gutierrez MD, 81 mg at 01/14/23 0831  •  atorvastatin (LIPITOR) tablet 20 mg, 20 mg, Oral, Daily, Nyasia Gutierrez MD, 20 mg at 01/14/23 3128  •  hydrALAZINE (APRESOLINE) tablet 25 mg, 25 mg, Oral, Q8H [address], JHONATAN Richards, 25 mg at 01/14/23 0534  •  insulin lispro (HumaLOG) 100 units/mL subcutaneous injection 1-5 Units, 1-5 Units, Subcutaneous, TID AC, 1 Units at 01/13/23 1843 **AND** Fingerstick Glucose (POCT), , , TID AC, Nyasia Gutierrez MD  •  isosorbide dinitrate (ISORDIL) tablet 10 mg, 10 mg, Oral, TID after meals, JHONATAN Richards, 10 mg at 01/14/23 0831  •  metoprolol succinate (TOPROL-XL) 24 hr tablet 50 mg, 50 mg, Oral, BID, JHONATAN Richards, 50 mg at 01/14/23 6011  •  sodium bicarbonate 75 mEq in sodium chloride 0 45 % 1,000 mL infusion, , Intravenous, Continuous, Andrew Barton MD, Last Rate: 75 mL/hr at 01/14/23 0532, New Bag at 01/14/23 0532  •  sodium bicarbonate tablet 1,300 mg, 1,300 mg, Oral, BID after meals, Andrew Barton MD, 1,300 mg at 01/14/23 0831  •  travoprost (TRAVATAN-Z) 0 004 % ophthalmic solution 1 drop, 1 drop, Both Eyes, HS, Nyasia Gutierrez MD, 1 drop at 01/13/23 2218  •  warfarin (COUMADIN) tablet 4 mg, 4 mg, Oral, Daily (warfarin), Nyasia Gutierrez MD, 4 mg at 01/13/23 0043    Lab Results:      Results from last 7 days   Lab Units 01/14/23  0507 01/13/23  1045   WBC Thousand/uL 9 48 8 99 HEMOGLOBIN g/dL 12 5 13 9   HEMATOCRIT % 37 8 42 3   PLATELETS Thousands/uL 167 201         Results from last 7 days   Lab Units 01/14/23  0507 01/13/23  1045   SODIUM mmol/L 135 136   POTASSIUM mmol/L 4 8 5 3   CHLORIDE mmol/L 110* 113*   CO2 mmol/L 18* 16*   BUN mg/dL 40* 44*   CREATININE mg/dL 2 65* 2 96*   CALCIUM mg/dL 7 7* 8 3*   ALK PHOS U/L  --  69   ALT U/L  --  13   AST U/L  --  21     Results from last 7 days   Lab Units 01/14/23  0507 01/13/23  1706 01/13/23  0000   INR  2 70* 2 22* 2 31*   PTT seconds  --   --  40*     Results from last 7 days   Lab Units 01/13/23  1045   MAGNESIUM mg/dL 2 0        Telemetry: Personally reviewed  Sinus with PVCs  Echo:  Left Ventricle: Left ventricular cavity size is normal  Wall thickness is normal  The left ventricular ejection fraction is 65%  Systolic function is normal  Wall motion is normal   •  Aortic Valve: There is a bioprosthetic valve  The prosthetic valve appears well-seated and appears to be functioning normally  There is no evidence of transvalvular regurgitation  The aortic valve mean gradient is 10 mmHg  •  Mitral Valve: The valve has been repaired with an annular ring  There is mild regurgitation  •  Tricuspid Valve:  There is mild regurgitation

## 2023-01-14 NOTE — ASSESSMENT & PLAN NOTE
- Open nasal bone fracture  - Discussed with OMFS, non-operative management okay to close wound  - Nasal lac repair - see separate note  - Unasyn while admitted transitioned to augmentin at discharge for total time of treatment 7 days

## 2023-01-14 NOTE — ASSESSMENT & PLAN NOTE
Lab Results   Component Value Date    HGBA1C 5 8 (H) 06/08/2021       Recent Labs     01/13/23  1545 01/13/23  2139 01/14/23  0738   POCGLU 221* 88 101       Blood Sugar Average: Last 72 hrs:  (P) 940 [id number]   · Resume tradjenta

## 2023-01-14 NOTE — ASSESSMENT & PLAN NOTE
· Secondary to fall  · Management per trauma and OMFS    · Outpatient appt in 1 week for suture removal

## 2023-01-14 NOTE — ASSESSMENT & PLAN NOTE
Wt Readings from Last 3 Encounters:   01/13/23 74 5 kg (164 lb 3 9 oz)   12/02/22 74 4 kg (164 lb)   10/27/22 72 1 kg (159 lb)     · Patient appears euvolemic on exam   · Continue Toprol-XL    · Monitor volume status closely with IV fluids

## 2023-01-16 ENCOUNTER — ANESTHESIA EVENT (OUTPATIENT)
Dept: PERIOP | Facility: HOSPITAL | Age: 73
End: 2023-01-16

## 2023-01-16 ENCOUNTER — TELEPHONE (OUTPATIENT)
Dept: NEPHROLOGY | Facility: CLINIC | Age: 73
End: 2023-01-16

## 2023-01-16 ENCOUNTER — ANESTHESIA (OUTPATIENT)
Dept: PERIOP | Facility: HOSPITAL | Age: 73
End: 2023-01-16

## 2023-01-16 ENCOUNTER — HOSPITAL ENCOUNTER (OUTPATIENT)
Facility: HOSPITAL | Age: 73
Setting detail: OBSERVATION
Discharge: HOME | End: 2023-01-17
Attending: EMERGENCY MEDICINE | Admitting: SURGERY

## 2023-01-16 ENCOUNTER — TRANSITIONAL CARE MANAGEMENT (OUTPATIENT)
Dept: FAMILY MEDICINE CLINIC | Facility: CLINIC | Age: 73
End: 2023-01-16

## 2023-01-16 DIAGNOSIS — N18.4 STAGE 4 CHRONIC KIDNEY DISEASE (HCC): ICD-10-CM

## 2023-01-16 DIAGNOSIS — R04.0 EPISTAXIS: Primary | ICD-10-CM

## 2023-01-16 DIAGNOSIS — R80.1 PERSISTENT PROTEINURIA: Primary | ICD-10-CM

## 2023-01-16 DIAGNOSIS — S02.2XXB OPEN DISPLACED FRACTURE OF NASAL BONE, INITIAL ENCOUNTER: ICD-10-CM

## 2023-01-16 DIAGNOSIS — N25.81 SECONDARY HYPERPARATHYROIDISM OF RENAL ORIGIN (HCC): ICD-10-CM

## 2023-01-16 PROBLEM — Z86.79 H/O ATRIAL FLUTTER: Status: ACTIVE | Noted: 2023-01-16

## 2023-01-16 LAB
ABO GROUP BLD: NORMAL
ANION GAP SERPL CALCULATED.3IONS-SCNC: 8 MMOL/L (ref 4–13)
APTT PPP: 45 SECONDS (ref 23–37)
BASOPHILS # BLD AUTO: 0.05 THOUSANDS/ÂΜL (ref 0–0.1)
BASOPHILS NFR BLD AUTO: 1 % (ref 0–1)
BLD GP AB SCN SERPL QL: NEGATIVE
BUN SERPL-MCNC: 42 MG/DL (ref 5–25)
CALCIUM SERPL-MCNC: 8.5 MG/DL (ref 8.4–10.2)
CHLORIDE SERPL-SCNC: 110 MMOL/L (ref 96–108)
CO2 SERPL-SCNC: 20 MMOL/L (ref 21–32)
CREAT SERPL-MCNC: 2.97 MG/DL (ref 0.6–1.3)
EOSINOPHIL # BLD AUTO: 0.27 THOUSAND/ÂΜL (ref 0–0.61)
EOSINOPHIL NFR BLD AUTO: 3 % (ref 0–6)
ERYTHROCYTE [DISTWIDTH] IN BLOOD BY AUTOMATED COUNT: 12.7 % (ref 11.6–15.1)
GFR SERPL CREATININE-BSD FRML MDRD: 20 ML/MIN/1.73SQ M
GLUCOSE SERPL-MCNC: 111 MG/DL (ref 65–140)
HCT VFR BLD AUTO: 41.6 % (ref 36.5–49.3)
HGB BLD-MCNC: 13.5 G/DL (ref 12–17)
IMM GRANULOCYTES # BLD AUTO: 0.04 THOUSAND/UL (ref 0–0.2)
IMM GRANULOCYTES NFR BLD AUTO: 0 % (ref 0–2)
INR PPP: 2.25 (ref 0.84–1.19)
LYMPHOCYTES # BLD AUTO: 1.33 THOUSANDS/ÂΜL (ref 0.6–4.47)
LYMPHOCYTES NFR BLD AUTO: 13 % (ref 14–44)
MCH RBC QN AUTO: 30.6 PG (ref 26.8–34.3)
MCHC RBC AUTO-ENTMCNC: 32.5 G/DL (ref 31.4–37.4)
MCV RBC AUTO: 94 FL (ref 82–98)
MONOCYTES # BLD AUTO: 0.91 THOUSAND/ÂΜL (ref 0.17–1.22)
MONOCYTES NFR BLD AUTO: 9 % (ref 4–12)
NEUTROPHILS # BLD AUTO: 7.79 THOUSANDS/ÂΜL (ref 1.85–7.62)
NEUTS SEG NFR BLD AUTO: 74 % (ref 43–75)
NRBC BLD AUTO-RTO: 0 /100 WBCS
PLATELET # BLD AUTO: 189 THOUSANDS/UL (ref 149–390)
PMV BLD AUTO: 10.3 FL (ref 8.9–12.7)
POTASSIUM SERPL-SCNC: 4.9 MMOL/L (ref 3.5–5.3)
PROTHROMBIN TIME: 25.1 SECONDS (ref 11.6–14.5)
RBC # BLD AUTO: 4.41 MILLION/UL (ref 3.88–5.62)
RH BLD: POSITIVE
SODIUM SERPL-SCNC: 138 MMOL/L (ref 135–147)
SPECIMEN EXPIRATION DATE: NORMAL
WBC # BLD AUTO: 10.39 THOUSAND/UL (ref 4.31–10.16)

## 2023-01-16 RX ORDER — LIDOCAINE HYDROCHLORIDE 10 MG/ML
INJECTION, SOLUTION EPIDURAL; INFILTRATION; INTRACAUDAL; PERINEURAL AS NEEDED
Status: DISCONTINUED | OUTPATIENT
Start: 2023-01-16 | End: 2023-01-16

## 2023-01-16 RX ORDER — SUCCINYLCHOLINE/SOD CL,ISO/PF 100 MG/5ML
SYRINGE (ML) INTRAVENOUS AS NEEDED
Status: DISCONTINUED | OUTPATIENT
Start: 2023-01-16 | End: 2023-01-16

## 2023-01-16 RX ORDER — METOPROLOL SUCCINATE 50 MG/1
50 TABLET, EXTENDED RELEASE ORAL 2 TIMES DAILY
Status: DISCONTINUED | OUTPATIENT
Start: 2023-01-16 | End: 2023-01-17 | Stop reason: HOSPADM

## 2023-01-16 RX ORDER — TRAVOPROST OPHTHALMIC SOLUTION 0.04 MG/ML
1 SOLUTION OPHTHALMIC
Status: DISCONTINUED | OUTPATIENT
Start: 2023-01-16 | End: 2023-01-17 | Stop reason: HOSPADM

## 2023-01-16 RX ORDER — LABETALOL HYDROCHLORIDE 5 MG/ML
5 INJECTION, SOLUTION INTRAVENOUS
Status: DISCONTINUED | OUTPATIENT
Start: 2023-01-16 | End: 2023-01-16 | Stop reason: HOSPADM

## 2023-01-16 RX ORDER — ISOSORBIDE DINITRATE 10 MG/1
10 TABLET ORAL 3 TIMES DAILY
Status: DISCONTINUED | OUTPATIENT
Start: 2023-01-16 | End: 2023-01-17 | Stop reason: HOSPADM

## 2023-01-16 RX ORDER — EPINEPHRINE NASAL SOLUTION 1 MG/ML
SOLUTION NASAL AS NEEDED
Status: DISCONTINUED | OUTPATIENT
Start: 2023-01-16 | End: 2023-01-16 | Stop reason: HOSPADM

## 2023-01-16 RX ORDER — ONDANSETRON 2 MG/ML
INJECTION INTRAMUSCULAR; INTRAVENOUS AS NEEDED
Status: DISCONTINUED | OUTPATIENT
Start: 2023-01-16 | End: 2023-01-16

## 2023-01-16 RX ORDER — BRIMONIDINE TARTRATE 2 MG/ML
1 SOLUTION/ DROPS OPHTHALMIC EVERY 12 HOURS SCHEDULED
Status: DISCONTINUED | OUTPATIENT
Start: 2023-01-16 | End: 2023-01-17 | Stop reason: HOSPADM

## 2023-01-16 RX ORDER — HYDRALAZINE HYDROCHLORIDE 25 MG/1
25 TABLET, FILM COATED ORAL 3 TIMES DAILY
Status: DISCONTINUED | OUTPATIENT
Start: 2023-01-16 | End: 2023-01-17 | Stop reason: HOSPADM

## 2023-01-16 RX ORDER — FENTANYL CITRATE 50 UG/ML
INJECTION, SOLUTION INTRAMUSCULAR; INTRAVENOUS AS NEEDED
Status: DISCONTINUED | OUTPATIENT
Start: 2023-01-16 | End: 2023-01-16

## 2023-01-16 RX ORDER — AMOXICILLIN 250 MG/1
500 CAPSULE ORAL EVERY 8 HOURS SCHEDULED
Status: DISCONTINUED | OUTPATIENT
Start: 2023-01-16 | End: 2023-01-17 | Stop reason: HOSPADM

## 2023-01-16 RX ORDER — SODIUM CHLORIDE, SODIUM LACTATE, POTASSIUM CHLORIDE, CALCIUM CHLORIDE 600; 310; 30; 20 MG/100ML; MG/100ML; MG/100ML; MG/100ML
INJECTION, SOLUTION INTRAVENOUS CONTINUOUS PRN
Status: DISCONTINUED | OUTPATIENT
Start: 2023-01-16 | End: 2023-01-16

## 2023-01-16 RX ORDER — SODIUM BICARBONATE 650 MG/1
1300 TABLET ORAL
Status: DISCONTINUED | OUTPATIENT
Start: 2023-01-16 | End: 2023-01-17 | Stop reason: HOSPADM

## 2023-01-16 RX ORDER — LABETALOL HYDROCHLORIDE 5 MG/ML
INJECTION, SOLUTION INTRAVENOUS AS NEEDED
Status: DISCONTINUED | OUTPATIENT
Start: 2023-01-16 | End: 2023-01-16

## 2023-01-16 RX ORDER — PROPOFOL 10 MG/ML
INJECTION, EMULSION INTRAVENOUS AS NEEDED
Status: DISCONTINUED | OUTPATIENT
Start: 2023-01-16 | End: 2023-01-16

## 2023-01-16 RX ORDER — FENTANYL CITRATE/PF 50 MCG/ML
25 SYRINGE (ML) INJECTION
Status: DISCONTINUED | OUTPATIENT
Start: 2023-01-16 | End: 2023-01-16 | Stop reason: HOSPADM

## 2023-01-16 RX ORDER — HYDRALAZINE HYDROCHLORIDE 20 MG/ML
5 INJECTION INTRAMUSCULAR; INTRAVENOUS ONCE
Status: CANCELLED | OUTPATIENT
Start: 2023-01-16

## 2023-01-16 RX ORDER — WARFARIN SODIUM 2 MG/1
2 TABLET ORAL
Status: DISCONTINUED | OUTPATIENT
Start: 2023-01-17 | End: 2023-01-17 | Stop reason: HOSPADM

## 2023-01-16 RX ORDER — MAGNESIUM HYDROXIDE 1200 MG/15ML
LIQUID ORAL AS NEEDED
Status: DISCONTINUED | OUTPATIENT
Start: 2023-01-16 | End: 2023-01-16 | Stop reason: HOSPADM

## 2023-01-16 RX ORDER — AMLODIPINE BESYLATE 2.5 MG/1
2.5 TABLET ORAL DAILY
Status: DISCONTINUED | OUTPATIENT
Start: 2023-01-17 | End: 2023-01-17 | Stop reason: HOSPADM

## 2023-01-16 RX ORDER — ONDANSETRON 2 MG/ML
4 INJECTION INTRAMUSCULAR; INTRAVENOUS ONCE AS NEEDED
Status: DISCONTINUED | OUTPATIENT
Start: 2023-01-16 | End: 2023-01-16 | Stop reason: HOSPADM

## 2023-01-16 RX ORDER — TIMOLOL MALEATE 5 MG/ML
1 SOLUTION/ DROPS OPHTHALMIC EVERY 12 HOURS SCHEDULED
Status: DISCONTINUED | OUTPATIENT
Start: 2023-01-16 | End: 2023-01-17 | Stop reason: HOSPADM

## 2023-01-16 RX ORDER — DEXAMETHASONE SODIUM PHOSPHATE 10 MG/ML
INJECTION, SOLUTION INTRAMUSCULAR; INTRAVENOUS AS NEEDED
Status: DISCONTINUED | OUTPATIENT
Start: 2023-01-16 | End: 2023-01-16

## 2023-01-16 RX ORDER — GINSENG 100 MG
1 CAPSULE ORAL 2 TIMES DAILY
Status: DISCONTINUED | OUTPATIENT
Start: 2023-01-17 | End: 2023-01-17 | Stop reason: HOSPADM

## 2023-01-16 RX ORDER — AMOXICILLIN 875 MG/1
875 TABLET, COATED ORAL EVERY 12 HOURS SCHEDULED
Status: DISCONTINUED | OUTPATIENT
Start: 2023-01-16 | End: 2023-01-16 | Stop reason: RX

## 2023-01-16 RX ORDER — TRANEXAMIC ACID 100 MG/ML
1000 INJECTION, SOLUTION INTRAVENOUS ONCE
Status: COMPLETED | OUTPATIENT
Start: 2023-01-16 | End: 2023-01-16

## 2023-01-16 RX ADMIN — LIDOCAINE HYDROCHLORIDE 90 MG: 10 INJECTION, SOLUTION EPIDURAL; INFILTRATION; INTRACAUDAL at 22:08

## 2023-01-16 RX ADMIN — PROPOFOL 150 MG: 10 INJECTION, EMULSION INTRAVENOUS at 22:08

## 2023-01-16 RX ADMIN — PHENYLEPHRINE HYDROCHLORIDE 50 MCG/MIN: 50 INJECTION INTRAVENOUS at 22:17

## 2023-01-16 RX ADMIN — TRANEXAMIC ACID 1000 MG: 1 INJECTION, SOLUTION INTRAVENOUS at 19:04

## 2023-01-16 RX ADMIN — FENTANYL CITRATE 50 MCG: 50 INJECTION, SOLUTION INTRAMUSCULAR; INTRAVENOUS at 22:08

## 2023-01-16 RX ADMIN — PHENYLEPHRINE HYDROCHLORIDE 3 DROP: 1 SPRAY NASAL at 19:03

## 2023-01-16 RX ADMIN — LABETALOL 20 MG/4 ML (5 MG/ML) INTRAVENOUS SYRINGE 5 MG: at 22:46

## 2023-01-16 RX ADMIN — ONDANSETRON 4 MG: 2 INJECTION INTRAMUSCULAR; INTRAVENOUS at 22:08

## 2023-01-16 RX ADMIN — SODIUM CHLORIDE, SODIUM LACTATE, POTASSIUM CHLORIDE, AND CALCIUM CHLORIDE: .6; .31; .03; .02 INJECTION, SOLUTION INTRAVENOUS at 22:04

## 2023-01-16 RX ADMIN — Medication 100 MG: at 22:09

## 2023-01-16 RX ADMIN — DEXAMETHASONE SODIUM PHOSPHATE 10 MG: 10 INJECTION, SOLUTION INTRAMUSCULAR; INTRAVENOUS at 22:11

## 2023-01-16 NOTE — TELEPHONE ENCOUNTER
----- Message from Stephanie Huff MD sent at 1/14/2023  3:16 PM EST -----  Patient was admitted status post fall, had laceration of skin over the nose which was sutured  Admitted due to slightly elevated creatinine which improved with IV fluids based on bicarb  Being discharged on sodium bicarbonate tablet  -MA team please order for repeat BMP to be done in 1 week for chronic kidney disease stage IV and metabolic acidosis  -Depending on the repeat BMP can decide if patient needs to be seen sooner  He has a follow-up with Dr Patrice Bueno in March        Chronic Kidney Disease Stage 4  -Outpatient Nephrologist: Dr Patrice Bueno  -Baseline Creatinine: 3 0-3 5 previously recently around 2 6- 2 8 mg/dL  -Etiology: 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  -Status post kidney biopsy in 2018  -Admission creatinine slightly elevated at 2 9 milligrams per deciliter  Blood pressure slightly on higher side   -patient received CT with IV contrast on admission for trauma evaluation which would increase the risk of contrast nephropathy  He did receive IV fluid  Discussed with patient about risk of worsening renal function  In the setting of metabolic acidosis, would change IV fluid to half NS with sodium bicarbonate  Would also start on oral sodium bicarbonate which will be continued at the time of discharge   -Continue to monitor renal function stable  discharge tomorrow if renal function stable and if acidosis improving  -Avoid Nephrotoxins and Dose all medications per eGFR  -Will need outpatient follow up after discharge      BP/primary hypertension  -Home Medication:   amlodipine, hydralazine, metoprolol   -Currently BP slightly on the higher side in the setting of pain  -Plan: Continue treatment with home medication-amlodipine, hydralazine, Isordil  Hold metoprolol in the setting of bradycardia as recommended by cardiology    Avoid hypotension     Metabolic acidosis  -Non-anion gap with bicarb level of 16  Did have bicarb level of 20 in December 2nd labs  -Likely due to chronic kidney disease  -Changing IV fluid to bicarbonate based IV fluid and also starting on oral sodium bicarbonate tablet, would continue sodium bicarbonate at the time of discharge  -Lactic acid was at normal range     Secondary hyperparathyroidism of renal origin: Monitor PTH level as outpatient, continue calcitriol at home dose     Bradycardia due to frequent PVC: Was seen by cardiology    Recommended to hold beta-blocker   History of paroxysmal atrial fibrillation/a flutter- management per cardiology

## 2023-01-16 NOTE — TELEPHONE ENCOUNTER
I Spoke to Westley , he is aware to go for BMP 1 week post his discharge date so this Friday no latest Monday of next week  This will determine if he needs to be seen sooner

## 2023-01-17 ENCOUNTER — TELEPHONE (OUTPATIENT)
Dept: NEPHROLOGY | Facility: CLINIC | Age: 73
End: 2023-01-17

## 2023-01-17 VITALS
RESPIRATION RATE: 18 BRPM | TEMPERATURE: 97.9 F | SYSTOLIC BLOOD PRESSURE: 134 MMHG | HEIGHT: 68 IN | WEIGHT: 152 LBS | DIASTOLIC BLOOD PRESSURE: 86 MMHG | OXYGEN SATURATION: 96 % | BODY MASS INDEX: 23.04 KG/M2 | HEART RATE: 55 BPM

## 2023-01-17 LAB
ANION GAP SERPL CALCULATED.3IONS-SCNC: 7 MMOL/L (ref 4–13)
BUN SERPL-MCNC: 46 MG/DL (ref 5–25)
CALCIUM SERPL-MCNC: 8.4 MG/DL (ref 8.4–10.2)
CHLORIDE SERPL-SCNC: 110 MMOL/L (ref 96–108)
CO2 SERPL-SCNC: 20 MMOL/L (ref 21–32)
CREAT SERPL-MCNC: 2.74 MG/DL (ref 0.6–1.3)
ERYTHROCYTE [DISTWIDTH] IN BLOOD BY AUTOMATED COUNT: 12.5 % (ref 11.6–15.1)
GFR SERPL CREATININE-BSD FRML MDRD: 22 ML/MIN/1.73SQ M
GLUCOSE P FAST SERPL-MCNC: 154 MG/DL (ref 65–99)
GLUCOSE SERPL-MCNC: 154 MG/DL (ref 65–140)
HCT VFR BLD AUTO: 34.9 % (ref 36.5–49.3)
HGB BLD-MCNC: 11.7 G/DL (ref 12–17)
INR PPP: 2.38 (ref 0.84–1.19)
MCH RBC QN AUTO: 31 PG (ref 26.8–34.3)
MCHC RBC AUTO-ENTMCNC: 33.5 G/DL (ref 31.4–37.4)
MCV RBC AUTO: 93 FL (ref 82–98)
PLATELET # BLD AUTO: 180 THOUSANDS/UL (ref 149–390)
PMV BLD AUTO: 10.4 FL (ref 8.9–12.7)
POTASSIUM SERPL-SCNC: 5.1 MMOL/L (ref 3.5–5.3)
PROTHROMBIN TIME: 26.3 SECONDS (ref 11.6–14.5)
RBC # BLD AUTO: 3.77 MILLION/UL (ref 3.88–5.62)
SODIUM SERPL-SCNC: 137 MMOL/L (ref 135–147)
WBC # BLD AUTO: 11.51 THOUSAND/UL (ref 4.31–10.16)

## 2023-01-17 RX ORDER — AMOXICILLIN 500 MG/1
500 CAPSULE ORAL EVERY 8 HOURS SCHEDULED
Qty: 10 CAPSULE | Refills: 0 | Status: SHIPPED | OUTPATIENT
Start: 2023-01-17 | End: 2023-01-17

## 2023-01-17 RX ADMIN — AMOXICILLIN 500 MG: 250 CAPSULE ORAL at 00:42

## 2023-01-17 RX ADMIN — AMLODIPINE BESYLATE 2.5 MG: 2.5 TABLET ORAL at 08:27

## 2023-01-17 RX ADMIN — HYDRALAZINE HYDROCHLORIDE 25 MG: 25 TABLET ORAL at 08:27

## 2023-01-17 RX ADMIN — SODIUM BICARBONATE 1300 MG: 650 TABLET ORAL at 08:26

## 2023-01-17 RX ADMIN — TIMOLOL MALEATE 1 DROP: 5 SOLUTION/ DROPS OPHTHALMIC at 10:41

## 2023-01-17 RX ADMIN — SODIUM BICARBONATE 1300 MG: 650 TABLET ORAL at 00:43

## 2023-01-17 RX ADMIN — BACITRACIN 1 SMALL APPLICATION: 500 OINTMENT TOPICAL at 08:26

## 2023-01-17 RX ADMIN — ISOSORBIDE DINITRATE 10 MG: 10 TABLET ORAL at 08:27

## 2023-01-17 RX ADMIN — BRIMONIDINE TARTRATE 1 DROP: 2 SOLUTION/ DROPS OPHTHALMIC at 10:41

## 2023-01-17 RX ADMIN — METOPROLOL SUCCINATE 50 MG: 50 TABLET, EXTENDED RELEASE ORAL at 08:27

## 2023-01-17 RX ADMIN — AMOXICILLIN 500 MG: 250 CAPSULE ORAL at 08:26

## 2023-01-17 NOTE — CONSULTS
Consultation - ENT   KAYLEN HARRIS [facility] 71 y o  male MRN: [id number]  Unit/Bed#: ED-32 Encounter: [id number]      Assessment/Plan     Assessment:  Bilateral epistaxis, uncontrolled with bilateral nasal packing  The patient's CT from 1/13/23 also incidentally revealed opacification of the right maxillary sinus with internal calcifications and chronic osteitis of the maxilla  It is apparent from the patient's prior CT scans that this has been chronic for at least six years  If possible, I will try to biopsy the area  Plan:  Pt scheduled for emergency OR for endoscopic control of epistaxis  History of Present Illness   Physician Requesting Consult: Maldonado Guy DO  Reason for Consult / Principal Problem: epistaxis  HPI: KAYLEN HARRIS [facility] is a 71y o  year old male who presents with acute onset of severe bilateral epistaxis this evening  He fell and fractured his nose 2 days ago, and was discharged  But this evening he began bleeding  Inpatient consult to ENT  Consult performed by: Kristen Jones MD  Consult ordered by: Maldonado uGy DO          Review of Systems    Historical Information   Past Medical History:   Diagnosis Date   • Chronic kidney disease    • Colon polyp    • COVID-19    • Diabetes mellitus ([facility] [address] )    • Hyperlipidemia    • Hypertension    • Proteinuria    • Stage 3 chronic kidney disease ([facility] [address] )    • Vitamin D deficiency      Past Surgical History:   Procedure Laterality Date   • COLONOSCOPY      overdue   • OH ECHO TRANSESOPHAG R-T 2D W/PRB IMG ACQUISJ I&R N/A 3/7/2019    Procedure: INTRA-OP TRANSESOPHAGEAL ECHOCARDIOGRAM (PATIENCE);   Surgeon: Jose Jones DO;  [facility]: [facility];  Service: Cardiac Surgery   • OH PERQ CLSR TCAT L ATR APNDGE W/ENDOCARDIAL IMPLNT  3/7/2019    Procedure: LEFT ATRIAL APPENDAGE OCCLUSION CLIPPED with 35mm Joye Castanon;  Surgeon: Jose Jones DO;  Location: [facility];  Service: Cardiac Surgery   • OH REPLACEMENT MITRAL VALVE W/CARDIOPULMONARY BYP N/A 3/7/2019    Procedure: REPLACEMENT VALVE MITRAL (MVR) REPAIR with a 30mm MEDTRONIC CG FUTURE RING;  Surgeon: Roula Frausto DO;  Location: [facility];  Service: Cardiac Surgery   • [place] RPLCMT AORTIC VALVE OPN W/STENTLESS TISSUE VALVE N/A 3/7/2019    Procedure: REPLACEMENT VALVE AORTIC (AVR) with 23mm TAVERA INSPIRIS RESILIA  AORTIC VALVE;  Surgeon: Roula Frausto DO;  Location[facility];  Service: Cardiac Surgery   • RENAL BIOPSY, OPEN     • TOE SURGERY Left      Social History   Social History     Substance and Sexual Activity   Alcohol Use Yes    Comment: occasionally     Social History     Substance and Sexual Activity   Drug Use No     Social History     Tobacco Use   Smoking Status Former   • Years: 2 00   • Types: Cigarettes   • Quit date:    • Years since quittin 0   Smokeless Tobacco Never     Family History: non-contributory    Meds/Allergies   all current active meds have been reviewed    Allergies   Allergen Reactions   • Ace Inhibitors Cough   • Keflex [Cephalexin] Rash       Objective     No intake or output data in the 24 hours ending 23    Invasive Devices     Peripheral Intravenous Line  Duration           Peripheral IV 23 Left Antecubital <1 day                Physical Exam  Neck: no thyromegaly nor adenopathy  Nose: bilateral nasal packing in place, but continued oozing  Oropharynx: posterior bleeding apparent  Ears: unremarkable  Lab Results: I have personally reviewed pertinent lab results  Imaging Studies: I have personally reviewed pertinent reports  EKG, Pathology, and Other Studies: I have personally reviewed pertinent reports

## 2023-01-17 NOTE — H&P
[facility]  H&P- Sherif Valdez 1950, 71 y o  male MRN: [id number]  Unit/Bed#: ED-32 Encounter: [id number]  Primary Care Provider: Scotty Jovel MD   Date and time admitted to hospital: 1/16/2023  6:40 PM    Nasal fracture  Assessment & Plan  - s/p open nasal bone fractures 1/13, closed bedside and continuing on 1 week augmentin   - Now with bilateral epistaxis unable to be controlled in ED with packing   - ENT consult appreciated - patient to go to OR tonight for packing removal and cauterization as needed  - Recheck Hgb in am  - continue coumadin for history aflutter    * Epistaxis  Assessment & Plan  - see nasal bone fracture for management    H/O atrial flutter  Assessment & Plan  - Currently in NSR   - On coumadin 2mg daily - continue    CKD (chronic kidney disease), stage IV [facility])  Assessment & Plan  Lab Results   Component Value Date    EGFR 20 01/16/2023    EGFR 23 01/14/2023    EGFR 20 01/13/2023    CREATININE 2 97 (H) 01/16/2023    CREATININE 2 65 (H) 01/14/2023    CREATININE 2 96 (H) 01/13/2023     - At baseline cr   - Avoid nephrotoxic medications  - Continue to monitor    Chronic systolic heart failure ([address] )  Assessment & Plan  Wt Readings from Last 3 Encounters:   01/13/23 74 5 kg (164 lb 3 9 oz)   12/02/22 74 4 kg (164 lb)   10/27/22 72 1 kg (159 lb)     - without evidence of acute exacerbation  - Continue home medications      Trauma Alert: Evaluation; trauma team notified at 2010 via text   Model of Arrival: Ambulance    Trauma Team: Attending Margot Velazquez and [facility] [address]  Consultants:     ENT: by ER - STAT consult; returned call/text yes and evaluated patient in Er;     History of Present Illness     Chief Complaint: Epistaxis  Mechanism:Fall     HPI:    Sherif Valdez is a 71 y o  male with PMH Ao valve replacement with bioprosthetic valve, mitral valve repair, bradycardia, fall with open nasal bone fracture who presents with bilateral epistaxis unable to be controlled at home  He reports he had spontaneous nose bleeds today unable to be stopped at home  He denies additional falls  He denies dizziness, shortness of breath, n/v, headache  Review of Systems   Constitutional: Negative for activity change, appetite change, diaphoresis, fatigue and fever  HENT: Positive for nosebleeds  Negative for congestion, ear discharge, ear pain, facial swelling, hearing loss, mouth sores, postnasal drip, rhinorrhea, sinus pressure, sinus pain, sneezing and sore throat  Eyes: Negative for photophobia, pain and redness  Respiratory: Negative for cough, chest tightness, shortness of breath and wheezing  Cardiovascular: Negative for chest pain and palpitations  Gastrointestinal: Negative for abdominal distention, abdominal pain, blood in stool, constipation, diarrhea, nausea and vomiting  Genitourinary: Negative for dysuria, frequency, hematuria and urgency  Musculoskeletal: Negative for back pain and neck pain  Skin: Negative for wound  Neurological: Negative for dizziness, seizures, syncope, weakness, numbness and headaches  12-point, complete review of systems was reviewed and negative except as stated above  Historical Information     Past Medical History:   Diagnosis Date   • Chronic kidney disease    • Colon polyp    • COVID-19    • Diabetes mellitus ([facility] )    • Hyperlipidemia    • Hypertension    • Proteinuria    • Stage 3 chronic kidney disease ([place] [address] )    • Vitamin D deficiency      Past Surgical History:   Procedure Laterality Date   • COLONOSCOPY      overdue   • [place] ECHO TRANSESOPHAG R-T 2D W/PRB IMG ACQUISJ I&R N/A 3/7/2019    Procedure: INTRA-OP TRANSESOPHAGEAL ECHOCARDIOGRAM (PATIENCE);   Surgeon: Karen Yao DO;  [facility];  Service: Cardiac Surgery   • AZ PERQ CLSR TCAT L ATR APNDGE W/ENDOCARDIAL IMPLNT  3/7/2019    Procedure: LEFT ATRIAL APPENDAGE OCCLUSION CLIPPED with 35mm ATRICURE ATRICLIP;  Surgeon: Karen Yao DO;  Location[facility];  Service: Cardiac Surgery   • IA REPLACEMENT MITRAL VALVE W/CARDIOPULMONARY BYP N/A 3/7/2019    Procedure: REPLACEMENT VALVE MITRAL (MVR) REPAIR with a 30mm MEDTRONIC CG FUTURE RING;  Surgeon: Suri Galeano DO;  Location[facility];  Service: Cardiac Surgery   • IA RPLCMT AORTIC VALVE OPN W/STENTLESS TISSUE VALVE N/A 3/7/2019    Procedure: REPLACEMENT VALVE AORTIC (AVR) with 23mm TAVERA INSPIRIS RESILIA  AORTIC VALVE;  Surgeon: Suri Galeano DO;  Location[facility];  Service: Cardiac Surgery   • RENAL BIOPSY, OPEN     • TOE SURGERY Left         Social History     Tobacco Use   • Smoking status: Former     Years: 2 00     Types: Cigarettes     Quit date:      Years since quittin 0   • Smokeless tobacco: Never   Vaping Use   • Vaping Use: Never used   Substance Use Topics   • Alcohol use: Yes     Comment: occasionally   • Drug use: No     Immunization History   Administered Date(s) Administered   • COVID-19 MODERNA VACC 0 5 ML IM 2021, 2021   • INFLUENZA 2010, 10/09/2013, 2015, 2018   • Influenza, high dose seasonal 0 7 mL 10/07/2020, 09/15/2021, 2022   • Pneumococcal Conjugate 13-Valent 2018   • Tdap 2019, 2023   • Zoster 2014     Last Tetanus:   Family History: Non-contributory    1  Before the illness or injury that brought you to the Emergency, did you need someone to help you on a regular basis? 0=No   2  Since the illness or injury that brought you to the Emergency, have you needed more help than usual to take care of yourself? 0=No   3  Have you been hospitalized for one or more nights during the past 6 months (excluding a stay in the Emergency Department)? 1=Yes   4  In general, do you see well? 0=Yes   5  In general, do you have serious problems with your memory? 0=No   6  Do you take more than three different medications everyday?  1=Yes   TOTAL   2     Did you order a geriatric consult if the score was 2 or greater?: yes     Meds/Allergies   current meds:   No current facility-administered medications for this encounter  Allergies   Allergen Reactions   • Ace Inhibitors Cough   • Keflex [Cephalexin] Rash       Objective   Initial Vitals:   Temperature: 97 5 °F (36 4 °C) (01/16/23 1814)  Pulse: (!) 46 (01/16/23 1811)  Respirations: 16 (01/16/23 1811)  Blood Pressure: (!) 176/97 (01/16/23 1811)    Primary Survey:   Airway:        Status: patent;        Pre-hospital Interventions: none        Hospital Interventions: none  Breathing:        Pre-hospital Interventions: none       Effort: normal       Right breath sounds: normal       Left breath sounds: normal  Circulation:        Rhythm: regular       Rate: regular   Right Pulses Left Pulses    R radial: 2+  R femoral: 2+  R pedal: 2+     L radial: 2+  L femoral: 2+  L pedal: 2+       Disability:        GCS: Eye: 4; Verbal: 5 Motor: 6 Total: 15       Right Pupil: round;  reactive         Left Pupil:  round;  reactive      R Motor Strength L Motor Strength    R : 5/5  R dorsiflex: 5/5  R plantarflex: 5/5 L : 5/5  L dorsiflex: 5/5  L plantarflex: 5/5        Sensory:  No sensory deficit  Exposure:       Completed: Yes      Secondary Survey:  Physical Exam  Vitals and nursing note reviewed  Constitutional:       General: He is not in acute distress  Appearance: Normal appearance  He is not ill-appearing or toxic-appearing  HENT:      Head: Normocephalic  Right Ear: Tympanic membrane normal       Left Ear: Tympanic membrane normal       Nose: Laceration (with sutures in place, wound edges well approximated without erythema or drainage) present  No congestion or rhinorrhea  Right Nostril: Foreign body (packing) present  Left Nostril: Foreign body (packing) present  Mouth/Throat:      Mouth: Mucous membranes are moist       Pharynx: Oropharynx is clear  No oropharyngeal exudate  Eyes:      Extraocular Movements: Extraocular movements intact  Conjunctiva/sclera: Conjunctivae normal       Pupils: Pupils are equal, round, and reactive to light  Cardiovascular:      Rate and Rhythm: Normal rate  Heart sounds: No murmur heard  No friction rub  No gallop  Pulmonary:      Effort: Pulmonary effort is normal       Breath sounds: No wheezing, rhonchi or rales  Abdominal:      General: Abdomen is flat  There is no distension  Palpations: Abdomen is soft  Tenderness: There is no abdominal tenderness  There is no guarding or rebound  Musculoskeletal:      Right shoulder: Normal       Left shoulder: Normal       Right upper arm: Normal       Left upper arm: Normal       Right elbow: Normal       Left elbow: Normal       Right forearm: Normal       Left forearm: Normal       Right wrist: Normal       Left wrist: Normal       Right hand: Normal       Left hand: Normal       Cervical back: No deformity or tenderness  Thoracic back: No deformity or tenderness  Lumbar back: Normal  No swelling, deformity or tenderness  Right hip: Normal       Left hip: Normal       Right upper leg: Normal       Left upper leg: Normal       Right knee: Normal       Left knee: Normal       Right lower leg: Normal       Left lower leg: Normal       Right ankle: Normal       Left ankle: Normal       Right foot: Normal       Left foot: Normal    Skin:     General: Skin is warm and dry  Capillary Refill: Capillary refill takes less than 2 seconds  Neurological:      General: No focal deficit present  Mental Status: He is alert and oriented to person, place, and time  Sensory: No sensory deficit  Motor: No weakness           Invasive Devices     Peripheral Intravenous Line  Duration           Peripheral IV 01/16/23 Left Antecubital <1 day              Lab Results:   BMP/CMP:   Lab Results   Component Value Date    SODIUM 138 01/16/2023    K 4 9 01/16/2023     (H) 01/16/2023    CO2 20 (L) 01/16/2023    BUN 42 (H) 01/16/2023 CREATININE 2 97 (H) 01/16/2023    CALCIUM 8 5 01/16/2023    EGFR 20 01/16/2023   , CBC:   Lab Results   Component Value Date    WBC 10 39 (H) 01/16/2023    HGB 13 5 01/16/2023    HCT 41 6 01/16/2023    MCV 94 01/16/2023     01/16/2023    MCH 30 6 01/16/2023    MCHC 32 5 01/16/2023    RDW 12 7 01/16/2023    MPV 10 3 01/16/2023    NRBC 0 01/16/2023    and Coagulation:   Lab Results   Component Value Date    INR 2 25 (H) 01/16/2023       Imaging Results: I have personally reviewed pertinent reports      Chest Xray(s): N/A   FAST exam(s): N/A   CT Scan(s): N/A   Additional Xray(s): N/A     Other Studies: none    Code Status: Prior  Advance Directive and Living Will:

## 2023-01-17 NOTE — CONSULTS
Consultation - Geriatric Medicine   [facility] 71 y o  male MRN: [id number]  Unit/Bed#: W -01 Encounter: [id number]        Inpatient consult to Gerontology  Consult performed by: Sharon Jean Baptiste MD  Consult ordered by: Kendrick Christensen PA-C          Assessment/Plan     1  -Epistaxis  bilateral epistaxis uncontrolled with nasal packing  CT scan from 1/13/2023 revealed opacification of right maxillary sinus  Emergency OR for endoscopic control of epistaxis performed yesterday by ENT, cauterized blood vessel causing bleeding  Patient reported feeling much better today  2  -Nasal fracture  Patient is status post open nasal bone fracture on January 13, presented to emergency department yesterday due to bilateral epistaxis unable to be controlled in the emergency department  Patient was managed by trauma and OMFS, no intervention from OMFS at the time, on January 13  CT of the facial bones revealed fracture through the tip of the nasal bone as well as a distal left nasal bone with overlying soft tissue swelling  3  -Atrial flutter history  Longstanding use of Coumadin 2 mg daily, to continue  Most recent EKG revealed normal sinus rhythm  4  -Chronic kidney disease stage IV  BUN today 46, creatinine 2 74, GFR 22  Patient says it has been like this for the past 10 years  He sees a Nephrologist (Dr Nannette Arroyo), 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 )  Must avoid nephrotoxins and dose all medications per current GFR     5  -Leukocytosis  Upon presenting to emergency department, white blood cell count 10 39  Today 11 51, increased from yesterday       6  -Anemia  Most recent CBC study revealed mild anemia, red blood cell count 3 77, hemoglobin 11 7, hematocrit 34 9     7  -PT PTT INR  PT 25 1, INR 2 25, PTT 45  All chronically elevated  Want to maintain INR between 2 0 and 3 0 due to history of valve replacement      8  -Chronic systolic heart failure  Continue Toprol-XL  Monitor volume status closely with IV fluids  Patient does closely follow cardiologist due to multiple conditions  9  -Hypertension  Controlled by amlodipine 2 5 mg daily  Most recent blood pressure reading 134/86  10  -Bradycardia  Pulse 55 last vital sign reading  Use of Toprol-XL  6  -Vision  Reportedly has family history positive for glaucoma  Regularly gets eye check for diabetic retinopathy and glaucoma prevention  Last saw ophthalmology on October 2022  Consult note reported no changes in vision at that time  12  -Diabetes mellitus type 2 in nonobese patient  Has diabetic nephropathy  Point-of-care glucose today 93  Last A1c reading June 2021 result 5 8  Consider new A1c test     13  Medication reconciliation  Patient medication list was read out loud and confirmed with patient  Takes the following:  Warfarin tablet 2 mg, amlodipine tablet 2 5 mg, amoxicillin 500 mg, isosorbide dinitrate tablet 10 mg, Toprol-XL 50 mg, sodium bicarbonate 1300 mg, travoprost, hydralazine, timolol  14   History of aortic heart valve replacement  History of aortic valve replacement with bovine aortic valve  History of Present Illness   Physician Requesting Consult: Ranae Gaucher, MD  Reason for Consult / Principal Problem: Epistaxis  Hx and PE limited by: None  Additional history obtained from: Daughter in the room      HPI: Samreen Esparza is a 71y o  year old male who presented to [address] [place] [address] emergency department on 1/16/2023 due to bilateral epistaxis unable to be controlled at home  He did report spontaneous nosebleeds throughout the day that he was unable to stop at home  Sustained a mechanical fall and landed onto his nose on 1/13/2023 causing him to be hospitalized  Denied additional fall between then  Patient reportedly had Covid-19 infection 3 years ago, and started having nosebleeds shortly thereafter   It cleared up with nasal sprays (no cauterization), until yesterday when he noticed intermittent nasal bleeding from both nostrils, which progressively got worse  He sprayed saline into both nostrils, and it stopped temporarily, but then restarted  His nose was packed in the ED after pulling out "lots of clots"  Patient takes warfarin following aortic and mitral valve surgery 3 years ago, but he did not take warfarin last night  Patient does not have a machine at home to check his INR; it was discussed with the patient this may be possible with medicare  ENT cauterized his nose, and he reports that today it is feeling much better  Patient doing followed by a Cardiolgist (Dr Ivana Mcmahon at [facility])  He recently had a holter monitor  Recently placed on extra 0 5 tab of metoprolol  Chronic kidney disease stage IV determined to be 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  Review of Systems   Constitutional: Negative for fatigue  HENT: Positive for facial swelling (Site of injury)  Negative for hearing loss, sinus pressure and sinus pain  Eyes:        Patient wears reading glasses  Respiratory: Negative for apnea, chest tightness and shortness of breath  Cardiovascular: Negative for chest pain  Gastrointestinal: Negative for abdominal pain, constipation, diarrhea, nausea and vomiting  Genitourinary: Negative for difficulty urinating, dysuria and urgency  Musculoskeletal: Positive for arthralgias  Mild joint pain on left side from when he fell   Skin: Positive for color change and wound  Neurological: Negative for dizziness, syncope, light-headedness and headaches  Hematological: Bruises/bleeds easily (On Coumadin)  Memory/Cognitive screening:  "Good for a 72yo", per patient  Mobility:  Good  Patient says he can climb up on ladders and go down stairs  Falls: Most recent mechanical fall January 13, 2023, nasal bone fracture discovered at the emergency department   No falls in the past year   Assistive Devices:  None  Fraility: None  Nutrition/weight loss/grocery shopping/meal preparation: Patient is able to grocery shop and prepare his own meals  Vision impairment: Good  Patient wears reading glasses  Patient gets frequent eye exams every 3-4 weeks to check for diabetic retinopathy (father went blind due to glaucoma)  Hearing impairment: No hearing impairment  Incontinence: Denies all incontinence  Delirium: None  Polypharmacy:   No current facility-administered medications on file prior to encounter  Current Outpatient Medications on File Prior to Encounter   Medication Sig Dispense Refill   • amLODIPine (NORVASC) 2 5 mg tablet take 1 tablet by mouth once daily 90 tablet 3   • amoxicillin (AMOXIL) 875 mg tablet Take 1 tablet (875 mg total) by mouth every 12 (twelve) hours for 12 doses 12 tablet 0   • ascorbic acid (VITAMIN C) 500 mg tablet Take 500 mg by mouth daily     • atorvastatin (LIPITOR) 20 mg tablet Take 1 tablet (20 mg total) by mouth daily 90 tablet 2   • calcitriol (ROCALTROL) 0 25 mcg capsule TAKE 1 CAPSULE 5 DAYS A WEEK AS DIRECTED 60 capsule 3   • Cholecalciferol (VITAMIN D3) 1000 units CAPS Take 1 capsule by mouth daily     • COMBIGAN 0 2-0 5 %   0   • hydrALAZINE (APRESOLINE) 25 mg tablet take 1 tablet by mouth three times a day 270 tablet 3   • isosorbide dinitrate (ISORDIL) 10 mg tablet take 1 tablet by mouth three times a day 270 tablet 3   • metoprolol succinate (TOPROL-XL) 50 mg 24 hr tablet Take 1 tablet (50 mg total) by mouth 2 (two) times a day  0   • RA Aspirin EC 81 MG EC tablet take 1 tablet by mouth once daily 90 tablet 6   • TRAVATAN Z 0 004 % ophthalmic solution Administer 1 drop to both eyes daily at bedtime   0   • warfarin (COUMADIN) 2 mg tablet TAKE 1-2 TABLETS BY MOUTH DAILY OR AS DIRECTED BY PHYSICIAN 75 tablet 11   • ALPRAZolam (XANAX) 0 25 mg tablet Take  1 tablets 30 minutes before flight   (Patient not taking: Reported on 1/17/2023) 4 tablet 0   • sodium bicarbonate 650 mg tablet Take 2 tablets (1,300 mg total) by mouth 3 (three) times daily after meals 180 tablet 0   • Tradjenta 5 MG TABS TAKE 1/2 TABLET BY MOUTH DAILY 30 tablet 0     Patients primary residence: [place] [address] home lives with: Spouse  iADL's: Enjoys all instrumental activities of daily living  ADL's: Patient can prepare his own food, pay his own bills, drive, bath and shower himself  Historical Information   Past medical history:   Past Medical History:   Diagnosis Date   • Chronic kidney disease    • Colon polyp    • COVID-19    • Diabetes mellitus ([address] [address] [address] )    • Hyperlipidemia    • Hypertension    • Proteinuria    • Stage 3 chronic kidney disease ([facility] )    • Vitamin D deficiency      Past surgical history:   Past Surgical History:   Procedure Laterality Date   • COLONOSCOPY      overdue   • NM ECHO TRANSESOPHAG R-T 2D W/PRB IMG ACQUISJ I&R N/A 3/7/2019    Procedure: INTRA-OP TRANSESOPHAGEAL ECHOCARDIOGRAM (PATIENCE);   Surgeon: Allegra Sy DO;  [facility];  Service: Cardiac Surgery   • NM PERQ CLSR TCAT L ATR APNDGE W/ENDOCARDIAL IMPLNT  3/7/2019    Procedure: LEFT ATRIAL APPENDAGE OCCLUSION CLIPPED with 35mm Evy Nephew;  Surgeon: Allegra Sy DO;  [facility];  Service: Cardiac Surgery   • NM REPLACEMENT MITRAL VALVE W/CARDIOPULMONARY BYP N/A 3/7/2019    Procedure: REPLACEMENT VALVE MITRAL (MVR) REPAIR with a 30mm MEDTRONIC CG FUTURE RING;  Surgeon: Allegra Sy DO;  Location: [facility];  Service: Cardiac Surgery   • NM RPLCMT AORTIC VALVE OPN W/STENTLESS TISSUE VALVE N/A 3/7/2019    Procedure: REPLACEMENT VALVE AORTIC (AVR) with 23mm TAVERA INSPIRIS RESILIA  AORTIC VALVE;  Surgeon: Allegra Sy DO;  Location[facility];  Service: Cardiac Surgery   • RENAL BIOPSY, OPEN     • TOE SURGERY Left      Social history:  Social History     Socioeconomic History   • Marital status: /Civil Union     Spouse name: Not on file   • Number of children: Not on file   • Years of education: Not on file   • Highest education level: Not on file   Occupational History   • Occupation: RETIRED   Tobacco Use   • Smoking status: Former     Years: 2 00     Types: Cigarettes     Quit date:      Years since quittin 0   • Smokeless tobacco: Never   Vaping Use   • Vaping Use: Never used   Substance and Sexual Activity   • Alcohol use: Yes     Comment: occasionally   • Drug use: No   • Sexual activity: Yes     Partners: Female     Birth control/protection: None   Other Topics Concern   • Not on file   Social History Narrative   • Not on file     Social Determinants of Health     Financial Resource Strain: Low Risk    • Difficulty of Paying Living Expenses: Not hard at all   Food Insecurity: Not on file   Transportation Needs: No Transportation Needs   • Lack of Transportation (Medical): No   • Lack of Transportation (Non-Medical): No   Physical Activity: Not on file   Stress: Not on file   Social Connections: Not on file   Intimate Partner Violence: Not on file   Housing Stability: Not on file     Family history:   Family History   Problem Relation Age of Onset   • Stroke Mother    • Hypertension Mother    • Blindness Father    • Hyperlipidemia Father    • Hypertension Father    • Glaucoma Father    • Cancer Sister    • Diabetes Sister    • Ovarian cancer Sister    • Gout Brother    • Heart Valve Disease Brother    • Hypertension Brother    • Anuerysm Neg Hx    • Heart attack Neg Hx    • Arrhythmia Neg Hx    • Heart failure Neg Hx    • Coronary artery disease Neg Hx    • Sudden death Neg Hx         scd       Meds/Allergies   All current active meds have been reviewed       Allergies   Allergen Reactions   • Ace Inhibitors Cough   • Keflex [Cephalexin] Rash       Objective   Vitals:    23 0709   BP: 134/86   Pulse: 55   Resp: 18   Temp: 97 9 °F (36 6 °C)   SpO2: 96%       Intake/Output Summary (Last 24 hours) at 2023 0835  Last data filed at 2023 6179  Gross per 24 hour   Intake 240 ml   Output 450 ml   Net -210 ml     Invasive Devices     Peripheral Intravenous Line  Duration           Peripheral IV 01/16/23 Left Antecubital <1 day                Physical Exam  Constitutional:       General: He is not in acute distress  Appearance: Normal appearance  He is normal weight  He is not toxic-appearing  HENT:      Head: Normocephalic  Right Ear: External ear normal       Left Ear: External ear normal       Nose: Nose normal  No rhinorrhea  Comments: Nonedematous, nonerythematous  Eyes:      Conjunctiva/sclera: Conjunctivae normal       Pupils: Pupils are equal, round, and reactive to light  Cardiovascular:      Rate and Rhythm: Normal rate  Rhythm irregular  Pulses: Normal pulses  Comments: History of bio mechanical static aortic valve and Mitral valve clip  Pulmonary:      Effort: Pulmonary effort is normal       Breath sounds: Normal breath sounds  Abdominal:      General: Abdomen is flat  Bowel sounds are normal       Palpations: Abdomen is soft  Skin:     General: Skin is warm and dry  Neurological:      General: No focal deficit present  Mental Status: He is alert and oriented to person, place, and time  Mental status is at baseline  Psychiatric:         Mood and Affect: Mood normal          Behavior: Behavior normal          Thought Content: Thought content normal          Judgment: Judgment normal          Lab Results:   I have personally reviewed pertinent lab and imaging results  VTE Prophylaxis: Warfarin (Coumadin)    Code Status: Level 1 - Full Code  Advance Directive and Living Will:      Power of :    POLST:       NIMISHA Diego, assessed and evaluated patient with Dr Rainelle Seip  Also scribed note         Family and Social Support:   Living Arrangements: Lives w/ Spouse/significant other  Support Systems: Self; Spouse/significant other; Children  Assistance Needed: n/a  Type of Current Residence: Private residence  [address]: No

## 2023-01-17 NOTE — ASSESSMENT & PLAN NOTE
Lab Results   Component Value Date    EGFR 20 01/16/2023    EGFR 23 01/14/2023    EGFR 20 01/13/2023    CREATININE 2 97 (H) 01/16/2023    CREATININE 2 65 (H) 01/14/2023    CREATININE 2 96 (H) 01/13/2023     - At baseline cr   - Avoid nephrotoxic medications  - Continue to monitor

## 2023-01-17 NOTE — OP NOTE
OPERATIVE REPORT  PATIENT NAME: Marcial Coles    :  1950  MRN: [id number]  Pt Location: AN OR ROOM 03    SURGERY DATE: 2023    Surgeon(s) and Role:     * Raymond Hutton MD - Primary    Preop Diagnosis:  Epistaxis [R04 0]    Post-Op Diagnosis Codes:     * Epistaxis [R04 0]    Procedure(s) (LRB):  ENDOSCOPY NOSE BILATERALLY, WITH ENDOSCOPIC CONTROL OF EPISTAXIS ON LEFT (Bilateral)    Specimen(s):  * No specimens in log *    Estimated Blood Loss:   Minimal    Drains:  * No LDAs found *    Anesthesia Type:   General    Operative Indications:  Epistaxis [R04 0]      Operative Findings:  Bleeding from the floor of the left nasal cavity  Deviated septum to the right  Sinonasal polyps on the right    Complications:   None    Procedure and Technique:  After induction of general endotracheal anesthesia, the patient was draped in the sterile fashion  The nose was packed bilaterally with cotton pledgets soaked with oxymetazoline  The nasal cavities were examined with the 0-degree sinus endoscope  An exposed blood vessel was located on the floor of the left nasal cavity  The area was cauterized under endoscopic guidance of the 0-degree endoscope, with the suction electrocautery  Hemostasis was achieved  A large clot was removed from the patient's pharynx  The right nasal cavity was difficult to access due to his deviated septum, but fortunately no bleeding was evident on the right  Endoscopy did reveal polypoid tissue coming from the right [facility]  Due to the emergent nature of this surgery, it was decided to leave the sinus polyp and deviated septum to be address in a more elective setting  The patient tolerated the procedure well  There were no complications     I was present for the entire procedure    Patient Disposition:  PACU         SIGNATURE: Raymond Hutton MD  DATE: 2023  TIME: 10:44 PM

## 2023-01-17 NOTE — ASSESSMENT & PLAN NOTE
- s/p open nasal bone fractures 1/13, closed bedside and continuing on 1 week augmentin   - Now with bilateral epistaxis unable to be controlled in ED with packing   - ENT consult appreciated - patient to go to OR tonight for packing removal and cauterization as needed  - Recheck Hgb in am  - continue coumadin for history aflutter

## 2023-01-17 NOTE — TELEPHONE ENCOUNTER
----- Message from Lou Mancilla MD sent at 1/16/2023  3:27 PM EST -----  thanks  ----- Message -----  From: Meredith Mccord MD  Sent: 1/14/2023   3:18 PM EST  To: Lou Mancilla MD, #    Patient was admitted status post fall, had laceration of skin over the nose which was sutured  Admitted due to slightly elevated creatinine which improved with IV fluids based on bicarb  Being discharged on sodium bicarbonate tablet  -MA team please order for repeat BMP to be done in 1 week for chronic kidney disease stage IV and metabolic acidosis  -Depending on the repeat BMP can decide if patient needs to be seen sooner  He has a follow-up with Dr Josee Contreras in March        Chronic Kidney Disease Stage 4  -Outpatient Nephrologist: Dr Josee Contreras  -Baseline Creatinine: 3 0-3 5 previously recently around 2 6- 2 8 mg/dL  -Etiology: 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  -Status post kidney biopsy in 2018  -Admission creatinine slightly elevated at 2 9 milligrams per deciliter  Blood pressure slightly on higher side   -patient received CT with IV contrast on admission for trauma evaluation which would increase the risk of contrast nephropathy  He did receive IV fluid  Discussed with patient about risk of worsening renal function  In the setting of metabolic acidosis, would change IV fluid to half NS with sodium bicarbonate  Would also start on oral sodium bicarbonate which will be continued at the time of discharge   -Continue to monitor renal function stable  discharge tomorrow if renal function stable and if acidosis improving  -Avoid Nephrotoxins and Dose all medications per eGFR  -Will need outpatient follow up after discharge      BP/primary hypertension  -Home Medication:   amlodipine, hydralazine, metoprolol   -Currently BP slightly on the higher side in the setting of pain  -Plan: Continue treatment with home medication-amlodipine, hydralazine, Isordil    Hold metoprolol in the setting of bradycardia as recommended by cardiology  Avoid hypotension     Metabolic acidosis  -Non-anion gap with bicarb level of 16  Did have bicarb level of 20 in December 2nd labs  -Likely due to chronic kidney disease  -Changing IV fluid to bicarbonate based IV fluid and also starting on oral sodium bicarbonate tablet, would continue sodium bicarbonate at the time of discharge  -Lactic acid was at normal range     Secondary hyperparathyroidism of renal origin: Monitor PTH level as outpatient, continue calcitriol at home dose     Bradycardia due to frequent PVC: Was seen by cardiology    Recommended to hold beta-blocker   History of paroxysmal atrial fibrillation/a flutter- management per cardiology

## 2023-01-17 NOTE — ANESTHESIA POSTPROCEDURE EVALUATION
Post-Op Assessment Note    CV Status:  Stable  Pain Score: 0    Pain management: adequate     Mental Status:  Alert and awake   Hydration Status:  Euvolemic   PONV Controlled:  Controlled   Airway Patency:  Patent      Post Op Vitals Reviewed: Yes      Staff: CRNA         No notable events documented      BP   132/101   Temp 97 7 °F (36 5 °C) (01/16/23 2253)    Pulse  98   Resp   15   SpO2   98%

## 2023-01-17 NOTE — DISCHARGE SUMMARY
Discharge Summary - Natali Chowdhury 71 y o  male MRN: [id number]    Unit/Bed#: W -01 Encounter: [id number]    Admission Date:   Admission Orders (From admission, onward)     Ordered        01/16/23 2012  Place in Observation  Once                        Admitting Diagnosis: Epistaxis [R04 0]  Bleeding from the nose [R04 0]  Open displaced fracture of nasal bone, initial encounter [S02  2XXB]    HPI: Per Ronnie Ram, "Natali Chowdhury is a 71 y o  male with PMH Ao valve replacement with bioprosthetic valve, mitral valve repair, bradycardia, fall with open nasal bone fracture who presents with bilateral epistaxis unable to be controlled at home  He reports he had spontaneous nose bleeds today unable to be stopped at home  He denies additional falls  He denies dizziness, shortness of breath, n/v, headache "    Procedures Performed:   Orders Placed This Encounter   Procedures   • Epistaxis management       Summary of Hospital Course: Patient is a 67-year-old male comes in for evaluation for epistaxis  He was taken to the OR urgently with ENT on 1/16/2023  Patient was monitored on the trauma service in the postoperative setting and did well  He will continue his Coumadin  Patient will be discharged home on 1/17/2023 with family  He would follow-up outpatient with ENT and PCP  Per ENT provider; antibiotics will be stopped upon discharge  Significant Findings, Care, Treatment and Services Provided:   No results found        Complications: no complications    Discharge Diagnosis:   Patient Active Problem List   Diagnosis   • Persistent proteinuria   • Diabetic nephropathy associated with type 2 diabetes mellitus ([facility] [address] )   • Vitamin D deficiency   • Secondary hyperparathyroidism of renal origin ([facility] [address] )   • FSGS (focal segmental glomerulosclerosis)   • Essential hypertension   • Chronic systolic heart failure (HCC)   • Nonrheumatic aortic valve insufficiency   • Non-rheumatic mitral regurgitation   • Bicuspid aortic valve   • Coronary artery disease involving native coronary artery of native heart without angina pectoris   • S/P AVR   • S/P MVR (mitral valve repair)   • Dilated cardiomyopathy (HCC)   • Diabetes mellitus type 2 in nonobese [facility])   • Atrial flutter (HCC)   • Encounter for postoperative care   • CKD (chronic kidney disease), stage IV (HCC)   • History of colon polyps   • Anticoagulant long-term use   • Close exposure to COVID-19 virus   • COVID-19 virus infection   • Dyslipidemia   • Bradycardia   • Fall from height of greater than 3 feet   • Nasal fracture   • Metabolic acidosis   • H/O atrial flutter   • Epistaxis         Medical Problems     Resolved Problems  Date Reviewed: 1/14/2023   None         Condition at Discharge: good         Discharge instructions/Information to patient and family:   See after visit summary for information provided to patient and family  Provisions for Follow-Up Care:  See after visit summary for information related to follow-up care and any pertinent home health orders  PCP: Giovanna Singleton MD    Disposition: Home    Planned Readmission: No      Discharge Statement   I spent 23 minutes discharging the patient  This time was spent on the day of discharge  I had direct contact with the patient on the day of discharge  Additional documentation is required if more than 30 minutes were spent on discharge  Discharge Medications:  See after visit summary for reconciled discharge medications provided to patient and family

## 2023-01-17 NOTE — ASSESSMENT & PLAN NOTE
Wt Readings from Last 3 Encounters:   01/13/23 74 5 kg (164 lb 3 9 oz)   12/02/22 74 4 kg (164 lb)   10/27/22 72 1 kg (159 lb)     - without evidence of acute exacerbation  - Continue home medications

## 2023-01-17 NOTE — ED PROVIDER NOTES
History  Chief Complaint   Patient presents with   • Nose Bleed     Pt fell on Friday and was seen in this ER  Pt dx with a broken nose  Pts nose has been bleeding for 45 minutes  Pt takes coumadin      60-year-old male history of valve replacement on Coumadin, CKD, DM2, admitted here 3 days ago for cough and nasal bone fracture presenting with severe bilateral epistaxis  Patient states that this afternoon he started bleeding on left side of his nose which quickly progressed to right side as well  Did have some improvement after trying to hold his nose for a bit but soon as he stood up in again bleeding pretty profusely  Here in the ED, he is already soaked through several towels of blood  He is bleeding on both sides of his nose  Denies reinjury to the area  Prior to Admission Medications   Prescriptions Last Dose Informant Patient Reported? Taking? ALPRAZolam (XANAX) 0 25 mg tablet Unknown  No No   Sig: Take  1 tablets 30 minutes before flight     Patient not taking: Reported on 1/17/2023   COMBIGAN 0 2-0 5 % 1/16/2023  Yes Yes   Cholecalciferol (VITAMIN D3) 1000 units CAPS 1/16/2023  Yes Yes   Sig: Take 1 capsule by mouth daily   RA Aspirin EC 81 MG EC tablet 1/16/2023  No Yes   Sig: take 1 tablet by mouth once daily   TRAVATAN Z 0 004 % ophthalmic solution 1/16/2023  Yes Yes   Sig: Administer 1 drop to both eyes daily at bedtime    Tradjenta 5 MG TABS Unknown  No No   Sig: TAKE 1/2 TABLET BY MOUTH DAILY   amLODIPine (NORVASC) 2 5 mg tablet 1/16/2023  No Yes   Sig: take 1 tablet by mouth once daily   amoxicillin (AMOXIL) 875 mg tablet 1/16/2023  No Yes   Sig: Take 1 tablet (875 mg total) by mouth every 12 (twelve) hours for 12 doses   ascorbic acid (VITAMIN C) 500 mg tablet 1/16/2023  Yes Yes   Sig: Take 500 mg by mouth daily   atorvastatin (LIPITOR) 20 mg tablet 1/15/2023  No Yes   Sig: Take 1 tablet (20 mg total) by mouth daily   calcitriol (ROCALTROL) 0 25 mcg capsule 1/16/2023  No Yes   Sig: TAKE 1 CAPSULE 5 DAYS A WEEK AS DIRECTED   hydrALAZINE (APRESOLINE) 25 mg tablet 1/16/2023  No Yes   Sig: take 1 tablet by mouth three times a day   isosorbide dinitrate (ISORDIL) 10 mg tablet 1/16/2023  No Yes   Sig: take 1 tablet by mouth three times a day   metoprolol succinate (TOPROL-XL) 50 mg 24 hr tablet 1/16/2023  No Yes   Sig: Take 1 tablet (50 mg total) by mouth 2 (two) times a day   sodium bicarbonate 650 mg tablet Unknown  No No   Sig: Take 2 tablets (1,300 mg total) by mouth 3 (three) times daily after meals   warfarin (COUMADIN) 2 mg tablet 1/16/2023  No Yes   Sig: TAKE 1-2 TABLETS BY MOUTH DAILY OR AS DIRECTED BY PHYSICIAN      Facility-Administered Medications: None       Past Medical History:   Diagnosis Date   • Chronic kidney disease    • Colon polyp    • COVID-19    • Diabetes mellitus ([facility] [address] )    • Hyperlipidemia    • Hypertension    • Proteinuria    • [facility]    • Vitamin D deficiency        Past Surgical History:   Procedure Laterality Date   • COLONOSCOPY      overdue   • FL ECHO TRANSESOPHAG R-T 2D W/PRB IMG ACQUISJ I&R N/A 3/7/2019    Procedure: INTRA-OP TRANSESOPHAGEAL ECHOCARDIOGRAM (PATIENCE);   Surgeon: Tolu Newman DO;  [facility];  Service: Cardiac Surgery   • FL NASAL ENDOSCOPY DIAGNOSTIC UNI/BI SPX Bilateral 1/16/2023    Procedure: ENDOSCOPY NOSE BILATERALLY, WITH ENDOSCOPIC CONTROL OF EPISTAXIS ON LEFT;  Surgeon: Azra Pike MD;  Location[facility];  Service: ENT   • FL PERQ CLSR TCAT L ATR APNDGE W/ENDOCARDIAL IMPLNT  3/7/2019    Procedure: LEFT ATRIAL APPENDAGE OCCLUSION CLIPPED with 35mm Hermilo Barthel;  Surgeon: Tolu Newman DO;  [facility];  Service: Cardiac Surgery   • FL REPLACEMENT MITRAL VALVE W/CARDIOPULMONARY BYP N/A 3/7/2019    Procedure: REPLACEMENT VALVE MITRAL (MVR) REPAIR with a 30mm MEDTRONIC CG FUTURE RING;  Surgeon: Tolu Newman DO;  [facility];  Service: Cardiac Surgery   • FL RPLCMT AORTIC VALVE OPN W/STENTLESS TISSUE VALVE N/A 3/7/2019    Procedure: REPLACEMENT VALVE AORTIC (AVR) with 23mm TAVERA INSPIRIS RESILIA  AORTIC VALVE;  Surgeon: Jose Jones DO;  [facility];  Service: Cardiac Surgery   • RENAL BIOPSY, OPEN     • TOE SURGERY Left        Family History   Problem Relation Age of Onset   • Stroke Mother    • Hypertension Mother    • Blindness Father    • Hyperlipidemia Father    • Hypertension Father    • Glaucoma Father    • Cancer Sister    • Diabetes Sister    • Ovarian cancer Sister    • Gout Brother    • Heart Valve Disease Brother    • Hypertension Brother    • Anuerysm Neg Hx    • Heart attack Neg Hx    • Arrhythmia Neg Hx    • Heart failure Neg Hx    • Coronary artery disease Neg Hx    • Sudden death Neg Hx         scd     I have reviewed and agree with the history as documented  E-Cigarette/Vaping   • E-Cigarette Use Never User      E-Cigarette/Vaping Substances     Social History     Tobacco Use   • Smoking status: Former     Years: 2 00     Types: Cigarettes     Quit date:      Years since quittin 0   • Smokeless tobacco: Never   Vaping Use   • Vaping Use: Never used   Substance Use Topics   • Alcohol use: Yes     Comment: occasionally   • Drug use: No        Review of Systems   Constitutional: Negative for activity change and fever  HENT: Positive for nosebleeds  Skin: Positive for wound  Allergic/Immunologic: Negative for immunocompromised state  Neurological: Negative for syncope  Hematological: Bruises/bleeds easily  Psychiatric/Behavioral: Negative for self-injury  All other systems reviewed and are negative        Physical Exam  ED Triage Vitals   Temperature Pulse Respirations Blood Pressure SpO2   23 1814 23 1811 23 1811 23 1811 23 1811   97 5 °F (36 4 °C) (!) 46 16 (!) 176/97 98 %      Temp Source Heart Rate Source Patient Position - Orthostatic VS BP Location FiO2 (%)   23 181 01/16/23 1811 01/16/23 2339 01/16/23 1811 --   Oral Monitor Lying Right arm       Pain Score       01/16/23 2253       No Pain             Orthostatic Vital Signs  Vitals:    01/17/23 0200 01/17/23 0208 01/17/23 0400 01/17/23 0709   BP:  140/100  134/86   Pulse: 89  90 55   Patient Position - Orthostatic VS:           Physical Exam  Vitals and nursing note reviewed  Exam conducted with a chaperone present (Wife at bedside)  Constitutional:       General: He is in acute distress  Appearance: He is normal weight  He is not ill-appearing, toxic-appearing or diaphoretic  HENT:      Head: Normocephalic  Comments: Profuse brisk bright red bleeding from both nostrils  Minimal amount coming through posterior oropharynx  At 1 point did cough a large clot out of his mouth  On inspection of his nose, cannot identify an anterior source of his bleeding  Right Ear: External ear normal       Left Ear: External ear normal       Nose: Nose normal    Eyes:      General: No scleral icterus  Right eye: No discharge  Left eye: No discharge  Pupils: Pupils are equal, round, and reactive to light  Cardiovascular:      Rate and Rhythm: Bradycardia present  Pulses: Normal pulses  Pulmonary:      Effort: Pulmonary effort is normal  No respiratory distress  Breath sounds: No stridor  Abdominal:      General: Abdomen is flat  There is no distension  Hernia: No hernia is present  Musculoskeletal:         General: Normal range of motion  Cervical back: Normal range of motion  Skin:     General: Skin is warm and dry  Coloration: Skin is not jaundiced  Neurological:      General: No focal deficit present  Mental Status: He is alert  Mental status is at baseline     Psychiatric:         Mood and Affect: Mood normal          Behavior: Behavior normal          ED Medications  Medications   tranexamic acid 100mg/mL (for epistaxis) 1,000 mg (1,000 mg Nasal Given by Other 1/16/23 1904)   phenylephrine (RONALD-SYNEPHRINE) 1 % nasal solution 3 drop (3 drops Each Nare Given by Other 1/16/23 1903)       Diagnostic Studies  Results Reviewed     Procedure Component Value Units Date/Time    Protime-INR [[id number]]  (Abnormal) Collected: 01/16/23 1934    Lab Status: Final result Specimen: Blood from Arm, Left Updated: 01/16/23 2004     Protime 25 1 seconds      INR 2 25    APTT [[id number]]  (Abnormal) Collected: 01/16/23 1934    Lab Status: Final result Specimen: Blood from Arm, Left Updated: 01/16/23 2004     PTT 45 seconds     Basic metabolic panel [[id number]]  (Abnormal) Collected: 01/16/23 1904    Lab Status: Final result Specimen: Blood from Arm, Left Updated: 01/16/23 1944     Sodium 138 mmol/L      Potassium 4 9 mmol/L      Chloride 110 mmol/L      CO2 20 mmol/L      ANION GAP 8 mmol/L      BUN 42 mg/dL      Creatinine 2 97 mg/dL      Glucose 111 mg/dL      Calcium 8 5 mg/dL      eGFR 20 ml/min/1 73sq m     Narrative:      [facility] guidelines for Chronic Kidney Disease (CKD):   •  Stage 1 with normal or high GFR (GFR > 90 mL/min/1 73 square meters)  •  Stage 2 Mild CKD (GFR = 60-89 mL/min/1 73 square meters)  •  Stage 3A Moderate CKD (GFR = 45-59 mL/min/1 73 square meters)  •  Stage 3B Moderate CKD (GFR = 30-44 mL/min/1 73 square meters)  •  Stage 4 Severe CKD (GFR = 15-29 mL/min/1 73 square meters)  •  Stage 5 End Stage CKD (GFR <15 mL/min/1 73 square meters)  Note: GFR calculation is accurate only with a steady state creatinine    CBC and differential [[id number]]  (Abnormal) Collected: 01/16/23 1904    Lab Status: Final result Specimen: Blood from Arm, Left Updated: 01/16/23 1910     WBC 10 39 Thousand/uL      RBC 4 41 Million/uL      Hemoglobin 13 5 g/dL      Hematocrit 41 6 %      MCV 94 fL      MCH 30 6 pg      MCHC 32 5 g/dL      RDW 12 7 %      MPV 10 3 fL      Platelets 980 Thousands/uL      nRBC 0 /100 WBCs      Neutrophils Relative 74 % Immat GRANS % 0 %      Lymphocytes Relative 13 %      Monocytes Relative 9 %      Eosinophils Relative 3 %      Basophils Relative 1 %      Neutrophils Absolute 7 79 Thousands/µL      Immature Grans Absolute 0 04 Thousand/uL      Lymphocytes Absolute 1 33 Thousands/µL      Monocytes Absolute 0 91 Thousand/µL      Eosinophils Absolute 0 27 Thousand/µL      Basophils Absolute 0 05 Thousands/µL                  No orders to display         Procedures  Epistaxis management    Date/Time: 1/16/2023 8:01 PM  Performed by: Rand Wadsworth MD  Authorized by: Rand Wadsworth MD   Universal Protocol:  Procedure performed by: Rox Webster)  Consent: Verbal consent obtained  Risks and benefits: risks, benefits and alternatives were discussed  Required items: required blood products, implants, devices, and special equipment available  Patient identity confirmed: verbally with patient      Patient location:  ED  Anesthesia (see MAR for exact dosages): Anesthesia method:  None  Procedure details:     Treatment site:  L anterior and R anterior    Hemostasis method:  Merocel sponge    Approach:  External    Spec Headlamp used: No      Treatment complexity:  Extensive    Treatment episode: initial    Post-procedure details:     Assessment:  Bleeding decreased    Patient tolerance of procedure: Tolerated well, no immediate complications  Comments: Both long and short sponges were placed in both nostrils  This did slow bleeding and decrease blood seen in the posterior oropharynx  Bleeding did not completely stop and continue to have some oozing from both sides of the nose  ED Course  ED Course as of 01/18/23 0035   Mon Jan 16, 2023 1947 Creatinine(!): 2 97  Similar to where he's been  Baseline is 2 6   1947 CO2(!): 20   1947 Hemoglobin: 13 5   2007 POCT INR(!): 2 25                                       Medical Decision Making  Patient presenting with severe bilateral epistaxis    In the setting of recent nasal bone fracture and taking Coumadin  His INR is within therapeutic range  We did multiple attempts to stop the bleeding however was not able to fully stop the bleeding requiring ENT to be involved  ENT arrived and ultimately felt that it was necessary to take him to the OR  Epistaxis: acute illness or injury that poses a threat to life or bodily functions  Open displaced fracture of nasal bone, initial encounter: acute illness or injury  Amount and/or Complexity of Data Reviewed  Independent Historian: spouse  External Data Reviewed: labs, radiology and notes  Details: Reviewed labs, notes, scans from previous admission  Labs: ordered  Decision-making details documented in ED Course  Discussion of management or test interpretation with external provider(s): Called ENT who eventually came and took patient to the OR    Risk  OTC drugs  Prescription drug management  Decision regarding hospitalization  Minor surgery with identified risk factors  Disposition  Final diagnoses:   Epistaxis   Open displaced fracture of nasal bone, initial encounter     Time reflects when diagnosis was documented in both MDM as applicable and the Disposition within this note     Time User Action Codes Description Comment    1/16/2023  7:56 PM Ya Needs Add [R04 0] Epistaxis     1/16/2023  8:11 PM Ya Needs Add [S02  2XXB] Open displaced fracture of nasal bone, initial encounter       ED Disposition     ED Disposition   Admit    Condition   Stable    Date/Time   Mon Jan 16, 2023  8:10 PM    Comment   Case was discussed with Radha Ontiveros and the patient's admission status was agreed to be Admission Status: observation status to the service of Dr Higinio Fabry              Follow-up Information     Follow up With Specialties Details Why Contact Info    Rocio Dempsey MD Family Medicine Schedule an appointment as soon as possible for a visit in 2 week(s)  [address]  [id number] [facility] [address] Colleen Trotter MD Otolaryngology Follow up Call for appointment in 1-2 weeks [address] [facility] [address] [facility]   [phone]            Discharge Medication List as of 1/17/2023 12:23 PM      START taking these medications    Details   amoxicillin (AMOXIL) 500 mg capsule Take 1 capsule (500 mg total) by mouth every 8 (eight) hours for 10 doses, Starting Tue 1/17/2023, Until Sat 1/21/2023, Normal         CONTINUE these medications which have NOT CHANGED    Details   metoprolol succinate (TOPROL-XL) 50 mg 24 hr tablet Take 1 tablet (50 mg total) by mouth 2 (two) times a day, Starting Sat 1/14/2023, No Print      sodium bicarbonate 650 mg tablet Take 2 tablets (1,300 mg total) by mouth 3 (three) times daily after meals, Starting Sat 1/14/2023, Until Mon 2/13/2023, Normal      amLODIPine (NORVASC) 2 5 mg tablet take 1 tablet by mouth once daily, Normal      ascorbic acid (VITAMIN C) 500 mg tablet Take 500 mg by mouth daily, Historical Med      atorvastatin (LIPITOR) 20 mg tablet Take 1 tablet (20 mg total) by mouth daily, Starting Mon 9/12/2022, Normal      calcitriol (ROCALTROL) 0 25 mcg capsule TAKE 1 CAPSULE 5 DAYS A WEEK AS DIRECTED, Normal      Cholecalciferol (VITAMIN D3) 1000 units CAPS Take 1 capsule by mouth daily, Historical Med      COMBIGAN 0 2-0 5 % Historical Med      hydrALAZINE (APRESOLINE) 25 mg tablet take 1 tablet by mouth three times a day, Normal      isosorbide dinitrate (ISORDIL) 10 mg tablet take 1 tablet by mouth three times a day, Normal      RA Aspirin EC 81 MG EC tablet take 1 tablet by mouth once daily, Normal      Tradjenta 5 MG TABS TAKE 1/2 TABLET BY MOUTH DAILY, Normal      TRAVATAN Z 0 004 % ophthalmic solution Administer 1 drop to both eyes daily at bedtime , Starting Mon 2/12/2018, Historical Med      warfarin (COUMADIN) 2 mg tablet TAKE 1-2 TABLETS BY MOUTH DAILY OR AS DIRECTED BY PHYSICIAN, Normal         STOP taking these medications amoxicillin (AMOXIL) 875 mg tablet Comments:   Reason for Stopping:         ALPRAZolam (XANAX) 0 25 mg tablet Comments:   Reason for Stopping:             Outpatient Discharge Orders   Discharge Diet     Activity as tolerated     No strenuous exercise     Call provider for:  persistent nausea or vomiting     Call provider for:  severe uncontrolled pain     Call provider for:  redness, tenderness, or signs of infection (pain, swelling, redness, odor or green/yellow discharge around incision site)     Call provider for: active or persistent bleeding     Call provider for:  hives     Call provider for:  persistent dizziness or light-headedness       PDMP Review       Value Time User    PDMP Reviewed  Yes 2/21/2022  2:01 PM Gwendalyn Hatchet, MD           ED Provider  Attending physically available and evaluated Shawnwaylon Moon HICKEY managed the patient along with the ED Attending      Electronically Signed by         Kathryn Kingsley MD  01/18/23 8872

## 2023-01-17 NOTE — PLAN OF CARE
Problem: PAIN - ADULT  Goal: Verbalizes/displays adequate comfort level or baseline comfort level  Description: Interventions:  - Encourage patient to monitor pain and request assistance  - Assess pain using appropriate pain scale  - Administer analgesics based on type and severity of pain and evaluate response  - Implement non-pharmacological measures as appropriate and evaluate response  - Consider cultural and social influences on pain and pain management  - Notify physician/advanced practitioner if interventions unsuccessful or patient reports new pain  Outcome: Adequate for Discharge     Problem: SAFETY ADULT  Goal: Patient will remain free of falls  Description: INTERVENTIONS:  - Educate patient/family on patient safety including physical limitations  - Instruct patient to call for assistance with activity   - Consult OT/PT to assist with strengthening/mobility   - Keep Call bell within reach  - Keep bed low and locked with side rails adjusted as appropriate  - Keep care items and personal belongings within reach  - Initiate and maintain comfort rounds  - Make Fall Risk Sign visible to staff  - Offer Toileting every  Hours, in advance of need  - Initiate/Maintain alarm  - Obtain necessary fall risk management equipment:   - Apply yellow socks and bracelet for high fall risk patients  - Consider moving patient to room near nurses station  Outcome: Adequate for Discharge  Goal: Maintain or return to baseline ADL function  Description: INTERVENTIONS:  -  Assess patient's ability to carry out ADLs; assess patient's baseline for ADL function and identify physical deficits which impact ability to perform ADLs (bathing, care of mouth/teeth, toileting, grooming, dressing, etc )  - Assess/evaluate cause of self-care deficits   - Assess range of motion  - Assess patient's mobility; develop plan if impaired  - Assess patient's need for assistive devices and provide as appropriate  - Encourage maximum independence but intervene and supervise when necessary  - Involve family in performance of ADLs  - Assess for home care needs following discharge   - Consider OT consult to assist with ADL evaluation and planning for discharge  - Provide patient education as appropriate  Outcome: Adequate for Discharge  Goal: Maintains/Returns to pre admission functional level  Description: INTERVENTIONS:  - Perform BMAT or MOVE assessment daily    - Set and communicate daily mobility goal to care team and patient/family/caregiver  - Collaborate with rehabilitation services on mobility goals if consulted  - Perform Range of Motion  times a day  - Reposition patient every  hours    - Dangle patient  times a day  - Stand patient  times a day  - Ambulate patient  times a day  - Out of bed to chair  times a day   - Out of bed for meals  times a day  - Out of bed for toileting  - Record patient progress and toleration of activity level   Outcome: Adequate for Discharge     Problem: DISCHARGE PLANNING  Goal: Discharge to home or other facility with appropriate resources  Description: INTERVENTIONS:  - Identify barriers to discharge w/patient and caregiver  - Arrange for needed discharge resources and transportation as appropriate  - Identify discharge learning needs (meds, wound care, etc )  - Arrange for interpretive services to assist at discharge as needed  - Refer to Case Management Department for coordinating discharge planning if the patient needs post-hospital services based on physician/advanced practitioner order or complex needs related to functional status, cognitive ability, or social support system  Outcome: Adequate for Discharge     Problem: SKIN/TISSUE INTEGRITY - ADULT  Goal: Skin Integrity remains intact(Skin Breakdown Prevention)  Description: Assess:  -Perform Ron assessment every   -Clean and moisturize skin every   -Inspect skin when repositioning, toileting, and assisting with ADLS  -Assess under medical devices such as  every -Assess extremities for adequate circulation and sensation     Bed Management:  -Have minimal linens on bed & keep smooth, unwrinkled  -Change linens as needed when moist or perspiring  -Avoid sitting or lying in one position for more than  hours while in bed  -Keep HOB at degrees     Toileting:  -Offer bedside commode  -Assess for incontinence every   -Use incontinent care products after each incontinent episode such as     Activity:  -Mobilize patient  times a day  -Encourage activity and walks on unit  -Encourage or provide ROM exercises   -Turn and reposition patient every  Hours  -Use appropriate equipment to lift or move patient in bed  -Instruct/ Assist with weight shifting every  when out of bed in chair  -Consider limitation of chair time  hour intervals    Skin Care:  -Avoid use of baby powder, tape, friction and shearing, hot water or constrictive clothing  -Relieve pressure over bony prominences using   -Do not massage red bony areas    Next Steps:  -Teach patient strategies to minimize risks such as    -Consider consults to  interdisciplinary teams such as   Outcome: Adequate for Discharge  Goal: Pressure injury heals and does not worsen  Description: Interventions:  - Implement low air loss mattress or specialty surface (Criteria met)  - Apply silicone foam dressing  - Instruct/assist with weight shifting every  minutes when in chair   - Limit chair time to  hour intervals  - Use special pressure reducing interventions such as  when in chair   - Apply fecal or urinary incontinence containment device   - Perform passive or active ROM every   - Turn and reposition patient & offload bony prominences every  hours   - Utilize friction reducing device or surface for transfers   - Consider consults to  interdisciplinary teams such as   - Use incontinent care products after each incontinent episode such as   - Consider nutrition services referral as needed  Outcome: Adequate for Discharge     Problem: HEMATOLOGIC - ADULT  Goal: Maintains hematologic stability  Description: INTERVENTIONS  - Assess for signs and symptoms of bleeding or hemorrhage  - Monitor labs  - Administer supportive blood products/factors as ordered and appropriate  Outcome: Adequate for Discharge

## 2023-01-19 NOTE — ED ATTENDING ATTESTATION
1/16/2023  IMargarita, DO, saw and evaluated the patient  I have discussed the patient with the resident/non-physician practitioner and agree with the resident's/non-physician practitioner's findings, Plan of Care, and MDM as documented in the resident's/non-physician practitioner's note, except where noted  All available labs and Radiology studies were reviewed  I was present for key portions of any procedure(s) performed by the resident/non-physician practitioner and I was immediately available to provide assistance  At this point I agree with the current assessment done in the Emergency Department  I have conducted an independent evaluation of this patient a history and physical is as follows:    ED Course   71-year-old male presents emergency department for evaluation of epistaxis  Patient had a fall 3 days ago and sustained an open fracture of the nose  This was closed in the emergency department  He resumed his warfarin  Today he developed brisk bleeding from the left side of the nose that he was able to control briefly however the bleeding recurred and was then bilateral   Patient is coughing up and swallowing blood  He denies fevers or chills  He has been taking Augmentin prophylaxis for his wound  Past medical history: Chronic kidney disease, heart valve replacement, type 2 diabetes  Nasal bone fracture  Physical exam: Patient is in mild distress due to bleeding  Pupils are equal reactive  Neck is supple  Heart is bradycardic, regular  Lungs are clear to auscultation  There is brisk bleeding from both nostrils  Difficult to identify a source of bleeding  There is clot and bloody drainage in the posterior pharynx noted  Airway is intact  Assessment: 71-year-old male presents with brisk bilateral epistaxis    Frontal diagnosis includes but is not limited to acute anterior or posterior epistaxis, bleeding secondary to recent nasal bone fracture, vascular injury due to trauma  Plan: Due to patient's brisk bleeding packing was placed in bilateral naris with satisfactory hemostasis however patient continued to ooze  The decision was made to contact ENT for evaluation who determined that the patient would be best treated in the operating room    Critical Care Time  Procedures

## 2023-01-20 ENCOUNTER — OFFICE VISIT (OUTPATIENT)
Dept: FAMILY MEDICINE CLINIC | Facility: CLINIC | Age: 73
End: 2023-01-20

## 2023-01-20 VITALS
HEART RATE: 45 BPM | BODY MASS INDEX: 23.25 KG/M2 | TEMPERATURE: 97.9 F | WEIGHT: 157 LBS | SYSTOLIC BLOOD PRESSURE: 130 MMHG | OXYGEN SATURATION: 99 % | HEIGHT: 69 IN | RESPIRATION RATE: 16 BRPM | DIASTOLIC BLOOD PRESSURE: 70 MMHG

## 2023-01-20 DIAGNOSIS — S81.812D LACERATION OF LEFT LOWER LEG, SUBSEQUENT ENCOUNTER: ICD-10-CM

## 2023-01-20 DIAGNOSIS — Z11.1 TUBERCULOSIS SCREENING: ICD-10-CM

## 2023-01-20 DIAGNOSIS — Z79.01 ANTICOAGULANT LONG-TERM USE: Primary | ICD-10-CM

## 2023-01-20 DIAGNOSIS — S02.2XXD CLOSED FRACTURE OF NASAL BONE WITH ROUTINE HEALING, SUBSEQUENT ENCOUNTER: ICD-10-CM

## 2023-01-20 DIAGNOSIS — N18.4 CKD (CHRONIC KIDNEY DISEASE), STAGE IV (HCC): ICD-10-CM

## 2023-01-20 PROBLEM — S81.812A LACERATION OF LEFT LOWER LEG: Status: ACTIVE | Noted: 2023-01-20

## 2023-01-20 NOTE — ASSESSMENT & PLAN NOTE
2+ " vertical  Laceration healing second intention with eschar mild redness around edges no drainage  Keep covered and follow up 5-7 days  Stop neosporin  As may be sensitizing skin

## 2023-01-20 NOTE — ASSESSMENT & PLAN NOTE
Lab Results   Component Value Date    EGFR 22 01/17/2023    EGFR 20 01/16/2023    EGFR 23 01/14/2023    CREATININE 2 74 (H) 01/17/2023    CREATININE 2 97 (H) 01/16/2023    CREATININE 2 65 (H) 01/14/2023   nephrology was consulted and  Bmp ordered for follow up

## 2023-01-20 NOTE — PROGRESS NOTES
Name: Jeremias Small      : 1950      MRN: [id number]  Encounter Provider: Faye Goel MD  Encounter Date: 2023   Encounter department: [address] [place] [address]  TCM Call     Date and time call was made  2023 11:36 AM    Hospital care reviewed  Records reviewed    [place]  [facility] [place] [facility]    Date of Admission  23    Date of discharge  23    Diagnosis  Fall from height of greater than 3 feet    Disposition  Home    Were the patients medications reviewed and updated  Yes    Current Symptoms  None      TCM Call     Post hospital issues  None    Should patient be enrolled in anticoag monitoring? No    Scheduled for follow up? Yes    Patients specialists  --  NONE    Did you obtain your prescribed medications  Yes    Do you need help managing your prescriptions or medications  No    Is transportation to your appointment needed  No    I have advised the patient to call PCP with any new or worsening symptoms  ZANA Carroll    Living Arrangements  Spouse or Significiant other    Support System  Children    The type of support provided  Emotional    Do you have social support  Yes, as much as I need    Are you recieving any outpatient services  No    Are you recieving home care services  Yes    Types of home care services  Nurse visit    Are you using any community resources  No    Current waiver services  No    Have you fallen in the last 12 months  Yes    How many times  1    Interperter language line needed  No          Assessment & Plan     1  Anticoagulant long-term use  Assessment & Plan:  No bleeding now; had extensive bruising and small hematoma left hip      2   CKD (chronic kidney disease), stage IV [facility])  Assessment & Plan:  Lab Results   Component Value Date    EGFR 22 2023    EGFR 20 2023    EGFR 23 2023    CREATININE 2 74 (H) 2023    CREATININE 2 97 (H) 2023    CREATININE 2 65 (H) 2023 nephrology was consulted and  Bmp ordered for follow up      3  Closed fracture of nasal bone with routine healing, subsequent encounter  Assessment & Plan:  Reviewed hospital note ent note laceration healing no discharge or redness      4  Laceration of left lower leg, subsequent encounter  Assessment & Plan:  2+ " vertical  Laceration healing second intention with eschar mild redness around edges no drainage  Keep covered and follow up 5-7 days  Stop neosporin  As may be sensitizing skin             Subjective      HPI pt here for TCM discharged 1/17/23 pt had nasal fracture 1/13/23 after fall slipped and fell forward onto ladder;  Pt had  Sutures for nasal laceration  Then had profuse epistaxis on coumadin 1/16 and  was taken to OR by ENT  And bleeding controlled pt back on coumadin pt also hit left anterior shin  And left anterior hip area  Pt had CT with contrast nephrology consulted and pt to have followup bmp as ordered  Pt needs form filled out for kinship care to take care of grandchild  Review of Systems   HENT: Positive for congestion (mild )  Negative for nosebleeds      Skin:        Laceration mid nose ; bruise left anterior hip; laceration left anterior shin         Current Outpatient Medications on File Prior to Visit   Medication Sig   • amLODIPine (NORVASC) 2 5 mg tablet take 1 tablet by mouth once daily   • ascorbic acid (VITAMIN C) 500 mg tablet Take 500 mg by mouth daily   • atorvastatin (LIPITOR) 20 mg tablet Take 1 tablet (20 mg total) by mouth daily   • calcitriol (ROCALTROL) 0 25 mcg capsule TAKE 1 CAPSULE 5 DAYS A WEEK AS DIRECTED   • Cholecalciferol (VITAMIN D3) 1000 units CAPS Take 1 capsule by mouth daily   • COMBIGAN 0 2-0 5 %    • hydrALAZINE (APRESOLINE) 25 mg tablet take 1 tablet by mouth three times a day   • isosorbide dinitrate (ISORDIL) 10 mg tablet take 1 tablet by mouth three times a day   • metoprolol succinate (TOPROL-XL) 50 mg 24 hr tablet Take 1 tablet (50 mg total) by mouth 2 (two) times a day   • RA Aspirin EC 81 MG EC tablet take 1 tablet by mouth once daily   • sodium bicarbonate 650 mg tablet Take 2 tablets (1,300 mg total) by mouth 3 (three) times daily after meals   • Tradjenta 5 MG TABS TAKE 1/2 TABLET BY MOUTH DAILY   • TRAVATAN Z 0 004 % ophthalmic solution Administer 1 drop to both eyes daily at bedtime    • warfarin (COUMADIN) 2 mg tablet TAKE 1-2 TABLETS BY MOUTH DAILY OR AS DIRECTED BY PHYSICIAN       Objective     /70 (BP Location: Left arm, Patient Position: Sitting, Cuff Size: Standard)   Pulse (!) 45   Temp 97 9 °F (36 6 °C) (Tympanic)   Resp 16   Ht 5' 8 5" (1 74 m)   Wt 71 2 kg (157 lb)   SpO2 99%   BMI 23 52 kg/m²     Physical Exam  Constitutional:       General: He is not in acute distress  Appearance: Normal appearance  He is well-developed  He is not ill-appearing  Eyes:      Extraocular Movements: Extraocular movements intact  Neck:      Thyroid: No thyromegaly  Cardiovascular:      Rate and Rhythm: Normal rate  Pulmonary:      Effort: Pulmonary effort is normal  No respiratory distress  Breath sounds: Normal breath sounds  Musculoskeletal:      Cervical back: Normal range of motion  Skin:     Comments: Large ecchymosis left anterior hip  Spans 6" diameter  1 " hematoma at superior anterior edge of bruise; Laceration left anterior shin 2" vertical healing by secondary intention with eschar and mild redness around edges no warmth or drainage    Neurological:      General: No focal deficit present  Mental Status: He is alert and oriented to person, place, and time  Mental status is at baseline        Gait: Gait normal    Psychiatric:         Mood and Affect: Mood normal          Behavior: Behavior normal        Emmy Moon MD

## 2023-01-21 LAB
BASE EXCESS BLDA CALC-SCNC: -9 MMOL/L (ref -2–3)
CA-I BLD-SCNC: 1.17 MMOL/L (ref 1.12–1.32)
GLUCOSE SERPL-MCNC: 100 MG/DL (ref 65–140)
HCO3 BLDA-SCNC: 15.3 MMOL/L (ref 24–30)
HCT VFR BLD CALC: 39 % (ref 36.5–49.3)
HGB BLDA-MCNC: 13.3 G/DL (ref 12–17)
PCO2 BLD: 16 MMOL/L (ref 21–32)
PCO2 BLD: 28.6 MM HG (ref 42–50)
PH BLD: 7.34 [PH] (ref 7.3–7.4)
PO2 BLD: 66 MM HG (ref 35–45)
POTASSIUM BLD-SCNC: 5.3 MMOL/L (ref 3.5–5.3)
SAO2 % BLD FROM PO2: 92 % (ref 60–85)
SODIUM BLD-SCNC: 139 MMOL/L (ref 136–145)
SPECIMEN SOURCE: ABNORMAL

## 2023-01-23 ENCOUNTER — TELEPHONE (OUTPATIENT)
Dept: FAMILY MEDICINE CLINIC | Facility: CLINIC | Age: 73
End: 2023-01-23

## 2023-01-23 DIAGNOSIS — I10 ESSENTIAL HYPERTENSION: Chronic | ICD-10-CM

## 2023-01-23 RX ORDER — METOPROLOL SUCCINATE 50 MG/1
50 TABLET, EXTENDED RELEASE ORAL 2 TIMES DAILY
Qty: 60 TABLET | Refills: 2 | Status: SHIPPED | OUTPATIENT
Start: 2023-01-23

## 2023-01-23 NOTE — TELEPHONE ENCOUNTER
metoprolol succinate (TOPROL-XL) 50 mg 24 hr tablet Take 1 tablet (50 mg total) by mouth 2 (two) times a day     [facility]

## 2023-01-24 ENCOUNTER — CLINICAL SUPPORT (OUTPATIENT)
Dept: CARDIOLOGY CLINIC | Facility: CLINIC | Age: 73
End: 2023-01-24

## 2023-01-24 DIAGNOSIS — I50.22 CHRONIC SYSTOLIC HEART FAILURE (HCC): ICD-10-CM

## 2023-01-24 DIAGNOSIS — I48.3 TYPICAL ATRIAL FLUTTER (HCC): ICD-10-CM

## 2023-01-26 ENCOUNTER — OFFICE VISIT (OUTPATIENT)
Dept: FAMILY MEDICINE CLINIC | Facility: CLINIC | Age: 73
End: 2023-01-26

## 2023-01-26 VITALS
WEIGHT: 160 LBS | HEIGHT: 69 IN | BODY MASS INDEX: 23.7 KG/M2 | OXYGEN SATURATION: 97 % | RESPIRATION RATE: 16 BRPM | DIASTOLIC BLOOD PRESSURE: 68 MMHG | TEMPERATURE: 97 F | HEART RATE: 51 BPM | SYSTOLIC BLOOD PRESSURE: 120 MMHG

## 2023-01-26 DIAGNOSIS — S01.21XD LACERATION OF NOSE, SUBSEQUENT ENCOUNTER: Primary | ICD-10-CM

## 2023-01-26 PROBLEM — S85.142D: Status: ACTIVE | Noted: 2023-01-26

## 2023-01-26 PROBLEM — S01.21XA LACERATION OF NOSE: Status: ACTIVE | Noted: 2023-01-26

## 2023-01-26 LAB
INDURATION: 0 MM
TB SKIN TEST: NEGATIVE

## 2023-01-26 NOTE — PROGRESS NOTES
Name: Zenaida Treviño      : 1950      MRN: [id number]  Encounter Provider: Matt Nichols MD  Encounter Date: 2023   Encounter department: [address] [place] [address] Dr MEDICINE    Assessment & Plan     1   Laceration of nose, subsequent encounter  Assessment & Plan:  4 sutures removed healed well             Subjective      HPI pt had fall with nasal fracture 23 needs 4  sutures removed also needs wound on left anterior shin looked at today   Review of Systems   Skin:        Laceration nose and laceration left anterior shin        Current Outpatient Medications on File Prior to Visit   Medication Sig   • amLODIPine (NORVASC) 2 5 mg tablet take 1 tablet by mouth once daily   • ascorbic acid (VITAMIN C) 500 mg tablet Take 500 mg by mouth daily   • atorvastatin (LIPITOR) 20 mg tablet Take 1 tablet (20 mg total) by mouth daily   • calcitriol (ROCALTROL) 0 25 mcg capsule TAKE 1 CAPSULE 5 DAYS A WEEK AS DIRECTED   • Cholecalciferol (VITAMIN D3) 1000 units CAPS Take 1 capsule by mouth daily   • COMBIGAN 0 2-0 5 %    • hydrALAZINE (APRESOLINE) 25 mg tablet take 1 tablet by mouth three times a day   • isosorbide dinitrate (ISORDIL) 10 mg tablet take 1 tablet by mouth three times a day   • metoprolol succinate (TOPROL-XL) 50 mg 24 hr tablet Take 1 tablet (50 mg total) by mouth 2 (two) times a day   • RA Aspirin EC 81 MG EC tablet take 1 tablet by mouth once daily   • sodium bicarbonate 650 mg tablet Take 2 tablets (1,300 mg total) by mouth 3 (three) times daily after meals   • Tradjenta 5 MG TABS TAKE 1/2 TABLET BY MOUTH DAILY   • TRAVATAN Z 0 004 % ophthalmic solution Administer 1 drop to both eyes daily at bedtime    • warfarin (COUMADIN) 2 mg tablet TAKE 1-2 TABLETS BY MOUTH DAILY OR AS DIRECTED BY PHYSICIAN       Objective     /68 (BP Location: Left arm, Patient Position: Sitting, Cuff Size: Standard)   Pulse (!) 51   Temp (!) 97 °F (36 1 °C) (Tympanic)   Resp 16   Ht 5' 8 5" (1 74 m)   Wt 72 6 kg (160 lb)   SpO2 97%   BMI 23 97 kg/m²     Physical Exam  Skin:     Comments: Left anterior shin healing well eschar in place no drainage and no redness wound now shrunken in size      4 sutures removed  Wound healed  Well   Jose Rodrigues MD

## 2023-01-28 ENCOUNTER — APPOINTMENT (OUTPATIENT)
Dept: LAB | Facility: CLINIC | Age: 73
End: 2023-01-28

## 2023-01-28 DIAGNOSIS — N18.4 CKD (CHRONIC KIDNEY DISEASE), STAGE IV (HCC): ICD-10-CM

## 2023-01-28 DIAGNOSIS — R80.1 PERSISTENT PROTEINURIA: ICD-10-CM

## 2023-01-28 DIAGNOSIS — N18.4 STAGE 4 CHRONIC KIDNEY DISEASE (HCC): ICD-10-CM

## 2023-01-28 DIAGNOSIS — N25.81 SECONDARY HYPERPARATHYROIDISM OF RENAL ORIGIN (HCC): ICD-10-CM

## 2023-01-28 LAB
ANION GAP SERPL CALCULATED.3IONS-SCNC: 4 MMOL/L (ref 4–13)
BUN SERPL-MCNC: 36 MG/DL (ref 5–25)
CALCIUM SERPL-MCNC: 8.3 MG/DL (ref 8.4–10.2)
CHLORIDE SERPL-SCNC: 110 MMOL/L (ref 96–108)
CO2 SERPL-SCNC: 24 MMOL/L (ref 21–32)
CREAT SERPL-MCNC: 3.01 MG/DL (ref 0.6–1.3)
GFR SERPL CREATININE-BSD FRML MDRD: 19 ML/MIN/1.73SQ M
GLUCOSE P FAST SERPL-MCNC: 94 MG/DL (ref 65–99)
INR PPP: 2.1 (ref 0.84–1.19)
POTASSIUM SERPL-SCNC: 5 MMOL/L (ref 3.5–5.3)
PROTHROMBIN TIME: 23.8 SECONDS (ref 11.6–14.5)
SODIUM SERPL-SCNC: 138 MMOL/L (ref 135–147)

## 2023-01-30 ENCOUNTER — ANTICOAG VISIT (OUTPATIENT)
Dept: CARDIOLOGY CLINIC | Facility: CLINIC | Age: 73
End: 2023-01-30

## 2023-02-03 ENCOUNTER — TELEPHONE (OUTPATIENT)
Dept: NEPHROLOGY | Facility: CLINIC | Age: 73
End: 2023-02-03

## 2023-02-03 ENCOUNTER — CONSULT (OUTPATIENT)
Dept: ENDOCRINOLOGY | Facility: CLINIC | Age: 73
End: 2023-02-03

## 2023-02-03 VITALS
WEIGHT: 159 LBS | BODY MASS INDEX: 23.55 KG/M2 | SYSTOLIC BLOOD PRESSURE: 120 MMHG | OXYGEN SATURATION: 98 % | TEMPERATURE: 96.8 F | HEART RATE: 71 BPM | DIASTOLIC BLOOD PRESSURE: 82 MMHG | HEIGHT: 69 IN

## 2023-02-03 DIAGNOSIS — E83.9 CHRONIC KIDNEY DISEASE-MINERAL AND BONE DISORDER: ICD-10-CM

## 2023-02-03 DIAGNOSIS — N18.9 CHRONIC KIDNEY DISEASE-MINERAL AND BONE DISORDER: ICD-10-CM

## 2023-02-03 DIAGNOSIS — E78.5 DYSLIPIDEMIA: ICD-10-CM

## 2023-02-03 DIAGNOSIS — M89.9 CHRONIC KIDNEY DISEASE-MINERAL AND BONE DISORDER: ICD-10-CM

## 2023-02-03 DIAGNOSIS — E11.9 DIABETES MELLITUS TYPE 2 IN NONOBESE (HCC): Chronic | ICD-10-CM

## 2023-02-03 DIAGNOSIS — E11.9 TYPE 2 DIABETES MELLITUS WITHOUT COMPLICATION, WITHOUT LONG-TERM CURRENT USE OF INSULIN (HCC): ICD-10-CM

## 2023-02-03 DIAGNOSIS — E55.9 VITAMIN D DEFICIENCY: ICD-10-CM

## 2023-02-03 DIAGNOSIS — E11.65 TYPE 2 DIABETES MELLITUS WITH HYPERGLYCEMIA, WITHOUT LONG-TERM CURRENT USE OF INSULIN (HCC): Primary | ICD-10-CM

## 2023-02-03 RX ORDER — LINAGLIPTIN 5 MG/1
2.5 TABLET, FILM COATED ORAL DAILY
Qty: 30 TABLET | Refills: 0 | Status: SHIPPED | OUTPATIENT
Start: 2023-02-03

## 2023-02-03 NOTE — PROGRESS NOTES
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, CKD MBD, bicuspid aortic valve s/p replacement, HFrEF, CAD and vitamin D deficiency  He was previously seen by Dr Denilson Saeed and then Dr Sunitha Granado  Was on metformin initially  Bebeto Omid was started about 4 years ago  Home blood glucose monitoring: checks 1-2/day  Fasting in 80-100mg/dL and pp about 120-140mg/dL  Hypoglycemia: No    Current meds:  Tradjenta 2 5 mg daily    Opthamology: Yes  Podiatry:   vaccination: Yes  Dental:  Pancreatitis: No    Ace/ARB: No   On hydralazine and isosorbide dinitrate    Statin: Lipitor  Thyroid issues: No    Patient Active Problem List   Diagnosis   • Persistent proteinuria   • Diabetic nephropathy associated with type 2 diabetes mellitus ([facility] )   • Vitamin D deficiency   • Secondary hyperparathyroidism of renal origin ([facility] )   • FSGS (focal segmental glomerulosclerosis)   • Essential hypertension   • Chronic systolic heart failure (HCC)   • Nonrheumatic aortic valve insufficiency   • Non-rheumatic mitral regurgitation   • Bicuspid aortic valve   • Coronary artery disease involving native coronary artery of native heart without angina pectoris   • S/P AVR   • S/P MVR (mitral valve repair)   • Dilated cardiomyopathy (HCC)   • Diabetes mellitus type 2 in nonobese (HCC)   • Atrial flutter (HCC)   • Encounter for postoperative care   • CKD (chronic kidney disease), stage IV (HCC)   • History of colon polyps   • Anticoagulant long-term use   • Close exposure to COVID-19 virus   • COVID-19 virus infection   • Dyslipidemia   • Bradycardia   • Fall from height of greater than 3 feet   • Nasal fracture   • Metabolic acidosis   • H/O atrial flutter   • Epistaxis   • Laceration of left lower leg   • Laceration of anterior tibial artery, left leg, subsequent encounter   • Laceration of nose     Past Medical History:   Diagnosis Date   • Chronic kidney disease    • Colon polyp    • COVID-19    • Diabetes mellitus ([facility] )    • Hyperlipidemia    • Hypertension    • Proteinuria    • Stage 3 chronic kidney disease ([facility] )    • Vitamin D deficiency       Past Surgical History:   Procedure Laterality Date   • COLONOSCOPY      overdue   • [place] ECHO TRANSESOPHAG R-T 2D W/PRB IMG ACQUISJ I&R N/A 3/7/2019    Procedure: INTRA-OP TRANSESOPHAGEAL ECHOCARDIOGRAM (PATIENCE);   Surgeon: Suzy Dejesus DO;  Location: [facility];  Service: Cardiac Surgery   • [place] NASAL ENDOSCOPY DIAGNOSTIC UNI/BI SPX Bilateral 1/16/2023    Procedure: ENDOSCOPY NOSE BILATERALLY, WITH ENDOSCOPIC CONTROL OF EPISTAXIS ON LEFT;  Surgeon: Kwaku Shine MD;  Location: [facility];  Service: ENT   • [place] PERQ CLSR TCAT L R [address] IMPLNT  3/7/2019    Procedure: LEFT ATRIAL APPENDAGE OCCLUSION CLIPPED with 35mm Gwenetta Hammans;  Surgeon: Suzy Dejesus DO;  [facility];  Service: Cardiac Surgery   • [place] REPLACEMENT MITRAL VALVE W/CARDIOPULMONARY BYP N/A 3/7/2019    Procedure: REPLACEMENT VALVE MITRAL (MVR) REPAIR with a 30mm MEDTRONIC CG FUTURE RING;  Surgeon: Suzy Dejesus DO;  [facility];  Service: Cardiac Surgery   • [place] RPLCMT AORTIC VALVE OPN W/STENTLESS TISSUE VALVE N/A 3/7/2019    Procedure: REPLACEMENT VALVE AORTIC (AVR) with 23mm TAVERA INSPIRIS RESILIA  AORTIC VALVE;  Surgeon: Suzy Dejesus DO;  [facility]: [facility];  Service: Cardiac Surgery   • RENAL BIOPSY, OPEN     • TOE SURGERY Left       Family History   Problem Relation Age of Onset   • Stroke Mother    • Hypertension Mother    • Blindness Father    • Hyperlipidemia Father    • Hypertension Father    • Glaucoma Father    • Cancer Sister    • Diabetes Sister    • Ovarian cancer Sister    • Gout Brother    • Heart Valve Disease Brother    • Hypertension Brother    • Anuerysm Neg Hx    • Heart attack Neg Hx    • Arrhythmia Neg Hx    • Heart failure Neg Hx    • Coronary artery disease Neg Hx    • Sudden death Neg Hx         scd     Social History     Tobacco Use   • Smoking status: Former     Years: 2 00     Types: Cigarettes     Quit date:      Years since quittin 1   • Smokeless tobacco: Never   Substance Use Topics   • Alcohol use: Yes     Comment: occasionally     Allergies   Allergen Reactions   • Ace Inhibitors Cough   • Keflex [Cephalexin] Rash         Current Outpatient Medications:   •  amLODIPine (NORVASC) 2 5 mg tablet, take 1 tablet by mouth once daily, Disp: 90 tablet, Rfl: 3  •  ascorbic acid (VITAMIN C) 500 mg tablet, Take 500 mg by mouth daily, Disp: , Rfl:   •  atorvastatin (LIPITOR) 20 mg tablet, Take 1 tablet (20 mg total) by mouth daily, Disp: 90 tablet, Rfl: 2  •  calcitriol (ROCALTROL) 0 25 mcg capsule, TAKE 1 CAPSULE 5 DAYS A WEEK AS DIRECTED, Disp: 60 capsule, Rfl: 3  •  Cholecalciferol (VITAMIN D3) 1000 units CAPS, Take 1 capsule by mouth daily, Disp: , Rfl:   •  COMBIGAN 0 2-0 5 %, , Disp: , Rfl: 0  •  hydrALAZINE (APRESOLINE) 25 mg tablet, take 1 tablet by mouth three times a day, Disp: 270 tablet, Rfl: 3  •  isosorbide dinitrate (ISORDIL) 10 mg tablet, take 1 tablet by mouth three times a day, Disp: 270 tablet, Rfl: 3  •  metoprolol succinate (TOPROL-XL) 50 mg 24 hr tablet, Take 1 tablet (50 mg total) by mouth 2 (two) times a day, Disp: 60 tablet, Rfl: 2  •  RA Aspirin EC 81 MG EC tablet, take 1 tablet by mouth once daily, Disp: 90 tablet, Rfl: 6  •  sodium bicarbonate 650 mg tablet, Take 2 tablets (1,300 mg total) by mouth 3 (three) times daily after meals, Disp: 180 tablet, Rfl: 0  •  Tradjenta 5 MG TABS, TAKE 1/2 TABLET BY MOUTH DAILY, Disp: 30 tablet, Rfl: 0  •  TRAVATAN Z 0 004 % ophthalmic solution, Administer 1 drop to both eyes daily at bedtime , Disp: , Rfl: 0  •  warfarin (COUMADIN) 2 mg tablet, TAKE 1-2 TABLETS BY MOUTH DAILY OR AS DIRECTED BY PHYSICIAN, Disp: 75 tablet, Rfl: 11    Review of Systems   Constitutional: Positive for activity change, appetite change and fatigue  HENT: Negative  Eyes: Negative  Respiratory: Negative  Cardiovascular: Negative for chest pain  Gastrointestinal: Negative  Endocrine: Negative  Genitourinary: Negative  Musculoskeletal: Negative  Skin: Negative  Allergic/Immunologic: Negative  Neurological: Negative  Hematological: Negative  Psychiatric/Behavioral: Negative  All other systems reviewed and are negative  Physical Exam:  Body mass index is 23 82 kg/m²  /82 (BP Location: Right arm, Patient Position: Sitting, Cuff Size: Large)   Pulse 71 Comment: 41 on left finger  Temp (!) 96 8 °F (36 °C) (Tympanic)   Ht 5' 8 5" (1 74 m)   Wt 72 1 kg (159 lb)   SpO2 98%   BMI 23 82 kg/m²    Vitals:    02/03/23 0848   Weight: 72 1 kg (159 lb)        Physical Exam  Constitutional:       Appearance: He is well-developed  HENT:      Head: Normocephalic  Eyes:      Pupils: Pupils are equal, round, and reactive to light  Neck:      Thyroid: No thyromegaly  Cardiovascular:      Rate and Rhythm: Normal rate  Heart sounds: Normal heart sounds  Pulmonary:      Effort: Pulmonary effort is normal       Breath sounds: Normal breath sounds  Abdominal:      General: Bowel sounds are normal       Palpations: Abdomen is soft  Musculoskeletal:         General: No deformity  Cervical back: Normal range of motion  Skin:     Capillary Refill: Capillary refill takes less than 2 seconds  Coloration: Skin is not pale  Findings: No rash  Neurological:      Mental Status: He is alert and oriented to person, place, and time           Labs:   Lab Results   Component Value Date    HGBA1C 5 8 (H) 06/08/2021       Lab Results   Component Value Date    [id number] 1 429 01/08/2021       Lab Results   Component Value Date    CREATININE 3 01 (H) 01/28/2023    CREATININE 2 74 (H) 01/17/2023    CREATININE 2 97 (H) 01/16/2023    BUN 36 (H) 01/28/2023    K 5 0 01/28/2023  (H) 01/28/2023    CO2 24 01/28/2023     eGFR   Date Value Ref Range Status   01/28/2023 19 ml/min/1 73sq m Final       Lab Results   Component Value Date    ALT 13 01/13/2023    AST 21 01/13/2023    ALKPHOS 69 01/13/2023       Lab Results   Component Value Date    CHOLESTEROL 117 10/20/2022    CHOLESTEROL 105 06/08/2021    CHOLESTEROL 108 10/14/2020     Lab Results   Component Value Date    HDL 48 10/20/2022    HDL 37 (L) 06/08/2021    HDL 49 10/14/2020     Lab Results   Component Value Date    TRIG 77 10/20/2022    TRIG 128 06/08/2021    TRIG 77 10/14/2020     Lab Results   Component Value Date    NONHDLC 68 06/08/2021    [facility] 59 10/14/2020    [facility] 81 05/08/2020         Impression:  1  Type 2 diabetes mellitus with hyperglycemia, without long-term current use of insulin ([facility] )    2  Diabetes mellitus type 2 in nonobese ([facility])    3  Vitamin D deficiency    4  Dyslipidemia    5  Chronic kidney disease-mineral and bone disorder         Plan:    Diagnoses and all orders for this visit:    Type 2 diabetes mellitus with hyperglycemia, without long-term current use of insulin ([facility] [address] )  He seems controlled with an A1c of 5 8% but this would be inaccurate given CKD 4  Today we discussed all aspects of diabetes including pathophysiology, risk factors, complications, SAGM, CGM, diet, lifestyle modifications, medical fitness training, diabetes education, goals of therapy, follow up needs and medications including insulin and GLP1 agonists  Advised to maintain goal blood sugars 70-180mg/dL  He seems to be in great control of his glycemia at this time and we agreed to continue Tradjenta 2 5 mg daily  Advised to use a personal sonia 2 CGM for 2 weeks prior to his next visit to review data  In future, it is reasonable to stop all medications and monitor with only diet and lifestyle  Follow-up in 3 months    -     Hemoglobin A1C; Future  -     Fructosamine;  Future    Diabetes mellitus type 2 in nonobese [facility])  -     Ambulatory Referral to Endocrinology    Vitamin D deficiency  On replacement with vitamin D3 and calcitriol  Dyslipidemia    Chronic kidney disease-mineral and bone disorder  He has a mildly elevated PTH associated with a normal corrected calcium and normal ALT  He is on calcitriol and is under follow-up by nephrology  He is being considered for renal transplant  I have spent 50 minutes with patient today in which greater than 50% of this time was spent in counseling/coordination of care  Discussed with the patient and all questioned fully answered  He will call me if any problems arise  Educated/ Counseled patient on diagnostic test results, prognosis, risk vs benefit of treatment options, importance of treatment compliance, healthy life and lifestyle choices        [address]

## 2023-02-24 LAB
LEFT EYE DIABETIC RETINOPATHY: NORMAL
RIGHT EYE DIABETIC RETINOPATHY: NORMAL

## 2023-02-24 PROCEDURE — 2023F DILAT RTA XM W/O RTNOPTHY: CPT | Performed by: INTERNAL MEDICINE

## 2023-03-01 ENCOUNTER — OFFICE VISIT (OUTPATIENT)
Dept: NEPHROLOGY | Facility: CLINIC | Age: 73
End: 2023-03-01

## 2023-03-01 VITALS
HEIGHT: 69 IN | SYSTOLIC BLOOD PRESSURE: 120 MMHG | BODY MASS INDEX: 22.96 KG/M2 | DIASTOLIC BLOOD PRESSURE: 70 MMHG | WEIGHT: 155 LBS

## 2023-03-01 DIAGNOSIS — N18.4 BENIGN HYPERTENSION WITH CHRONIC KIDNEY DISEASE, STAGE IV (HCC): ICD-10-CM

## 2023-03-01 DIAGNOSIS — N18.9 CHRONIC KIDNEY DISEASE-MINERAL AND BONE DISORDER: ICD-10-CM

## 2023-03-01 DIAGNOSIS — Z95.2 S/P AVR: ICD-10-CM

## 2023-03-01 DIAGNOSIS — I12.9 BENIGN HYPERTENSION WITH CHRONIC KIDNEY DISEASE, STAGE IV (HCC): ICD-10-CM

## 2023-03-01 DIAGNOSIS — E87.20 METABOLIC ACIDOSIS: ICD-10-CM

## 2023-03-01 DIAGNOSIS — E83.9 CHRONIC KIDNEY DISEASE-MINERAL AND BONE DISORDER: ICD-10-CM

## 2023-03-01 DIAGNOSIS — I50.22 CHRONIC SYSTOLIC HEART FAILURE (HCC): ICD-10-CM

## 2023-03-01 DIAGNOSIS — N05.1 FSGS (FOCAL SEGMENTAL GLOMERULOSCLEROSIS): ICD-10-CM

## 2023-03-01 DIAGNOSIS — N18.4 ANEMIA DUE TO STAGE 4 CHRONIC KIDNEY DISEASE (HCC): ICD-10-CM

## 2023-03-01 DIAGNOSIS — N18.4 CKD (CHRONIC KIDNEY DISEASE), STAGE IV (HCC): Primary | ICD-10-CM

## 2023-03-01 DIAGNOSIS — D63.1 ANEMIA DUE TO STAGE 4 CHRONIC KIDNEY DISEASE (HCC): ICD-10-CM

## 2023-03-01 DIAGNOSIS — M89.9 CHRONIC KIDNEY DISEASE-MINERAL AND BONE DISORDER: ICD-10-CM

## 2023-03-01 DIAGNOSIS — E87.5 HYPERKALEMIA: ICD-10-CM

## 2023-03-01 DIAGNOSIS — N25.81 SECONDARY HYPERPARATHYROIDISM (HCC): Chronic | ICD-10-CM

## 2023-03-01 DIAGNOSIS — I10 ESSENTIAL HYPERTENSION: Chronic | ICD-10-CM

## 2023-03-01 DIAGNOSIS — D62 ANEMIA DUE TO ACUTE BLOOD LOSS: ICD-10-CM

## 2023-03-01 RX ORDER — SODIUM BICARBONATE 650 MG/1
1300 TABLET ORAL 3 TIMES DAILY
Qty: 180 TABLET | Refills: 1 | Status: SHIPPED | OUTPATIENT
Start: 2023-03-01

## 2023-03-01 RX ORDER — HYDRALAZINE HYDROCHLORIDE 25 MG/1
25 TABLET, FILM COATED ORAL 3 TIMES DAILY
Qty: 270 TABLET | Refills: 3 | Status: SHIPPED | OUTPATIENT
Start: 2023-03-01

## 2023-03-01 RX ORDER — CALCITRIOL 0.25 UG/1
0.25 CAPSULE, LIQUID FILLED ORAL DAILY
Qty: 90 CAPSULE | Refills: 3 | Status: SHIPPED | OUTPATIENT
Start: 2023-03-01

## 2023-03-01 RX ORDER — AMLODIPINE BESYLATE 2.5 MG/1
2.5 TABLET ORAL DAILY
Qty: 90 TABLET | Refills: 3 | Status: SHIPPED | OUTPATIENT
Start: 2023-03-01

## 2023-03-01 RX ORDER — SODIUM BICARBONATE 650 MG/1
1300 TABLET ORAL 3 TIMES DAILY
COMMUNITY
End: 2023-03-01 | Stop reason: SDUPTHER

## 2023-03-01 NOTE — PROGRESS NOTES
GLOMERULAR DISEASE OUTPATIENT PROGRESS NOTE   Sherif Hemp 71 y o  male MRN: [id number]  DATE: 3/2/2023    Reason for visit:   Chief Complaint   Patient presents with   • Follow-up   • CKD IV       ASSESSMENT and PLAN:  81 yo man with PMH DM, FSGS (biopsy proven 2018), CKD G4A3 (bl creat 3-3 5mg/dL, eGFR 21-24)  Patient is here for follow-up of FSGS     PLAN:     #Podocytopathy with features of secondary FSGS  • Time of diagnosis:4/18/2018  • Biopsy date/brief summary:  ? FSGS with glomerulomegaly with secondary pattern with 15% of podocyte effacement  ? Diffuse diabetic glomerulosclerosis (mild)  ? 25% IFTA  ? Arterio-arteriolosclerosis with hyalinosis, moderate-severe   • Etiology: Likely secondary to obesity  • Lost ~15lbs (asper patient, previous notes mentioned 35lbs) after diagnosis (posible cause of FSGS)  • Genetic Testing: Not indicated at this time  • Current creatinine: 3 01 mg/dL, baseline   • UPCr 2 58 mg/g  ? Not taking ACE inhibitors or ARB due to hyperkalemia  ? eGFR low for DAPA   • Serum albumin 3 3      #Immunosuppressive Medications  • No IS indicated at this time     #CKD G4A3     • Baseline creatinine:3-3 5mg/dL  • Current creatinine: 3 01 mg/dL, baseline  • Etiology:  Secondary to FSGS and Diabetic glomerulopathy as above  • UPCr:2 58g/g  • Not taking ACE inhibitors, ARB due to tendency to hyperkalemia   • Avoid nonsteroidal anti-inflammatory drugs such as Naprosyn, ibuprofen, Aleve, Advil, Celebrex, Meloxicam (Mobic) etc   You can use Tylenol as needed if you do not have any liver condition to be concerned about  • Referred to transplant and CKD class   • Referred and seen for transplant eval and education   • Patient prefers to do peritoneal dialysis when dialysis is required  • Declined referral to surgery at this time    Patient prefers to wait till he gets close dialysis     #Volume/Hypertension:  • Volume: Remains euvolemic  • Blood pressure: Sean normotensive, /70 mmhg , goal <140/90mmHg   • Low sodium diet   • Continue with  • Torsemide 20mg as needed   • Metoprolol 50 mg daily  • Hydralazin 25mg tid   • Amlodipine 2 5mg daily   · Advised to maintain a good BP control to prevent progression of CKD        #Hyperkalemia (resolved)  • Resolved  • And able to take ACE inhibitors or ARB       #Acid base disorder  • Serum bicarb 24mmol/L  • Previously acidotic  • Started on sodium bicarb 1300 mg 3 times daily      #CKD-MBD  • Calcium 8 3 mg/dL  • Phosphorus 3 7mg/dL  (goal <5 5)  • At goal     #Secondary Hyperparathyroidism   • iPTH 104 6, guidelines levels KDIGO 2017  • 25OH vit D35 6 at goal , goal >30     • Continue calcitriol 0 25mcg daily     #Acute on chronic anemia:  • Current hemoglobin 11 7 at goal   • Can Roverto to severe nosebleeding after work accident that required surgical intervention  • No indication of MERRILL at this time  We will repeat CBC and iron panel     #MVR, AVR  • On coumadin     #CHF  · Euvolemic  · Continue torsemide and low-sodium diet     PREVIOUS BIOPSIES     4/18/2018  [facility], read by Dr Leila Fleischer     21 gloms, 7 globally sclerosed  Glomerulomegaly, mild mesangial hypercellularity  3/14 gloms focal segmental sclerosis, adhesions to [place] Capsule  25%IFTA  Arteriosclerosis with hyalinosis  And moderate to severe arteriolosclerosis  IF: IgG  EM:Segmental thickening of GBM, 15% foot process effacement, no deposits  Dx:  FSGS with glomerulomegaly  Mild diffuse diabetic glomerulosclerosis  25% IFTA  Arterio-arterioloscleoris with hyalinosis moderate to severe    SUBJECTIVE / HPI:  Patient is here for follow-up of CKD  Patient had a work accident he fell from a ladder and the ladder hit his forehead and nose  He had to go to the ED due to nose bleeding that became very severe and underwent surgical intervention    Denies shortness of breath, no chest pain    REVIEW OF SYSTEMS:  More than 10 point review of systems were obtained and discussed in length with the patient  Complete review of systems were negative / unremarkable except mentioned above  Review of Systems - Psychological ROS: negative  Ophthalmic ROS: negative  ENT ROS: negative  Hematological and Lymphatic ROS: negative  Endocrine ROS: negative  Respiratory ROS: no cough, shortness of breath, or wheezing  Cardiovascular ROS: no chest pain or dyspnea on exertion  Gastrointestinal ROS: no abdominal pain, change in bowel habits, or black or bloody stools  Genito-Urinary ROS: no dysuria, trouble voiding, or hematuria  Musculoskeletal ROS: negative  Neurological ROS: no TIA or stroke symptoms  Dermatological ROS: negative       PHYSICAL EXAM:  Vitals:    03/01/23 1130   BP: 120/70   Weight: 70 3 kg (155 lb)   Height: 5' 8 5" (1 74 m)     Body mass index is 23 22 kg/m²  Physical Exam   General:  no acute distress at this time  Skin:  No acute rash  Eyes:  No scleral icterus and noninjected  ENT:  mucous membranes moist  Neck:  no carotid bruits  Chest:  Clear to auscultation percussion, good respiratory effort, no use of accessory respiratory muscles  CVS:  Regular rate and rhythm without a rub   Abdomen:  soft and nontender   Extremities:   no significant lower extremity edema  Neuro:  No gross focality  Psych:  Alert , cooperative       PAST MEDICAL HISTORY:  Past Medical History:   Diagnosis Date   • Chronic kidney disease    • Colon polyp    • COVID-19    • Diabetes mellitus ([address] )    • Hyperlipidemia    • Hypertension    • Proteinuria    • Stage 3 chronic kidney disease (HCC)    • Vitamin D deficiency        PAST SURGICAL HISTORY:  Past Surgical History:   Procedure Laterality Date   • COLONOSCOPY      overdue   • [place] ECHO TRANSESOPHAG R-T 2D W/PRB IMG ACQUIS I&R N/A 3/7/2019    Procedure: INTRA-OP TRANSESOPHAGEAL ECHOCARDIOGRAM (PATIENCE);   Surgeon: Pacheco Mac DO;  [facility];  Service: Cardiac Surgery   • NJ NASAL ENDOSCOPY DIAGNOSTIC UNI/BI SPX Bilateral 1/16/2023    Procedure: ENDOSCOPY NOSE BILATERALLY, WITH ENDOSCOPIC CONTROL OF EPISTAXIS ON LEFT;  Surgeon: Lb Henderson MD;  [facility];  Service: ENT   • PA PERQ CLSR TCAT L ATR APNDGE W/ENDOCARDIAL IMPLNT  3/7/2019    Procedure: LEFT ATRIAL APPENDAGE OCCLUSION CLIPPED with 35mm Margoth Kenji;  Surgeon: Luh Iglesias DO;  [facility];  Service: Cardiac Surgery   • PA REPLACEMENT MITRAL VALVE W/CARDIOPULMONARY BYP N/A 3/7/2019    Procedure: REPLACEMENT VALVE MITRAL (MVR) REPAIR with a 30mm MEDTRONIC CG FUTURE RING;  Surgeon: Luh Iglesias DO;  [facility];  Service: Cardiac Surgery   • PA RPLCMT AORTIC VALVE OPN W/STENTLESS TISSUE VALVE N/A 3/7/2019    Procedure: REPLACEMENT VALVE AORTIC (AVR) with 23mm TAVERA INSPIRIS RESILIA  AORTIC VALVE;  Surgeon: Luh Iglesias DO;  [facility];  Service: Cardiac Surgery   • RENAL BIOPSY, OPEN     • TOE SURGERY Left        SOCIAL HISTORY:  Social History     Substance and Sexual Activity   Alcohol Use Yes    Comment: occasionally     Social History     Substance and Sexual Activity   Drug Use No     Social History     Tobacco Use   Smoking Status Former   • Years: 2 00   • Types: Cigarettes   • Quit date:    • Years since quittin 2   Smokeless Tobacco Never       FAMILY HISTORY:  Family History   Problem Relation Age of Onset   • Stroke Mother    • Hypertension Mother    • Blindness Father    • Hyperlipidemia Father    • Hypertension Father    • Glaucoma Father    • Cancer Sister    • Diabetes Sister    • Ovarian cancer Sister    • Gout Brother    • Heart Valve Disease Brother    • Hypertension Brother    • Anuerysm Neg Hx    • Heart attack Neg Hx    • Arrhythmia Neg Hx    • Heart failure Neg Hx    • Coronary artery disease Neg Hx    • Sudden death Neg Hx         scd       MEDICATIONS:    Current Outpatient Medications:   •  amLODIPine (NORVASC) 2 5 mg tablet, Take 1 tablet (2 5 mg total) by mouth daily, Disp: 90 tablet, Rfl: 3  •  ascorbic acid (VITAMIN C) 500 mg tablet, Take 500 mg by mouth daily, Disp: , Rfl:   •  atorvastatin (LIPITOR) 20 mg tablet, Take 1 tablet (20 mg total) by mouth daily, Disp: 90 tablet, Rfl: 2  •  calcitriol (ROCALTROL) 0 25 mcg capsule, Take 1 capsule (0 25 mcg total) by mouth daily TAKE 1 CAPSULE 5 DAYS A WEEK AS DIRECTED, Disp: 90 capsule, Rfl: 3  •  Cholecalciferol (VITAMIN D3) 1000 units CAPS, Take 1 capsule by mouth daily, Disp: , Rfl:   •  COMBIGAN 0 2-0 5 %, , Disp: , Rfl: 0  •  hydrALAZINE (APRESOLINE) 25 mg tablet, Take 1 tablet (25 mg total) by mouth 3 (three) times a day, Disp: 270 tablet, Rfl: 3  •  isosorbide dinitrate (ISORDIL) 10 mg tablet, take 1 tablet by mouth three times a day, Disp: 270 tablet, Rfl: 3  •  metoprolol succinate (TOPROL-XL) 50 mg 24 hr tablet, Take 1 tablet (50 mg total) by mouth 2 (two) times a day (Patient taking differently: Take 50 mg by mouth daily), Disp: 60 tablet, Rfl: 2  •  RA Aspirin EC 81 MG EC tablet, take 1 tablet by mouth once daily, Disp: 90 tablet, Rfl: 6  •  sodium bicarbonate 650 mg tablet, Take 2 tablets (1,300 mg total) by mouth 3 (three) times a day, Disp: 180 tablet, Rfl: 1  •  Tradjenta 5 MG TABS, Take 2 5 mg by mouth daily, Disp: 30 tablet, Rfl: 0  •  TRAVATAN Z 0 004 % ophthalmic solution, Administer 1 drop to both eyes daily at bedtime , Disp: , Rfl: 0  •  warfarin (COUMADIN) 2 mg tablet, TAKE 1-2 TABLETS BY MOUTH DAILY OR AS DIRECTED BY PHYSICIAN, Disp: 75 tablet, Rfl: 11    Lab Results:   Results for orders placed or performed in visit on 02/24/23    Diabetes Eye Exam   Result Value Ref Range    Right Eye Diabetic Retinopathy None     Left Eye Diabetic Retinopathy None

## 2023-03-01 NOTE — PATIENT INSTRUCTIONS
Thank you for coming to your visit today  As we discussed you kidney function is abnormal but stable at your baseline your creatinine is 3 mg/dL your electrolytes are normal please follow the recommendations below       Recommend low sodium (salt) food    Avoid nonsteroidal anti-inflammatory drugs such as Naprosyn, ibuprofen, Aleve, Advil, Celebrex, Meloxicam (Mobic) etc   You can use Tylenol as needed if you do not have any liver condition to be concerned about    Try to avoid medications such as pantoprazole or  Protonix/Nexium or Esomeprazole)/Prilosec or omeprazole/Prevacid or lansoprazole/AcipHex or Rabeprazole  If you are able to, use Pepcid as this is safer for your kidneys      Try to exercise at least 30 minutes 3 days a week to begin with with an ultimate goal of 5 days a week for at least 30 minutes      Next Visit in 4 months with results   If you need to see us earlier we can change the appointment for you      Nikki Kenyon MD  Nephrology Attending

## 2023-03-02 PROBLEM — E83.9 CHRONIC KIDNEY DISEASE-MINERAL AND BONE DISORDER: Status: ACTIVE | Noted: 2023-03-02

## 2023-03-02 PROBLEM — E87.5 HYPERKALEMIA: Status: ACTIVE | Noted: 2023-03-02

## 2023-03-02 PROBLEM — N18.9 CHRONIC KIDNEY DISEASE-MINERAL AND BONE DISORDER: Status: ACTIVE | Noted: 2023-03-02

## 2023-03-02 PROBLEM — N18.4 ANEMIA DUE TO STAGE 4 CHRONIC KIDNEY DISEASE (HCC): Status: ACTIVE | Noted: 2023-03-02

## 2023-03-02 PROBLEM — I12.9 BENIGN HYPERTENSION WITH CHRONIC KIDNEY DISEASE, STAGE IV (HCC): Status: ACTIVE | Noted: 2023-03-02

## 2023-03-02 PROBLEM — D63.1 ANEMIA DUE TO STAGE 4 CHRONIC KIDNEY DISEASE (HCC): Status: ACTIVE | Noted: 2023-03-02

## 2023-03-02 PROBLEM — N18.4 BENIGN HYPERTENSION WITH CHRONIC KIDNEY DISEASE, STAGE IV (HCC): Status: ACTIVE | Noted: 2023-03-02

## 2023-03-02 PROBLEM — M89.9 CHRONIC KIDNEY DISEASE-MINERAL AND BONE DISORDER: Status: ACTIVE | Noted: 2023-03-02

## 2023-03-02 PROBLEM — D62 ANEMIA DUE TO ACUTE BLOOD LOSS: Status: ACTIVE | Noted: 2023-03-02

## 2023-03-06 NOTE — ANESTHESIA PREPROCEDURE EVALUATION
Face Mask Procedure:  ENDOSCOPY NOSE (Bilateral: Nose)    Relevant Problems   CARDIO   (+) Atrial flutter (HCC)   (+) Coronary artery disease involving native coronary artery of native heart without angina pectoris   (+) Essential hypertension   (+) Non-rheumatic mitral regurgitation   (+) Nonrheumatic aortic valve insufficiency   (+) S/P AVR   (+) S/P MVR (mitral valve repair)      ENDO   (+) Diabetes mellitus type 2 in nonobese (HCC)   (+) Secondary hyperparathyroidism of renal origin ([facility] [address] )      /RENAL   (+) CKD (chronic kidney disease), stage IV (HCC)   (+) Diabetic nephropathy associated with type 2 diabetes mellitus (HCC)   (+) FSGS (focal segmental glomerulosclerosis)      NEURO/PSYCH   (+) H/O atrial flutter   (+) History of colon polyps      FSGS disease stable for the past 2 years  Wearing a ZIO patch  Physical Exam    Airway    Mallampati score: III  TM Distance: >3 FB  Neck ROM: full     Dental   Comment: None loose,     Cardiovascular      Pulmonary      Other Findings         Latest Reference Range & Units 01/16/23 19:04   Sodium 135 - 147 mmol/L 138   Potassium 3 5 - 5 3 mmol/L 4 9   Chloride 96 - 108 mmol/L 110 (H)   CO2 21 - 32 mmol/L 20 (L)   Anion Gap 4 - 13 mmol/L 8   BUN 5 - 25 mg/dL 42 (H)   Creatinine 0 60 - 1 30 mg/dL 2 97 (H)   Glucose, Random 65 - 140 mg/dL 111   Calcium 8 4 - 10 2 mg/dL 8 5   eGFR ml/min/1 73sq m 20   (H): Data is abnormally high  (L): Data is abnormally low       Latest Reference Range & Units 01/16/23 19:04   WBC 4 31 - 10 16 Thousand/uL 10 39 (H)   Red Blood Cell Count 3 88 - 5 62 Million/uL 4 41   Hemoglobin 12 0 - 17 0 g/dL 13 5   HCT 36 5 - 49 3 % 41 6   MCV 82 - 98 fL 94   MCH 26 8 - 34 3 pg 30 6   MCHC 31 4 - 37 4 g/dL 32 5   RDW 11 6 - 15 1 % 12 7   Platelet Count 048 - 390 Thousands/uL 189   MPV 8 9 - 12 7 fL 10 3   nRBC /100 WBCs 0   (H): Data is abnormally high         Latest Reference Range & Units 01/16/23 19:34   PROTIME 11 6 - 14 5 seconds 25 1 (H)   POCT INR 0 84 - 1  19  2 25 (H)   PTT 23 - 37 seconds 45 (H)   (H): Data is abnormally high      •  Left Ventricle: Left ventricular cavity size is normal  Wall thickness is normal  The left ventricular ejection fraction is 65%  Systolic function is normal  Wall motion is normal   •  Aortic Valve: There is a bioprosthetic valve  The prosthetic valve appears well-seated and appears to be functioning normally  There is no evidence of transvalvular regurgitation  The aortic valve mean gradient is 10 mmHg  •  Mitral Valve: The valve has been repaired with an annular ring  There is mild regurgitation  •  Tricuspid Valve: There is mild regurgitation  Anesthesia Plan  ASA Score- 3 Emergent    Anesthesia Type- general with ASA Monitors  Additional Monitors:   Airway Plan: ETT  Comment: Last ate around 12:30pm  Active type and screen  Plan Factors-Exercise tolerance (METS): >4 METS  Chart reviewed  Existing labs reviewed  Patient summary reviewed  Patient is not a current smoker  Induction- intravenous and rapid sequence induction  Postoperative Plan-     Informed Consent- Anesthetic plan and risks discussed with patient  I personally reviewed this patient with the CRNA  Discussed and agreed on the Anesthesia Plan with the CRNA  David Hanson

## 2023-03-13 ENCOUNTER — APPOINTMENT (OUTPATIENT)
Dept: LAB | Facility: CLINIC | Age: 73
End: 2023-03-13

## 2023-03-13 ENCOUNTER — ANTICOAG VISIT (OUTPATIENT)
Dept: CARDIOLOGY CLINIC | Facility: CLINIC | Age: 73
End: 2023-03-13

## 2023-03-13 DIAGNOSIS — D62 ANEMIA DUE TO ACUTE BLOOD LOSS: ICD-10-CM

## 2023-03-13 DIAGNOSIS — N25.81 SECONDARY HYPERPARATHYROIDISM (HCC): Chronic | ICD-10-CM

## 2023-03-13 DIAGNOSIS — E11.65 TYPE 2 DIABETES MELLITUS WITH HYPERGLYCEMIA, WITHOUT LONG-TERM CURRENT USE OF INSULIN (HCC): ICD-10-CM

## 2023-03-13 DIAGNOSIS — N18.4 CKD (CHRONIC KIDNEY DISEASE), STAGE IV (HCC): ICD-10-CM

## 2023-03-13 LAB
ANION GAP SERPL CALCULATED.3IONS-SCNC: 5 MMOL/L (ref 4–13)
BUN SERPL-MCNC: 37 MG/DL (ref 5–25)
CALCIUM SERPL-MCNC: 8.5 MG/DL (ref 8.4–10.2)
CHLORIDE SERPL-SCNC: 110 MMOL/L (ref 96–108)
CO2 SERPL-SCNC: 21 MMOL/L (ref 21–32)
CREAT SERPL-MCNC: 2.95 MG/DL (ref 0.6–1.3)
CREAT UR-MCNC: 138.6 MG/DL
ERYTHROCYTE [DISTWIDTH] IN BLOOD BY AUTOMATED COUNT: 13 % (ref 11.6–15.1)
EST. AVERAGE GLUCOSE BLD GHB EST-MCNC: 103 MG/DL
FERRITIN SERPL-MCNC: 51 NG/ML (ref 8–388)
GFR SERPL CREATININE-BSD FRML MDRD: 20 ML/MIN/1.73SQ M
GLUCOSE P FAST SERPL-MCNC: 97 MG/DL (ref 65–99)
HBA1C MFR BLD: 5.2 %
HCT VFR BLD AUTO: 41.8 % (ref 36.5–49.3)
HGB BLD-MCNC: 13.9 G/DL (ref 12–17)
IRON SATN MFR SERPL: 18 % (ref 20–50)
IRON SERPL-MCNC: 43 UG/DL (ref 65–175)
MCH RBC QN AUTO: 31.5 PG (ref 26.8–34.3)
MCHC RBC AUTO-ENTMCNC: 33.3 G/DL (ref 31.4–37.4)
MCV RBC AUTO: 95 FL (ref 82–98)
PLATELET # BLD AUTO: 178 THOUSANDS/UL (ref 149–390)
PMV BLD AUTO: 10.7 FL (ref 8.9–12.7)
POTASSIUM SERPL-SCNC: 5.3 MMOL/L (ref 3.5–5.3)
PROT UR-MCNC: 920 MG/DL
PROT/CREAT UR: 6.64 MG/G{CREAT} (ref 0–0.1)
PTH-INTACT SERPL-MCNC: 117.4 PG/ML (ref 18.4–80.1)
RBC # BLD AUTO: 4.41 MILLION/UL (ref 3.88–5.62)
SODIUM SERPL-SCNC: 136 MMOL/L (ref 135–147)
TIBC SERPL-MCNC: 236 UG/DL (ref 250–450)
WBC # BLD AUTO: 7.64 THOUSAND/UL (ref 4.31–10.16)

## 2023-03-14 LAB — FRUCTOSAMINE SERPL-SCNC: 183 UMOL/L (ref 0–285)

## 2023-03-21 PROBLEM — S81.812A LACERATION OF LEFT LOWER LEG: Status: RESOLVED | Noted: 2023-01-20 | Resolved: 2023-03-21

## 2023-03-27 PROBLEM — S85.142D: Status: RESOLVED | Noted: 2023-01-26 | Resolved: 2023-03-27

## 2023-03-27 PROBLEM — S01.21XA LACERATION OF NOSE: Status: RESOLVED | Noted: 2023-01-26 | Resolved: 2023-03-27

## 2023-05-06 ENCOUNTER — APPOINTMENT (OUTPATIENT)
Dept: LAB | Facility: CLINIC | Age: 73
End: 2023-05-06

## 2023-05-07 DIAGNOSIS — Z95.2 S/P AVR: ICD-10-CM

## 2023-05-07 DIAGNOSIS — I48.0 PAROXYSMAL ATRIAL FIBRILLATION (HCC): ICD-10-CM

## 2023-05-08 ENCOUNTER — ANTICOAG VISIT (OUTPATIENT)
Dept: CARDIOLOGY CLINIC | Facility: CLINIC | Age: 73
End: 2023-05-08

## 2023-05-08 ENCOUNTER — OFFICE VISIT (OUTPATIENT)
Dept: ENDOCRINOLOGY | Facility: CLINIC | Age: 73
End: 2023-05-08

## 2023-05-08 VITALS
WEIGHT: 156 LBS | HEART RATE: 40 BPM | OXYGEN SATURATION: 98 % | BODY MASS INDEX: 23.11 KG/M2 | DIASTOLIC BLOOD PRESSURE: 84 MMHG | TEMPERATURE: 97.8 F | SYSTOLIC BLOOD PRESSURE: 152 MMHG | HEIGHT: 69 IN

## 2023-05-08 DIAGNOSIS — E55.9 VITAMIN D DEFICIENCY: ICD-10-CM

## 2023-05-08 DIAGNOSIS — N25.81 SECONDARY HYPERPARATHYROIDISM OF RENAL ORIGIN (HCC): ICD-10-CM

## 2023-05-08 DIAGNOSIS — E11.65 TYPE 2 DIABETES MELLITUS WITH HYPERGLYCEMIA, WITHOUT LONG-TERM CURRENT USE OF INSULIN (HCC): Primary | ICD-10-CM

## 2023-05-08 DIAGNOSIS — E78.5 DYSLIPIDEMIA: ICD-10-CM

## 2023-05-08 RX ORDER — WARFARIN SODIUM 2 MG/1
TABLET ORAL
Qty: 75 TABLET | Refills: 11 | Status: SHIPPED | OUTPATIENT
Start: 2023-05-08

## 2023-05-08 NOTE — PROGRESS NOTES
Continous glucose monitoring sonia placement     Date/Time 5/8/2023 10:20 AM     Performed by  Jordan Yost     Authorized by Aura Gar MD      Universal Protocol   Consent: Verbal consent obtained  Written consent obtained  Consent given by: patient  Timeout called at: 5/8/2023 11:08 AM   Patient understanding: patient states understanding of the procedure being performed  Patient consent: the patient's understanding of the procedure matches consent given  Procedure consent: procedure consent matches procedure scheduled  Relevant documents: relevant documents present and verified  Test results: test results available and properly labeled  Site marked: the operative site was marked  Radiology Images displayed and confirmed   If images not available, report reviewed: imaging studies not available  Patient identity confirmed: verbally with patient        Local anesthesia used: no     Anesthesia   Local anesthesia used: no     Sedation   Patient sedated: no        Specimen: no    Culture: no   Procedure Details   Procedure Notes: Left Arm  SN [id number]  EXP 10 31 23

## 2023-05-08 NOTE — PROGRESS NOTES
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, CKD MBD, bicuspid aortic valve s/p replacement, HFrEF, CAD and vitamin D deficiency  Last visit was 2/3/23      Since last visit, he lost 3 lbs  He was previously seen by Dr Gordy Crow and then Dr Clinton Martinez  Was on metformin initially  Valeria Began was started about 4 years ago      Home blood glucose monitoring: checks 1-2/day  Fasting in 80-100mg/dL and pp about 120-140mg/dL  Hypoglycemia: No     Current meds:  Tradjenta 2 5 mg daily     Opthamology: Yes  Podiatry:   vaccination: Yes  Dental:  Pancreatitis: No     Ace/ARB: No   On hydralazine and isosorbide dinitrate    Statin: Lipitor  Thyroid issues: No    Patient Active Problem List   Diagnosis   • Persistent proteinuria   • Diabetic nephropathy associated with type 2 diabetes mellitus ([place] [address] )   • Vitamin D deficiency   • Secondary hyperparathyroidism of renal origin ([place] [address] )   • FSGS (focal segmental glomerulosclerosis)   • Essential hypertension   • Chronic systolic heart failure (HCC)   • Nonrheumatic aortic valve insufficiency   • Non-rheumatic mitral regurgitation   • Bicuspid aortic valve   • Coronary artery disease involving native coronary artery of native heart without angina pectoris   • S/P AVR   • S/P MVR (mitral valve repair)   • Dilated cardiomyopathy (HCC)   • Type 2 diabetes mellitus with hyperglycemia, without long-term current use of insulin ([facility])   • Atrial flutter ([place] [address] )   • Encounter for postoperative care   • CKD (chronic kidney disease), stage IV ([place] [address] )   • History of colon polyps   • Anticoagulant long-term use   • Close exposure to COVID-19 virus   • COVID-19 virus infection   • Dyslipidemia   • Bradycardia   • Fall from height of greater than 3 feet   • Nasal fracture   • Metabolic acidosis   • H/O atrial flutter   • Epistaxis   • Anemia due to stage 4 chronic kidney " disease ([place] [address] )   • Benign hypertension with chronic kidney disease, stage IV (HCC)   • Hyperkalemia   • Anemia due to acute blood loss   • Chronic kidney disease-mineral and bone disorder     Past Medical History:   Diagnosis Date   • Chronic kidney disease    • Colon polyp    • COVID-19    • Diabetes mellitus ([place] [address] )    • Hyperlipidemia    • Hypertension    • Proteinuria    • Stage 3 chronic kidney disease ([place] [address] )    • Vitamin D deficiency       Past Surgical History:   Procedure Laterality Date   • COLONOSCOPY      overdue   • SC ECHO TRANSESOPHAG R-T 2D W/PRB IMG ACQUISJ I&R N/A 3/7/2019    Procedure: INTRA-OP TRANSESOPHAGEAL ECHOCARDIOGRAM (PATIENCE);   Surgeon: Mehnaz Lyman DO;  [facility];  Service: Cardiac Surgery   • SC NASAL ENDOSCOPY DIAGNOSTIC UNI/BI SPX Bilateral 1/16/2023    Procedure: ENDOSCOPY NOSE BILATERALLY, WITH ENDOSCOPIC CONTROL OF EPISTAXIS ON LEFT;  Surgeon: Pop Shaikh MD;  Location: [facility];  Service: ENT   • SC PERQ CLSR TCAT L R [place] [address] IMPLNT  3/7/2019    Procedure: LEFT ATRIAL APPENDAGE OCCLUSION CLIPPED with 35mm Chetna Cyphers;  Surgeon: Mehnaz Lyman DO;  [facility];  Service: Cardiac Surgery   • SC REPLACEMENT MITRAL VALVE W/CARDIOPULMONARY BYP N/A 3/7/2019    Procedure: REPLACEMENT VALVE MITRAL (MVR) REPAIR with a 30mm MEDTRONIC CG FUTURE RING;  Surgeon: Mehnaz Lyman DO;  [facility];  Service: Cardiac Surgery   • SC RPLCMT AORTIC VALVE OPN W/STENTLESS TISSUE VALVE N/A 3/7/2019    Procedure: REPLACEMENT VALVE AORTIC (AVR) with 23mm TAVERA INSPIRIS RESILIA  AORTIC VALVE;  Surgeon: Mehnaz Lyman DO;  [facility];  Service: Cardiac Surgery   • RENAL BIOPSY, OPEN     • TOE SURGERY Left       Family History   Problem Relation Age of Onset   • Stroke Mother    • Hypertension Mother    • Blindness Father    • Hyperlipidemia Father    • Hypertension Father    • Glaucoma Father    • Cancer Sister    • Diabetes Sister    • Ovarian cancer Sister    • Gout Brother    • Heart Valve Disease Brother    • Hypertension Brother    • Anuerysm Neg Hx    • Heart attack Neg Hx    • Arrhythmia Neg Hx    • Heart failure Neg Hx    • Coronary artery disease Neg Hx    • Sudden death Neg Hx         scd     Social History     Tobacco Use   • Smoking status: Former     Years: 2 00     Types: Cigarettes     Quit date:      Years since quittin 3   • Smokeless tobacco: Never   Substance Use Topics   • Alcohol use: Yes     Comment: occasionally     Allergies   Allergen Reactions   • Ace Inhibitors Cough   • Keflex [Cephalexin] Rash         Current Outpatient Medications:   •  amLODIPine (NORVASC) 2 5 mg tablet, Take 1 tablet (2 5 mg total) by mouth daily, Disp: 90 tablet, Rfl: 3  •  ascorbic acid (VITAMIN C) 500 mg tablet, Take 500 mg by mouth daily, Disp: , Rfl:   •  atorvastatin (LIPITOR) 20 mg tablet, Take 1 tablet (20 mg total) by mouth daily, Disp: 90 tablet, Rfl: 2  •  calcitriol (ROCALTROL) 0 25 mcg capsule, Take 1 capsule (0 25 mcg total) by mouth daily TAKE 1 CAPSULE 5 DAYS A WEEK AS DIRECTED, Disp: 90 capsule, Rfl: 3  •  Cholecalciferol (VITAMIN D3) 1000 units CAPS, Take 1 capsule by mouth daily, Disp: , Rfl:   •  COMBIGAN 0 2-0 5 %, , Disp: , Rfl: 0  •  hydrALAZINE (APRESOLINE) 25 mg tablet, Take 1 tablet (25 mg total) by mouth 3 (three) times a day, Disp: 270 tablet, Rfl: 3  •  isosorbide dinitrate (ISORDIL) 10 mg tablet, take 1 tablet by mouth three times a day, Disp: 270 tablet, Rfl: 3  •  metoprolol succinate (TOPROL-XL) 50 mg 24 hr tablet, Take 1 tablet (50 mg total) by mouth 2 (two) times a day (Patient taking differently: Take 50 mg by mouth daily), Disp: 60 tablet, Rfl: 2  •  RA Aspirin EC 81 MG EC tablet, take 1 tablet by mouth once daily, Disp: 90 tablet, Rfl: 6  •  sodium bicarbonate 650 mg tablet, Take 2 tablets (1,300 mg total) by mouth 3 (three) times a day, Disp: 180 tablet, Rfl: 1  •  TRAVATAN Z 0 004 % "ophthalmic solution, Administer 1 drop to both eyes daily at bedtime , Disp: , Rfl: 0  •  warfarin (COUMADIN) 2 mg tablet, TAKE 1-2 TABLETS BY MOUTH DAILY OR AS DIRECTED BY PHYSICIAN, Disp: 75 tablet, Rfl: 11  •  Tradjenta 5 MG TABS, Take 2 5 mg by mouth daily (Patient not taking: Reported on 5/8/2023), Disp: 30 tablet, Rfl: 0    Review of Systems   Constitutional: Positive for activity change and appetite change  HENT: Negative  Eyes: Negative  Respiratory: Negative  Cardiovascular: Negative for chest pain  Gastrointestinal: Negative  Endocrine: Negative  Genitourinary: Negative  Musculoskeletal: Negative  Skin: Negative  Allergic/Immunologic: Negative  Neurological: Negative  Hematological: Negative  Psychiatric/Behavioral: Negative  All other systems reviewed and are negative  Physical Exam:  Body mass index is 23 37 kg/m²  /84 (BP Location: Left arm, Patient Position: Sitting, Cuff Size: Standard)   Pulse (!) 40   Temp 97 8 °F (36 6 °C) (Tympanic)   Ht 5' 8 5\" (1 74 m)   Wt 70 8 kg (156 lb)   SpO2 98%   BMI 23 37 kg/m²    Vitals:    05/08/23 1049   Weight: 70 8 kg (156 lb)        Physical Exam  Constitutional:       General: He is not in acute distress  Appearance: He is well-developed  He is not ill-appearing  HENT:      Head: Normocephalic and atraumatic  Nose: Nose normal       Mouth/Throat:      Pharynx: Oropharynx is clear  Eyes:      Extraocular Movements: Extraocular movements intact  Conjunctiva/sclera: Conjunctivae normal    Neck:      Thyroid: No thyromegaly  Cardiovascular:      Rate and Rhythm: Normal rate  Pulmonary:      Effort: Pulmonary effort is normal    Musculoskeletal:         General: No deformity  Cervical back: Normal range of motion  Skin:     Capillary Refill: Capillary refill takes less than 2 seconds  Coloration: Skin is not pale  Findings: No rash     Neurological:      Mental Status: He is " alert and oriented to person, place, and time  Psychiatric:         Behavior: Behavior normal          Labs:   Lab Results   Component Value Date    HGBA1C 5 2 03/13/2023       Lab Results   Component Value Date    YRJ5HAPWYXZK 1 429 01/08/2021       Lab Results   Component Value Date    CREATININE 2 95 (H) 03/13/2023    CREATININE 3 01 (H) 01/28/2023    CREATININE 2 74 (H) 01/17/2023    BUN 37 (H) 03/13/2023    K 5 3 03/13/2023     (H) 03/13/2023    CO2 21 03/13/2023     eGFR   Date Value Ref Range Status   03/13/2023 20 ml/min/1 73sq m Final       Lab Results   Component Value Date    ALT 13 01/13/2023    AST 21 01/13/2023    ALKPHOS 69 01/13/2023       Lab Results   Component Value Date    CHOLESTEROL 117 10/20/2022    CHOLESTEROL 105 06/08/2021    CHOLESTEROL 108 10/14/2020     Lab Results   Component Value Date    HDL 48 10/20/2022    HDL 37 (L) 06/08/2021    HDL 49 10/14/2020     Lab Results   Component Value Date    TRIG 77 10/20/2022    TRIG 128 06/08/2021    TRIG 77 10/14/2020     Lab Results   Component Value Date    NONHDLC 68 06/08/2021    [place] 59 10/14/2020    [place] 81 05/08/2020         Impression:  1  Type 2 diabetes mellitus with hyperglycemia, without long-term current use of insulin ([address] )    2  Secondary hyperparathyroidism of renal origin ([facility] )    3  Vitamin D deficiency    4  Dyslipidemia         Plan:    Diagnoses and all orders for this visit:    Type 2 diabetes mellitus with hyperglycemia, without long-term current use of insulin ([address] )  He is well controlled with an A1c of 5 2% and fructosamine 183umol/L  Today we discussed options and agreed to continue with linagliptin 2 5 mg daily  If A1c and other glycemic indicis including professional CGM data look good, may consider removing all medications and monitor with diet and lifestyle only  Follow-up in 6 months   -     Continous glucose monitoring sonia placement;  Future  -     Continous glucose monitoring sonia intrepretation; Future  -     Hemoglobin A1C; Future  -     Fructosamine; Future  -     Continous glucose monitoring sonia placement    Secondary hyperparathyroidism of renal origin ([facility] ) -CKD-MBD  He has normal alkaline phosphatase, normal vitamin D, normal calcium and high PTH  Nephrology is following  Vitamin D deficiency  Advised to use vitamin D3 3000 IU daily OTC  Dyslipidemia  Stable lipid profile  I have spent 31 minutes with patient today in which greater than 50% of this time was spent in counseling/coordination of care  Discussed with the patient and all questioned fully answered  He will call me if any problems arise  Educated/ Counseled patient on diagnostic test results, prognosis, risk vs benefit of treatment options, importance of treatment compliance, healthy life and lifestyle choices        [address]

## 2023-05-22 ENCOUNTER — OFFICE VISIT (OUTPATIENT)
Dept: ENDOCRINOLOGY | Facility: CLINIC | Age: 73
End: 2023-05-22

## 2023-05-22 DIAGNOSIS — E11.65 TYPE 2 DIABETES MELLITUS WITH HYPERGLYCEMIA, WITHOUT LONG-TERM CURRENT USE OF INSULIN (HCC): ICD-10-CM

## 2023-05-22 NOTE — PROGRESS NOTES
General procedure    Date/Time: 5/22/2023 9:15 AM  Performed by: Iris Joshua  Authorized by: Fior Delgadillo MD     Anesthesia (see MAR for exact dosages): Anesthesia method:  None  Post-procedure details:     Patient tolerance of procedure: Tolerated well, no immediate complications  Comments:      Data pulled from device and attached to visit  Patient tolerated removal well, no concerns at this time

## 2023-06-06 NOTE — ASSESSMENT & PLAN NOTE
-stable   -currently on lisinopril-HCTZ 20-12.5 mg, metoprolol succinate, nifedipine, refill  -low-sodium diet   He has diabetes for 5 years but his glucose seems to be well controlled  Not sure if his increased proteinuria is related to diabetes or not  Checking hgb a1c and minor serologic workup   Increasing benicar to 5 mg daily

## 2023-06-09 LAB
LEFT EYE DIABETIC RETINOPATHY: NORMAL
RIGHT EYE DIABETIC RETINOPATHY: NORMAL

## 2023-06-09 PROCEDURE — 2023F DILAT RTA XM W/O RTNOPTHY: CPT

## 2023-06-15 DIAGNOSIS — E78.3 HYPERCHYLOMICRONEMIA: ICD-10-CM

## 2023-06-15 RX ORDER — ATORVASTATIN CALCIUM 20 MG/1
TABLET, FILM COATED ORAL
Qty: 90 TABLET | Refills: 2 | Status: SHIPPED | OUTPATIENT
Start: 2023-06-15

## 2023-06-23 ENCOUNTER — OFFICE VISIT (OUTPATIENT)
Dept: FAMILY MEDICINE CLINIC | Facility: CLINIC | Age: 73
End: 2023-06-23
Payer: COMMERCIAL

## 2023-06-23 VITALS
HEART RATE: 55 BPM | HEIGHT: 68 IN | RESPIRATION RATE: 16 BRPM | SYSTOLIC BLOOD PRESSURE: 128 MMHG | BODY MASS INDEX: 23.49 KG/M2 | TEMPERATURE: 97.4 F | OXYGEN SATURATION: 97 % | DIASTOLIC BLOOD PRESSURE: 74 MMHG | WEIGHT: 155 LBS

## 2023-06-23 DIAGNOSIS — L23.7 ALLERGIC DERMATITIS DUE TO POISON IVY: Primary | ICD-10-CM

## 2023-06-23 PROCEDURE — 99213 OFFICE O/P EST LOW 20 MIN: CPT

## 2023-06-23 RX ORDER — CETIRIZINE HYDROCHLORIDE 10 MG/1
10 TABLET ORAL DAILY
Qty: 30 TABLET | Refills: 1 | Status: SHIPPED | OUTPATIENT
Start: 2023-06-23

## 2023-06-23 RX ORDER — PREDNISONE 20 MG/1
40 TABLET ORAL DAILY
Qty: 10 TABLET | Refills: 0 | Status: SHIPPED | OUTPATIENT
Start: 2023-06-23 | End: 2023-06-28

## 2023-06-23 NOTE — ASSESSMENT & PLAN NOTE
Wide spread urticarial rash on hands, forearms and face  Start short course of oral steroids and antihistamine as prescribed, recommend cold compress as needed to help with swelling and itching and seek immediate medical care for worsening sxs

## 2023-06-23 NOTE — PATIENT INSTRUCTIONS
Poison Ivy   WHAT YOU NEED TO KNOW:   Poison ivy is a plant that can cause an itchy, uncomfortable rash on your skin  Poison ivy grows as a shrub or vine in woods, fields, and areas of thick [place]  It has 3 bright green leaves on each stem that turn red in [place]  DISCHARGE INSTRUCTIONS:   Medicines:   Antiseptic or drying creams or ointments: These medicines may be used to dry out the rash and decrease the itching  These products may be available without a doctor's order  Steroids: This medicine helps decrease itching and inflammation  It can be given as a cream to apply to your skin or as a pill  Antihistamines: This medicine may help decrease itching and help you sleep  It is available without a doctor's order  Take cetirizine once daily as prescribed    Take your medicine as directed  Contact your healthcare provider if you think your medicine is not helping or if you have side effects  Tell your provider if you are allergic to any medicine  Keep a list of the medicines, vitamins, and herbs you take  Include the amounts, and when and why you take them  Bring the list or the pill bottles to follow-up visits  Carry your medicine list with you in case of an emergency  Follow up with your doctor as directed:  Write down your questions so you remember to ask them during your visits  How your poison ivy rash spreads: You cannot spread poison ivy by touching your rash or the liquid from your blisters  Poison ivy is spread only if you scratch your skin while it still has oil on it  You may think your rash is spreading because new rashes appear over a number of days  This happens because areas covered by thin skin break out in a rash first  Your face or forearms may develop a rash before thicker areas, such as the palms of your hands  Self-care:   Keep your rash clean and dry:  Wash it with soap and water  Gently pat it dry with a clean towel  Try not to scratch or rub your rash:   This can cause your skin to become infected  Use a compress on your rash:  Dip a clean washcloth in cool water  Wring it out and place it on your rash  Leave the washcloth on your skin for 15 minutes  Do this at least 3 times per day  Take a cornstarch or oatmeal bath: If your rash is too large to cover with wet washcloths, take 3 or 4 cornstarch baths daily  Mix 1 pound of cornstarch with a little water to make a paste  Add the paste to a tub full of water and mix well  You may also use colloidal oatmeal in the bath water  Use lukewarm water  Avoid hot water because it may cause your itching to increase  Prevent a poison ivy rash in the future:   Wear skin protection:  Wear long pants, a long-sleeved shirt, and gloves  Use a skin block lotion to protect your skin from poison ivy oil  You can find this at a drugstore without a prescription  Wash clothing after possible exposure: If you think you have been near a poison ivy plant, wash the clothes you were wearing separately from other clothes  Rinse the washing machine well after you take the clothes out  Scrub boots and shoes with warm, soapy water  Dry clean items and clothing that you cannot wash in water  Poison ivy oil is sticky and can stay on surfaces for a long time  It can cause a new rash even years later  Bathe your pet:  Use warm water and shampoo on your pet's fur  This will prevent the spread of oil to your skin, car, and home  Wear long sleeves, long pants, and gloves while washing pets or any items that may have oil on them  Reduce exposure to poison ivy:  Do not touch plants that look like poison ivy  Keep your yard free of poison ivy  While protecting your skin, remove the plant and the roots  Place them in a plastic bag and seal the bag tightly  Do not burn poison ivy plants: This can spread the oil through the air  If you breathe the oil into your lungs, you could have swelling and serious breathing problems   Oil that clings to the fire michaela can land on your skin and cause a rash  Contact your healthcare provider if:   You have pus, soft yellow scabs, or tenderness on the rash  The itching gets worse or keeps you awake at night  The rash covers more than 1/4 of your skin or spreads to your eyes, mouth, or genital area  The rash is not better after 2 to 3 weeks  You have tender, swollen glands on the sides of your neck  You have swelling in your arms and legs  You have questions or concerns about your condition or care  Return to the emergency department if:   You have a fever  You have redness, swelling, and tenderness around the rash  You have trouble breathing  © Copyright [employer] 2022 Information is for End User's use only and may not be sold, redistributed or otherwise used for commercial purposes  The above information is an  only  It is not intended as medical advice for individual conditions or treatments  Talk to your doctor, nurse or pharmacist before following any medical regimen to see if it is safe and effective for you

## 2023-06-23 NOTE — PROGRESS NOTES
Name: Collis Spatz      : 1950      MRN: [id number]  Encounter Provider: JHONATAN Dsouza  Encounter Date: 2023   Encounter department: [address] [place] [address] MEDICINE    Assessment & Plan     1  Allergic dermatitis due to poison ivy  Assessment & Plan: Wide spread urticarial rash on hands, forearms and face  Start short course of oral steroids and antihistamine as prescribed, recommend cold compress as needed to help with swelling and itching and seek immediate medical care for worsening sxs  Orders:  -     predniSONE 20 mg tablet; Take 2 tablets (40 mg total) by mouth daily for 5 days  -     cetirizine (ZyrTEC) 10 mg tablet; Take 1 tablet (10 mg total) by mouth daily         Subjective      Exposed to poison ivy started with rash on hands however has spread to upper forearms and on face  Took benadryl the last two days    Review of Systems   Constitutional: Negative for activity change, chills, fatigue and fever  HENT: Negative for trouble swallowing  Respiratory: Negative for chest tightness, shortness of breath, wheezing and stridor  Cardiovascular: Negative for chest pain  Gastrointestinal: Negative for abdominal pain  Musculoskeletal: Negative for joint swelling  Skin: Positive for color change and rash  Allergic/Immunologic: Positive for environmental allergies  Neurological: Negative for seizures and facial asymmetry         Current Outpatient Medications on File Prior to Visit   Medication Sig   • amLODIPine (NORVASC) 2 5 mg tablet Take 1 tablet (2 5 mg total) by mouth daily   • ascorbic acid (VITAMIN C) 500 mg tablet Take 500 mg by mouth daily   • atorvastatin (LIPITOR) 20 mg tablet take 1 tablet by mouth once daily   • calcitriol (ROCALTROL) 0 25 mcg capsule Take 1 capsule (0 25 mcg total) by mouth daily TAKE 1 CAPSULE 5 DAYS A WEEK AS DIRECTED   • Cholecalciferol (VITAMIN D3) 1000 units CAPS Take 1 capsule by mouth daily   • COMBIGAN 0 2-0 5 %    • "hydrALAZINE (APRESOLINE) 25 mg tablet Take 1 tablet (25 mg total) by mouth 3 (three) times a day   • isosorbide dinitrate (ISORDIL) 10 mg tablet take 1 tablet by mouth three times a day   • metoprolol succinate (TOPROL-XL) 50 mg 24 hr tablet Take 1 tablet (50 mg total) by mouth 2 (two) times a day (Patient taking differently: Take 50 mg by mouth daily)   • RA Aspirin EC 81 MG EC tablet take 1 tablet by mouth once daily   • sodium bicarbonate 650 mg tablet Take 2 tablets (1,300 mg total) by mouth 3 (three) times a day   • TRAVATAN Z 0 004 % ophthalmic solution Administer 1 drop to both eyes daily at bedtime    • warfarin (COUMADIN) 2 mg tablet TAKE 1-2 TABLETS BY MOUTH DAILY OR AS DIRECTED BY PHYSICIAN   • Tradjenta 5 MG TABS Take 2 5 mg by mouth daily (Patient not taking: Reported on 5/8/2023)       Objective     /74 (BP Location: Right arm, Patient Position: Sitting, Cuff Size: Standard)   Pulse 55   Temp (!) 97 4 °F (36 3 °C) (Tympanic)   Resp 16   Ht 5' 8\" (1 727 m)   Wt 70 3 kg (155 lb)   SpO2 97%   BMI 23 57 kg/m²     Physical Exam  Vitals and nursing note reviewed  Constitutional:       General: He is not in acute distress  Appearance: Normal appearance  He is obese  He is not ill-appearing, toxic-appearing or diaphoretic  HENT:      Head: Normocephalic and atraumatic  Nose: Nose normal       Mouth/Throat:      Mouth: Mucous membranes are moist    Eyes:      Conjunctiva/sclera: Conjunctivae normal       Pupils: Pupils are equal, round, and reactive to light  Cardiovascular:      Rate and Rhythm: Normal rate and regular rhythm  Pulses: Normal pulses  Heart sounds: Normal heart sounds  Skin:     General: Skin is warm  Capillary Refill: Capillary refill takes less than 2 seconds  Findings: Erythema and rash present  Rash is urticarial       Comments:  Wide spread urticarial rash with erythema on b/l hands, forearms and face   Neurological:      General: No focal " deficit present  Mental Status: He is alert and oriented to person, place, and time  Psychiatric:         Mood and Affect: Mood normal          Behavior: Behavior normal          Thought Content:  Thought content normal          Judgment: Judgment normal        [address]

## 2023-07-13 DIAGNOSIS — I10 ESSENTIAL HYPERTENSION: Chronic | ICD-10-CM

## 2023-07-13 DIAGNOSIS — I10 ESSENTIAL HYPERTENSION: ICD-10-CM

## 2023-07-13 DIAGNOSIS — N18.4 CKD (CHRONIC KIDNEY DISEASE), STAGE IV (HCC): Primary | ICD-10-CM

## 2023-07-13 PROCEDURE — 3066F NEPHROPATHY DOC TX: CPT | Performed by: STUDENT IN AN ORGANIZED HEALTH CARE EDUCATION/TRAINING PROGRAM

## 2023-07-13 RX ORDER — METOPROLOL SUCCINATE 50 MG/1
50 TABLET, EXTENDED RELEASE ORAL DAILY
Qty: 90 TABLET | Refills: 3 | Status: SHIPPED | OUTPATIENT
Start: 2023-07-13

## 2023-07-13 RX ORDER — METOPROLOL SUCCINATE 50 MG/1
50 TABLET, EXTENDED RELEASE ORAL DAILY
Qty: 90 TABLET | Refills: 3 | Status: SHIPPED | OUTPATIENT
Start: 2023-07-13 | End: 2023-07-13 | Stop reason: SDUPTHER

## 2023-07-13 NOTE — TELEPHONE ENCOUNTER
Needs a new script for his Metoprolol succ.  50mg 24 hr 1x a day (90 day)    [facility]    Patient ph # [phone]

## 2023-07-17 ENCOUNTER — TELEPHONE (OUTPATIENT)
Dept: NEPHROLOGY | Facility: CLINIC | Age: 73
End: 2023-07-17

## 2023-07-17 NOTE — TELEPHONE ENCOUNTER
Called patient and left a voicemail asking to please have labs done before the follow up appointment.

## 2023-07-19 ENCOUNTER — ANTICOAG VISIT (OUTPATIENT)
Dept: CARDIOLOGY CLINIC | Facility: CLINIC | Age: 73
End: 2023-07-19

## 2023-07-19 ENCOUNTER — LAB (OUTPATIENT)
Dept: LAB | Facility: CLINIC | Age: 73
End: 2023-07-19
Payer: COMMERCIAL

## 2023-07-19 DIAGNOSIS — Z12.5 PROSTATE CANCER SCREENING: ICD-10-CM

## 2023-07-19 DIAGNOSIS — Z11.59 NEED FOR HEPATITIS C SCREENING TEST: ICD-10-CM

## 2023-07-19 DIAGNOSIS — E11.65 TYPE 2 DIABETES MELLITUS WITH HYPERGLYCEMIA, WITHOUT LONG-TERM CURRENT USE OF INSULIN (HCC): ICD-10-CM

## 2023-07-19 LAB
ERYTHROCYTE [DISTWIDTH] IN BLOOD BY AUTOMATED COUNT: 12.9 % (ref 11.6–15.1)
EST. AVERAGE GLUCOSE BLD GHB EST-MCNC: 117 MG/DL
FERRITIN SERPL-MCNC: 82 NG/ML (ref 24–336)
HBA1C MFR BLD: 5.7 %
HCT VFR BLD AUTO: 36.8 % (ref 36.5–49.3)
HCV AB SER QL: NORMAL
HGB BLD-MCNC: 12.2 G/DL (ref 12–17)
IRON SATN MFR SERPL: 25 % (ref 20–50)
IRON SERPL-MCNC: 50 UG/DL (ref 65–175)
MCH RBC QN AUTO: 31.2 PG (ref 26.8–34.3)
MCHC RBC AUTO-ENTMCNC: 33.2 G/DL (ref 31.4–37.4)
MCV RBC AUTO: 94 FL (ref 82–98)
PLATELET # BLD AUTO: 199 THOUSANDS/UL (ref 149–390)
PMV BLD AUTO: 9.5 FL (ref 8.9–12.7)
PSA SERPL-MCNC: 1.32 NG/ML (ref 0–4)
RBC # BLD AUTO: 3.91 MILLION/UL (ref 3.88–5.62)
TIBC SERPL-MCNC: 203 UG/DL (ref 250–450)
WBC # BLD AUTO: 5.44 THOUSAND/UL (ref 4.31–10.16)

## 2023-07-19 PROCEDURE — 82985 ASSAY OF GLYCATED PROTEIN: CPT

## 2023-07-19 PROCEDURE — G0103 PSA SCREENING: HCPCS

## 2023-07-19 PROCEDURE — 85027 COMPLETE CBC AUTOMATED: CPT

## 2023-07-19 PROCEDURE — 86803 HEPATITIS C AB TEST: CPT

## 2023-07-19 PROCEDURE — 83540 ASSAY OF IRON: CPT

## 2023-07-19 PROCEDURE — 83550 IRON BINDING TEST: CPT

## 2023-07-19 PROCEDURE — 83036 HEMOGLOBIN GLYCOSYLATED A1C: CPT

## 2023-07-19 PROCEDURE — 82728 ASSAY OF FERRITIN: CPT

## 2023-07-20 LAB — FRUCTOSAMINE SERPL-SCNC: 166 UMOL/L (ref 0–285)

## 2023-07-21 ENCOUNTER — OFFICE VISIT (OUTPATIENT)
Dept: CARDIOLOGY CLINIC | Facility: CLINIC | Age: 73
End: 2023-07-21
Payer: COMMERCIAL

## 2023-07-21 VITALS
BODY MASS INDEX: 23.4 KG/M2 | HEART RATE: 83 BPM | DIASTOLIC BLOOD PRESSURE: 74 MMHG | WEIGHT: 154.4 LBS | HEIGHT: 68 IN | SYSTOLIC BLOOD PRESSURE: 118 MMHG | OXYGEN SATURATION: 97 %

## 2023-07-21 DIAGNOSIS — I10 ESSENTIAL HYPERTENSION: Chronic | ICD-10-CM

## 2023-07-21 DIAGNOSIS — E78.5 DYSLIPIDEMIA: ICD-10-CM

## 2023-07-21 DIAGNOSIS — Q23.1 BICUSPID AORTIC VALVE: Chronic | ICD-10-CM

## 2023-07-21 DIAGNOSIS — I48.92 ATRIAL FLUTTER, UNSPECIFIED TYPE (HCC): Primary | ICD-10-CM

## 2023-07-21 DIAGNOSIS — I35.1 NONRHEUMATIC AORTIC VALVE INSUFFICIENCY: Chronic | ICD-10-CM

## 2023-07-21 DIAGNOSIS — I42.0 DILATED CARDIOMYOPATHY (HCC): Chronic | ICD-10-CM

## 2023-07-21 DIAGNOSIS — Z98.890 S/P MVR (MITRAL VALVE REPAIR): ICD-10-CM

## 2023-07-21 DIAGNOSIS — Z95.2 S/P AVR: ICD-10-CM

## 2023-07-21 PROCEDURE — 99214 OFFICE O/P EST MOD 30 MIN: CPT | Performed by: INTERNAL MEDICINE

## 2023-07-21 PROCEDURE — 93000 ELECTROCARDIOGRAM COMPLETE: CPT | Performed by: INTERNAL MEDICINE

## 2023-07-21 NOTE — PROGRESS NOTES
Cardiology   [facility] 68 y.o. male MRN: [id number]      Impression:  1. S/P AVR/MV repair 3/19 - doing well.   2. Atrial flutter - in NSR. On warfarin. Occasional PVCs. 3. Hypertension - controlled. 4. Cardiomyopathy - likely tachy mediated. Resolved.   5. CKD - stable.  Cr 2.8     Recommendations:  1. Continue current medications. 2. Follow up in 6 months. HPI: [facility] is a 68y.o. year old male with mod-severe MR/mod-severe AI and EF 40% s/p bioAVR and MV repair with annuloplasty ring and VIRI clipping 3/19, with paroxysmal atrial fibrillation/atrial flutter who returns for follow up. Aure Banks a PATIENCE/DCCV 4/12/19 - EF 25%, mod MR, no clot.  Successfully converted to NSR.  Echo 11/20 - EF 65%, normal bio AVR and MV repair. Holter monitor 6/22 demonstrated no atrial flutter. Echo 10/22 - Normal LV systolic function, normal AVR/MVR with mild MR. Review of Systems   Constitutional: Negative. HENT: Negative. Eyes: Negative. Respiratory: Negative for chest tightness and shortness of breath. Cardiovascular: Negative for chest pain, palpitations and leg swelling. Gastrointestinal: Negative. Endocrine: Negative. Genitourinary: Negative. Musculoskeletal: Negative. Skin: Negative. Allergic/Immunologic: Negative. Neurological: Negative. Hematological: Negative. Psychiatric/Behavioral: Negative. All other systems reviewed and are negative. Past Medical History:   Diagnosis Date   • Chronic kidney disease    • Colon polyp    • COVID-19    • Diabetes mellitus ([address] [facility])    • Hyperlipidemia    • Hypertension    • Proteinuria    • Stage 3 chronic kidney disease ([address] )    • Vitamin D deficiency      Past Surgical History:   Procedure Laterality Date   • COLONOSCOPY      overdue   • ID ECHO TRANSESOPHAG R-T 2D W/PRB IMG ACQUISJ I&R N/A 3/7/2019    Procedure: INTRA-OP TRANSESOPHAGEAL ECHOCARDIOGRAM (PATIENCE);   Surgeon: Laney Holloway DO;  Location: [facility]; Service: Cardiac Surgery   • [place] NASAL ENDOSCOPY DIAGNOSTIC UNI/BI SPX Bilateral 2023    Procedure: ENDOSCOPY NOSE BILATERALLY, WITH ENDOSCOPIC CONTROL OF EPISTAXIS ON LEFT;  Surgeon: Neftali Foster MD;  Location: [facility];  Service: ENT   • [place] PERQ CLSR TCAT L [address] [facility] [address] TAVERAS  3/7/2019    Procedure: LEFT ATRIAL APPENDAGE OCCLUSION CLIPPED with 35mm Lorelee Decant;  Surgeon: Soumya Mello DO;  Location: [facility];  Service: Cardiac Surgery   • [place] REPLACEMENT MITRAL VALVE W/CARDIOPULMONARY BYP N/A 3/7/2019    Procedure: REPLACEMENT VALVE MITRAL (MVR) REPAIR with a 30mm MEDTRONIC CG FUTURE RING;  Surgeon: Soumya Mello DO;  Location: [facility];  Service: Cardiac Surgery   • [place] RPLCMT AORTIC VALVE OPN W/STENTLESS TISSUE VALVE N/A 3/7/2019    Procedure: REPLACEMENT VALVE AORTIC (AVR) with 23mm TAVERA INSPIRIS RESILIA  AORTIC VALVE;  Surgeon: Soumya Mello DO;  [facility]: [facility];  Service: Cardiac Surgery   • RENAL BIOPSY, OPEN     • TOE SURGERY Left      Social History     Substance and Sexual Activity   Alcohol Use Yes    Comment: occasionally     Social History     Substance and Sexual Activity   Drug Use No     Social History     Tobacco Use   Smoking Status Former   • Years: 2.00   • Types: Cigarettes   • Quit date:    • Years since quittin.5   Smokeless Tobacco Never     Family History   Problem Relation Age of Onset   • Stroke Mother    • Hypertension Mother    • Blindness Father    • Hyperlipidemia Father    • Hypertension Father    • Glaucoma Father    • Cancer Sister    • Diabetes Sister    • Ovarian cancer Sister    • Gout Brother    • Heart Valve Disease Brother    • Hypertension Brother    • Anuerysm Neg Hx    • Heart attack Neg Hx    • Arrhythmia Neg Hx    • Heart failure Neg Hx    • Coronary artery disease Neg Hx    • Sudden death Neg Hx         scd       Allergies:   Allergies   Allergen Reactions   • Ace Inhibitors Cough   • Keflex [Cephalexin] Rash       Medications:     Current Outpatient Medications:   •  amLODIPine (NORVASC) 2.5 mg tablet, Take 1 tablet (2.5 mg total) by mouth daily, Disp: 90 tablet, Rfl: 3  •  ascorbic acid (VITAMIN C) 500 mg tablet, Take 500 mg by mouth daily, Disp: , Rfl:   •  atorvastatin (LIPITOR) 20 mg tablet, take 1 tablet by mouth once daily, Disp: 90 tablet, Rfl: 2  •  calcitriol (ROCALTROL) 0.25 mcg capsule, Take 1 capsule (0.25 mcg total) by mouth daily TAKE 1 CAPSULE 5 DAYS A WEEK AS DIRECTED, Disp: 90 capsule, Rfl: 3  •  cetirizine (ZyrTEC) 10 mg tablet, Take 1 tablet (10 mg total) by mouth daily, Disp: 30 tablet, Rfl: 1  •  Cholecalciferol (VITAMIN D3) 1000 units CAPS, Take 1 capsule by mouth daily, Disp: , Rfl:   •  COMBIGAN 0.2-0.5 %, , Disp: , Rfl: 0  •  hydrALAZINE (APRESOLINE) 25 mg tablet, Take 1 tablet (25 mg total) by mouth 3 (three) times a day, Disp: 270 tablet, Rfl: 3  •  isosorbide dinitrate (ISORDIL) 10 mg tablet, take 1 tablet by mouth three times a day, Disp: 270 tablet, Rfl: 3  •  metoprolol succinate (TOPROL-XL) 50 mg 24 hr tablet, Take 1 tablet (50 mg total) by mouth daily, Disp: 90 tablet, Rfl: 3  •  RA Aspirin EC 81 MG EC tablet, take 1 tablet by mouth once daily, Disp: 90 tablet, Rfl: 6  •  sodium bicarbonate 650 mg tablet, Take 2 tablets (1,300 mg total) by mouth 3 (three) times a day, Disp: 180 tablet, Rfl: 1  •  TRAVATAN Z 0.004 % ophthalmic solution, Administer 1 drop to both eyes daily at bedtime , Disp: , Rfl: 0  •  warfarin (COUMADIN) 2 mg tablet, TAKE 1-2 TABLETS BY MOUTH DAILY OR AS DIRECTED BY PHYSICIAN, Disp: 75 tablet, Rfl: 11  •  Tradjenta 5 MG TABS, Take 2.5 mg by mouth daily (Patient not taking: Reported on 5/8/2023), Disp: 30 tablet, Rfl: 0      Wt Readings from Last 3 Encounters:   07/21/23 70 kg (154 lb 6.4 oz)   06/23/23 70.3 kg (155 lb)   05/08/23 70.8 kg (156 lb)     Temp Readings from Last 3 Encounters:   06/23/23 (!) 97.4 °F (36.3 °C) (Tympanic)   05/08/23 97.8 °F (36.6 °C) (Tympanic)   02/03/23 (!) 96.8 °F (36 °C) (Tympanic)     BP Readings from Last 3 Encounters:   07/21/23 118/74   06/23/23 128/74   05/08/23 152/84     Pulse Readings from Last 3 Encounters:   07/21/23 83   06/23/23 55   05/08/23 (!) 40         Physical Exam  HENT:      Head: Atraumatic. Mouth/Throat:      Mouth: Mucous membranes are moist.   Eyes:      Extraocular Movements: Extraocular movements intact. Cardiovascular:      Rate and Rhythm: Normal rate and regular rhythm. Heart sounds: Murmur heard. Systolic murmur is present with a grade of 2/6. Pulmonary:      Effort: Pulmonary effort is normal.      Breath sounds: Normal breath sounds. Abdominal:      General: Abdomen is flat. Musculoskeletal:         General: Normal range of motion. Cervical back: Normal range of motion. Skin:     General: Skin is warm. Neurological:      General: No focal deficit present. Mental Status: He is alert and oriented to person, place, and time.    Psychiatric:         Mood and Affect: Mood normal.         Behavior: Behavior normal.           Laboratory Studies:  CMP:  Lab Results   Component Value Date    K 5.3 03/13/2023     (H) 03/13/2023    CO2 21 03/13/2023    BUN 37 (H) 03/13/2023    CREATININE 2.95 (H) 03/13/2023    GLUCOSE 100 01/13/2023    AST 21 01/13/2023    ALT 13 01/13/2023    EGFR 20 03/13/2023       Lipid Profile:   No results found for: "CHOL"  Lab Results   Component Value Date    HDL 48 10/20/2022     Lab Results   Component Value Date    LDLCALC 54 10/20/2022     Lab Results   Component Value Date    TRIG 77 10/20/2022       Cardiac testing:   EKG reviewed personally: NSR 83 PVCs RBBB LAFB  Results for orders placed during the hospital encounter of 11/20/20    Echo complete with contrast if indicated    Narrative  [address] [place] [address]  [phone]    Transthoracic Echocardiogram  2D, M-mode, Doppler, and Color Doppler    Study date:  2020    Patient: Ami Watkins  MR number: [id number]  Account number: [de-identified]  : 1950  Age: 72 years  Gender: Male  Status: Outpatient  Location: [facility]  Height: 66 in  Weight: 183.7 lb  BP: 148/ 86 mmHg    Indications: CAD, AVR, MV Repair    Diagnoses: I25.83 - Coronary atherosclerosis due to lipid rich plaque    Sonographer:  TIMMY Orozco  Primary Physician:  Shiloh Dickey MD  Referring Physician:  Ivory Gardner MD  Group:  [facility]  Interpreting Physician:  Rahul River MD    SUMMARY    LEFT VENTRICLE:  Systolic function was normal. Ejection fraction was estimated to be 65 %. There were no regional wall motion abnormalities. Features were consistent with a pseudonormal left ventricular filling pattern, with concomitant abnormal relaxation and increased filling pressure (grade 2 diastolic dysfunction). MITRAL VALVE:  There was mild regurgitation. AORTIC VALVE:  A bioprosthesis was present. It exhibited normal function. The mean gradient was 13mmHg. TRICUSPID VALVE:  There was mild to moderate regurgitation. Estimated peak PA pressure was 28 mmHg. PULMONIC VALVE:  There was mild to moderate regurgitation. HISTORY: PRIOR HISTORY: heart failure, AI, MR, bicuspid AV, CAD, CM, aflutter    PROCEDURE: The study was performed in the [facility]. This was a routine study. The transthoracic approach was used. The study included complete 2D imaging, M-mode, complete spectral Doppler, and color Doppler. The  heart rate was 74 bpm, at the start of the study. Images were obtained from the parasternal, apical, subcostal, and suprasternal notch acoustic windows. Image quality was adequate. LEFT VENTRICLE: Size was normal. Systolic function was normal. Ejection fraction was estimated to be 65 %. There were no regional wall motion abnormalities.  Wall thickness was normal. DOPPLER: Features were consistent with a pseudonormal  left ventricular filling pattern, with concomitant abnormal relaxation and increased filling pressure (grade 2 diastolic dysfunction). RIGHT VENTRICLE: The size was normal. Systolic function was normal. Wall thickness was normal.    LEFT ATRIUM: Size was normal.    RIGHT ATRIUM: Size was normal.    MITRAL VALVE: There was normal leaflet separation. There was a prior surgical repair. The mean gradient across the ring was 2mmHg. DOPPLER: The transmitral velocity was within the normal range. There was mild regurgitation. AORTIC VALVE: A bioprosthesis was present. It exhibited normal function. The mean gradient was 13mmHg. TRICUSPID VALVE: The valve structure was normal. There was normal leaflet separation. DOPPLER: The transtricuspid velocity was within the normal range. There was no evidence for stenosis. There was mild to moderate regurgitation. Estimated  peak PA pressure was 28 mmHg. PULMONIC VALVE: Leaflets exhibited normal thickness, no calcification, and normal cuspal separation. DOPPLER: The transpulmonic velocity was within the normal range. There was mild to moderate regurgitation. PERICARDIUM: There was no pericardial effusion. AORTA: The root exhibited normal size. SYSTEMIC VEINS: IVC: The inferior vena cava was not well visualized. SYSTEM MEASUREMENT TABLES    2D  %FS: 37.23 %  Ao asc: 3.41 cm  EDV(Teich): 86.36 ml  EF(Teich): 67.44 %  ESV(Teich): 28.12 ml  IVSd: 0.88 cm  LA Area: 11.95 cm2  LA Diam: 3.74 cm  LVEDV MOD A4C: 56.04 ml  LVEF MOD A4C: 60.86 %  LVESV MOD A4C: 21.94 ml  LVIDd: 4.37 cm  LVIDs: 2.74 cm  LVLd A4C: 8.16 cm  LVLs A4C: 7.31 cm  LVOT Diam: 1.9 cm  LVPWd: 0.92 cm  RA Area: 10.72 cm2  RVIDd: 3.71 cm  SV MOD A4C: 34.11 ml  SV(Teich): 58.25 ml    CW  AV Env. Ti: 312.08 ms  AV MaxP.7 mmHg  AV VTI: 52.95 cm  AV Vmax: 2.43 m/s  AV Vmean: 1.7 m/s  AV meanP.43 mmHg  TR MaxP.49 mmHg  TR Vmax: 2.32 m/s    MM  TAPSE: 1.46 cm    PW  E' Sept: 0.06 m/s  E/E' Sept: 18.99  MV A [place]: 1.05 m/s  MV Dec [place]: 4.15 m/s2  MV DecT: 275.38 ms  MV E [place]: 1.13 m/s  MV E/A Ratio: 1.08  MV PHT: 79.86 ms  MVA By PHT: 2.75 cm2    [facility]    Prepared and electronically signed by    Jasper Costello MD  Signed [id number] 12:07:16    Results for orders placed during the hospital encounter of 19    PATIENCE    Narrative  [address] [place] [address], [address] [place] [address]  [phone]    Transesophageal Echocardiogram  Limited 2D, Doppler, and Color Doppler    Study date:  2019    Patient: Tereza Mar  MR number: [id number]  Account number: [de-identified]  : 1950  Age: 80 years  Gender: Male  Status: Outpatient  Location: Cath lab  Height: 66 in  Weight: 156 lb  BP: 140/ 100 mmHg    Indications: Atrial flutter w/ cardioversion. Diagnoses: I48.1 - Atrial flutter    Sonographer:  Binta Senior RDCS  Primary Physician:  Daphne Courtney  Referring Physician:  Ana Maria Young MD  Group:  [facility]  RN:  Heriberto Higuera  Interpreting Physician:  Ana Maria Young MD    SUMMARY    LEFT VENTRICLE:  The ventricle was mildly dilated. Systolic function was severely reduced. Ejection fraction was estimated to be 25 %. There was severe diffuse hypokinesis. Wall thickness was normal.    RIGHT VENTRICLE:  The size was normal.  Systolic function was mildly reduced. LEFT ATRIAL APPENDAGE:  S/P clipping. No thrombus. MITRAL VALVE:  Prior repair procedures: surgical repair  There was moderate regurgitation. TRICUSPID VALVE:  There was mild regurgitation. HISTORY: PRIOR HISTORY: DM2. Dilated cardiomyopathy. CAD. Bicuspid aortic valve. Hypertension. Systolic congestive heart failure. S/P AVR and MV clip; 2019. PROCEDURE: The procedure was performed in the catheterization laboratory. This was a routine study.  The risks and alternatives of the procedure were explained to the patient and informed consent was obtained. The transesophageal approach  was used. The study included limited 2D imaging, limited spectral Doppler, color Doppler, and probe insertion (without interpretation). The heart rate was 125 bpm, at the start of the study. An adult omniplane probe was inserted by the  attending cardiologist. Intubated with ease. One intubation attempt(s). There was no blood detected on the probe. Image quality was adequate. There were no complications during the procedure. MEDICATIONS: Anesthesia administered by  anesthesia team.    LEFT VENTRICLE: The ventricle was mildly dilated. Systolic function was severely reduced. Ejection fraction was estimated to be 25 %. There was severe diffuse hypokinesis. Wall thickness was normal.    RIGHT VENTRICLE: The size was normal. Systolic function was mildly reduced. Wall thickness was normal.    LEFT ATRIUM: Size was normal. No thrombus was identified. APPENDAGE: S/P clipping. No thrombus. ATRIAL SEPTUM: No defect or patent foramen ovale was identified. RIGHT ATRIUM: Size was normal. No thrombus was identified. MITRAL VALVE: Prior repair procedures: surgical repair DOPPLER: There was moderate regurgitation. AORTIC VALVE: A bioprosthesis was present. It exhibited normal function. DOPPLER: There was no regurgitation. TRICUSPID VALVE: The valve structure was normal. There was normal leaflet separation. There was no echocardiographic evidence of vegetation. DOPPLER: There was mild regurgitation. PULMONIC VALVE: Leaflets exhibited normal thickness, no calcification, and normal cuspal separation. There was no echocardiographic evidence of vegetation. PERICARDIUM: There was no pericardial effusion. The pericardium was normal in appearance. AORTA: The root exhibited normal size. There was no atheroma. There was no evidence for dissection. There was no evidence for aneurysm.     [employer] Accredited Echocardiography Laboratory    Prepared and electronically signed by    Bryon Paige MD  Signed 12-Apr-2019 15:55:03    No results found for this or any previous visit. No results found for this or any previous visit.

## 2023-07-26 ENCOUNTER — OFFICE VISIT (OUTPATIENT)
Dept: NEPHROLOGY | Facility: CLINIC | Age: 73
End: 2023-07-26
Payer: COMMERCIAL

## 2023-07-26 VITALS
BODY MASS INDEX: 23.37 KG/M2 | DIASTOLIC BLOOD PRESSURE: 64 MMHG | HEIGHT: 68 IN | WEIGHT: 154.2 LBS | SYSTOLIC BLOOD PRESSURE: 132 MMHG

## 2023-07-26 DIAGNOSIS — E87.20 METABOLIC ACIDOSIS: ICD-10-CM

## 2023-07-26 DIAGNOSIS — I50.22 CHRONIC SYSTOLIC HEART FAILURE (HCC): Chronic | ICD-10-CM

## 2023-07-26 DIAGNOSIS — D63.1 ANEMIA DUE TO STAGE 4 CHRONIC KIDNEY DISEASE (HCC): ICD-10-CM

## 2023-07-26 DIAGNOSIS — N05.1 FSGS (FOCAL SEGMENTAL GLOMERULOSCLEROSIS): ICD-10-CM

## 2023-07-26 DIAGNOSIS — N18.4 CKD STAGE G4/A3, GFR 15-29 AND ALBUMIN CREATININE RATIO >300 MG/G (HCC): Primary | ICD-10-CM

## 2023-07-26 DIAGNOSIS — N18.4 BENIGN HYPERTENSION WITH CHRONIC KIDNEY DISEASE, STAGE IV (HCC): ICD-10-CM

## 2023-07-26 DIAGNOSIS — Z98.890 S/P MVR (MITRAL VALVE REPAIR): ICD-10-CM

## 2023-07-26 DIAGNOSIS — E11.22 TYPE 2 DIABETES MELLITUS WITH STAGE 4 CHRONIC KIDNEY DISEASE, WITHOUT LONG-TERM CURRENT USE OF INSULIN (HCC): ICD-10-CM

## 2023-07-26 DIAGNOSIS — N18.4 TYPE 2 DIABETES MELLITUS WITH STAGE 4 CHRONIC KIDNEY DISEASE, WITHOUT LONG-TERM CURRENT USE OF INSULIN (HCC): ICD-10-CM

## 2023-07-26 DIAGNOSIS — N25.81 SECONDARY HYPERPARATHYROIDISM OF RENAL ORIGIN (HCC): ICD-10-CM

## 2023-07-26 DIAGNOSIS — N18.9 CHRONIC KIDNEY DISEASE-MINERAL AND BONE DISORDER: ICD-10-CM

## 2023-07-26 DIAGNOSIS — E83.9 CHRONIC KIDNEY DISEASE-MINERAL AND BONE DISORDER: ICD-10-CM

## 2023-07-26 DIAGNOSIS — N18.4 ANEMIA DUE TO STAGE 4 CHRONIC KIDNEY DISEASE (HCC): ICD-10-CM

## 2023-07-26 DIAGNOSIS — I12.9 BENIGN HYPERTENSION WITH CHRONIC KIDNEY DISEASE, STAGE IV (HCC): ICD-10-CM

## 2023-07-26 DIAGNOSIS — E87.5 HYPERKALEMIA: ICD-10-CM

## 2023-07-26 DIAGNOSIS — M89.9 CHRONIC KIDNEY DISEASE-MINERAL AND BONE DISORDER: ICD-10-CM

## 2023-07-26 PROCEDURE — 99214 OFFICE O/P EST MOD 30 MIN: CPT | Performed by: STUDENT IN AN ORGANIZED HEALTH CARE EDUCATION/TRAINING PROGRAM

## 2023-07-26 PROCEDURE — 1159F MED LIST DOCD IN RCRD: CPT | Performed by: STUDENT IN AN ORGANIZED HEALTH CARE EDUCATION/TRAINING PROGRAM

## 2023-07-26 PROCEDURE — 1160F RVW MEDS BY RX/DR IN RCRD: CPT | Performed by: STUDENT IN AN ORGANIZED HEALTH CARE EDUCATION/TRAINING PROGRAM

## 2023-07-26 NOTE — PATIENT INSTRUCTIONS
Thank you for coming to your visit today. As we discussed you kidney function is , your creatinine is 2.9mg/dL. Your electrolytes are normal. Please follow the recommendations below       Recommend low sodium (salt) food    Avoid nonsteroidal anti-inflammatory drugs such as Naprosyn, ibuprofen, Aleve, Advil, Celebrex, Meloxicam (Mobic) etc.  You can use Tylenol as needed if you do not have any liver condition to be concerned about    Try to avoid medications such as pantoprazole or  Protonix/Nexium or Esomeprazole)/Prilosec or omeprazole/Prevacid or lansoprazole/AcipHex or Rabeprazole. If you are able to, use Pepcid as this is safer for your kidneys.     Try to exercise at least 30 minutes 3 days a week to begin with with an ultimate goal of 5 days a week for at least 30 minutes  Stop taking vit D 1000 units and continue with Calcitriol     Next Visit in 4 months with results   If you need to see us earlier we can change the appointment for you      Anayeli Ordoñez MD  Nephrology Attending

## 2023-07-26 NOTE — PROGRESS NOTES
GLOMERULAR DISEASE OUTPATIENT PROGRESS NOTE   Myra Mustafa 68 y.o. male MRN: [id number]  DATE: 7/30/2023    Reason for visit:   Chief Complaint   Patient presents with   • Follow-up     CKD (chronic kidney disease), stage IV        ASSESSMENT and PLAN:  79 yo man with PMH DM, FSGS (biopsy proven 2018), CKD G4A3 (bl creat 3-3.5mg/dL, eGFR 21-24). Patient is here for follow-up of FSGS     PLAN:    Labs from March 2023 reviewed, urine from July 2023 reviewed and updated     #Podocytopathy with features of secondary FSGS  • Time of diagnosis:4/18/2018  • Biopsy date/brief summary:  ? FSGS with glomerulomegaly with secondary pattern with 15% of podocyte effacement. ? Diffuse diabetic glomerulosclerosis (mild). ? 25% IFTA  ? Arterio-arteriolosclerosis with hyalinosis, moderate-severe   • Etiology: Likely secondary to obesity (at that time)  ? Lost ~15lbs (asper patient, previous notes mentioned 35lbs) after diagnosis (posible cause of FSGS)  • Genetic Testing:  Not done, possible APOL1  • Current creatinine:  2.95 mg/dL, baseline   • UPCr 2.58 mg/g>6.64g/g  ? Not taking ACE inhibitors or ARB due to hyperkalemia  ?  eGFR borderline low for DAPA  • Serum albumin 3.3      #Immunosuppressive Medications  • No IS indicated at this time     #CKD G4A3     • Baseline creatinine:3-3.5mg/dL  • Current creatinine:  2.95 mg/dL, baseline  • Etiology:  Secondary to FSGS and Diabetic glomerulopathy as above  • UPCr:2.58g/g>6.64gg  • Not taking ACE inhibitors, ARB due to tendency to hyperkalemia   • No dapagliflozin due to GFR less than 25  • Avoid nonsteroidal anti-inflammatory drugs such as Naprosyn, ibuprofen, Aleve, Advil, Celebrex, Meloxicam (Mobic) etc.  You can use Tylenol as needed if you do not have any liver condition to be concerned about  • Referred to CKD Smart classes for dialysis techniques  • Patient would like to do peritoneal dialysis  • Referred and seen for transplant eval and education   • Declined referral to surgery at this time. Patient prefers to wait till he gets close dialysis     #Volume/Hypertension:  • Volume: Euvolemic  • Blood pressure:  Normotensive /64mmhg , goal <140/90mmHg   • Low sodium diet   • Continue with  ? Amlodipine 2.5   ? Hydralazine 25 mg 3 times daily   ? Isodril   ? Metoprolol 50 mg daily   • Advised to maintain a good BP control to prevent progression of CKD         #Hyperkalemia (resolved)  • Resolved  • Off ACE inhibitors or ARB        #Acid base disorder  • Serum bicarb 21mmol/L  • Continue with sodium bicarb 1300 mg 3 times daily    #CKD-MBD  • Calcium 8.5 mg/dL  • Phosphorus 3.7mg/dL  (goal <5.5)  • At goal     #Secondary Hyperparathyroidism   • LILIANA 869.9, EECJNKFGSD levels KDIGO 2017  • Continue calcitriol     #Acute on chronic anemia:  • Current hemoglobin 12.2mg at goal   • Secondary to CKD no indication of MERRILL at this time     #MVR, AVR  • On coumadin      #CHF  • Euvolemic  • Off diuretics    #DM  · HbA1c 5.7  · Off meds   · Advised to maintain a good DM control to prevent progression of CKD   • Maintain healthy diet (vegetables, fruits, whole grains, nonfat or low fat)  • Weight loss  • Physical activity (5 to 10 minutes to start the increase to 30 min a day)      PREVIOUS BIOPSIES  She portal cytopathy with features of secondary FSGS    SUBJECTIVE / HPI:  Feels tired, no SOB, no CP, no recent hospitalizations. No issues with medication. He is currently taking care of his grandchild    REVIEW OF SYSTEMS:  More than 10 point review of systems were obtained and discussed in length with the patient. Complete review of systems were negative / unremarkable except mentioned above.      Review of Systems - Ophthalmic ROS: negative  ENT ROS: negative  Hematological and Lymphatic ROS: negative  Endocrine ROS: Fatigue  Respiratory ROS: no cough, shortness of breath, or wheezing  Cardiovascular ROS: no chest pain or dyspnea on exertion  Gastrointestinal ROS: no abdominal pain, change in bowel habits, or black or bloody stools  Genito-Urinary ROS: no dysuria, trouble voiding, or hematuria  Musculoskeletal ROS: negative  Neurological ROS: no TIA or stroke symptoms  Dermatological ROS: negative       PHYSICAL EXAM:  Vitals:    07/26/23 1138   BP: 132/64   BP Location: Left arm   Patient Position: Sitting   Cuff Size: Standard   Weight: 69.9 kg (154 lb 3.2 oz)   Height: 5' 8" (1.727 m)     Body mass index is 23.45 kg/m². Physical Exam   General:  no acute distress at this time  Skin:  No acute rash  Eyes:  No scleral icterus and noninjected  ENT:  mucous membranes moist  Neck:  no carotid bruits  Chest:  Clear to auscultation percussion, good respiratory effort, no use of accessory respiratory muscles  CVS:  Regular rate and rhythm without rub   Abdomen:  soft and nontender   Extremities: no significant lower extremity edema  Neuro:  No gross focality  Psych:  Alert , cooperative       PAST MEDICAL HISTORY:  Past Medical History:   Diagnosis Date   • Chronic kidney disease    • Colon polyp    • COVID-19    • Diabetes mellitus ([address])    • Hyperlipidemia    • Hypertension    • Proteinuria    • Stage 3 chronic kidney disease (HCC)    • Vitamin D deficiency        PAST SURGICAL HISTORY:  Past Surgical History:   Procedure Laterality Date   • COLONOSCOPY      overdue   • DC ECHO TRANSESOPHAG R-T 2D W/PRB IMG ACQUISJ I&R N/A 3/7/2019    Procedure: INTRA-OP TRANSESOPHAGEAL ECHOCARDIOGRAM (PATIENCE);   Surgeon: Diya Carmona DO;  [facility];  Service: Cardiac Surgery   • DC NASAL ENDOSCOPY DIAGNOSTIC UNI/BI SPX Bilateral 1/16/2023    Procedure: ENDOSCOPY NOSE BILATERALLY, WITH ENDOSCOPIC CONTROL OF EPISTAXIS ON LEFT;  Surgeon: Liliane Felty, MD;  [facility];  Service: ENT   • DC PERQ CLSR TCAT L ATR APNDGE W/ENDOCARDIAL IMPLNT  3/7/2019    Procedure: LEFT ATRIAL APPENDAGE OCCLUSION CLIPPED with 35mm Charleen Lakshmi;  Surgeon: Diya Carmona DO;  [facility]; Service: Cardiac Surgery   • NE REPLACEMENT MITRAL VALVE W/CARDIOPULMONARY BYP N/A 3/7/2019    Procedure: REPLACEMENT VALVE MITRAL (MVR) REPAIR with a 30mm MEDTRONIC CG FUTURE RING;  Surgeon: Diya Carmona DO;  [facility];  Service: Cardiac Surgery   • NE RPLCMT AORTIC VALVE OPN W/STENTLESS TISSUE VALVE N/A 3/7/2019    Procedure: REPLACEMENT VALVE AORTIC (AVR) with 23mm TAVERA INSPIRIS RESILIA  AORTIC VALVE;  Surgeon: Diya Carmona DO;  [facility];  Service: Cardiac Surgery   • RENAL BIOPSY, OPEN     • TOE SURGERY Left        SOCIAL HISTORY:  Social History     Substance and Sexual Activity   Alcohol Use Yes    Comment: occasionally     Social History     Substance and Sexual Activity   Drug Use No     Social History     Tobacco Use   Smoking Status Former   • Years: 2.00   • Types: Cigarettes   • Quit date:    • Years since quittin.6   Smokeless Tobacco Never       FAMILY HISTORY:  Family History   Problem Relation Age of Onset   • Stroke Mother    • Hypertension Mother    • Blindness Father    • Hyperlipidemia Father    • Hypertension Father    • Glaucoma Father    • Cancer Sister    • Diabetes Sister    • Ovarian cancer Sister    • Gout Brother    • Heart Valve Disease Brother    • Hypertension Brother    • Anuerysm Neg Hx    • Heart attack Neg Hx    • Arrhythmia Neg Hx    • Heart failure Neg Hx    • Coronary artery disease Neg Hx    • Sudden death Neg Hx         scd       MEDICATIONS:    Current Outpatient Medications:   •  amLODIPine (NORVASC) 2.5 mg tablet, Take 1 tablet (2.5 mg total) by mouth daily, Disp: 90 tablet, Rfl: 3  •  ascorbic acid (VITAMIN C) 500 mg tablet, Take 500 mg by mouth daily, Disp: , Rfl:   •  atorvastatin (LIPITOR) 20 mg tablet, take 1 tablet by mouth once daily, Disp: 90 tablet, Rfl: 2  •  calcitriol (ROCALTROL) 0.25 mcg capsule, Take 1 capsule (0.25 mcg total) by mouth daily TAKE 1 CAPSULE 5 DAYS A WEEK AS DIRECTED, Disp: 90 capsule, Rfl: 3  • cetirizine (ZyrTEC) 10 mg tablet, Take 1 tablet (10 mg total) by mouth daily, Disp: 30 tablet, Rfl: 1  •  Cholecalciferol (VITAMIN D3) 1000 units CAPS, Take 1 capsule by mouth daily, Disp: , Rfl:   •  COMBIGAN 0.2-0.5 %, , Disp: , Rfl: 0  •  hydrALAZINE (APRESOLINE) 25 mg tablet, Take 1 tablet (25 mg total) by mouth 3 (three) times a day, Disp: 270 tablet, Rfl: 3  •  isosorbide dinitrate (ISORDIL) 10 mg tablet, take 1 tablet by mouth three times a day, Disp: 270 tablet, Rfl: 3  •  metoprolol succinate (TOPROL-XL) 50 mg 24 hr tablet, Take 1 tablet (50 mg total) by mouth daily, Disp: 90 tablet, Rfl: 3  •  RA Aspirin EC 81 MG EC tablet, take 1 tablet by mouth once daily, Disp: 90 tablet, Rfl: 6  •  sodium bicarbonate 650 mg tablet, Take 2 tablets (1,300 mg total) by mouth 3 (three) times a day, Disp: 180 tablet, Rfl: 1  •  TRAVATAN Z 0.004 % ophthalmic solution, Administer 1 drop to both eyes daily at bedtime , Disp: , Rfl: 0  •  warfarin (COUMADIN) 2 mg tablet, TAKE 1-2 TABLETS BY MOUTH DAILY OR AS DIRECTED BY PHYSICIAN, Disp: 75 tablet, Rfl: 11    Lab Results:   Results for orders placed or performed in visit on 07/19/23   Hepatitis C Antibody ([facility])   Result Value Ref Range    Hepatitis C Ab Non-reactive Non-Reactive   PSA, Total Screen   Result Value Ref Range    PSA 1.32 0.00 - 4.00 ng/mL   Hemoglobin A1C   Result Value Ref Range    Hemoglobin A1C 5.7 (H) Normal 3.8-5.6%; PreDiabetic 5.7-6.4%;  Diabetic >=6.5%; Glycemic control for adults with diabetes <7.0% %     mg/dl   Fructosamine   Result Value Ref Range    Fructosamine 166 0 - 285 umol/L

## 2023-09-09 ENCOUNTER — APPOINTMENT (OUTPATIENT)
Dept: LAB | Facility: CLINIC | Age: 73
End: 2023-09-09
Payer: COMMERCIAL

## 2023-09-11 ENCOUNTER — ANTICOAG VISIT (OUTPATIENT)
Dept: CARDIOLOGY CLINIC | Facility: CLINIC | Age: 73
End: 2023-09-11

## 2023-10-10 DIAGNOSIS — I48.0 PAROXYSMAL ATRIAL FIBRILLATION (HCC): ICD-10-CM

## 2023-10-10 DIAGNOSIS — Z95.2 S/P AVR: ICD-10-CM

## 2023-10-10 DIAGNOSIS — I10 ESSENTIAL HYPERTENSION: Chronic | ICD-10-CM

## 2023-10-10 RX ORDER — METOPROLOL SUCCINATE 50 MG/1
50 TABLET, EXTENDED RELEASE ORAL DAILY
Qty: 90 TABLET | Refills: 3 | Status: SHIPPED | OUTPATIENT
Start: 2023-10-10

## 2023-10-10 RX ORDER — WARFARIN SODIUM 2 MG/1
TABLET ORAL
Qty: 60 TABLET | Refills: 11 | Status: SHIPPED | OUTPATIENT
Start: 2023-10-10

## 2023-10-23 ENCOUNTER — TELEPHONE (OUTPATIENT)
Dept: NEPHROLOGY | Facility: CLINIC | Age: 73
End: 2023-10-23

## 2023-10-23 NOTE — TELEPHONE ENCOUNTER
Left voicemail for the patient reminding to please complete labwork prior to 11/1 appointment with Dr. Robb Hurd. Advised patient to call back with any questions or  Concerns.

## 2023-10-25 NOTE — TELEPHONE ENCOUNTER
Called patient reminding to please complete labwork prior to 11/1 appointment with Dr. Raul Ortega. Myra stating will do labs on Saturday.

## 2023-10-28 ENCOUNTER — APPOINTMENT (OUTPATIENT)
Dept: LAB | Facility: CLINIC | Age: 73
End: 2023-10-28
Payer: COMMERCIAL

## 2023-10-28 DIAGNOSIS — N18.4 CKD STAGE G4/A3, GFR 15-29 AND ALBUMIN CREATININE RATIO >300 MG/G (HCC): ICD-10-CM

## 2023-10-28 DIAGNOSIS — N25.81 SECONDARY HYPERPARATHYROIDISM OF RENAL ORIGIN (HCC): ICD-10-CM

## 2023-10-28 LAB
ANION GAP SERPL CALCULATED.3IONS-SCNC: 3 MMOL/L
BUN SERPL-MCNC: 42 MG/DL (ref 5–25)
CALCIUM SERPL-MCNC: 8.4 MG/DL (ref 8.4–10.2)
CHLORIDE SERPL-SCNC: 113 MMOL/L (ref 96–108)
CO2 SERPL-SCNC: 22 MMOL/L (ref 21–32)
CREAT SERPL-MCNC: 3.4 MG/DL (ref 0.6–1.3)
GFR SERPL CREATININE-BSD FRML MDRD: 17 ML/MIN/1.73SQ M
GLUCOSE P FAST SERPL-MCNC: 91 MG/DL (ref 65–99)
PHOSPHATE SERPL-MCNC: 4.2 MG/DL (ref 2.3–4.1)
POTASSIUM SERPL-SCNC: 5.9 MMOL/L (ref 3.5–5.3)
PTH-INTACT SERPL-MCNC: 122.9 PG/ML (ref 12–88)
SODIUM SERPL-SCNC: 138 MMOL/L (ref 135–147)

## 2023-10-28 PROCEDURE — 80048 BASIC METABOLIC PNL TOTAL CA: CPT

## 2023-10-28 PROCEDURE — 84100 ASSAY OF PHOSPHORUS: CPT

## 2023-10-28 PROCEDURE — 83970 ASSAY OF PARATHORMONE: CPT

## 2023-10-30 ENCOUNTER — TELEPHONE (OUTPATIENT)
Dept: FAMILY MEDICINE CLINIC | Facility: CLINIC | Age: 73
End: 2023-10-30

## 2023-10-30 ENCOUNTER — ANTICOAG VISIT (OUTPATIENT)
Dept: CARDIOLOGY CLINIC | Facility: CLINIC | Age: 73
End: 2023-10-30

## 2023-10-30 DIAGNOSIS — F41.9 ANXIETY: Primary | ICD-10-CM

## 2023-10-30 RX ORDER — ALPRAZOLAM 0.25 MG/1
TABLET ORAL
Qty: 4 TABLET | Refills: 0 | Status: SHIPPED | OUTPATIENT
Start: 2023-10-30

## 2023-11-01 ENCOUNTER — OFFICE VISIT (OUTPATIENT)
Dept: NEPHROLOGY | Facility: CLINIC | Age: 73
End: 2023-11-01
Payer: COMMERCIAL

## 2023-11-01 VITALS
WEIGHT: 150 LBS | BODY MASS INDEX: 22.73 KG/M2 | DIASTOLIC BLOOD PRESSURE: 70 MMHG | HEIGHT: 68 IN | SYSTOLIC BLOOD PRESSURE: 120 MMHG

## 2023-11-01 DIAGNOSIS — N18.4 BENIGN HYPERTENSION WITH CHRONIC KIDNEY DISEASE, STAGE IV (HCC): ICD-10-CM

## 2023-11-01 DIAGNOSIS — E11.22 TYPE 2 DIABETES MELLITUS WITH STAGE 4 CHRONIC KIDNEY DISEASE, WITHOUT LONG-TERM CURRENT USE OF INSULIN (HCC): ICD-10-CM

## 2023-11-01 DIAGNOSIS — E83.9 CHRONIC KIDNEY DISEASE-MINERAL AND BONE DISORDER: ICD-10-CM

## 2023-11-01 DIAGNOSIS — I12.9 BENIGN HYPERTENSION WITH CHRONIC KIDNEY DISEASE, STAGE IV (HCC): ICD-10-CM

## 2023-11-01 DIAGNOSIS — N18.4 TYPE 2 DIABETES MELLITUS WITH STAGE 4 CHRONIC KIDNEY DISEASE, WITHOUT LONG-TERM CURRENT USE OF INSULIN (HCC): ICD-10-CM

## 2023-11-01 DIAGNOSIS — E87.5 HYPERKALEMIA: ICD-10-CM

## 2023-11-01 DIAGNOSIS — N18.4 ANEMIA DUE TO STAGE 4 CHRONIC KIDNEY DISEASE: ICD-10-CM

## 2023-11-01 DIAGNOSIS — N25.81 SECONDARY HYPERPARATHYROIDISM OF RENAL ORIGIN (HCC): ICD-10-CM

## 2023-11-01 DIAGNOSIS — D63.1 ANEMIA DUE TO STAGE 4 CHRONIC KIDNEY DISEASE: ICD-10-CM

## 2023-11-01 DIAGNOSIS — Z98.890 S/P MVR (MITRAL VALVE REPAIR): ICD-10-CM

## 2023-11-01 DIAGNOSIS — N18.9 CHRONIC KIDNEY DISEASE-MINERAL AND BONE DISORDER: ICD-10-CM

## 2023-11-01 DIAGNOSIS — N05.1 FSGS (FOCAL SEGMENTAL GLOMERULOSCLEROSIS): ICD-10-CM

## 2023-11-01 DIAGNOSIS — N25.81 SECONDARY HYPERPARATHYROIDISM (HCC): Chronic | ICD-10-CM

## 2023-11-01 DIAGNOSIS — I50.22 CHRONIC SYSTOLIC HEART FAILURE (HCC): ICD-10-CM

## 2023-11-01 DIAGNOSIS — N18.4 CKD STAGE G4/A3, GFR 15-29 AND ALBUMIN CREATININE RATIO >300 MG/G (HCC): Primary | ICD-10-CM

## 2023-11-01 DIAGNOSIS — M89.9 CHRONIC KIDNEY DISEASE-MINERAL AND BONE DISORDER: ICD-10-CM

## 2023-11-01 PROCEDURE — 99214 OFFICE O/P EST MOD 30 MIN: CPT | Performed by: STUDENT IN AN ORGANIZED HEALTH CARE EDUCATION/TRAINING PROGRAM

## 2023-11-01 RX ORDER — CALCITRIOL 0.25 UG/1
0.25 CAPSULE, LIQUID FILLED ORAL DAILY
Qty: 90 CAPSULE | Refills: 3 | Status: SHIPPED | OUTPATIENT
Start: 2023-11-01

## 2023-11-01 NOTE — PATIENT INSTRUCTIONS
Thank you for coming to your visit today. As we discussed you kidney function is above your baseline, your creatinine is 3.4 mg/dL. Your potassium is high.  Please follow the recommendations below       Recommend low sodium (salt) food  Low potassium diet    Avoid nonsteroidal anti-inflammatory drugs such as Naprosyn, ibuprofen, Aleve, Advil, Celebrex, Meloxicam (Mobic) etc.  You can use Tylenol as needed if you do not have any liver condition to be concerned about      Try to exercise at least 30 minutes 3 days a week to begin with with an ultimate goal of 5 days a week for at least 30 minutes    Next Visit in 2-3 months with results   If you need to see us earlier we can change the appointment for you      Beryle Saner, MD  Nephrology Attending

## 2023-11-01 NOTE — PROGRESS NOTES
NEPHROLOGY OUTPATIENT PROGRESS NOTE   Nelsy Dates 68 y.o. male MRN: [id number]  DATE: 11/7/2023    Reason for visit:   Chief Complaint   Patient presents with    Follow-up    CKD IV        Patient Instructions   Thank you for coming to your visit today. As we discussed you kidney function is above your baseline, your creatinine is 3.4 mg/dL. Your potassium is high. Please follow the recommendations below       Recommend low sodium (salt) food  Low potassium diet    Avoid nonsteroidal anti-inflammatory drugs such as Naprosyn, ibuprofen, Aleve, Advil, Celebrex, Meloxicam (Mobic) etc.  You can use Tylenol as needed if you do not have any liver condition to be concerned about      Try to exercise at least 30 minutes 3 days a week to begin with with an ultimate goal of 5 days a week for at least 30 minutes    Next Visit in 2-3 months with results   If you need to see us earlier we can change the appointment for you      Sascha Plasencia MD  Nephrology Attending           Jose Abreu was seen today for follow-up and ckd iv. Diagnoses and all orders for this visit:    CKD stage G4/A3, GFR 15-29 and albumin creatinine ratio >300 mg/g (HCC)  -     Protein, Total w/Creat, Random Urine; Future  -     CBC and Platelet; Future  -     Cystatin C With eGFR; Future  -     Basic metabolic panel; Future    FSGS (focal segmental glomerulosclerosis)    Hyperkalemia  -     Basic metabolic panel; Future    Secondary hyperparathyroidism (HCC)  -     calcitriol (ROCALTROL) 0.25 mcg capsule;  Take 1 capsule (0.25 mcg total) by mouth daily TAKE 1 CAPSULE 5 DAYS A WEEK AS DIRECTED    Benign hypertension with chronic kidney disease, stage IV (HCC)    Chronic kidney disease-mineral and bone disorder    Secondary hyperparathyroidism of renal origin ([address])    Anemia due to stage 4 chronic kidney disease     S/P MVR (mitral valve repair)    Chronic systolic heart failure (HCC)    Type 2 diabetes mellitus with stage 4 chronic kidney disease, without long-term current use of insulin [facility])        Assessment/Plan:  81 yo man with PMH DM, FSGS (biopsy proven 2018), CKD G4A3 (bl creat 3-3.5mg/dL, eGFR 21-24). Patient is here for follow-up of FSGS     PLAN:     Labs from March 2023 reviewed, urine from July 2023 reviewed and updated     #Podocytopathy with features of secondary FSGS  Time of diagnosis:4/18/2018  Biopsy date/brief summary:  FSGS with glomerulomegaly with secondary pattern with 15% of podocyte effacement. Diffuse diabetic glomerulosclerosis (mild).    25% IFTA  Arterio-arteriolosclerosis with hyalinosis, moderate-severe   Etiology: Likely secondary to obesity (at that time)  Lost ~15lbs (asper patient, previous notes mentioned 35lbs) after diagnosis (posible cause of FSGS)  Genetic Testing:  Not done, possible APOL1  Current creatinine: 3.4 mg/dL, worsening  UPCr 2.58 mg/g>6.64g/g, trending up  Not taking ACE inhibitors or ARB due to hyperkalemia  eGFR borderline low for DAPA  Serum albumin 3.3      #Immunosuppressive Medications  No IS indicated at this time     #CKD G4A3     Baseline creatinine:3-3.5mg/dL  Current creatinine: 3.4 mg/dL, trending up  Etiology:  Secondary to FSGS and Diabetic glomerulopathy as above  UPCr:2.58g/g>6.64gg  No uremic symptoms at this time  Avoid nonsteroidal anti-inflammatory drugs such as Naprosyn, ibuprofen, Aleve, Advil, Celebrex, Meloxicam (Mobic) etc.  You can use Tylenol as needed if you do not have any liver condition to be concerned about  Patient would like prefer to do PD if needed  Referred and seen for transplant eval and education   We will refer to IR for PD catheter if worsening kidney function     #Volume/Hypertension:  Volume: Euvolemic  Blood pressure:  Normotensive /70 mmhg , goal <140/90mmHg   Low sodium diet   Continue with  Amlodipine 2.5   Hydralazine 25 mg 3 times daily   Isodril   Metoprolol 50 mg daily   Advised to maintain a good BP control to prevent progression of CKD #Hyperkalemia   Potassium 5.9 mEq/L  Off ACE inhibitors or ARB  Low potassium diet  We will repeat BMP        #Acid base disorder  Serum bicarb 22mmol/L  Of sodium bicarb at this time     #CKD-MBD  Calcium 8.4 mg/dL  Phosphorus 4.2 mg/dL  (goal <5.5)  At goal     #Secondary Hyperparathyroidism   iPTH 122.9, guidelines levels KDIGO 2017  Continue calcitriol     #Acute on chronic anemia:  Current hemoglobin 12.2mg at goal   Secondary to CKD no indication of MERRILL at this time      #MVR, AVR  On coumadin      #CHF  Euvolemic  Off diuretics     #DM  HbA1c 5.7  Off meds   Advised to maintain a good DM control to prevent progression of CKD   Maintain healthy diet (vegetables, fruits, whole grains, nonfat or low fat)  Weight loss  Physical activity (5 to 10 minutes to start the increase to 30 min a day)      SUBJECTIVE / INTERVAL HISTORY:  68 y.o. male presents in follow up of CKD. Feels well, no SOB, no CP, no recent hospitalizations. No issues with medication. No uremic symptoms      Myra Mustafa denies any recent illness/hospitalizations/medication changes since last office visit. Review of Systems   Constitutional:  Negative for activity change and appetite change. HENT:  Negative for congestion. Eyes:  Negative for discharge. Respiratory:  Negative for shortness of breath and wheezing. Cardiovascular:  Negative for chest pain and leg swelling. Gastrointestinal:  Negative for abdominal distention. Endocrine: Negative for cold intolerance. Genitourinary:  Negative for dysuria. Musculoskeletal:  Negative for arthralgias and back pain. Skin:  Negative for color change and pallor. Neurological:  Negative for dizziness. Psychiatric/Behavioral:  Negative for agitation. OBJECTIVE:  /70   Ht 5' 8" (1.727 m)   Wt 68 kg (150 lb)   BMI 22.81 kg/m²  Body mass index is 22.81 kg/m².     Physical exam:  Physical Exam  General:  no acute distress at this time  Skin:  No acute rash  Eyes: No scleral icterus and noninjected  ENT:  mucous membranes moist  Neck:  no carotid bruits  Chest:  Clear to auscultation percussion, good respiratory effort, no use of accessory respiratory muscles  CVS:  Regular rate and rhythm without rub   Abdomen:  soft and nontender   Extremities: no significant lower extremity edema  Neuro:  No gross focality  Psych:  Alert , cooperative         Medications:    Current Outpatient Medications:     ALPRAZolam (XANAX) 0.25 mg tablet, Take prior to flight, Disp: 4 tablet, Rfl: 0    amLODIPine (NORVASC) 2.5 mg tablet, Take 1 tablet (2.5 mg total) by mouth daily, Disp: 90 tablet, Rfl: 3    ascorbic acid (VITAMIN C) 500 mg tablet, Take 500 mg by mouth daily, Disp: , Rfl:     atorvastatin (LIPITOR) 20 mg tablet, take 1 tablet by mouth once daily, Disp: 90 tablet, Rfl: 2    calcitriol (ROCALTROL) 0.25 mcg capsule, Take 1 capsule (0.25 mcg total) by mouth daily TAKE 1 CAPSULE 5 DAYS A WEEK AS DIRECTED, Disp: 90 capsule, Rfl: 3    cetirizine (ZyrTEC) 10 mg tablet, Take 1 tablet (10 mg total) by mouth daily, Disp: 30 tablet, Rfl: 1    Cholecalciferol (VITAMIN D3) 1000 units CAPS, Take 1 capsule by mouth daily, Disp: , Rfl:     COMBIGAN 0.2-0.5 %, , Disp: , Rfl: 0    hydrALAZINE (APRESOLINE) 25 mg tablet, Take 1 tablet (25 mg total) by mouth 3 (three) times a day, Disp: 270 tablet, Rfl: 3    isosorbide dinitrate (ISORDIL) 10 mg tablet, take 1 tablet by mouth three times a day, Disp: 270 tablet, Rfl: 3    metoprolol succinate (TOPROL-XL) 50 mg 24 hr tablet, Take 1 tablet (50 mg total) by mouth daily, Disp: 90 tablet, Rfl: 3    TRAVATAN Z 0.004 % ophthalmic solution, Administer 1 drop to both eyes daily at bedtime , Disp: , Rfl: 0    warfarin (COUMADIN) 2 mg tablet, TAKE 1 TO 2 TABLETS DAILY BY MOUTH OR AS DIRECTED BY PHYSICIAN., Disp: 60 tablet, Rfl: 11    aspirin (RA Aspirin EC) 81 mg EC tablet, Take 1 tablet (81 mg total) by mouth daily, Disp: 90 tablet, Rfl: 1    Allergies:   Allergies as of 11/01/2023 - Reviewed 11/01/2023   Allergen Reaction Noted    Ace inhibitors Cough 12/24/2013    Keflex [cephalexin] Rash 01/26/2021       The following portions of the patient's history were reviewed and updated as appropriate: past family history, past surgical history and problem list.    Laboratory Results:  Lab Results   Component Value Date    SODIUM 138 10/28/2023    K 5.9 (H) 10/28/2023     (H) 10/28/2023    CO2 22 10/28/2023    BUN 42 (H) 10/28/2023    CREATININE 3.40 (H) 10/28/2023    GLUC 154 (H) 01/17/2023    CALCIUM 8.4 10/28/2023        Lab Results   Component Value Date    .9 (H) 10/28/2023    CALCIUM 8.4 10/28/2023    PHOS 4.2 (H) 10/28/2023       Portions of the record may have been created with voice recognition software. Occasional wrong word or "sound a like" substitutions may have occurred due to the inherent limitations of voice recognition software. Read the chart carefully and recognize, using context, where substitutions have occurred.

## 2023-11-07 DIAGNOSIS — Z95.2 S/P AVR: ICD-10-CM

## 2023-11-07 PROBLEM — E11.22 TYPE 2 DIABETES MELLITUS WITH STAGE 4 CHRONIC KIDNEY DISEASE, WITHOUT LONG-TERM CURRENT USE OF INSULIN (HCC): Status: ACTIVE | Noted: 2023-11-07

## 2023-11-07 PROBLEM — N25.81 SECONDARY HYPERPARATHYROIDISM (HCC): Chronic | Status: ACTIVE | Noted: 2023-11-07

## 2023-11-07 PROBLEM — N18.4 TYPE 2 DIABETES MELLITUS WITH STAGE 4 CHRONIC KIDNEY DISEASE, WITHOUT LONG-TERM CURRENT USE OF INSULIN (HCC): Status: ACTIVE | Noted: 2023-11-07

## 2023-11-07 PROBLEM — N18.4 CKD STAGE G4/A3, GFR 15-29 AND ALBUMIN CREATININE RATIO >300 MG/G (HCC): Status: ACTIVE | Noted: 2023-11-07

## 2023-11-07 RX ORDER — ASPIRIN 81 MG/1
81 TABLET ORAL DAILY
Qty: 90 TABLET | Refills: 1 | Status: SHIPPED | OUTPATIENT
Start: 2023-11-07

## 2023-11-07 NOTE — TELEPHONE ENCOUNTER
Reason for call:   [x] Refill   [] Prior Auth  [] Other:     Office:   [x] PCP/Provider - Dr Mike Sullivan  [] Specialty/Provider -     Medication: RA Aspirin 81 mg, 1 qd, 90      [facility]    Does the patient have enough for 3 days?    [x] Yes   [] No - Send as HP to POD

## 2023-12-08 LAB
LEFT EYE DIABETIC RETINOPATHY: NORMAL
RIGHT EYE DIABETIC RETINOPATHY: NORMAL

## 2023-12-12 DIAGNOSIS — I48.92 ATRIAL FLUTTER, UNSPECIFIED TYPE (HCC): Primary | ICD-10-CM

## 2023-12-13 ENCOUNTER — APPOINTMENT (OUTPATIENT)
Dept: LAB | Facility: CLINIC | Age: 73
End: 2023-12-13
Payer: COMMERCIAL

## 2023-12-13 ENCOUNTER — ANTICOAG VISIT (OUTPATIENT)
Dept: CARDIOLOGY CLINIC | Facility: CLINIC | Age: 73
End: 2023-12-13

## 2023-12-13 DIAGNOSIS — N18.4 CKD STAGE G4/A3, GFR 15-29 AND ALBUMIN CREATININE RATIO >300 MG/G (HCC): ICD-10-CM

## 2023-12-13 DIAGNOSIS — N05.1 FSGS (FOCAL SEGMENTAL GLOMERULOSCLEROSIS): ICD-10-CM

## 2023-12-21 DIAGNOSIS — E78.3 HYPERCHYLOMICRONEMIA: ICD-10-CM

## 2023-12-21 RX ORDER — ATORVASTATIN CALCIUM 20 MG/1
20 TABLET, FILM COATED ORAL DAILY
Qty: 90 TABLET | Refills: 0 | Status: SHIPPED | OUTPATIENT
Start: 2023-12-21

## 2024-01-02 DIAGNOSIS — I12.9 BENIGN HYPERTENSION WITH CHRONIC KIDNEY DISEASE, STAGE IV (HCC): ICD-10-CM

## 2024-01-02 DIAGNOSIS — N18.4 BENIGN HYPERTENSION WITH CHRONIC KIDNEY DISEASE, STAGE IV (HCC): ICD-10-CM

## 2024-01-02 RX ORDER — HYDRALAZINE HYDROCHLORIDE 25 MG/1
25 TABLET, FILM COATED ORAL 3 TIMES DAILY
Qty: 270 TABLET | Refills: 3 | Status: SHIPPED | OUTPATIENT
Start: 2024-01-02

## 2024-01-27 ENCOUNTER — APPOINTMENT (OUTPATIENT)
Dept: LAB | Facility: CLINIC | Age: 74
End: 2024-01-27
Payer: COMMERCIAL

## 2024-01-27 DIAGNOSIS — E87.5 HYPERKALEMIA: ICD-10-CM

## 2024-01-27 DIAGNOSIS — N18.4 CKD STAGE G4/A3, GFR 15-29 AND ALBUMIN CREATININE RATIO >300 MG/G (HCC): ICD-10-CM

## 2024-01-27 DIAGNOSIS — N05.1 FSGS (FOCAL SEGMENTAL GLOMERULOSCLEROSIS): ICD-10-CM

## 2024-01-27 LAB
ANION GAP SERPL CALCULATED.3IONS-SCNC: 4 MMOL/L
BUN SERPL-MCNC: 50 MG/DL (ref 5–25)
CALCIUM SERPL-MCNC: 8.4 MG/DL (ref 8.4–10.2)
CHLORIDE SERPL-SCNC: 114 MMOL/L (ref 96–108)
CO2 SERPL-SCNC: 20 MMOL/L (ref 21–32)
CREAT SERPL-MCNC: 3.21 MG/DL (ref 0.6–1.3)
CREAT UR-MCNC: 79.4 MG/DL
ERYTHROCYTE [DISTWIDTH] IN BLOOD BY AUTOMATED COUNT: 13.4 % (ref 11.6–15.1)
GFR SERPL CREATININE-BSD FRML MDRD: 18 ML/MIN/1.73SQ M
GLUCOSE P FAST SERPL-MCNC: 90 MG/DL (ref 65–99)
HCT VFR BLD AUTO: 36.9 % (ref 36.5–49.3)
HGB BLD-MCNC: 12 G/DL (ref 12–17)
MCH RBC QN AUTO: 31.2 PG (ref 26.8–34.3)
MCHC RBC AUTO-ENTMCNC: 32.5 G/DL (ref 31.4–37.4)
MCV RBC AUTO: 96 FL (ref 82–98)
PLATELET # BLD AUTO: 160 THOUSANDS/UL (ref 149–390)
PMV BLD AUTO: 10.2 FL (ref 8.9–12.7)
POTASSIUM SERPL-SCNC: 5.5 MMOL/L (ref 3.5–5.3)
PROT UR-MCNC: 371 MG/DL
PROT/CREAT UR: 4.67 MG/G{CREAT} (ref 0–0.1)
RBC # BLD AUTO: 3.85 MILLION/UL (ref 3.88–5.62)
SODIUM SERPL-SCNC: 138 MMOL/L (ref 135–147)
WBC # BLD AUTO: 6.84 THOUSAND/UL (ref 4.31–10.16)

## 2024-01-27 PROCEDURE — 85027 COMPLETE CBC AUTOMATED: CPT

## 2024-01-27 PROCEDURE — 80048 BASIC METABOLIC PNL TOTAL CA: CPT

## 2024-01-27 PROCEDURE — 84156 ASSAY OF PROTEIN URINE: CPT

## 2024-01-27 PROCEDURE — 82570 ASSAY OF URINE CREATININE: CPT

## 2024-01-27 PROCEDURE — 82610 CYSTATIN C: CPT

## 2024-01-29 ENCOUNTER — ANTICOAG VISIT (OUTPATIENT)
Dept: CARDIOLOGY CLINIC | Facility: CLINIC | Age: 74
End: 2024-01-29

## 2024-01-29 LAB
CYSTATIN C SERPL-MCNC: 2.79 MG/L (ref 0.78–1.15)
GFR/BSA.PRED SERPLBLD CYS-BASED-ARV: 19 ML/MIN/1.73

## 2024-01-30 ENCOUNTER — OFFICE VISIT (OUTPATIENT)
Dept: ENDOCRINOLOGY | Facility: CLINIC | Age: 74
End: 2024-01-30
Payer: COMMERCIAL

## 2024-01-30 VITALS
OXYGEN SATURATION: 99 % | BODY MASS INDEX: 22.13 KG/M2 | WEIGHT: 146 LBS | HEIGHT: 68 IN | RESPIRATION RATE: 12 BRPM | SYSTOLIC BLOOD PRESSURE: 132 MMHG | HEART RATE: 63 BPM | DIASTOLIC BLOOD PRESSURE: 80 MMHG | TEMPERATURE: 98.1 F

## 2024-01-30 DIAGNOSIS — I50.22 CHRONIC SYSTOLIC HEART FAILURE (HCC): ICD-10-CM

## 2024-01-30 DIAGNOSIS — E11.65 TYPE 2 DIABETES MELLITUS WITH HYPERGLYCEMIA, WITHOUT LONG-TERM CURRENT USE OF INSULIN (HCC): ICD-10-CM

## 2024-01-30 DIAGNOSIS — E11.21 DIABETIC NEPHROPATHY ASSOCIATED WITH TYPE 2 DIABETES MELLITUS (HCC): Chronic | ICD-10-CM

## 2024-01-30 DIAGNOSIS — E78.5 DYSLIPIDEMIA: ICD-10-CM

## 2024-01-30 DIAGNOSIS — E11.22 TYPE 2 DIABETES MELLITUS WITH STAGE 4 CHRONIC KIDNEY DISEASE, WITHOUT LONG-TERM CURRENT USE OF INSULIN (HCC): Primary | ICD-10-CM

## 2024-01-30 DIAGNOSIS — E87.5 HYPERKALEMIA: ICD-10-CM

## 2024-01-30 DIAGNOSIS — N18.4 TYPE 2 DIABETES MELLITUS WITH STAGE 4 CHRONIC KIDNEY DISEASE, WITHOUT LONG-TERM CURRENT USE OF INSULIN (HCC): Primary | ICD-10-CM

## 2024-01-30 DIAGNOSIS — E55.9 VITAMIN D DEFICIENCY: ICD-10-CM

## 2024-01-30 PROCEDURE — 99214 OFFICE O/P EST MOD 30 MIN: CPT | Performed by: INTERNAL MEDICINE

## 2024-01-30 PROCEDURE — 95250 CONT GLUC MNTR PHYS/QHP EQP: CPT | Performed by: INTERNAL MEDICINE

## 2024-01-30 RX ORDER — FLASH GLUCOSE SENSOR
1 KIT MISCELLANEOUS CONTINUOUS
Qty: 1 EACH | Refills: 0 | Status: SHIPPED | OUTPATIENT
Start: 2024-01-30

## 2024-01-30 RX ORDER — BLOOD-GLUCOSE SENSOR
1 EACH MISCELLANEOUS
Qty: 2 EACH | Refills: 5 | Status: SHIPPED | OUTPATIENT
Start: 2024-01-30

## 2024-01-30 NOTE — ASSESSMENT & PLAN NOTE
He seems controlled and does not need medications. Since A1c is unreliable, we will use a professional cgm for next 2 weeks and if there is good glycemia, no intervention is needed.    Lab Results   Component Value Date    HGBA1C 5.7 (H) 07/19/2023

## 2024-01-30 NOTE — ASSESSMENT & PLAN NOTE
On calcitriol.  I usually suggest a small dose of vitamin D 3 as well but will defer to nephrology.

## 2024-01-30 NOTE — PROGRESS NOTES
Continous glucose monitoring alisson placement     Date/Time  1/30/2024 11:39 AM     Performed by  Yoana Huang LPN   Authorized by  Bishnu Hutton MD     Universal Protocol   Consent: Verbal consent obtained. Written consent obtained.  Consent given by: patient  Timeout called at: 1/30/2024 11:39 AM.  Patient understanding: patient states understanding of the procedure being performed  Patient identity confirmed: verbally with patient      Local anesthesia used: no     Anesthesia   Local anesthesia used: no     Sedation   Patient sedated: no        Specimen: no    Culture: no   Procedure Details   Procedure Notes: Alisson sensor placed on left arm  SN: [id number]  EXP: 6/30/24  Patient tolerance: patient tolerated the procedure well with no immediate complications

## 2024-01-30 NOTE — PROGRESS NOTES
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 Last visit was 5/8/23.     Since last visit, he lost 10 lbs.     Home blood glucose monitoring: checks 1-2/day. Fasting in 80-100mg/dL and pp about 120-140mg/dL.  Hypoglycemia: No     Current meds:  Tradjenta 2.5 mg daily     Opthamology: Yes  Podiatry:   vaccination: Yes  Dental:  Pancreatitis: No     Ace/ARB: No.  On hydralazine and isosorbide dinitrate.  Statin: Lipitor  Thyroid issues: No      Physical Exam:  Body mass index is 22.2 kg/m².  /80 (BP Location: Left arm, Patient Position: Sitting)   Pulse 63   Temp 98.1 °F (36.7 °C) (Tympanic)   Resp 12   Ht 5' 8\" (1.727 m)   Wt 66.2 kg (146 lb)   SpO2 99%   BMI 22.20 kg/m²    Vitals:    01/30/24 1042   Weight: 66.2 kg (146 lb)        Physical Exam  Constitutional:       General: He is not in acute distress.     Appearance: He is well-developed. He is not ill-appearing.   HENT:      Head: Normocephalic and atraumatic.      Nose: Nose normal.      Mouth/Throat:      Pharynx: Oropharynx is clear.   Eyes:      Extraocular Movements: Extraocular movements intact.      Conjunctiva/sclera: Conjunctivae normal.   Neck:      Thyroid: No thyromegaly.   Cardiovascular:      Rate and Rhythm: Normal rate.   Pulmonary:      Effort: Pulmonary effort is normal.   Musculoskeletal:         General: No deformity.      Cervical back: Normal range of motion.   Skin:     Capillary Refill: Capillary refill takes less than 2 seconds.      Coloration: Skin is not pale.      Findings: No rash.   Neurological:      Mental Status: He is alert and oriented to person, place, and time.   Psychiatric:         Behavior: Behavior normal.         Labs:   Lab Results   Component Value Date    HGBA1C 5.7 (H) 07/19/2023       Lab " Results   Component Value Date    AWN1PVIIHMMZ 1.429 01/08/2021       Lab Results   Component Value Date    CREATININE 3.21 (H) 01/27/2024    CREATININE 3.40 (H) 10/28/2023    CREATININE 2.95 (H) 03/13/2023    BUN 50 (H) 01/27/2024    K 5.5 (H) 01/27/2024     (H) 01/27/2024    CO2 20 (L) 01/27/2024     eGFR   Date Value Ref Range Status   01/27/2024 18 ml/min/1.73sq m Final   01/27/2024 19 (L) >59 mL/min/1.73 Final       Lab Results   Component Value Date    ALT 13 01/13/2023    AST 21 01/13/2023    ALKPHOS 69 01/13/2023       Lab Results   Component Value Date    CHOLESTEROL 117 10/20/2022    CHOLESTEROL 105 06/08/2021    CHOLESTEROL 108 10/14/2020     Lab Results   Component Value Date    HDL 48 10/20/2022    HDL 37 (L) 06/08/2021    HDL 49 10/14/2020     Lab Results   Component Value Date    TRIG 77 10/20/2022    TRIG 128 06/08/2021    TRIG 77 10/14/2020     Lab Results   Component Value Date    NONHDLC 68 06/08/2021    NONHDLC 59 10/14/2020    NONHDLC 81 05/08/2020         Assessment/Plan:    Problem List Items Addressed This Visit          Endocrine    Type 2 diabetes mellitus with stage 4 chronic kidney disease, without long-term current use of insulin (HCC) - Primary     He seems controlled and does not need medications. Since A1c is unreliable, we will use a professional cgm for next 2 weeks and if there is good glycemia, no intervention is needed.    Lab Results   Component Value Date    HGBA1C 5.7 (H) 07/19/2023            Relevant Medications    Continuous Blood Gluc Sensor (FreeStyle Alisson 3 Sensor) MISC    Continuous Blood Gluc  (FreeStyle Alisson [place]) CHERELLE    Other Relevant Orders    Continous glucose monitoring alisson placement    Continous glucose monitoring alisson intrepretation    RESOLVED: Diabetic nephropathy associated with type 2 diabetes mellitus (HCC) (Chronic)    RESOLVED: Type 2 diabetes mellitus with hyperglycemia, without long-term current use of insulin (HCC)    Relevant  "Medications    Continuous Blood Gluc Sensor (FreeStyle Alisson 3 Sensor) MISC    Continuous Blood Gluc  (FreeStyle Alisson [place]) CHERELLE       Cardiovascular and Mediastinum    Chronic systolic heart failure (HCC) (Chronic)       Other    Vitamin D deficiency     On calcitriol.  I usually suggest a small dose of vitamin D 3 as well but will defer to nephrology.         Dyslipidemia    Hyperkalemia     2\" CKD 4 -planned PD probably              I have spent a total time of 32 minutes on 01/30/24 in caring for this patient including greater than 50% of this time was spent in counseling/coordination of care as listed above.       Discussed with the patient and all questioned fully answered. He will contact me with concerns.    Bishnu Hutton                  "

## 2024-02-06 LAB
LEFT EYE DIABETIC RETINOPATHY: NORMAL
RIGHT EYE DIABETIC RETINOPATHY: NORMAL

## 2024-02-12 ENCOUNTER — OFFICE VISIT (OUTPATIENT)
Dept: ENDOCRINOLOGY | Facility: CLINIC | Age: 74
End: 2024-02-12
Payer: COMMERCIAL

## 2024-02-12 DIAGNOSIS — E11.22 TYPE 2 DIABETES MELLITUS WITH STAGE 4 CHRONIC KIDNEY DISEASE, WITHOUT LONG-TERM CURRENT USE OF INSULIN (HCC): ICD-10-CM

## 2024-02-12 DIAGNOSIS — I50.22 CHRONIC SYSTOLIC HEART FAILURE (HCC): ICD-10-CM

## 2024-02-12 DIAGNOSIS — E11.65 TYPE 2 DIABETES MELLITUS WITH HYPERGLYCEMIA, WITHOUT LONG-TERM CURRENT USE OF INSULIN (HCC): ICD-10-CM

## 2024-02-12 DIAGNOSIS — N18.4 TYPE 2 DIABETES MELLITUS WITH STAGE 4 CHRONIC KIDNEY DISEASE, WITHOUT LONG-TERM CURRENT USE OF INSULIN (HCC): ICD-10-CM

## 2024-02-12 PROCEDURE — 95251 CONT GLUC MNTR ANALYSIS I&R: CPT | Performed by: INTERNAL MEDICINE

## 2024-02-12 PROCEDURE — 99202 OFFICE O/P NEW SF 15 MIN: CPT | Performed by: INTERNAL MEDICINE

## 2024-02-12 RX ORDER — ISOSORBIDE DINITRATE 10 MG/1
10 TABLET ORAL 3 TIMES DAILY
Qty: 270 TABLET | Refills: 1 | Status: CANCELLED | OUTPATIENT
Start: 2024-02-12

## 2024-02-12 NOTE — PROGRESS NOTES
Date/Time  2/12/2024 2:59 PM     Performed by  Yoana Huang LPN   Authorized by  Bishnu Hutton MD     Universal Protocol   Consent: Verbal consent obtained. Written consent obtained.  Consent given by: patient  Timeout called at: 2/12/2024 2:59 PM.  Patient understanding: patient states understanding of the procedure being performed  Patient identity confirmed: verbally with patient      Local anesthesia used: no     Anesthesia   Local anesthesia used: no     Sedation   Patient sedated: no        Specimen: no    Culture: no   Procedure Details   Procedure Notes: Alisson sensor removed from right arm   Patient tolerance: patient tolerated the procedure well with no immediate complications             
No

## 2024-02-12 NOTE — TELEPHONE ENCOUNTER
Reason for call:   [x] Refill   [] Prior Auth  [] Other:     Office:   [x] PCP/Provider -   [] Specialty/Provider -     Medication:   Isosorbide dinitrate 10mg- take 1 tablet by mouth 3 times a day      [facility]: [facility]    Does the patient have enough for 3 days?   [] Yes   [x] No - Send as HP to POD

## 2024-02-15 ENCOUNTER — TELEPHONE (OUTPATIENT)
Dept: FAMILY MEDICINE CLINIC | Facility: CLINIC | Age: 74
End: 2024-02-15

## 2024-02-15 DIAGNOSIS — I12.9 BENIGN HYPERTENSION WITH CHRONIC KIDNEY DISEASE, STAGE IV (HCC): ICD-10-CM

## 2024-02-15 DIAGNOSIS — I50.22 CHRONIC SYSTOLIC HEART FAILURE (HCC): ICD-10-CM

## 2024-02-15 DIAGNOSIS — N18.4 BENIGN HYPERTENSION WITH CHRONIC KIDNEY DISEASE, STAGE IV (HCC): ICD-10-CM

## 2024-02-15 RX ORDER — ISOSORBIDE DINITRATE 10 MG/1
10 TABLET ORAL 3 TIMES DAILY
Qty: 270 TABLET | Refills: 3 | Status: SHIPPED | OUTPATIENT
Start: 2024-02-15

## 2024-02-15 RX ORDER — AMLODIPINE BESYLATE 2.5 MG/1
2.5 TABLET ORAL DAILY
Qty: 90 TABLET | Refills: 3 | Status: SHIPPED | OUTPATIENT
Start: 2024-02-15 | End: 2024-02-19

## 2024-02-19 ENCOUNTER — OFFICE VISIT (OUTPATIENT)
Dept: NEPHROLOGY | Facility: CLINIC | Age: 74
End: 2024-02-19
Payer: COMMERCIAL

## 2024-02-19 DIAGNOSIS — M89.9 CHRONIC KIDNEY DISEASE-MINERAL AND BONE DISORDER: ICD-10-CM

## 2024-02-19 DIAGNOSIS — N18.4 BENIGN HYPERTENSION WITH CHRONIC KIDNEY DISEASE, STAGE IV (HCC): ICD-10-CM

## 2024-02-19 DIAGNOSIS — N18.4 TYPE 2 DIABETES MELLITUS WITH STAGE 4 CHRONIC KIDNEY DISEASE, WITHOUT LONG-TERM CURRENT USE OF INSULIN (HCC): ICD-10-CM

## 2024-02-19 DIAGNOSIS — N05.1 FSGS (FOCAL SEGMENTAL GLOMERULOSCLEROSIS): ICD-10-CM

## 2024-02-19 DIAGNOSIS — N18.4 ANEMIA DUE TO STAGE 4 CHRONIC KIDNEY DISEASE: ICD-10-CM

## 2024-02-19 DIAGNOSIS — D63.1 ANEMIA DUE TO STAGE 4 CHRONIC KIDNEY DISEASE: ICD-10-CM

## 2024-02-19 DIAGNOSIS — N25.81 SECONDARY HYPERPARATHYROIDISM OF RENAL ORIGIN (HCC): ICD-10-CM

## 2024-02-19 DIAGNOSIS — E11.22 TYPE 2 DIABETES MELLITUS WITH STAGE 4 CHRONIC KIDNEY DISEASE, WITHOUT LONG-TERM CURRENT USE OF INSULIN (HCC): ICD-10-CM

## 2024-02-19 DIAGNOSIS — Z98.890 S/P MVR (MITRAL VALVE REPAIR): ICD-10-CM

## 2024-02-19 DIAGNOSIS — E83.9 CHRONIC KIDNEY DISEASE-MINERAL AND BONE DISORDER: ICD-10-CM

## 2024-02-19 DIAGNOSIS — E87.20 METABOLIC ACIDOSIS: ICD-10-CM

## 2024-02-19 DIAGNOSIS — R63.4 WEIGHT LOSS: ICD-10-CM

## 2024-02-19 DIAGNOSIS — N18.9 CHRONIC KIDNEY DISEASE-MINERAL AND BONE DISORDER: ICD-10-CM

## 2024-02-19 DIAGNOSIS — I12.9 BENIGN HYPERTENSION WITH CHRONIC KIDNEY DISEASE, STAGE IV (HCC): ICD-10-CM

## 2024-02-19 DIAGNOSIS — I50.22 CHRONIC SYSTOLIC HEART FAILURE (HCC): ICD-10-CM

## 2024-02-19 DIAGNOSIS — N18.4 CKD STAGE G4/A3, GFR 15-29 AND ALBUMIN CREATININE RATIO >300 MG/G (HCC): Primary | ICD-10-CM

## 2024-02-19 DIAGNOSIS — E87.5 HYPERKALEMIA: ICD-10-CM

## 2024-02-19 DIAGNOSIS — R80.9 NEPHROTIC RANGE PROTEINURIA: ICD-10-CM

## 2024-02-19 PROCEDURE — 99214 OFFICE O/P EST MOD 30 MIN: CPT | Performed by: STUDENT IN AN ORGANIZED HEALTH CARE EDUCATION/TRAINING PROGRAM

## 2024-02-19 RX ORDER — SODIUM BICARBONATE 650 MG/1
650 TABLET ORAL DAILY
Qty: 90 TABLET | Refills: 1 | Status: SHIPPED | OUTPATIENT
Start: 2024-02-19

## 2024-02-19 RX ORDER — AMLODIPINE BESYLATE 10 MG/1
10 TABLET ORAL DAILY
Qty: 90 TABLET | Refills: 1 | Status: SHIPPED | OUTPATIENT
Start: 2024-02-19

## 2024-02-19 NOTE — PROGRESS NOTES
NEPHROLOGY OUTPATIENT PROGRESS NOTE   Myra Mustafa 73 y.o. male MRN: [id number]  DATE: 2/22/2024    Reason for visit:   Chief Complaint   Patient presents with    Follow-up    CKD IV        Patient Instructions   Thank you for coming to your visit today. As we discussed you kidney function is normal but it is stable, your creatinine is close monitor 3.2 mg/dL.  There is no indication of dialysis at this time . Please follow the recommendations below       Recommend low sodium (salt) food    Avoid nonsteroidal anti-inflammatory drugs such as Naprosyn, ibuprofen, Aleve, Advil, Celebrex, Meloxicam (Mobic) etc.  You can use Tylenol as needed if you do not have any liver condition to be concerned about    Try to avoid medications such as pantoprazole or  Protonix/Nexium or Esomeprazole)/Prilosec or omeprazole/Prevacid or lansoprazole/AcipHex or Rabeprazole.  If you are able to, use Pepcid as this is safer for your kidneys.    Try to exercise at least 30 minutes 3 days a week to begin with with an ultimate goal of 5 days a week for at least 30 minutes    Decrease Hydralazin to once a day for 3 days   Increase Amlodipine to 5mg for 3 days   After the 3rd day discontinue Hydralazin and increase Amlodipine to 10mg   If your BP is <140/90 you can continue with Amlodipine 10mg     Start sodium bicarb 1 tablet a day   Labs in 2 months     Next Visit in 3 months with results   If you need to see us earlier we can change the appointment for you      Joselyn Reyes Bahamonde, MD  Nephrology Attending         Myra was seen today for follow-up and ckd iv.    Diagnoses and all orders for this visit:    CKD stage G4/A3, GFR 15-29 and albumin creatinine ratio >300 mg/g (HCC)  -     Cancel: Basic metabolic panel; Future  -     Cancel: Phosphorus; Future  -     sodium bicarbonate 650 mg tablet; Take 1 tablet (650 mg total) by mouth in the morning  -     Nutritional Supplements (Nepro) LIQD; Take 237 mL by mouth in the morning  -      Basic metabolic panel; Future  -     Phosphorus; Future    Benign hypertension with chronic kidney disease, stage IV (HCC)  -     amLODIPine (NORVASC) 10 mg tablet; Take 1 tablet (10 mg total) by mouth daily    Secondary hyperparathyroidism of renal origin (HCC)        Assessment/Plan:  72 yo man with PMH DM, FSGS (biopsy proven 2018), CKD G4A3 (bl creat 3-3.5mg/dL, eGFR 21-24).  Patient is here for follow-up of FSGS     PLAN:        #Podocytopathy with features of secondary FSGS  Time of diagnosis:4/18/2018  Biopsy date/brief summary:  FSGS with glomerulomegaly with secondary pattern with 15% of podocyte effacement.   Diffuse diabetic glomerulosclerosis (mild).   25% IFTA  Arterio-arteriolosclerosis with hyalinosis, moderate-severe   Etiology: Likely secondary to obesity (at that time)  Lost ~15lbs (asper patient, previous notes mentioned 35lbs) after diagnosis (posible cause of FSGS)  Genetic Testing:  Not done, possible APOL1  Current creatinine: Stable at new baseline, creatinine 3.2 mg/dL   UPCr fluctuates, 4.6 g/g  Not taking ACE inhibitors or ARB due to hyperkalemia  eGFR borderline low for SGLT2 inhibitors  Serum albumin 3.3      #Immunosuppressive Medications  No IS indicated at this time     # Nephrotic proteinuria  UPCR 4.6 g/g  Secondary to FSGS with secondary fissures  Not on ACE inhibitors/ARB due to hyperkalemia  Not on SGLT2 inhibitors due to low GFR    #CKD G4A3     Baseline creatinine:3-3.5mg/dL  Current creatinine: 3.2 mg/dL, new baseline  Etiology:  Secondary to FSGS and Diabetic glomerulopathy as above  UPCr: 4.6 g/g  Patient does not have any uremic symptoms at this time  Advised to watch for red flag symptoms  Avoid nonsteroidal anti-inflammatory drugs such as Naprosyn, ibuprofen, Aleve, Advil, Celebrex, Meloxicam (Mobic) etc.  You can use Tylenol as needed if you do not have any liver condition to be concerned about  Patient wants to do peritoneal dialysis when needed   Prefers to wait to get  PD catheter when kidney function is more at the   Referred to transplant evaluation    We will refer to IR for PD catheter if worsening kidney function     #Volume/Hypertension:  Volume: Remains euvolemic  Blood pressure: Normotensive, /80, goal less than 140/90    Low sodium diet   Continue with  Amlodipine 10 mg daily  Metoprolol 50 mg daily  Plan is to discontinue hydralazine and increase amlodipine to 10 mg  Hydralazine has been associated with ANCA vasculitis and other vitamin conditions  Isodril  Advised to maintain a good BP control to prevent progression of CKD         #Hyperkalemia   Potassium 5.5 mEq/L  Off ACE inhibitors or ARB  Continue low potassium diet  Will monitor BMP        #Acid base disorder  Serum bicarb 20mmol/L  Start sodium bicarbonate once a day    #CKD-MBD  Calcium 8.4 mg/dL  Phosphorus 4.2 mg/dL  (goal <5.5)  At goal     #Secondary Hyperparathyroidism   iPTH 122.9, guidelines levels KDIGO 2017  Continue calcitriol     #Acute on chronic anemia:  Current hemoglobin 12mg   at goal        #MVR, AVR  On coumadin   Follow-up with cardiology     #CHF  Euvolemic on exam  No need for diuretics at this time     #DM  HbA1c 5.7  Off meds   Advised to maintain a good DM control to prevent progression of CKD   Maintain healthy diet (vegetables, fruits, whole grains, nonfat or low fat)  Weight loss  Physical activity (5 to 10 minutes to start the increase to 30 min a day)     #Weight loss  Poor appetite, eating small portions  Could be uremic symptoms related  No indication of dialysis at this time    SUBJECTIVE / INTERVAL HISTORY:  73 y.o. male presents in follow up of CKD. Feels well, no SOB, no CP, no recent hospitalizations. No issues with medication   No uremic symptoms       Myra Mustafa denies any recent illness/hospitalizations/medication changes since last office visit.  Review of Systems   Constitutional:  Negative for activity change and appetite change.   HENT:  Negative for congestion  "and dental problem.    Eyes:  Negative for discharge.   Respiratory:  Negative for shortness of breath and stridor.    Cardiovascular:  Negative for chest pain and leg swelling.   Gastrointestinal:  Negative for abdominal distention and abdominal pain.   Endocrine: Negative for cold intolerance.   Genitourinary:  Negative for dysuria.   Musculoskeletal:  Negative for back pain.   Skin:  Negative for color change.   Neurological:  Negative for dizziness.   Psychiatric/Behavioral:  Negative for agitation.        OBJECTIVE:  Ht 5' 8\" (1.727 m)   Wt 65.8 kg (145 lb)   BMI 22.05 kg/m²  Body mass index is 22.05 kg/m².    Physical exam:  Physical Exam  General:  no acute distress at this time  Skin:  No acute rash  Eyes:  No scleral icterus and noninjected  ENT:  mucous membranes moist  Neck:  no carotid bruits  Chest:  Clear to auscultation percussion, good respiratory effort, no use of accessory respiratory muscles  CVS:  Regular rate and rhythm without rub   Abdomen:  soft and nontender   Extremities: no significant lower extremity edema  Neuro:  No gross focality  Psych:  Alert , cooperative       Medications:    Current Outpatient Medications:     ALPRAZolam (XANAX) 0.25 mg tablet, Take prior to flight, Disp: 4 tablet, Rfl: 0    amLODIPine (NORVASC) 10 mg tablet, Take 1 tablet (10 mg total) by mouth daily, Disp: 90 tablet, Rfl: 1    ascorbic acid (VITAMIN C) 500 mg tablet, Take 500 mg by mouth daily, Disp: , Rfl:     aspirin (RA Aspirin EC) 81 mg EC tablet, Take 1 tablet (81 mg total) by mouth daily, Disp: 90 tablet, Rfl: 1    atorvastatin (LIPITOR) 20 mg tablet, TAKE 1 TABLET BY MOUTH EVERY DAY, Disp: 90 tablet, Rfl: 0    calcitriol (ROCALTROL) 0.25 mcg capsule, Take 1 capsule (0.25 mcg total) by mouth daily TAKE 1 CAPSULE 5 DAYS A WEEK AS DIRECTED, Disp: 90 capsule, Rfl: 3    cetirizine (ZyrTEC) 10 mg tablet, Take 1 tablet (10 mg total) by mouth daily, Disp: 30 tablet, Rfl: 1    COMBIGAN 0.2-0.5 %, , Disp: , Rfl: " "0    Continuous Blood Gluc  (FreeStyle Alisson [place]) CHERELLE, Use 1 Units continuous, Disp: 1 each, Rfl: 0    Continuous Blood Gluc Sensor (FreeStyle Alisson 3 Sensor) MISC, Use 1 Units every 14 (fourteen) days, Disp: 2 each, Rfl: 5    hydrALAZINE (APRESOLINE) 25 mg tablet, TAKE 1 TABLET BY MOUTH THREE TIMES DAILY, Disp: 270 tablet, Rfl: 3    isosorbide dinitrate (ISORDIL) 10 mg tablet, Take 1 tablet (10 mg total) by mouth 3 (three) times a day, Disp: 270 tablet, Rfl: 3    metoprolol succinate (TOPROL-XL) 50 mg 24 hr tablet, Take 1 tablet (50 mg total) by mouth daily, Disp: 90 tablet, Rfl: 3    Nutritional Supplements (Nepro) LIQD, Take 237 mL by mouth in the morning, Disp: 5688 mL, Rfl: 3    sodium bicarbonate 650 mg tablet, Take 1 tablet (650 mg total) by mouth in the morning, Disp: 90 tablet, Rfl: 1    TRAVATAN Z 0.004 % ophthalmic solution, Administer 1 drop to both eyes daily at bedtime , Disp: , Rfl: 0    warfarin (COUMADIN) 2 mg tablet, TAKE 1 TO 2 TABLETS DAILY BY MOUTH OR AS DIRECTED BY PHYSICIAN., Disp: 60 tablet, Rfl: 11    Allergies:  Allergies as of 02/19/2024 - Reviewed 02/19/2024   Allergen Reaction Noted    Ace inhibitors Cough 12/24/2013    Keflex [cephalexin] Rash 01/26/2021       The following portions of the patient's history were reviewed and updated as appropriate: past family history, past surgical history and problem list.    Laboratory Results:  Lab Results   Component Value Date    SODIUM 138 01/27/2024    K 5.5 (H) 01/27/2024     (H) 01/27/2024    CO2 20 (L) 01/27/2024    BUN 50 (H) 01/27/2024    CREATININE 3.21 (H) 01/27/2024    GLUC 154 (H) 01/17/2023    CALCIUM 8.4 01/27/2024        Lab Results   Component Value Date    .9 (H) 10/28/2023    CALCIUM 8.4 01/27/2024    PHOS 4.2 (H) 10/28/2023       Portions of the record may have been created with voice recognition software.  Occasional wrong word or \"sound a like\" substitutions may have occurred due to the inherent " limitations of voice recognition software.  Read the chart carefully and recognize, using context, where substitutions have occurred.

## 2024-02-19 NOTE — PATIENT INSTRUCTIONS
Thank you for coming to your visit today. As we discussed you kidney function is normal but it is stable, your creatinine is close monitor 3.2 mg/dL.  There is no indication of dialysis at this time . Please follow the recommendations below       Recommend low sodium (salt) food    Avoid nonsteroidal anti-inflammatory drugs such as Naprosyn, ibuprofen, Aleve, Advil, Celebrex, Meloxicam (Mobic) etc.  You can use Tylenol as needed if you do not have any liver condition to be concerned about    Try to avoid medications such as pantoprazole or  Protonix/Nexium or Esomeprazole)/Prilosec or omeprazole/Prevacid or lansoprazole/AcipHex or Rabeprazole.  If you are able to, use Pepcid as this is safer for your kidneys.    Try to exercise at least 30 minutes 3 days a week to begin with with an ultimate goal of 5 days a week for at least 30 minutes    Decrease Hydralazin to once a day for 3 days   Increase Amlodipine to 5mg for 3 days   After the 3rd day discontinue Hydralazin and increase Amlodipine to 10mg   If your BP is <140/90 you can continue with Amlodipine 10mg     Start sodium bicarb 1 tablet a day   Labs in 2 months     Next Visit in 3 months with results   If you need to see us earlier we can change the appointment for you      Joselyn Reyes Bahamonde, MD  Nephrology Attending

## 2024-02-22 VITALS
WEIGHT: 145 LBS | DIASTOLIC BLOOD PRESSURE: 80 MMHG | BODY MASS INDEX: 21.98 KG/M2 | SYSTOLIC BLOOD PRESSURE: 130 MMHG | HEIGHT: 68 IN

## 2024-02-22 PROBLEM — R80.9 NEPHROTIC RANGE PROTEINURIA: Status: ACTIVE | Noted: 2024-02-22

## 2024-02-22 PROBLEM — R63.4 WEIGHT LOSS: Status: ACTIVE | Noted: 2024-02-22

## 2024-02-28 ENCOUNTER — TELEPHONE (OUTPATIENT)
Dept: NEPHROLOGY | Facility: CLINIC | Age: 74
End: 2024-02-28

## 2024-02-28 NOTE — TELEPHONE ENCOUNTER
Received a fax of patient Blood Pressure readings and change in medications.    Change in Medications/ starting 2/24/2024:  Amlodipine 10mg once a day and Hydralazine now 2 times a day.     Blood Pressure readings:  2/24  149/78 take at 7am            145/78 take at 8 pm  2/25   128/73 take at 11:45 am  2/26   141/92 take at 7:15 am before meds  2/27    138/80 take at 9:30 am after meds taken at 7:30am    Average(AM):139/81

## 2024-03-05 ENCOUNTER — RA CDI HCC (OUTPATIENT)
Dept: OTHER | Facility: HOSPITAL | Age: 74
End: 2024-03-05

## 2024-03-05 NOTE — PROGRESS NOTES
HCC coding opportunities          Chart Reviewed number of suggestions sent to Provider: 1   I13.0    Patients Insurance     Medicare Insurance: [employer]

## 2024-03-11 ENCOUNTER — OFFICE VISIT (OUTPATIENT)
Dept: FAMILY MEDICINE CLINIC | Facility: CLINIC | Age: 74
End: 2024-03-11
Payer: COMMERCIAL

## 2024-03-11 VITALS
HEIGHT: 68 IN | DIASTOLIC BLOOD PRESSURE: 58 MMHG | SYSTOLIC BLOOD PRESSURE: 122 MMHG | RESPIRATION RATE: 16 BRPM | BODY MASS INDEX: 21.98 KG/M2 | TEMPERATURE: 97.6 F | OXYGEN SATURATION: 98 % | HEART RATE: 52 BPM | WEIGHT: 145 LBS

## 2024-03-11 DIAGNOSIS — N25.81 SECONDARY HYPERPARATHYROIDISM OF RENAL ORIGIN (HCC): ICD-10-CM

## 2024-03-11 DIAGNOSIS — N18.4 TYPE 2 DIABETES MELLITUS WITH STAGE 4 CHRONIC KIDNEY DISEASE, WITHOUT LONG-TERM CURRENT USE OF INSULIN (HCC): ICD-10-CM

## 2024-03-11 DIAGNOSIS — Z79.01 ANTICOAGULANT LONG-TERM USE: ICD-10-CM

## 2024-03-11 DIAGNOSIS — Z23 ENCOUNTER FOR IMMUNIZATION: Primary | ICD-10-CM

## 2024-03-11 DIAGNOSIS — R00.1 BRADYCARDIA: ICD-10-CM

## 2024-03-11 DIAGNOSIS — N18.4 CKD (CHRONIC KIDNEY DISEASE), STAGE IV (HCC): ICD-10-CM

## 2024-03-11 DIAGNOSIS — I25.10 CORONARY ARTERY DISEASE INVOLVING NATIVE CORONARY ARTERY OF NATIVE HEART WITHOUT ANGINA PECTORIS: Chronic | ICD-10-CM

## 2024-03-11 DIAGNOSIS — N18.4 BENIGN HYPERTENSION WITH CHRONIC KIDNEY DISEASE, STAGE IV (HCC): ICD-10-CM

## 2024-03-11 DIAGNOSIS — I50.22 CHRONIC SYSTOLIC HEART FAILURE (HCC): Chronic | ICD-10-CM

## 2024-03-11 DIAGNOSIS — I10 ESSENTIAL HYPERTENSION: Chronic | ICD-10-CM

## 2024-03-11 DIAGNOSIS — E78.5 DYSLIPIDEMIA: ICD-10-CM

## 2024-03-11 DIAGNOSIS — Z12.5 PROSTATE CANCER SCREENING: ICD-10-CM

## 2024-03-11 DIAGNOSIS — I12.9 BENIGN HYPERTENSION WITH CHRONIC KIDNEY DISEASE, STAGE IV (HCC): ICD-10-CM

## 2024-03-11 DIAGNOSIS — E11.22 TYPE 2 DIABETES MELLITUS WITH STAGE 4 CHRONIC KIDNEY DISEASE, WITHOUT LONG-TERM CURRENT USE OF INSULIN (HCC): ICD-10-CM

## 2024-03-11 PROBLEM — D63.1 ANEMIA DUE TO STAGE 4 CHRONIC KIDNEY DISEASE: Status: RESOLVED | Noted: 2023-03-02 | Resolved: 2024-03-11

## 2024-03-11 PROBLEM — S02.2XXA NASAL FRACTURE: Status: RESOLVED | Noted: 2023-01-13 | Resolved: 2024-03-11

## 2024-03-11 PROBLEM — D62 ANEMIA DUE TO ACUTE BLOOD LOSS: Status: RESOLVED | Noted: 2023-03-02 | Resolved: 2024-03-11
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
## 2024-03-11 PROBLEM — I48.92 ATRIAL FLUTTER (HCC): Status: RESOLVED | Noted: 2019-03-28 | Resolved: 2024-03-11

## 2024-03-11 LAB — SL AMB POCT HEMOGLOBIN AIC: 5.5 (ref ?–6.5)

## 2024-03-11 PROCEDURE — 99214 OFFICE O/P EST MOD 30 MIN: CPT | Performed by: FAMILY MEDICINE

## 2024-03-11 PROCEDURE — G0009 ADMIN PNEUMOCOCCAL VACCINE: HCPCS | Performed by: FAMILY MEDICINE

## 2024-03-11 PROCEDURE — 83036 HEMOGLOBIN GLYCOSYLATED A1C: CPT | Performed by: FAMILY MEDICINE

## 2024-03-11 PROCEDURE — G0439 PPPS, SUBSEQ VISIT: HCPCS | Performed by: FAMILY MEDICINE

## 2024-03-11 PROCEDURE — 90677 PCV20 VACCINE IM: CPT | Performed by: FAMILY MEDICINE

## 2024-03-11 RX ORDER — DORZOLAMIDE HYDROCHLORIDE AND TIMOLOL MALEATE 20; 5 MG/ML; MG/ML
SOLUTION/ DROPS OPHTHALMIC
COMMUNITY
Start: 2024-02-16

## 2024-03-11 NOTE — ASSESSMENT & PLAN NOTE
Lab Results   Component Value Date    HGBA1C 5.7 (H) 07/19/2023   Due for diabetic foot and hga1c

## 2024-03-11 NOTE — PROGRESS NOTES
Assessment and Plan:     Problem List Items Addressed This Visit        Endocrine    Secondary hyperparathyroidism of renal origin (HCC)     Managed by nephrology         Type 2 diabetes mellitus with stage 4 chronic kidney disease, without long-term current use of insulin ([facility])       Lab Results   Component Value Date    HGBA1C 5.7 (H) 07/19/2023   Due for diabetic foot and hga1c         Relevant Orders    POCT hemoglobin A1c (Completed)       Cardiovascular and Mediastinum    Essential hypertension (Chronic)     Well controlled         Chronic systolic heart failure (HCC) (Chronic)     Wt Readings from Last 3 Encounters:   03/11/24 65.8 kg (145 lb)   02/19/24 65.8 kg (145 lb)   01/30/24 66.2 kg (146 lb)     Compensated reviewed cardiology note                Coronary artery disease involving native coronary artery of native heart without angina pectoris (Chronic)     Chest pain free due to see cardiology soon         Benign hypertension with chronic kidney disease, stage IV ([facility])     Lab Results   Component Value Date    EGFR 18 01/27/2024    EGFR 19 (L) 01/27/2024    EGFR 17 10/28/2023    CREATININE 3.21 (H) 01/27/2024    CREATININE 3.40 (H) 10/28/2023    CREATININE 2.95 (H) 03/13/2023   Reviewed nephrology note            Genitourinary    CKD (chronic kidney disease), stage IV ([facility])     Lab Results   Component Value Date    EGFR 18 01/27/2024    EGFR 19 (L) 01/27/2024    EGFR 17 10/28/2023    CREATININE 3.21 (H) 01/27/2024    CREATININE 3.40 (H) 10/28/2023    CREATININE 2.95 (H) 03/13/2023   Stable reviewed nephrology note             Other    Anticoagulant long-term use     On warfarin managed coumadin clinic          Dyslipidemia    Relevant Orders    Lipid panel    Bradycardia     Pt on metoprolol        Other Visit Diagnoses     Encounter for immunization    -  Primary    Relevant Orders    Pneumococcal Conjugate Vaccine 20-valent (Pcv20) (Completed)    Prostate cancer screening        Relevant Orders     PSA, Total Screen           Preventive health issues were discussed with patient, and age appropriate screening tests were ordered as noted in patient's After Visit Summary.  Personalized health advice and appropriate referrals for health education or preventive services given if needed, as noted in patient's After Visit Summary.     History of Present Illness:     Patient presents for a Medicare Wellness Visit    HPI   Patient Care Team:  Rufina Cao MD as PCP - General (Family Medicine)  Joselyn Reyes Bahamonde, MD (Nephrology)     Review of Systems:     Review of Systems     Problem List:     Patient Active Problem List   Diagnosis   • Persistent proteinuria   • Vitamin D deficiency   • Secondary hyperparathyroidism of renal origin (HCC)   • FSGS (focal segmental glomerulosclerosis)   • Essential hypertension   • Chronic systolic heart failure (HCC)   • Nonrheumatic aortic valve insufficiency   • Non-rheumatic mitral regurgitation   • Bicuspid aortic valve   • Coronary artery disease involving native coronary artery of native heart without angina pectoris   • S/P AVR   • S/P MVR (mitral valve repair)   • Dilated cardiomyopathy (HCC)   • Encounter for postoperative care   • CKD (chronic kidney disease), stage IV ([facility])   • History of colon polyps   • Anticoagulant long-term use   • Close exposure to COVID-19 virus   • COVID-19 virus infection   • Dyslipidemia   • Bradycardia   • Metabolic acidosis   • H/O atrial flutter   • Epistaxis   • Benign hypertension with chronic kidney disease, stage IV (HCC)   • Hyperkalemia   • Chronic kidney disease-mineral and bone disorder   • Allergic dermatitis due to poison ivy   • CKD stage G4/A3, GFR 15-29 and albumin creatinine ratio >300 mg/g ([facility])   • Secondary hyperparathyroidism (HCC)   • Type 2 diabetes mellitus with stage 4 chronic kidney disease, without long-term current use of insulin ([facility])   • Nephrotic range proteinuria   • Weight loss      Past Medical and Surgical  History:     Past Medical History:   Diagnosis Date   • Chronic kidney disease    • Colon polyp    • COVID-19    • Diabetes mellitus (HCC)    • Hyperlipidemia    • Hypertension    • Proteinuria    • Stage 3 chronic kidney disease (HCC)    • Vitamin D deficiency      Past Surgical History:   Procedure Laterality Date   • COLONOSCOPY      overdue   • WI ECHO TRANSESOPHAG R-T 2D W/PRB IMG ACQUISJ I&R N/A 3/7/2019    Procedure: INTRA-OP TRANSESOPHAGEAL ECHOCARDIOGRAM (PATIENCE);  Surgeon: Gaudencio Tubbs DO;  Location: [facility];  Service: Cardiac Surgery   • WI NASAL ENDOSCOPY DIAGNOSTIC UNI/BI SPX Bilateral 1/16/2023    Procedure: ENDOSCOPY NOSE BILATERALLY, WITH ENDOSCOPIC CONTROL OF EPISTAXIS ON LEFT;  Surgeon: Ady Monahan MD;  [facility];  Service: ENT   • WI PERQ CLSR TCAT L ATR APNDGE W/ENDOCARDIAL IMPLNT  3/7/2019    Procedure: LEFT ATRIAL APPENDAGE OCCLUSION CLIPPED with 35mm ATRICURE ATRICLIP;  Surgeon: Gaudencio Tubbs DO;  [facility];  Service: Cardiac Surgery   • WI REPLACEMENT MITRAL VALVE W/CARDIOPULMONARY BYP N/A 3/7/2019    Procedure: REPLACEMENT VALVE MITRAL (MVR) REPAIR with a 30mm MEDTRONIC CG FUTURE RING;  Surgeon: Gaudencio Tubbs DO;  [facility];  Service: Cardiac Surgery   • WI RPLCMT AORTIC VALVE OPN W/STENTLESS TISSUE VALVE N/A 3/7/2019    Procedure: REPLACEMENT VALVE AORTIC (AVR) with 23mm TAVERA INSPIRIS RESILIA  AORTIC VALVE;  Surgeon: Gaudencio Tubbs DO;  [facility];  Service: Cardiac Surgery   • RENAL BIOPSY, OPEN     • TOE SURGERY Left       Family History:     Family History   Problem Relation Age of Onset   • Stroke Mother    • Hypertension Mother    • Blindness Father    • Hyperlipidemia Father    • Hypertension Father    • Glaucoma Father    • Cancer Sister    • Diabetes Sister    • Ovarian cancer Sister    • Gout Brother    • Heart Valve Disease Brother    • Hypertension Brother    • Anuerysm Neg Hx    • Heart attack Neg Hx    •  Arrhythmia Neg Hx    • Heart failure Neg Hx    • Coronary artery disease Neg Hx    • Sudden death Neg Hx         scd      Social History:     Social History     Socioeconomic History   • Marital status: /Civil Union     Spouse name: None   • Number of children: None   • Years of education: None   • Highest education level: None   Occupational History   • Occupation: RETIRED   Tobacco Use   • Smoking status: Former     Current packs/day: 0.00     Types: Cigarettes     Start date:      Quit date:      Years since quittin.2   • Smokeless tobacco: Never   Vaping Use   • Vaping status: Never Used   Substance and Sexual Activity   • Alcohol use: Yes     Comment: occasionally   • Drug use: No   • Sexual activity: Yes     Partners: Female     Birth control/protection: None   Other Topics Concern   • None   Social History Narrative   • None     Social Determinants of Health     Financial Resource Strain: Low Risk  (2022)    Overall Financial Resource Strain (CARDIA)    • Difficulty of Paying Living Expenses: Not hard at all   Food Insecurity: Not on file   Transportation Needs: No Transportation Needs (2022)    PRAPARE - Transportation    • Lack of Transportation (Medical): No    • Lack of Transportation (Non-Medical): No   Physical Activity: Not on file   Stress: Not on file   Social Connections: Not on file   Intimate Partner Violence: Not on file   Housing Stability: Not on file      Medications and Allergies:     Current Outpatient Medications   Medication Sig Dispense Refill   • ALPRAZolam (XANAX) 0.25 mg tablet Take prior to flight 4 tablet 0   • amLODIPine (NORVASC) 10 mg tablet Take 1 tablet (10 mg total) by mouth daily 90 tablet 1   • ascorbic acid (VITAMIN C) 500 mg tablet Take 500 mg by mouth daily     • aspirin (RA Aspirin EC) 81 mg EC tablet Take 1 tablet (81 mg total) by mouth daily 90 tablet 1   • atorvastatin (LIPITOR) 20 mg tablet TAKE 1 TABLET BY MOUTH EVERY DAY 90 tablet 0   •  calcitriol (ROCALTROL) 0.25 mcg capsule Take 1 capsule (0.25 mcg total) by mouth daily TAKE 1 CAPSULE 5 DAYS A WEEK AS DIRECTED 90 capsule 3   • cetirizine (ZyrTEC) 10 mg tablet Take 1 tablet (10 mg total) by mouth daily 30 tablet 1   • COMBIGAN 0.2-0.5 %   0   • Continuous Blood Gluc  (FreeStyle [place]isson [place]) CHERELLE Use 1 Units continuous 1 each 0   • Continuous Blood Gluc Sensor (FreeStyle Alisson 3 Sensor) MISC Use 1 Units every 14 (fourteen) days 2 each 5   • Cosopt PF 2-0.5 % SOLN instill 1 drop in both eyes twice daily     • isosorbide dinitrate (ISORDIL) 10 mg tablet Take 1 tablet (10 mg total) by mouth 3 (three) times a day 270 tablet 3   • metoprolol succinate (TOPROL-XL) 50 mg 24 hr tablet Take 1 tablet (50 mg total) by mouth daily 90 tablet 3   • Nutritional Supplements (Nepro) LIQD Take 237 mL by mouth in the morning 5688 mL 3   • sodium bicarbonate 650 mg tablet Take 1 tablet (650 mg total) by mouth in the morning 90 tablet 1   • TRAVATAN Z 0.004 % ophthalmic solution Administer 1 drop to both eyes daily at bedtime   0   • warfarin (COUMADIN) 2 mg tablet TAKE 1 TO 2 TABLETS DAILY BY MOUTH OR AS DIRECTED BY PHYSICIAN. 60 tablet 11     No current facility-administered medications for this visit.     Allergies   Allergen Reactions   • Ace Inhibitors Cough   • Keflex [Cephalexin] Rash      Immunizations:     Immunization History   Administered Date(s) Administered   • COVID-19 MODERNA VACC 0.5 ML IM 04/13/2021, 05/13/2021, 12/02/2021   • INFLUENZA 11/01/2010, 10/09/2013, 11/03/2015, 12/13/2018, 12/02/2022   • Influenza, high dose seasonal 0.7 mL 10/07/2020, 09/15/2021, 12/02/2022   • Pneumococcal Conjugate 13-Valent 07/13/2018   • Pneumococcal Conjugate Vaccine 20-valent (Pcv20), Polysace 03/11/2024   • Tdap 07/13/2019, 01/13/2023   • Tuberculin Skin Test-PPD Intradermal 01/20/2023   • Zoster 09/02/2014      Health Maintenance:         Topic Date Due   • Colorectal Cancer Screening  08/24/2027   •  Hepatitis C Screening  Completed         Topic Date Due   • COVID-19 Vaccine (4 - 2023-24 season) 09/01/2023      Medicare Screening Tests and Risk Assessments:     Myra is here for his Subsequent Wellness visit.     Health Risk Assessment:   Patient rates overall health as very good. Patient feels that their physical health rating is same. Patient is very satisfied with their life. Eyesight was rated as same. Hearing was rated as same. Patient feels that their emotional and mental health rating is same. Patients states they are never, rarely angry. Patient states they are never, rarely unusually tired/fatigued. Pain experienced in the last 7 days has been none. Patient states that he has experienced no weight loss or gain in last 6 months.     Depression Screening:   PHQ-2 Score: 0      Fall Risk Screening:   In the past year, patient has experienced: no history of falling in past year      Home Safety:  Patient does not have trouble with stairs inside or outside of their home. Patient has working smoke alarms and has working carbon monoxide detector. Home safety hazards include: none.     Nutrition:   Current diet is Regular.     Medications:   Patient is currently taking over-the-counter supplements. OTC medications include: see medication list. Patient is able to manage medications.     Activities of Daily Living (ADLs)/Instrumental Activities of Daily Living (IADLs):   Walk and transfer into and out of bed and chair?: Yes  Dress and groom yourself?: Yes    Bathe or shower yourself?: Yes    Feed yourself? Yes  Do your laundry/housekeeping?: Yes  Manage your money, pay your bills and track your expenses?: Yes  Make your own meals?: Yes    Do your own shopping?: Yes    Previous Hospitalizations:   Any hospitalizations or ED visits within the last 12 months?: No      Advance Care Planning:   Living will: No    Durable POA for healthcare: No    Advanced directive: No    Advanced directive counseling given: Yes   "  ACP document given: Yes    Patient declined ACP directive: No    End of Life Decisions reviewed with patient: Yes    Provider agrees with end of life decisions: Yes      Cognitive Screening:   Provider or family/friend/caregiver concerned regarding cognition?: No    PREVENTIVE SCREENINGS      Cardiovascular Screening:    General: History Lipid Disorder and Risks and Benefits Discussed    Due for: Lipid Panel      Diabetes Screening:     General: History Diabetes and Risks and Benefits Discussed    Due for: Blood Glucose      Colorectal Cancer Screening:     General: Screening Current      Prostate Cancer Screening:    General: Screening Current      Osteoporosis Screening:    General: Screening Not Indicated      Abdominal Aortic Aneurysm (AAA) Screening:    Risk factors include: age between 65-74 yo and tobacco use        General: Risks and Benefits Discussed    Due for: Screening AAA Ultrasound      Lung Cancer Screening:     General: Screening Not Indicated      Hepatitis C Screening:    General: Screening Current    Screening, Brief Intervention, and Referral to Treatment (SBIRT)    Screening      AUDIT-C Screenin) How often did you have a drink containing alcohol in the past year? monthly or less  2) How many drinks did you have on a typical day when you were drinking in the past year? 1 to 2    Single Item Drug Screening:  How often have you used an illegal drug (including marijuana) or a prescription medication for non-medical reasons in the past year? never    Single Item Drug Screen Score: 0  Interpretation: Negative screen for possible drug use disorder    No results found.     Physical Exam:     /58 (BP Location: Left arm, Patient Position: Sitting, Cuff Size: Standard)   Pulse (!) 52   Temp 97.6 °F (36.4 °C) (Tympanic)   Resp 16   Ht 5' 8\" (1.727 m)   Wt 65.8 kg (145 lb)   SpO2 98%   BMI 22.05 kg/m²     Physical Exam     Rufina Cao MD  "

## 2024-03-11 NOTE — ASSESSMENT & PLAN NOTE
Lab Results   Component Value Date    EGFR 18 01/27/2024    EGFR 19 (L) 01/27/2024    EGFR 17 10/28/2023    CREATININE 3.21 (H) 01/27/2024    CREATININE 3.40 (H) 10/28/2023    CREATININE 2.95 (H) 03/13/2023   Reviewed nephrology note

## 2024-03-11 NOTE — ASSESSMENT & PLAN NOTE
Wt Readings from Last 3 Encounters:   03/11/24 65.8 kg (145 lb)   02/19/24 65.8 kg (145 lb)   01/30/24 66.2 kg (146 lb)     Compensated reviewed cardiology note

## 2024-03-11 NOTE — PATIENT INSTRUCTIONS
Medicare Preventive Visit Patient Instructions  Thank you for completing your Welcome to Medicare Visit or Medicare Annual Wellness Visit today. Your next wellness visit will be due in one year (3/12/2025).  The screening/preventive services that you may require over the next 5-10 years are detailed below. Some tests may not apply to you based off risk factors and/or age. Screening tests ordered at today's visit but not completed yet may show as past due. Also, please note that scanned in results may not display below.  Preventive Screenings:  Service Recommendations Previous Testing/Comments   Colorectal Cancer Screening  Colonoscopy    Fecal Occult Blood Test (FOBT)/Fecal Immunochemical Test (FIT)  Fecal DNA/Cologuard Test  Flexible Sigmoidoscopy Age: 45-75 years old   Colonoscopy: every 10 years (May be performed more frequently if at higher risk)  OR  FOBT/FIT: every 1 year  OR  Cologuard: every 3 years  OR  Sigmoidoscopy: every 5 years  Screening may be recommended earlier than age 45 if at higher risk for colorectal cancer. Also, an individualized decision between you and your healthcare provider will decide whether screening between the ages of 76-85 would be appropriate. Colonoscopy: 08/25/2022  FOBT/FIT: Not on file  Cologuard: Not on file  Sigmoidoscopy: Not on file    Screening Current     Prostate Cancer Screening Individualized decision between patient and health care provider in men between ages of 55-69   Medicare will cover every 12 months beginning on the day after your 50th birthday PSA: 1.32 ng/mL     Screening Current     Hepatitis C Screening Once for adults born between 1945 and 1965  More frequently in patients at high risk for Hepatitis C Hep C Antibody: 07/19/2023    Screening Current   Diabetes Screening 1-2 times per year if you're at risk for diabetes or have pre-diabetes Fasting glucose: 90 mg/dL (1/27/2024)  A1C: 5.7 % (7/19/2023)  History Diabetes  Risks and Benefits Discussed  Due for  Blood Glucose   Cholesterol Screening Once every 5 years if you don't have a lipid disorder. May order more often based on risk factors. Lipid panel: 10/20/2022  History Lipid Disorder  Risks and Benefits Discussed  Due for Lipid Panel      Other Preventive Screenings Covered by Medicare:  Abdominal Aortic Aneurysm (AAA) Screening: covered once if your at risk. You're considered to be at risk if you have a family history of AAA or a male between the age of 65-75 who smoking at least 100 cigarettes in your lifetime.  Lung Cancer Screening: covers low dose CT scan once per year if you meet all of the following conditions: (1) Age 55-77; (2) No signs or symptoms of lung cancer; (3) Current smoker or have quit smoking within the last 15 years; (4) You have a tobacco smoking history of at least 20 pack years (packs per day x number of years you smoked); (5) You get a written order from a healthcare provider.  Glaucoma Screening: covered annually if you're considered high risk: (1) You have diabetes OR (2) Family history of glaucoma OR (3)  aged 50 and older OR (4)  American aged 65 and older  Osteoporosis Screening: covered every 2 years if you meet one of the following conditions: (1) Have a vertebral abnormality; (2) On glucocorticoid therapy for more than 3 months; (3) Have primary hyperparathyroidism; (4) On osteoporosis medications and need to assess response to drug therapy.  HIV Screening: covered annually if you're between the age of 15-65. Also covered annually if you are younger than 15 and older than 65 with risk factors for HIV infection. For pregnant patients, it is covered up to 3 times per pregnancy.    Immunizations:  Immunization Recommendations   Influenza Vaccine Annual influenza vaccination during flu season is recommended for all persons aged >= 6 months who do not have contraindications   Pneumococcal Vaccine   * Pneumococcal conjugate vaccine = PCV13 (Prevnar 13), PCV15  (Vaxneuvance), PCV20 (Prevnar 20)  * Pneumococcal polysaccharide vaccine = PPSV23 (Pneumovax) Adults 19-65 yo with certain risk factors or if 65+ yo  If never received any pneumonia vaccine: recommend Prevnar 20 (PCV20)  Give PCV20 if previously received 1 dose of PCV13 or PPSV23   Hepatitis B Vaccine 3 dose series if at intermediate or high risk (ex: diabetes, end stage renal disease, liver disease)   Respiratory syncytial virus (RSV) Vaccine - COVERED BY MEDICARE PART D  * RSVPreF3 (Arexvy) CDC recommends that adults 60 years of age and older may receive a single dose of RSV vaccine using shared clinical decision-making (SCDM)   Tetanus (Td) Vaccine - COST NOT COVERED BY MEDICARE PART B Following completion of primary series, a booster dose should be given every 10 years to maintain immunity against tetanus. Td may also be given as tetanus wound prophylaxis.   Tdap Vaccine - COST NOT COVERED BY MEDICARE PART B Recommended at least once for all adults. For pregnant patients, recommended with each pregnancy.   Shingles Vaccine (Shingrix) - COST NOT COVERED BY MEDICARE PART B  2 shot series recommended in those 19 years and older who have or will have weakened immune systems or those 50 years and older     Health Maintenance Due:      Topic Date Due   • Colorectal Cancer Screening  08/24/2027   • Hepatitis C Screening  Completed     Immunizations Due:      Topic Date Due   • Pneumococcal Vaccine: 65+ Years (2 of 2 - PPSV23 or PCV20) 09/07/2018   • COVID-19 Vaccine (4 - 2023-24 season) 09/01/2023     Advance Directives   What are advance directives?  Advance directives are legal documents that state your wishes and plans for medical care. These plans are made ahead of time in case you lose your ability to make decisions for yourself. Advance directives can apply to any medical decision, such as the treatments you want, and if you want to donate organs.   What are the types of advance directives?  There are many types  of advance directives, and each state has rules about how to use them. You may choose a combination of any of the following:  Living will:  This is a written record of the treatment you want. You can also choose which treatments you do not want, which to limit, and which to stop at a certain time. This includes surgery, medicine, IV fluid, and tube feedings.   Durable power of  for healthcare (DPAHC):  This is a written record that states who you want to make healthcare choices for you when you are unable to make them for yourself. This person, called a proxy, is usually a family member or a friend. You may choose more than 1 proxy.  Do not resuscitate (DNR) order:  A DNR order is used in case your heart stops beating or you stop breathing. It is a request not to have certain forms of treatment, such as CPR. A DNR order may be included in other types of advance directives.  Medical directive:  This covers the care that you want if you are in a coma, near death, or unable to make decisions for yourself. You can list the treatments you want for each condition. Treatment may include pain medicine, surgery, blood transfusions, dialysis, IV or tube feedings, and a ventilator (breathing machine).  Values history:  This document has questions about your views, beliefs, and how you feel and think about life. This information can help others choose the care that you would choose.  Why are advance directives important?  An advance directive helps you control your care. Although spoken wishes may be used, it is better to have your wishes written down. Spoken wishes can be misunderstood, or not followed. Treatments may be given even if you do not want them. An advance directive may make it easier for your family to make difficult choices about your care.       © Copyright [employer] 2018 Information is for End User's use only and may not be sold, redistributed or otherwise used for commercial purposes. All  illustrations and images included in CareNotes® are the copyrighted property of [employer] or [employer]

## 2024-03-11 NOTE — PROGRESS NOTES
Name: Myra Mustafa      : 1950      MRN: [id number]  Encounter Provider: Rufina Cao MD  Encounter Date: 3/11/2024   Encounter department: [facility] MEDICINE    Assessment & Plan     1. Encounter for immunization  -     Pneumococcal Conjugate Vaccine 20-valent (Pcv20)    2. Benign hypertension with chronic kidney disease, stage IV (HCC)  Assessment & Plan:  Lab Results   Component Value Date    EGFR 18 2024    EGFR 19 (L) 2024    EGFR 17 10/28/2023    CREATININE 3.21 (H) 2024    CREATININE 3.40 (H) 10/28/2023    CREATININE 2.95 (H) 2023   Reviewed nephrology note      3. Chronic systolic heart failure (HCC)  Assessment & Plan:  Wt Readings from Last 3 Encounters:   24 65.8 kg (145 lb)   24 65.8 kg (145 lb)   24 66.2 kg (146 lb)     Compensated reviewed cardiology note             4. Coronary artery disease involving native coronary artery of native heart without angina pectoris  Assessment & Plan:  Chest pain free due to see cardiology soon      5. Essential hypertension  Assessment & Plan:  Well controlled      6. Type 2 diabetes mellitus with stage 4 chronic kidney disease, without long-term current use of insulin ([facility], [facility])  Assessment & Plan:    Lab Results   Component Value Date    HGBA1C 5.7 (H) 2023   Due for diabetic foot and hga1c    Orders:  -     POCT hemoglobin A1c    7. Secondary hyperparathyroidism of renal origin (HCC)  Assessment & Plan:  Managed by nephrology      8. CKD (chronic kidney disease), stage IV (HCC)  Assessment & Plan:  Lab Results   Component Value Date    EGFR 18 2024    EGFR 19 (L) 2024    EGFR 17 10/28/2023    CREATININE 3.21 (H) 2024    CREATININE 3.40 (H) 10/28/2023    CREATININE 2.95 (H) 2023   Stable reviewed nephrology note       9. Anticoagulant long-term use  Assessment & Plan:  On warfarin managed coumadin clinic       10. Bradycardia  Assessment & Plan:  Pt on  metoprolol      11. Prostate cancer screening  -     PSA, Total Screen; Future    12. Dyslipidemia  -     Lipid panel; Future; Expected date: 03/11/2024           Subjective      Pt is here for interval visit and evaluation of multiple medical problems, review of medications, labs, Health Maintenance and any recent specialty consults        Review of Systems   Constitutional:  Negative for appetite change, chills, fatigue and fever.   Respiratory:  Negative for cough, chest tightness and shortness of breath.    Cardiovascular:  Negative for chest pain, palpitations and leg swelling.   Gastrointestinal:  Negative for abdominal pain, constipation, diarrhea, nausea and vomiting.   Genitourinary:  Negative for difficulty urinating and frequency.   Musculoskeletal:  Negative for arthralgias, back pain, gait problem and neck pain.   Skin:  Negative for rash.   Neurological:  Negative for dizziness, weakness, light-headedness, numbness and headaches.   Hematological:  Does not bruise/bleed easily.   Psychiatric/Behavioral:  Negative for dysphoric mood and sleep disturbance. The patient is not nervous/anxious.        Current Outpatient Medications on File Prior to Visit   Medication Sig    ALPRAZolam (XANAX) 0.25 mg tablet Take prior to flight    amLODIPine (NORVASC) 10 mg tablet Take 1 tablet (10 mg total) by mouth daily    ascorbic acid (VITAMIN C) 500 mg tablet Take 500 mg by mouth daily    aspirin (RA Aspirin EC) 81 mg EC tablet Take 1 tablet (81 mg total) by mouth daily    atorvastatin (LIPITOR) 20 mg tablet TAKE 1 TABLET BY MOUTH EVERY DAY    calcitriol (ROCALTROL) 0.25 mcg capsule Take 1 capsule (0.25 mcg total) by mouth daily TAKE 1 CAPSULE 5 DAYS A WEEK AS DIRECTED    cetirizine (ZyrTEC) 10 mg tablet Take 1 tablet (10 mg total) by mouth daily    COMBIGAN 0.2-0.5 %     Continuous Blood Gluc  (FreeStyle Alisson [place]) CHERELLE Use 1 Units continuous    Continuous Blood Gluc Sensor (FreeStyle Alisson 3 Sensor) MISC  "Use 1 Units every 14 (fourteen) days    Cosopt PF 2-0.5 % SOLN instill 1 drop in both eyes twice daily    isosorbide dinitrate (ISORDIL) 10 mg tablet Take 1 tablet (10 mg total) by mouth 3 (three) times a day    metoprolol succinate (TOPROL-XL) 50 mg 24 hr tablet Take 1 tablet (50 mg total) by mouth daily    Nutritional Supplements (Nepro) LIQD Take 237 mL by mouth in the morning    sodium bicarbonate 650 mg tablet Take 1 tablet (650 mg total) by mouth in the morning    TRAVATAN Z 0.004 % ophthalmic solution Administer 1 drop to both eyes daily at bedtime     warfarin (COUMADIN) 2 mg tablet TAKE 1 TO 2 TABLETS DAILY BY MOUTH OR AS DIRECTED BY PHYSICIAN.    [DISCONTINUED] hydrALAZINE (APRESOLINE) 25 mg tablet TAKE 1 TABLET BY MOUTH THREE TIMES DAILY       Objective     /58 (BP Location: Left arm, Patient Position: Sitting, Cuff Size: Standard)   Pulse (!) 52   Temp 97.6 °F (36.4 °C) (Tympanic)   Resp 16   Ht 5' 8\" (1.727 m)   Wt 65.8 kg (145 lb)   SpO2 98%   BMI 22.05 kg/m²     Physical Exam  Vitals reviewed.   Constitutional:       General: He is not in acute distress.     Appearance: Normal appearance. He is well-developed. He is not ill-appearing.   HENT:      Mouth/Throat:      Mouth: Mucous membranes are moist.   Eyes:      Extraocular Movements: Extraocular movements intact.      Conjunctiva/sclera: Conjunctivae normal.      Pupils: Pupils are equal, round, and reactive to light.   Neck:      Thyroid: No thyromegaly.      Vascular: No carotid bruit.   Cardiovascular:      Rate and Rhythm: Regular rhythm. Bradycardia present.      Pulses: Normal pulses. no weak pulses.           Dorsalis pedis pulses are 2+ on the right side and 2+ on the left side.      Heart sounds: Murmur (2/6 systolic) heard.   Pulmonary:      Effort: Pulmonary effort is normal.      Breath sounds: Normal breath sounds.   Chest:      Chest wall: No tenderness.   Abdominal:      General: Bowel sounds are normal. There is no " distension.      Palpations: Abdomen is soft.      Tenderness: There is no abdominal tenderness.   Musculoskeletal:      Cervical back: Normal range of motion and neck supple.   Feet:      Right foot:      Skin integrity: Dry skin present. No ulcer, skin breakdown, erythema, warmth or callus.      Left foot:      Skin integrity: Dry skin present. No ulcer, skin breakdown, erythema, warmth or callus.   Lymphadenopathy:      Cervical: No cervical adenopathy.   Skin:     General: Skin is warm and dry.   Neurological:      General: No focal deficit present.      Mental Status: He is alert and oriented to person, place, and time. Mental status is at baseline.      Cranial Nerves: No cranial nerve deficit.   Psychiatric:         Mood and Affect: Mood normal.         Behavior: Behavior normal.     Patient's shoes and socks removed.    Right Foot/Ankle   Right Foot Inspection  Skin Exam: skin normal, skin intact and dry skin. No warmth, no callus, no erythema, no maceration, no abnormal color, no pre-ulcer, no ulcer and no callus.     Toe Exam: No swelling, no tenderness, erythema and  no right toe deformity    Sensory   Vibration: intact  Proprioception: intact  Monofilament testing: intact    Vascular  The right DP pulse is 2+.     Right Toe  - Comprehensive Exam  Ecchymosis: none  Swelling: none   Tenderness: none       Left Foot/Ankle  Left Foot Inspection  Skin Exam: skin normal, skin intact and dry skin. No warmth, no erythema, no maceration, normal color, no pre-ulcer, no ulcer and no callus.     Sensory   Vibration: intact  Proprioception: intact  Monofilament testing: intact    Vascular  The left DP pulse is 2+.     Left Toe  - Comprehensive Exam  Ecchymosis: none  Swelling: none   Tenderness: none       Assign Risk Category  No deformity present  No loss of protective sensation  No weak pulses  Risk: 0      Rufina Cao MD

## 2024-03-11 NOTE — ASSESSMENT & PLAN NOTE
Lab Results   Component Value Date    EGFR 18 01/27/2024    EGFR 19 (L) 01/27/2024    EGFR 17 10/28/2023    CREATININE 3.21 (H) 01/27/2024    CREATININE 3.40 (H) 10/28/2023    CREATININE 2.95 (H) 03/13/2023   Stable reviewed nephrology note

## 2024-03-23 ENCOUNTER — APPOINTMENT (OUTPATIENT)
Dept: LAB | Facility: CLINIC | Age: 74
End: 2024-03-23
Payer: COMMERCIAL

## 2024-03-25 ENCOUNTER — ANTICOAG VISIT (OUTPATIENT)
Dept: CARDIOLOGY CLINIC | Facility: CLINIC | Age: 74
End: 2024-03-25

## 2024-05-10 DIAGNOSIS — Z95.2 S/P AVR: ICD-10-CM

## 2024-05-10 RX ORDER — ASPIRIN 81 MG/1
81 TABLET, COATED ORAL DAILY
Qty: 90 TABLET | Refills: 1 | Status: SHIPPED | OUTPATIENT
Start: 2024-05-10

## 2024-05-11 ENCOUNTER — APPOINTMENT (OUTPATIENT)
Dept: LAB | Facility: CLINIC | Age: 74
End: 2024-05-11
Payer: COMMERCIAL

## 2024-05-11 DIAGNOSIS — N18.4 CKD STAGE G4/A3, GFR 15-29 AND ALBUMIN CREATININE RATIO >300 MG/G (HCC): ICD-10-CM

## 2024-05-11 DIAGNOSIS — E78.5 DYSLIPIDEMIA: ICD-10-CM

## 2024-05-11 DIAGNOSIS — N18.9 CHRONIC KIDNEY DISEASE-MINERAL AND BONE DISORDER: ICD-10-CM

## 2024-05-11 DIAGNOSIS — Z12.5 PROSTATE CANCER SCREENING: ICD-10-CM

## 2024-05-11 DIAGNOSIS — E83.9 CHRONIC KIDNEY DISEASE-MINERAL AND BONE DISORDER: ICD-10-CM

## 2024-05-11 DIAGNOSIS — M89.9 CHRONIC KIDNEY DISEASE-MINERAL AND BONE DISORDER: ICD-10-CM

## 2024-05-13 ENCOUNTER — ANTICOAG VISIT (OUTPATIENT)
Dept: CARDIOLOGY CLINIC | Facility: CLINIC | Age: 74
End: 2024-05-13

## 2024-05-21 DIAGNOSIS — I12.9 BENIGN HYPERTENSION WITH CHRONIC KIDNEY DISEASE, STAGE IV (HCC): ICD-10-CM

## 2024-05-21 DIAGNOSIS — N18.4 BENIGN HYPERTENSION WITH CHRONIC KIDNEY DISEASE, STAGE IV (HCC): ICD-10-CM

## 2024-05-21 DIAGNOSIS — N18.4 CKD STAGE G4/A3, GFR 15-29 AND ALBUMIN CREATININE RATIO >300 MG/G (HCC): Primary | ICD-10-CM

## 2024-05-24 DIAGNOSIS — E87.20 METABOLIC ACIDOSIS: ICD-10-CM

## 2024-05-24 RX ORDER — SODIUM BICARBONATE 650 MG/1
TABLET ORAL
Qty: 90 TABLET | Refills: 1 | Status: SHIPPED | OUTPATIENT
Start: 2024-05-24

## 2024-05-31 ENCOUNTER — TELEPHONE (OUTPATIENT)
Dept: NEPHROLOGY | Facility: CLINIC | Age: 74
End: 2024-05-31

## 2024-05-31 NOTE — TELEPHONE ENCOUNTER
Left voicemail for the patient reminding to please complete labwork prior to 6/4 appointment with Dr. Reyes. Advised patient to call back with any questions or  Concerns.

## 2024-06-01 ENCOUNTER — APPOINTMENT (OUTPATIENT)
Dept: LAB | Facility: CLINIC | Age: 74
End: 2024-06-01
Payer: COMMERCIAL

## 2024-06-01 DIAGNOSIS — N18.4 BENIGN HYPERTENSION WITH CHRONIC KIDNEY DISEASE, STAGE IV (HCC): ICD-10-CM

## 2024-06-01 DIAGNOSIS — N18.4 CKD STAGE G4/A3, GFR 15-29 AND ALBUMIN CREATININE RATIO >300 MG/G (HCC): ICD-10-CM

## 2024-06-01 DIAGNOSIS — I12.9 BENIGN HYPERTENSION WITH CHRONIC KIDNEY DISEASE, STAGE IV (HCC): ICD-10-CM

## 2024-06-01 LAB
ANION GAP SERPL CALCULATED.3IONS-SCNC: 4 MMOL/L (ref 4–13)
BUN SERPL-MCNC: 53 MG/DL (ref 5–25)
CALCIUM SERPL-MCNC: 8.7 MG/DL (ref 8.4–10.2)
CHLORIDE SERPL-SCNC: 113 MMOL/L (ref 96–108)
CO2 SERPL-SCNC: 22 MMOL/L (ref 21–32)
CREAT SERPL-MCNC: 4.02 MG/DL (ref 0.6–1.3)
GFR SERPL CREATININE-BSD FRML MDRD: 13 ML/MIN/1.73SQ M
GLUCOSE P FAST SERPL-MCNC: 88 MG/DL (ref 65–99)
PHOSPHATE SERPL-MCNC: 4 MG/DL (ref 2.3–4.1)
POTASSIUM SERPL-SCNC: 6 MMOL/L (ref 3.5–5.3)
SODIUM SERPL-SCNC: 139 MMOL/L (ref 135–147)

## 2024-06-01 PROCEDURE — 80048 BASIC METABOLIC PNL TOTAL CA: CPT

## 2024-06-01 PROCEDURE — 84100 ASSAY OF PHOSPHORUS: CPT

## 2024-06-04 ENCOUNTER — HOSPITAL ENCOUNTER (OUTPATIENT)
Dept: RADIOLOGY | Facility: HOSPITAL | Age: 74
Discharge: HOME | End: 2024-06-04
Payer: COMMERCIAL

## 2024-06-04 ENCOUNTER — APPOINTMENT (OUTPATIENT)
Dept: LAB | Facility: CLINIC | Age: 74
End: 2024-06-04
Payer: COMMERCIAL

## 2024-06-04 ENCOUNTER — TELEPHONE (OUTPATIENT)
Dept: NEPHROLOGY | Facility: CLINIC | Age: 74
End: 2024-06-04

## 2024-06-04 ENCOUNTER — OFFICE VISIT (OUTPATIENT)
Dept: NEPHROLOGY | Facility: CLINIC | Age: 74
End: 2024-06-04
Payer: COMMERCIAL

## 2024-06-04 VITALS
SYSTOLIC BLOOD PRESSURE: 130 MMHG | HEART RATE: 50 BPM | HEIGHT: 68 IN | BODY MASS INDEX: 21.82 KG/M2 | WEIGHT: 144 LBS | DIASTOLIC BLOOD PRESSURE: 80 MMHG

## 2024-06-04 DIAGNOSIS — N18.6 END STAGE RENAL DISEASE (HCC): ICD-10-CM

## 2024-06-04 DIAGNOSIS — N18.5 CKD STAGE G5/A3, GFR <15 AND ALBUMIN CREATININE RATIO >300 MG/G (HCC): ICD-10-CM

## 2024-06-04 DIAGNOSIS — N05.1 FSGS (FOCAL SEGMENTAL GLOMERULOSCLEROSIS): ICD-10-CM

## 2024-06-04 DIAGNOSIS — R80.9 NEPHROTIC RANGE PROTEINURIA: ICD-10-CM

## 2024-06-04 DIAGNOSIS — I50.22 CHRONIC SYSTOLIC HEART FAILURE (HCC): ICD-10-CM

## 2024-06-04 DIAGNOSIS — M89.9 CHRONIC KIDNEY DISEASE-MINERAL BONE DISORDER (CKD-MBD) WITH STAGE 5 CHRONIC KIDNEY DISEASE, NOT ON CHRONIC DIALYSIS (HCC): ICD-10-CM

## 2024-06-04 DIAGNOSIS — E87.20 METABOLIC ACIDOSIS: ICD-10-CM

## 2024-06-04 DIAGNOSIS — N18.5 CKD STAGE G5/A3, GFR <15 AND ALBUMIN CREATININE RATIO >300 MG/G (HCC): Primary | ICD-10-CM

## 2024-06-04 DIAGNOSIS — I12.0 BENIGN HYPERTENSION WITH CHRONIC KIDNEY DISEASE, STAGE V (HCC): ICD-10-CM

## 2024-06-04 DIAGNOSIS — N18.5 CHRONIC KIDNEY DISEASE-MINERAL BONE DISORDER (CKD-MBD) WITH STAGE 5 CHRONIC KIDNEY DISEASE, NOT ON CHRONIC DIALYSIS (HCC): ICD-10-CM

## 2024-06-04 DIAGNOSIS — E83.9 CHRONIC KIDNEY DISEASE-MINERAL BONE DISORDER (CKD-MBD) WITH STAGE 5 CHRONIC KIDNEY DISEASE, NOT ON CHRONIC DIALYSIS (HCC): ICD-10-CM

## 2024-06-04 DIAGNOSIS — N18.5 BENIGN HYPERTENSION WITH CHRONIC KIDNEY DISEASE, STAGE V (HCC): ICD-10-CM

## 2024-06-04 DIAGNOSIS — Z98.890 S/P MVR (MITRAL VALVE REPAIR): ICD-10-CM

## 2024-06-04 DIAGNOSIS — E87.5 HYPERKALEMIA: ICD-10-CM

## 2024-06-04 DIAGNOSIS — E87.5 HYPERKALEMIA: Primary | ICD-10-CM

## 2024-06-04 DIAGNOSIS — N25.81 SECONDARY HYPERPARATHYROIDISM OF RENAL ORIGIN (HCC): ICD-10-CM

## 2024-06-04 DIAGNOSIS — R60.0 LEG EDEMA, RIGHT: ICD-10-CM

## 2024-06-04 LAB
ANION GAP SERPL CALCULATED.3IONS-SCNC: 5 MMOL/L (ref 4–13)
BUN SERPL-MCNC: 53 MG/DL (ref 5–25)
CALCIUM SERPL-MCNC: 8.1 MG/DL (ref 8.4–10.2)
CHLORIDE SERPL-SCNC: 110 MMOL/L (ref 96–108)
CO2 SERPL-SCNC: 22 MMOL/L (ref 21–32)
CREAT SERPL-MCNC: 4.29 MG/DL (ref 0.6–1.3)
GFR SERPL CREATININE-BSD FRML MDRD: 12 ML/MIN/1.73SQ M
GLUCOSE SERPL-MCNC: 110 MG/DL (ref 65–140)
HBV CORE AB SER QL: NORMAL
HBV CORE IGM SER QL: NORMAL
HBV SURFACE AB SER-ACNC: 4.35 MIU/ML
HBV SURFACE AG SER QL: NORMAL
HCV AB SER QL: NORMAL
POTASSIUM SERPL-SCNC: 5.7 MMOL/L (ref 3.5–5.3)
SODIUM SERPL-SCNC: 137 MMOL/L (ref 135–147)

## 2024-06-04 PROCEDURE — 86706 HEP B SURFACE ANTIBODY: CPT

## 2024-06-04 PROCEDURE — 87340 HEPATITIS B SURFACE AG IA: CPT

## 2024-06-04 PROCEDURE — 86803 HEPATITIS C AB TEST: CPT

## 2024-06-04 PROCEDURE — 86705 HEP B CORE ANTIBODY IGM: CPT

## 2024-06-04 PROCEDURE — 80048 BASIC METABOLIC PNL TOTAL CA: CPT | Performed by: STUDENT IN AN ORGANIZED HEALTH CARE EDUCATION/TRAINING PROGRAM

## 2024-06-04 PROCEDURE — 86704 HEP B CORE ANTIBODY TOTAL: CPT

## 2024-06-04 PROCEDURE — 71046 X-RAY EXAM CHEST 2 VIEWS: CPT

## 2024-06-04 PROCEDURE — 99214 OFFICE O/P EST MOD 30 MIN: CPT | Performed by: STUDENT IN AN ORGANIZED HEALTH CARE EDUCATION/TRAINING PROGRAM

## 2024-06-04 RX ORDER — TORSEMIDE 20 MG/1
20 TABLET ORAL DAILY
Qty: 30 TABLET | Refills: 2 | Status: SHIPPED | OUTPATIENT
Start: 2024-06-04

## 2024-06-04 RX ORDER — SODIUM POLYSTYRENE SULFONATE 4.1 MEQ/G
15 POWDER, FOR SUSPENSION ORAL; RECTAL EVERY OTHER DAY
Qty: 210 G | Refills: 0 | Status: SHIPPED | OUTPATIENT
Start: 2024-06-04 | End: 2024-07-01

## 2024-06-04 NOTE — PROGRESS NOTES
NEPHROLOGY OUTPATIENT PROGRESS NOTE   Myra Mustafa 73 y.o. male MRN: [id number]  DATE: 6/4/2024    Reason for visit: No chief complaint on file.       There are no Patient Instructions on file for this visit.      There are no diagnoses linked to this encounter.    Assessment/Plan:  72 yo man with PMH DM, FSGS (biopsy proven 2018), CKD G4A3 (bl creat 3-3.5mg/dL, eGFR 21-24).  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   Diffuse diabetic glomerulosclerosis (mild).   25% IFTA  Arterio-arteriolosclerosis with hyalinosis, moderate-severe   Etiology: Likely secondary to obesity (at that time)  Lost ~15lbs (asper patient, previous notes mentioned 35lbs) after diagnosis (posible cause of FSGS)  Genetic Testing:  Not done, possible APOL1  Current creatinine: Stable at new baseline, creatinine 3.2 mg/dL   UPCr fluctuates,  4.67 g/g  Unable to receive ACE inhibitor's or ARB due to tendency to hyperkalemia   eGFR borderline low for SGLT2 inhibitors  Serum albumin 3.3      #Immunosuppressive Medications  No IS indicated at this time     # Nephrotic proteinuria  UPCR 4.67 g/g  Secondary to FSGS with secondary fissures  Unfortunately patient has not been able to receive ACE inhibitors or ARB in the settings of tendency to hyperkalemia GFR low for SGLT2      #CKD G5A3 progression of CKD     Baseline creatinine:3-3.5mg/dL  Current creatinine: 4.02 mg/dL, worsening kidney function due to progression  Etiology:  Secondary to FSGS and Diabetic glomerulopathy as above  UPCr: 4.67 g/ g   The only uremic symptoms is poor appetite, no asterixis, no shortness of breath  Due to worsening hyperkalemia and increase of creatinine we plan to start the process for peritoneal dialysis   Referral to interventional radiologist  for PD catheter placement   See hyperkalemia below  Patient will be referred to [facility]  Avoid nonsteroidal anti-inflammatory drugs such as Naprosyn, ibuprofen, Aleve, Advil, Celebrex, Meloxicam (Mobic) etc.  You can use Tylenol as needed if you do not have any liver condition to be concerned about  PD catheter was not placed before because patient wanted to wait until  kidney function progress   Referred to transplant    # Hyperkalemia  Potassium of 6 on June 1  Labs start today.  If remains hyperkalemic patient will be referred to the ED   Hyperkalemia is the only indication for urgent dialysis at this time.  No uremic symptoms  Lokelma 10 g daily  Torsemide 20 mg daily  Discussed low potassium diet     #Volume/Hypertension:  Volume: Hypervolemia with unilateral lower extremity edema  Blood pressure: Normotensive, /80,, goal less than 140/90    Low sodium diet   Continue with  Amlodipine 10 mg daily  Metoprolol 50 mg daily  Off hydralazine   Isodril  Advised to maintain a good BP control to prevent progression of CKD        #Acid base disorder  Serum bicarb 22mmol/L  Continue serum bicarbonate once a day only to avoid sodium load     #CKD-MBD  Calcium 8.7 mg/dL  Phosphorus 4 mg/dL  (goal <5.5)  At goal     #Secondary Hyperparathyroidism   iPTH 122.9, guidelines levels KDIGO 2017  Continue calcitriol     #Acute on chronic anemia:  Current hemoglobin 12mg   at goal         #MVR, AVR  On coumadin   Follow-up with cardiology     #CHF  Hypervolemic mainly lower extremity, unilateral edema  Start torsemide 20 mg daily      #DM  HbA1c 5.5  Off meds   Advised to maintain a good DM control to prevent progression of CKD   Maintain healthy diet (vegetables, fruits, whole grains, nonfat or low fat)  Weight loss  Physical activity (5 to 10 minutes to start the increase to 30 min a day)     #Weight loss  Poor appetite, eating small portions  Could be uremic symptoms related  No indication of dialysis at this time    #  Right lower extremity edema  Started with a wound on his right calf  Doppler ultrasound to rule out DVT patient has a aortic valve replaced, versus hematoma in the settings of Coumadin         SUBJECTIVE / INTERVAL HISTORY:  73 y.o. male presents in follow up of CKD.  Patient denies uremic symptoms however his potassium was high on Saturday.  He did not receive a call from our office on Saturday for hyperkalemia of 6.  No shortness of breath, no lower extremity weakness, no cramps, no palpitations    Myra Mustafa denies any recent illness/hospitalizations/medication changes since last office visit.    Review of Systems   Constitutional:  Negative for activity change and appetite change.   HENT:  Negative for congestion and dental problem.    Eyes:  Negative for discharge.   Respiratory:  Negative for shortness of breath and stridor.    Cardiovascular:  Positive for leg swelling. Negative for chest pain.   Gastrointestinal:  Negative for abdominal distention and abdominal pain.   Endocrine: Negative for cold intolerance.   Genitourinary:  Negative for dysuria.   Musculoskeletal:  Negative for arthralgias.   Skin:  Negative for color change.   Neurological:  Negative for dizziness.   Psychiatric/Behavioral:  Negative for agitation.        OBJECTIVE:  There were no vitals taken for this visit. There is no height or weight on file to calculate BMI.    Physical exam:  Physical Exam  General:  no acute distress at this time  Skin:  No acute rash  Eyes:  No scleral icterus and noninjected  ENT:  mucous membranes moist  Neck:  no carotid bruits  Chest:  Clear to auscultation percussion, good respiratory effort, no use of accessory respiratory muscles  CVS: Systolic murmur, no rub    Abdomen:  soft and nontender   Extremities: RLLE   Neuro:  No gross focality  Psych:  Alert , cooperative       Medications:    Current Outpatient Medications:     ALPRAZolam (XANAX) 0.25 mg tablet, Take prior to flight, Disp: 4 tablet, Rfl:  0    amLODIPine (NORVASC) 10 mg tablet, Take 1 tablet (10 mg total) by mouth daily, Disp: 90 tablet, Rfl: 1    ascorbic acid (VITAMIN C) 500 mg tablet, Take 500 mg by mouth daily, Disp: , Rfl:     Aspirin Low Dose 81 MG EC tablet, TAKE 1 TABLET BY MOUTH EVERY DAY, Disp: 90 tablet, Rfl: 1    atorvastatin (LIPITOR) 20 mg tablet, TAKE 1 TABLET BY MOUTH EVERY DAY, Disp: 90 tablet, Rfl: 0    calcitriol (ROCALTROL) 0.25 mcg capsule, Take 1 capsule (0.25 mcg total) by mouth daily TAKE 1 CAPSULE 5 DAYS A WEEK AS DIRECTED, Disp: 90 capsule, Rfl: 3    cetirizine (ZyrTEC) 10 mg tablet, Take 1 tablet (10 mg total) by mouth daily, Disp: 30 tablet, Rfl: 1    COMBIGAN 0.2-0.5 %, , Disp: , Rfl: 0    Continuous Blood Gluc  (FreeStyle Alisson [place]) CHERELLE, Use 1 Units continuous, Disp: 1 each, Rfl: 0    Continuous Blood Gluc Sensor (FreeStyle Alisson 3 Sensor) MISC, Use 1 Units every 14 (fourteen) days, Disp: 2 each, Rfl: 5    Cosopt PF 2-0.5 % SOLN, instill 1 drop in both eyes twice daily, Disp: , Rfl:     isosorbide dinitrate (ISORDIL) 10 mg tablet, Take 1 tablet (10 mg total) by mouth 3 (three) times a day, Disp: 270 tablet, Rfl: 3    metoprolol succinate (TOPROL-XL) 50 mg 24 hr tablet, Take 1 tablet (50 mg total) by mouth daily, Disp: 90 tablet, Rfl: 3    Nutritional Supplements (Nepro) LIQD, Take 237 mL by mouth in the morning, Disp: 5688 mL, Rfl: 3    sodium bicarbonate 650 mg tablet, TAKE 1 TABLET(650 MG) BY MOUTH IN THE MORNING, Disp: 90 tablet, Rfl: 1    TRAVATAN Z 0.004 % ophthalmic solution, Administer 1 drop to both eyes daily at bedtime , Disp: , Rfl: 0    warfarin (COUMADIN) 2 mg tablet, TAKE 1 TO 2 TABLETS DAILY BY MOUTH OR AS DIRECTED BY PHYSICIAN., Disp: 60 tablet, Rfl: 11    Allergies:  Allergies as of 06/04/2024 - Reviewed 03/11/2024   Allergen Reaction Noted    Ace inhibitors Cough 12/24/2013    Keflex [cephalexin] Rash 01/26/2021       The following portions of the patient's history were reviewed and updated  "as appropriate: past family history, past surgical history and problem list.    Laboratory Results:  Lab Results   Component Value Date    SODIUM 139 06/01/2024    K 6.0 (H) 06/01/2024     (H) 06/01/2024    CO2 22 06/01/2024    BUN 53 (H) 06/01/2024    CREATININE 4.02 (H) 06/01/2024    GLUC 154 (H) 01/17/2023    CALCIUM 8.7 06/01/2024        Lab Results   Component Value Date    .9 (H) 10/28/2023    CALCIUM 8.7 06/01/2024    PHOS 4.0 06/01/2024       Portions of the record may have been created with voice recognition software.  Occasional wrong word or \"sound a like\" substitutions may have occurred due to the inherent limitations of voice recognition software.  Read the chart carefully and recognize, using context, where substitutions have occurred.    "

## 2024-06-04 NOTE — PATIENT INSTRUCTIONS
Thank you for coming to your visit today. As we discussed you kidney function is above your baseline, your creatinine is 4.02 mg/dL your potassium is high. Please follow the recommendations below       Recommend low sodium (salt) food    Avoid nonsteroidal anti-inflammatory drugs such as Naprosyn, ibuprofen, Aleve, Advil, Celebrex, Meloxicam (Mobic) etc.  You can use Tylenol as needed if you do not have any liver condition to be concerned about    I recommend you to repeat your lasb today   Low potassium diet   Torsemide 20mg daily  Lokelma 1 pack daily    Leg ultrasound  You will receive a call from IR for PD catheter     Next Visit in 4-6 weeks    If you need to see us earlier we can change the appointment for you      Joselyn Reyes Bahamonde, MD  Nephrology Attending

## 2024-06-05 ENCOUNTER — TELEPHONE (OUTPATIENT)
Dept: NEPHROLOGY | Facility: HOSPITAL | Age: 74
End: 2024-06-05

## 2024-06-05 ENCOUNTER — HOSPITAL ENCOUNTER (OUTPATIENT)
Dept: VASCULAR ULTRASOUND | Facility: HOSPITAL | Age: 74
Discharge: HOME | End: 2024-06-05
Attending: STUDENT IN AN ORGANIZED HEALTH CARE EDUCATION/TRAINING PROGRAM
Payer: COMMERCIAL

## 2024-06-05 PROCEDURE — 93971 EXTREMITY STUDY: CPT | Performed by: SURGERY

## 2024-06-05 PROCEDURE — 93971 EXTREMITY STUDY: CPT

## 2024-06-05 NOTE — TELEPHONE ENCOUNTER
Patient called back, informed him of message.  He is going to go to pharmacy now to see which one they have, and he will call us back if any issues.  He will repeat lab Monday as ordered.

## 2024-06-05 NOTE — TELEPHONE ENCOUNTER
Called patient to make sure he received Dr. Reyes message was sent through [employer] and left voicemail to patient with the following information: Dr. Reyes received your blood work results.  Your potassium is slightly better but it's still high.  Your potassium is 5.7.  I sent 2 prescriptions to your pharmacy but you do not have to  both.  Please  only the 1 that is available either Lokelma or Kayexalate.       Take Lokelma or Kayexalate every other day.  Will will try to control your potassium out of the hospital but if your potassium does not get better by Monday you will have to be admitted.     Please repeat your blood work on Monday morning. Advised patient to please call our office to let us know he received the message and if have any other questions or concerns.       I will call patient  again later to make sure he received message.

## 2024-06-05 NOTE — TELEPHONE ENCOUNTER
Called patient to make sure he received messages and left a voicemail with the above message. Advised to please call our office to let us know he received the message and if have any other questions or concerns.

## 2024-06-06 ENCOUNTER — TELEPHONE (OUTPATIENT)
Age: 74
End: 2024-06-06

## 2024-06-06 NOTE — TELEPHONE ENCOUNTER
Forwarding to PA team for review.   Patient needs prior authorization for  Lokelma 10 g packet  Dr. العلي/[facility]      Pharmacy called and said that this needed a prior auth.

## 2024-06-07 NOTE — TELEPHONE ENCOUNTER
ELANA DUNCAN Approved     Date(s) approved UNTIL 12/06/2024        Patient advised by          [] mParticlehart Message  [] Phone call   []LMOM  [x]L/M to call office as no active Communication consent on file  []Unable to leave detailed message as VM not approved on Communication consent       Pharmacy advised by    [x]Fax  []Phone call    Approval letter scanned into Media Yes

## 2024-06-07 NOTE — TELEPHONE ENCOUNTER
ELANA DUNCAN    Submitted via    [x][employer]-KEY [id number]  [][employer]-Case ID #   []Faxed to plan   []Other website   []Phone call Case ID #     Office notes sent, clinical questions answered. Awaiting determination    Turnaround time for your insurance to make a decision on your Prior Authorization can take 7-21 business days.

## 2024-06-10 ENCOUNTER — TELEPHONE (OUTPATIENT)
Dept: NEPHROLOGY | Facility: CLINIC | Age: 74
End: 2024-06-10

## 2024-06-10 ENCOUNTER — TELEPHONE (OUTPATIENT)
Dept: FAMILY MEDICINE CLINIC | Facility: CLINIC | Age: 74
End: 2024-06-10

## 2024-06-10 ENCOUNTER — ANTICOAG VISIT (OUTPATIENT)
Dept: CARDIOLOGY CLINIC | Facility: CLINIC | Age: 74
End: 2024-06-10

## 2024-06-10 ENCOUNTER — APPOINTMENT (OUTPATIENT)
Dept: LAB | Facility: CLINIC | Age: 74
End: 2024-06-10
Payer: COMMERCIAL

## 2024-06-10 DIAGNOSIS — E87.5 HYPERKALEMIA: ICD-10-CM

## 2024-06-10 DIAGNOSIS — N18.5 CKD STAGE G5/A3, GFR <15 AND ALBUMIN CREATININE RATIO >300 MG/G (HCC): ICD-10-CM

## 2024-06-10 LAB
ANION GAP SERPL CALCULATED.3IONS-SCNC: 7 MMOL/L (ref 4–13)
BUN SERPL-MCNC: 67 MG/DL (ref 5–25)
CALCIUM SERPL-MCNC: 8.7 MG/DL (ref 8.4–10.2)
CHLORIDE SERPL-SCNC: 105 MMOL/L (ref 96–108)
CHOLEST SERPL-MCNC: 128 MG/DL
CO2 SERPL-SCNC: 26 MMOL/L (ref 21–32)
CREAT SERPL-MCNC: 4.5 MG/DL (ref 0.6–1.3)
GFR SERPL CREATININE-BSD FRML MDRD: 12 ML/MIN/1.73SQ M
GLUCOSE P FAST SERPL-MCNC: 93 MG/DL (ref 65–99)
HDLC SERPL-MCNC: 50 MG/DL
LDLC SERPL CALC-MCNC: 61 MG/DL (ref 0–100)
NONHDLC SERPL-MCNC: 78 MG/DL
POTASSIUM SERPL-SCNC: 4.7 MMOL/L (ref 3.5–5.3)
PSA SERPL-MCNC: 2.63 NG/ML (ref 0–4)
SODIUM SERPL-SCNC: 138 MMOL/L (ref 135–147)
TRIGL SERPL-MCNC: 83 MG/DL

## 2024-06-10 PROCEDURE — 80061 LIPID PANEL: CPT

## 2024-06-10 PROCEDURE — G0103 PSA SCREENING: HCPCS

## 2024-06-10 PROCEDURE — 80048 BASIC METABOLIC PNL TOTAL CA: CPT

## 2024-06-10 NOTE — TELEPHONE ENCOUNTER
Patient returning call. Made aware:    Tania SMALLWOOD    6/10/24  9:59 AM  Note     ----- Message from Joselyn Reyes Bahamonde, MD sent at 6/10/2024  8:04 AM EDT -----  Arcadio Marin, please call Mr. Mustafa and let him know his potassium is good. He needs to continue Loklema every other day until we can start PD         Patient verbalized understanding

## 2024-06-10 NOTE — TELEPHONE ENCOUNTER
Patient is aware and asked if he has to take Kayexalate. I told patient that if he is taking Loklema, he does not have to take Kayexalate. No further questions at this time.

## 2024-06-10 NOTE — TELEPHONE ENCOUNTER
----- Message from Joselyn Reyes Bahamonde, MD sent at 6/10/2024  8:04 AM EDT -----  Arcadio Marin, please call Mr. Mustafa and let him know his potassium is good. He needs to continue Loklema every other day until we can start PD

## 2024-06-10 NOTE — TELEPHONE ENCOUNTER
----- Message from Rufina Cao MD sent at 6/10/2024  9:16 AM EDT -----  Reviewed nephrology note if labs no better by today has to go to ER pt to call nephrology office and go to hospital as they instruct

## 2024-06-10 NOTE — TELEPHONE ENCOUNTER
Pt returned call, message in chart from provider relayed.  Pt stated he just spoke with nephrology and they told him labs are good.

## 2024-06-13 ENCOUNTER — HOSPITAL ENCOUNTER (EMERGENCY)
Facility: HOSPITAL | Age: 74
Discharge: HOME | End: 2024-06-13
Attending: EMERGENCY MEDICINE
Payer: COMMERCIAL

## 2024-06-13 ENCOUNTER — APPOINTMENT (EMERGENCY)
Dept: RADIOLOGY | Facility: HOSPITAL | Age: 74
End: 2024-06-13
Payer: COMMERCIAL

## 2024-06-13 VITALS
SYSTOLIC BLOOD PRESSURE: 159 MMHG | RESPIRATION RATE: 16 BRPM | HEART RATE: 75 BPM | TEMPERATURE: 98.4 F | OXYGEN SATURATION: 100 % | DIASTOLIC BLOOD PRESSURE: 85 MMHG

## 2024-06-13 DIAGNOSIS — R79.89 ELEVATED SERUM CREATININE: ICD-10-CM

## 2024-06-13 DIAGNOSIS — R79.9 ELEVATED BUN: ICD-10-CM

## 2024-06-13 DIAGNOSIS — I10 HYPERTENSION, UNSPECIFIED TYPE: Primary | ICD-10-CM

## 2024-06-13 LAB
2HR DELTA HS TROPONIN: -2 NG/L
ALBUMIN SERPL BCP-MCNC: 3.4 G/DL (ref 3.5–5)
ALP SERPL-CCNC: 62 U/L (ref 34–104)
ALT SERPL W P-5'-P-CCNC: 14 U/L (ref 7–52)
ANION GAP SERPL CALCULATED.3IONS-SCNC: 10 MMOL/L (ref 4–13)
AST SERPL W P-5'-P-CCNC: 21 U/L (ref 13–39)
BASOPHILS # BLD AUTO: 0.04 THOUSANDS/ÂΜL (ref 0–0.1)
BASOPHILS NFR BLD AUTO: 1 % (ref 0–1)
BILIRUB SERPL-MCNC: 0.25 MG/DL (ref 0.2–1)
BNP SERPL-MCNC: 196 PG/ML (ref 0–100)
BUN SERPL-MCNC: 75 MG/DL (ref 5–25)
CALCIUM ALBUM COR SERPL-MCNC: 8.8 MG/DL (ref 8.3–10.1)
CALCIUM SERPL-MCNC: 8.3 MG/DL (ref 8.4–10.2)
CARDIAC TROPONIN I PNL SERPL HS: 15 NG/L
CARDIAC TROPONIN I PNL SERPL HS: 17 NG/L
CHLORIDE SERPL-SCNC: 106 MMOL/L (ref 96–108)
CO2 SERPL-SCNC: 22 MMOL/L (ref 21–32)
CREAT SERPL-MCNC: 4.72 MG/DL (ref 0.6–1.3)
EOSINOPHIL # BLD AUTO: 0.21 THOUSAND/ÂΜL (ref 0–0.61)
EOSINOPHIL NFR BLD AUTO: 3 % (ref 0–6)
ERYTHROCYTE [DISTWIDTH] IN BLOOD BY AUTOMATED COUNT: 12.9 % (ref 11.6–15.1)
GFR SERPL CREATININE-BSD FRML MDRD: 11 ML/MIN/1.73SQ M
GLUCOSE SERPL-MCNC: 89 MG/DL (ref 65–140)
HCT VFR BLD AUTO: 34.2 % (ref 36.5–49.3)
HGB BLD-MCNC: 11.6 G/DL (ref 12–17)
IMM GRANULOCYTES # BLD AUTO: 0.03 THOUSAND/UL (ref 0–0.2)
IMM GRANULOCYTES NFR BLD AUTO: 0 % (ref 0–2)
LYMPHOCYTES # BLD AUTO: 1.7 THOUSANDS/ÂΜL (ref 0.6–4.47)
LYMPHOCYTES NFR BLD AUTO: 21 % (ref 14–44)
MCH RBC QN AUTO: 31.7 PG (ref 26.8–34.3)
MCHC RBC AUTO-ENTMCNC: 33.9 G/DL (ref 31.4–37.4)
MCV RBC AUTO: 93 FL (ref 82–98)
MONOCYTES # BLD AUTO: 0.93 THOUSAND/ÂΜL (ref 0.17–1.22)
MONOCYTES NFR BLD AUTO: 11 % (ref 4–12)
NEUTROPHILS # BLD AUTO: 5.38 THOUSANDS/ÂΜL (ref 1.85–7.62)
NEUTS SEG NFR BLD AUTO: 64 % (ref 43–75)
NRBC BLD AUTO-RTO: 0 /100 WBCS
PLATELET # BLD AUTO: 161 THOUSANDS/UL (ref 149–390)
PMV BLD AUTO: 9.9 FL (ref 8.9–12.7)
POTASSIUM SERPL-SCNC: 4.4 MMOL/L (ref 3.5–5.3)
PROT SERPL-MCNC: 6.4 G/DL (ref 6.4–8.4)
RBC # BLD AUTO: 3.66 MILLION/UL (ref 3.88–5.62)
SODIUM SERPL-SCNC: 138 MMOL/L (ref 135–147)
WBC # BLD AUTO: 8.29 THOUSAND/UL (ref 4.31–10.16)

## 2024-06-13 PROCEDURE — 83880 ASSAY OF NATRIURETIC PEPTIDE: CPT

## 2024-06-13 PROCEDURE — 93005 ELECTROCARDIOGRAM TRACING: CPT

## 2024-06-13 PROCEDURE — 99284 EMERGENCY DEPT VISIT MOD MDM: CPT

## 2024-06-13 PROCEDURE — 96365 THER/PROPH/DIAG IV INF INIT: CPT

## 2024-06-13 PROCEDURE — 85025 COMPLETE CBC W/AUTO DIFF WBC: CPT

## 2024-06-13 PROCEDURE — 71045 X-RAY EXAM CHEST 1 VIEW: CPT

## 2024-06-13 PROCEDURE — 36415 COLL VENOUS BLD VENIPUNCTURE: CPT

## 2024-06-13 PROCEDURE — 84484 ASSAY OF TROPONIN QUANT: CPT

## 2024-06-13 PROCEDURE — 99285 EMERGENCY DEPT VISIT HI MDM: CPT | Performed by: EMERGENCY MEDICINE

## 2024-06-13 PROCEDURE — 80053 COMPREHEN METABOLIC PANEL: CPT

## 2024-06-13 RX ORDER — SODIUM CHLORIDE, SODIUM GLUCONATE, SODIUM ACETATE, POTASSIUM CHLORIDE, MAGNESIUM CHLORIDE, SODIUM PHOSPHATE, DIBASIC, AND POTASSIUM PHOSPHATE .53; .5; .37; .037; .03; .012; .00082 G/100ML; G/100ML; G/100ML; G/100ML; G/100ML; G/100ML; G/100ML
500 INJECTION, SOLUTION INTRAVENOUS ONCE
Status: COMPLETED | OUTPATIENT
Start: 2024-06-13 | End: 2024-06-13

## 2024-06-13 RX ADMIN — SODIUM CHLORIDE, SODIUM GLUCONATE, SODIUM ACETATE, POTASSIUM CHLORIDE, MAGNESIUM CHLORIDE, SODIUM PHOSPHATE, DIBASIC, AND POTASSIUM PHOSPHATE 500 ML: .53; .5; .37; .037; .03; .012; .00082 INJECTION, SOLUTION INTRAVENOUS at 17:32

## 2024-06-13 NOTE — ED PROCEDURE NOTE
Procedure  POC Cardiac US    Date/Time: 6/13/2024 6:03 PM    Performed by: Loren Calvo MD  Authorized by: Loren Calvo MD    Patient location:  ED  Other Assisting Provider: Yes (comment) (Parvez Dukes MS 4)    Procedure details:     Exam Type:  Educational    Indications: chest pain      Assessment / Evaluation for: cardiac function and pericardial effusion      Exam Type: initial exam      Image quality: limited diagnostic      Image availability:  Images available in PACS and video obtained  Patient Details:     Cardiac Rhythm:  Irregular    Mechanical ventilation: No    Cardiac findings:     Echo technique: limited 2D      Views obtained: parasternal long axis, parasternal short axis, subcostal and apical      Pericardial effusion: absent      Tamponade physiology: absent      Wall motion: normal      LV systolic function: normal      RV dilation: none    POC AAA US    Date/Time: 6/13/2024 6:03 PM    Performed by: Loren Calvo MD  Authorized by: Loren Calvo MD    Patient location:  ED  Performing Provider:  Resident  Procedure performed by consultant: Parvez MCDANIELS.    Procedure details:     Exam Type:  Educational    Indications: chest pain      Views Obtained:  Transverse proximal, sagittal (longitudinal) view and transverse mid view    Image quality: limited diagnostic      Image availability:  Images available in PACS and video obtained  Findings:     Abdominal Aorta Findings: normal      Intra-abdominal fluid: not identified    Interpretation:     Aortic ultrasound impression: aorta normal                     Loren Calvo MD  06/13/24 0161

## 2024-06-13 NOTE — ED ATTENDING ATTESTATION
6/13/2024  I, Brandon Costello MD, saw and evaluated the patient. I have discussed the patient with the resident/non-physician practitioner and agree with the resident's/non-physician practitioner's findings, Plan of Care, and MDM as documented in the resident's/non-physician practitioner's note, except where noted. All available labs and Radiology studies were reviewed.  I was present for key portions of any procedure(s) performed by the resident/non-physician practitioner and I was immediately available to provide assistance.       At this point I agree with the current assessment done in the Emergency Department.  I have conducted an independent evaluation of this patient a history and physical is as follows:    73-year-old male with history of hypertension on metoprolol, amlodipine presented for evaluation of elevated blood pressure at home, systolics in the 160s.  Has a history of CKD stage V currently in the beginning stages of getting set up for peritoneal dialysis.  Recently started on Lokelma for elevated potassium.  Also on torsemide daily.  He denies any symptoms.  No chest pain/pressure, shortness of breath, lightheadedness, dizziness, palpitations.    Pressure elevated on arrival but has improved to 123/66 spontaneously.    ED Course  ED Course as of 06/13/24 2022   u Jun 13, 2024   1800 BUN(!): 75   1800 Creatinine(!): 4.72   1800 GFR, Calculated: 11 1801 Most recent GFR 12-13. 2021 Delta 2hr hsTnI: -2   2021 hs TnI 2hr: 15   2021 Resident discussed case with on-call nephrologist.  They are aware of increasing creatinine.  Since patient has no other complaints he is stable for discharge home and will continue outpatient plan for initiation of peritoneal dialysis.         Critical Care Time  Procedures

## 2024-06-13 NOTE — ED PROVIDER NOTES
"History  Chief Complaint   Patient presents with    Medical Problem     Pt reports not being able to get a BP reading earlier today, takes BP medication, but feels \"off\". Pt states, \"just a little dry mouth, fatigue for the last day or so\". Pt reports having swelling in legs that has went down since starting on torsemide.      73-year-old male presenting to the ED with a complaint of elevated blood pressure at home.  Patient has a history of bigeminy, kidney disease, FSGS, bicuspid aortic valve with aortic valve replacement and mitral valve surgery.  Patient is seen by nephrology as well as cardiology.  6 weeks ago patient had his hydralazine taken out of his med list.  Patient has noted a steady increase in his blood pressure however last week his blood pressure was only 130 systolic and this week he has jumped up into the 160s.  Patient is denying other symptoms including chest pain, palpitations, shortness of breath, difficulty breathing, urinary changes.  Patient is also denying fever, chills, nausea, vomiting, diarrhea.  Patient has a cath procedure in 4 days.        Prior to Admission Medications   Prescriptions Last Dose Informant Patient Reported? Taking?   ALPRAZolam (XANAX) 0.25 mg tablet  Self No No   Sig: Take prior to flight   Aspirin Low Dose 81 MG EC tablet  Self No No   Sig: TAKE 1 TABLET BY MOUTH EVERY DAY   COMBIGAN 0.2-0.5 %  Self Yes No   Continuous Blood Gluc  (FreeStyle Alisson Pillager) CHERELLE  Self No No   Sig: Use 1 Units continuous   Continuous Blood Gluc Sensor (FreeStyle Alisson 3 Sensor) MISC  Self No No   Sig: Use 1 Units every 14 (fourteen) days   Cosopt PF 2-0.5 % SOLN  Self Yes No   Sig: instill 1 drop in both eyes twice daily   Nutritional Supplements (Nepro) LIQD  Self No No   Sig: Take 237 mL by mouth in the morning   Sodium Zirconium Cyclosilicate (Lokelma) 10 g   No No   Sig: Take 1 packet (10 g total) by mouth daily   TRAVATAN Z 0.004 % ophthalmic solution  Self Yes No   Sig: " Administer 1 drop to both eyes daily at bedtime    amLODIPine (NORVASC) 10 mg tablet  Self No No   Sig: Take 1 tablet (10 mg total) by mouth daily   ascorbic acid (VITAMIN C) 500 mg tablet  Self Yes No   Sig: Take 500 mg by mouth daily   atorvastatin (LIPITOR) 20 mg tablet  Self No No   Sig: TAKE 1 TABLET BY MOUTH EVERY DAY   calcitriol (ROCALTROL) 0.25 mcg capsule  Self No No   Sig: Take 1 capsule (0.25 mcg total) by mouth daily TAKE 1 CAPSULE 5 DAYS A WEEK AS DIRECTED   cetirizine (ZyrTEC) 10 mg tablet  Self No No   Sig: Take 1 tablet (10 mg total) by mouth daily   isosorbide dinitrate (ISORDIL) 10 mg tablet  Self No No   Sig: Take 1 tablet (10 mg total) by mouth 3 (three) times a day   metoprolol succinate (TOPROL-XL) 50 mg 24 hr tablet  Self No No   Sig: Take 1 tablet (50 mg total) by mouth daily   sodium bicarbonate 650 mg tablet  Self No No   Sig: TAKE 1 TABLET(650 MG) BY MOUTH IN THE MORNING   sodium polystyrene (KAYEXALATE) powder   No No   Sig: Take 15 g by mouth every other day for 14 doses   torsemide (DEMADEX) 20 mg tablet   No No   Sig: Take 1 tablet (20 mg total) by mouth daily   warfarin (COUMADIN) 2 mg tablet  Self No No   Sig: TAKE 1 TO 2 TABLETS DAILY BY MOUTH OR AS DIRECTED BY PHYSICIAN.      Facility-Administered Medications: None       Past Medical History:   Diagnosis Date    Chronic kidney disease     Colon polyp     COVID-19     Diabetes mellitus (HCC)     Hyperlipidemia     Hypertension     Proteinuria     Stage 3 chronic kidney disease (HCC)     Vitamin D deficiency        Past Surgical History:   Procedure Laterality Date    COLONOSCOPY      overdue    VT ECHO TRANSESOPHAG R-T 2D W/PRB IMG ACQUISJ I&R N/A 3/7/2019    Procedure: INTRA-OP TRANSESOPHAGEAL ECHOCARDIOGRAM (PATIENCE);  Surgeon: Gaudencio Tubbs DO;  Location: [facility];  Service: Cardiac Surgery    VT NASAL ENDOSCOPY DIAGNOSTIC UNI/BI SPX Bilateral 1/16/2023    Procedure: ENDOSCOPY NOSE BILATERALLY, WITH ENDOSCOPIC CONTROL OF  EPISTAXIS ON LEFT;  Surgeon: Ady Monahan MD;  [facility];  Service: ENT    MI PERQ CLSR TCAT L ATR APNDGE W/ENDOCARDIAL IMPLNT  3/7/2019    Procedure: LEFT ATRIAL APPENDAGE OCCLUSION CLIPPED with 35mm ATRICURE ATRICLIP;  Surgeon: Gaudencio Tubbs DO;  Location[facility];  Service: Cardiac Surgery    MI REPLACEMENT MITRAL VALVE W/CARDIOPULMONARY BYP N/A 3/7/2019    Procedure: REPLACEMENT VALVE MITRAL (MVR) REPAIR with a 30mm MEDTRONIC CG FUTURE RING;  Surgeon: Gaudenico Tubbs DO;  [facility];  Service: Cardiac Surgery    MI RPLCMT AORTIC VALVE OPN W/STENTLESS TISSUE VALVE N/A 3/7/2019    Procedure: REPLACEMENT VALVE AORTIC (AVR) with 23mm TAVERA INSPIRIS RESILIA  AORTIC VALVE;  Surgeon: Gaudencio Tubbs DO;  [facility];  Service: Cardiac Surgery    RENAL BIOPSY, OPEN      TOE SURGERY Left        Family History   Problem Relation Age of Onset    Stroke Mother     Hypertension Mother     Blindness Father     Hyperlipidemia Father     Hypertension Father     Glaucoma Father     Cancer Sister     Diabetes Sister     Ovarian cancer Sister     Gout Brother     Heart Valve Disease Brother     Hypertension Brother     Anuerysm Neg Hx     Heart attack Neg Hx     Arrhythmia Neg Hx     Heart failure Neg Hx     Coronary artery disease Neg Hx     Sudden death Neg Hx         scd     I have reviewed and agree with the history as documented.    E-Cigarette/Vaping    E-Cigarette Use Never User      E-Cigarette/Vaping Substances    Nicotine No     THC No     CBD No     Flavoring No     Other No     Unknown No      Social History     Tobacco Use    Smoking status: Former     Current packs/day: 0.00     Types: Cigarettes     Start date:      Quit date:      Years since quittin.4    Smokeless tobacco: Never   Vaping Use    Vaping status: Never Used   Substance Use Topics    Alcohol use: Yes     Comment: occasionally    Drug use: No        Review of Systems   Constitutional:   Negative for chills and fever.   HENT:  Negative for congestion.    Eyes:  Negative for visual disturbance.   Respiratory:  Negative for chest tightness and shortness of breath.    Cardiovascular:  Negative for chest pain and palpitations.   Gastrointestinal:  Negative for abdominal pain, constipation, diarrhea, nausea and vomiting.   Endocrine: Negative for polyuria.   Genitourinary:  Negative for difficulty urinating.   Musculoskeletal:  Negative for arthralgias and myalgias.   Neurological:  Negative for dizziness, weakness, light-headedness and headaches.   Psychiatric/Behavioral:  Negative for agitation.        Physical Exam  ED Triage Vitals [06/13/24 1659]   Temperature Pulse Respirations Blood Pressure SpO2   98.4 °F (36.9 °C) (!) 40 14 138/63 99 %      Temp Source Heart Rate Source Patient Position - Orthostatic VS BP Location FiO2 (%)   Oral Monitor;Left Sitting Right arm --      Pain Score       --             Orthostatic Vital Signs  Vitals:    06/13/24 1715 06/13/24 1745 06/13/24 1911 06/13/24 1930   BP: 161/84 123/66 (!) 175/73 159/85   Pulse: 62 80 75 75   Patient Position - Orthostatic VS:           Physical Exam  Constitutional:       Appearance: Normal appearance.   HENT:      Head: Normocephalic and atraumatic.      Right Ear: External ear normal.      Left Ear: External ear normal.      Nose: Nose normal.      Mouth/Throat:      Pharynx: Oropharynx is clear.   Eyes:      Extraocular Movements: Extraocular movements intact.      Pupils: Pupils are equal, round, and reactive to light.   Cardiovascular:      Rate and Rhythm: Normal rate. Rhythm irregular.      Pulses: Normal pulses.      Heart sounds: Normal heart sounds.   Pulmonary:      Effort: Pulmonary effort is normal.      Breath sounds: Normal breath sounds.   Abdominal:      General: Bowel sounds are normal.      Palpations: Abdomen is soft.   Musculoskeletal:         General: Normal range of motion.      Cervical back: Normal range of  motion.   Skin:     General: Skin is warm.      Capillary Refill: Capillary refill takes less than 2 seconds.   Neurological:      General: No focal deficit present.      Mental Status: He is alert and oriented to person, place, and time.   Psychiatric:         Mood and Affect: Mood normal.         Behavior: Behavior normal.         ED Medications  Medications   multi-electrolyte (ISOLYTE-S PH 7.4) bolus 500 mL (0 mL Intravenous Stopped 6/13/24 1832)       Diagnostic Studies  Results Reviewed       Procedure Component Value Units Date/Time    HS Troponin I 2hr [[id number]]  (Normal) Collected: 06/13/24 1941    Lab Status: Final result Specimen: Blood from Arm, Right Updated: 06/13/24 2015     hs TnI 2hr 15 ng/L      Delta 2hr hsTnI -2 ng/L     B-Type Natriuretic Peptide(BNP) [[id number]]  (Abnormal) Collected: 06/13/24 1729    Lab Status: Final result Specimen: Blood from Arm, Right Updated: 06/13/24 1810      pg/mL     HS Troponin 0hr (reflex protocol) [[id number]]  (Normal) Collected: 06/13/24 1729    Lab Status: Final result Specimen: Blood from Arm, Right Updated: 06/13/24 1758     hs TnI 0hr 17 ng/L     Comprehensive metabolic panel [[id number]]  (Abnormal) Collected: 06/13/24 1729    Lab Status: Final result Specimen: Blood from Arm, Right Updated: 06/13/24 1753     Sodium 138 mmol/L      Potassium 4.4 mmol/L      Chloride 106 mmol/L      CO2 22 mmol/L      ANION GAP 10 mmol/L      BUN 75 mg/dL      Creatinine 4.72 mg/dL      Glucose 89 mg/dL      Calcium 8.3 mg/dL      Corrected Calcium 8.8 mg/dL      AST 21 U/L      ALT 14 U/L      Alkaline Phosphatase 62 U/L      Total Protein 6.4 g/dL      Albumin 3.4 g/dL      Total Bilirubin 0.25 mg/dL      eGFR 11 ml/min/1.73sq m     Narrative:      National Kidney Disease Foundation guidelines for Chronic Kidney Disease (CKD):     Stage 1 with normal or high GFR (GFR > 90 mL/min/1.73 square meters)    Stage 2 Mild CKD (GFR = 60-89 mL/min/1.73 square meters)    Stage  3A Moderate CKD (GFR = 45-59 mL/min/1.73 square meters)    Stage 3B Moderate CKD (GFR = 30-44 mL/min/1.73 square meters)    Stage 4 Severe CKD (GFR = 15-29 mL/min/1.73 square meters)    Stage 5 End Stage CKD (GFR <15 mL/min/1.73 square meters)  Note: GFR calculation is accurate only with a steady state creatinine    CBC and differential [[id number]]  (Abnormal) Collected: 06/13/24 1729    Lab Status: Final result Specimen: Blood from Arm, Right Updated: 06/13/24 1738     WBC 8.29 Thousand/uL      RBC 3.66 Million/uL      Hemoglobin 11.6 g/dL      Hematocrit 34.2 %      MCV 93 fL      MCH 31.7 pg      MCHC 33.9 g/dL      RDW 12.9 %      MPV 9.9 fL      Platelets 161 Thousands/uL      nRBC 0 /100 WBCs      Segmented % 64 %      Immature Grans % 0 %      Lymphocytes % 21 %      Monocytes % 11 %      Eosinophils Relative 3 %      Basophils Relative 1 %      Absolute Neutrophils 5.38 Thousands/µL      Absolute Immature Grans 0.03 Thousand/uL      Absolute Lymphocytes 1.70 Thousands/µL      Absolute Monocytes 0.93 Thousand/µL      Eosinophils Absolute 0.21 Thousand/µL      Basophils Absolute 0.04 Thousands/µL                    XR chest 1 view portable   ED Interpretation by Bethel Gibson MD (06/14 0037)   No acute cardiopulmonary disease noted.            Procedures  ECG 12 Lead Documentation Only    Date/Time: 6/14/2024 12:33 AM    Performed by: Bethel Gibson MD  Authorized by: Bethel Gibson MD    Indications / Diagnosis:  Hypertension  ECG reviewed by me, the ED Provider: yes    Patient location:  ED  Previous ECG:     Previous ECG:  Compared to current    Similarity:  Changes noted  Interpretation:     Interpretation: abnormal    Rate:     ECG rate:  74    ECG rate assessment: normal    Rhythm:     Rhythm: sinus rhythm    Ectopy:     Ectopy: PVCs      PVCs:  Frequent  QRS:     QRS axis:  Left  Conduction:     Conduction: abnormal      Abnormal conduction: LAFB    ST segments:     ST segments:  Normal  T  waves:     T waves: normal          ED Course  ED Course as of 06/14/24 0038   Thu Jun 13, 2024 1820 With nephrology about renal function.  Nephrology okay with outpatient follow-up in 4 days for cath.  No uremic symptoms at this time.   1824 Creatinine(!): 4.72   1824 BUN(!): 75   1824 BNP(!): 196             HEART Risk Score      Flowsheet Row Most Recent Value   Heart Score Risk Calculator    History 0 Filed at: 06/14/2024 0037   ECG 1 Filed at: 06/14/2024 0037   Age 2 Filed at: 06/14/2024 0037   Risk Factors 1 Filed at: 06/14/2024 0037   Troponin 1 Filed at: 06/14/2024 0037   HEART Score 5 Filed at: 06/14/2024 0037                        SBIRT 22yo+      Flowsheet Row Most Recent Value   Initial Alcohol Screen: US AUDIT-C     1. How often do you have a drink containing alcohol? 3 Filed at: 06/13/2024 1705   2. How many drinks containing alcohol do you have on a typical day you are drinking?  0 Filed at: 06/13/2024 1705   3b. FEMALE Any Age, or MALE 65+: How often do you have 4 or more drinks on one occassion? 0 Filed at: 06/13/2024 1705   Audit-C Score 3 Filed at: 06/13/2024 1705   MASTER: How many times in the past year have you...    Used an illegal drug or used a prescription medication for non-medical reasons? Never Filed at: 06/13/2024 1705                  Medical Decision Making  73-year-old male presenting to the ED with hypertension.  Patient has a significant history of FSGS.  At this time differential includes ACS, progression of renal disease, medication changes.  Cardiac workup completed showing an elevation in BUN and creatinine as well as an elevated BNP.  Reached out to nephrology to discuss admission versus outpatient follow-up.  Given patient's cath coming up in 4 days, nephrology was comfortable with outpatient follow-up.  EKG showed no signs of ST elevation or depression and troponin delta was normal.  Comfortable with discharge and outpatient follow-up.  Patient comfortable with this plan at  this time.  Patient discharged.    Amount and/or Complexity of Data Reviewed  Labs: ordered. Decision-making details documented in ED Course.  Radiology: ordered.    Risk  Prescription drug management.          Disposition  Final diagnoses:   Hypertension, unspecified type   Elevated serum creatinine   Elevated BUN     Time reflects when diagnosis was documented in both MDM as applicable and the Disposition within this note       Time User Action Codes Description Comment    6/13/2024  8:31 PM Bethel Stoddard [I10] Hypertension, unspecified type     6/13/2024  8:32 PM Bethel Stoddard [R79.89] Elevated serum creatinine     6/13/2024  8:32 PM Bethel Stoddard [R79.9] Elevated BUN           ED Disposition       ED Disposition   Discharge    Condition   Stable    Date/Time   Thu Jun 13, 2024 2031    Comment   Myra Mustafa discharge to home/self care.                   Follow-up Information    None         Discharge Medication List as of 6/13/2024  8:33 PM        CONTINUE these medications which have NOT CHANGED    Details   ALPRAZolam (XANAX) 0.25 mg tablet Take prior to flight, Normal      amLODIPine (NORVASC) 10 mg tablet Take 1 tablet (10 mg total) by mouth daily, Starting Mon 2/19/2024, Normal      ascorbic acid (VITAMIN C) 500 mg tablet Take 500 mg by mouth daily, Historical Med      Aspirin Low Dose 81 MG EC tablet TAKE 1 TABLET BY MOUTH EVERY DAY, Starting Fri 5/10/2024, Normal      atorvastatin (LIPITOR) 20 mg tablet TAKE 1 TABLET BY MOUTH EVERY DAY, Starting Thu 12/21/2023, Normal      calcitriol (ROCALTROL) 0.25 mcg capsule Take 1 capsule (0.25 mcg total) by mouth daily TAKE 1 CAPSULE 5 DAYS A WEEK AS DIRECTED, Starting Wed 11/1/2023, Normal      cetirizine (ZyrTEC) 10 mg tablet Take 1 tablet (10 mg total) by mouth daily, Starting Fri 6/23/2023, Normal      COMBIGAN 0.2-0.5 % Historical Med      Continuous Blood Gluc  (FreeStyle Alisson Lipan) CHERELLE Use 1 Units continuous, Starting  Tue 1/30/2024, Normal      Continuous Blood Gluc Sensor (FreeStyle Alisson 3 Sensor) MISC Use 1 Units every 14 (fourteen) days, Starting Tue 1/30/2024, Normal      Cosopt PF 2-0.5 % SOLN instill 1 drop in both eyes twice daily, Historical Med      isosorbide dinitrate (ISORDIL) 10 mg tablet Take 1 tablet (10 mg total) by mouth 3 (three) times a day, Starting Thu 2/15/2024, Normal      metoprolol succinate (TOPROL-XL) 50 mg 24 hr tablet Take 1 tablet (50 mg total) by mouth daily, Starting Tue 10/10/2023, Normal      Nutritional Supplements (Nepro) LIQD Take 237 mL by mouth in the morning, Starting Mon 2/19/2024, Normal      sodium bicarbonate 650 mg tablet TAKE 1 TABLET(650 MG) BY MOUTH IN THE MORNING, Normal      sodium polystyrene (KAYEXALATE) powder Take 15 g by mouth every other day for 14 doses, Starting Tue 6/4/2024, Until Mon 7/1/2024, Normal      Sodium Zirconium Cyclosilicate (Lokelma) 10 g Take 1 packet (10 g total) by mouth daily, Starting Tue 6/4/2024, Normal      torsemide (DEMADEX) 20 mg tablet Take 1 tablet (20 mg total) by mouth daily, Starting Tue 6/4/2024, Normal      TRAVATAN Z 0.004 % ophthalmic solution Administer 1 drop to both eyes daily at bedtime , Starting Mon 2/12/2018, Historical Med      warfarin (COUMADIN) 2 mg tablet TAKE 1 TO 2 TABLETS DAILY BY MOUTH OR AS DIRECTED BY PHYSICIAN., Normal           No discharge procedures on file.    PDMP Review         Value Time User    PDMP Reviewed  Yes 10/30/2023  3:40 PM Rufina Cao MD             ED Provider  Attending physically available and evaluated Myra Mustafa. I managed the patient along with the ED Attending.    Electronically Signed by           Bethel Gibson MD  06/14/24 0038

## 2024-06-14 ENCOUNTER — VBI (OUTPATIENT)
Dept: FAMILY MEDICINE CLINIC | Facility: CLINIC | Age: 74
End: 2024-06-14

## 2024-06-14 DIAGNOSIS — E78.3 HYPERCHYLOMICRONEMIA: ICD-10-CM

## 2024-06-14 LAB
ATRIAL RATE: 74 BPM
P AXIS: 69 DEGREES
PR INTERVAL: 188 MS
QRS AXIS: -69 DEGREES
QRSD INTERVAL: 148 MS
QT INTERVAL: 436 MS
QTC INTERVAL: 483 MS
T WAVE AXIS: 59 DEGREES
VENTRICULAR RATE: 74 BPM

## 2024-06-14 PROCEDURE — 93010 ELECTROCARDIOGRAM REPORT: CPT | Performed by: INTERNAL MEDICINE

## 2024-06-14 RX ORDER — ATORVASTATIN CALCIUM 20 MG/1
20 TABLET, FILM COATED ORAL DAILY
Qty: 90 TABLET | Refills: 1 | Status: SHIPPED | OUTPATIENT
Start: 2024-06-14

## 2024-06-14 NOTE — TELEPHONE ENCOUNTER
06/14/24 1:07 PM    Patient contacted post ED visit, first outreach attempt made. Message was left for patient to return a call to the VBI Department at [facility]: Phone [phone].    Thank you.  Mena Valdez  [facility]

## 2024-06-14 NOTE — DISCHARGE INSTRUCTIONS
We assessed you for your high blood pressure.  A cardiac workup was completed as well as labs to show your renal function.  Your renal function does seem to be worsening from previous.  We reached out to nephrology who stated that you should still follow-up in 4 days for your cath.  Should you start to have symptoms such as chest pain, palpitations, difficulty breathing, please return to the ED.  We are here 24/7 for you should you need anything.

## 2024-06-17 ENCOUNTER — TELEMEDICINE (OUTPATIENT)
Age: 74
End: 2024-06-17

## 2024-06-17 ENCOUNTER — TELEPHONE (OUTPATIENT)
Age: 74
End: 2024-06-17

## 2024-06-17 DIAGNOSIS — N18.5 CKD STAGE G5/A3, GFR <15 AND ALBUMIN CREATININE RATIO >300 MG/G (HCC): ICD-10-CM

## 2024-06-17 PROCEDURE — 99443 PR PHYS/QHP TELEPHONE EVALUATION 21-30 MIN: CPT | Performed by: RADIOLOGY

## 2024-06-17 NOTE — TELEPHONE ENCOUNTER
Patient called to make Dr. Reyes aware that he has decided he is not going to do the dialysis at home as they discussed. He states he does not have the help he would need to have at home.     Patient states he would like to go in for it to the dialysis center 3x a week.     Patient states he would need for Dr. Reyes to set up for him to get the fistula put in.     Patient asking for call back to discuss.     Please advise.

## 2024-06-17 NOTE — PROGRESS NOTES
Virtual Brief Visit    This Visit is being completed by telephone. The Patient is located at Home and in the following state in which I hold an active license [place]    The patient was identified by name and date of birth. Myra Mustafa was informed that this is a telemedicine visit and that the visit is being conducted through Telephone.  My office door was closed. No one else was in the room.  He acknowledged consent and understanding of privacy and security of the video platform. The patient has agreed to participate and understands they can discontinue the visit at any time.    Patient is aware this is a billable service.       Assessment/Plan:  Patient with ESRD in need of dialysis and would like hemodialysis rather than peritoneal dialysis and wishes to speak with Dr. Reyes Bahamonde again.  I gave the patient links to videos about both types of dialysis to better educate himself and his family.  If the patient changes his mind, I can meet with him again.  Otherwise, he will meet with Dr. العلي.    Problem List Items Addressed This Visit          Genitourinary    CKD stage G5/A3, GFR <15 and albumin creatinine ratio >300 mg/g (HCC)       Recent Visits  No visits were found meeting these conditions.  Showing recent visits within past 7 days and meeting all other requirements  Today's Visits  Date Type Provider Dept   06/17/24 Telemedicine MD Nieves Velasco   Showing today's visits and meeting all other requirements  Future Appointments  No visits were found meeting these conditions.  Showing future appointments within next 150 days and meeting all other requirements         Visit Time  Total Visit Duration: 25 minutes

## 2024-06-18 ENCOUNTER — TELEPHONE (OUTPATIENT)
Age: 74
End: 2024-06-18

## 2024-06-18 DIAGNOSIS — Z01.818 ENCOUNTER FOR OTHER PREPROCEDURAL EXAMINATION: ICD-10-CM

## 2024-06-18 DIAGNOSIS — N18.5 CKD STAGE G5/A3, GFR <15 AND ALBUMIN CREATININE RATIO >300 MG/G (HCC): Primary | ICD-10-CM

## 2024-06-18 DIAGNOSIS — Z01.810 ENCOUNTER FOR PREPROCEDURAL CARDIOVASCULAR EXAMINATION: ICD-10-CM

## 2024-06-18 NOTE — TELEPHONE ENCOUNTER
06/18/24 2:19 PM    Patient contacted post ED visit, VBI department spoke with patient/caregiver and outreach was successful.    Thank you.  Mena Valdez  [facility]

## 2024-06-18 NOTE — TELEPHONE ENCOUNTER
Pt wants to know if he can still have the appt he was called about today. Please call pt back re this.

## 2024-06-18 NOTE — TELEPHONE ENCOUNTER
Pt informed of message above. Pt is scheduled for vein mapping and Vas will reach out to him and schedule appointment.

## 2024-06-18 NOTE — TELEPHONE ENCOUNTER
Called patient to relayed Dr. Reyes message but patient stating that he is on his way to the EO  office and we can talk at office.      EO office please information patient of Dr. Reyes message.

## 2024-06-30 ENCOUNTER — HOSPITAL ENCOUNTER (OUTPATIENT)
Dept: VASCULAR ULTRASOUND | Facility: HOSPITAL | Age: 74
Discharge: HOME | End: 2024-06-30
Attending: STUDENT IN AN ORGANIZED HEALTH CARE EDUCATION/TRAINING PROGRAM
Payer: COMMERCIAL

## 2024-06-30 DIAGNOSIS — Z01.818 ENCOUNTER FOR OTHER PREPROCEDURAL EXAMINATION: ICD-10-CM

## 2024-06-30 DIAGNOSIS — Z01.810 ENCOUNTER FOR PREPROCEDURAL CARDIOVASCULAR EXAMINATION: ICD-10-CM

## 2024-06-30 DIAGNOSIS — N18.5 CKD STAGE G5/A3, GFR <15 AND ALBUMIN CREATININE RATIO >300 MG/G (HCC): ICD-10-CM

## 2024-06-30 PROCEDURE — 93985 DUP-SCAN HEMO COMPL BI STD: CPT

## 2024-06-30 PROCEDURE — 93985 DUP-SCAN HEMO COMPL BI STD: CPT | Performed by: SURGERY

## 2024-07-09 ENCOUNTER — ANTICOAG VISIT (OUTPATIENT)
Dept: CARDIOLOGY CLINIC | Facility: CLINIC | Age: 74
End: 2024-07-09

## 2024-07-09 ENCOUNTER — APPOINTMENT (OUTPATIENT)
Dept: LAB | Facility: CLINIC | Age: 74
End: 2024-07-09
Payer: COMMERCIAL

## 2024-07-10 ENCOUNTER — TELEPHONE (OUTPATIENT)
Dept: NEPHROLOGY | Facility: CLINIC | Age: 74
End: 2024-07-10

## 2024-07-10 DIAGNOSIS — N18.5 CKD STAGE G5/A3, GFR <15 AND ALBUMIN CREATININE RATIO >300 MG/G (HCC): Primary | ICD-10-CM

## 2024-07-10 NOTE — TELEPHONE ENCOUNTER
I called patient to discuss dialysis plan.  I was not able to reach him.  He has an appointment with me on July 19

## 2024-07-10 NOTE — TELEPHONE ENCOUNTER
Hi Dr. Reyes, please see the attached message from the patient. He would like a call back to discuss his dialysis plan.

## 2024-07-11 ENCOUNTER — TELEPHONE (OUTPATIENT)
Dept: NEPHROLOGY | Facility: CLINIC | Age: 74
End: 2024-07-11

## 2024-07-11 ENCOUNTER — PATIENT MESSAGE (OUTPATIENT)
Dept: NEPHROLOGY | Facility: CLINIC | Age: 74
End: 2024-07-11

## 2024-07-11 NOTE — TELEPHONE ENCOUNTER
I sent him a message via my chart and also called and left a voicemail. I am at [place] office today until 4 pm. He can pass by if he prefers. I am going to be here

## 2024-07-11 NOTE — TELEPHONE ENCOUNTER
Called patient to make sure patient received Dr. Reyes message was sent through [employer] Left a voicemail with Dr. Reyes message. Advised patient to please call our office or sent a message through [employer] to let us know he received the message and if have any other questions or concerns.

## 2024-07-11 NOTE — PATIENT COMMUNICATION
Patient calling in, relayed above message and he expressed understanding. He will get more labs done this week or next week prior to traveling.

## 2024-07-11 NOTE — TELEPHONE ENCOUNTER
Patient returning Doctors call.     He wants to speak with provider only.     He stated if he doesn't get a call he Is going to the office in [place] to discuss with her this afternoon.   Pt advised she had patients this afternoon.   Please be advised.

## 2024-07-19 ENCOUNTER — OFFICE VISIT (OUTPATIENT)
Dept: NEPHROLOGY | Facility: CLINIC | Age: 74
End: 2024-07-19
Payer: COMMERCIAL

## 2024-07-19 VITALS
SYSTOLIC BLOOD PRESSURE: 140 MMHG | DIASTOLIC BLOOD PRESSURE: 80 MMHG | BODY MASS INDEX: 22.07 KG/M2 | HEIGHT: 68 IN | HEART RATE: 50 BPM | WEIGHT: 145.6 LBS

## 2024-07-19 DIAGNOSIS — E11.22 TYPE 2 DIABETES MELLITUS WITH STAGE 5 CHRONIC KIDNEY DISEASE NOT ON CHRONIC DIALYSIS, WITHOUT LONG-TERM CURRENT USE OF INSULIN (HCC): ICD-10-CM

## 2024-07-19 DIAGNOSIS — Z98.890 S/P MVR (MITRAL VALVE REPAIR): ICD-10-CM

## 2024-07-19 DIAGNOSIS — N18.5 ANEMIA DUE TO STAGE 5 CHRONIC KIDNEY DISEASE, NOT ON CHRONIC DIALYSIS  (HCC): ICD-10-CM

## 2024-07-19 DIAGNOSIS — E83.9 CHRONIC KIDNEY DISEASE-MINERAL BONE DISORDER (CKD-MBD) WITH STAGE 5 CHRONIC KIDNEY DISEASE, NOT ON CHRONIC DIALYSIS (HCC): ICD-10-CM

## 2024-07-19 DIAGNOSIS — N18.5 CHRONIC KIDNEY DISEASE-MINERAL BONE DISORDER (CKD-MBD) WITH STAGE 5 CHRONIC KIDNEY DISEASE, NOT ON CHRONIC DIALYSIS (HCC): ICD-10-CM

## 2024-07-19 DIAGNOSIS — M89.9 CHRONIC KIDNEY DISEASE-MINERAL BONE DISORDER (CKD-MBD) WITH STAGE 5 CHRONIC KIDNEY DISEASE, NOT ON CHRONIC DIALYSIS (HCC): ICD-10-CM

## 2024-07-19 DIAGNOSIS — R63.4 WEIGHT LOSS: ICD-10-CM

## 2024-07-19 DIAGNOSIS — E87.5 HYPERKALEMIA: ICD-10-CM

## 2024-07-19 DIAGNOSIS — N05.1 FSGS (FOCAL SEGMENTAL GLOMERULOSCLEROSIS): ICD-10-CM

## 2024-07-19 DIAGNOSIS — N18.5 CKD STAGE G5/A3, GFR <15 AND ALBUMIN CREATININE RATIO >300 MG/G (HCC): Primary | ICD-10-CM

## 2024-07-19 DIAGNOSIS — I50.22 CHRONIC SYSTOLIC HEART FAILURE (HCC): ICD-10-CM

## 2024-07-19 DIAGNOSIS — D63.1 ANEMIA DUE TO STAGE 5 CHRONIC KIDNEY DISEASE, NOT ON CHRONIC DIALYSIS  (HCC): ICD-10-CM

## 2024-07-19 DIAGNOSIS — N18.5 TYPE 2 DIABETES MELLITUS WITH STAGE 5 CHRONIC KIDNEY DISEASE NOT ON CHRONIC DIALYSIS, WITHOUT LONG-TERM CURRENT USE OF INSULIN (HCC): ICD-10-CM

## 2024-07-19 DIAGNOSIS — N25.81 SECONDARY HYPERPARATHYROIDISM OF RENAL ORIGIN (HCC): ICD-10-CM

## 2024-07-19 DIAGNOSIS — I12.0 BENIGN HYPERTENSION WITH CHRONIC KIDNEY DISEASE, STAGE V (HCC): ICD-10-CM

## 2024-07-19 DIAGNOSIS — N18.5 BENIGN HYPERTENSION WITH CHRONIC KIDNEY DISEASE, STAGE V (HCC): ICD-10-CM

## 2024-07-19 DIAGNOSIS — R80.9 NEPHROTIC RANGE PROTEINURIA: ICD-10-CM

## 2024-07-19 PROCEDURE — 99214 OFFICE O/P EST MOD 30 MIN: CPT | Performed by: STUDENT IN AN ORGANIZED HEALTH CARE EDUCATION/TRAINING PROGRAM

## 2024-07-19 RX ORDER — AMOXICILLIN 500 MG/1
TABLET, FILM COATED ORAL
COMMUNITY
Start: 2024-06-11

## 2024-07-19 NOTE — PATIENT INSTRUCTIONS
Thank you for coming to your visit today. As we discussed you kidney function is stable. There is no urgent indication for urgent dialysis.       Recommend low sodium (salt) food    Next Visit in 2 months with results   If you need to see us earlier we can change the appointment for you      Joselyn Reyes Bahamonde, MD  Nephrology Attending

## 2024-07-19 NOTE — PROGRESS NOTES
NEPHROLOGY OUTPATIENT PROGRESS NOTE   Myra Mustafa 74 y.o. male MRN: [id number]  DATE: 7/19/2024    Reason for visit: No chief complaint on file.       There are no Patient Instructions on file for this visit.      There are no diagnoses linked to this encounter.    Assessment/Plan:  70 yo man with PMH DM, FSGS (biopsy proven 2018), CKD G4A3 (bl creat 3-3.5mg/dL, eGFR 21-24).  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   Diffuse diabetic glomerulosclerosis (mild).   25% IFTA  Arterio-arteriolosclerosis with hyalinosis, moderate-severe   Etiology: Likely secondary to obesity (at that time)  Lost ~15lbs (asper patient, previous notes mentioned 35lbs) after diagnosis (posible cause of FSGS)  Genetic Testing:  Not done, possible APOL1  UPCr fluctuates,  4.67 g/g  Unable to receive ACE inhibitor's or ARB due to hyperkalemia   eGFR borderline low for SGLT2 inhibitors       #Immunosuppressive Medications  No IS indicated at this time     # Nephrotic proteinuria  UPCR 4.67 g/g  Secondary to FSGS with secondary fissures  Unfortunately patient has not been able to receive ACE inhibitors or ARB in the settings of tendency to hyperkalemia GFR low for SGLT2      #CKD G5A3 progression of CKD     Baseline creatinine:3-3.5mg/dL  Current creatinine: 4.72 progression to ESRD   Etiology:  Secondary to FSGS and Diabetic glomerulopathy as above  UPCr: 4.67 g/ g   Report fatigue as only uremic symptom  Patient initially wanted to do PD but now he wants to do HD   Vein mapping done, referred to vascular, has an tito on 7/25  Patient will be referred to [facility]  Avoid nonsteroidal anti-inflammatory drugs such as Naprosyn, ibuprofen, Aleve, Advil, Celebrex, Meloxicam (Mobic) etc.  You can use Tylenol as needed if you do  not have any liver condition to be concerned about  Referred to transplant     # Hyperkalemia  Potassium of 6 on June 1  Current potassium 4.4  S/p [facility] labs      #Volume/Hypertension:  Volume: euvolemic   Blood pressure: Normotensive, /80mmhg, goal less than 140/90    Low sodium diet   Continue with  Amlodipine 10 mg daily  Metoprolol 50 mg daily  Off hydralazine   Isodril  Decrease Torsemide to 10mg daily   Advised to maintain a good BP control to prevent progression of CKD         #Acid base disorder  Serum bicarb 22mmol/L  Continue serum bicarbonate once a day only to avoid sodium load     #CKD-MBD  Calcium 8.8g/dL  Phosphorus 4 mg/dL  (goal <5.5)  At goal     #Secondary Hyperparathyroidism   iPTH 122.9, guidelines levels KDIGO 2017  Continue calcitriol     #Acute on chronic anemia:  Current hemoglobin 11.6mg   at goal         #MVR, AVR  On coumadin   Follow-up with cardiology     #CHF  Hypervolemic improved with Torsemide   Decrease to 10mg as above      #DM  HbA1c 5.5  Off meds   Advised to maintain a good DM control to prevent progression of CKD   Maintain healthy diet (vegetables, fruits, whole grains, nonfat or low fat)  Weight loss  Physical activity (5 to 10 minutes to start the increase to 30 min a day)     #Weight loss  Poor appetite, eating small portions  Could be uremic symptoms related  No urgent indication  of dialysis at this time     SUBJECTIVE / INTERVAL HISTORY:  74 y.o. male presents in follow up of CKD. Feels well, no SOB, no CP, no recent hospitalizations. No issues with medication   .     Myra Mustafa denies any recent illness/hospitalizations/medication changes since last office visit.    Review of Systems   Constitutional:  Negative for activity change and appetite change.   HENT:  Negative for congestion and dental problem.    Eyes:  Negative for discharge.   Respiratory:  Negative for cough and choking.    Cardiovascular:  Negative for chest pain and leg swelling.    Gastrointestinal:  Negative for abdominal distention and abdominal pain.   Endocrine: Negative for cold intolerance.   Genitourinary:  Negative for dysuria.   Musculoskeletal:  Negative for arthralgias and back pain.   Skin:  Negative for color change and pallor.   Neurological:  Negative for dizziness.   Psychiatric/Behavioral:  Negative for agitation.        OBJECTIVE:  There were no vitals taken for this visit. There is no height or weight on file to calculate BMI.    Physical exam:  Physical Exam  General:  no acute distress at this time  Skin:  No acute rash  Eyes:  No scleral icterus and noninjected  ENT:  mucous membranes moist  Neck:  no carotid bruits  Chest:  Clear to auscultation percussion, good respiratory effort, no use of accessory respiratory muscles  CVS:  Regular rate and rhythm without rub   Abdomen:  soft and nontender   Extremities: trace lower extremity edema  Neuro:  No gross focality  Psych:  Alert , cooperative     Medications:    Current Outpatient Medications:     ALPRAZolam (XANAX) 0.25 mg tablet, Take prior to flight, Disp: 4 tablet, Rfl: 0    amLODIPine (NORVASC) 10 mg tablet, Take 1 tablet (10 mg total) by mouth daily, Disp: 90 tablet, Rfl: 1    ascorbic acid (VITAMIN C) 500 mg tablet, Take 500 mg by mouth daily, Disp: , Rfl:     Aspirin Low Dose 81 MG EC tablet, TAKE 1 TABLET BY MOUTH EVERY DAY, Disp: 90 tablet, Rfl: 1    atorvastatin (LIPITOR) 20 mg tablet, TAKE 1 TABLET BY MOUTH EVERY DAY, Disp: 90 tablet, Rfl: 1    calcitriol (ROCALTROL) 0.25 mcg capsule, Take 1 capsule (0.25 mcg total) by mouth daily TAKE 1 CAPSULE 5 DAYS A WEEK AS DIRECTED, Disp: 90 capsule, Rfl: 3    cetirizine (ZyrTEC) 10 mg tablet, Take 1 tablet (10 mg total) by mouth daily, Disp: 30 tablet, Rfl: 1    COMBIGAN 0.2-0.5 %, , Disp: , Rfl: 0    Continuous Blood Gluc  (FreeStyle Alisson [place]) CHERELLE, Use 1 Units continuous, Disp: 1 each, Rfl: 0    Continuous Blood Gluc Sensor (FreeStyle Alisson 3 Sensor)  "MISC, Use 1 Units every 14 (fourteen) days, Disp: 2 each, Rfl: 5    Cosopt PF 2-0.5 % SOLN, instill 1 drop in both eyes twice daily, Disp: , Rfl:     isosorbide dinitrate (ISORDIL) 10 mg tablet, Take 1 tablet (10 mg total) by mouth 3 (three) times a day, Disp: 270 tablet, Rfl: 3    metoprolol succinate (TOPROL-XL) 50 mg 24 hr tablet, Take 1 tablet (50 mg total) by mouth daily, Disp: 90 tablet, Rfl: 3    Nutritional Supplements (Nepro) LIQD, Take 237 mL by mouth in the morning, Disp: 5688 mL, Rfl: 3    sodium bicarbonate 650 mg tablet, TAKE 1 TABLET(650 MG) BY MOUTH IN THE MORNING, Disp: 90 tablet, Rfl: 1    Sodium Zirconium Cyclosilicate (Lokelma) 10 g, Take 1 packet (10 g total) by mouth daily, Disp: 10 packet, Rfl: 0    torsemide (DEMADEX) 20 mg tablet, Take 1 tablet (20 mg total) by mouth daily, Disp: 30 tablet, Rfl: 2    TRAVATAN Z 0.004 % ophthalmic solution, Administer 1 drop to both eyes daily at bedtime , Disp: , Rfl: 0    warfarin (COUMADIN) 2 mg tablet, TAKE 1 TO 2 TABLETS DAILY BY MOUTH OR AS DIRECTED BY PHYSICIAN., Disp: 60 tablet, Rfl: 11    Allergies:  Allergies as of 07/19/2024 - Reviewed 06/13/2024   Allergen Reaction Noted    Ace inhibitors Cough 12/24/2013    Keflex [cephalexin] Rash 01/26/2021       The following portions of the patient's history were reviewed and updated as appropriate: past family history, past surgical history and problem list.    Laboratory Results:  Lab Results   Component Value Date    SODIUM 138 06/13/2024    K 4.4 06/13/2024     06/13/2024    CO2 22 06/13/2024    BUN 75 (H) 06/13/2024    CREATININE 4.72 (H) 06/13/2024    GLUC 89 06/13/2024    CALCIUM 8.3 (L) 06/13/2024        Lab Results   Component Value Date    .9 (H) 10/28/2023    CALCIUM 8.3 (L) 06/13/2024    PHOS 4.0 06/01/2024       Portions of the record may have been created with voice recognition software.  Occasional wrong word or \"sound a like\" substitutions may have occurred due to the inherent " limitations of voice recognition software.  Read the chart carefully and recognize, using context, where substitutions have occurred.

## 2024-07-24 ENCOUNTER — ANTICOAG VISIT (OUTPATIENT)
Dept: CARDIOLOGY CLINIC | Facility: CLINIC | Age: 74
End: 2024-07-24

## 2024-07-24 ENCOUNTER — APPOINTMENT (OUTPATIENT)
Dept: LAB | Facility: CLINIC | Age: 74
End: 2024-07-24
Payer: COMMERCIAL

## 2024-07-24 DIAGNOSIS — N18.5 CKD STAGE G5/A3, GFR <15 AND ALBUMIN CREATININE RATIO >300 MG/G (HCC): ICD-10-CM

## 2024-07-24 LAB
ANION GAP SERPL CALCULATED.3IONS-SCNC: 5 MMOL/L (ref 4–13)
BUN SERPL-MCNC: 73 MG/DL (ref 5–25)
CALCIUM SERPL-MCNC: 8.5 MG/DL (ref 8.4–10.2)
CHLORIDE SERPL-SCNC: 113 MMOL/L (ref 96–108)
CO2 SERPL-SCNC: 21 MMOL/L (ref 21–32)
CREAT SERPL-MCNC: 5.06 MG/DL (ref 0.6–1.3)
GFR SERPL CREATININE-BSD FRML MDRD: 10 ML/MIN/1.73SQ M
GLUCOSE P FAST SERPL-MCNC: 86 MG/DL (ref 65–99)
PHOSPHATE SERPL-MCNC: 5 MG/DL (ref 2.3–4.1)
POTASSIUM SERPL-SCNC: 5.4 MMOL/L (ref 3.5–5.3)
SODIUM SERPL-SCNC: 139 MMOL/L (ref 135–147)

## 2024-07-24 PROCEDURE — 80048 BASIC METABOLIC PNL TOTAL CA: CPT

## 2024-07-24 PROCEDURE — 84100 ASSAY OF PHOSPHORUS: CPT

## 2024-07-25 ENCOUNTER — OFFICE VISIT (OUTPATIENT)
Dept: VASCULAR SURGERY | Facility: CLINIC | Age: 74
End: 2024-07-25
Payer: COMMERCIAL

## 2024-07-25 ENCOUNTER — TELEPHONE (OUTPATIENT)
Dept: CARDIOLOGY CLINIC | Facility: CLINIC | Age: 74
End: 2024-07-25

## 2024-07-25 VITALS
WEIGHT: 145 LBS | BODY MASS INDEX: 21.98 KG/M2 | HEIGHT: 68 IN | HEART RATE: 52 BPM | SYSTOLIC BLOOD PRESSURE: 154 MMHG | OXYGEN SATURATION: 98 % | DIASTOLIC BLOOD PRESSURE: 74 MMHG

## 2024-07-25 DIAGNOSIS — N18.5 CHRONIC KIDNEY DISEASE-MINERAL BONE DISORDER (CKD-MBD) WITH STAGE 5 CHRONIC KIDNEY DISEASE, NOT ON CHRONIC DIALYSIS (HCC): ICD-10-CM

## 2024-07-25 DIAGNOSIS — N18.5 CKD (CHRONIC KIDNEY DISEASE) STAGE 5, GFR LESS THAN 15 ML/MIN (HCC): Primary | ICD-10-CM

## 2024-07-25 DIAGNOSIS — E83.9 CHRONIC KIDNEY DISEASE-MINERAL BONE DISORDER (CKD-MBD) WITH STAGE 5 CHRONIC KIDNEY DISEASE, NOT ON CHRONIC DIALYSIS (HCC): ICD-10-CM

## 2024-07-25 DIAGNOSIS — M89.9 CHRONIC KIDNEY DISEASE-MINERAL BONE DISORDER (CKD-MBD) WITH STAGE 5 CHRONIC KIDNEY DISEASE, NOT ON CHRONIC DIALYSIS (HCC): ICD-10-CM

## 2024-07-25 PROCEDURE — 99204 OFFICE O/P NEW MOD 45 MIN: CPT | Performed by: SURGERY

## 2024-07-25 RX ORDER — CEFAZOLIN SODIUM 1 G/50ML
1000 SOLUTION INTRAVENOUS ONCE
OUTPATIENT
Start: 2024-07-25 | End: 2024-07-25

## 2024-07-25 RX ORDER — CHLORHEXIDINE GLUCONATE ORAL RINSE 1.2 MG/ML
15 SOLUTION DENTAL ONCE
OUTPATIENT
Start: 2024-07-25 | End: 2024-07-25

## 2024-07-25 NOTE — TELEPHONE ENCOUNTER
REMINDER: Under Reason For Call, comments MUST be formatted as:   (Surgeon's Initials) / (Procedure)      Special Instructions / FYI: [facility] / cardiac clearance L/S 2023, letter was sent to request an apt / CREATION FISTULA ARTERIOVENOUS (AV), possible graft     Consent: I certify that patient has signed, printed, timed, and dated their surgery consent.  I certify that the patient's LEGAL NAME and DATE OF BIRTH are written in the upper left corner on BOTH sides of the consent.  I certify that BOTH sides of the completed surgery consent have been scanned into the patient's Epic chart by myself on 7/25/2024.  Yes, I have LABELED the consent in Epic as Consent for Vascular Procedure.     For Surgical Clearances     Levels   1-3   ROUTE this encounter to [facility] Clearance Pool (AND)   [facility] Surgery Coordinator Pool     Level   4   ROUTE this encounter to [facility] Surgery Coordinator Pool       HYDRATION CLEARANCES   ONLY ROUTE TO  [facility] Clearance Pool       Yes, I have ROUTED this encounter to [facility] Surgery Coordinator and/or [facility] Clearance Pool.

## 2024-07-25 NOTE — PROGRESS NOTES
Ambulatory Visit  Name: Myra Mustafa      : 1950      MRN: [id number]  Encounter Provider: Liborio Hudson MD  Encounter Date: 2024   Enc[facility] department: [facility] [place]    Assessment & Plan   1. CKD (chronic kidney disease) stage 5, GFR less than 15 ml/min (HCC)  -     Case request operating room: CREATION FISTULA ARTERIOVENOUS (AV), possible graft; Standing  -     CBC and Platelet; Future  -     Basic metabolic panel; Future  -     Protime-INR; Future  -     Case request operating room: CREATION FISTULA ARTERIOVENOUS (AV), possible graft  2. 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, not on chronic dialysis (HCC)  Assessment & Plan:  Lab Results   Component Value Date    EGFR 10 2024    EGFR 11 2024    EGFR 12 06/10/2024    CREATININE 5.06 (H) 2024    CREATININE 4.72 (H) 2024    CREATININE 4.50 (H) 06/10/2024     74-year-old male with a past medical history of diabetes, bioprosthetic valve, A-fib on warfarin, CAD, dilated cardiomyopathy, hypertension, CKD 5 with GFR of 10 on recent BMP presented to the office as a new patient to discuss HD access.    Patient has no history of dialysis or upper extremity surgery.  He is right-hand dominant.  You have discussion with nephrologist as to what dialysis is.    Patient had a venous mapping done prior to his appointment which I reviewed.  Cephalic vein does not appear adequate but the left basilic vein there is appear borderline in the upper arm.    Discussed with the patient the different vein fistula and AV graft.  Discussion was that we would plan to perform a point-of-care ultrasound to evaluate the veins of the left upper extremity in the operating room and, pending on my evaluation of his veins, we would then proceed with either a fistula or graft.  We discussed the risks which include bleeding, infection, steal syndrome, nerve injury, thrombosis, need for  "further intervention.  Patient will need cardiac clearance prior to surgery.  Consent was signed in the office.      History of Present Illness         Myra Mustafa is a 74 y.o. male     Patient is new to our office. Patient is here today to review vein mapping and to discuss dialysis access. He is right handed. Patient is not getting dialysis at this time. He is taking Coumadin, ASA 81 mg and Atorvastatin. He is a former smoker.     Review of Systems   Constitutional: Negative.    HENT: Negative.     Eyes: Negative.    Respiratory: Negative.     Cardiovascular: Negative.    Gastrointestinal: Negative.    Endocrine: Negative.    Genitourinary: Negative.    Musculoskeletal: Negative.    Skin: Negative.    Allergic/Immunologic: Negative.    Neurological: Negative.    Hematological:  Bruises/bleeds easily.   Psychiatric/Behavioral: Negative.       Medical History Reviewed by provider this encounter:  Tobacco  Allergies  Meds  Problems  Med Hx  Surg Hx  Fam Hx       Objective     /74 (BP Location: Left arm, Patient Position: Sitting, Cuff Size: Standard)   Pulse (!) 52   Ht 5' 8\" (1.727 m)   Wt 65.8 kg (145 lb)   SpO2 98%   BMI 22.05 kg/m²     Physical Exam  Vitals and nursing note reviewed.   Constitutional:       Appearance: He is well-developed.   HENT:      Head: Normocephalic and atraumatic.   Eyes:      Conjunctiva/sclera: Conjunctivae normal.   Cardiovascular:      Rate and Rhythm: Normal rate and regular rhythm.      Pulses:           Radial pulses are 2+ on the right side and 2+ on the left side.      Comments: Palpable radial and ulnar pulse  +kaci's test  Pulmonary:      Effort: Pulmonary effort is normal.      Breath sounds: Normal breath sounds.   Abdominal:      Palpations: Abdomen is soft.      Tenderness: There is no abdominal tenderness.   Musculoskeletal:      Cervical back: Neck supple.   Skin:     General: Skin is warm and dry.      Capillary Refill: Capillary refill takes less " than 2 seconds.   Neurological:      General: No focal deficit present.      Mental Status: He is alert and oriented to person, place, and time.   Psychiatric:         Mood and Affect: Mood normal.       Administrative Statements   I have spent a total time of 45 minutes in caring for this patient on the day of the visit/encounter including Diagnostic results, Prognosis, Risks and benefits of tx options, Instructions for management, Patient and family education, Importance of tx compliance, Risk factor reductions, Impressions, Counseling / Coordination of care, Documenting in the medical record, Reviewing / ordering tests, medicine, procedures  , and Obtaining or reviewing history  .

## 2024-07-25 NOTE — LETTER
24    Re: Cardiology  Clearance    Patient Name: Myra Mustafa   Patient : 1950   Patient MRN: [id number]   Patient Phone: [phone]     Dr. Conde ,    Dr. Liborio Hudson MD is requesting clearance for above patient, prior to proceeding with  CREATION FISTULA ARTERIOVENOUS (AV), possible graft  .  Patient's procedure will be scheduled at [facility] once clearance has been obtained.  Patient will be given general anesthesia.    Patient needs cardiac clearance and has not been seen since . No appointments are available until October.     Please fax your recommendations, including any medication recommendations, to [phone], attention nursing.  Or route your recommendations to [facility] pool [facility] Clearance Pool [[id number]].     Please reach out with any questions or concerns.    Sincerely,    [facility]

## 2024-07-25 NOTE — ASSESSMENT & PLAN NOTE
Lab Results   Component Value Date    EGFR 10 07/24/2024    EGFR 11 06/13/2024    EGFR 12 06/10/2024    CREATININE 5.06 (H) 07/24/2024    CREATININE 4.72 (H) 06/13/2024    CREATININE 4.50 (H) 06/10/2024     74-year-old male with a past medical history of diabetes, bioprosthetic valve, A-fib on warfarin, CAD, dilated cardiomyopathy, hypertension, CKD 5 with GFR of 10 on recent BMP presented to the office as a new patient to discuss HD access.    Patient has no history of dialysis or upper extremity surgery.  He is right-hand dominant.  You have discussion with nephrologist as to what dialysis is.    Patient had a venous mapping done prior to his appointment which I reviewed.  Cephalic vein does not appear adequate but the left basilic vein there is appear borderline in the upper arm.    Discussed with the patient the different vein fistula and AV graft.  Discussion was that we would plan to perform a point-of-care ultrasound to evaluate the veins of the left upper extremity in the operating room and, pending on my evaluation of his veins, we would then proceed with either a fistula or graft.  We discussed the risks which include bleeding, infection, steal syndrome, nerve injury, thrombosis, need for further intervention.  Patient will need cardiac clearance prior to surgery.  Consent was signed in the office.

## 2024-07-26 ENCOUNTER — TELEPHONE (OUTPATIENT)
Dept: CARDIOLOGY CLINIC | Facility: CLINIC | Age: 74
End: 2024-07-26

## 2024-07-26 NOTE — TELEPHONE ENCOUNTER
This pt is overdue for 6 mth f/u by 6 mths.  Please get him appt with Dr. Conde. He needs clearance for A-V fistula.    Thank you

## 2024-07-29 ENCOUNTER — TELEPHONE (OUTPATIENT)
Dept: NEPHROLOGY | Facility: CLINIC | Age: 74
End: 2024-07-29

## 2024-07-29 DIAGNOSIS — E87.5 HYPERKALEMIA: ICD-10-CM

## 2024-07-29 NOTE — TELEPHONE ENCOUNTER
----- Message from Joselyn Reyes Bahamonde, MD sent at 7/26/2024  9:04 PM EDT -----  Please ask patient to continue Loklema every other day. His potassium is borderline high

## 2024-07-29 NOTE — TELEPHONE ENCOUNTER
Patient states he received a call [facility] Patient states he has no refills on prescription. Please send to [facility], [address]. Thank you.

## 2024-07-29 NOTE — TELEPHONE ENCOUNTER
Left detailed message for pt     ----- Message from Joselyn Reyes Bahamonde, MD sent at 7/26/2024  9:04 PM EDT -----  Please ask patient to continue Loklema every other day. His potassium is borderline high      Asked pt to give us a call back with any questions or concerns.

## 2024-07-30 NOTE — TELEPHONE ENCOUNTER
Left message for pt letting him know his Lokelma 10 g was approved and sent to his pharmacy [facility] in [facility].

## 2024-08-01 ENCOUNTER — TELEPHONE (OUTPATIENT)
Age: 74
End: 2024-08-01

## 2024-08-01 NOTE — TELEPHONE ENCOUNTER
PA for LOKELMA cancelled due to     [x]Approval on file-dates approved UNTIL 12/06/2024  []Medication already on Formulary  []Brand Name Preferred  []Patient no longer covered by insurance

## 2024-08-06 ENCOUNTER — TELEPHONE (OUTPATIENT)
Age: 74
End: 2024-08-06

## 2024-08-06 DIAGNOSIS — N18.4 BENIGN HYPERTENSION WITH CHRONIC KIDNEY DISEASE, STAGE IV (HCC): ICD-10-CM

## 2024-08-06 DIAGNOSIS — I12.9 BENIGN HYPERTENSION WITH CHRONIC KIDNEY DISEASE, STAGE IV (HCC): ICD-10-CM

## 2024-08-06 PROCEDURE — 3066F NEPHROPATHY DOC TX: CPT | Performed by: INTERNAL MEDICINE

## 2024-08-06 RX ORDER — AMLODIPINE BESYLATE 10 MG/1
10 TABLET ORAL DAILY
Qty: 90 TABLET | Refills: 1 | Status: SHIPPED | OUTPATIENT
Start: 2024-08-06

## 2024-08-06 NOTE — TELEPHONE ENCOUNTER
Patient states that he is now taking amlodipine 2.5mg daily.   Please review and send new RX to [id number] - [place] - [id number]  if appropriate.

## 2024-08-28 DIAGNOSIS — E87.5 HYPERKALEMIA: ICD-10-CM

## 2024-08-28 RX ORDER — TORSEMIDE 20 MG/1
20 TABLET ORAL DAILY
Qty: 30 TABLET | Refills: 5 | Status: SHIPPED | OUTPATIENT
Start: 2024-08-28

## 2024-09-05 ENCOUNTER — OFFICE VISIT (OUTPATIENT)
Dept: CARDIOLOGY CLINIC | Facility: CLINIC | Age: 74
End: 2024-09-05
Payer: COMMERCIAL

## 2024-09-05 VITALS
SYSTOLIC BLOOD PRESSURE: 110 MMHG | HEART RATE: 67 BPM | DIASTOLIC BLOOD PRESSURE: 68 MMHG | BODY MASS INDEX: 21.92 KG/M2 | WEIGHT: 144.6 LBS | OXYGEN SATURATION: 98 % | HEIGHT: 68 IN

## 2024-09-05 DIAGNOSIS — Z01.810 PREOP CARDIOVASCULAR EXAM: ICD-10-CM

## 2024-09-05 DIAGNOSIS — I12.0 BENIGN HYPERTENSION WITH CHRONIC KIDNEY DISEASE, STAGE V (HCC): ICD-10-CM

## 2024-09-05 DIAGNOSIS — I34.0 NON-RHEUMATIC MITRAL REGURGITATION: Chronic | ICD-10-CM

## 2024-09-05 DIAGNOSIS — E78.5 DYSLIPIDEMIA: ICD-10-CM

## 2024-09-05 DIAGNOSIS — Z98.890 S/P MVR (MITRAL VALVE REPAIR): ICD-10-CM

## 2024-09-05 DIAGNOSIS — I25.10 CORONARY ARTERY DISEASE INVOLVING NATIVE CORONARY ARTERY OF NATIVE HEART WITHOUT ANGINA PECTORIS: Primary | Chronic | ICD-10-CM

## 2024-09-05 DIAGNOSIS — N18.4 CKD (CHRONIC KIDNEY DISEASE), STAGE IV (HCC): ICD-10-CM

## 2024-09-05 DIAGNOSIS — N18.5 BENIGN HYPERTENSION WITH CHRONIC KIDNEY DISEASE, STAGE V (HCC): ICD-10-CM

## 2024-09-05 DIAGNOSIS — Z95.2 S/P AVR: ICD-10-CM

## 2024-09-05 DIAGNOSIS — Q23.1 BICUSPID AORTIC VALVE: Chronic | ICD-10-CM

## 2024-09-05 DIAGNOSIS — I10 ESSENTIAL HYPERTENSION: Chronic | ICD-10-CM

## 2024-09-05 PROCEDURE — 3078F DIAST BP <80 MM HG: CPT | Performed by: INTERNAL MEDICINE

## 2024-09-05 PROCEDURE — 3074F SYST BP LT 130 MM HG: CPT | Performed by: INTERNAL MEDICINE

## 2024-09-05 PROCEDURE — 1160F RVW MEDS BY RX/DR IN RCRD: CPT | Performed by: INTERNAL MEDICINE

## 2024-09-05 PROCEDURE — 93000 ELECTROCARDIOGRAM COMPLETE: CPT | Performed by: INTERNAL MEDICINE

## 2024-09-05 PROCEDURE — 99214 OFFICE O/P EST MOD 30 MIN: CPT | Performed by: INTERNAL MEDICINE

## 2024-09-05 NOTE — PROGRESS NOTES
Cardiology   Myra Mustafa 74 y.o. male MRN: [id number]        Impression:  1. S/P AVR/MV repair 3/19 - doing well.   2. Atrial flutter - in NSR. On warfarin.  Occasional PVCs.  3. Hypertension - controlled.  4. Cardiomyopathy - likely tachy mediated. Resolved.   5. CKD - worsening - Cr 5.06.  Scheduled for AV fistula placement.      Recommendations:  1. Continue current medications.  2. Check echo to evaluate bioAVR/MV repair.  May be done after surgery.  3. Patient at acceptable cardiovascular risk to proceed with fistula surgery.  4. Follow up in 6 months.         HPI: Myra Mustafa is a 74 y.o. year old malewith mod-severe MR/mod-severe AI and EF 40% s/p bioAVR and MV repair with annuloplasty ring and VIRI clipping 3/19, CKD with progression of disease, with paroxysmal atrial fibrillation/atrial flutter who returns for follow up.  Had a PATIENCE/DCCV 4/12/19 - EF 25%, mod MR, no clot.  Successfully converted to NSR.  Echo 11/20 - EF 65%, normal bio AVR and MV repair.  Holter monitor 6/22 demonstrated no atrial flutter. Echo 10/22 - Normal LV systolic function, normal AVR/MVR with mild MR.  Is slated to undergo placement of an AV fistula for HD. Is very active - climbs stairs without difficulty.         Review of Systems   Constitutional: Negative.    HENT: Negative.     Eyes: Negative.    Respiratory:  Negative for chest tightness and shortness of breath.    Cardiovascular:  Negative for chest pain, palpitations and leg swelling.   Gastrointestinal: Negative.    Endocrine: Negative.    Genitourinary: Negative.    Musculoskeletal: Negative.    Skin: Negative.    Allergic/Immunologic: Negative.    Neurological: Negative.    Hematological: Negative.    Psychiatric/Behavioral: Negative.     All other systems reviewed and are negative.        Past Medical History:   Diagnosis Date    Chronic kidney disease     Colon polyp     COVID-19     Diabetes mellitus (HCC)     Hyperlipidemia     Hypertension     Proteinuria     Stage  3 chronic kidney disease (HCC)     Vitamin D deficiency      Past Surgical History:   Procedure Laterality Date    COLONOSCOPY      overdue    MS ECHO TRANSESOPHAG R-T 2D W/PRB IMG ACQUISJ I&R N/A 3/7/2019    Procedure: INTRA-OP TRANSESOPHAGEAL ECHOCARDIOGRAM (PATIENCE);  Surgeon: Gaudencio Tubbs DO;  [facility];  Service: Cardiac Surgery    MS NASAL ENDOSCOPY DIAGNOSTIC UNI/BI SPX Bilateral 2023    Procedure: ENDOSCOPY NOSE BILATERALLY, WITH ENDOSCOPIC CONTROL OF EPISTAXIS ON LEFT;  Surgeon: Ady Monahan MD;  [facility];  Service: ENT    MS PERQ CLSR TCAT L ATR APNDGE W/ENDOCARDIAL IMPLNT  3/7/2019    Procedure: LEFT ATRIAL APPENDAGE OCCLUSION CLIPPED with 35mm ATRICURE ATRICLIP;  Surgeon: Gaudencio Tubbs DO;  [facility];  Service: Cardiac Surgery    MS REPLACEMENT MITRAL VALVE W/CARDIOPULMONARY BYP N/A 3/7/2019    Procedure: REPLACEMENT VALVE MITRAL (MVR) REPAIR with a 30mm MEDTRONIC CG FUTURE RING;  Surgeon: Gaudencio Tubbs DO;  [facility];  Service: Cardiac Surgery    MS RPLCMT AORTIC VALVE OPN W/STENTLESS TISSUE VALVE N/A 3/7/2019    Procedure: REPLACEMENT VALVE AORTIC (AVR) with 23mm TAVERA INSPIRIS RESILIA  AORTIC VALVE;  Surgeon: Gaudencio Tubbs DO;  [facility];  Service: Cardiac Surgery    RENAL BIOPSY, OPEN      TOE SURGERY Left      Social History     Substance and Sexual Activity   Alcohol Use Yes    Comment: occasionally     Social History     Substance and Sexual Activity   Drug Use No     Social History     Tobacco Use   Smoking Status Former    Current packs/day: 0.00    Types: Cigarettes    Start date:     Quit date:     Years since quittin.7   Smokeless Tobacco Never     Family History   Problem Relation Age of Onset    Stroke Mother     Hypertension Mother     Blindness Father     Hyperlipidemia Father     Hypertension Father     Glaucoma Father     Cancer Sister     Diabetes Sister     Ovarian cancer Sister      Gout Brother     Heart Valve Disease Brother     Hypertension Brother     Anuerysm Neg Hx     Heart attack Neg Hx     Arrhythmia Neg Hx     Heart failure Neg Hx     Coronary artery disease Neg Hx     Sudden death Neg Hx         scd       Allergies:  Allergies   Allergen Reactions    Ace Inhibitors Cough    Keflex [Cephalexin] Rash       Medications:     Current Outpatient Medications:     ALPRAZolam (XANAX) 0.25 mg tablet, Take prior to flight, Disp: 4 tablet, Rfl: 0    amLODIPine (NORVASC) 10 mg tablet, Take 1 tablet (10 mg total) by mouth daily, Disp: 90 tablet, Rfl: 1    ascorbic acid (VITAMIN C) 500 mg tablet, Take 500 mg by mouth daily, Disp: , Rfl:     Aspirin Low Dose 81 MG EC tablet, TAKE 1 TABLET BY MOUTH EVERY DAY, Disp: 90 tablet, Rfl: 1    atorvastatin (LIPITOR) 20 mg tablet, TAKE 1 TABLET BY MOUTH EVERY DAY, Disp: 90 tablet, Rfl: 1    calcitriol (ROCALTROL) 0.25 mcg capsule, Take 1 capsule (0.25 mcg total) by mouth daily TAKE 1 CAPSULE 5 DAYS A WEEK AS DIRECTED, Disp: 90 capsule, Rfl: 3    cetirizine (ZyrTEC) 10 mg tablet, Take 1 tablet (10 mg total) by mouth daily, Disp: 30 tablet, Rfl: 1    COMBIGAN 0.2-0.5 %, , Disp: , Rfl: 0    isosorbide dinitrate (ISORDIL) 10 mg tablet, Take 1 tablet (10 mg total) by mouth 3 (three) times a day, Disp: 270 tablet, Rfl: 3    metoprolol succinate (TOPROL-XL) 50 mg 24 hr tablet, Take 1 tablet (50 mg total) by mouth daily, Disp: 90 tablet, Rfl: 3    Nutritional Supplements (Nepro) LIQD, Take 237 mL by mouth in the morning (Patient taking differently: Take 237 mL by mouth 2 (two) times a week Takes time to time), Disp: 5688 mL, Rfl: 3    sodium bicarbonate 650 mg tablet, TAKE 1 TABLET(650 MG) BY MOUTH IN THE MORNING, Disp: 90 tablet, Rfl: 1    Sodium Zirconium Cyclosilicate (Lokelma) 10 g, Take 1 packet (10 g total) by mouth every other day, Disp: 20 packet, Rfl: 0    torsemide (DEMADEX) 20 mg tablet, TAKE 1 TABLET(20 MG) BY MOUTH DAILY, Disp: 30 tablet, Rfl: 5     TRAVATAN Z 0.004 % ophthalmic solution, Administer 1 drop to both eyes daily at bedtime , Disp: , Rfl: 0    warfarin (COUMADIN) 2 mg tablet, TAKE 1 TO 2 TABLETS DAILY BY MOUTH OR AS DIRECTED BY PHYSICIAN., Disp: 60 tablet, Rfl: 11    amoxicillin (AMOXIL) 500 MG tablet, FOR DENTAL VISITS (Patient not taking: Reported on 7/25/2024), Disp: , Rfl:     Continuous Blood Gluc  (FreeStyle [place]) CHERELLE, Use 1 Units continuous (Patient not taking: Reported on 7/25/2024), Disp: 1 each, Rfl: 0    Continuous Blood Gluc Sensor (FreeStyle Alisson 3 Sensor) MISC, Use 1 Units every 14 (fourteen) days (Patient not taking: Reported on 7/25/2024), Disp: 2 each, Rfl: 5    Cosopt PF 2-0.5 % SOLN, instill 1 drop in both eyes twice daily, Disp: , Rfl:       Wt Readings from Last 3 Encounters:   09/05/24 65.6 kg (144 lb 9.6 oz)   07/25/24 65.8 kg (145 lb)   07/19/24 66 kg (145 lb 9.6 oz)     Temp Readings from Last 3 Encounters:   06/13/24 98.4 °F (36.9 °C) (Oral)   03/11/24 97.6 °F (36.4 °C) (Tympanic)   01/30/24 98.1 °F (36.7 °C) (Tympanic)     BP Readings from Last 3 Encounters:   09/05/24 110/68   07/25/24 154/74   07/19/24 140/80     Pulse Readings from Last 3 Encounters:   09/05/24 67   07/25/24 (!) 52   07/19/24 (!) 50         Physical Exam  Constitutional:       Appearance: Normal appearance.   HENT:      Head: Atraumatic.      Mouth/Throat:      Mouth: Mucous membranes are moist.   Eyes:      Extraocular Movements: Extraocular movements intact.   Cardiovascular:      Rate and Rhythm: Normal rate and regular rhythm.      Heart sounds: Normal heart sounds.   Pulmonary:      Effort: Pulmonary effort is normal.      Breath sounds: Normal breath sounds.   Abdominal:      General: Abdomen is flat.   Musculoskeletal:         General: Normal range of motion.      Cervical back: Normal range of motion.   Skin:     General: Skin is warm.   Neurological:      General: No focal deficit present.      Mental Status: He is alert and  "oriented to person, place, and time.   Psychiatric:         Mood and Affect: Mood normal.         Behavior: Behavior normal.           Laboratory Studies:  CMP:  Lab Results   Component Value Date    K 5.4 (H) 2024     (H) 2024    CO2 21 2024    BUN 73 (H) 2024    CREATININE 5.06 (H) 2024    GLUCOSE 100 2023    AST 21 2024    ALT 14 2024    EGFR 10 2024       Lipid Profile:   No results found for: \"CHOL\"  Lab Results   Component Value Date    HDL 50 06/10/2024     Lab Results   Component Value Date    LDLCALC 61 06/10/2024     Lab Results   Component Value Date    TRIG 83 06/10/2024       Cardiac testing:   EKG reviewed personally: NSR 67 PVCs IRBBB LAFB  Results for orders placed during the hospital encounter of 20    Echo complete with contrast if indicated    [address] [id number] [phone]    Transthoracic Echocardiogram  2D, M-mode, Doppler, and Color Doppler    Study date:  2020    Patient: MILES ALVAREZ  MR number: [id number]  Account number: [id number]  : 1950  Age: 70 years  Gender: Male  Status: Outpatient  Location: [facility]  Height: 66 in  Weight: 183.7 lb  BP: 148/ 86 mmHg    Indications: CAD, AVR, MV Repair    Diagnoses: I25.83 - Coronary atherosclerosis due to lipid rich plaque    Sonographer:  TIMMY Robin  Primary Physician:  Rufina Cao MD  Referring Physician:  Tobin Conde MD  Group:  [facility]  Interpreting Physician:  Parker Lund MD    SUMMARY    LEFT VENTRICLE:  Systolic function was normal. Ejection fraction was estimated to be 65 %.  There were no regional wall motion abnormalities.  Features were consistent with a pseudonormal left ventricular filling pattern, with concomitant abnormal relaxation and increased filling pressure (grade 2 diastolic dysfunction).    MITRAL VALVE:  There was " mild regurgitation.    AORTIC VALVE:  A bioprosthesis was present. It exhibited normal function. The mean gradient was 13mmHg.    TRICUSPID VALVE:  There was mild to moderate regurgitation.  Estimated peak PA pressure was 28 mmHg.    PULMONIC VALVE:  There was mild to moderate regurgitation.    HISTORY: PRIOR HISTORY: heart failure, AI, MR, bicuspid AV, CAD, CM, aflutter    PROCEDURE: The study was performed in the [facility]. This was a routine study. The transthoracic approach was used. The study included complete 2D imaging, M-mode, complete spectral Doppler, and color Doppler. The  heart rate was 74 bpm, at the start of the study. Images were obtained from the parasternal, apical, subcostal, and suprasternal notch acoustic windows. Image quality was adequate.    LEFT VENTRICLE: Size was normal. Systolic function was normal. Ejection fraction was estimated to be 65 %. There were no regional wall motion abnormalities. Wall thickness was normal. DOPPLER: Features were consistent with a pseudonormal  left ventricular filling pattern, with concomitant abnormal relaxation and increased filling pressure (grade 2 diastolic dysfunction).    RIGHT VENTRICLE: The size was normal. Systolic function was normal. Wall thickness was normal.    LEFT ATRIUM: Size was normal.    RIGHT ATRIUM: Size was normal.    MITRAL VALVE: There was normal leaflet separation. There was a prior surgical repair. The mean gradient across the ring was 2mmHg. DOPPLER: The transmitral velocity was within the normal range. There was mild regurgitation.    AORTIC VALVE: A bioprosthesis was present. It exhibited normal function. The mean gradient was 13mmHg.    TRICUSPID VALVE: The valve structure was normal. There was normal leaflet separation. DOPPLER: The transtricuspid velocity was within the normal range. There was no evidence for stenosis. There was mild to moderate regurgitation. Estimated  peak PA pressure was 28  mmHg.    PULMONIC VALVE: Leaflets exhibited normal thickness, no calcification, and normal cuspal separation. DOPPLER: The transpulmonic velocity was within the normal range. There was mild to moderate regurgitation.    PERICARDIUM: There was no pericardial effusion.    AORTA: The root exhibited normal size.    SYSTEMIC VEINS: IVC: The inferior vena cava was not well visualized.    SYSTEM MEASUREMENT TABLES    2D  %FS: 37.23 %  Ao asc: 3.41 cm  EDV(Teich): 86.36 ml  EF(Teich): 67.44 %  ESV(Teich): 28.12 ml  IVSd: 0.88 cm  LA Area: 11.95 cm2  LA Diam: 3.74 cm  LVEDV MOD A4C: 56.04 ml  LVEF MOD A4C: 60.86 %  LVESV MOD A4C: 21.94 ml  LVIDd: 4.37 cm  LVIDs: 2.74 cm  LVLd A4C: 8.16 cm  LVLs A4C: 7.31 cm  LVOT Diam: 1.9 cm  LVPWd: 0.92 cm  RA Area: 10.72 cm2  RVIDd: 3.71 cm  SV MOD A4C: 34.11 ml  SV(Teich): 58.25 ml    CW  AV Env.Ti: 312.08 ms  AV MaxP.7 mmHg  AV VTI: 52.95 cm  AV Vmax: 2.43 m/s  AV Vmean: 1.7 m/s  AV meanP.43 mmHg  TR MaxP.49 mmHg  TR Vmax: 2.32 m/s    MM  TAPSE: 1.46 cm    PW  E' Sept: 0.06 m/s  E/E' Sept: 18.99  MV A [place]: 1.05 m/s  MV Dec [place]: 4.15 m/s2  MV DecT: 275.38 ms  MV E [place]: 1.13 m/s  MV E/A Ratio: 1.08  MV PHT: 79.86 ms  MVA By PHT: 2.75 cm2    Intersocietal Commission Accredited Echocardiography Laboratory    Prepared and electronically signed by    Parker Lund MD  Signed 2020 12:07:16    Results for orders placed during the hospital encounter of 19    PATIENCE    [facility]  [address] [id number] [phone]    Transesophageal Echocardiogram  Limited 2D, Doppler, and Color Doppler    Study date:  2019    Patient: MILES ALVAREZ  MR number: [id number]  Account number: [id number]  : 1950  Age: 68 years  Gender: Male  Status: Outpatient  Location: Cath lab  Height: 66 in  Weight: 156 lb  BP: 140/ 100 mmHg    Indications: Atrial flutter w/ cardioversion.    Diagnoses: I48.1 - Atrial flutter    Sonographer:   Lena Jean Baptiste RDCS  Primary Physician:  Rufina Cao  Referring Physician:  Tboin Conde MD  Group:  [facility]  RN:  Pattie Robison  Interpreting Physician:  Tobin Conde MD    SUMMARY    LEFT VENTRICLE:  The ventricle was mildly dilated.  Systolic function was severely reduced. Ejection fraction was estimated to be 25 %.  There was severe diffuse hypokinesis.  Wall thickness was normal.    RIGHT VENTRICLE:  The size was normal.  Systolic function was mildly reduced.    LEFT ATRIAL APPENDAGE:  S/P clipping. No thrombus.    MITRAL VALVE:  Prior repair procedures: surgical repair  There was moderate regurgitation.    TRICUSPID VALVE:  There was mild regurgitation.    HISTORY: PRIOR HISTORY: DM2. Dilated cardiomyopathy. CAD. Bicuspid aortic valve. Hypertension. Systolic congestive heart failure. S/P AVR and MV clip; March 2019.    PROCEDURE: The procedure was performed in the catheterization laboratory. This was a routine study. The risks and alternatives of the procedure were explained to the patient and informed consent was obtained. The transesophageal approach  was used. The study included limited 2D imaging, limited spectral Doppler, color Doppler, and probe insertion (without interpretation). The heart rate was 125 bpm, at the start of the study. An adult omniplane probe was inserted by the  attending cardiologist. Intubated with ease. One intubation attempt(s). There was no blood detected on the probe. Image quality was adequate. There were no complications during the procedure. MEDICATIONS: Anesthesia administered by  anesthesia team.    LEFT VENTRICLE: The ventricle was mildly dilated. Systolic function was severely reduced. Ejection fraction was estimated to be 25 %. There was severe diffuse hypokinesis. Wall thickness was normal.    RIGHT VENTRICLE: The size was normal. Systolic function was mildly reduced. Wall thickness was normal.    LEFT ATRIUM: Size was normal.  No thrombus was identified. APPENDAGE: S/P clipping. No thrombus.    ATRIAL SEPTUM: No defect or patent foramen ovale was identified.    RIGHT ATRIUM: Size was normal. No thrombus was identified.    MITRAL VALVE: Prior repair procedures: surgical repair DOPPLER: There was moderate regurgitation.    AORTIC VALVE: A bioprosthesis was present. It exhibited normal function. DOPPLER: There was no regurgitation.    TRICUSPID VALVE: The valve structure was normal. There was normal leaflet separation. There was no echocardiographic evidence of vegetation. DOPPLER: There was mild regurgitation.    PULMONIC VALVE: Leaflets exhibited normal thickness, no calcification, and normal cuspal separation. There was no echocardiographic evidence of vegetation.    PERICARDIUM: There was no pericardial effusion. The pericardium was normal in appearance.    AORTA: The root exhibited normal size. There was no atheroma. There was no evidence for dissection. There was no evidence for aneurysm.    Intersocietal Commission Accredited Echocardiography Laboratory    Prepared and electronically signed by    Tobin Conde MD  Signed 12-Apr-2019 15:55:03    No results found for this or any previous visit.    No results found for this or any previous visit.

## 2024-09-05 NOTE — PATIENT INSTRUCTIONS
Recommendations:  1. Continue current medications.  2. Check echo to evaluate bioAVR/MV repair.  May be done after surgery.  3. Patient at acceptable cardiovascular risk to proceed with fistula surgery.  4. Follow up in 6 months.

## 2024-09-06 ENCOUNTER — TELEPHONE (OUTPATIENT)
Age: 74
End: 2024-09-06

## 2024-09-06 NOTE — TELEPHONE ENCOUNTER
Patient states he has an appointment for fistula scheduled on 10-15. Patient asking if this is too long to wait based on current labs etc. Patient said a detailed message can be left if he does not answer. Please advise, thank you.

## 2024-09-11 ENCOUNTER — ANESTHESIA EVENT (OUTPATIENT)
Dept: PERIOP | Facility: HOSPITAL | Age: 74
End: 2024-09-11
Payer: COMMERCIAL

## 2024-09-11 ENCOUNTER — TELEPHONE (OUTPATIENT)
Dept: NEPHROLOGY | Facility: CLINIC | Age: 74
End: 2024-09-11

## 2024-09-11 ENCOUNTER — HOSPITAL ENCOUNTER (OUTPATIENT)
Dept: NON INVASIVE DIAGNOSTICS | Facility: CLINIC | Age: 74
Discharge: HOME | End: 2024-09-11
Payer: COMMERCIAL

## 2024-09-11 VITALS
SYSTOLIC BLOOD PRESSURE: 110 MMHG | HEART RATE: 67 BPM | BODY MASS INDEX: 21.82 KG/M2 | DIASTOLIC BLOOD PRESSURE: 68 MMHG | WEIGHT: 144 LBS | HEIGHT: 68 IN

## 2024-09-11 DIAGNOSIS — I34.0 NON-RHEUMATIC MITRAL REGURGITATION: Chronic | ICD-10-CM

## 2024-09-11 DIAGNOSIS — Z95.2 S/P AVR: ICD-10-CM

## 2024-09-11 DIAGNOSIS — Z98.890 S/P MVR (MITRAL VALVE REPAIR): ICD-10-CM

## 2024-09-11 LAB
AORTIC ROOT: 3 CM
AORTIC VALVE MEAN VELOCITY: 16.4 M/S
APICAL FOUR CHAMBER EJECTION FRACTION: 49 %
ASCENDING AORTA: 3.1 CM
AV AREA BY CONTINUOUS VTI: 1.2 CM2
AV AREA PEAK VELOCITY: 1.2 CM2
AV LVOT MEAN GRADIENT: 2 MMHG
AV LVOT PEAK GRADIENT: 3 MMHG
AV MEAN GRADIENT: 12 MMHG
AV PEAK GRADIENT: 18 MMHG
AV VALVE AREA: 1.17 CM2
AV VELOCITY RATIO: 0.39
BSA FOR ECHO PROCEDURE: 1.78 M2
DOP CALC AO PEAK VEL: 2.14 M/S
DOP CALC AO VTI: 45.14 CM
DOP CALC LVOT AREA: 3.14 CM2
DOP CALC LVOT CARDIAC INDEX: 2.79 L/MIN/M2
DOP CALC LVOT CARDIAC OUTPUT: 4.97 L/MIN
DOP CALC LVOT DIAMETER: 2 CM
DOP CALC LVOT PEAK VEL VTI: 16.85 CM
DOP CALC LVOT PEAK VEL: 0.84 M/S
DOP CALC LVOT STROKE INDEX: 29.2 ML/M2
DOP CALC LVOT STROKE VOLUME: 52.91
E WAVE DECELERATION TIME: 264 MS
E/A RATIO: 1.01
FRACTIONAL SHORTENING: 30 (ref 28–44)
INTERVENTRICULAR SEPTUM IN DIASTOLE (PARASTERNAL SHORT AXIS VIEW): 1.1 CM
INTERVENTRICULAR SEPTUM: 1.1 CM (ref 0.6–1.1)
LAAS-AP2: 15.2 CM2
LAAS-AP4: 16.9 CM2
LEFT ATRIUM SIZE: 3.6 CM
LEFT ATRIUM VOLUME (MOD BIPLANE): 44 ML
LEFT ATRIUM VOLUME INDEX (MOD BIPLANE): 24.7 ML/M2
LEFT INTERNAL DIMENSION IN SYSTOLE: 3.1 CM (ref 2.1–4)
LEFT VENTRICULAR INTERNAL DIMENSION IN DIASTOLE: 4.4 CM (ref 3.5–6)
LEFT VENTRICULAR POSTERIOR WALL IN END DIASTOLE: 1.2 CM
LEFT VENTRICULAR STROKE VOLUME: 51 ML
LVSV (TEICH): 51 ML
MV E'TISSUE VEL-SEP: 7 CM/S
MV MEAN GRADIENT: 3 MMHG
MV PEAK A VEL: 1.03 M/S
MV PEAK E VEL: 104 CM/S
MV STENOSIS PRESSURE HALF TIME: 77 MS
MV VALVE AREA P 1/2 METHOD: 2.86
RIGHT ATRIUM AREA SYSTOLE A4C: 16 CM2
RIGHT VENTRICLE ID DIMENSION: 3.5 CM
SL CV LEFT ATRIUM LENGTH A2C: 4.6 CM
SL CV LV EF: 55
SL CV PED ECHO LEFT VENTRICLE DIASTOLIC VOLUME (MOD BIPLANE) 2D: 88 ML
SL CV PED ECHO LEFT VENTRICLE SYSTOLIC VOLUME (MOD BIPLANE) 2D: 37 ML
TR MAX PG: 21 MMHG
TR PEAK VELOCITY: 2.3 M/S
TRICUSPID VALVE PEAK REGURGITATION VELOCITY: 2.26 M/S

## 2024-09-11 PROCEDURE — 93306 TTE W/DOPPLER COMPLETE: CPT | Performed by: INTERNAL MEDICINE

## 2024-09-11 PROCEDURE — 93306 TTE W/DOPPLER COMPLETE: CPT

## 2024-09-11 NOTE — TELEPHONE ENCOUNTER
Left voicemail for the patient reminding to please complete labwork prior to 9/19 appointment with Dr. Reyes. Advised patient to call back with any questions or  Concerns.

## 2024-09-11 NOTE — PRE-PROCEDURE INSTRUCTIONS
Pre-Surgery Instructions:   Medication Instructions    amLODIPine (NORVASC) 10 mg tablet Take day of surgery.    ascorbic acid (VITAMIN C) 500 mg tablet Stop taking 5 days prior to surgery.    Aspirin Low Dose 81 MG EC tablet Instructions provided by MD- advised by vascular to continue    atorvastatin (LIPITOR) 20 mg tablet Take day of surgery.    calcitriol (ROCALTROL) 0.25 mcg capsule Hold day of surgery.    cetirizine (ZyrTEC) 10 mg tablet Take day of surgery.    COMBIGAN 0.2-0.5 % Take night before surgery    isosorbide dinitrate (ISORDIL) 10 mg tablet Take day of surgery.    metoprolol succinate (TOPROL-XL) 50 mg 24 hr tablet Take day of surgery.    Nutritional Supplements (Nepro) LIQD Hold day of surgery.    sodium bicarbonate 650 mg tablet Hold day of surgery.    Sodium Zirconium Cyclosilicate (Lokelma) 10 g Hold day of surgery.    torsemide (DEMADEX) 20 mg tablet Hold day of surgery.    warfarin (COUMADIN) 2 mg tablet Instructions provided by MD- pt states he was advised by cardiology Dr. Conde to hold for 5 days     Medication instructions for day surgery reviewed. Please use only a sip of water to take your instructed medications. Avoid all over the counter vitamins, supplements and NSAIDS for one week prior to surgery per anesthesia guidelines. Tylenol is ok to take as needed.     You will receive a call one business day prior to surgery with an arrival time and hospital directions. If your surgery is scheduled on a Monday, the hospital will be calling you on the Friday prior to your surgery. If you have not heard from anyone by 8pm, please call the hospital supervisor through the hospital  at [phone]. ([place] [phone] or [place] [phone]).    Do not eat or drink anything after midnight the night before your surgery, including candy, mints, lifesavers, or chewing gum. Do not drink alcohol 24hrs before your surgery. Try not to smoke at least 24hrs before your surgery.        Follow the pre surgery showering instructions as listed in the “My Surgical Experience Booklet” or otherwise provided by your surgeon's office. Do not use a blade to shave the surgical area 1 week before surgery. It is okay to use a clean electric clippers up to 24 hours before surgery. Do not apply any lotions, creams, including makeup, cologne, deodorant, or perfumes after showering on the day of your surgery. Do not use dry shampoo, hair spray, hair gel, or any type of hair products.     No contact lenses, eye make-up, or artificial eyelashes. Remove nail polish, including gel polish, and any artificial, gel, or acrylic nails if possible. Remove all jewelry including rings and body piercing jewelry.     Wear causal clothing that is easy to take on and off. Consider your type of surgery.    Keep any valuables, jewelry, piercings at home. Please bring any specially ordered equipment (sling, braces) if indicated.    Arrange for a responsible person to drive you to and from the hospital on the day of your surgery. Please confirm the visitor policy for the day of your procedure when you receive your phone call with an arrival time.     Call the surgeon's office with any new illnesses, exposures, or additional questions prior to surgery.    Please reference your “My Surgical Experience Booklet” for additional information to prepare for your upcoming surgery.

## 2024-09-12 ENCOUNTER — ANTICOAG VISIT (OUTPATIENT)
Dept: CARDIOLOGY CLINIC | Facility: CLINIC | Age: 74
End: 2024-09-12

## 2024-09-12 ENCOUNTER — APPOINTMENT (OUTPATIENT)
Dept: LAB | Facility: CLINIC | Age: 74
End: 2024-09-12
Payer: COMMERCIAL

## 2024-09-12 DIAGNOSIS — N18.5 CKD (CHRONIC KIDNEY DISEASE) STAGE 5, GFR LESS THAN 15 ML/MIN (HCC): ICD-10-CM

## 2024-09-12 DIAGNOSIS — N18.5 CKD STAGE G5/A3, GFR <15 AND ALBUMIN CREATININE RATIO >300 MG/G (HCC): ICD-10-CM

## 2024-09-12 DIAGNOSIS — E87.5 HYPERKALEMIA: ICD-10-CM

## 2024-09-12 LAB
ANION GAP SERPL CALCULATED.3IONS-SCNC: 8 MMOL/L (ref 4–13)
BUN SERPL-MCNC: 71 MG/DL (ref 5–25)
CALCIUM SERPL-MCNC: 8.7 MG/DL (ref 8.4–10.2)
CHLORIDE SERPL-SCNC: 107 MMOL/L (ref 96–108)
CO2 SERPL-SCNC: 25 MMOL/L (ref 21–32)
CREAT SERPL-MCNC: 5.32 MG/DL (ref 0.6–1.3)
ERYTHROCYTE [DISTWIDTH] IN BLOOD BY AUTOMATED COUNT: 12.7 % (ref 11.6–15.1)
GFR SERPL CREATININE-BSD FRML MDRD: 9 ML/MIN/1.73SQ M
GLUCOSE P FAST SERPL-MCNC: 94 MG/DL (ref 65–99)
HCT VFR BLD AUTO: 35.9 % (ref 36.5–49.3)
HGB BLD-MCNC: 12.1 G/DL (ref 12–17)
INR PPP: 2.16 (ref 0.85–1.19)
MCH RBC QN AUTO: 31.7 PG (ref 26.8–34.3)
MCHC RBC AUTO-ENTMCNC: 33.7 G/DL (ref 31.4–37.4)
MCV RBC AUTO: 94 FL (ref 82–98)
PLATELET # BLD AUTO: 162 THOUSANDS/UL (ref 149–390)
PMV BLD AUTO: 10.4 FL (ref 8.9–12.7)
POTASSIUM SERPL-SCNC: 4.7 MMOL/L (ref 3.5–5.3)
PROTHROMBIN TIME: 24.8 SECONDS (ref 12.3–15)
RBC # BLD AUTO: 3.82 MILLION/UL (ref 3.88–5.62)
SODIUM SERPL-SCNC: 140 MMOL/L (ref 135–147)
WBC # BLD AUTO: 7.19 THOUSAND/UL (ref 4.31–10.16)

## 2024-09-12 PROCEDURE — 85610 PROTHROMBIN TIME: CPT

## 2024-09-12 PROCEDURE — 36415 COLL VENOUS BLD VENIPUNCTURE: CPT

## 2024-09-12 PROCEDURE — 85027 COMPLETE CBC AUTOMATED: CPT

## 2024-09-12 PROCEDURE — 80048 BASIC METABOLIC PNL TOTAL CA: CPT

## 2024-09-13 ENCOUNTER — TELEPHONE (OUTPATIENT)
Age: 74
End: 2024-09-13

## 2024-09-13 NOTE — TELEPHONE ENCOUNTER
Caller:  Myra    Doctor: Gaudencio Tubbs     Reason for call:  Patient calling in states he can not make his post op appt and if appt date can be changed. Unable to transfer call to office    Call back#:  [phone]

## 2024-09-15 NOTE — ASSESSMENT & PLAN NOTE
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 No cervical or inguinal lymphadenopathy.

## 2024-09-16 RX ORDER — SODIUM ZIRCONIUM CYCLOSILICATE 10 G/10G
POWDER, FOR SUSPENSION ORAL
Qty: 20 EACH | Refills: 2 | Status: SHIPPED | OUTPATIENT
Start: 2024-09-16

## 2024-09-16 NOTE — ANESTHESIA PREPROCEDURE EVALUATION
Procedure:  CREATION FISTULA ARTERIOVENOUS (AV), possible graft (Left: Arm Upper)    Relevant Problems   CARDIO   (+) Coronary artery disease involving native coronary artery of native heart without angina pectoris   (+) Essential hypertension   (+) Non-rheumatic mitral regurgitation   (+) Nonrheumatic aortic valve insufficiency      ENDO   (+) Secondary hyperparathyroidism (HCC)   (+) Secondary hyperparathyroidism of renal origin (HCC)   (+) Type 2 diabetes mellitus with stage 4 chronic kidney disease, without long-term current use of insulin (HCC)      /RENAL   (+) Benign hypertension with chronic kidney disease, stage IV (HCC)   (+) Benign hypertension with chronic kidney disease, stage V (HCC)   (+) CKD (chronic kidney disease), stage IV (HCC)   (+) CKD stage G4/A3, GFR 15-29 and albumin creatinine ratio >300 mg/g (HCC)   (+) CKD stage G5/A3, GFR <15 and albumin creatinine ratio >300 mg/g (HCC)   (+) 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, not on chronic dialysis (HCC)   (+) End stage renal disease (HCC)   (+) FSGS (focal segmental glomerulosclerosis)      HEMATOLOGY   (+) Anemia due to stage 5 chronic kidney disease, not on chronic dialysis  (HCC)     TTE (09/11/24)     Left Ventricle: Left ventricular cavity size is normal. Wall thickness is mildly increased. The left ventricular ejection fraction is 55%. Systolic function is normal. Although no diagnostic regional wall motion abnormality was identified, this possibility cannot be completely excluded on the basis of this study. Unable to assess diastolic function due to frequent ectopy.    Right Ventricle: Right ventricle is not well visualized. Systolic function is mildly reduced.    Aortic Valve: There is a bioprosthetic valve. The prosthetic valve appears well-seated. Prosthetic valve leaflets are not well visualized. There is no evidence of paravalvular regurgitation. There is no evidence of  transvalvular regurgitation. The gradient recorded across the prosthetic aortic valve is within the expected range. The aortic valve mean gradient is 12 mmHg.  Acceleration time: 79 ms    Mitral Valve: The valve has been repaired with an annular ring. There is no evidence of stenosis. The mitral valve mean gradient is 3.0mmHg.       Physical Exam    Airway    Mallampati score: II  TM Distance: >3 FB  Neck ROM: full     Dental        Cardiovascular  Rhythm: regular, Rate: normal, Cardiovascular exam normal    Pulmonary  Pulmonary exam normal Breath sounds clear to auscultation    Other Findings        Anesthesia Plan  ASA Score- 3     Anesthesia Type- IV sedation with anesthesia with ASA Monitors.         Additional Monitors:     Airway Plan:     Comment: Supraclavicular block  + sedation (GA back up).       Plan Factors-Exercise tolerance (METS): >4 METS.    Chart reviewed. EKG reviewed. Imaging results reviewed. Existing labs reviewed. Patient summary reviewed.          Obstructive sleep apnea risk education given perioperatively.        Induction- intravenous.    Postoperative Plan-     Perioperative Resuscitation Plan - Level 1 - Full Code.       Informed Consent- Anesthetic plan and risks discussed with patient.  I personally reviewed this patient with the CRNA. Discussed and agreed on the Anesthesia Plan with the CRNA..

## 2024-09-17 ENCOUNTER — HOSPITAL ENCOUNTER (OUTPATIENT)
Facility: HOSPITAL | Age: 74
Setting detail: OUTPATIENT SURGERY
Discharge: HOME | End: 2024-09-17
Attending: SURGERY | Admitting: SURGERY
Payer: COMMERCIAL

## 2024-09-17 ENCOUNTER — ANESTHESIA (OUTPATIENT)
Dept: PERIOP | Facility: HOSPITAL | Age: 74
End: 2024-09-17
Payer: COMMERCIAL

## 2024-09-17 VITALS
WEIGHT: 144 LBS | OXYGEN SATURATION: 98 % | SYSTOLIC BLOOD PRESSURE: 135 MMHG | TEMPERATURE: 96.6 F | RESPIRATION RATE: 14 BRPM | BODY MASS INDEX: 21.82 KG/M2 | HEART RATE: 58 BPM | DIASTOLIC BLOOD PRESSURE: 76 MMHG | HEIGHT: 68 IN

## 2024-09-17 DIAGNOSIS — N18.5 CKD STAGE G5/A3, GFR <15 AND ALBUMIN CREATININE RATIO >300 MG/G (HCC): Primary | ICD-10-CM

## 2024-09-17 DIAGNOSIS — R63.4 WEIGHT LOSS: ICD-10-CM

## 2024-09-17 LAB
GLUCOSE SERPL-MCNC: 90 MG/DL (ref 65–140)
INR PPP: 1.09 (ref 0.85–1.19)
POTASSIUM SERPL-SCNC: 4.9 MMOL/L (ref 3.5–5.3)
PROTHROMBIN TIME: 14.4 SECONDS (ref 12.3–15)

## 2024-09-17 PROCEDURE — 36821 AV FUSION DIRECT ANY SITE: CPT | Performed by: SURGERY

## 2024-09-17 PROCEDURE — 82948 REAGENT STRIP/BLOOD GLUCOSE: CPT

## 2024-09-17 PROCEDURE — 84132 ASSAY OF SERUM POTASSIUM: CPT | Performed by: ANESTHESIOLOGY

## 2024-09-17 PROCEDURE — 85610 PROTHROMBIN TIME: CPT | Performed by: SURGERY

## 2024-09-17 RX ORDER — FENTANYL CITRATE/PF 50 MCG/ML
25 SYRINGE (ML) INJECTION
Status: DISCONTINUED | OUTPATIENT
Start: 2024-09-17 | End: 2024-09-17 | Stop reason: HOSPADM

## 2024-09-17 RX ORDER — HEPARIN SODIUM 1000 [USP'U]/ML
INJECTION, SOLUTION INTRAVENOUS; SUBCUTANEOUS AS NEEDED
Status: DISCONTINUED | OUTPATIENT
Start: 2024-09-17 | End: 2024-09-17

## 2024-09-17 RX ORDER — ROPIVACAINE HYDROCHLORIDE 5 MG/ML
INJECTION, SOLUTION EPIDURAL; INFILTRATION; PERINEURAL AS NEEDED
Status: DISCONTINUED | OUTPATIENT
Start: 2024-09-17 | End: 2024-09-17

## 2024-09-17 RX ORDER — CHLORHEXIDINE GLUCONATE ORAL RINSE 1.2 MG/ML
15 SOLUTION DENTAL ONCE
Status: COMPLETED | OUTPATIENT
Start: 2024-09-17 | End: 2024-09-17

## 2024-09-17 RX ORDER — FENTANYL CITRATE 50 UG/ML
INJECTION, SOLUTION INTRAMUSCULAR; INTRAVENOUS AS NEEDED
Status: DISCONTINUED | OUTPATIENT
Start: 2024-09-17 | End: 2024-09-17

## 2024-09-17 RX ORDER — PROPOFOL 10 MG/ML
INJECTION, EMULSION INTRAVENOUS CONTINUOUS PRN
Status: DISCONTINUED | OUTPATIENT
Start: 2024-09-17 | End: 2024-09-17

## 2024-09-17 RX ORDER — CEFAZOLIN SODIUM 1 G/50ML
1000 SOLUTION INTRAVENOUS ONCE
Status: COMPLETED | OUTPATIENT
Start: 2024-09-17 | End: 2024-09-17

## 2024-09-17 RX ORDER — SODIUM CHLORIDE 9 MG/ML
20 INJECTION, SOLUTION INTRAVENOUS CONTINUOUS
Status: DISCONTINUED | OUTPATIENT
Start: 2024-09-17 | End: 2024-09-17 | Stop reason: HOSPADM

## 2024-09-17 RX ORDER — ONDANSETRON 2 MG/ML
4 INJECTION INTRAMUSCULAR; INTRAVENOUS ONCE AS NEEDED
Status: DISCONTINUED | OUTPATIENT
Start: 2024-09-17 | End: 2024-09-17 | Stop reason: HOSPADM

## 2024-09-17 RX ORDER — LIDOCAINE HYDROCHLORIDE 20 MG/ML
INJECTION, SOLUTION EPIDURAL; INFILTRATION; INTRACAUDAL; PERINEURAL AS NEEDED
Status: DISCONTINUED | OUTPATIENT
Start: 2024-09-17 | End: 2024-09-17

## 2024-09-17 RX ADMIN — HEPARIN SODIUM 3000 UNITS: 1000 INJECTION INTRAVENOUS; SUBCUTANEOUS at 12:41

## 2024-09-17 RX ADMIN — SODIUM CHLORIDE 20 ML/HR: 0.9 INJECTION, SOLUTION INTRAVENOUS at 10:45

## 2024-09-17 RX ADMIN — LIDOCAINE HYDROCHLORIDE 0.5 ML: 20 INJECTION, SOLUTION EPIDURAL; INFILTRATION; INTRACAUDAL at 11:43

## 2024-09-17 RX ADMIN — PHENYLEPHRINE HYDROCHLORIDE 20 MCG/MIN: 10 INJECTION INTRAVENOUS at 12:05

## 2024-09-17 RX ADMIN — ROPIVACAINE HYDROCHLORIDE 20 ML: 5 INJECTION, SOLUTION EPIDURAL; INFILTRATION; PERINEURAL at 11:46

## 2024-09-17 RX ADMIN — FENTANYL CITRATE 50 MCG: 50 INJECTION INTRAMUSCULAR; INTRAVENOUS at 12:19

## 2024-09-17 RX ADMIN — CEFAZOLIN SODIUM 1000 MG: 1 SOLUTION INTRAVENOUS at 12:28

## 2024-09-17 RX ADMIN — CHLORHEXIDINE GLUCONATE 0.12% ORAL RINSE 15 ML: 1.2 LIQUID ORAL at 10:40

## 2024-09-17 RX ADMIN — CEFAZOLIN SODIUM 1000 MG: 1 SOLUTION INTRAVENOUS at 11:58

## 2024-09-17 RX ADMIN — PROPOFOL 100 MCG/KG/MIN: 10 INJECTION, EMULSION INTRAVENOUS at 12:05

## 2024-09-17 NOTE — TELEPHONE ENCOUNTER
Caller: Myra Mustafa    Doctor: Dr. Hudson    Reason for call: Pt calling to r/s his PO appt. Contacted Vascular Surgery coordinator, Alba, who stated she would take a look at the schedules and give the pt a call back today or tomorrow with a new appt. Went to relay this information to the pt who had ended the call. Attempted to call the pt back but was unable to get in touch with him. Please inform the pt of this information if he calls back.     Call back#: [phone]

## 2024-09-17 NOTE — OP NOTE
DATE OF PROCEDURE: 09/17/24    PERFORMING SERVICE: Vascular and Endovascular Surgery    ATTENDING SURGEON: Isaias Nieto MD    PREOPERATIVE DIAGNOSIS: ESRD     POSTOPERATIVE DIAGNOSIS: Same    PROCEDURE NAME:   Left radiocephalic arteriovenous fistula placement    ANESTHESIA: Regional with sedation    EBL: 10 cc    UOP:  0 cc    IVF:  200 cc     SPECIMENS: None     COMPLICATIONS: None    DRAINS: None    INDICATION:  74 year old male with CKD5 and impending dialysis in need of access with vein mapping completed who underwent informed consent for AV access.   Due to extenuating circumstances an emergent case I offered to assess the patient for AV access while my partner was involved in that case.  I started from scratch with Mr. Mustafa in the preop area.  We discussed dialysis access ease to include fistulas and graft.  I discussed how I would reassess his veins in the operating room.  He confirmed that he has not yet been dialyzed but it is not pending.  As we move toward the operating room we discussed my findings from vein mapping him at bedside.  At this point he did have sedation but I did reiterate to him the options to include a left upper extremity graft for the upper arm cephalic and basilic veins were inadequate.  The upper arm basilic vein was found to re-enter the deep system at mid humerus and was not a candidate for transposition.  His cephalic vein at the wrist was measured to be 3mm consistently.  We decided to proceed with radiocephalic fistula.      INTRAOPERATIVE FINDINGS: Adequate cephalic vein at the wrist measured at 3mm.  Completion demonstrated adequate fistula with low resistance flow.    ASSISTING SURGEON: Dr. Shilpa Stafford MD    DESCRIPTION OF PROCEDURE:   The patient was transported to the operative suite where a timeout was performed confirming the patient, procedure and laterality.  Preoperative antibiotics were administered.  The patient was prepped and draped in usual  sterile surgical fashion.  A second timeout was again performed.  Anesthesia was initiated.      An incision was made over the lateral anterior left wrist.  Blunt dissection and electrocautery allowed for isolation of the cephalic vein.  We adjusted the field to dissect and control the radial artery.  The artery was loosely controlled.  The vein was marked, transected and the distal vein was ligated.  The patient was administered IV heparin.  An arteriotomy was made with an 11 blade scalpel and extended with Pott's scissors.  The anastomosis was completed with 6-0 prolene suture.  The anastomosis was flushed prior to completion of the knots.  The distal vasculature was assessed to be intact.  Hemostasis was obtained in the operative field.  The subcutaneous tissues were reapproximated with monocryl suture.  The skin was closed with 4-0 monocryl suture and sealed with dermabond skin glue.      All counts were correct at the end of the case. The patient tolerated the procedure well, awoke from anesthesia uneventfully and was transported to the PACU in good condition.    Isaias Nieto MD  09/17/24  1:12 PM

## 2024-09-17 NOTE — ANESTHESIA POSTPROCEDURE EVALUATION
Post-Op Assessment Note    CV Status:  Stable  Pain Score: 0    Pain management: adequate       Mental Status:  Alert and awake   Hydration Status:  Euvolemic   PONV Controlled:  Controlled   Airway Patency:  Patent  Two or more mitigation strategies used for obstructive sleep apnea   Post Op Vitals Reviewed: Yes    No anethesia notable event occurred.    Staff: HEAVEN               /71 (09/17/24 1329)    Temp      Pulse 72 (09/17/24 1329)   Resp (!) 9 (09/17/24 1329)    SpO2 99 % (09/17/24 1329)

## 2024-09-17 NOTE — QUICK NOTE
I met the patient in the preoperative area to discuss the plan for transition of surgeons from Dr. Hudson to me.  The patient was in full agreement with proceeding with my procedure for his left upper extremity access.  We reviewed this from the very basic level of introducing dialysis access ease.  We talked about vein mapping we talked about his current veins.  We talked about the possibility of AV graft versus fistula.  He agreed to proceed and I repeated a fresh consent process.  Will proceed to the operating room as dictated.

## 2024-09-17 NOTE — ANESTHESIA PROCEDURE NOTES
Peripheral Block    Patient location during procedure: holding area  Start time: 9/17/2024 11:48 AM  Reason for block: at surgeon's request and post-op pain management  Staffing  Performed by: Nelson Mello MD  Authorized by: Nelson Mello MD    Preanesthetic Checklist  Completed: patient identified, IV checked, site marked, risks and benefits discussed, surgical consent, monitors and equipment checked, pre-op evaluation and timeout performed  Peripheral Block  Prep: ChloraPrep  Patient monitoring: frequent blood pressure checks, continuous pulse oximetry and heart rate  Block type: Supraclavicular  Laterality: left  Injection technique: single-shot  Procedures: ultrasound guided, Ultrasound guidance required for the procedure to increase accuracy and safety of medication placement and decrease risk of complications.  Ultrasound permanent image saved    Needle  Needle type: Stimuplex   Needle gauge: 17 G  Needle length: 4 in  Needle localization: anatomical landmarks and ultrasound guidance  Assessment  Injection assessment: incremental injection, frequent aspiration, injected with ease, negative aspiration, negative for heart rate change, no paresthesia on injection, no symptoms of intraneural/intravenous injection and needle tip visualized at all times  Paresthesia pain: none  Post-procedure:  site cleaned  patient tolerated the procedure well with no immediate complications

## 2024-09-19 ENCOUNTER — OFFICE VISIT (OUTPATIENT)
Dept: NEPHROLOGY | Facility: CLINIC | Age: 74
End: 2024-09-19
Payer: COMMERCIAL

## 2024-09-19 ENCOUNTER — TELEPHONE (OUTPATIENT)
Dept: VASCULAR SURGERY | Facility: CLINIC | Age: 74
End: 2024-09-19

## 2024-09-19 ENCOUNTER — RA CDI HCC (OUTPATIENT)
Dept: OTHER | Facility: HOSPITAL | Age: 74
End: 2024-09-19

## 2024-09-19 VITALS — BODY MASS INDEX: 21.82 KG/M2 | HEIGHT: 68 IN | WEIGHT: 144 LBS

## 2024-09-19 DIAGNOSIS — I12.0 BENIGN HYPERTENSION WITH CHRONIC KIDNEY DISEASE, STAGE V (HCC): Primary | ICD-10-CM

## 2024-09-19 DIAGNOSIS — E87.5 HYPERKALEMIA: ICD-10-CM

## 2024-09-19 DIAGNOSIS — E83.9 CHRONIC KIDNEY DISEASE-MINERAL BONE DISORDER (CKD-MBD) WITH STAGE 5 CHRONIC KIDNEY DISEASE, NOT ON CHRONIC DIALYSIS (HCC): ICD-10-CM

## 2024-09-19 DIAGNOSIS — N05.1 FSGS (FOCAL SEGMENTAL GLOMERULOSCLEROSIS): ICD-10-CM

## 2024-09-19 DIAGNOSIS — N18.5 CKD STAGE G5/A3, GFR <15 AND ALBUMIN CREATININE RATIO >300 MG/G (HCC): ICD-10-CM

## 2024-09-19 DIAGNOSIS — M89.9 CHRONIC KIDNEY DISEASE-MINERAL BONE DISORDER (CKD-MBD) WITH STAGE 5 CHRONIC KIDNEY DISEASE, NOT ON CHRONIC DIALYSIS (HCC): ICD-10-CM

## 2024-09-19 DIAGNOSIS — N18.5 CHRONIC KIDNEY DISEASE-MINERAL BONE DISORDER (CKD-MBD) WITH STAGE 5 CHRONIC KIDNEY DISEASE, NOT ON CHRONIC DIALYSIS (HCC): ICD-10-CM

## 2024-09-19 DIAGNOSIS — N18.5 BENIGN HYPERTENSION WITH CHRONIC KIDNEY DISEASE, STAGE V (HCC): Primary | ICD-10-CM

## 2024-09-19 DIAGNOSIS — N25.81 SECONDARY HYPERPARATHYROIDISM OF RENAL ORIGIN (HCC): ICD-10-CM

## 2024-09-19 DIAGNOSIS — R80.9 NEPHROTIC RANGE PROTEINURIA: ICD-10-CM

## 2024-09-19 DIAGNOSIS — I50.22 CHRONIC SYSTOLIC HEART FAILURE (HCC): ICD-10-CM

## 2024-09-19 DIAGNOSIS — E87.20 METABOLIC ACIDOSIS: ICD-10-CM

## 2024-09-19 PROCEDURE — 99214 OFFICE O/P EST MOD 30 MIN: CPT | Performed by: STUDENT IN AN ORGANIZED HEALTH CARE EDUCATION/TRAINING PROGRAM

## 2024-09-19 NOTE — PATIENT INSTRUCTIONS
Thank you for coming to your visit today. As we discussed there is no urgent indication for dialysis but we need to monitor your kidney function closely     Recommend low sodium (salt) food    Avoid nonsteroidal anti-inflammatory drugs such as Naprosyn, ibuprofen, Aleve, Advil, Celebrex, Meloxicam (Mobic) etc.  You can use Tylenol as needed if you do not have any liver condition to be concerned about    Try to exercise at least 30 minutes 3 days a week to begin with with an ultimate goal of 5 days a week for at least 30 minutes    Recommend labs every 4 weeks      Next Visit in 1 month with results   If you need to see us earlier we can change the appointment for you      Joselyn Reyes Bahamonde, MD  Nephrology Attending

## 2024-09-19 NOTE — TELEPHONE ENCOUNTER
Vascular Nurse Navigator Post Op Phone Call    Post-Discharge phone call attempted to assess patient recovery after vascular surgery I left a message on answering machine. Will attempt to contact at later date.        Pt's chart was reviewed prior to call and leaving message.    Procedure: Left radiocephalic arteriovenous fistula placement    Date of Procedure: 9/17/24    Surgeon: MD Dr. Shilpa Laguerre MD         Anticoagulation pt was discharged on post op?: Aspirin and Warfarin (Coumadin or Jantoven)    Statin pt was discharged on post op?:  Lipitor (atorvastatin)    Dialysis Days and Location: Not on dialysis    Reminded pt of the following in message:    NEXT SCHEDULED OFFICE VISIT:  10/3/24 at 8:30 am with Dr. Hudson with [facility]    To contact [facility] with any concerns.

## 2024-09-19 NOTE — PROGRESS NOTES
Ambulatory Visit  Name: Myra Mustafa      : 1950      MRN: [id number]  Encounter Provider: Joselyn Reyes Bahamonde, MD  Encounter Date: 2024   Encounter department: [facility] [place]    Assessment & Plan  Benign hypertension with chronic kidney disease, stage V (HCC)  Lab Results   Component Value Date    EGFR 9 2024    EGFR 10 2024    EGFR 11 2024    CREATININE 5.32 (H) 2024    CREATININE 5.06 (H) 2024    CREATININE 4.72 (H) 2024   Continue with  Amlodipine 10 mg daily  Metoprolol 50 mg daily  Off hydralazine   Isodril  Continue with torsemide 20 mg    Advised to maintain a good BP control to prevent progression of CKD          Chronic systolic heart failure (HCC)  Wt Readings from Last 3 Encounters:   24 65.3 kg (144 lb)   24 65.3 kg (144 lb)   24 65.3 kg (144 lb)   Euvolemic  Continue with torsemide 20 mg daily  Recommend low-sodium diet                 CKD stage G5/A3, GFR <15 and albumin creatinine ratio >300 mg/g (HCC)  Lab Results   Component Value Date    EGFR 9 2024    EGFR 10 2024    EGFR 11 2024    CREATININE 5.32 (H) 2024    CREATININE 5.06 (H) 2024    CREATININE 4.72 (H) 2024   Report fatigue as only uremic symptom  Patient initially wanted to do PD but now he wants to do HD   AV fistula done in September, needs to be mature   Referral to diabetes done   Avoid nonsteroidal anti-inflammatory drugs such as Naprosyn, ibuprofen, Aleve, Advil, Celebrex, Meloxicam (Mobic) etc.  You can use Tylenol as needed if you do not have any liver condition to be concerned about  Referred to transplant  Plan to start dialysis once AV fistula is matured  Will check kidney function every 4 weeks    Orders:    Basic metabolic panel; Standing    Phosphorus; Future    PTH, intact; Future    FSGS (focal segmental glomerulosclerosis)  With progression of CKD G5  Genetic Testing:  Not done, possible  APOL1  UPCr fluctuates,  4.67 g/g  Treatment  Unable to receive ACE inhibitor's or ARB due to hyperkalemia   eGFR borderline low for SGLT2 inhibitors  Patient needs to start dialysis as soon as AV fistula is mature       Hyperkalemia  Recommend low-sodium diet  Continue with Lokelma every other day until we can start dialysis  Will monitor BMP closely       Nephrotic range proteinuria  UPCR 4.6 g secondary to FSGS  Unable to start ACE inhibitors or ARB due to hyperkalemia  GFR low at 4 SGLT2  Patient will require dialysis soon and when potassium is better controlled we can start ACE inhibitors       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 male  with PMH DM, FSGS (biopsy proven 2018), CKD G4A3 (bl creat 3-3.5mg/dL, eGFR 21-24).  Patient is here for follow-up of FSGS     History obtained from : patient  Review of Systems   Constitutional:  Negative for activity change and appetite change.   HENT:  Negative for congestion and dental problem.    Eyes:  Negative for discharge.   Respiratory:  Negative for shortness of breath.    Cardiovascular:  Negative for chest pain.   Gastrointestinal:  Negative for abdominal distention.   Endocrine: Negative for cold intolerance.   Genitourinary:  Negative for dysuria.   Musculoskeletal:  Negative for arthralgias.   Skin:  Negative for color change and pallor.    Neurological:  Negative for dizziness.   Psychiatric/Behavioral:  Negative for agitation.      Pertinent Medical History   72 yo man with PMH DM, FSGS (biopsy proven 2018), CKD G4A3 (bl creat 3-3.5mg/dL, eGFR 21-24).  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   Diffuse diabetic glomerulosclerosis (mild).   25% IFTA  Arterio-arteriolosclerosis with hyalinosis, moderate-severe   Etiology: Likely secondary to obesity (at that time)  Lost ~15lbs (asper patient, previous notes mentioned 35lbs) after diagnosis (posible cause of FSGS)  Genetic Testing:  Not done, possible APOL1  UPCr fluctuates,  4.67 g/g  Unable to receive ACE inhibitor's or ARB due to hyperkalemia   eGFR borderline low for SGLT2 inhibitors        #Immunosuppressive Medications  No IS indicated at this time     # Nephrotic proteinuria  UPCR 4.67 g/g  Secondary to FSGS with secondary fissures  Unfortunately patient has not been able to receive ACE inhibitors or ARB in the settings of tendency to hyperkalemia GFR low for SGLT2      #CKD G5A3 progression of CKD     Baseline creatinine:3-3.5mg/dL  Current creatinine: 4.72 progression to ESRD   Etiology:  Secondary to FSGS and Diabetic glomerulopathy as above  UPCr: 4.67 g/ g        # Hyperkalemia  Potassium of 6 on June 1  Current potassium 4.4  S/p [facility] labs      #Volume/Hypertension:  Volume: euvolemic   Blood pressure: Normotensive, /80mmhg, goal less than 140/90    Low sodium diet           #Acid base disorder  Serum bicarb 25mmol/L     #CKD-MBD  At goal     #Secondary Hyperparathyroidism   iPTH 122.9, guidelines levels KDIGO 2017     #Acute on chronic anemia:  Current hemoglobin 11.6mg   at goal         #MVR, AVR  On coumadin   Follow-up with cardiology      #CHF  Euvoelmic      #Weight loss  Poor appetite, eating small portions  Could be uremic symptoms related  No urgent indication  of dialysis at this time     Medical History Reviewed by provider this encounter:       Current Outpatient Medications on File Prior to Visit   Medication Sig Dispense Refill    ALPRAZolam (XANAX) 0.25 mg tablet Take prior to flight 4 tablet 0    amLODIPine (NORVASC) 10 mg tablet Take 1 tablet (10 mg total) by mouth daily 90 tablet 1    amoxicillin (AMOXIL) 500 MG tablet FOR DENTAL VISITS (Patient not taking: Reported on 7/25/2024)      ascorbic acid (VITAMIN C) 500 mg tablet Take 500 mg by mouth daily      Aspirin Low Dose 81 MG EC tablet TAKE 1 TABLET BY MOUTH EVERY DAY 90 tablet 1    atorvastatin (LIPITOR) 20 mg tablet TAKE 1 TABLET BY MOUTH EVERY DAY 90 tablet 1    calcitriol (ROCALTROL) 0.25 mcg capsule Take 1 capsule (0.25 mcg total) by mouth daily TAKE 1 CAPSULE 5 DAYS A WEEK AS DIRECTED 90 capsule 3    cetirizine (ZyrTEC) 10 mg tablet Take 1 tablet (10 mg total) by mouth daily 30 tablet 1    COMBIGAN 0.2-0.5 %   0    Continuous Blood Gluc  (FreeStyle Alisson Arco) CHERELLE Use 1 Units continuous (Patient not taking: Reported on 7/25/2024) 1 each 0    Continuous Blood Gluc Sensor (FreeStyle Alisson 3 Sensor) MISC Use 1 Units every 14 (fourteen) days (Patient not taking: Reported on 7/25/2024) 2 each 5    Cosopt PF 2-0.5 % SOLN instill 1 drop in both eyes twice daily      isosorbide dinitrate (ISORDIL) 10 mg tablet Take 1 tablet (10 mg total) by mouth 3 (three) times a day 270 tablet 3    metoprolol succinate (TOPROL-XL) 50 mg 24 hr tablet Take 1 tablet (50 mg total) by mouth daily 90 tablet 3    Nutritional Supplements (Nepro) LIQD Take 237 mL by mouth in the morning (Patient taking differently: Take 237 mL by mouth 2 (two) times a week Takes time to time) 5688 mL 3    sodium bicarbonate 650 mg tablet TAKE 1 TABLET(650 MG) BY MOUTH IN THE MORNING 90 tablet 1    Sodium Zirconium  Cyclosilicate (Lokelma) 10 g TAKE 1 PACKET BY MOUTH EVERY OTHER DAY 20 each 2    torsemide (DEMADEX) 20 mg tablet TAKE 1 TABLET(20 MG) BY MOUTH DAILY 30 tablet 5    TRAVATAN Z 0.004 % ophthalmic solution Administer 1 drop to both eyes daily at bedtime   0    warfarin (COUMADIN) 2 mg tablet TAKE 1 TO 2 TABLETS DAILY BY MOUTH OR AS DIRECTED BY PHYSICIAN. 60 tablet 11     No current facility-administered medications on file prior to visit.      Social History     Tobacco Use    Smoking status: Former     Current packs/day: 0.00     Types: Cigarettes     Start date:      Quit date:      Years since quittin.7    Smokeless tobacco: Never   Vaping Use    Vaping status: Never Used   Substance and Sexual Activity    Alcohol use: Yes     Comment: occasionally    Drug use: No    Sexual activity: Yes     Partners: Female     Birth control/protection: None         Objective     There were no vitals taken for this visit.    Physical Exam  General:  no acute distress at this time  Skin:  No acute rash  Eyes:  No scleral icterus and noninjected  ENT:  mucous membranes moist  Neck:  no carotid bruits  Chest:  Clear to auscultation percussion, good respiratory effort, no use of accessory respiratory muscles  CVS:  Regular rate and rhythm without rub   Abdomen:  soft and nontender   Extremities: no significant lower extremity edema  Neuro:  No gross focality  Psych:  Alert , cooperative   Vascular access: New left AV fistula with good bruit and thrill

## 2024-09-23 ENCOUNTER — DOCUMENTATION (OUTPATIENT)
Dept: NEPHROLOGY | Facility: CLINIC | Age: 74
End: 2024-09-23

## 2024-09-23 ENCOUNTER — TELEPHONE (OUTPATIENT)
Dept: NEPHROLOGY | Facility: CLINIC | Age: 74
End: 2024-09-23

## 2024-09-23 NOTE — ASSESSMENT & PLAN NOTE
Decrease sodium bicarbonate to 650 mg once a day  Plan to discontinue once patient starts dialysis

## 2024-09-23 NOTE — ASSESSMENT & PLAN NOTE
Lab Results   Component Value Date    EGFR 9 09/12/2024    EGFR 10 07/24/2024    EGFR 11 06/13/2024    CREATININE 5.32 (H) 09/12/2024    CREATININE 5.06 (H) 07/24/2024    CREATININE 4.72 (H) 06/13/2024   Report fatigue as only uremic symptom  Patient initially wanted to do PD but now he wants to do HD   AV fistula done in September, needs to be mature   Referral to diabetes done   Avoid nonsteroidal anti-inflammatory drugs such as Naprosyn, ibuprofen, Aleve, Advil, Celebrex, Meloxicam (Mobic) etc.  You can use Tylenol as needed if you do not have any liver condition to be concerned about  Referred to transplant  Plan to start dialysis once AV fistula is matured  Will check kidney function every 4 weeks    Orders:    Basic metabolic panel; Standing    Phosphorus; Future    PTH, intact; Future

## 2024-09-23 NOTE — ASSESSMENT & PLAN NOTE
With progression of CKD G5  Genetic Testing:  Not done, possible APOL1  UPCr fluctuates,  4.67 g/g  Treatment  Unable to receive ACE inhibitor's or ARB due to hyperkalemia   eGFR borderline low for SGLT2 inhibitors  Patient needs to start dialysis as soon as AV fistula is mature

## 2024-09-23 NOTE — ASSESSMENT & PLAN NOTE
UPCR 4.6 g secondary to FSGS  Unable to start ACE inhibitors or ARB due to hyperkalemia  GFR low at 4 SGLT2  Patient will require dialysis soon and when potassium is better controlled we can start ACE inhibitors

## 2024-09-23 NOTE — ASSESSMENT & PLAN NOTE
Lab Results   Component Value Date    EGFR 9 09/12/2024    EGFR 10 07/24/2024    EGFR 11 06/13/2024    CREATININE 5.32 (H) 09/12/2024    CREATININE 5.06 (H) 07/24/2024    CREATININE 4.72 (H) 06/13/2024   Continue with  Amlodipine 10 mg daily  Metoprolol 50 mg daily  Off hydralazine   Isodril  Continue with torsemide 20 mg    Advised to maintain a good BP control to prevent progression of CKD

## 2024-09-23 NOTE — TELEPHONE ENCOUNTER
----- Message from Joselyn Reyes Bahamonde, MD sent at 9/23/2024  2:58 PM EDT -----  Of course.  I sent to his [facility]  Tania please make sure he received it    Thank you  ----- Message -----  From: Mckenzie Barrera MA  Sent: 9/20/2024   7:02 PM EDT  To: Joselyn Reyes Bahamonde, MD        Can you please write a very short note for Mr. Mustafa.  He needs it to see that his impending dialysis will not cause any issues with his kinship nursing home care of his grandchildren; it will  not cause any hardship.    (His daughter does live with he and his wife who helps take care of the children and he also has a nanny for four hours per day)    His son is to sign off parental rights and the children will be permanently in patient's care.      Again he said just a short paragraph.       (I am off next week.  Just when the letter is done, message Claudia and I will let her know about this)    Thanks, Dr. Reyes

## 2024-09-23 NOTE — TELEPHONE ENCOUNTER
Left a VM to patient letting him know that Dr. Reyes sent the letter through [facility]. Advised patient to please call our office to let us know he received the message and if have any other questions or concerns.

## 2024-09-23 NOTE — ASSESSMENT & PLAN NOTE
Lab Results   Component Value Date    EGFR 9 09/12/2024    EGFR 10 07/24/2024    EGFR 11 06/13/2024    CREATININE 5.32 (H) 09/12/2024    CREATININE 5.06 (H) 07/24/2024    CREATININE 4.72 (H) 06/13/2024

## 2024-09-23 NOTE — ASSESSMENT & PLAN NOTE
Discharge Summary - PMR   Jagdish Vega 69 y o  female MRN: [id number]  Unit/Bed#: [facility] 219-01 Encounter: [id number]    Admission Date: 7/6/2022     Discharge Date: 7/15/2022    Etiologic/Rehabilitation Diagnosis: Impairment of mobility, safety and Activities of Daily Living (ADLs) due to Impairment of mobility, safety and Activities of Daily Living (ADLs) due to Orthopedic Disorders:  08 4  Major Multiple Fractures  Etiologic: Closed transcervical fracture of right femur and Distal radial comminuted intra-articular fracture with ulnar styloid fracture  Date of Onset: 7/1   Date of surgery: 7/2/22    HPI: 69year old female with a PMH of hypertension, cervical radiculopathy, GERD, depression who presented with right hip pain   Patient reported she was walking her dog when she tripped, fell on right side on concrete porch  Ardeth Bongo revealed Nondisplaced transcervical fracture of the right femur and Distal radial comminuted intra-articular fracture with ulnar styloid fracture  Ortho consulted  Pt is s/p right total hip hemiarthroplasty and s/p closed reduction with splinting right wrist (Right) on 7/2  Pt is weight-bearing as tolerated right lower extremity with hip precautions   Patient should remain nonweightbearing of right wrist   May use platform for walker per Ortho    PT and OT have been consulted and are recommending post-acute rehab services    Patient's case has been reviewed with [facility] medical director, patient meets medical criteria for acute rehab and has demonstrated the ability to tolerate three or more hours of therapy per day    Procedures Performed During [facility] Admission: None    Acute Rehabilitation Center Course: Patient participated in a comprehensive interdisciplinary inpatient rehabilitation program which included involvment of MD, therapies (PT, OT, and/or SLP), RN, CM, SW, dietary, and psychology services   She was able to be advanced to a modified independent level of assist and considered safe Recommend low-sodium diet  Continue with Lokelma every other day until we can start dialysis  Will monitor BMP closely        for discharge home  Please see below for patient's day to day management of rehabilitation needs  Please refer to Internal Medicine notes during [facility] stay for day to day management of patient's medical co-morbidities  No new Assessment & Plan notes have been filed under this hospital service since the last note was generated  Service: PMR      Discharge Physical Examination:alert, no apparent distress, cooperative and comfortable  HEENT:  Head: Normocephalic, no lesions, without obvious abnormality  Eye: Normal external eye, conjunctiva, lidsc cornea  Ears: Normal external ears  Nose: Normal external nose, mucus membranes  CARDIAC:  regular rate and rhythm, S1, S2 normal, no murmur, click, rub or gallop  LUNGS:  no abnormal respiratory pattern, no retractions noted, non-labored breathing   ABDOMEN:  soft, non-tender, non-distended  EXTREMITIES:  extremities normal, warm and well-perfused; no cyanosis, clubbing, or edema  NEURO:  clear speech, following all commands, oriented There are no focal neurological deficits  PSYCH:  Alert and oriented, appropriate affect  INCISION:  C/D/I    Significant Findings, Care, Treatment and Services Provided: Acute comprehensive interdisciplinary inpatient rehabilitation including PT, OT, SLP, RN, CM, SW, dietary, psychology, etc     Complications: none    Functional Status Upon Admission to [facility]:modified assistance     Functional Status Upon Discharge from [facility]:  Modified independent    Discharge Diagnosis: Impairment of mobility, safety and Activities of Daily Living (ADLs) due to Orthopedic Disorders:  08 4  Major Multiple Fractures  Etiologic: Closed transcervical fracture of right femur and Distal radial comminuted intra-articular fracture with ulnar styloid fracture  Date of Onset: 7/1   Date of surgery: 7/2/22    Discharge Medications:   See after visit summary for reconciled discharge medications provided to patient and family        Condition at Discharge: good Discharge instructions/Information to patient and family:   See after visit summary for information provided to patient and family  Provisions for Follow-Up Care:  See after visit summary for information related to follow-up care and any pertinent home health orders  Future Appointments   Date Time Provider Dany Church   7/29/2022  8:45 AM Suzanne Mena DO [facility]   11/14/2022  9:15 AM MD KENNETH [facility]   1/30/2023  8:15 AM Suzanne Mena DO [facility]       Disposition: Home    Planned Readmission: No    Discharge Statement   I spent 45 minutes discharging the patient  This time was spent on the day of discharge  I had direct contact with the patient on the day of discharge  Greater than 50% of the total time was spent examining patient, answering all patient questions, arranging and discussing plan of care with patient as well as directly providing post-discharge instructions  Additional time then spent on discharge activities  Discharge Medications:  See after visit summary for reconciled discharge medications provided to patient and family        Facility Administered Medications Prior to Discharge:    Current Facility-Administered Medications   Medication Dose Route Frequency Provider Last Rate    acetaminophen  650 mg Oral Q6H PRN Rosalee Zuleta PA-C      aluminum-magnesium hydroxide-simethicone  30 mL Oral Q6H PRN Rosalee Zuleta PA-C      amLODIPine  10 mg Oral Daily Rosalee Zuleta PA-C      ascorbic acid  500 mg Oral BID Rosalee Zuleta PA-C      docusate sodium  100 mg Oral BID Rosalee Zuleta PA-C      enoxaparin  40 mg Subcutaneous Daily Rosalee Zuleta PA-C      ferrous sulfate  325 mg Oral Daily With Breakfast Rosalee Zuleta PA-C      gabapentin  600 mg Oral HS Rosalee Zuleta PA-C      loratadine  10 mg Oral Daily Rosalee Zuleta PA-C      multivitamin-minerals  1 tablet Oral Daily Balaji Qiu Bob, REJI      ondansetron  4 mg Oral Q6H PRN Latrelle Sour, REJI      oxyCODONE  5 mg Oral Q6H PRN Latrelle Sour, REJI      pantoprazole  20 mg Oral BID AC Latrelle Sour, REJI      pravastatin  40 mg Oral Daily Latrelle Sour, REJI      senna  1 tablet Oral HS Latrelle Sour, REJI      temazepam  15 mg Oral HS PRN Latrelle Sour, REJI      venlafaxine  150 mg Oral Daily Latrelle Sour, REJI

## 2024-09-23 NOTE — ASSESSMENT & PLAN NOTE
Wt Readings from Last 3 Encounters:   09/19/24 65.3 kg (144 lb)   09/17/24 65.3 kg (144 lb)   09/11/24 65.3 kg (144 lb)   Euvolemic  Continue with torsemide 20 mg daily  Recommend low-sodium diet

## 2024-09-25 ENCOUNTER — OFFICE VISIT (OUTPATIENT)
Dept: FAMILY MEDICINE CLINIC | Facility: CLINIC | Age: 74
End: 2024-09-25
Payer: COMMERCIAL

## 2024-09-25 VITALS
BODY MASS INDEX: 21.98 KG/M2 | WEIGHT: 145 LBS | RESPIRATION RATE: 16 BRPM | HEART RATE: 48 BPM | SYSTOLIC BLOOD PRESSURE: 122 MMHG | HEIGHT: 68 IN | DIASTOLIC BLOOD PRESSURE: 62 MMHG | TEMPERATURE: 98 F | OXYGEN SATURATION: 98 %

## 2024-09-25 DIAGNOSIS — Z23 ENCOUNTER FOR IMMUNIZATION: ICD-10-CM

## 2024-09-25 DIAGNOSIS — E78.5 DYSLIPIDEMIA: ICD-10-CM

## 2024-09-25 DIAGNOSIS — I48.11 LONGSTANDING PERSISTENT ATRIAL FIBRILLATION (HCC): ICD-10-CM

## 2024-09-25 DIAGNOSIS — I25.10 CORONARY ARTERY DISEASE INVOLVING NATIVE CORONARY ARTERY OF NATIVE HEART WITHOUT ANGINA PECTORIS: Chronic | ICD-10-CM

## 2024-09-25 DIAGNOSIS — Z95.2 S/P AVR: ICD-10-CM

## 2024-09-25 DIAGNOSIS — N18.5 CKD STAGE G5/A3, GFR <15 AND ALBUMIN CREATININE RATIO >300 MG/G (HCC): Primary | ICD-10-CM

## 2024-09-25 DIAGNOSIS — I48.0 PAROXYSMAL ATRIAL FIBRILLATION (HCC): Primary | ICD-10-CM

## 2024-09-25 DIAGNOSIS — I10 ESSENTIAL HYPERTENSION: Chronic | ICD-10-CM

## 2024-09-25 PROBLEM — E11.22 TYPE 2 DIABETES MELLITUS WITH STAGE 4 CHRONIC KIDNEY DISEASE, WITHOUT LONG-TERM CURRENT USE OF INSULIN (HCC): Status: RESOLVED | Noted: 2023-11-07 | Resolved: 2024-09-25

## 2024-09-25 PROBLEM — N18.4 CKD (CHRONIC KIDNEY DISEASE), STAGE IV (HCC): Status: RESOLVED | Noted: 2019-12-15 | Resolved: 2024-09-25

## 2024-09-25 PROBLEM — Z20.822 CLOSE EXPOSURE TO COVID-19 VIRUS: Status: RESOLVED | Noted: 2021-01-04 | Resolved: 2024-09-25

## 2024-09-25 PROBLEM — L23.7 ALLERGIC DERMATITIS DUE TO POISON IVY: Status: RESOLVED | Noted: 2023-06-23 | Resolved: 2024-09-25

## 2024-09-25 PROBLEM — I49.9 ARRHYTHMIA: Status: ACTIVE | Noted: 2024-09-25

## 2024-09-25 PROBLEM — I49.9 ARRHYTHMIA: Status: RESOLVED | Noted: 2024-09-25 | Resolved: 2024-09-25

## 2024-09-25 PROBLEM — N18.4 TYPE 2 DIABETES MELLITUS WITH STAGE 4 CHRONIC KIDNEY DISEASE, WITHOUT LONG-TERM CURRENT USE OF INSULIN (HCC): Status: RESOLVED | Noted: 2023-11-07 | Resolved: 2024-09-25

## 2024-09-25 PROCEDURE — G0008 ADMIN INFLUENZA VIRUS VAC: HCPCS | Performed by: FAMILY MEDICINE

## 2024-09-25 PROCEDURE — 90662 IIV NO PRSV INCREASED AG IM: CPT | Performed by: FAMILY MEDICINE

## 2024-09-25 PROCEDURE — 99214 OFFICE O/P EST MOD 30 MIN: CPT | Performed by: FAMILY MEDICINE

## 2024-09-25 RX ORDER — WARFARIN SODIUM 2 MG/1
TABLET ORAL
Qty: 80 TABLET | Refills: 11 | Status: SHIPPED | OUTPATIENT
Start: 2024-09-25

## 2024-09-25 NOTE — TELEPHONE ENCOUNTER
Vascular Nurse Navigator Post Op Call    Procedure: Left radiocephalic arteriovenous fistula placement     Date of Procedure: 9/17/24     Surgeon: MD Dr. Shilpa Laguerre MD       Painful tingling or numbness in your fingers?: No    Paleness/Coolness in hands/fingers?: No    Redness, swelling or pus from your wound?: No    Bleeding?: No    Thrill present?: Yes    Anticoagulation pt was discharged on post op?: Aspirin and Warfarin (Coumadin or Jantoven)    Statin pt was discharged on post op?:  Lipitor (atorvastatin)    Fever/chills?: No    Uncontrolled Pain?: No      Reviewed discharge instructions and incision care with patient.      Dialysis Days and Location:  Not on dialysis     NEXT SCHEDULED OFFICE VISIT:  10/3/24 at 8:30 am with Dr. Hudson with [facility] [place] [place]     Transportation Confirmed?: Yes      Any Questions or Concerns?    Patient stated that he is doing good since discharge.  He stated that he saw his PCP this am and they said his incision looks good.  He stated that his hand was stiff, but has been doing hand exercises and this improved.  Reviewed incision care with him - wash daily with soap and water.  Reviewed discharge medications - Aspirin and Coumadin.  All questions answered.  No concerns expressed at this time.

## 2024-09-25 NOTE — ASSESSMENT & PLAN NOTE
Well controlled on current therapy continue with current medications and will reassess next visit

## 2024-09-25 NOTE — ASSESSMENT & PLAN NOTE
Lab Results   Component Value Date    EGFR 9 09/12/2024    EGFR 10 07/24/2024    EGFR 11 06/13/2024    CREATININE 5.32 (H) 09/12/2024    CREATININE 5.06 (H) 07/24/2024    CREATININE 4.72 (H) 06/13/2024   Reviewed recent nephrology note pt will start HD referred for transplant

## 2024-09-25 NOTE — TELEPHONE ENCOUNTER
Called patient to make sure he received the letter and patient stated that he received it but drop off it at office because the letter need to be signed by Dr. Reyes.

## 2024-09-25 NOTE — TELEPHONE ENCOUNTER
Refill must be reviewed and completed by the office or provider. The refill is unable to be approved or denied by the medication management team.    Please review to see if the refill is appropriate.

## 2024-09-28 DIAGNOSIS — I10 ESSENTIAL HYPERTENSION: Chronic | ICD-10-CM

## 2024-09-28 RX ORDER — METOPROLOL SUCCINATE 50 MG/1
50 TABLET, EXTENDED RELEASE ORAL DAILY
Qty: 90 TABLET | Refills: 1 | Status: SHIPPED | OUTPATIENT
Start: 2024-09-28

## 2024-09-30 NOTE — TELEPHONE ENCOUNTER
Patient calling to ask if his form is signed and ready for him to ? Called the [place] office and they state the form is not ready. Patient is requesting this be ready tomorrow morning as he has to drop it off by 4pm tomorrow.  Please advise

## 2024-10-03 ENCOUNTER — OFFICE VISIT (OUTPATIENT)
Dept: VASCULAR SURGERY | Facility: CLINIC | Age: 74
End: 2024-10-03

## 2024-10-03 VITALS
SYSTOLIC BLOOD PRESSURE: 106 MMHG | BODY MASS INDEX: 22.13 KG/M2 | WEIGHT: 146 LBS | HEART RATE: 71 BPM | OXYGEN SATURATION: 100 % | DIASTOLIC BLOOD PRESSURE: 54 MMHG | HEIGHT: 68 IN

## 2024-10-03 DIAGNOSIS — N18.5 CKD STAGE G5/A3, GFR <15 AND ALBUMIN CREATININE RATIO >300 MG/G (HCC): Primary | ICD-10-CM

## 2024-10-03 PROCEDURE — 99024 POSTOP FOLLOW-UP VISIT: CPT | Performed by: SURGERY

## 2024-10-03 NOTE — ASSESSMENT & PLAN NOTE
Lab Results   Component Value Date    EGFR 9 09/12/2024    EGFR 10 07/24/2024    EGFR 11 06/13/2024    CREATININE 5.32 (H) 09/12/2024    CREATININE 5.06 (H) 07/24/2024    CREATININE 4.72 (H) 06/13/2024       74-year-old male with a past medical history of CKD 5 not yet on dialysis now status post a left radiocephalic AV fistula performed on 9/17/24.    Patient's been doing well since procedure.  No numbness tingling or pain.  He has a palpable radial pulse distal to the AV fistula and a palpable thrill over the vein.    We will plan for a duplex in 4 weeks to evaluate for maturity and follow-up at that time to discuss results    Orders:     VAS DIALYSIS ACCESS EVALUATION- AVF (LOWER); Future

## 2024-10-03 NOTE — PROGRESS NOTES
Ambulatory Visit  Name: Myra Mustafa      : 1950      MRN: [id number]  Encounter Provider: Liborio Hudson MD  Encounter Date: 10/3/2024   [facility] [place]    Assessment & Plan  CKD stage G5/A3, GFR <15 and albumin creatinine ratio >300 mg/g (HCC)  Lab Results   Component Value Date    EGFR 9 2024    EGFR 10 2024    EGFR 11 2024    CREATININE 5.32 (H) 2024    CREATININE 5.06 (H) 2024    CREATININE 4.72 (H) 2024       74-year-old male with a past medical history of CKD 5 not yet on dialysis now status post a left radiocephalic AV fistula performed on 24.    Patient's been doing well since procedure.  No numbness tingling or pain.  He has a palpable radial pulse distal to the AV fistula and a palpable thrill over the vein.    We will plan for a duplex in 4 weeks to evaluate for maturity and follow-up at that time to discuss results    Orders:     VAS DIALYSIS ACCESS EVALUATION- AVF (LOWER); Future      History of Present Illness       Myra Mustafa is a 74 y.o. male      Patient is here today s/p creation of LUE AVF done 2024 by Dr. Nieto. Patient c/o some numbness at the bottom of his right thumb. He denies any pain, numbness or tingling in any other parts of his left arm, fingers or hand. Patient is not getting dialysis at this time. Patient is taking ASA 81 mg, Coumadin and Atorvastatin. Patient is a former smoker.     History obtained from : patient  Review of Systems   Constitutional: Negative.    HENT: Negative.     Eyes: Negative.    Respiratory: Negative.     Cardiovascular: Negative.    Gastrointestinal: Negative.    Endocrine: Negative.    Genitourinary: Negative.    Musculoskeletal: Negative.    Allergic/Immunologic: Negative.    Neurological:  Positive for numbness (left thumb).   Hematological: Negative.    Psychiatric/Behavioral: Negative.       Medical History Reviewed by provider this encounter:   "Tobacco  Allergies  Meds  Problems  Med Hx  Surg Hx  Fam Hx           Objective     /54 (BP Location: Right arm, Patient Position: Sitting, Cuff Size: Standard)   Pulse 71   Ht 5' 8\" (1.727 m)   Wt 66.2 kg (146 lb)   SpO2 100%   BMI 22.20 kg/m²     Physical Exam  Vitals and nursing note reviewed.   Constitutional:       Appearance: He is well-developed.   HENT:      Head: Normocephalic and atraumatic.   Eyes:      Conjunctiva/sclera: Conjunctivae normal.   Cardiovascular:      Rate and Rhythm: Normal rate and regular rhythm.      Comments: Palpable radial pulse distal to fistula  Palpable thrill  Pulmonary:      Effort: Pulmonary effort is normal.      Breath sounds: Normal breath sounds.   Abdominal:      Palpations: Abdomen is soft.      Tenderness: There is no abdominal tenderness.   Musculoskeletal:      Cervical back: Neck supple.   Skin:     General: Skin is warm and dry.      Capillary Refill: Capillary refill takes less than 2 seconds.   Neurological:      General: No focal deficit present.      Mental Status: He is alert and oriented to person, place, and time.   Psychiatric:         Mood and Affect: Mood normal.       Administrative Statements   I have spent a total time of 25 minutes in caring for this patient on the day of the visit/encounter including Prognosis, Risks and benefits of tx options, Instructions for management, Patient and family education, Importance of tx compliance, Risk factor reductions, Impressions, Counseling / Coordination of care, Documenting in the medical record, Reviewing / ordering tests, medicine, procedures  , and Obtaining or reviewing history  .  "

## 2024-10-07 DIAGNOSIS — F41.9 ANXIETY: ICD-10-CM

## 2024-10-08 RX ORDER — ALPRAZOLAM 0.25 MG
TABLET ORAL
Qty: 4 TABLET | Refills: 0 | Status: SHIPPED | OUTPATIENT
Start: 2024-10-08

## 2024-10-10 ENCOUNTER — TELEPHONE (OUTPATIENT)
Dept: CARDIOLOGY CLINIC | Facility: CLINIC | Age: 74
End: 2024-10-10

## 2024-10-10 NOTE — TELEPHONE ENCOUNTER
Called patient and left message      ----- Message from Tobin Conde MD sent at 10/7/2024 11:30 AM EDT -----  Please let patient know echo looks good.  Normal cardiac function with well functioning AVR/mitral valve repair.  Thanks.

## 2024-10-18 ENCOUNTER — ANTICOAG VISIT (OUTPATIENT)
Dept: CARDIOLOGY CLINIC | Facility: CLINIC | Age: 74
End: 2024-10-18

## 2024-10-18 ENCOUNTER — TELEPHONE (OUTPATIENT)
Dept: NEPHROLOGY | Facility: CLINIC | Age: 74
End: 2024-10-18

## 2024-10-18 ENCOUNTER — APPOINTMENT (OUTPATIENT)
Dept: LAB | Facility: CLINIC | Age: 74
End: 2024-10-18
Payer: COMMERCIAL

## 2024-10-18 DIAGNOSIS — N18.5 CKD STAGE G5/A3, GFR <15 AND ALBUMIN CREATININE RATIO >300 MG/G (HCC): ICD-10-CM

## 2024-10-18 LAB
ANION GAP SERPL CALCULATED.3IONS-SCNC: 6 MMOL/L (ref 4–13)
BUN SERPL-MCNC: 73 MG/DL (ref 5–25)
CALCIUM SERPL-MCNC: 8.8 MG/DL (ref 8.4–10.2)
CHLORIDE SERPL-SCNC: 112 MMOL/L (ref 96–108)
CO2 SERPL-SCNC: 23 MMOL/L (ref 21–32)
CREAT SERPL-MCNC: 5.41 MG/DL (ref 0.6–1.3)
GFR SERPL CREATININE-BSD FRML MDRD: 9 ML/MIN/1.73SQ M
GLUCOSE P FAST SERPL-MCNC: 94 MG/DL (ref 65–99)
PHOSPHATE SERPL-MCNC: 4.8 MG/DL (ref 2.3–4.1)
POTASSIUM SERPL-SCNC: 5.1 MMOL/L (ref 3.5–5.3)
PTH-INTACT SERPL-MCNC: 320.7 PG/ML (ref 12–88)
SODIUM SERPL-SCNC: 141 MMOL/L (ref 135–147)

## 2024-10-18 PROCEDURE — 84100 ASSAY OF PHOSPHORUS: CPT

## 2024-10-18 PROCEDURE — 80048 BASIC METABOLIC PNL TOTAL CA: CPT

## 2024-10-18 PROCEDURE — 83970 ASSAY OF PARATHORMONE: CPT

## 2024-10-18 NOTE — TELEPHONE ENCOUNTER
----- Message from Joselyn Reyes Bahamonde, MD sent at 10/18/2024  8:21 AM EDT -----  Please let pat know that his kidney function is stable and his potassium is normal.  Thank you

## 2024-10-21 NOTE — RESULT ENCOUNTER NOTE
PTH elevated.  To be reviewed and addressed at scheduled follow-up appointment in 2 weeks with Dr Reyes.

## 2024-11-01 DIAGNOSIS — N25.81 SECONDARY HYPERPARATHYROIDISM (HCC): Chronic | ICD-10-CM

## 2024-11-01 RX ORDER — CALCITRIOL 0.25 UG/1
CAPSULE, LIQUID FILLED ORAL
Qty: 90 CAPSULE | Refills: 1 | Status: SHIPPED | OUTPATIENT
Start: 2024-11-01

## 2024-11-05 DIAGNOSIS — N18.4 BENIGN HYPERTENSION WITH CHRONIC KIDNEY DISEASE, STAGE IV (HCC): ICD-10-CM

## 2024-11-05 DIAGNOSIS — I12.9 BENIGN HYPERTENSION WITH CHRONIC KIDNEY DISEASE, STAGE IV (HCC): ICD-10-CM

## 2024-11-05 RX ORDER — AMLODIPINE BESYLATE 10 MG/1
10 TABLET ORAL DAILY
Qty: 90 TABLET | Refills: 1 | Status: SHIPPED | OUTPATIENT
Start: 2024-11-05

## 2024-11-12 ENCOUNTER — ANTICOAG VISIT (OUTPATIENT)
Dept: CARDIOLOGY CLINIC | Facility: CLINIC | Age: 74
End: 2024-11-12

## 2024-11-12 ENCOUNTER — APPOINTMENT (OUTPATIENT)
Dept: LAB | Facility: CLINIC | Age: 74
End: 2024-11-12
Payer: COMMERCIAL

## 2024-11-12 ENCOUNTER — TELEPHONE (OUTPATIENT)
Dept: NEPHROLOGY | Facility: CLINIC | Age: 74
End: 2024-11-12

## 2024-11-12 DIAGNOSIS — N18.5 CKD STAGE G5/A3, GFR <15 AND ALBUMIN CREATININE RATIO >300 MG/G (HCC): ICD-10-CM

## 2024-11-12 LAB
ANION GAP SERPL CALCULATED.3IONS-SCNC: 9 MMOL/L (ref 4–13)
BUN SERPL-MCNC: 62 MG/DL (ref 5–25)
CALCIUM SERPL-MCNC: 8.5 MG/DL (ref 8.4–10.2)
CHLORIDE SERPL-SCNC: 108 MMOL/L (ref 96–108)
CO2 SERPL-SCNC: 23 MMOL/L (ref 21–32)
CREAT SERPL-MCNC: 5.2 MG/DL (ref 0.6–1.3)
GFR SERPL CREATININE-BSD FRML MDRD: 10 ML/MIN/1.73SQ M
GLUCOSE P FAST SERPL-MCNC: 97 MG/DL (ref 65–99)
POTASSIUM SERPL-SCNC: 4.4 MMOL/L (ref 3.5–5.3)
SODIUM SERPL-SCNC: 140 MMOL/L (ref 135–147)

## 2024-11-12 PROCEDURE — 80048 BASIC METABOLIC PNL TOTAL CA: CPT

## 2024-11-12 NOTE — TELEPHONE ENCOUNTER
----- Message from Joselyn Reyes Bahamonde, MD sent at 11/12/2024  8:15 AM EST -----  Please let patient know his potassium is normal. Kidney fucntion is stable and there is no urgent indication to initiate dialysis. We can discuss the labs on next tito

## 2024-11-14 ENCOUNTER — OFFICE VISIT (OUTPATIENT)
Dept: NEPHROLOGY | Facility: CLINIC | Age: 74
End: 2024-11-14
Payer: COMMERCIAL

## 2024-11-14 ENCOUNTER — TELEPHONE (OUTPATIENT)
Dept: NEPHROLOGY | Facility: CLINIC | Age: 74
End: 2024-11-14

## 2024-11-14 VITALS
WEIGHT: 144 LBS | DIASTOLIC BLOOD PRESSURE: 74 MMHG | HEIGHT: 68 IN | BODY MASS INDEX: 21.82 KG/M2 | SYSTOLIC BLOOD PRESSURE: 126 MMHG | HEART RATE: 66 BPM

## 2024-11-14 DIAGNOSIS — N18.5 CHRONIC KIDNEY DISEASE-MINERAL BONE DISORDER (CKD-MBD) WITH STAGE 5 CHRONIC KIDNEY DISEASE, NOT ON CHRONIC DIALYSIS (HCC): ICD-10-CM

## 2024-11-14 DIAGNOSIS — E87.20 METABOLIC ACIDOSIS: ICD-10-CM

## 2024-11-14 DIAGNOSIS — N05.1 FSGS (FOCAL SEGMENTAL GLOMERULOSCLEROSIS): ICD-10-CM

## 2024-11-14 DIAGNOSIS — N25.81 SECONDARY HYPERPARATHYROIDISM OF RENAL ORIGIN (HCC): ICD-10-CM

## 2024-11-14 DIAGNOSIS — I12.0 BENIGN HYPERTENSION WITH CHRONIC KIDNEY DISEASE, STAGE V (HCC): Primary | ICD-10-CM

## 2024-11-14 DIAGNOSIS — N18.5 CKD STAGE G5/A3, GFR <15 AND ALBUMIN CREATININE RATIO >300 MG/G (HCC): ICD-10-CM

## 2024-11-14 DIAGNOSIS — E83.9 CHRONIC KIDNEY DISEASE-MINERAL BONE DISORDER (CKD-MBD) WITH STAGE 5 CHRONIC KIDNEY DISEASE, NOT ON CHRONIC DIALYSIS (HCC): ICD-10-CM

## 2024-11-14 DIAGNOSIS — R80.9 NEPHROTIC RANGE PROTEINURIA: ICD-10-CM

## 2024-11-14 DIAGNOSIS — I50.22 CHRONIC SYSTOLIC HEART FAILURE (HCC): ICD-10-CM

## 2024-11-14 DIAGNOSIS — M89.9 CHRONIC KIDNEY DISEASE-MINERAL BONE DISORDER (CKD-MBD) WITH STAGE 5 CHRONIC KIDNEY DISEASE, NOT ON CHRONIC DIALYSIS (HCC): ICD-10-CM

## 2024-11-14 DIAGNOSIS — N25.81 SECONDARY HYPERPARATHYROIDISM (HCC): Chronic | ICD-10-CM

## 2024-11-14 DIAGNOSIS — E87.5 HYPERKALEMIA: ICD-10-CM

## 2024-11-14 DIAGNOSIS — N18.5 BENIGN HYPERTENSION WITH CHRONIC KIDNEY DISEASE, STAGE V (HCC): Primary | ICD-10-CM

## 2024-11-14 PROCEDURE — 99214 OFFICE O/P EST MOD 30 MIN: CPT | Performed by: STUDENT IN AN ORGANIZED HEALTH CARE EDUCATION/TRAINING PROGRAM

## 2024-11-14 RX ORDER — CALCITRIOL 0.25 UG/1
0.5 CAPSULE, LIQUID FILLED ORAL DAILY
Qty: 90 CAPSULE | Refills: 1 | Status: CANCELLED | OUTPATIENT
Start: 2024-11-14

## 2024-11-14 NOTE — TELEPHONE ENCOUNTER
Message left for pt asking to call and confirm appointment scheduled for 11/27 @ 11:30 [place] office, with Dr. Anderson.

## 2024-11-14 NOTE — ASSESSMENT & PLAN NOTE
Wt Readings from Last 3 Encounters:   10/03/24 66.2 kg (146 lb)   09/25/24 65.8 kg (145 lb)   09/19/24 65.3 kg (144 lb)   Euvolemic on exam  Continue with low-sodium diet   Continue torsemide 20

## 2024-11-14 NOTE — PATIENT INSTRUCTIONS
Thank you for coming to your visit today. As we discussed there is no urgent indication for dialysis at this time. We can wait until your AVF is matured to initiate dialysis. Your electrolytes are normal. Please follow the recommendations below       Recommend low sodium (salt) food    Avoid nonsteroidal anti-inflammatory drugs such as Naprosyn, ibuprofen, Aleve, Advil, Celebrex, Meloxicam (Mobic) etc.  You can use Tylenol as needed if you do not have any liver condition to be concerned about    Try to avoid medications such as pantoprazole or  Protonix/Nexium or Esomeprazole)/Prilosec or omeprazole/Prevacid or lansoprazole/AcipHex or Rabeprazole.  If you are able to, use Pepcid as this is safer for your kidneys.    Try to exercise at least 30 minutes 3 days a week to begin with with an ultimate goal of 5 days a week for at least 30 minutes  Decrease Lokelma to 3 times a weeks   Increase Calcitriol to 0.25mcg to every day       Next Visit in 4 weeks  with results   If you need to see us earlier we can change the appointment for you      Joselyn Reyes Bahamonde, MD  Nephrology Attending

## 2024-11-14 NOTE — ASSESSMENT & PLAN NOTE
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

## 2024-11-14 NOTE — TELEPHONE ENCOUNTER
Patient calling back to inform us he has an appt on 11/27 at 12 pm with the vascular team. That time will not work. Please advise and call patient.

## 2024-11-14 NOTE — ASSESSMENT & PLAN NOTE
With progression of CKD G5  Genetic Testing:  Not done, possible APOL1  UPCR: 4.67 g/g   Treatment  Unable to take ACE inhibitor's or ARB due to hyperkalemia   eGFR borderline low for SGLT2 inhibitors  Patient needs to start dialysis as soon as AV fistula is mature

## 2024-11-14 NOTE — ASSESSMENT & PLAN NOTE
Lab Results   Component Value Date    EGFR 10 11/12/2024    EGFR 9 10/18/2024    EGFR 9 09/12/2024    CREATININE 5.20 (H) 11/12/2024    CREATININE 5.41 (H) 10/18/2024    CREATININE 5.32 (H) 09/12/2024   Blood pressure normotensive, /74  Recommend   low-sodium diet   Continue with BP regimen :  Amlodipine 10 mg daily   Metoprolol 50 mg daily   Torsemide 20 mg   Isodril   Advised to maintain a good BP control to prevent progression of CKD

## 2024-11-14 NOTE — PROGRESS NOTES
Name: Myra Mustafa      : 1950      MRN: [id number]  Encounter Provider: Joselyn Reyes Bahamonde, MD  Encounter Date: 2024   Encounter department: [facility]  :  Assessment & Plan  Benign hypertension with chronic kidney disease, stage V (HCC)  Lab Results   Component Value Date    EGFR 10 2024    EGFR 9 10/18/2024    EGFR 9 2024    CREATININE 5.20 (H) 2024    CREATININE 5.41 (H) 10/18/2024    CREATININE 5.32 (H) 2024   Blood pressure normotensive, /74  Recommend   low-sodium diet   Continue with BP regimen :  Amlodipine 10 mg daily   Metoprolol 50 mg daily   Torsemide 20 mg   Isodril   Advised to maintain a good BP control to prevent progression of CKD             CKD stage G5/A3, GFR <15 and albumin creatinine ratio >300 mg/g (HCC)  Lab Results   Component Value Date    EGFR 10 2024    EGFR 9 10/18/2024    EGFR 9 2024    CREATININE 5.20 (H) 2024    CREATININE 5.41 (H) 10/18/2024    CREATININE 5.32 (H) 2024     Baseline creatinine 5.2 to 5.5 mg/dL with progression of CKD to ESRD   Current creatinine 5.2 mg/dL, stable   Patient feels well, no shortness of breath, no chest pain, no nausea, no uremic symptoms at this time   Patient initially wanted to do PD but change his mind.  Prepared to start HD when needed   AV fistula done in September, pending ultrasound at the end of this month  Referred to [facility] NSAIDs  I discussed with patient next steps after AV fistula is cleared to be used.  The plan is to wait until he presents with symptoms  Discussed with Dr. Anderson, he will see him in the office again       FSGS (focal segmental glomerulosclerosis)  With progression of CKD G5  Genetic Testing:  Not done, possible APOL1  UPCR: 4.67 g/g   Treatment  Unable to take ACE inhibitor's or ARB due to hyperkalemia   eGFR borderline low for SGLT2 inhibitors  Patient needs to start dialysis as soon as AV fistula is mature        Chronic systolic heart failure (HCC)  Wt Readings from Last 3 Encounters:   10/03/24 66.2 kg (146 lb)   09/25/24 65.8 kg (145 lb)   09/19/24 65.3 kg (144 lb)   Euvolemic on exam  Continue with low-sodium diet   Continue torsemide 20       Nephrotic range proteinuria  UPCR   4.7 g/g secondary to secondary FSGS   Not on ACE inhibitors or ARB due to hyperkalemia   GFR low for SGLT2 inhibitors     Hyperkalemia  Well-controlled with [facility]      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, not on chronic dialysis ([facility])  Lab Results   Component Value Date    EGFR 10 11/12/2024    EGFR 9 10/18/2024    EGFR 9 09/12/2024    CREATININE 5.20 (H) 11/12/2024    CREATININE 5.41 (H) 10/18/2024    CREATININE 5.32 (H) 09/12/2024   Serum calcium 8.5 mg/dL  Phosphorus 4.8 mg/dL, goal less than 5.5  No indication of binders at this time         Secondary hyperparathyroidism of renal origin ([facility])  iPTH  320.7 guidelines levels KDIGO 2017  Increase calcitriol to 0.5 mcg          Metabolic acidosis  Decrease sodium bicarbonate to 650 mg once a day  Plan to discontinue once patient starts dialysis           History of Present Illness   HPI  Myra Mustafa is a 74 y.o. male PMH DM, FSGS (biopsy proven 2018), CKD G4A3 (bl creat 3-3.5mg/dL, eGFR 21-24).  Patient is here for follow-up of FSGS     Patient feels well, no shortness of breath, no chest pain.  Eating and drinking well      History obtained from: patient  Review of Systems   Constitutional:  Negative for activity change and appetite change.   HENT:  Negative for congestion and dental problem.    Eyes:  Negative for discharge.   Respiratory:  Negative for shortness of breath and stridor.    Cardiovascular:  Negative for chest pain and leg swelling.   Gastrointestinal:  Negative for abdominal distention and abdominal pain.   Endocrine: Negative for cold intolerance.   Genitourinary:  Negative for dysuria.   Musculoskeletal:  Negative  for arthralgias.   Skin:  Negative for color change.   Neurological:  Negative for dizziness.   Psychiatric/Behavioral:  Negative for agitation.           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  UPCr: 4.67 g/ g        # Hyperkalemia  Potassium of 6 on June 1       #Volume/Hypertension:  Volume: euvolemic   Blood pressure: Normotensive, /80mmhg, goal less than 140/90          #Acid base disorder  Serum bicarb 25mmol/L     #CKD-MBD  At goal     #Secondary Hyperparathyroidism   iPTH 122.9, guidelines levels KDIGO 2017     #Acute on chronic anemia:  Current hemoglobin 11.6mg         #MVR, AVR  On coumadin   Follow-up with cardiology     #CHF  Euvoelmic      Medical History Reviewed by provider this encounter:       Current Outpatient Medications on File Prior to Visit  Medication  Sig  Dispense  Refill    ALPRAZolam (XANAX) 0.25 mg tablet  Take prior to flight  4 tablet  0    amLODIPine (NORVASC) 10 mg tablet  Take 1 tablet (10 mg total) by mouth daily  90 tablet  1    amoxicillin (AMOXIL) 500 MG tablet  FOR DENTAL VISITS (Patient not taking: Reported on 7/25/2024)    ascorbic acid (VITAMIN C) 500 mg tablet  Take 500 mg by mouth daily    Aspirin Low Dose 81 MG EC tablet  TAKE 1 TABLET BY MOUTH EVERY DAY  90 tablet  1    atorvastatin (LIPITOR) 20 mg tablet  TAKE 1 TABLET BY MOUTH EVERY DAY  90 tablet  1    calcitriol (ROCALTROL) 0.25 mcg capsule  Take 1 capsule (0.25 mcg total) by mouth daily TAKE 1 CAPSULE 5 DAYS A WEEK AS DIRECTED  90 capsule  3    cetirizine (ZyrTEC) 10 mg tablet  Take 1 tablet (10 mg total) by mouth daily  30 tablet  1    COMBIGAN 0.2-0.5 %  0    Continuous Blood Gluc  (FreeStyle [place]) CHERELLE  Use 1 Units continuous (Patient not taking:  Reported on 2024)  1 each  0    Continuous Blood Gluc Sensor (FreeStyle [place] 3 Sensor) MISC  Use 1 Units every 14 (fourteen) days (Patient not taking: Reported on 2024)  2 each  5    Cosopt PF 2-0.5 % SOLN  instill 1 drop in both eyes twice daily    isosorbide dinitrate (ISORDIL) 10 mg tablet  Take 1 tablet (10 mg total) by mouth 3 (three) times a day  270 tablet  3    metoprolol succinate (TOPROL-XL) 50 mg 24 hr tablet  Take 1 tablet (50 mg total) by mouth daily  90 tablet  3    Nutritional Supplements (Nepro) LIQD  Take 237 mL by mouth in the morning (Patient taking differently: Take 237 mL by mouth 2 (two) times a week Takes time to time)  5688 mL  3    sodium bicarbonate 650 mg tablet  TAKE 1 TABLET(650 MG) BY MOUTH IN THE MORNING  90 tablet  1    Sodium Zirconium Cyclosilicate (Lokelma) 10 g  TAKE 1 PACKET BY MOUTH EVERY OTHER DAY  20 each  2    torsemide (DEMADEX) 20 mg tablet  TAKE 1 TABLET(20 MG) BY MOUTH DAILY  30 tablet  5    TRAVATAN Z 0.004 % ophthalmic solution  Administer 1 drop to both eyes daily at bedtime   0    warfarin (COUMADIN) 2 mg tablet  TAKE 1 TO 2 TABLETS DAILY BY MOUTH OR AS DIRECTED BY PHYSICIAN.  60 tablet  11    No current facility-administered medications on file prior to visit.     Social History    Tobacco Use    Smoking status:  Former  Current packs/day:  0.00  Types:  Cigarettes  Start date:    Quit date:    Years since quittin.7    Smokeless tobacco:  Never  Vaping Use    Vaping status:  Never Used  Substance and Sexual Activity    Alcohol use:  Yes  Comment: occasionally    Drug use:  No    Sexual activity:  Yes  Partners:  Female  Birth control/protection:  None        70 yo man with PMH DM, FSGS (biopsy proven 2018), CKD G4A3 (bl creat 3-3.5mg/dL, eGFR 21-24).  Patient is here for follow-up of FSGS      Medical History Reviewed by provider this encounter:     .  Current Outpatient Medications on File Prior to  Visit  Medication  Sig  Dispense  Refill    ALPRAZolam (XANAX) 0.25 mg tablet  TAKE 1 TABLET BY MOUTH PRIOR TO FLIGHT  4 tablet  0    amLODIPine (NORVASC) 10 mg tablet  TAKE 1 TABLET(10 MG) BY MOUTH DAILY  90 tablet  1    ascorbic acid (VITAMIN C) 500 mg tablet  Take 500 mg by mouth daily    Aspirin Low Dose 81 MG EC tablet  TAKE 1 TABLET BY MOUTH EVERY DAY  90 tablet  1    atorvastatin (LIPITOR) 20 mg tablet  TAKE 1 TABLET BY MOUTH EVERY DAY  90 tablet  1    calcitriol (ROCALTROL) 0.25 mcg capsule  TAKE ONE CAPSULE BY MOUTH DAILY 5 DAYS A WEEK AS DIRECTED  90 capsule  1    cetirizine (ZyrTEC) 10 mg tablet  Take 1 tablet (10 mg total) by mouth daily  30 tablet  1    COMBIGAN 0.2-0.5 %  0    isosorbide dinitrate (ISORDIL) 10 mg tablet  Take 1 tablet (10 mg total) by mouth 3 (three) times a day  270 tablet  3    metoprolol succinate (TOPROL-XL) 50 mg 24 hr tablet  TAKE 1 TABLET(50 MG) BY MOUTH DAILY  90 tablet  1    Nutritional Supplements (Nepro) LIQD  Take 237 mL by mouth in the morning (Patient taking differently: Take 237 mL by mouth 2 (two) times a week Takes time to time)  5688 mL  3    sodium bicarbonate 650 mg tablet  TAKE 1 TABLET(650 MG) BY MOUTH IN THE MORNING  90 tablet  1    Sodium Zirconium Cyclosilicate (Lokelma) 10 g  TAKE 1 PACKET BY MOUTH EVERY OTHER DAY  20 each  2    torsemide (DEMADEX) 20 mg tablet  TAKE 1 TABLET(20 MG) BY MOUTH DAILY  30 tablet  5    TRAVATAN Z 0.004 % ophthalmic solution  Administer 1 drop to both eyes daily at bedtime   0    warfarin (COUMADIN) 2 mg tablet  TAKE 2-3 TABLETS BY MOUTH DAILY AS DIRECTED BY PRESCRIBER  80 tablet  11    No current facility-administered medications on file prior to visit.     Social History    Tobacco Use    Smoking status:  Former  Current packs/day:  0.00  Types:  Cigarettes  Start date:    Quit date:    Years since quittin.9    Smokeless tobacco:  Never  Vaping Use    Vaping status:  Never Used  Substance and Sexual  Activity    Alcohol use:  Yes  Comment: occasionally    Drug use:  No    Sexual activity:  Yes  Partners:  Female  Birth control/protection:  None       Objective   There were no vitals taken for this visit.     Physical Exam  General:  no acute distress at this time  Skin:  No acute rash  Eyes:  No scleral icterus and noninjected  ENT:  mucous membranes moist  Neck:  no carotid bruits  Chest:  Clear to auscultation percussion, good respiratory effort, no use of accessory respiratory muscles  CVS:  Regular rate and rhythm without rub   Abdomen:  soft and nontender   Extremities: no significant lower extremity edema  Neuro:  No gross focality  Psych:  Alert , cooperative   Vascular access: Left AV fistula with good bruit and thrill.  Not yet matured

## 2024-11-14 NOTE — ASSESSMENT & PLAN NOTE
Lab Results   Component Value Date    EGFR 10 11/12/2024    EGFR 9 10/18/2024    EGFR 9 09/12/2024    CREATININE 5.20 (H) 11/12/2024    CREATININE 5.41 (H) 10/18/2024    CREATININE 5.32 (H) 09/12/2024     Baseline creatinine 5.2 to 5.5 mg/dL with progression of CKD to ESRD   Current creatinine 5.2 mg/dL, stable   Patient feels well, no shortness of breath, no chest pain, no nausea, no uremic symptoms at this time   Patient initially wanted to do PD but change his mind.  Prepared to start HD when needed   AV fistula done in September, pending ultrasound at the end of this month  Referred to [facility]   Avoid NSAIDs  I discussed with patient next steps after AV fistula is cleared to be used.  The plan is to wait until he presents with symptoms  Discussed with Dr. Anderson, he will see him in the office again

## 2024-11-14 NOTE — ASSESSMENT & PLAN NOTE
Lab Results   Component Value Date    EGFR 10 11/12/2024    EGFR 9 10/18/2024    EGFR 9 09/12/2024    CREATININE 5.20 (H) 11/12/2024    CREATININE 5.41 (H) 10/18/2024    CREATININE 5.32 (H) 09/12/2024   Serum calcium 8.5 mg/dL  Phosphorus 4.8 mg/dL, goal less than 5.5  No indication of binders at this time          PAST SURGICAL HISTORY:  AVF (arteriovenous fistula) FLIP

## 2024-11-27 ENCOUNTER — HOSPITAL ENCOUNTER (OUTPATIENT)
Dept: NON INVASIVE DIAGNOSTICS | Facility: CLINIC | Age: 74
Discharge: HOME | End: 2024-11-27
Payer: COMMERCIAL

## 2024-11-27 ENCOUNTER — OFFICE VISIT (OUTPATIENT)
Dept: NEPHROLOGY | Facility: CLINIC | Age: 74
End: 2024-11-27
Payer: COMMERCIAL

## 2024-11-27 VITALS
HEIGHT: 68 IN | HEART RATE: 62 BPM | DIASTOLIC BLOOD PRESSURE: 78 MMHG | SYSTOLIC BLOOD PRESSURE: 144 MMHG | BODY MASS INDEX: 22.28 KG/M2 | WEIGHT: 147 LBS

## 2024-11-27 DIAGNOSIS — Z01.818 PRE-TRANSPLANT EVALUATION FOR CKD (CHRONIC KIDNEY DISEASE): Primary | ICD-10-CM

## 2024-11-27 DIAGNOSIS — N18.5 CKD STAGE G5/A3, GFR <15 AND ALBUMIN CREATININE RATIO >300 MG/G (HCC): ICD-10-CM

## 2024-11-27 PROCEDURE — 93990 DOPPLER FLOW TESTING: CPT

## 2024-11-27 PROCEDURE — 93990 DOPPLER FLOW TESTING: CPT | Performed by: SURGERY

## 2024-11-27 PROCEDURE — 99215 OFFICE O/P EST HI 40 MIN: CPT | Performed by: INTERNAL MEDICINE

## 2024-11-27 NOTE — PATIENT INSTRUCTIONS
1) We will review your case at the transplant committee meeting and will review our decision with you in approximately 4 weeks over the phone.  2) If you do not receive a call from [facility] [address] within 4 weeks, please call our local Nephrology office.  3) From a renal transplant evaluation purpose, we will see you once a year in our local office for medical follow-up.  4) Once we call you with our decision regarding further evaluation, you will receive further instruction over the phone and in the mail regarding the next steps to complete the transplant evaluation.  5) All of your non transplant evaluation follow up regarding your regular medical follow ups, your renal care follow ups, and any other specialists visits you are following with should continue as you are advised by those respective physicians and continue to follow with their management and care.  And if there is any emergency please go to the nearest urgent care or emergency room.

## 2024-11-27 NOTE — PROGRESS NOTES
Name: Myra Mustafa      : 1950      MRN: [id number]  Encounter Provider: Celestina Anderson MD  Encounter Date: 2024   Encounter department: [facility] NEPHROLOGY [facility]  :  Assessment & Plan  Pre-transplant evaluation for CKD (chronic kidney disease)  Full pertinent medical history section for full note.  - We will review the patient's case at transplant committee meeting and further decisions thereafter  - From a medical evaluation standpoint today, patient appears to be a suitable candidate to move forward with the evaluation  - I highly encouraged the patient to start looking for a living donors given that the prolonged wait times for  donor transplantation           History of Present Illness     HPI  Myra Mustafa is a 74 y.o. male who presents for renal transplant evaluation.  Denies fevers, chills, nausea, vomiting, diarrhea.  History obtained from: patient    Review of Systems   Constitutional: Negative.  Negative for appetite change and fatigue.   HENT: Negative.     Eyes: Negative.    Respiratory: Negative.  Negative for shortness of breath.    Cardiovascular: Negative.  Negative for leg swelling.   Gastrointestinal: Negative.    Endocrine: Negative.    Genitourinary: Negative.  Negative for difficulty urinating.   Musculoskeletal: Negative.    Allergic/Immunologic: Negative.    Neurological: Negative.    Hematological: Negative.    Psychiatric/Behavioral: Negative.     All other systems reviewed and are negative.    Pertinent Medical History     74 y.o. male //Laura referred by Dr العلي, CKD stage 4 with most recent creat 2.69 mg/dL (2022 with eGFR 22), FSGS/diabetes biopsy-proven , diabetes (diagnosed 17 years ago), hypertension (diagnosed 17 years ago), bicuspid aortic valve replacement with bio AVR and mitral valve repair in 2019, atrial flutter on anticoagulation and amiodarone, cardiomyopathy, glaucoma, COVID infection in  January 2021, felt to be tachy mediated, working at [employer] as ; and publisher and retired professor, former smoker 3 years when teenager, social ETOH, no drugs, seen in the Nephrology Clinic for pre-transplant kidney evaluation.  Patient was evaluated in 2022 for renal transplantation and [facility]    Patient had renal biopsy in April 2018-FSGS with glomerular megaly with 15% foot process effacement, diffuse diabetic glomerulosclerosis, arterial sclerosis with hyalinosis  -FSGS may have been related to elevated BMI  -also diabetic disease on biopsy     1/2019 - pt was first diagnosed with SOB and was noted to have CHF, valvular disease.      3/2019 - s/p valvular replacement     Echocardiogram November 2020-EF 65%, grade 2 diastolic dysfunction, mitral regurgitation mild, bioprosthetic AV valve, mild-to-moderate pulmonic regurgitation     Carotid artery stenosis-less than 50% stenosis bilaterally.      At the time of prior evaluation, patient's evaluation was closed as his GFR was above the goal for listing.  His GFR now is less than 20 and [facility]    Since last being seen in 2022,    Admission January 2023    For a mechanical fall.  Nasal fracture.  Did have bradycardia which was being monitored as outpatient by Zio patch and metoprolol was decreased.  Nasal fracture was repaired in the OR.    January 2023-CT scan-tiny thyroid nodule, cluster of tiny cystic foci in the head of the pancreas, right renal cyst, enlarged prostate    More recently, patient saw cardiologist September 2024.  Was advised to have updated echocardiogram.    September 17, 2024-patient underwent AV fistula placement.    Echocardiogram September 11, 2024 with EF 55%, unable to assess diastolic function, bioprosthetic aortic valve without sign of paravalvular regurgitation      plan  - Patient's current GFR is 10 as of November 12, 2024 with  creatinine 5.2 milligram per deciliter patient is preemptive evaluation.  We can likely move forward with transplant evaluation.   reviewed in detail with the patient regarding benefits of living transplant donation and advised him to start reviewing with his family and loved ones regarding kidney donation.  He states that his children may be interested in learning more about donation.  - primary care taker of 8 yo and 1 yo grandchildren    - We will need cardiology clearance.  Prior ejection fraction was as low as 25% in 2019 and improved to 65% in 2020.  Has a bio AVR.  More recent echocardiogram in September 2024 with EF 55%    - Has a history of PVD with carotid artery stenoses less than 50% bilaterally    - Has a history of a flutter on anticoagulation with Coumadin  Total time spent on chart review, imaging review, echocardiogram review, reviewing findings with patient, education, clinical exam 45 minutes.    I reviewed with the patient the findings of small pancreatic cyst, renal cyst, thyroid nodules on CT scan in 2023.  I reviewed that we will be updating CT of the abdomen and pelvis and we will make a decision regarding thyroid ultrasound.  As a part of the transplant evaluation.    Past Medical History   Past Medical History:   Diagnosis Date    Chronic kidney disease     Colon polyp     COVID-19     Diabetes mellitus (HCC)     Hyperlipidemia     Hypertension     Proteinuria     Stage 3 chronic kidney disease (HCC)     Vitamin D deficiency      Past Surgical History:   Procedure Laterality Date    COLONOSCOPY      overdue    PA ARTERIOVENOUS ANASTOMOSIS OPEN DIRECT Left 9/17/2024    Procedure: CREATION LEFT FISTULA ARTERIOVENOUS (AV);  Surgeon: Isaias Nieto MD;  Location: [facility];  Service: Vascular    PA ECHO TRANSESOPHAG R-T 2D W/PRB IMG ACQUISJ I&R N/A 3/7/2019    Procedure: INTRA-OP TRANSESOPHAGEAL ECHOCARDIOGRAM (PATIENCE);  Surgeon: Gaudencio Tubbs DO;  Location[facility];   Service: Cardiac Surgery    WA NASAL ENDOSCOPY DIAGNOSTIC UNI/BI SPX Bilateral 1/16/2023    Procedure: ENDOSCOPY NOSE BILATERALLY, WITH ENDOSCOPIC CONTROL OF EPISTAXIS ON LEFT;  Surgeon: Ady Monahan MD;  Location[facility];  Service: ENT    WA PERQ CLSR TCAT L ATR APNDGE W/ENDOCARDIAL IMPLNT  3/7/2019    Procedure: LEFT ATRIAL APPENDAGE OCCLUSION CLIPPED with 35mm ATRICURE ATRICLIP;  Surgeon: Gaudencio Tubbs DO;  [facility];  Service: Cardiac Surgery    WA REPLACEMENT MITRAL VALVE W/CARDIOPULMONARY BYP N/A 3/7/2019    Procedure: REPLACEMENT VALVE MITRAL (MVR) REPAIR with a 30mm MEDTRONIC CG FUTURE RING;  Surgeon: Gaudencio Tubbs DO;  Location[facility];  Service: Cardiac Surgery    WA RPLCMT AORTIC VALVE OPN W/STENTLESS TISSUE VALVE N/A 3/7/2019    Procedure: REPLACEMENT VALVE AORTIC (AVR) with 23mm TAVERA INSPIRIS RESILIA  AORTIC VALVE;  Surgeon: Gaudencio Tubbs DO;  [facility];  Service: Cardiac Surgery    RENAL BIOPSY, OPEN      TOE SURGERY Left      Family History   Problem Relation Age of Onset    Stroke Mother     Hypertension Mother     Blindness Father     Hyperlipidemia Father     Hypertension Father     Glaucoma Father     Cancer Sister     Diabetes Sister     Ovarian cancer Sister     Gout Brother     Heart Valve Disease Brother     Hypertension Brother     Anuerysm Neg Hx     Heart attack Neg Hx     Arrhythmia Neg Hx     Heart failure Neg Hx     Coronary artery disease Neg Hx     Sudden death Neg Hx         scd      reports that he quit smoking about 56 years ago. His smoking use included cigarettes. He started smoking about 58 years ago. He has never used smokeless tobacco. He reports current alcohol use. He reports that he does not use drugs.  Current Outpatient Medications on File Prior to Visit   Medication Sig Dispense Refill    ALPRAZolam (XANAX) 0.25 mg tablet TAKE 1 TABLET BY MOUTH PRIOR TO FLIGHT 4 tablet 0    amLODIPine (NORVASC) 10 mg tablet TAKE 1  TABLET(10 MG) BY MOUTH DAILY 90 tablet 1    ascorbic acid (VITAMIN C) 500 mg tablet Take 500 mg by mouth daily      Aspirin Low Dose 81 MG EC tablet TAKE 1 TABLET BY MOUTH EVERY DAY 90 tablet 1    atorvastatin (LIPITOR) 20 mg tablet TAKE 1 TABLET BY MOUTH EVERY DAY 90 tablet 1    calcitriol (ROCALTROL) 0.25 mcg capsule TAKE ONE CAPSULE BY MOUTH DAILY 5 DAYS A WEEK AS DIRECTED 90 capsule 1    cetirizine (ZyrTEC) 10 mg tablet Take 1 tablet (10 mg total) by mouth daily 30 tablet 1    COMBIGAN 0.2-0.5 %   0    isosorbide dinitrate (ISORDIL) 10 mg tablet Take 1 tablet (10 mg total) by mouth 3 (three) times a day 270 tablet 3    metoprolol succinate (TOPROL-XL) 50 mg 24 hr tablet TAKE 1 TABLET(50 MG) BY MOUTH DAILY 90 tablet 1    sodium bicarbonate 650 mg tablet TAKE 1 TABLET(650 MG) BY MOUTH IN THE MORNING 90 tablet 1    Sodium Zirconium Cyclosilicate (Lokelma) 10 g TAKE 1 PACKET BY MOUTH EVERY OTHER DAY 20 each 2    torsemide (DEMADEX) 20 mg tablet TAKE 1 TABLET(20 MG) BY MOUTH DAILY 30 tablet 5    TRAVATAN Z 0.004 % ophthalmic solution Administer 1 drop to both eyes daily at bedtime   0    warfarin (COUMADIN) 2 mg tablet TAKE 2-3 TABLETS BY MOUTH DAILY AS DIRECTED BY PRESCRIBER 80 tablet 11     No current facility-administered medications on file prior to visit.     Allergies   Allergen Reactions    Ace Inhibitors Cough    Keflex [Cephalexin] Rash      Current Outpatient Medications on File Prior to Visit   Medication Sig Dispense Refill    ALPRAZolam (XANAX) 0.25 mg tablet TAKE 1 TABLET BY MOUTH PRIOR TO FLIGHT 4 tablet 0    amLODIPine (NORVASC) 10 mg tablet TAKE 1 TABLET(10 MG) BY MOUTH DAILY 90 tablet 1    ascorbic acid (VITAMIN C) 500 mg tablet Take 500 mg by mouth daily      Aspirin Low Dose 81 MG EC tablet TAKE 1 TABLET BY MOUTH EVERY DAY 90 tablet 1    atorvastatin (LIPITOR) 20 mg tablet TAKE 1 TABLET BY MOUTH EVERY DAY 90 tablet 1    calcitriol (ROCALTROL) 0.25 mcg capsule TAKE ONE CAPSULE BY MOUTH DAILY 5 DAYS  "A WEEK AS DIRECTED 90 capsule 1    cetirizine (ZyrTEC) 10 mg tablet Take 1 tablet (10 mg total) by mouth daily 30 tablet 1    COMBIGAN 0.2-0.5 %   0    isosorbide dinitrate (ISORDIL) 10 mg tablet Take 1 tablet (10 mg total) by mouth 3 (three) times a day 270 tablet 3    metoprolol succinate (TOPROL-XL) 50 mg 24 hr tablet TAKE 1 TABLET(50 MG) BY MOUTH DAILY 90 tablet 1    sodium bicarbonate 650 mg tablet TAKE 1 TABLET(650 MG) BY MOUTH IN THE MORNING 90 tablet 1    Sodium Zirconium Cyclosilicate (Lokelma) 10 g TAKE 1 PACKET BY MOUTH EVERY OTHER DAY 20 each 2    torsemide (DEMADEX) 20 mg tablet TAKE 1 TABLET(20 MG) BY MOUTH DAILY 30 tablet 5    TRAVATAN Z 0.004 % ophthalmic solution Administer 1 drop to both eyes daily at bedtime   0    warfarin (COUMADIN) 2 mg tablet TAKE 2-3 TABLETS BY MOUTH DAILY AS DIRECTED BY PRESCRIBER 80 tablet 11     No current facility-administered medications on file prior to visit.      Social History     Tobacco Use    Smoking status: Former     Current packs/day: 0.00     Types: Cigarettes     Start date:      Quit date:      Years since quittin.9    Smokeless tobacco: Never   Vaping Use    Vaping status: Never Used   Substance and Sexual Activity    Alcohol use: Yes     Comment: occasionally    Drug use: No    Sexual activity: Yes     Partners: Female     Birth control/protection: None        Objective   /78 (BP Location: Right arm, Patient Position: Sitting, Cuff Size: Adult)   Pulse 62   Ht 5' 8\" (1.727 m)   Wt 66.7 kg (147 lb)   BMI 22.35 kg/m²      Physical Exam  Vitals and nursing note reviewed.   Constitutional:       General: He is not in acute distress.     Appearance: He is well-developed. He is not diaphoretic.   HENT:      Head: Normocephalic and atraumatic.   Eyes:      General: No scleral icterus.        Right eye: No discharge.         Left eye: No discharge.      Conjunctiva/sclera: Conjunctivae normal.   Cardiovascular:      Rate and Rhythm: Normal " rate and regular rhythm.      Heart sounds: Normal heart sounds. No murmur heard.     No friction rub. No gallop.      Comments: No carotid bruit  Pulmonary:      Effort: Pulmonary effort is normal. No respiratory distress.      Breath sounds: Normal breath sounds. No wheezing or rales.   Chest:      Chest wall: No tenderness.   Abdominal:      General: Bowel sounds are normal. There is no distension.      Palpations: Abdomen is soft.      Tenderness: There is no abdominal tenderness. There is no rebound.   Musculoskeletal:         General: No tenderness or deformity. Normal range of motion.      Cervical back: Normal range of motion and neck supple.      Right lower leg: No edema.      Left lower leg: No edema.   Skin:     General: Skin is warm and dry.      Coloration: Skin is not pale.      Findings: No erythema or rash.   Neurological:      Mental Status: He is alert and oriented to person, place, and time.      Cranial Nerves: No cranial nerve deficit.      Coordination: Coordination normal.   Psychiatric:         Behavior: Behavior normal.         Thought Content: Thought content normal.         Judgment: Judgment normal.

## 2024-11-27 NOTE — LETTER
2024     Rufina Cao MD  [address]  [address]  [place] [address]    Patient: Myra Mustafa   YOB: 1950   Date of Visit: 2024       Dear Dr. Cao:    Thank you for referring Myra Mustafa to me for evaluation. Below are my notes for this consultation.    If you have questions, please do not hesitate to call me. I look forward to following your patient along with you.         Sincerely,        Celestina Anderson MD        CC: Joselyn Reyes Bahamonde, MD Nikunjkumar T Patel, MD  2024 11:30 AM  Sign when Signing Visit  Name: Myra Mustafa      : 1950      MRN: [id number]  Encounter Provider: Celestina Anderson MD  Encounter Date: 2024   Encounter department: [facility] NEPHROLOGY [facility]  :  Assessment & Plan  Pre-transplant evaluation for CKD (chronic kidney disease)  Full pertinent medical history section for full note.  - We will review the patient's case at transplant committee meeting and further decisions thereafter  - From a medical evaluation standpoint today, patient appears to be a suitable candidate to move forward with the evaluation  - I highly encouraged the patient to start looking for a living donors given that the prolonged wait times for  donor transplantation           History of Present Illness    HPI  Myra Mustafa is a 74 y.o. male who presents for renal transplant evaluation.  Denies fevers, chills, nausea, vomiting, diarrhea.  History obtained from: patient    Review of Systems   Constitutional: Negative.  Negative for appetite change and fatigue.   HENT: Negative.     Eyes: Negative.    Respiratory: Negative.  Negative for shortness of breath.    Cardiovascular: Negative.  Negative for leg swelling.   Gastrointestinal: Negative.    Endocrine: Negative.    Genitourinary: Negative.  Negative for difficulty urinating.   Musculoskeletal: Negative.    Allergic/Immunologic: Negative.    Neurological:  Negative.    Hematological: Negative.    Psychiatric/Behavioral: Negative.     All other systems reviewed and are negative.    Pertinent Medical History    74 y.o. male //Ukrainian referred by Dr العلي, CKD stage 4 with most recent creat 2.69 mg/dL (4/2022 with eGFR 22), FSGS/diabetes biopsy-proven 2018, diabetes (diagnosed 17 years ago), hypertension (diagnosed 17 years ago), bicuspid aortic valve replacement with bio AVR and mitral valve repair in 2019, atrial flutter on anticoagulation and amiodarone, cardiomyopathy, glaucoma, COVID infection in January 2021, felt to be tachy mediated, working at [employer] as ; and publisher and retired professor, former smoker 3 years when teenager, social ETOH, no drugs, seen in the Nephrology Clinic for pre-transplant kidney evaluation.  Patient was evaluated in 2022 for renal transplantation and is now presenting to reestablish care November 2024.    Patient had renal biopsy in April 2018-FSGS with glomerular megaly with 15% foot process effacement, diffuse diabetic glomerulosclerosis, arterial sclerosis with hyalinosis  -FSGS may have been related to elevated BMI  -also diabetic disease on biopsy     1/2019 - pt was first diagnosed with SOB and was noted to have CHF, valvular disease.      3/2019 - s/p valvular replacement     Echocardiogram November 2020-EF 65%, grade 2 diastolic dysfunction, mitral regurgitation mild, bioprosthetic AV valve, mild-to-moderate pulmonic regurgitation     Carotid artery stenosis-less than 50% stenosis bilaterally.      At the time of prior evaluation, patient's evaluation was closed as his GFR was above the goal for listing.  His GFR now is less than 20 and he is presenting back to reestablish care.    Since last being seen in 2022,    Admission January 2023    For a mechanical fall.  Nasal fracture.  Did have bradycardia which was being monitored as outpatient by Zio patch and metoprolol was  decreased.  Nasal fracture was repaired in the OR.    January 2023-CT scan-tiny thyroid nodule, cluster of tiny cystic foci in the head of the pancreas, right renal cyst, enlarged prostate    More recently, patient saw cardiologist September 2024.  Was advised to have updated echocardiogram.    September 17, 2024-patient underwent AV fistula placement.    Echocardiogram September 11, 2024 with EF 55%, unable to assess diastolic function, bioprosthetic aortic valve without sign of paravalvular regurgitation      plan  - Patient's current GFR is 10 as of November 12, 2024 with creatinine 5.2 milligram per deciliter patient is preemptive evaluation.  We can likely move forward with transplant evaluation.   reviewed in detail with the patient regarding benefits of living transplant donation and advised him to start reviewing with his family and loved ones regarding kidney donation.  He states that his children may be interested in learning more about donation.  - primary care taker of 8 yo and 1 yo grandchildren    - We will need cardiology clearance.  Prior ejection fraction was as low as 25% in 2019 and improved to 65% in 2020.  Has a bio AVR.  More recent echocardiogram in September 2024 with EF 55%    - Has a history of PVD with carotid artery stenoses less than 50% bilaterally    - Has a history of a flutter on anticoagulation with Coumadin    Past Medical History  Past Medical History:   Diagnosis Date   • Chronic kidney disease    • Colon polyp    • COVID-19    • Diabetes mellitus (HCC)    • Hyperlipidemia    • Hypertension    • Proteinuria    • Stage 3 chronic kidney disease (HCC)    • Vitamin D deficiency      Past Surgical History:   Procedure Laterality Date   • COLONOSCOPY      overdue   • DE ARTERIOVENOUS ANASTOMOSIS OPEN DIRECT Left 9/17/2024    Procedure: CREATION LEFT FISTULA ARTERIOVENOUS (AV);  Surgeon: Isaias Nieto MD;  [facility];  Service: Vascular   • DE ECHO TRANSESOPHAG  R-T 2D W/PRB IMG ACQUISJ I&R N/A 3/7/2019    Procedure: INTRA-OP TRANSESOPHAGEAL ECHOCARDIOGRAM (PATIENCE);  Surgeon: Gaudencio Tubbs DO;  [facility];  Service: Cardiac Surgery   • LA NASAL ENDOSCOPY DIAGNOSTIC UNI/BI SPX Bilateral 1/16/2023    Procedure: ENDOSCOPY NOSE BILATERALLY, WITH ENDOSCOPIC CONTROL OF EPISTAXIS ON LEFT;  Surgeon: Ady Monahan MD;  [facility];  Service: ENT   • LA PERQ CLSR TCAT L ATR APNDGE W/ENDOCARDIAL IMPLNT  3/7/2019    Procedure: LEFT ATRIAL APPENDAGE OCCLUSION CLIPPED with 35mm ATRICURE ATRICLIP;  Surgeon: Gaudencio Tubbs DO;  [facility];  Service: Cardiac Surgery   • LA REPLACEMENT MITRAL VALVE W/CARDIOPULMONARY BYP N/A 3/7/2019    Procedure: REPLACEMENT VALVE MITRAL (MVR) REPAIR with a 30mm MEDTRONIC CG FUTURE RING;  Surgeon: Gaudencio Tubbs DO;  [facility];  Service: Cardiac Surgery   • LA RPLCMT AORTIC VALVE OPN W/STENTLESS TISSUE VALVE N/A 3/7/2019    Procedure: REPLACEMENT VALVE AORTIC (AVR) with 23mm TAVERA INSPIRIS RESILIA  AORTIC VALVE;  Surgeon: Gaudencio Tubbs DO;  [facility];  Service: Cardiac Surgery   • RENAL BIOPSY, OPEN     • TOE SURGERY Left      Family History   Problem Relation Age of Onset   • Stroke Mother    • Hypertension Mother    • Blindness Father    • Hyperlipidemia Father    • Hypertension Father    • Glaucoma Father    • Cancer Sister    • Diabetes Sister    • Ovarian cancer Sister    • Gout Brother    • Heart Valve Disease Brother    • Hypertension Brother    • Anuerysm Neg Hx    • Heart attack Neg Hx    • Arrhythmia Neg Hx    • Heart failure Neg Hx    • Coronary artery disease Neg Hx    • Sudden death Neg Hx         scd      reports that he quit smoking about 56 years ago. His smoking use included cigarettes. He started smoking about 58 years ago. He has never used smokeless tobacco. He reports current alcohol use. He reports that he does not use drugs.  Current Outpatient Medications on  File Prior to Visit   Medication Sig Dispense Refill   • ALPRAZolam (XANAX) 0.25 mg tablet TAKE 1 TABLET BY MOUTH PRIOR TO FLIGHT 4 tablet 0   • amLODIPine (NORVASC) 10 mg tablet TAKE 1 TABLET(10 MG) BY MOUTH DAILY 90 tablet 1   • ascorbic acid (VITAMIN C) 500 mg tablet Take 500 mg by mouth daily     • Aspirin Low Dose 81 MG EC tablet TAKE 1 TABLET BY MOUTH EVERY DAY 90 tablet 1   • atorvastatin (LIPITOR) 20 mg tablet TAKE 1 TABLET BY MOUTH EVERY DAY 90 tablet 1   • calcitriol (ROCALTROL) 0.25 mcg capsule TAKE ONE CAPSULE BY MOUTH DAILY 5 DAYS A WEEK AS DIRECTED 90 capsule 1   • cetirizine (ZyrTEC) 10 mg tablet Take 1 tablet (10 mg total) by mouth daily 30 tablet 1   • COMBIGAN 0.2-0.5 %   0   • isosorbide dinitrate (ISORDIL) 10 mg tablet Take 1 tablet (10 mg total) by mouth 3 (three) times a day 270 tablet 3   • metoprolol succinate (TOPROL-XL) 50 mg 24 hr tablet TAKE 1 TABLET(50 MG) BY MOUTH DAILY 90 tablet 1   • sodium bicarbonate 650 mg tablet TAKE 1 TABLET(650 MG) BY MOUTH IN THE MORNING 90 tablet 1   • Sodium Zirconium Cyclosilicate (Lokelma) 10 g TAKE 1 PACKET BY MOUTH EVERY OTHER DAY 20 each 2   • torsemide (DEMADEX) 20 mg tablet TAKE 1 TABLET(20 MG) BY MOUTH DAILY 30 tablet 5   • TRAVATAN Z 0.004 % ophthalmic solution Administer 1 drop to both eyes daily at bedtime   0   • warfarin (COUMADIN) 2 mg tablet TAKE 2-3 TABLETS BY MOUTH DAILY AS DIRECTED BY PRESCRIBER 80 tablet 11     No current facility-administered medications on file prior to visit.     Allergies   Allergen Reactions   • Ace Inhibitors Cough   • Keflex [Cephalexin] Rash      Current Outpatient Medications on File Prior to Visit   Medication Sig Dispense Refill   • ALPRAZolam (XANAX) 0.25 mg tablet TAKE 1 TABLET BY MOUTH PRIOR TO FLIGHT 4 tablet 0   • amLODIPine (NORVASC) 10 mg tablet TAKE 1 TABLET(10 MG) BY MOUTH DAILY 90 tablet 1   • ascorbic acid (VITAMIN C) 500 mg tablet Take 500 mg by mouth daily     • Aspirin Low Dose 81 MG EC tablet TAKE  "1 TABLET BY MOUTH EVERY DAY 90 tablet 1   • atorvastatin (LIPITOR) 20 mg tablet TAKE 1 TABLET BY MOUTH EVERY DAY 90 tablet 1   • calcitriol (ROCALTROL) 0.25 mcg capsule TAKE ONE CAPSULE BY MOUTH DAILY 5 DAYS A WEEK AS DIRECTED 90 capsule 1   • cetirizine (ZyrTEC) 10 mg tablet Take 1 tablet (10 mg total) by mouth daily 30 tablet 1   • COMBIGAN 0.2-0.5 %   0   • isosorbide dinitrate (ISORDIL) 10 mg tablet Take 1 tablet (10 mg total) by mouth 3 (three) times a day 270 tablet 3   • metoprolol succinate (TOPROL-XL) 50 mg 24 hr tablet TAKE 1 TABLET(50 MG) BY MOUTH DAILY 90 tablet 1   • sodium bicarbonate 650 mg tablet TAKE 1 TABLET(650 MG) BY MOUTH IN THE MORNING 90 tablet 1   • Sodium Zirconium Cyclosilicate (Lokelma) 10 g TAKE 1 PACKET BY MOUTH EVERY OTHER DAY 20 each 2   • torsemide (DEMADEX) 20 mg tablet TAKE 1 TABLET(20 MG) BY MOUTH DAILY 30 tablet 5   • TRAVATAN Z 0.004 % ophthalmic solution Administer 1 drop to both eyes daily at bedtime   0   • warfarin (COUMADIN) 2 mg tablet TAKE 2-3 TABLETS BY MOUTH DAILY AS DIRECTED BY PRESCRIBER 80 tablet 11     No current facility-administered medications on file prior to visit.      Social History     Tobacco Use   • Smoking status: Former     Current packs/day: 0.00     Types: Cigarettes     Start date:      Quit date:      Years since quittin.9   • Smokeless tobacco: Never   Vaping Use   • Vaping status: Never Used   Substance and Sexual Activity   • Alcohol use: Yes     Comment: occasionally   • Drug use: No   • Sexual activity: Yes     Partners: Female     Birth control/protection: None        Objective  /78 (BP Location: Right arm, Patient Position: Sitting, Cuff Size: Adult)   Pulse 62   Ht 5' 8\" (1.727 m)   Wt 66.7 kg (147 lb)   BMI 22.35 kg/m²      Physical Exam  Vitals and nursing note reviewed.   Constitutional:       General: He is not in acute distress.     Appearance: He is well-developed. He is not diaphoretic.   HENT:      Head: " Normocephalic and atraumatic.   Eyes:      General: No scleral icterus.        Right eye: No discharge.         Left eye: No discharge.      Conjunctiva/sclera: Conjunctivae normal.   Cardiovascular:      Rate and Rhythm: Normal rate and regular rhythm.      Heart sounds: Normal heart sounds. No murmur heard.     No friction rub. No gallop.      Comments: No carotid bruit  Pulmonary:      Effort: Pulmonary effort is normal. No respiratory distress.      Breath sounds: Normal breath sounds. No wheezing or rales.   Chest:      Chest wall: No tenderness.   Abdominal:      General: Bowel sounds are normal. There is no distension.      Palpations: Abdomen is soft.      Tenderness: There is no abdominal tenderness. There is no rebound.   Musculoskeletal:         General: No tenderness or deformity. Normal range of motion.      Cervical back: Normal range of motion and neck supple.      Right lower leg: No edema.      Left lower leg: No edema.   Skin:     General: Skin is warm and dry.      Coloration: Skin is not pale.      Findings: No erythema or rash.   Neurological:      Mental Status: He is alert and oriented to person, place, and time.      Cranial Nerves: No cranial nerve deficit.      Coordination: Coordination normal.   Psychiatric:         Behavior: Behavior normal.         Thought Content: Thought content normal.         Judgment: Judgment normal.

## 2024-12-04 DIAGNOSIS — Z98.890 S/P MVR (MITRAL VALVE REPAIR): Primary | ICD-10-CM

## 2024-12-08 DIAGNOSIS — E78.3 HYPERCHYLOMICRONEMIA: ICD-10-CM

## 2024-12-09 ENCOUNTER — TELEPHONE (OUTPATIENT)
Age: 74
End: 2024-12-09

## 2024-12-09 RX ORDER — ATORVASTATIN CALCIUM 20 MG/1
20 TABLET, FILM COATED ORAL DAILY
Qty: 90 TABLET | Refills: 1 | Status: SHIPPED | OUTPATIENT
Start: 2024-12-09

## 2024-12-09 NOTE — TELEPHONE ENCOUNTER
Kia from [facility] called asking if we received the prior authorization form.    They sent it Saturday morning, advised her we did not receive this yet.

## 2024-12-10 ENCOUNTER — TELEPHONE (OUTPATIENT)
Dept: NEPHROLOGY | Facility: CLINIC | Age: 74
End: 2024-12-10

## 2024-12-10 DIAGNOSIS — E87.5 HYPERKALEMIA: ICD-10-CM

## 2024-12-10 NOTE — TELEPHONE ENCOUNTER
Received fax from [facility] [place] . Pharmacy needing prior authorization for Lokelma 10 mg.     Key code: [id number]  Patient Last Name: Moon  : 1950    Please initiate prior authorization. Form faxed to patient chart.   Never

## 2024-12-11 NOTE — TELEPHONE ENCOUNTER
PA for LOKELMA SUBMITTED to [facility]    via    []CMM-KEY:   []Surescripts-Case ID #   [x]Availity-Auth ID # [id number] /NDC # [id number]  []Faxed to plan   []Other website   []Phone call Case ID #     [x]PA sent as URGENT    All office notes, labs and other pertaining documents and studies sent. Clinical questions answered. Awaiting determination from insurance company.     Turnaround time for your insurance to make a decision on your Prior Authorization can take 7-21 business days.

## 2024-12-12 NOTE — TELEPHONE ENCOUNTER
ELANA DUNCAN APPROVED     Date(s) approved 10/11/24-12/10/25    Case #    Patient advised by          []Stupeflixhart Message  []Phone call   [x]LMOM  []L/M to call office as no active Communication consent on file  []Unable to leave detailed message as VM not approved on Communication consent       Pharmacy advised by    [x]Fax  []Phone call    Approval letter scanned into Media Yes

## 2024-12-13 NOTE — TELEPHONE ENCOUNTER
[facility] pharmacy calling to check the status of the [facility]. Informed him it was approved, provided auth ID #. He states it is still coming back as needed when he runs the script through. He will call the insurance company to check on that.

## 2024-12-27 ENCOUNTER — ANTICOAG VISIT (OUTPATIENT)
Dept: CARDIOLOGY CLINIC | Facility: CLINIC | Age: 74
End: 2024-12-27

## 2024-12-27 ENCOUNTER — APPOINTMENT (OUTPATIENT)
Dept: LAB | Facility: CLINIC | Age: 74
End: 2024-12-27
Payer: COMMERCIAL

## 2024-12-27 DIAGNOSIS — N18.5 CKD STAGE G5/A3, GFR <15 AND ALBUMIN CREATININE RATIO >300 MG/G (HCC): ICD-10-CM

## 2024-12-27 DIAGNOSIS — Z98.890 S/P MVR (MITRAL VALVE REPAIR): ICD-10-CM

## 2024-12-27 DIAGNOSIS — E87.5 HYPERKALEMIA: ICD-10-CM

## 2024-12-27 LAB
ANION GAP SERPL CALCULATED.3IONS-SCNC: 8 MMOL/L (ref 4–13)
BUN SERPL-MCNC: 69 MG/DL (ref 5–25)
CALCIUM SERPL-MCNC: 8.8 MG/DL (ref 8.4–10.2)
CHLORIDE SERPL-SCNC: 110 MMOL/L (ref 96–108)
CO2 SERPL-SCNC: 23 MMOL/L (ref 21–32)
CREAT SERPL-MCNC: 5.45 MG/DL (ref 0.6–1.3)
GFR SERPL CREATININE-BSD FRML MDRD: 9 ML/MIN/1.73SQ M
GLUCOSE P FAST SERPL-MCNC: 86 MG/DL (ref 65–99)
POTASSIUM SERPL-SCNC: 5 MMOL/L (ref 3.5–5.3)
SODIUM SERPL-SCNC: 141 MMOL/L (ref 135–147)

## 2024-12-27 PROCEDURE — 36415 COLL VENOUS BLD VENIPUNCTURE: CPT

## 2024-12-27 PROCEDURE — 80048 BASIC METABOLIC PNL TOTAL CA: CPT

## 2025-01-31 ENCOUNTER — ANTICOAG VISIT (OUTPATIENT)
Dept: CARDIOLOGY CLINIC | Facility: CLINIC | Age: 75
End: 2025-01-31

## 2025-01-31 ENCOUNTER — APPOINTMENT (OUTPATIENT)
Dept: LAB | Facility: CLINIC | Age: 75
End: 2025-01-31
Payer: COMMERCIAL

## 2025-01-31 DIAGNOSIS — Z98.890 S/P MVR (MITRAL VALVE REPAIR): ICD-10-CM

## 2025-01-31 DIAGNOSIS — N18.5 CKD STAGE G5/A3, GFR <15 AND ALBUMIN CREATININE RATIO >300 MG/G (HCC): ICD-10-CM

## 2025-01-31 LAB
ANION GAP SERPL CALCULATED.3IONS-SCNC: 7 MMOL/L (ref 4–13)
BUN SERPL-MCNC: 77 MG/DL (ref 5–25)
CALCIUM SERPL-MCNC: 8.3 MG/DL (ref 8.4–10.2)
CHLORIDE SERPL-SCNC: 110 MMOL/L (ref 96–108)
CO2 SERPL-SCNC: 22 MMOL/L (ref 21–32)
CREAT SERPL-MCNC: 6.02 MG/DL (ref 0.6–1.3)
GFR SERPL CREATININE-BSD FRML MDRD: 8 ML/MIN/1.73SQ M
GLUCOSE P FAST SERPL-MCNC: 90 MG/DL (ref 65–99)
INR PPP: 1.82 (ref 0.85–1.19)
POTASSIUM SERPL-SCNC: 5.4 MMOL/L (ref 3.5–5.3)
PROTHROMBIN TIME: 21.8 SECONDS (ref 12.3–15)
SODIUM SERPL-SCNC: 139 MMOL/L (ref 135–147)

## 2025-01-31 PROCEDURE — 36415 COLL VENOUS BLD VENIPUNCTURE: CPT

## 2025-01-31 PROCEDURE — 80048 BASIC METABOLIC PNL TOTAL CA: CPT

## 2025-01-31 PROCEDURE — 85610 PROTHROMBIN TIME: CPT

## 2025-02-05 ENCOUNTER — OFFICE VISIT (OUTPATIENT)
Dept: VASCULAR SURGERY | Facility: CLINIC | Age: 75
End: 2025-02-05
Payer: COMMERCIAL

## 2025-02-05 VITALS
SYSTOLIC BLOOD PRESSURE: 145 MMHG | OXYGEN SATURATION: 98 % | HEART RATE: 64 BPM | WEIGHT: 141 LBS | DIASTOLIC BLOOD PRESSURE: 80 MMHG | BODY MASS INDEX: 21.37 KG/M2 | HEIGHT: 68 IN

## 2025-02-05 DIAGNOSIS — N18.5 CKD STAGE G5/A3, GFR <15 AND ALBUMIN CREATININE RATIO >300 MG/G (HCC): Primary | ICD-10-CM

## 2025-02-05 PROCEDURE — 99214 OFFICE O/P EST MOD 30 MIN: CPT | Performed by: SURGERY

## 2025-02-05 NOTE — ASSESSMENT & PLAN NOTE
Lab Results   Component Value Date    EGFR 8 01/31/2025    EGFR 9 12/27/2024    EGFR 10 11/12/2024    CREATININE 6.02 (H) 01/31/2025    CREATININE 5.45 (H) 12/27/2024    CREATININE 5.20 (H) 11/12/2024     74-year-old male with a past ministry of CKD stage V status post left upper extremity radiocephalic fistula placed 9/17/24.  Patient denies any left upper extremity symptoms.  Fistula has good thrill.    Duplex shows flow rates > 600, depth less than 6 mm, but mentions diameter of less than 6 mm.  On exam the fistula is pump easily palpable with a good thrill.  I do not believe this will be an issue with access.  At this point would not recommend when needed the access can be used for dialysis.  If there are any issues during access the patient would require a fistulogram with balloon assisted maturation. Otherwise patient can follow-up with me as needed

## 2025-02-05 NOTE — PROGRESS NOTES
Name: Myra Mustafa      : 1950      MRN: [id number]  Encounter Provider: Liborio Hudson MD  Encounter Date: 2025   Enc[facility] department: [facility] [place]  :  Assessment & Plan  CKD stage G5/A3, GFR <15 and albumin creatinine ratio >300 mg/g ([facility])  Lab Results   Component Value Date    EGFR 8 2025    EGFR 9 2024    EGFR 10 2024    CREATININE 6.02 (H) 2025    CREATININE 5.45 (H) 2024    CREATININE 5.20 (H) 2024     74-year-old male with a past ministry of CKD stage V status post left upper extremity radiocephalic fistula placed 24.  Patient denies any left upper extremity symptoms.  Fistula has good thrill.    Duplex shows flow rates > 600, depth less than 6 mm, but mentions diameter of less than 6 mm.  On exam the fistula is pump easily palpable with a good thrill.  I do not believe this will be an issue with access.  At this point would not recommend when needed the access can be used for dialysis.  If there are any issues during access the patient would require a fistulogram with balloon assisted maturation. Otherwise patient can follow-up with me as needed         History of Present Illness   HPI  Myra Mustafa is a 74 y.o. male who presents     Pt here for f/u dialysis eval 24. Pt is asym. Pt has not started dialysis. Pt is positive for thrill and bruit.   History obtained from: patient    Review of Systems   Constitutional: Negative.    HENT: Negative.     Eyes: Negative.    Respiratory: Negative.     Cardiovascular: Negative.    Gastrointestinal: Negative.    Endocrine: Negative.    Genitourinary: Negative.    Musculoskeletal: Negative.    Skin: Negative.    Allergic/Immunologic: Negative.    Neurological: Negative.    Hematological: Negative.    Psychiatric/Behavioral: Negative.       Medical History Reviewed by provider this encounter:  Tobacco  Allergies  Meds  Problems  Med Hx  Surg Hx  Fam Hx     .     Objective   BP  "145/80 (BP Location: Right arm, Patient Position: Sitting)   Pulse 64   Ht 5' 8\" (1.727 m)   Wt 64 kg (141 lb)   SpO2 98%   BMI 21.44 kg/m²      Physical Exam  Vitals and nursing note reviewed.   Constitutional:       Appearance: He is well-developed.   HENT:      Head: Normocephalic and atraumatic.   Eyes:      Conjunctiva/sclera: Conjunctivae normal.   Cardiovascular:      Rate and Rhythm: Normal rate and regular rhythm.      Comments: AVF with +thrill  Pulmonary:      Effort: Pulmonary effort is normal.      Breath sounds: Normal breath sounds.   Abdominal:      Palpations: Abdomen is soft.      Tenderness: There is no abdominal tenderness.   Musculoskeletal:      Cervical back: Neck supple.   Skin:     General: Skin is warm and dry.      Capillary Refill: Capillary refill takes less than 2 seconds.   Neurological:      General: No focal deficit present.      Mental Status: He is alert and oriented to person, place, and time.   Psychiatric:         Mood and Affect: Mood normal.         Administrative Statements   I have spent a total time of 25 minutes in caring for this patient on the day of the visit/encounter including Diagnostic results, Prognosis, Risks and benefits of tx options, Instructions for management, Patient and family education, Importance of tx compliance, Risk factor reductions, Impressions, Counseling / Coordination of care, Documenting in the medical record, Reviewing / ordering tests, medicine, procedures  , and Obtaining or reviewing history  .   "

## 2025-02-10 ENCOUNTER — OFFICE VISIT (OUTPATIENT)
Dept: NEPHROLOGY | Facility: CLINIC | Age: 75
End: 2025-02-10
Payer: COMMERCIAL

## 2025-02-10 VITALS
WEIGHT: 143 LBS | HEART RATE: 72 BPM | SYSTOLIC BLOOD PRESSURE: 120 MMHG | OXYGEN SATURATION: 99 % | DIASTOLIC BLOOD PRESSURE: 74 MMHG | BODY MASS INDEX: 21.67 KG/M2 | HEIGHT: 68 IN

## 2025-02-10 DIAGNOSIS — N18.5 BENIGN HYPERTENSION WITH CHRONIC KIDNEY DISEASE, STAGE V (HCC): ICD-10-CM

## 2025-02-10 DIAGNOSIS — N05.1 FSGS (FOCAL SEGMENTAL GLOMERULOSCLEROSIS): ICD-10-CM

## 2025-02-10 DIAGNOSIS — N18.5 CKD STAGE G5/A3, GFR <15 AND ALBUMIN CREATININE RATIO >300 MG/G (HCC): Primary | ICD-10-CM

## 2025-02-10 DIAGNOSIS — E87.5 HYPERKALEMIA: ICD-10-CM

## 2025-02-10 DIAGNOSIS — R80.9 NEPHROTIC RANGE PROTEINURIA: ICD-10-CM

## 2025-02-10 DIAGNOSIS — I50.22 CHRONIC SYSTOLIC HEART FAILURE (HCC): ICD-10-CM

## 2025-02-10 DIAGNOSIS — Z01.818 PRE-TRANSPLANT EVALUATION FOR CKD (CHRONIC KIDNEY DISEASE): ICD-10-CM

## 2025-02-10 DIAGNOSIS — E83.9 CHRONIC KIDNEY DISEASE-MINERAL BONE DISORDER (CKD-MBD) WITH STAGE 5 CHRONIC KIDNEY DISEASE, NOT ON CHRONIC DIALYSIS (HCC): ICD-10-CM

## 2025-02-10 DIAGNOSIS — N18.5 CHRONIC KIDNEY DISEASE-MINERAL BONE DISORDER (CKD-MBD) WITH STAGE 5 CHRONIC KIDNEY DISEASE, NOT ON CHRONIC DIALYSIS (HCC): ICD-10-CM

## 2025-02-10 DIAGNOSIS — M89.9 CHRONIC KIDNEY DISEASE-MINERAL BONE DISORDER (CKD-MBD) WITH STAGE 5 CHRONIC KIDNEY DISEASE, NOT ON CHRONIC DIALYSIS (HCC): ICD-10-CM

## 2025-02-10 DIAGNOSIS — I12.0 BENIGN HYPERTENSION WITH CHRONIC KIDNEY DISEASE, STAGE V (HCC): ICD-10-CM

## 2025-02-10 DIAGNOSIS — N25.81 SECONDARY HYPERPARATHYROIDISM OF RENAL ORIGIN (HCC): ICD-10-CM

## 2025-02-10 PROCEDURE — 99214 OFFICE O/P EST MOD 30 MIN: CPT | Performed by: STUDENT IN AN ORGANIZED HEALTH CARE EDUCATION/TRAINING PROGRAM

## 2025-02-10 NOTE — PROGRESS NOTES
Name: Myra Mustafa      : 1950      MRN: [id number]  Encounter Provider: Joselyn Reyes Bahamonde, MD  Encounter Date: 2/10/2025   Encounter department: [facility]  :  Assessment & Plan  CKD stage G5/A3, GFR <15 and albumin creatinine ratio >300 mg/g ([facility])  Lab Results   Component Value Date    EGFR 8 2025    EGFR 9 2024    EGFR 10 2024    CREATININE 6.02 (H) 2025    CREATININE 5.45 (H) 2024    CREATININE 5.20 (H) 2024   Baseline creatinine 5.2 to 5.5 mg/dL with progression of CKD to ESRD   Current creatinine is 6.02 progression of CKD    No uremic symptoms at this time  AV fistula performed in 2024, last evaluation by vascular surgery 2025, ready to be used  Referred to [facility]   Avoid NSAIDs  No urgent indication of dialysis ice time, will plan to start dialysis with symptoms    Orders:    CBC and Platelet; Future    Basic metabolic panel; Standing    Benign hypertension with chronic kidney disease, stage V ([facility])  Lab Results   Component Value Date    EGFR 8 2025    EGFR 9 2024    EGFR 10 2024    CREATININE 6.02 (H) 2025    CREATININE 5.45 (H) 2024    CREATININE 5.20 (H) 2024     Blood pressure normotensive, BP  120/70   recommend   Continue with low-sodium diet  Continue with amlodipine 10   Metoprolol 50 mg daily   Torsemide 20 mg daily   Advised to maintain a good BP control to prevent progression of CKD        Hyperkalemia  Serum potassium 5.4  Continue Lokelma   Low potassium diet        FSGS (focal segmental glomerulosclerosis)  With progression of CKD G5  Genetic Testing:  Not done, possible APOL1  UPCR: 4.67 g/g        Chronic systolic heart failure (HCC)  Wt Readings from Last 3 Encounters:   25 64 kg (141 lb)   24 66.7 kg (147 lb)   24 65.3 kg (144 lb)     Euvolemic  Continue with torsemide 20 mg daily       Nephrotic range proteinuria  UPCR   4.7 g/g secondary  to secondary FSGS   Cannot take ACE inhibitors/ARB due to hyperkalemia   GFR low for SGLT2 inhibitors        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 Anderson            History of Present Illness   HPI  Myra Mustafa is a 74 y.o. male PMH DM, FSGS (biopsy proven 2018), CKD G4A3 (bl creat 3-3.5mg/dL, eGFR 21-24).  Patient is here for follow-up of FSGS      History obtained from: patient    Review of Systems   Constitutional:  Negative for activity change and appetite change.   HENT:  Negative for congestion and dental problem.    Eyes:  Negative for discharge.   Respiratory:  Negative for cough.    Cardiovascular:  Negative for chest pain and leg swelling.   Gastrointestinal:  Negative for abdominal distention and abdominal pain.   Endocrine: Negative for cold intolerance.   Genitourinary:  Negative for dysuria.   Musculoskeletal:  Negative for arthralgias.   Skin:  Negative for color change and pallor.   Neurological:  Negative for dizziness.   Psychiatric/Behavioral:  Negative for agitation.      Pertinent Medical History     70 yo man with PMH DM, FSGS (biopsy proven 2018), CKD G4A3 (bl creat 3-3.5mg/dL, eGFR 21-24).  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     #Immunosuppressive Medications     # Nephrotic  proteinuria     #CKD G5A3 progression of CKD        # Hyperkalemia     #Volume/Hypertension:      #Acid base disorder     #CKD-MBD     #Secondary Hyperparathyroidism      #Acute on chronic anemia:     #MVR, AVR     #CHF    Medical History Reviewed by provider this encounter:       Current Outpatient Medications on File Prior to Visit   Medication Sig Dispense Refill    ALPRAZolam (XANAX) 0.25 mg tablet Take prior to flight 4 tablet 0    amLODIPine (NORVASC) 10 mg tablet Take 1 tablet (10 mg total) by mouth daily 90 tablet 1    amoxicillin (AMOXIL) 500 MG tablet FOR DENTAL VISITS (Patient not taking: Reported on 7/25/2024)      ascorbic acid (VITAMIN C) 500 mg tablet Take 500 mg by mouth daily      Aspirin Low Dose 81 MG EC tablet TAKE 1 TABLET BY MOUTH EVERY DAY 90 tablet 1    atorvastatin (LIPITOR) 20 mg tablet TAKE 1 TABLET BY MOUTH EVERY DAY 90 tablet 1    calcitriol (ROCALTROL) 0.25 mcg capsule Take 1 capsule (0.25 mcg total) by mouth daily TAKE 1 CAPSULE 5 DAYS A WEEK AS DIRECTED 90 capsule 3    cetirizine (ZyrTEC) 10 mg tablet Take 1 tablet (10 mg total) by mouth daily 30 tablet 1    COMBIGAN 0.2-0.5 %   0    Continuous Blood Gluc  (FreeStyle Alisson Loyal) CHERELLE Use 1 Units continuous (Patient not taking: Reported on 7/25/2024) 1 each 0    Continuous Blood Gluc Sensor (FreeStyle Alisson 3 Sensor) MISC Use 1 Units every 14 (fourteen) days (Patient not taking: Reported on 7/25/2024) 2 each 5    Cosopt PF 2-0.5 % SOLN instill 1 drop in both eyes twice daily      isosorbide dinitrate (ISORDIL) 10 mg tablet Take 1 tablet (10 mg total) by mouth 3 (three) times a day 270 tablet 3    metoprolol succinate (TOPROL-XL) 50 mg 24 hr tablet Take 1 tablet (50 mg total) by mouth daily 90 tablet 3    Nutritional Supplements (Nepro) LIQD Take 237 mL by mouth in the morning (Patient taking differently: Take 237 mL by mouth 2 (two) times a week Takes time to time) 5688 mL 3    sodium bicarbonate 650 mg tablet TAKE 1  TABLET(650 MG) BY MOUTH IN THE MORNING 90 tablet 1    Sodium Zirconium Cyclosilicate (Lokelma) 10 g TAKE 1 PACKET BY MOUTH EVERY OTHER DAY 20 each 2    torsemide (DEMADEX) 20 mg tablet TAKE 1 TABLET(20 MG) BY MOUTH DAILY 30 tablet 5    TRAVATAN Z 0.004 % ophthalmic solution Administer 1 drop to both eyes daily at bedtime   0    warfarin (COUMADIN) 2 mg tablet TAKE 1 TO 2 TABLETS DAILY BY MOUTH OR AS DIRECTED BY PHYSICIAN. 60 tablet 11     No current facility-administered medications on file prior to visit.      Social History     Tobacco Use    Smoking status: Former     Current packs/day: 0.00     Types: Cigarettes     Start date:      Quit date:      Years since quittin.7    Smokeless tobacco: Never   Vaping Use    Vaping status: Never Used   Substance and Sexual Activity    Alcohol use: Yes     Comment: occasionally    Drug use: No    Sexual activity: Yes     Partners: Female     Birth control/protection: None           Medical History Reviewed by provider this encounter:     .  Current Outpatient Medications on File Prior to Visit   Medication Sig Dispense Refill    ALPRAZolam (XANAX) 0.25 mg tablet TAKE 1 TABLET BY MOUTH PRIOR TO FLIGHT 4 tablet 0    amLODIPine (NORVASC) 10 mg tablet TAKE 1 TABLET(10 MG) BY MOUTH DAILY 90 tablet 1    ascorbic acid (VITAMIN C) 500 mg tablet Take 500 mg by mouth daily      Aspirin Low Dose 81 MG EC tablet TAKE 1 TABLET BY MOUTH EVERY DAY 90 tablet 1    atorvastatin (LIPITOR) 20 mg tablet TAKE 1 TABLET BY MOUTH EVERY DAY 90 tablet 1    calcitriol (ROCALTROL) 0.25 mcg capsule TAKE ONE CAPSULE BY MOUTH DAILY 5 DAYS A WEEK AS DIRECTED 90 capsule 1    cetirizine (ZyrTEC) 10 mg tablet Take 1 tablet (10 mg total) by mouth daily 30 tablet 1    COMBIGAN 0.2-0.5 %   0    isosorbide dinitrate (ISORDIL) 10 mg tablet Take 1 tablet (10 mg total) by mouth 3 (three) times a day 270 tablet 3    metoprolol succinate (TOPROL-XL) 50 mg 24 hr tablet TAKE 1 TABLET(50 MG) BY MOUTH DAILY 90  tablet 1    sodium bicarbonate 650 mg tablet TAKE 1 TABLET(650 MG) BY MOUTH IN THE MORNING 90 tablet 1    Sodium Zirconium Cyclosilicate (Lokelma) 10 g Take 1 packet (10 g total) by mouth every other day 30 each 1    torsemide (DEMADEX) 20 mg tablet TAKE 1 TABLET(20 MG) BY MOUTH DAILY 30 tablet 5    TRAVATAN Z 0.004 % ophthalmic solution Administer 1 drop to both eyes daily at bedtime   0    warfarin (COUMADIN) 2 mg tablet TAKE 2-3 TABLETS BY MOUTH DAILY AS DIRECTED BY PRESCRIBER 80 tablet 11     No current facility-administered medications on file prior to visit.      Social History     Tobacco Use    Smoking status: Former     Current packs/day: 0.00     Types: Cigarettes     Start date:      Quit date:      Years since quittin.1    Smokeless tobacco: Never   Vaping Use    Vaping status: Never Used   Substance and Sexual Activity    Alcohol use: Yes     Comment: occasionally    Drug use: No    Sexual activity: Yes     Partners: Female     Birth control/protection: None        Objective   There were no vitals taken for this visit.     Physical Exam  General:  no acute distress at this time  Skin:  No acute rash  Eyes:  No scleral icterus and noninjected  ENT:  mucous membranes moist  Neck:  no carotid bruits  Chest:  Clear to auscultation percussion, good respiratory effort, no use of accessory respiratory muscles  CVS:  Regular rate and rhythm without rub   Abdomen:  soft and nontender   Extremities: no significant lower extremity edema  Neuro:  No gross focality  Psych:  Alert , cooperative   Vascular access: Left AV fistula with good bruit and fluid

## 2025-02-10 NOTE — ASSESSMENT & PLAN NOTE
Lab Results   Component Value Date    EGFR 8 01/31/2025    EGFR 9 12/27/2024    EGFR 10 11/12/2024    CREATININE 6.02 (H) 01/31/2025    CREATININE 5.45 (H) 12/27/2024    CREATININE 5.20 (H) 11/12/2024   Baseline creatinine 5.2 to 5.5 mg/dL with progression of CKD to ESRD   Current creatinine is 6.02 progression of CKD    No uremic symptoms at this time  AV fistula performed in September 2024, last evaluation by vascular surgery February 2025, ready to be used  Referred to [facility]   Avoid NSAIDs  No urgent indication of dialysis ice time, will plan to start dialysis with symptoms    Orders:    CBC and Platelet; Future    Basic metabolic panel; Standing

## 2025-02-10 NOTE — ASSESSMENT & PLAN NOTE
Wt Readings from Last 3 Encounters:   02/05/25 64 kg (141 lb)   11/27/24 66.7 kg (147 lb)   11/14/24 65.3 kg (144 lb)     Euvolemic  Continue with torsemide 20 mg daily

## 2025-02-10 NOTE — PATIENT INSTRUCTIONS
Thank you for coming to your visit today. As we discussed you kidney function is worsening , your creatinine is 6.02mg/dL. Your potassium is slightly high. Please follow the recommendations below       Recommend low sodium (salt) food    Avoid nonsteroidal anti-inflammatory drugs such as Naprosyn, ibuprofen, Aleve, Advil, Celebrex, Meloxicam (Mobic) etc.  You can use Tylenol as needed if you do not have any liver condition to be concerned about    Try to exercise at least 30 minutes 3 days a week to begin with with an ultimate goal of 5 days a week for at least 30 minutes    Next Visit in 2-3 months with results   If you need to see us earlier we can change the appointment for you      Joselyn Reyes Bahamonde, MD  Nephrology Attending

## 2025-02-10 NOTE — ASSESSMENT & PLAN NOTE
UPCR   4.7 g/g secondary to secondary FSGS   Cannot take ACE inhibitors/ARB due to hyperkalemia   GFR low for SGLT2 inhibitors

## 2025-02-10 NOTE — ASSESSMENT & PLAN NOTE
Lab Results   Component Value Date    EGFR 8 01/31/2025    EGFR 9 12/27/2024    EGFR 10 11/12/2024    CREATININE 6.02 (H) 01/31/2025    CREATININE 5.45 (H) 12/27/2024    CREATININE 5.20 (H) 11/12/2024     Serum calcium 8.3  mg/dL  Phosphorus 4.8 mg/dL, goal less than 5.5  No indication of binders at this time  Orders:    Phosphorus; Future

## 2025-02-13 DIAGNOSIS — I50.22 CHRONIC SYSTOLIC HEART FAILURE (HCC): ICD-10-CM

## 2025-02-13 RX ORDER — ISOSORBIDE DINITRATE 10 MG/1
10 TABLET ORAL 3 TIMES DAILY
Qty: 270 TABLET | Refills: 1 | Status: SHIPPED | OUTPATIENT
Start: 2025-02-13

## 2025-03-02 DIAGNOSIS — E87.5 HYPERKALEMIA: ICD-10-CM

## 2025-03-03 RX ORDER — TORSEMIDE 20 MG/1
20 TABLET ORAL DAILY
Qty: 30 TABLET | Refills: 5 | Status: SHIPPED | OUTPATIENT
Start: 2025-03-03

## 2025-03-20 DIAGNOSIS — E87.20 METABOLIC ACIDOSIS: ICD-10-CM

## 2025-03-20 RX ORDER — SODIUM BICARBONATE 650 MG/1
TABLET ORAL
Qty: 90 TABLET | Refills: 1 | Status: SHIPPED | OUTPATIENT
Start: 2025-03-20

## 2025-03-26 ENCOUNTER — ANTICOAG VISIT (OUTPATIENT)
Dept: CARDIOLOGY CLINIC | Facility: CLINIC | Age: 75
End: 2025-03-26

## 2025-03-26 ENCOUNTER — APPOINTMENT (OUTPATIENT)
Dept: LAB | Facility: CLINIC | Age: 75
End: 2025-03-26
Payer: COMMERCIAL

## 2025-03-26 DIAGNOSIS — N18.5 CKD STAGE G5/A3, GFR <15 AND ALBUMIN CREATININE RATIO >300 MG/G (HCC): ICD-10-CM

## 2025-03-26 DIAGNOSIS — N18.5 CHRONIC KIDNEY DISEASE-MINERAL BONE DISORDER (CKD-MBD) WITH STAGE 5 CHRONIC KIDNEY DISEASE, NOT ON CHRONIC DIALYSIS (HCC): ICD-10-CM

## 2025-03-26 DIAGNOSIS — E87.5 HYPERKALEMIA: ICD-10-CM

## 2025-03-26 DIAGNOSIS — Z98.890 S/P MVR (MITRAL VALVE REPAIR): ICD-10-CM

## 2025-03-26 DIAGNOSIS — E83.9 CHRONIC KIDNEY DISEASE-MINERAL BONE DISORDER (CKD-MBD) WITH STAGE 5 CHRONIC KIDNEY DISEASE, NOT ON CHRONIC DIALYSIS (HCC): ICD-10-CM

## 2025-03-26 DIAGNOSIS — M89.9 CHRONIC KIDNEY DISEASE-MINERAL BONE DISORDER (CKD-MBD) WITH STAGE 5 CHRONIC KIDNEY DISEASE, NOT ON CHRONIC DIALYSIS (HCC): ICD-10-CM

## 2025-03-26 LAB
ANION GAP SERPL CALCULATED.3IONS-SCNC: 7 MMOL/L (ref 4–13)
BUN SERPL-MCNC: 74 MG/DL (ref 5–25)
CALCIUM SERPL-MCNC: 8.7 MG/DL (ref 8.4–10.2)
CHLORIDE SERPL-SCNC: 111 MMOL/L (ref 96–108)
CO2 SERPL-SCNC: 22 MMOL/L (ref 21–32)
CREAT SERPL-MCNC: 5.86 MG/DL (ref 0.6–1.3)
ERYTHROCYTE [DISTWIDTH] IN BLOOD BY AUTOMATED COUNT: 14.2 % (ref 11.6–15.1)
GFR SERPL CREATININE-BSD FRML MDRD: 8 ML/MIN/1.73SQ M
GLUCOSE P FAST SERPL-MCNC: 86 MG/DL (ref 65–99)
HCT VFR BLD AUTO: 36 % (ref 36.5–49.3)
HGB BLD-MCNC: 11.7 G/DL (ref 12–17)
INR PPP: 2.01 (ref 0.85–1.19)
MCH RBC QN AUTO: 31.2 PG (ref 26.8–34.3)
MCHC RBC AUTO-ENTMCNC: 32.5 G/DL (ref 31.4–37.4)
MCV RBC AUTO: 96 FL (ref 82–98)
PHOSPHATE SERPL-MCNC: 5.3 MG/DL (ref 2.3–4.1)
PLATELET # BLD AUTO: 166 THOUSANDS/UL (ref 149–390)
PMV BLD AUTO: 11.1 FL (ref 8.9–12.7)
POTASSIUM SERPL-SCNC: 4.8 MMOL/L (ref 3.5–5.3)
PROTHROMBIN TIME: 23.5 SECONDS (ref 12.3–15)
RBC # BLD AUTO: 3.75 MILLION/UL (ref 3.88–5.62)
SODIUM SERPL-SCNC: 140 MMOL/L (ref 135–147)
WBC # BLD AUTO: 4.96 THOUSAND/UL (ref 4.31–10.16)

## 2025-03-26 PROCEDURE — 84100 ASSAY OF PHOSPHORUS: CPT

## 2025-03-26 PROCEDURE — 80048 BASIC METABOLIC PNL TOTAL CA: CPT

## 2025-03-26 PROCEDURE — 85027 COMPLETE CBC AUTOMATED: CPT

## 2025-03-26 PROCEDURE — 36415 COLL VENOUS BLD VENIPUNCTURE: CPT

## 2025-03-26 PROCEDURE — 85610 PROTHROMBIN TIME: CPT

## 2025-03-27 DIAGNOSIS — I10 ESSENTIAL HYPERTENSION: Chronic | ICD-10-CM

## 2025-03-27 RX ORDER — METOPROLOL SUCCINATE 50 MG/1
50 TABLET, EXTENDED RELEASE ORAL DAILY
Qty: 90 TABLET | Refills: 0 | Status: SHIPPED | OUTPATIENT
Start: 2025-03-27

## 2025-03-28 ENCOUNTER — RESULTS FOLLOW-UP (OUTPATIENT)
Dept: NEPHROLOGY | Facility: CLINIC | Age: 75
End: 2025-03-28

## 2025-03-28 ENCOUNTER — TELEPHONE (OUTPATIENT)
Age: 75
End: 2025-03-28

## 2025-03-28 RX ORDER — SODIUM ZIRCONIUM CYCLOSILICATE 10 G/10G
POWDER, FOR SUSPENSION ORAL
Qty: 30 EACH | Refills: 1 | Status: SHIPPED | OUTPATIENT
Start: 2025-03-28

## 2025-03-28 NOTE — TELEPHONE ENCOUNTER
LVM to patient to let him know that his refill of lokelma 10 g was send to his pharmacy. Advised to please call our office to let us know he received the message and if have any other questions or concerns.

## 2025-04-09 ENCOUNTER — OFFICE VISIT (OUTPATIENT)
Dept: NEPHROLOGY | Facility: CLINIC | Age: 75
End: 2025-04-09
Payer: COMMERCIAL

## 2025-04-09 VITALS
BODY MASS INDEX: 22.13 KG/M2 | DIASTOLIC BLOOD PRESSURE: 62 MMHG | HEART RATE: 66 BPM | HEIGHT: 68 IN | OXYGEN SATURATION: 98 % | SYSTOLIC BLOOD PRESSURE: 116 MMHG | WEIGHT: 146 LBS

## 2025-04-09 DIAGNOSIS — R80.9 NEPHROTIC RANGE PROTEINURIA: ICD-10-CM

## 2025-04-09 DIAGNOSIS — I12.0 BENIGN HYPERTENSION WITH CHRONIC KIDNEY DISEASE, STAGE V (HCC): ICD-10-CM

## 2025-04-09 DIAGNOSIS — M89.9 CHRONIC KIDNEY DISEASE-MINERAL BONE DISORDER (CKD-MBD) WITH STAGE 5 CHRONIC KIDNEY DISEASE, NOT ON CHRONIC DIALYSIS (HCC): ICD-10-CM

## 2025-04-09 DIAGNOSIS — N18.5 CHRONIC KIDNEY DISEASE-MINERAL BONE DISORDER (CKD-MBD) WITH STAGE 5 CHRONIC KIDNEY DISEASE, NOT ON CHRONIC DIALYSIS (HCC): ICD-10-CM

## 2025-04-09 DIAGNOSIS — N05.1 FSGS (FOCAL SEGMENTAL GLOMERULOSCLEROSIS): ICD-10-CM

## 2025-04-09 DIAGNOSIS — N25.81 SECONDARY HYPERPARATHYROIDISM OF RENAL ORIGIN (HCC): ICD-10-CM

## 2025-04-09 DIAGNOSIS — E83.9 CHRONIC KIDNEY DISEASE-MINERAL BONE DISORDER (CKD-MBD) WITH STAGE 5 CHRONIC KIDNEY DISEASE, NOT ON CHRONIC DIALYSIS (HCC): ICD-10-CM

## 2025-04-09 DIAGNOSIS — N18.5 CKD STAGE G5/A3, GFR <15 AND ALBUMIN CREATININE RATIO >300 MG/G (HCC): Primary | ICD-10-CM

## 2025-04-09 DIAGNOSIS — I50.22 CHRONIC SYSTOLIC HEART FAILURE (HCC): ICD-10-CM

## 2025-04-09 DIAGNOSIS — N18.5 BENIGN HYPERTENSION WITH CHRONIC KIDNEY DISEASE, STAGE V (HCC): ICD-10-CM

## 2025-04-09 PROCEDURE — 99214 OFFICE O/P EST MOD 30 MIN: CPT | Performed by: STUDENT IN AN ORGANIZED HEALTH CARE EDUCATION/TRAINING PROGRAM

## 2025-04-09 NOTE — ASSESSMENT & PLAN NOTE
Lab Results   Component Value Date    EGFR 8 03/26/2025    EGFR 8 01/31/2025    EGFR 9 12/27/2024    CREATININE 5.86 (H) 03/26/2025    CREATININE 6.02 (H) 01/31/2025    CREATININE 5.45 (H) 12/27/2024     Serum calcium 8.7  mg/dL  Phosphorus 5.3 mg/dL, goal less than 5.5  No indication of binders at this time

## 2025-04-09 NOTE — ASSESSMENT & PLAN NOTE
UPCR   4.7 g/g secondary to secondary FSGS   No ACE inhibitors/ARB in the settings of hyperkalemia   GFR low for SGLT2 inhibitors

## 2025-04-09 NOTE — PATIENT INSTRUCTIONS
Thank you for coming to your visit today. As we discussed you kidney function is labile at your baseline.  No indication of dialysis at this time. Please follow the recommendations below       Recommend low sodium (salt) food    Avoid nonsteroidal anti-inflammatory drugs such as Naprosyn, ibuprofen, Aleve, Advil, Celebrex, Meloxicam (Mobic) etc.  You can use Tylenol as needed if you do not have any liver condition to be concerned about    Try to exercise at least 30 minutes 3 days a week to begin with with an ultimate goal of 5 days a week for at least 30 minutes  Check your blood pressure if BP is less than 120 please reduce Amlodipine to 1/2 tablets (5mg).     Next Visit in 3 months with results   If you need to see us earlier we can change the appointment for you      Joselyn Reyes Bahamonde, MD  Nephrology Attending

## 2025-04-09 NOTE — PROGRESS NOTES
Name: Myra Mustafa      : 1950      MRN: [id number]  Encounter Provider: Joselyn Reyes Bahamonde, MD  Encounter Date: 2025   Encounter department: [facility]  :  Assessment & Plan  CKD stage G5/A3, GFR <15 and albumin creatinine ratio >300 mg/g ([facility])  Lab Results   Component Value Date    EGFR 8 2025    EGFR 8 2025    EGFR 9 2024    CREATININE 5.86 (H) 2025    CREATININE 6.02 (H) 2025    CREATININE 5.45 (H) 2024   Baseline creatinine 5.2 to 5.5 mg/dL with progression of CKD to ESRD   Current creatinine is 5.86 mg/dL, remains at baseline   No uremic symptoms at this time  AV fistula performed in 2024, last evaluation by vascular surgery 2025, ready to be used  Referred to [facility]   No urgent indication of dialysis today's time, patient is doing labs every 3 months  Avoid NSAIDs  Patient aware to call if red flag symptoms euvolemic         Benign hypertension with chronic kidney disease, stage V ([facility])  Lab Results   Component Value Date    EGFR 8 2025    EGFR 8 2025    EGFR 9 2024    CREATININE 5.86 (H) 2025    CREATININE 6.02 (H) 2025    CREATININE 5.45 (H) 2024     Euvolemi  bood pressure normotensive, /62  recommend   Continue with low-sodium diet   Amlodipine 10 mg daily  Metoprolol 50 mg daily   torsemide 20  Advised to maintain a good BP control to prevent progression of CKD            FSGS (focal segmental glomerulosclerosis)  With progression of CKD G5  Genetic Testing:  Not done, possible APOL1  UPCR 4.6 g/g  Unable to give ACE inhibitor's,/ARB   due to borderline hyperkalemia       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, not on chronic dialysis ([facility])  Lab Results   Component Value Date    EGFR 8 2025    EGFR 8 2025    EGFR 9 2024    CREATININE 5.86 (H) 2025    CREATININE 6.02 (H) 2025     CREATININE 5.45 (H) 12/27/2024     Serum calcium 8.7  mg/dL  Phosphorus 5.3 mg/dL, goal less than 5.5  No indication of binders at this time        Secondary hyperparathyroidism of renal origin (HCC)  iPTH  320.7 guidelines levels KDIGO 2017  Continue calcitriol 0.25 mcg daily       Nephrotic range proteinuria  UPCR   4.7 g/g secondary to secondary FSGS   No ACE inhibitors/ARB in the settings of hyperkalemia   GFR low for SGLT2 inhibitors            History of Present Illness   HPI  Myra Mustafa is a 74 y.o. male PMH DM, FSGS (biopsy proven 2018), CKD G4A3 (bl creat 3-3.5mg/dL, eGFR 21-24).  Patient is here for follow-up of FSGS   History obtained from: patient    Review of Systems   Constitutional:  Negative for activity change and appetite change.   HENT:  Negative for congestion and dental problem.    Eyes:  Negative for discharge.   Respiratory:  Negative for chest tightness and shortness of breath.    Cardiovascular:  Negative for chest pain and leg swelling.   Gastrointestinal:  Negative for abdominal distention and abdominal pain.   Endocrine: Negative for cold intolerance.   Genitourinary:  Negative for dysuria.   Musculoskeletal:  Negative for arthralgias.   Skin:  Negative for color change and pallor.   Neurological:  Negative for dizziness.   Psychiatric/Behavioral:  Negative for agitation.      Pertinent Medical History           Medical History Reviewed by provider this encounter:     .  Current Outpatient Medications on File Prior to Visit   Medication Sig Dispense Refill    ALPRAZolam (XANAX) 0.25 mg tablet TAKE 1 TABLET BY MOUTH PRIOR TO FLIGHT 4 tablet 0    amLODIPine (NORVASC) 10 mg tablet TAKE 1 TABLET(10 MG) BY MOUTH DAILY 90 tablet 1    ascorbic acid (VITAMIN C) 500 mg tablet Take 500 mg by mouth daily      Aspirin Low Dose 81 MG EC tablet TAKE 1 TABLET BY MOUTH EVERY DAY 90 tablet 1    atorvastatin (LIPITOR) 20 mg tablet TAKE 1 TABLET BY MOUTH EVERY DAY 90 tablet 1    calcitriol (ROCALTROL)  0.25 mcg capsule TAKE ONE CAPSULE BY MOUTH DAILY 5 DAYS A WEEK AS DIRECTED 90 capsule 1    cetirizine (ZyrTEC) 10 mg tablet Take 1 tablet (10 mg total) by mouth daily 30 tablet 1    COMBIGAN 0.2-0.5 %   0    isosorbide dinitrate (ISORDIL) 10 mg tablet TAKE 1 TABLET(10 MG) BY MOUTH THREE TIMES DAILY 270 tablet 1    Lokelma 10 g MIX AND DRINK 1 PACKET(10 GRAMS) BY MOUTH EVERY OTHER DAY 30 each 1    metoprolol succinate (TOPROL-XL) 50 mg 24 hr tablet TAKE 1 TABLET(50 MG) BY MOUTH DAILY 90 tablet 0    sodium bicarbonate 650 mg tablet TAKE 1 TABLET(650 MG) BY MOUTH IN THE MORNING 90 tablet 1    torsemide (DEMADEX) 20 mg tablet TAKE 1 TABLET(20 MG) BY MOUTH DAILY 30 tablet 5    TRAVATAN Z 0.004 % ophthalmic solution Administer 1 drop to both eyes daily at bedtime   0    warfarin (COUMADIN) 2 mg tablet TAKE 2-3 TABLETS BY MOUTH DAILY AS DIRECTED BY PRESCRIBER 80 tablet 11     No current facility-administered medications on file prior to visit.      Social History     Tobacco Use    Smoking status: Former     Current packs/day: 0.00     Types: Cigarettes     Start date:      Quit date:      Years since quittin.3    Smokeless tobacco: Never   Vaping Use    Vaping status: Never Used   Substance and Sexual Activity    Alcohol use: Yes     Comment: occasionally    Drug use: No    Sexual activity: Yes     Partners: Female     Birth control/protection: None        Objective   There were no vitals taken for this visit.     Physical Exam  General:  no acute distress at this time  Skin:  No acute rash  Eyes:  No scleral icterus and noninjected  ENT:  mucous membranes moist  Neck:  no carotid bruits  Chest:  Clear to auscultation percussion, good respiratory effort, no use of accessory respiratory muscles  CVS:  Regular rate and rhythm without rub   Abdomen:  soft and nontender   Extremities: no significant lower extremity edema  Neuro:  No gross focality  Psych:  Alert , cooperative

## 2025-04-09 NOTE — ASSESSMENT & PLAN NOTE
Lab Results   Component Value Date    EGFR 8 03/26/2025    EGFR 8 01/31/2025    EGFR 9 12/27/2024    CREATININE 5.86 (H) 03/26/2025    CREATININE 6.02 (H) 01/31/2025    CREATININE 5.45 (H) 12/27/2024   Baseline creatinine 5.2 to 5.5 mg/dL with progression of CKD to ESRD   Current creatinine is 5.86 mg/dL, remains at baseline   No uremic symptoms at this time  AV fistula performed in September 2024, last evaluation by vascular surgery February 2025, ready to be used  Referred to [facility]   No urgent indication of dialysis today's time, patient is doing labs every 3 months  Avoid NSAIDs  Patient aware to call if red flag symptoms euvolemic

## 2025-04-09 NOTE — ASSESSMENT & PLAN NOTE
Lab Results   Component Value Date    EGFR 8 03/26/2025    EGFR 8 01/31/2025    EGFR 9 12/27/2024    CREATININE 5.86 (H) 03/26/2025    CREATININE 6.02 (H) 01/31/2025    CREATININE 5.45 (H) 12/27/2024     Euvolemi  bood pressure normotensive, /62  recommend   Continue with low-sodium diet   Amlodipine 10 mg daily  Metoprolol 50 mg daily   torsemide 20  Advised to maintain a good BP control to prevent progression of CKD

## 2025-04-09 NOTE — ASSESSMENT & PLAN NOTE
With progression of CKD G5  Genetic Testing:  Not done, possible APOL1  UPCR 4.6 g/g  Unable to give ACE inhibitor's,/ARB   due to borderline hyperkalemia

## 2025-04-11 NOTE — TELEPHONE ENCOUNTER

## 2025-04-16 ENCOUNTER — OFFICE VISIT (OUTPATIENT)
Dept: CARDIOLOGY CLINIC | Facility: CLINIC | Age: 75
End: 2025-04-16
Payer: COMMERCIAL

## 2025-04-16 VITALS
DIASTOLIC BLOOD PRESSURE: 72 MMHG | HEIGHT: 68 IN | SYSTOLIC BLOOD PRESSURE: 138 MMHG | BODY MASS INDEX: 21.19 KG/M2 | OXYGEN SATURATION: 99 % | WEIGHT: 139.8 LBS | HEART RATE: 67 BPM

## 2025-04-16 DIAGNOSIS — I12.0 BENIGN HYPERTENSION WITH CHRONIC KIDNEY DISEASE, STAGE V (HCC): ICD-10-CM

## 2025-04-16 DIAGNOSIS — N18.6 END STAGE RENAL DISEASE (HCC): ICD-10-CM

## 2025-04-16 DIAGNOSIS — I48.11 LONGSTANDING PERSISTENT ATRIAL FIBRILLATION (HCC): ICD-10-CM

## 2025-04-16 DIAGNOSIS — I34.0 NON-RHEUMATIC MITRAL REGURGITATION: Chronic | ICD-10-CM

## 2025-04-16 DIAGNOSIS — I10 ESSENTIAL HYPERTENSION: Primary | Chronic | ICD-10-CM

## 2025-04-16 DIAGNOSIS — I50.22 CHRONIC SYSTOLIC HEART FAILURE (HCC): Chronic | ICD-10-CM

## 2025-04-16 DIAGNOSIS — I35.1 NONRHEUMATIC AORTIC VALVE INSUFFICIENCY: Chronic | ICD-10-CM

## 2025-04-16 DIAGNOSIS — E78.5 DYSLIPIDEMIA: ICD-10-CM

## 2025-04-16 DIAGNOSIS — N18.5 BENIGN HYPERTENSION WITH CHRONIC KIDNEY DISEASE, STAGE V (HCC): ICD-10-CM

## 2025-04-16 DIAGNOSIS — I42.0 DILATED CARDIOMYOPATHY (HCC): Chronic | ICD-10-CM

## 2025-04-16 DIAGNOSIS — Q23.81 BICUSPID AORTIC VALVE: Chronic | ICD-10-CM

## 2025-04-16 PROCEDURE — 93000 ELECTROCARDIOGRAM COMPLETE: CPT | Performed by: INTERNAL MEDICINE

## 2025-04-16 PROCEDURE — 99214 OFFICE O/P EST MOD 30 MIN: CPT | Performed by: INTERNAL MEDICINE

## 2025-04-16 NOTE — PROGRESS NOTES
Cardiology   Myra Mustafa 74 y.o. male MRN: [id number]        Impression:  1. S/P AVR/MV repair 3/19 - doing well.   2. Atrial flutter - in NSR. On warfarin.  Occasional PVCs.  3. Hypertension - controlled.  4. Cardiomyopathy - likely tachy mediated. Resolved.   5. CKD - stage V - not on HD.     Recommendations:  1. Continue current medications.  2. Follow up in 6 months.         HPI: Myra Mustafa is a 74 y.o. year old male with mod-severe MR/mod-severe AI and EF 40% s/p bioAVR and MV repair with annuloplasty ring and VIRI clipping 3/19, CKD with progression of disease, with paroxysmal atrial fibrillation/atrial flutter who returns for follow up.  Had a PATIENCE/DCCV 4/12/19 - EF 25%, mod MR, no clot.  Successfully converted to NSR.  Echo 11/20 - EF 65%, normal bio AVR and MV repair.  Holter monitor 6/22 demonstrated no atrial flutter. Echo 10/22 - Normal LV systolic function, normal AVR/MVR with mild MR.  Is slated to undergo placement of an AV fistula for HD. Is very active - climbs stairs without difficulty. Echo 9/24 - EF 55%, bioprosthetic AVR/MV repair normal.         Review of Systems   Constitutional: Negative.    HENT: Negative.     Eyes: Negative.    Respiratory:  Negative for chest tightness and shortness of breath.    Cardiovascular:  Negative for chest pain, palpitations and leg swelling.   Gastrointestinal: Negative.    Endocrine: Negative.    Genitourinary: Negative.    Musculoskeletal: Negative.    Skin: Negative.    Allergic/Immunologic: Negative.    Neurological: Negative.    Hematological: Negative.    Psychiatric/Behavioral: Negative.     All other systems reviewed and are negative.        Past Medical History:   Diagnosis Date    Chronic kidney disease     Colon polyp     COVID-19     Diabetes mellitus (HCC)     Hyperlipidemia     Hypertension     Proteinuria     Stage 3 chronic kidney disease (HCC)     Vitamin D deficiency      Past Surgical History:   Procedure Laterality Date    COLONOSCOPY       overdue    WI ARTERIOVENOUS ANASTOMOSIS OPEN DIRECT Left 2024    Procedure: CREATION LEFT FISTULA ARTERIOVENOUS (AV);  Surgeon: Isaias Nieto MD;  [facility];  Service: Vascular    WI ECHO TRANSESOPHAG R-T 2D W/PRB IMG ACQUISJ I&R N/A 3/7/2019    Procedure: INTRA-OP TRANSESOPHAGEAL ECHOCARDIOGRAM (PATIENCE);  Surgeon: Gaudencio Tubbs DO;  [facility];  Service: Cardiac Surgery    WI NASAL ENDOSCOPY DIAGNOSTIC UNI/BI SPX Bilateral 2023    Procedure: ENDOSCOPY NOSE BILATERALLY, WITH ENDOSCOPIC CONTROL OF EPISTAXIS ON LEFT;  Surgeon: Ady Monahan MD;  [facility];  Service: ENT    WI PERQ CLSR TCAT L ATR APNDGE W/ENDOCARDIAL IMPLNT  3/7/2019    Procedure: LEFT ATRIAL APPENDAGE OCCLUSION CLIPPED with 35mm ATRICURE ATRICLIP;  Surgeon: Gaudencio Tubbs DO;  [facility];  Service: Cardiac Surgery    WI REPLACEMENT MITRAL VALVE W/CARDIOPULMONARY BYP N/A 3/7/2019    Procedure: REPLACEMENT VALVE MITRAL (MVR) REPAIR with a 30mm MEDTRONIC CG FUTURE RING;  Surgeon: Gaudencio Tubbs DO;  [facility];  Service: Cardiac Surgery    WI RPLCMT AORTIC VALVE OPN W/STENTLESS TISSUE VALVE N/A 3/7/2019    Procedure: REPLACEMENT VALVE AORTIC (AVR) with 23mm TAVERA INSPIRIS RESILIA  AORTIC VALVE;  Surgeon: Gaudencio Tubbs DO;  [facility];  Service: Cardiac Surgery    RENAL BIOPSY, OPEN      TOE SURGERY Left      Social History     Substance and Sexual Activity   Alcohol Use Yes    Comment: occasionally     Social History     Substance and Sexual Activity   Drug Use No     Social History     Tobacco Use   Smoking Status Former    Current packs/day: 0.00    Types: Cigarettes    Start date:     Quit date:     Years since quittin.3   Smokeless Tobacco Never     Family History   Problem Relation Age of Onset    Stroke Mother     Hypertension Mother     Blindness Father     Hyperlipidemia Father     Hypertension Father     Glaucoma Father      Cancer Sister     Diabetes Sister     Ovarian cancer Sister     Gout Brother     Heart Valve Disease Brother     Hypertension Brother     Anuerysm Neg Hx     Heart attack Neg Hx     Arrhythmia Neg Hx     Heart failure Neg Hx     Coronary artery disease Neg Hx     Sudden death Neg Hx         scd       Allergies:  Allergies   Allergen Reactions    Ace Inhibitors Cough    Keflex [Cephalexin] Rash       Medications:     Current Outpatient Medications:     ALPRAZolam (XANAX) 0.25 mg tablet, TAKE 1 TABLET BY MOUTH PRIOR TO FLIGHT, Disp: 4 tablet, Rfl: 0    amLODIPine (NORVASC) 10 mg tablet, TAKE 1 TABLET(10 MG) BY MOUTH DAILY, Disp: 90 tablet, Rfl: 1    ascorbic acid (VITAMIN C) 500 mg tablet, Take 500 mg by mouth daily, Disp: , Rfl:     Aspirin Low Dose 81 MG EC tablet, TAKE 1 TABLET BY MOUTH EVERY DAY, Disp: 90 tablet, Rfl: 1    atorvastatin (LIPITOR) 20 mg tablet, TAKE 1 TABLET BY MOUTH EVERY DAY, Disp: 90 tablet, Rfl: 1    calcitriol (ROCALTROL) 0.25 mcg capsule, TAKE ONE CAPSULE BY MOUTH DAILY 5 DAYS A WEEK AS DIRECTED, Disp: 90 capsule, Rfl: 1    cetirizine (ZyrTEC) 10 mg tablet, Take 1 tablet (10 mg total) by mouth daily, Disp: 30 tablet, Rfl: 1    COMBIGAN 0.2-0.5 %, , Disp: , Rfl: 0    isosorbide dinitrate (ISORDIL) 10 mg tablet, TAKE 1 TABLET(10 MG) BY MOUTH THREE TIMES DAILY, Disp: 270 tablet, Rfl: 1    Lokelma 10 g, MIX AND DRINK 1 PACKET(10 GRAMS) BY MOUTH EVERY OTHER DAY, Disp: 30 each, Rfl: 1    metoprolol succinate (TOPROL-XL) 50 mg 24 hr tablet, TAKE 1 TABLET(50 MG) BY MOUTH DAILY, Disp: 90 tablet, Rfl: 0    sodium bicarbonate 650 mg tablet, TAKE 1 TABLET(650 MG) BY MOUTH IN THE MORNING, Disp: 90 tablet, Rfl: 1    torsemide (DEMADEX) 20 mg tablet, TAKE 1 TABLET(20 MG) BY MOUTH DAILY, Disp: 30 tablet, Rfl: 5    TRAVATAN Z 0.004 % ophthalmic solution, Administer 1 drop to both eyes daily at bedtime , Disp: , Rfl: 0    warfarin (COUMADIN) 2 mg tablet, TAKE 2-3 TABLETS BY MOUTH DAILY AS DIRECTED BY  "PRESCRIBER, Disp: 80 tablet, Rfl: 11      Wt Readings from Last 3 Encounters:   04/16/25 63.4 kg (139 lb 12.8 oz)   04/09/25 66.2 kg (146 lb)   02/10/25 64.9 kg (143 lb)     Temp Readings from Last 3 Encounters:   09/25/24 98 °F (36.7 °C) (Tympanic)   09/17/24 (!) 96.6 °F (35.9 °C)   06/13/24 98.4 °F (36.9 °C) (Oral)     BP Readings from Last 3 Encounters:   04/16/25 138/72   04/09/25 116/62   02/10/25 120/74     Pulse Readings from Last 3 Encounters:   04/16/25 67   04/09/25 66   02/10/25 72         Physical Exam  HENT:      Head: Atraumatic.      Mouth/Throat:      Mouth: Mucous membranes are moist.   Eyes:      Extraocular Movements: Extraocular movements intact.   Cardiovascular:      Rate and Rhythm: Normal rate and regular rhythm.      Heart sounds: Murmur heard.      Systolic murmur is present with a grade of 2/6.   Pulmonary:      Effort: Pulmonary effort is normal.      Breath sounds: Normal breath sounds.   Abdominal:      General: Abdomen is flat.   Musculoskeletal:         General: Normal range of motion.      Cervical back: Normal range of motion.   Skin:     General: Skin is warm.   Neurological:      General: No focal deficit present.      Mental Status: He is alert and oriented to person, place, and time.   Psychiatric:         Mood and Affect: Mood normal.         Behavior: Behavior normal.           Laboratory Studies:  CMP:  Lab Results   Component Value Date    K 4.8 03/26/2025     (H) 03/26/2025    CO2 22 03/26/2025    BUN 74 (H) 03/26/2025    CREATININE 5.86 (H) 03/26/2025    GLUCOSE 100 01/13/2023    AST 21 06/13/2024    ALT 14 06/13/2024    EGFR 8 03/26/2025       Lipid Profile:   No results found for: \"CHOL\"  Lab Results   Component Value Date    HDL 50 06/10/2024     Lab Results   Component Value Date    LDLCALC 61 06/10/2024     Lab Results   Component Value Date    TRIG 83 06/10/2024       Cardiac testing:   EKG reviewed personally: NSR 67 RBBB LAFB  Results for orders placed during " the hospital encounter of 20    Echo complete with contrast if indicated    Narrative  [facility]  [address], [place] [id number] [phone]    Transthoracic Echocardiogram  2D, M-mode, Doppler, and Color Doppler    Study date:  2020    Patient: MILES ALVAREZ  MR number: [id number]  Account number: [id number]  : 1950  Age: 70 years  Gender: Male  Status: Outpatient  Location: [facility]  Height: 66 in  Weight: 183.7 lb  BP: 148/ 86 mmHg    Indications: CAD, AVR, MV Repair    Diagnoses: I25.83 - Coronary atherosclerosis due to lipid rich plaque    Sonographer:  TIMMY Robin  Primary Physician:  Rufina Cao MD  Referring Physician:  Tobin Conde MD  Group:  [facility]  Interpreting Physician:  Parker Lund MD    SUMMARY    LEFT VENTRICLE:  Systolic function was normal. Ejection fraction was estimated to be 65 %.  There were no regional wall motion abnormalities.  Features were consistent with a pseudonormal left ventricular filling pattern, with concomitant abnormal relaxation and increased filling pressure (grade 2 diastolic dysfunction).    MITRAL VALVE:  There was mild regurgitation.    AORTIC VALVE:  A bioprosthesis was present. It exhibited normal function. The mean gradient was 13mmHg.    TRICUSPID VALVE:  There was mild to moderate regurgitation.  Estimated peak PA pressure was 28 mmHg.    PULMONIC VALVE:  There was mild to moderate regurgitation.    HISTORY: PRIOR HISTORY: heart failure, AI, MR, bicuspid AV, CAD, CM, aflutter    PROCEDURE: The study was performed in the [facility]. This was a routine study. The transthoracic approach was used. The study included complete 2D imaging, M-mode, complete spectral Doppler, and color Doppler. The  heart rate was 74 bpm, at the start of the study. Images were obtained from the parasternal, apical, subcostal, and suprasternal notch  acoustic windows. Image quality was adequate.    LEFT VENTRICLE: Size was normal. Systolic function was normal. Ejection fraction was estimated to be 65 %. There were no regional wall motion abnormalities. Wall thickness was normal. DOPPLER: Features were consistent with a pseudonormal  left ventricular filling pattern, with concomitant abnormal relaxation and increased filling pressure (grade 2 diastolic dysfunction).    RIGHT VENTRICLE: The size was normal. Systolic function was normal. Wall thickness was normal.    LEFT ATRIUM: Size was normal.    RIGHT ATRIUM: Size was normal.    MITRAL VALVE: There was normal leaflet separation. There was a prior surgical repair. The mean gradient across the ring was 2mmHg. DOPPLER: The transmitral velocity was within the normal range. There was mild regurgitation.    AORTIC VALVE: A bioprosthesis was present. It exhibited normal function. The mean gradient was 13mmHg.    TRICUSPID VALVE: The valve structure was normal. There was normal leaflet separation. DOPPLER: The transtricuspid velocity was within the normal range. There was no evidence for stenosis. There was mild to moderate regurgitation. Estimated  peak PA pressure was 28 mmHg.    PULMONIC VALVE: Leaflets exhibited normal thickness, no calcification, and normal cuspal separation. DOPPLER: The transpulmonic velocity was within the normal range. There was mild to moderate regurgitation.    PERICARDIUM: There was no pericardial effusion.    AORTA: The root exhibited normal size.    SYSTEMIC VEINS: IVC: The inferior vena cava was not well visualized.    SYSTEM MEASUREMENT TABLES    2D  %FS: 37.23 %  Ao asc: 3.41 cm  EDV(Teich): 86.36 ml  EF(Teich): 67.44 %  ESV(Teich): 28.12 ml  IVSd: 0.88 cm  LA Area: 11.95 cm2  LA Diam: 3.74 cm  LVEDV MOD A4C: 56.04 ml  LVEF MOD A4C: 60.86 %  LVESV MOD A4C: 21.94 ml  LVIDd: 4.37 cm  LVIDs: 2.74 cm  LVLd A4C: 8.16 cm  LVLs A4C: 7.31 cm  LVOT Diam: 1.9 cm  LVPWd: 0.92 cm  RA Area: 10.72  cm2  RVIDd: 3.71 cm  SV MOD A4C: 34.11 ml  SV(Teich): 58.25 ml    CW  AV Env.Ti: 312.08 ms  AV MaxP.7 mmHg  AV VTI: 52.95 cm  AV Vmax: 2.43 m/s  AV Vmean: 1.7 m/s  AV meanP.43 mmHg  TR MaxP.49 mmHg  TR Vmax: 2.32 m/s    MM  TAPSE: 1.46 cm    PW  E' Sept: 0.06 m/s  E/E' Sept: 18.99  MV A Jeferson: 1.05 m/s  MV Dec [place]: 4.15 m/s2  MV DecT: 275.38 ms  MV E Jeferson: 1.13 m/s  MV E/A Ratio: 1.08  MV PHT: 79.86 ms  MVA By PHT: 2.75 cm2    [facility] Accredited Echocardiography Laboratory    Prepared and electronically signed by    Parker Lund MD  Signed 2020 12:07:16    Results for orders placed during the hospital encounter of 19    PATIENCE    [address] [address]  [phone]    Transesophageal Echocardiogram  Limited 2D, Doppler, and Color Doppler    Study date:  2019    Patient: MILES ALVAREZ  MR number: [id number]  Account number: [id number]  : 1950  Age: 68 years  Gender: Male  Status: Outpatient  Location: Cath lab  Height: 66 in  Weight: 156 lb  BP: 140/ 100 mmHg    Indications: Atrial flutter w/ cardioversion.    Diagnoses: I48.1 - Atrial flutter    Sonographer:  Lena Jean Baptiste RDCS  Primary Physician:  Rufina Cao  Referring Physician:  Tobin Conde MD  Group:  [facility]  RN:  Pattie Robison  Interpreting Physician:  Tobin Conde MD    SUMMARY    LEFT VENTRICLE:  The ventricle was mildly dilated.  Systolic function was severely reduced. Ejection fraction was estimated to be 25 %.  There was severe diffuse hypokinesis.  Wall thickness was normal.    RIGHT VENTRICLE:  The size was normal.  Systolic function was mildly reduced.    LEFT ATRIAL APPENDAGE:  S/P clipping. No thrombus.    MITRAL VALVE:  Prior repair procedures: surgical repair  There was moderate regurgitation.    TRICUSPID VALVE:  There was mild regurgitation.    HISTORY: PRIOR HISTORY: DM2. Dilated  cardiomyopathy. CAD. Bicuspid aortic valve. Hypertension. Systolic congestive heart failure. S/P AVR and MV clip; March 2019.    PROCEDURE: The procedure was performed in the catheterization laboratory. This was a routine study. The risks and alternatives of the procedure were explained to the patient and informed consent was obtained. The transesophageal approach  was used. The study included limited 2D imaging, limited spectral Doppler, color Doppler, and probe insertion (without interpretation). The heart rate was 125 bpm, at the start of the study. An adult omniplane probe was inserted by the  attending cardiologist. Intubated with ease. One intubation attempt(s). There was no blood detected on the probe. Image quality was adequate. There were no complications during the procedure. MEDICATIONS: Anesthesia administered by  anesthesia team.    LEFT VENTRICLE: The ventricle was mildly dilated. Systolic function was severely reduced. Ejection fraction was estimated to be 25 %. There was severe diffuse hypokinesis. Wall thickness was normal.    RIGHT VENTRICLE: The size was normal. Systolic function was mildly reduced. Wall thickness was normal.    LEFT ATRIUM: Size was normal. No thrombus was identified. APPENDAGE: S/P clipping. No thrombus.    ATRIAL SEPTUM: No defect or patent foramen ovale was identified.    RIGHT ATRIUM: Size was normal. No thrombus was identified.    MITRAL VALVE: Prior repair procedures: surgical repair DOPPLER: There was moderate regurgitation.    AORTIC VALVE: A bioprosthesis was present. It exhibited normal function. DOPPLER: There was no regurgitation.    TRICUSPID VALVE: The valve structure was normal. There was normal leaflet separation. There was no echocardiographic evidence of vegetation. DOPPLER: There was mild regurgitation.    PULMONIC VALVE: Leaflets exhibited normal thickness, no calcification, and normal cuspal separation. There was no echocardiographic evidence of  vegetation.    PERICARDIUM: There was no pericardial effusion. The pericardium was normal in appearance.    AORTA: The root exhibited normal size. There was no atheroma. There was no evidence for dissection. There was no evidence for aneurysm.    [employer] Accredited Echocardiography Laboratory    Prepared and electronically signed by    Tobin Conde MD  Signed 12-Apr-2019 15:55:03    No results found for this or any previous visit.    No results found for this or any previous visit.

## 2025-05-01 DIAGNOSIS — N25.81 SECONDARY HYPERPARATHYROIDISM (HCC): Chronic | ICD-10-CM

## 2025-05-01 RX ORDER — CALCITRIOL 0.25 UG/1
CAPSULE, LIQUID FILLED ORAL
Qty: 90 CAPSULE | Refills: 1 | Status: SHIPPED | OUTPATIENT
Start: 2025-05-01

## 2025-05-10 ENCOUNTER — APPOINTMENT (OUTPATIENT)
Dept: LAB | Facility: CLINIC | Age: 75
End: 2025-05-10
Payer: COMMERCIAL

## 2025-05-10 DIAGNOSIS — Z98.890 S/P MVR (MITRAL VALVE REPAIR): ICD-10-CM

## 2025-05-10 DIAGNOSIS — N18.5 CKD STAGE G5/A3, GFR <15 AND ALBUMIN CREATININE RATIO >300 MG/G (HCC): ICD-10-CM

## 2025-05-10 LAB
ANION GAP SERPL CALCULATED.3IONS-SCNC: 8 MMOL/L (ref 4–13)
BUN SERPL-MCNC: 79 MG/DL (ref 5–25)
CALCIUM SERPL-MCNC: 8.4 MG/DL (ref 8.4–10.2)
CHLORIDE SERPL-SCNC: 113 MMOL/L (ref 96–108)
CO2 SERPL-SCNC: 17 MMOL/L (ref 21–32)
CREAT SERPL-MCNC: 5.92 MG/DL (ref 0.6–1.3)
GFR SERPL CREATININE-BSD FRML MDRD: 8 ML/MIN/1.73SQ M
GLUCOSE P FAST SERPL-MCNC: 81 MG/DL (ref 65–99)
INR PPP: 2.73 (ref 0.85–1.19)
POTASSIUM SERPL-SCNC: 5.4 MMOL/L (ref 3.5–5.3)
PROTHROMBIN TIME: 29.6 SECONDS (ref 12.3–15)
SODIUM SERPL-SCNC: 138 MMOL/L (ref 135–147)

## 2025-05-10 PROCEDURE — 80048 BASIC METABOLIC PNL TOTAL CA: CPT

## 2025-05-10 PROCEDURE — 85610 PROTHROMBIN TIME: CPT

## 2025-05-10 PROCEDURE — 36415 COLL VENOUS BLD VENIPUNCTURE: CPT

## 2025-05-12 ENCOUNTER — ANTICOAG VISIT (OUTPATIENT)
Dept: CARDIOLOGY CLINIC | Facility: CLINIC | Age: 75
End: 2025-05-12

## 2025-05-28 DIAGNOSIS — I50.22 CHRONIC SYSTOLIC HEART FAILURE (HCC): ICD-10-CM

## 2025-05-29 RX ORDER — ISOSORBIDE DINITRATE 10 MG/1
10 TABLET ORAL 3 TIMES DAILY
Qty: 270 TABLET | Refills: 0 | Status: SHIPPED | OUTPATIENT
Start: 2025-05-29

## 2025-06-05 DIAGNOSIS — E78.3 HYPERCHYLOMICRONEMIA: ICD-10-CM

## 2025-06-05 RX ORDER — ATORVASTATIN CALCIUM 20 MG/1
20 TABLET, FILM COATED ORAL DAILY
Qty: 90 TABLET | Refills: 1 | Status: SHIPPED | OUTPATIENT
Start: 2025-06-05

## 2025-06-27 DIAGNOSIS — I10 ESSENTIAL HYPERTENSION: Chronic | ICD-10-CM

## 2025-06-27 RX ORDER — METOPROLOL SUCCINATE 50 MG/1
50 TABLET, EXTENDED RELEASE ORAL DAILY
Qty: 90 TABLET | Refills: 1 | Status: SHIPPED | OUTPATIENT
Start: 2025-06-27

## 2025-07-02 NOTE — ASSESSMENT & PLAN NOTE
Addended by: RENAE CONNELLY on: 7/2/2025 03:50 PM     Modules accepted: Orders     Lab Results   Component Value Date    EGFR 8 01/31/2025    EGFR 9 12/27/2024    EGFR 10 11/12/2024    CREATININE 6.02 (H) 01/31/2025    CREATININE 5.45 (H) 12/27/2024    CREATININE 5.20 (H) 11/12/2024     Blood pressure normotensive, BP  120/70   recommend   Continue with low-sodium diet  Continue with amlodipine 10   Metoprolol 50 mg daily   Torsemide 20 mg daily   Advised to maintain a good BP control to prevent progression of CKD

## 2025-07-08 ENCOUNTER — ANTICOAG VISIT (OUTPATIENT)
Dept: CARDIOLOGY CLINIC | Facility: CLINIC | Age: 75
End: 2025-07-08

## 2025-07-08 ENCOUNTER — OFFICE VISIT (OUTPATIENT)
Dept: NEPHROLOGY | Facility: CLINIC | Age: 75
End: 2025-07-08
Payer: COMMERCIAL

## 2025-07-08 ENCOUNTER — APPOINTMENT (OUTPATIENT)
Dept: LAB | Facility: CLINIC | Age: 75
End: 2025-07-08
Attending: INTERNAL MEDICINE
Payer: COMMERCIAL

## 2025-07-08 VITALS — DIASTOLIC BLOOD PRESSURE: 80 MMHG | WEIGHT: 139 LBS | SYSTOLIC BLOOD PRESSURE: 120 MMHG | BODY MASS INDEX: 21.13 KG/M2

## 2025-07-08 DIAGNOSIS — I12.0 BENIGN HYPERTENSION WITH CHRONIC KIDNEY DISEASE, STAGE V (HCC): ICD-10-CM

## 2025-07-08 DIAGNOSIS — N18.5 CHRONIC KIDNEY DISEASE-MINERAL BONE DISORDER (CKD-MBD) WITH STAGE 5 CHRONIC KIDNEY DISEASE, NOT ON CHRONIC DIALYSIS (HCC): ICD-10-CM

## 2025-07-08 DIAGNOSIS — Z98.890 S/P MVR (MITRAL VALVE REPAIR): ICD-10-CM

## 2025-07-08 DIAGNOSIS — I50.22 CHRONIC SYSTOLIC HEART FAILURE (HCC): ICD-10-CM

## 2025-07-08 DIAGNOSIS — M89.9 CHRONIC KIDNEY DISEASE-MINERAL BONE DISORDER (CKD-MBD) WITH STAGE 5 CHRONIC KIDNEY DISEASE, NOT ON CHRONIC DIALYSIS (HCC): ICD-10-CM

## 2025-07-08 DIAGNOSIS — E87.5 HYPERKALEMIA: ICD-10-CM

## 2025-07-08 DIAGNOSIS — N05.1 FSGS (FOCAL SEGMENTAL GLOMERULOSCLEROSIS): ICD-10-CM

## 2025-07-08 DIAGNOSIS — N18.5 CKD STAGE G5/A3, GFR <15 AND ALBUMIN CREATININE RATIO >300 MG/G (HCC): ICD-10-CM

## 2025-07-08 DIAGNOSIS — N25.81 SECONDARY HYPERPARATHYROIDISM OF RENAL ORIGIN (HCC): ICD-10-CM

## 2025-07-08 DIAGNOSIS — N18.5 CKD STAGE G5/A3, GFR <15 AND ALBUMIN CREATININE RATIO >300 MG/G (HCC): Primary | ICD-10-CM

## 2025-07-08 DIAGNOSIS — N18.5 BENIGN HYPERTENSION WITH CHRONIC KIDNEY DISEASE, STAGE V (HCC): ICD-10-CM

## 2025-07-08 DIAGNOSIS — R80.9 NEPHROTIC RANGE PROTEINURIA: ICD-10-CM

## 2025-07-08 DIAGNOSIS — E83.9 CHRONIC KIDNEY DISEASE-MINERAL BONE DISORDER (CKD-MBD) WITH STAGE 5 CHRONIC KIDNEY DISEASE, NOT ON CHRONIC DIALYSIS (HCC): ICD-10-CM

## 2025-07-08 PROBLEM — N18.6 END STAGE RENAL DISEASE (HCC): Status: RESOLVED | Noted: 2024-06-04 | Resolved: 2025-07-08

## 2025-07-08 PROBLEM — Z01.810 PREOP CARDIOVASCULAR EXAM: Status: RESOLVED | Noted: 2024-09-05 | Resolved: 2025-07-08

## 2025-07-08 PROBLEM — Z48.89 ENCOUNTER FOR POSTOPERATIVE CARE: Status: RESOLVED | Noted: 2019-04-10 | Resolved: 2025-07-08

## 2025-07-08 LAB
ANION GAP SERPL CALCULATED.3IONS-SCNC: 8 MMOL/L (ref 4–13)
BUN SERPL-MCNC: 84 MG/DL (ref 5–25)
CALCIUM SERPL-MCNC: 8.3 MG/DL (ref 8.4–10.2)
CHLORIDE SERPL-SCNC: 112 MMOL/L (ref 96–108)
CO2 SERPL-SCNC: 19 MMOL/L (ref 21–32)
CREAT SERPL-MCNC: 7.12 MG/DL (ref 0.6–1.3)
GFR SERPL CREATININE-BSD FRML MDRD: 6 ML/MIN/1.73SQ M
GLUCOSE P FAST SERPL-MCNC: 85 MG/DL (ref 65–99)
INR PPP: 1.57 (ref 0.85–1.19)
POTASSIUM SERPL-SCNC: 5.6 MMOL/L (ref 3.5–5.3)
PROTHROMBIN TIME: 19.5 SECONDS (ref 12.3–15)
SODIUM SERPL-SCNC: 139 MMOL/L (ref 135–147)

## 2025-07-08 PROCEDURE — 80048 BASIC METABOLIC PNL TOTAL CA: CPT

## 2025-07-08 PROCEDURE — 85610 PROTHROMBIN TIME: CPT

## 2025-07-08 PROCEDURE — 36415 COLL VENOUS BLD VENIPUNCTURE: CPT

## 2025-07-08 PROCEDURE — 99214 OFFICE O/P EST MOD 30 MIN: CPT | Performed by: STUDENT IN AN ORGANIZED HEALTH CARE EDUCATION/TRAINING PROGRAM

## 2025-07-08 NOTE — ASSESSMENT & PLAN NOTE
With progression of CKD G5  Genetic Testing:  Not done, possible APOL1  UPCR 4.67g/g  Unable to give ACE inhibitor's,/ARB   due to borderline hyperkalemia

## 2025-07-08 NOTE — ASSESSMENT & PLAN NOTE
Lab Results   Component Value Date    EGFR 6 07/08/2025    EGFR 8 05/10/2025    EGFR 8 03/26/2025    CREATININE 7.12 (H) 07/08/2025    CREATININE 5.92 (H) 05/10/2025    CREATININE 5.86 (H) 03/26/2025   Baseline creatinine 5.2 to 5.5 mg/dL with progression of CKD to ESRD   Current creatinine 7.1 mg/dL, progression of CKD   No uremic symptom is poor appetite   AV fistula performed in September 2024, last evaluation by vascular surgery February 2025, ready to be used  Referred to [facility]   No urgent indication of dialysis at this time however dissipate that patient will require dialysis very soon  Recommend monthly labs  Next visit in 6 weeks  Avoid NSAIDs  Patient aware to call if red flag symptoms     Orders:    PTH, intact; Future    Phosphorus; Future    Basic metabolic panel; Future    CBC and Platelet; Future

## 2025-07-08 NOTE — ASSESSMENT & PLAN NOTE
Lab Results   Component Value Date    EGFR 6 07/08/2025    EGFR 8 05/10/2025    EGFR 8 03/26/2025    CREATININE 7.12 (H) 07/08/2025    CREATININE 5.92 (H) 05/10/2025    CREATININE 5.86 (H) 03/26/2025     Serum calcium 8.3mg/dL  Phosphorus 5.3 mg/dL, goal less than 5.5  Will monitor phosphorus on next set of labs

## 2025-07-08 NOTE — PATIENT INSTRUCTIONS
Thank you for coming to your visit today. As we discussed you kidney function is worsening , your creatinine is 7.12mg/dL. Your potassium is high. Please follow the recommendations below       Recommend low sodium (salt) food    Avoid nonsteroidal anti-inflammatory drugs such as Naprosyn, ibuprofen, Aleve, Advil, Celebrex, Meloxicam (Mobic) etc.  You can use Tylenol as needed if you do not have any liver condition to be concerned about    Try to exercise at least 30 minutes 3 days a week to begin with with an ultimate goal of 5 days a week for at least 30 minutes    Next Visit in 6 weeks  with results   If you need to see us earlier we can change the appointment for you      Joselyn Reyes Bahamonde, MD  Nephrology Attending

## 2025-07-08 NOTE — ASSESSMENT & PLAN NOTE
Lab Results   Component Value Date    EGFR 6 07/08/2025    EGFR 8 05/10/2025    EGFR 8 03/26/2025    CREATININE 7.12 (H) 07/08/2025    CREATININE 5.92 (H) 05/10/2025    CREATININE 5.86 (H) 03/26/2025     Lower extremity edema  Normotensive, /62 mmhg  Recommend low-sodium diet    Continue amlodipine 10 mg daily  Metoprolol 50 mg   torsemide 20 mg   Advised to maintain a good BP control to prevent progression of CKD

## 2025-07-08 NOTE — ASSESSMENT & PLAN NOTE
iPTH  320.7 guidelines levels KDIGO 2017  Continue calcitriol 0.25 mcg daily  Will repeat PTH

## 2025-07-08 NOTE — PROGRESS NOTES
Name: Myra Mustafa      : 1950      MRN: [id number]  Encounter Provider: Joselyn Reyes Bahamonde, MD  Encounter Date: 2025   Encounter department: [facility]  :  Assessment & Plan  CKD stage G5/A3, GFR <15 and albumin creatinine ratio >300 mg/g ([facility])  Lab Results   Component Value Date    EGFR 6 2025    EGFR 8 05/10/2025    EGFR 8 2025    CREATININE 7.12 (H) 2025    CREATININE 5.92 (H) 05/10/2025    CREATININE 5.86 (H) 2025   Baseline creatinine 5.2 to 5.5 mg/dL with progression of CKD to ESRD   Current creatinine 7.1 mg/dL, progression of CKD   No uremic symptom is poor appetite   AV fistula performed in 2024, last evaluation by vascular surgery 2025, ready to be used  Referred to [place]   No urgent indication of dialysis at this time however dissipate that patient will require dialysis very soon  Recommend monthly labs  Next visit in 6 weeks  Avoid NSAIDs  Patient aware to call if red flag symptoms     Orders:    PTH, intact; Future    Phosphorus; Future    Basic metabolic panel; Future    CBC and Platelet; Future    Benign hypertension with chronic kidney disease, stage V ([facility])  Lab Results   Component Value Date    EGFR 6 2025    EGFR 8 05/10/2025    EGFR 8 2025    CREATININE 7.12 (H) 2025    CREATININE 5.92 (H) 05/10/2025    CREATININE 5.86 (H) 2025     Lower extremity edema  Normotensive, /62 mmhg  Recommend low-sodium diet    Continue amlodipine 10 mg daily  Metoprolol 50 mg   torsemide 20 mg   Advised to maintain a good BP control to prevent progression of CKD          FSGS (focal segmental glomerulosclerosis)  With progression of CKD G5  Genetic Testing:  Not done, possible APOL1  UPCR 4.67g/g  Unable to give ACE inhibitor's,/ARB   due to borderline hyperkalemia          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, not on chronic dialysis  ([facility])  Lab Results   Component Value Date    EGFR 6 07/08/2025    EGFR 8 05/10/2025    EGFR 8 03/26/2025    CREATININE 7.12 (H) 07/08/2025    CREATININE 5.92 (H) 05/10/2025    CREATININE 5.86 (H) 03/26/2025     Serum calcium 8.3mg/dL  Phosphorus 5.3 mg/dL, goal less than 5.5  Will monitor phosphorus on next set of labs       Secondary hyperparathyroidism of renal origin ([facility])  iPTH  320.7 guidelines levels KDIGO 2017  Continue calcitriol 0.25 mcg daily  Will repeat PTH       Nephrotic range proteinuria  UPCR   4.7 g/g secondary to secondary FSGS   No ACE inhibitors/ARB in the settings of hyperkalemia   GFR low for SGLT2 inhibitors        Hyperkalemia  Potassium 5.6  Last Lokelma 3 days ago, sometimes forgets to take  Recommend to increase Lokelma to 4 times a week  Orders:    Sodium Zirconium Cyclosilicate (Lokelma) 10 g; Take 1 packet (10 g total) by mouth 4 (four) times a week        History of Present Illness   HPI  Myra Mustafa is a 75 y.o. male DM, FSGS (biopsy proven 2018), CKD G4A3 (bl creat 3-3.5mg/dL, eGFR 21-24).  Patient is here for follow-up of FSGS     History obtained from: patient    Review of Systems   Constitutional:  Positive for appetite change. Negative for activity change.   HENT:  Negative for congestion and dental problem.    Eyes:  Negative for discharge.   Respiratory:  Negative for shortness of breath.    Cardiovascular:  Positive for leg swelling. Negative for chest pain.   Gastrointestinal:  Negative for abdominal distention and abdominal pain.   Genitourinary:  Negative for dysuria.   Musculoskeletal:  Negative for arthralgias.   Skin:  Negative for color change and pallor.   Neurological:  Negative for dizziness.   Psychiatric/Behavioral:  Negative for agitation.      Pertinent Medical History     Medical History Reviewed by provider this encounter:     .  Medications Ordered Prior to Encounter[1]   Social History[2]     Objective   There were no vitals taken for this visit.      Physical Exam  General:  no acute distress at this time  Skin:  No acute rash  Eyes:  No scleral icterus and noninjected  ENT:  mucous membranes moist  Neck:  no carotid bruits  Chest:  Clear to auscultation percussion, good respiratory effort, no use of accessory respiratory muscles  CVS:  Regular rate and rhythm without rub   Abdomen:  soft and nontender   Extremities: lower extremity edema  Neuro:  No gross focality  Psych:  Alert , cooperative              [1]   Current Outpatient Medications on File Prior to Visit   Medication Sig Dispense Refill    ALPRAZolam (XANAX) 0.25 mg tablet TAKE 1 TABLET BY MOUTH PRIOR TO FLIGHT 4 tablet 0    amLODIPine (NORVASC) 10 mg tablet TAKE 1 TABLET(10 MG) BY MOUTH DAILY 90 tablet 1    ascorbic acid (VITAMIN C) 500 mg tablet Take 500 mg by mouth daily      Aspirin Low Dose 81 MG EC tablet TAKE 1 TABLET BY MOUTH EVERY DAY 90 tablet 1    atorvastatin (LIPITOR) 20 mg tablet TAKE 1 TABLET BY MOUTH EVERY DAY 90 tablet 1    calcitriol (ROCALTROL) 0.25 mcg capsule TAKE 1 CAPSULE BY MOUTH DAILY, 5 DAYS A WEEK AS DIRECTED 90 capsule 1    cetirizine (ZyrTEC) 10 mg tablet Take 1 tablet (10 mg total) by mouth daily 30 tablet 1    COMBIGAN 0.2-0.5 %   0    isosorbide dinitrate (ISORDIL) 10 mg tablet TAKE 1 TABLET(10 MG) BY MOUTH THREE TIMES DAILY 270 tablet 0    Lokelma 10 g MIX AND DRINK 1 PACKET(10 GRAMS) BY MOUTH EVERY OTHER DAY 30 each 1    metoprolol succinate (TOPROL-XL) 50 mg 24 hr tablet TAKE 1 TABLET(50 MG) BY MOUTH DAILY 90 tablet 1    sodium bicarbonate 650 mg tablet TAKE 1 TABLET(650 MG) BY MOUTH IN THE MORNING 90 tablet 1    torsemide (DEMADEX) 20 mg tablet TAKE 1 TABLET(20 MG) BY MOUTH DAILY 30 tablet 5    TRAVATAN Z 0.004 % ophthalmic solution Administer 1 drop to both eyes daily at bedtime   0    warfarin (COUMADIN) 2 mg tablet TAKE 2-3 TABLETS BY MOUTH DAILY AS DIRECTED BY PRESCRIBER 80 tablet 11     No current facility-administered medications on file prior to visit.    [2]   Social History  Tobacco Use    Smoking status: Former     Current packs/day: 0.00     Types: Cigarettes     Start date:      Quit date:      Years since quittin.5    Smokeless tobacco: Never   Vaping Use    Vaping status: Never Used   Substance and Sexual Activity    Alcohol use: Yes     Comment: occasionally    Drug use: No    Sexual activity: Yes     Partners: Female     Birth control/protection: None

## 2025-07-08 NOTE — ASSESSMENT & PLAN NOTE
Potassium 5.6  Last Lokelma 3 days ago, sometimes forgets to take  Recommend to increase Lokelma to 4 times a week  Orders:    Sodium Zirconium Cyclosilicate (Lokelma) 10 g; Take 1 packet (10 g total) by mouth 4 (four) times a week

## 2025-07-23 PROBLEM — R04.0 EPISTAXIS: Status: RESOLVED | Noted: 2023-01-16 | Resolved: 2025-07-23

## 2025-07-23 PROBLEM — Z00.00 MEDICARE ANNUAL WELLNESS VISIT, SUBSEQUENT: Status: ACTIVE | Noted: 2025-07-23

## 2025-07-23 PROBLEM — U07.1 COVID-19 VIRUS INFECTION: Status: RESOLVED | Noted: 2021-01-08 | Resolved: 2025-07-23

## 2025-07-23 NOTE — ASSESSMENT & PLAN NOTE
Lab Results   Component Value Date    EGFR 6 07/08/2025    EGFR 8 05/10/2025    EGFR 8 03/26/2025    CREATININE 7.12 (H) 07/08/2025    CREATININE 5.92 (H) 05/10/2025    CREATININE 5.86 (H) 03/26/2025   Nephrology note reviewed anticipating dialysis in near future    Orders:    Comprehensive metabolic panel; Future

## 2025-07-23 NOTE — ASSESSMENT & PLAN NOTE
Wt Readings from Last 3 Encounters:   07/25/25 64.4 kg (142 lb)   07/08/25 63 kg (139 lb)   04/16/25 63.4 kg (139 lb 12.8 oz)   compensated

## 2025-07-23 NOTE — PROGRESS NOTES
Name: Myra Mustafa      : 1950      MRN: [id number]  Encounter Provider: Rufina Cao MD  Encounter Date: 2025   Encounter department: [facility]  :  Assessment & Plan  Dyslipidemia  Due for lipid panel    Orders:    Lipid panel; Future    Anemia due to stage 5 chronic kidney disease, not on chronic dialysis  (H[facility])  Last hg 11.7         Chronic systolic heart failure (HCC)  Wt Readings from Last 3 Encounters:   25 64.4 kg (142 lb)   25 63 kg (139 lb)   25 63.4 kg (139 lb 12.8 oz)   compensated                 Coronary artery disease involving native coronary artery of native heart without angina pectoris  Chest pain free cardiology note reviewed          Essential hypertension  Well controlled on current therapy continue with current medications and will reassess next visit      Orders:    Comprehensive metabolic panel; Future    Longstanding persistent atrial fibrillation (HCC)  Pt is on warfarin followed by cardiology         Medicare annual wellness visit, subsequent  Reviewed and diagnostics ordered          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, not on chronic dialysis (HCC)  Lab Results   Component Value Date    EGFR 6 2025    EGFR 8 05/10/2025    EGFR 8 2025    CREATININE 7.12 (H) 2025    CREATININE 5.92 (H) 05/10/2025    CREATININE 5.86 (H) 2025   Nephrology note reviewed anticipating dialysis in near future    Orders:    Comprehensive metabolic panel; Future    Bilateral swelling of feet and ankles  Pt is on amlodipine  10mg po qd has been on his feet outdoors in heat  elevate compression stockings prn    Orders:    Comprehensive metabolic panel; Future    Anticoagulant long-term use  Pt on warfarin due to aortic valve issues          Bradycardia  Chronic          Anxiety    Orders:    ALPRAZolam (XANAX) 0.25 mg tablet; TAKE 1 TABLET BY MOUTH PRIOR TO  "FLIGHT    Screening for prostate cancer    Orders:    PSA, Total Screen; Future           History of Present Illness   Pt is here for interval visit and evaluation of multiple medical problems, review of medications, labs, Health Maintenance and any recent specialty consults Due for medicare wellness        Review of Systems   Constitutional:  Negative for appetite change, chills, fatigue and fever.   Respiratory:  Negative for cough, chest tightness and shortness of breath.    Cardiovascular:  Negative for chest pain, palpitations and leg swelling.   Gastrointestinal:  Negative for abdominal pain, constipation, diarrhea, nausea and vomiting.   Genitourinary:  Negative for difficulty urinating and frequency.   Musculoskeletal:  Negative for arthralgias, back pain, gait problem and neck pain.   Skin:  Negative for rash.   Neurological:  Negative for dizziness, weakness, light-headedness, numbness and headaches.   Hematological:  Does not bruise/bleed easily.   Psychiatric/Behavioral:  Negative for dysphoric mood and sleep disturbance. The patient is not nervous/anxious.        Objective   /72 (BP Location: Right arm, Patient Position: Sitting, Cuff Size: Standard)   Pulse (!) 45   Temp 98 °F (36.7 °C) (Tympanic)   Resp 16   Ht 5' 8\" (1.727 m)   Wt 64.4 kg (142 lb)   SpO2 98%   BMI 21.59 kg/m²      Physical Exam  Vitals and nursing note reviewed.   Constitutional:       General: He is not in acute distress.     Appearance: Normal appearance. He is well-developed.     Eyes:      Conjunctiva/sclera: Conjunctivae normal.      Pupils: Pupils are equal, round, and reactive to light.     Neck:      Thyroid: No thyromegaly.     Cardiovascular:      Rate and Rhythm: Normal rate and regular rhythm.      Pulses: Normal pulses.      Heart sounds: Murmur (3/6 systolic) heard.      No friction rub.   Pulmonary:      Effort: Pulmonary effort is normal. No respiratory distress.      Breath sounds: Normal breath sounds. " No wheezing.   Abdominal:      General: Bowel sounds are normal. There is no distension.      Palpations: Abdomen is soft. There is no mass.      Tenderness: There is no abdominal tenderness. There is no guarding.      Hernia: No hernia is present.     Musculoskeletal:      Cervical back: Normal range of motion and neck supple.      Right lower leg: No edema.      Left lower leg: No edema.   Lymphadenopathy:      Cervical: No cervical adenopathy.     Skin:     General: Skin is warm and dry.      Findings: No erythema or rash.     Neurological:      General: No focal deficit present.      Mental Status: He is alert and oriented to person, place, and time.      Cranial Nerves: No cranial nerve deficit.      Motor: No weakness.      Gait: Gait normal.      Deep Tendon Reflexes: Reflexes normal.     Psychiatric:         Mood and Affect: Mood normal.

## 2025-07-25 ENCOUNTER — OFFICE VISIT (OUTPATIENT)
Dept: FAMILY MEDICINE CLINIC | Facility: CLINIC | Age: 75
End: 2025-07-25
Payer: COMMERCIAL

## 2025-07-25 VITALS
HEART RATE: 45 BPM | DIASTOLIC BLOOD PRESSURE: 72 MMHG | WEIGHT: 142 LBS | SYSTOLIC BLOOD PRESSURE: 138 MMHG | HEIGHT: 68 IN | BODY MASS INDEX: 21.52 KG/M2 | RESPIRATION RATE: 16 BRPM | TEMPERATURE: 98 F | OXYGEN SATURATION: 98 %

## 2025-07-25 DIAGNOSIS — M25.472 BILATERAL SWELLING OF FEET AND ANKLES: ICD-10-CM

## 2025-07-25 DIAGNOSIS — F41.9 ANXIETY: ICD-10-CM

## 2025-07-25 DIAGNOSIS — I48.11 LONGSTANDING PERSISTENT ATRIAL FIBRILLATION (HCC): ICD-10-CM

## 2025-07-25 DIAGNOSIS — M25.474 BILATERAL SWELLING OF FEET AND ANKLES: ICD-10-CM

## 2025-07-25 DIAGNOSIS — I50.22 CHRONIC SYSTOLIC HEART FAILURE (HCC): Chronic | ICD-10-CM

## 2025-07-25 DIAGNOSIS — Z12.5 SCREENING FOR PROSTATE CANCER: ICD-10-CM

## 2025-07-25 DIAGNOSIS — N18.5 ANEMIA DUE TO STAGE 5 CHRONIC KIDNEY DISEASE, NOT ON CHRONIC DIALYSIS  (HCC): ICD-10-CM

## 2025-07-25 DIAGNOSIS — E83.9 CHRONIC KIDNEY DISEASE-MINERAL BONE DISORDER (CKD-MBD) WITH STAGE 5 CHRONIC KIDNEY DISEASE, NOT ON CHRONIC DIALYSIS (HCC): ICD-10-CM

## 2025-07-25 DIAGNOSIS — I25.10 CORONARY ARTERY DISEASE INVOLVING NATIVE CORONARY ARTERY OF NATIVE HEART WITHOUT ANGINA PECTORIS: Chronic | ICD-10-CM

## 2025-07-25 DIAGNOSIS — M89.9 CHRONIC KIDNEY DISEASE-MINERAL BONE DISORDER (CKD-MBD) WITH STAGE 5 CHRONIC KIDNEY DISEASE, NOT ON CHRONIC DIALYSIS (HCC): ICD-10-CM

## 2025-07-25 DIAGNOSIS — D63.1 ANEMIA DUE TO STAGE 5 CHRONIC KIDNEY DISEASE, NOT ON CHRONIC DIALYSIS  (HCC): ICD-10-CM

## 2025-07-25 DIAGNOSIS — E78.5 DYSLIPIDEMIA: Primary | ICD-10-CM

## 2025-07-25 DIAGNOSIS — Z00.00 MEDICARE ANNUAL WELLNESS VISIT, SUBSEQUENT: ICD-10-CM

## 2025-07-25 DIAGNOSIS — R00.1 BRADYCARDIA: ICD-10-CM

## 2025-07-25 DIAGNOSIS — I10 ESSENTIAL HYPERTENSION: Chronic | ICD-10-CM

## 2025-07-25 DIAGNOSIS — M25.475 BILATERAL SWELLING OF FEET AND ANKLES: ICD-10-CM

## 2025-07-25 DIAGNOSIS — N18.5 CHRONIC KIDNEY DISEASE-MINERAL BONE DISORDER (CKD-MBD) WITH STAGE 5 CHRONIC KIDNEY DISEASE, NOT ON CHRONIC DIALYSIS (HCC): ICD-10-CM

## 2025-07-25 DIAGNOSIS — M25.471 BILATERAL SWELLING OF FEET AND ANKLES: ICD-10-CM

## 2025-07-25 DIAGNOSIS — Z79.01 ANTICOAGULANT LONG-TERM USE: ICD-10-CM

## 2025-07-25 PROCEDURE — G0439 PPPS, SUBSEQ VISIT: HCPCS | Performed by: FAMILY MEDICINE

## 2025-07-25 PROCEDURE — G2211 COMPLEX E/M VISIT ADD ON: HCPCS | Performed by: FAMILY MEDICINE

## 2025-07-25 PROCEDURE — 99214 OFFICE O/P EST MOD 30 MIN: CPT | Performed by: FAMILY MEDICINE

## 2025-07-25 RX ORDER — ALPRAZOLAM 0.25 MG
TABLET ORAL
Qty: 4 TABLET | Refills: 0 | Status: SHIPPED | OUTPATIENT
Start: 2025-07-25

## 2025-07-25 NOTE — PROGRESS NOTES
Name: Myra Mustafa      : 1950      MRN: [id number]  Encounter Provider: Rufina Cao MD  Encounter Date: 2025   Encounter department: [facility]  :  Assessment & Plan       Preventive health issues were discussed with patient, and age appropriate screening tests were ordered as noted in patient's After Visit Summary. Personalized health advice and appropriate referrals for health education or preventive services given if needed, as noted in patient's After Visit Summary.    History of Present Illness     HPI   Patient Care Team:  Rufina Cao MD as PCP - General (Family Medicine)  Joselyn Reyes Bahamonde, MD (Nephrology)    Review of Systems  Medical History Reviewed by provider this encounter:       Annual Wellness Visit Questionnaire   Myra is here for his Subsequent Wellness visit.     Health Risk Assessment:   Patient rates overall health as good. Patient feels that their physical health rating is slightly worse. Patient is very satisfied with their life. Eyesight was rated as same. Hearing was rated as same. Patient feels that their emotional and mental health rating is same. Patients states they are sometimes angry. Patient states they are sometimes unusually tired/fatigued. Pain experienced in the last 7 days has been none. Patient states that he has experienced no weight loss or gain in last 6 months.     Depression Screening:   PHQ-2 Score: 0      Fall Risk Screening:   In the past year, patient has experienced: no history of falling in past year      Home Safety:  Patient does not have trouble with stairs inside or outside of their home. Patient has working smoke alarms and has working carbon monoxide detector. Home safety hazards include: none.     Nutrition:   Current diet is Regular.     Medications:   Patient is currently taking over-the-counter supplements. OTC medications include: see medication list. Patient is able to manage medications.      Activities of Daily Living (ADLs)/Instrumental Activities of Daily Living (IADLs):   Walk and transfer into and out of bed and chair?: Yes  Dress and groom yourself?: Yes    Bathe or shower yourself?: Yes    Feed yourself? Yes  Do your laundry/housekeeping?: Yes  Manage your money, pay your bills and track your expenses?: Yes  Make your own meals?: Yes    Do your own shopping?: Yes    Previous Hospitalizations:   Any hospitalizations or ED visits within the last 12 months?: No      Advance Care Planning:   Living will: No    Durable POA for healthcare: No    Advanced directive: No      Cognitive Screening:   Provider or family/friend/caregiver concerned regarding cognition?: No    Preventive Screenings      Cardiovascular Screening:    General: History Lipid Disorder and Risks and Benefits Discussed    Due for: Lipid Panel      Diabetes Screening:     General: Risks and Benefits Discussed    Due for: Blood Glucose      Colorectal Cancer Screening:     General: Screening Current      Prostate Cancer Screening:    General: Risks and Benefits Discussed    Due for: PSA      Osteoporosis Screening:    General: Screening Not Indicated      Abdominal Aortic Aneurysm (AAA) Screening:    Risk factors include: age between 65-74 yo and tobacco use        General: Screening Not Indicated      Lung Cancer Screening:     General: Screening Not Indicated      Hepatitis C Screening:    General: Screening Current    Immunizations:  - Immunizations due: Zoster (Shingrix)    Screening, Brief Intervention, and Referral to Treatment (SBIRT)     Screening      AUDIT-C Screenin) How often did you have a drink containing alcohol in the past year? monthly or less  2) How many drinks did you have on a typical day when you were drinking in the past year? 1 to 2    Single Item Drug Screening:  How often have you used an illegal drug (including marijuana) or a prescription medication for non-medical reasons in the past year?  "never    Single Item Drug Screen Score: 0  Interpretation: Negative screen for possible drug use disorder    Social Drivers of Health     Financial Resource Strain: Low Risk  (12/2/2022)    Overall Financial Resource Strain (CARDIA)     Difficulty of Paying Living Expenses: Not hard at all   Food Insecurity: No Food Insecurity (7/25/2025)    Nursing - Inadequate Food Risk Classification     Worried About Running Out of Food in the Last Year: Never true     Ran Out of Food in the Last Year: Never true   Transportation Needs: No Transportation Needs (7/25/2025)    PRAPARE - Transportation     Lack of Transportation (Medical): No     Lack of Transportation (Non-Medical): No   Housing Stability: Unknown (7/25/2025)    Housing Stability Vital Sign     Unable to Pay for Housing in the Last Year: No     Homeless in the Last Year: No   Utilities: Not At Risk (7/25/2025)    [facility] Utilities     Threatened with loss of utilities: No     No results found.    Objective   Ht 5' 8\" (1.727 m)   Wt 64.4 kg (142 lb)   BMI 21.59 kg/m²     Physical Exam    "

## 2025-07-25 NOTE — ASSESSMENT & PLAN NOTE
Pt is on amlodipine  10mg po qd has been on his feet outdoors in heat  elevate compression stockings prn    Orders:    Comprehensive metabolic panel; Future

## 2025-07-25 NOTE — PATIENT INSTRUCTIONS
Medicare Preventive Visit Patient Instructions  Thank you for completing your Welcome to Medicare Visit or Medicare Annual Wellness Visit today. Your next wellness visit will be due in one year (7/26/2026).  The screening/preventive services that you may require over the next 5-10 years are detailed below. Some tests may not apply to you based off risk factors and/or age. Screening tests ordered at today's visit but not completed yet may show as past due. Also, please note that scanned in results may not display below.  Preventive Screenings:  Service Recommendations Previous Testing/Comments   Colorectal Cancer Screening  Colonoscopy    Fecal Occult Blood Test (FOBT)/Fecal Immunochemical Test (FIT)  Fecal DNA/Cologuard Test  Flexible Sigmoidoscopy Age: 45-75 years old   Colonoscopy: every 10 years (May be performed more frequently if at higher risk)  OR  FOBT/FIT: every 1 year  OR  Cologuard: every 3 years  OR  Sigmoidoscopy: every 5 years  Screening may be recommended earlier than age 45 if at higher risk for colorectal cancer. Also, an individualized decision between you and your healthcare provider will decide whether screening between the ages of 76-85 would be appropriate. Colonoscopy: 08/25/2022  FOBT/FIT: Not on file  Cologuard: Not on file  Sigmoidoscopy: Not on file    Screening Current     Prostate Cancer Screening Individualized decision between patient and health care provider in men between ages of 55-69   Medicare will cover every 12 months beginning on the day after your 50th birthday PSA: 2.63 ng/mL     Risks and Benefits Discussed  Due for PSA     Hepatitis C Screening Once for adults born between 1945 and 1965  More frequently in patients at high risk for Hepatitis C Hep C Antibody: 06/04/2024    Screening Current   Diabetes Screening 1-2 times per year if you're at risk for diabetes or have pre-diabetes Fasting glucose: 85 mg/dL (7/8/2025)  A1C: 5.5 (3/11/2024)  Risks and Benefits Discussed  Due  for Blood Glucose   Cholesterol Screening Once every 5 years if you don't have a lipid disorder. May order more often based on risk factors. Lipid panel: 06/10/2024  History Lipid Disorder  Risks and Benefits Discussed  Due for Lipid Panel      Other Preventive Screenings Covered by Medicare:  Abdominal Aortic Aneurysm (AAA) Screening: covered once if your at risk. You're considered to be at risk if you have a family history of AAA or a male between the age of 65-75 who smoking at least 100 cigarettes in your lifetime.  Lung Cancer Screening: covers low dose CT scan once per year if you meet all of the following conditions: (1) Age 55-77; (2) No signs or symptoms of lung cancer; (3) Current smoker or have quit smoking within the last 15 years; (4) You have a tobacco smoking history of at least 20 pack years (packs per day x number of years you smoked); (5) You get a written order from a healthcare provider.  Glaucoma Screening: covered annually if you're considered high risk: (1) You have diabetes OR (2) Family history of glaucoma OR (3)  aged 50 and older OR (4)  American aged 65 and older  Osteoporosis Screening: covered every 2 years if you meet one of the following conditions: (1) Have a vertebral abnormality; (2) On glucocorticoid therapy for more than 3 months; (3) Have primary hyperparathyroidism; (4) On osteoporosis medications and need to assess response to drug therapy.  HIV Screening: covered annually if you're between the age of 15-65. Also covered annually if you are younger than 15 and older than 65 with risk factors for HIV infection. For pregnant patients, it is covered up to 3 times per pregnancy.    Immunizations:  Immunization Recommendations   Influenza Vaccine Annual influenza vaccination during flu season is recommended for all persons aged >= 6 months who do not have contraindications   Pneumococcal Vaccine   * Pneumococcal conjugate vaccine = PCV13 (Prevnar 13), PCV15  (Vaxneuvance), PCV20 (Prevnar 20)  * Pneumococcal polysaccharide vaccine = PPSV23 (Pneumovax) Adults 19-63 yo with certain risk factors or if 65+ yo  If never received any pneumonia vaccine: recommend Prevnar 20 (PCV20)  Give PCV20 if previously received 1 dose of PCV13 or PPSV23   Hepatitis B Vaccine 3 dose series if at intermediate or high risk (ex: diabetes, end stage renal disease, liver disease)   Respiratory syncytial virus (RSV) Vaccine - COVERED BY MEDICARE PART D  * RSVPreF3 (Arexvy) CDC recommends that adults 60 years of age and older may receive a single dose of RSV vaccine using shared clinical decision-making (SCDM)   Tetanus (Td) Vaccine - COST NOT COVERED BY MEDICARE PART B Following completion of primary series, a booster dose should be given every 10 years to maintain immunity against tetanus. Td may also be given as tetanus wound prophylaxis.   Tdap Vaccine - COST NOT COVERED BY MEDICARE PART B Recommended at least once for all adults. For pregnant patients, recommended with each pregnancy.   Shingles Vaccine (Shingrix) - COST NOT COVERED BY MEDICARE PART B  2 shot series recommended in those 19 years and older who have or will have weakened immune systems or those 50 years and older     Health Maintenance Due:      Topic Date Due   • Colorectal Cancer Screening  08/24/2027   • Hepatitis C Screening  Completed     Immunizations Due:      Topic Date Due   • COVID-19 Vaccine (4 - 2024-25 season) 09/01/2024   • Influenza Vaccine (1) 09/01/2025     Advance Directives   What are advance directives?  Advance directives are legal documents that state your wishes and plans for medical care. These plans are made ahead of time in case you lose your ability to make decisions for yourself. Advance directives can apply to any medical decision, such as the treatments you want, and if you want to donate organs.   What are the types of advance directives?  There are many types of advance directives, and each state  has rules about how to use them. You may choose a combination of any of the following:  Living will:  This is a written record of the treatment you want. You can also choose which treatments you do not want, which to limit, and which to stop at a certain time. This includes surgery, medicine, IV fluid, and tube feedings.   Durable power of  for healthcare (DPAHC):  This is a written record that states who you want to make healthcare choices for you when you are unable to make them for yourself. This person, called a proxy, is usually a family member or a friend. You may choose more than 1 proxy.  Do not resuscitate (DNR) order:  A DNR order is used in case your heart stops beating or you stop breathing. It is a request not to have certain forms of treatment, such as CPR. A DNR order may be included in other types of advance directives.  Medical directive:  This covers the care that you want if you are in a coma, near death, or unable to make decisions for yourself. You can list the treatments you want for each condition. Treatment may include pain medicine, surgery, blood transfusions, dialysis, IV or tube feedings, and a ventilator (breathing machine).  Values history:  This document has questions about your views, beliefs, and how you feel and think about life. This information can help others choose the care that you would choose.  Why are advance directives important?  An advance directive helps you control your care. Although spoken wishes may be used, it is better to have your wishes written down. Spoken wishes can be misunderstood, or not followed. Treatments may be given even if you do not want them. An advance directive may make it easier for your family to make difficult choices about your care.       © Copyright [employer] 2018 Information is for End User's use only and may not be sold, redistributed or otherwise used for commercial purposes. All illustrations and images included in  CareNotes® are the copyrighted property of [employer] or [employer]

## 2025-08-07 DIAGNOSIS — I48.0 PAROXYSMAL ATRIAL FIBRILLATION (HCC): ICD-10-CM

## 2025-08-07 DIAGNOSIS — Z95.2 S/P AVR: ICD-10-CM

## 2025-08-08 RX ORDER — WARFARIN SODIUM 2 MG/1
TABLET ORAL
Qty: 80 TABLET | Refills: 11 | Status: SHIPPED | OUTPATIENT
Start: 2025-08-08

## 2025-08-11 ENCOUNTER — ANTICOAG VISIT (OUTPATIENT)
Dept: CARDIOLOGY CLINIC | Facility: CLINIC | Age: 75
End: 2025-08-11

## 2025-08-11 ENCOUNTER — APPOINTMENT (OUTPATIENT)
Dept: LAB | Facility: CLINIC | Age: 75
End: 2025-08-11
Payer: COMMERCIAL

## 2025-08-22 PROBLEM — Z00.00 MEDICARE ANNUAL WELLNESS VISIT, SUBSEQUENT: Status: RESOLVED | Noted: 2025-07-23 | Resolved: 2025-08-22

## (undated) DEVICE — INTENDED FOR TISSUE SEPARATION, AND OTHER PROCEDURES THAT REQUIRE A SHARP SURGICAL BLADE TO PUNCTURE OR CUT.: Brand: BARD-PARKER ® CARBON RIB-BACK BLADES

## (undated) DEVICE — BLANKET HYPOTHERMIA ADULT GAYMAR

## (undated) DEVICE — DECANTER: Brand: UNBRANDED

## (undated) DEVICE — GLOVE SRG BIOGEL 7

## (undated) DEVICE — SUT SILK 2 60 IN SA8H

## (undated) DEVICE — UMBILICAL TAPE: Brand: DEROYAL

## (undated) DEVICE — 40601 PROLONGED POSITIONING SYSTEM: Brand: 40601 PROLONGED POSITIONING SYSTEM

## (undated) DEVICE — BONE WAX WHITE: Brand: BONE WAX WHITE

## (undated) DEVICE — SUT ETHIBOND 2-0 SH-1/SH-1 30 IN PXX92

## (undated) DEVICE — LIGACLIP MCA MULTIPLE CLIP APPLIERS, 20 SMALL CLIPS: Brand: LIGACLIP

## (undated) DEVICE — BLADE ELECTRODE: Brand: EDGE

## (undated) DEVICE — ANTIBACTERIAL UNDYED BRAIDED (POLYGLACTIN 910), SYNTHETIC ABSORBABLE SUTURE: Brand: COATED VICRYL

## (undated) DEVICE — DRESSING ALLEVYN LIFE SACRAL 6.75 X 6.5 IN

## (undated) DEVICE — CATH STRAIGHT RED RUBBER 20 FR

## (undated) DEVICE — STRL BETHLEHEM A V FISTULA PK: Brand: CARDINAL HEALTH

## (undated) DEVICE — OASIS DRAIN, SINGLE, INLINE & ATS COMPATIBLE: Brand: OASIS

## (undated) DEVICE — SUCTION CATH 18 FR

## (undated) DEVICE — TRAY FOLEY 16FR SURESTEP TEMP SENS URIMETER STAT LOK

## (undated) DEVICE — SUT SILK 0 CT-1 30 IN 424H

## (undated) DEVICE — 3M™ IOBAN™ 2 ANTIMICROBIAL INCISE DRAPE 6640EZ: Brand: IOBAN™ 2

## (undated) DEVICE — SUT PROLENE 4-0 SH 36 IN 8521H

## (undated) DEVICE — SUT VICRYL 3-0 SH 27 IN J416H

## (undated) DEVICE — VESSEL CANNULA

## (undated) DEVICE — EXOFIN PRECISION PEN HIGH VISCOSITY TOPICAL SKIN ADHESIVE: Brand: EXOFIN PRECISION PEN, 1G

## (undated) DEVICE — SUCTION COAGULATOR 10FR FOOT CNTRL MEGADYNE

## (undated) DEVICE — SUT PDS PLUS 1 CTB 36 IN PDPB359T

## (undated) DEVICE — SUT PROLENE 6-0 BV-1/BV-1 24 IN 8805H

## (undated) DEVICE — LIGHT HANDLE COVER SLEEVE DISP BLUE STELLAR

## (undated) DEVICE — SUT PROLENE 4-0 BB 36 IN 8581H

## (undated) DEVICE — PLUMEPEN PRO 10FT

## (undated) DEVICE — 3000CC GUARDIAN II: Brand: GUARDIAN

## (undated) DEVICE — SUT SILK 3-0 SH CR/8 18 IN C013D

## (undated) DEVICE — EVERGRIP INSERT SET 61MM: Brand: FOGARTY EVERGRIP

## (undated) DEVICE — SUT SILK 2-0 18 IN A185H

## (undated) DEVICE — PLEDGET CARDIO PTFE 9.5 X 4.8 SOFT LF (6EA/PK)

## (undated) DEVICE — SUT MONOCRYL 4-0 PS-2 18 IN Y496G

## (undated) DEVICE — DRAPE INTESTINAL ISOLATION BAG

## (undated) DEVICE — SUT SILK 4-0 18 IN A183H

## (undated) DEVICE — 32 FR STRAIGHT – SOFT PVC CATHETER: Brand: PVC THORACIC CATHETERS

## (undated) DEVICE — PAD GROUNDING DUAL ADULT

## (undated) DEVICE — SILVER-COATED ANTIBACTERIAL BARRIER DRESSING: Brand: ACTICOAT SURGIC 10X25CM 5PK US

## (undated) DEVICE — THERMOFLECT BLANKET, L, 25EA                               TS THERMOFLECT BLANKET, 48" X 84", SILVER, 5/BG, 5 BG/CS NW: Brand: THERMOFLECT

## (undated) DEVICE — PVC URETHRAL CATHETER: Brand: DOVER

## (undated) DEVICE — 32 FR RIGHT ANGLE – SOFT PVC CATHETER: Brand: PVC THORACIC CATHETERS

## (undated) DEVICE — SUTURE GUIDE

## (undated) DEVICE — GAUZE SPONGES,16 PLY: Brand: CURITY

## (undated) DEVICE — FILTER SMOKE EVAC VIROSAFE

## (undated) DEVICE — PACK VALVE PBDS

## (undated) DEVICE — DRAPE SHEET X-LG

## (undated) DEVICE — CATHETER PLUG WITH CAP: Brand: DOVER

## (undated) DEVICE — STERNAL WIRE

## (undated) DEVICE — SUT SILK 3-0 18 IN A184H

## (undated) DEVICE — DRESSING ALLEVYN LIFE HEEL 25 X 25.2CM

## (undated) DEVICE — PETRI DISH STERILE

## (undated) DEVICE — RECIP.STERNUM SAW BLADE 34/7.5/0.7MM: Brand: AESCULAP

## (undated) DEVICE — GLOVE INDICATOR PI UNDERGLOVE SZ 7 BLUE

## (undated) DEVICE — GLOVE SRG BIOGEL ECLIPSE 7

## (undated) DEVICE — 1820 FOAM BLOCK NEEDLE COUNTER: Brand: DEVON

## (undated) DEVICE — SUT ETHIBOND 2-0 SH-1/SH-1 30 IN X763H

## (undated) DEVICE — TIBURON SPLIT SHEET: Brand: CONVERTORS

## (undated) DEVICE — SUT ETHIBOND 2-0 V-5/V-5 30 IN PXX52